# Patient Record
Sex: MALE | Race: WHITE | NOT HISPANIC OR LATINO | Employment: FULL TIME | URBAN - METROPOLITAN AREA
[De-identification: names, ages, dates, MRNs, and addresses within clinical notes are randomized per-mention and may not be internally consistent; named-entity substitution may affect disease eponyms.]

---

## 2017-01-20 ENCOUNTER — OFFICE VISIT (OUTPATIENT)
Dept: RHEUMATOLOGY | Facility: CLINIC | Age: 57
End: 2017-01-20
Attending: STUDENT IN AN ORGANIZED HEALTH CARE EDUCATION/TRAINING PROGRAM
Payer: COMMERCIAL

## 2017-01-20 VITALS
SYSTOLIC BLOOD PRESSURE: 118 MMHG | BODY MASS INDEX: 26.82 KG/M2 | WEIGHT: 198 LBS | OXYGEN SATURATION: 96 % | DIASTOLIC BLOOD PRESSURE: 79 MMHG | HEIGHT: 72 IN | HEART RATE: 63 BPM

## 2017-01-20 DIAGNOSIS — M31.6 TEMPORAL ARTERITIS (H): Primary | ICD-10-CM

## 2017-01-20 PROCEDURE — 99212 OFFICE O/P EST SF 10 MIN: CPT | Mod: ZF

## 2017-01-20 ASSESSMENT — PAIN SCALES - GENERAL: PAINLEVEL: MODERATE PAIN (4)
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
## 2017-01-20 NOTE — NURSING NOTE
Chief Complaint   Patient presents with     RECHECK     3 month follow up for idiopathic acute paccreatitis, vasculitis       Initial /79 mmHg  Pulse 63  Ht 1.829 m (6')  Wt 89.812 kg (198 lb)  BMI 26.85 kg/m2  SpO2 96% Estimated body mass index is 26.85 kg/(m^2) as calculated from the following:    Height as of this encounter: 1.829 m (6').    Weight as of this encounter: 89.812 kg (198 lb).  BP completed using cuff size: zac Dillon, JERILYN
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
## 2017-01-20 NOTE — PATIENT INSTRUCTIONS
Decrease prednisone by 1 mg daily per month until off.    Check ESR and CRP every other month.    Follow up with primary care provider and Dr. Tamez as previously scheduled.    Get knee x-rays and PT referral from primary care provider for patellofemoral syndrome.

## 2017-01-20 NOTE — MR AVS SNAPSHOT
After Visit Summary   1/20/2017    Lucas Brown    MRN: [id number]           Patient Information     Date Of Birth          1960        Visit Information        Provider Department      1/20/2017 9:30 AM Gato Simpson MD [facility] Rheumatology        Care Instructions    Decrease prednisone by 1 mg daily per month until off.    Check ESR and CRP every other month.    Follow up with primary care provider and Dr. Tamez as previously scheduled.    Get knee x-rays and PT referral from primary care provider for patellofemoral syndrome.        Follow-ups after your visit        Follow-up notes from your care team     Return if symptoms worsen or fail to improve.      Your next 10 appointments already scheduled     Apr 12, 2017 11:00 AM   (Arrive by 10:45 AM)   Return Visit with Oniel Cates MD   [facility] Pancreas and Biliary ([facility])    [address] [place] [address] [phone]              Who to contact     If you have questions or need follow up information about today's clinic visit or your schedule please contact [facility] RHEUMATOLOGY directly at [phone].  Normal or non-critical lab and imaging results will be communicated to you by [employer], letter or phone within 4 business days after the clinic has received the results. If you do not hear from us within 7 days, please contact the clinic through [employer] or phone. If you have a critical or abnormal lab result, we will notify you by phone as soon as possible.  Submit refill requests through [employer] or call your pharmacy and they will forward the refill request to us. Please allow 3 business days for your refill to be completed.          Additional Information About Your Visit        [employer] Information     [employer] gives you secure access to your electronic health record. If you see a primary care provider, you can also send messages to your care team and make  appointments. If you have questions, please call your primary care clinic.  If you do not have a primary care provider, please call [phone] and they will assist you.        Care EveryWhere ID     This is your Care EveryWhere ID. This could be used by other organizations to access your [facility] medical records  [id number]        Your Vitals Were     Pulse Height BMI (Body Mass Index) Pulse Oximetry          63 1.829 m (6') 26.85 kg/m2 96%         Blood Pressure from Last 3 Encounters:   01/20/17 118/79   10/21/16 98/67   10/18/16 115/80    Weight from Last 3 Encounters:   01/20/17 89.812 kg (198 lb)   10/21/16 86.637 kg (191 lb)   10/18/16 82.555 kg (182 lb)              Today, you had the following     No orders found for display         Today's Medication Changes          These changes are accurate as of: 1/20/17 10:17 AM.  If you have any questions, ask your nurse or doctor.               These medicines have changed or have updated prescriptions.        Dose/Directions    HYDROmorphone 4 MG tablet   Commonly known as:  DILAUDID   This may have changed:  how much to take   Used for:  S/P ERCP        Dose:  2-4 mg   Take 0.5-1 tablets (2-4 mg) by mouth every 4 hours as needed for moderate to severe pain   Quantity:  90 tablet   Refills:  0                Primary Care Provider Office Phone # Fax #    Cyril Mackay [phone]       [address] [address]        Thank you!     Thank you for choosing [facility] RHEUMATOLOGY  for your care. Our goal is always to provide you with excellent care. Hearing back from our patients is one way we can continue to improve our services. Please take a few minutes to complete the written survey that you may receive in the mail after your visit with us. Thank you!             Your Updated Medication List - Protect others around you: Learn how to safely use, store and throw away your medicines at www.disposemymeds.org.           This list is accurate as of: 1/20/17 10:17 AM.  Always use your most recent med list.                   Brand Name Dispense Instructions for use    ACETAMINOPHEN PO      Take 1,000 mg by mouth every 4 hours as needed for pain       aspirin 81 MG tablet      Take 81 mg by mouth daily       fenofibrate 145 MG tablet      Take 145 mg by mouth daily       HYDROmorphone 4 MG tablet    DILAUDID    90 tablet    Take 0.5-1 tablets (2-4 mg) by mouth every 4 hours as needed for moderate to severe pain       levothyroxine 150 MCG tablet    SYNTHROID/LEVOTHROID     Take 150 mcg by mouth daily       lisinopril 10 MG tablet    PRINIVIL/ZESTRIL     Take 10 mg by mouth daily       MULTIVITAMINS Chew      Take 1 chew tab by mouth daily       omeprazole 20 MG CR capsule    priLOSEC     Take 20-40 mg by mouth daily       ondansetron 4 MG ODT tab    ZOFRAN-ODT     Take 4 mg by mouth every 8 hours as needed for nausea       pravastatin 40 MG tablet    PRAVACHOL     Take 40 mg by mouth daily       * PREDNISONE PO      Take by mouth daily       * predniSONE 1 MG tablet    DELTASONE    240 tablet    Take 1 tablet (1 mg) by mouth daily       sertraline 50 MG tablet    ZOLOFT     Take 50 mg by mouth daily       sucralfate 1 GM/10ML suspension    CARAFATE    420 mL    Take 10 mLs (1 g) by mouth 4 times daily       WARFARIN SODIUM PO      Take 5 mg by mouth on Monday and Thursday. Take 2.5 mg all other days.       * Notice:  This list has 2 medication(s) that are the same as other medications prescribed for you. Read the directions carefully, and ask your doctor or other care provider to review them with you.

## 2017-03-02 ENCOUNTER — HOSPITAL ENCOUNTER (INPATIENT)
Dept: HOSPITAL 41 - JD.ED | Age: 57
LOS: 3 days | Discharge: HOME | DRG: 440 | End: 2017-03-05
Attending: INTERNAL MEDICINE | Admitting: INTERNAL MEDICINE
Payer: COMMERCIAL

## 2017-03-02 DIAGNOSIS — Z79.82: ICD-10-CM

## 2017-03-02 DIAGNOSIS — Z88.6: ICD-10-CM

## 2017-03-02 DIAGNOSIS — Z79.899: ICD-10-CM

## 2017-03-02 DIAGNOSIS — Z95.0: ICD-10-CM

## 2017-03-02 DIAGNOSIS — Z95.2: ICD-10-CM

## 2017-03-02 DIAGNOSIS — E03.9: ICD-10-CM

## 2017-03-02 DIAGNOSIS — Z20.828: ICD-10-CM

## 2017-03-02 DIAGNOSIS — I72.9: ICD-10-CM

## 2017-03-02 DIAGNOSIS — Z87.891: ICD-10-CM

## 2017-03-02 DIAGNOSIS — M31.6: ICD-10-CM

## 2017-03-02 DIAGNOSIS — M81.0: ICD-10-CM

## 2017-03-02 DIAGNOSIS — R10.13: ICD-10-CM

## 2017-03-02 DIAGNOSIS — Z79.01: ICD-10-CM

## 2017-03-02 DIAGNOSIS — K86.1: Primary | ICD-10-CM

## 2017-03-02 PROCEDURE — G0378 HOSPITAL OBSERVATION PER HR: HCPCS

## 2017-03-02 NOTE — EDM.PDOC
ED HPI GI/ABDOMINAL





- General


Chief Complaint: Gastrointestinal Problem


Stated Complaint: VOMITING/ABD PAIN


Time Seen by Provider: 03/02/17 12:48


Source of Information: Reports: Patient


History Limitations: Reports: No limitations





- History of Present Illness


INITIAL COMMENTS - FREE TEXT/NARRATIVE: 





Patient presents for evaluation of right upper quadrant and epigastric pain. 

Patient reports that the episode of pain began this morning. states that the 

pain came on suddenly. Patient has a past medical history of chronic 

pancreatitis and reports that this pain feels similar. Reports the pain is in 

the epigastric and RUQ and radiates to the back. He reports associated symptoms 

of nausea, vomiting and diaphoresis. He states that he is unable to keep his 

nausea or pain medicine down. Denies any fevers or coughing.





Patient's grandson was diagnosed with influenza A earlier this week.





Patient is on a pain contract. He is currently on fentanyl patches and 

Dilaudid. He states that he did change his fentanyl patches today. He was 

unable to take Dilaudid today due to the pain.





Patient sees GI in both [place] and [place]. No current plan of surgery. 

Plan is to utilize pain management.


Location: other (epigastric, RUQ)


Severity: severe


Context: Reports: sick contact (grandson ill with influenza a)


Associated Symptoms: Reports: nausea/vomiting





- Related Data


Allergies/ADRs: 


 Allergies











Allergy/AdvReac Type Severity Reaction Status Date / Time


 


codeine AdvReac  Agitation Verified 03/02/17 16:48


 


mannitol [From Reclast] AdvReac  Joint Pain Verified 03/02/17 16:48


 


water for injection,sterile AdvReac  Joint Pain Verified 03/02/17 16:48





[From Reclast]     


 


zoledronic acid AdvReac  Joint Pain Verified 03/02/17 16:48





[From Reclast]     











Home Meds: 


 Home Meds





Acetaminophen [Tylenol Extra Strength] 1,000 mg PO BID PRN 12/11/16 [History]


Aspirin 81 mg PO DAILY 12/11/16 [History]


Celecoxib [CeleBREX] 200 mg PO DAILY 12/11/16 [History]


Fenofibrate Nanocrystallized [Tricor] 145 mg PO DAILY 12/11/16 [History]


Finasteride [Proscar] 5 mg PO DAILY 12/11/16 [History]


HYDROmorphone [Dilaudid] 2 mg PO Q8HR PRN 12/11/16 [History]


Levothyroxine 150 mcg PO DAILY 12/11/16 [History]


Lisinopril 10 mg PO DAILY 12/11/16 [History]


Multivitamin [Multivitamins] 1 tab PO DAILY 12/11/16 [History]


Ondansetron HCl [Zofran] 4 mg PO DAILY 12/11/16 [History]


Pravastatin [Pravachol] 40 mg PO DAILY 12/11/16 [History]


Prednisone [IJD: Prednisone] 7 mg PO DAILY 12/11/16 [History]


Sertraline [Zoloft] 50 mg PO DAILY 12/11/16 [History]


Sucralfate [Carafate] 1 gm PO QID 12/11/16 [History]


Warfarin [Coumadin] 1.25 mg PO FR 12/15/16 [History]


Warfarin [Coumadin] 2.5 mg PO SUMOTUWETHSA 12/15/16 [History]


Omeprazole Magnesium [Prilosec Otc] 20 mg PO BID #60 tablet. 12/16/16 [Rx]


fentaNYL [Duragesic] 50 mcg TRDERM Q72H #1 box 12/16/16 [Rx]











Past Medical History


HEENT History: Reports: Other (see below)


Other HEENT History: early stages of cataracts


Cardiovascular History: Reports: Aneurysm, Heart valve replacement, Pacemaker


Other Cardiovascular History: Temporal arteritis (on prednisone for that reason)


Respiratory History: Reports: None


Other Respiratory History: after Reclast last friday, heavier breathing since 

then.


Gastrointestinal History: Reports: Pancreatitis, Other (see below)


Other Gastrointestinal History: Peptic ulcers/ stents to pancreas and common 

bile duct


Genitourinary History: Reports: Other (see below)


Other Genitourinary History: biopsy to prostate (negative)


Musculoskeletal History: Reports: Other (see below)


Other Musculoskeletal History: knee pain


Neurological History: Reports: None


Other Neuro History: headaches from temporal arteritis


Psychiatric History: Reports: Other (see below)


Other Psychiatric History: On sertraline for "cabin fever"


Endocrine/Metabolic History: Reports: Hypothyroidism, Osteoporosis


Other Endocrine/Metabolic History: hips, early signs of osteoporosis


Hematologic History: Reports: Other (see below) (diagnosed with giant cell 

arteritis 4 months ago and is on a weaning dose of prednisone. Started at 30 mg 

daily and wean by 1 mg per week until down to 10 mg daily currently is on 7 mg 

daily. No associated visual loss.)


Immunologic History: Reports: Immunosuppression


Other Immunologic History: steroid use at this time for his temporal arteritis


Oncologic (Cancer) History: Reports: None


Dermatologic History: Reports: None





- Past Surgical History


HEENT Surgical History: Reports: None


Male  Surgical History: Reports: Prostate Biopsy


Dermatological Surgical History: Reports: None





Social & Family History





- Family History


Family Medical History: Noncontributory


Cardiac: Reports: Bypass, MI, Stent





- Tobacco Use


Smoking Status *Q: Never Smoker


Years of Tobacco use: 30


Packs/Tins Daily: 2


Used Tobacco, but Quit: Yes


Month Tobacco Last Used: 2012


Second Hand Smoke Exposure: No





- Caffeine Use


Caffeine Use: Reports: None


Other Caffeine Use: 1 cup daily or less.





- Alcohol Use


Days Per Week of Alcohol Use: 0





- Recreational Drug Use


Recreational Drug Use: No





- Living Situation & Occupation


Living situation: Reports: 


Occupation: employed





ED ROS GENERAL





- Review of Systems


Review Of Systems: See Below


Constitutional: Reports: chills.  Denies: fever


Respiratory: Denies: cough


GI/Abdominal: Reports: Abdominal pain, Nausea, Vomiting


Musculoskeletal: Reports: back pain





ED EXAM, GI/ABD





- Physical Exam


Exam: See Below


Exam Limited By: No limitations


General Appearance: alert, WD/WN, severe distress, thin


Respiratory/Chest: no respiratory distress, lungs clear, normal breath sounds


Cardiovascular: normal peripheral pulses, regular rate, rhythm, no murmur


GI/Abdominal: normal bowel sounds, tenderness, guarding


Neurological: alert, oriented, normal cognition


Psychiatric: normal affect, normal mood


Skin Exam: Warm, Dry, Normal color





Course





- Vital Signs


Last Recorded V/S: 


 Last Vital Signs











Temp  36.5 C   03/02/17 16:21


 


Pulse  64   03/02/17 16:21


 


Resp  15   03/02/17 16:21


 


BP  126/77   03/02/17 16:21


 


Pulse Ox  99   03/02/17 16:21














- Orders/Labs/Meds


Orders: 


 Active Orders 24 hr











 Category Date Time Status


 


 Patient Status [ADT] Routine ADT  03/02/17 15:27 Ordered


 


 Antiembolic Devices [RC] PER UNIT ROUTINE Care  03/02/17 17:55 Active


 


 Peripheral IV Care [RC] .AS DIRECTED Care  03/02/17 12:52 Active


 


 Vital Signs [RC] PER UNIT ROUTINE Care  03/02/17 17:51 Active


 


 Nothing Per Oral Diet [DIET] Diet  03/02/17 Dinner Active


 


 BASIC METABOLIC PANEL,BMP [CHEM] DAILY Lab  03/03/17 05:00 Ordered


 


 BASIC METABOLIC PANEL,BMP [CHEM] DAILY Lab  03/04/17 05:00 Ordered


 


 BASIC METABOLIC PANEL,BMP [CHEM] DAILY Lab  03/05/17 05:00 Ordered


 


 CBC W/O DIFF,HEMOGRAM [HEME] MOTH@0700 Lab  03/06/17 07:00 Ordered


 


 CBC W/O DIFF,HEMOGRAM [HEME] MOTH@0700 Lab  03/09/17 07:00 Ordered


 


 CBC W/O DIFF,HEMOGRAM [HEME] MOTH@0700 Lab  03/13/17 07:00 Ordered


 


 CBC W/O DIFF,HEMOGRAM [HEME] MOTH@0700 Lab  03/16/17 07:00 Ordered


 


 CBC W/O DIFF,HEMOGRAM [HEME] MOTH@0700 Lab  03/20/17 07:00 Ordered


 


 CBC W/O DIFF,HEMOGRAM [HEME] MOTH@0700 Lab  03/23/17 07:00 Ordered


 


 CBC WITH AUTO DIFF [HEME] DAILY Lab  03/03/17 05:00 Ordered


 


 CBC WITH AUTO DIFF [HEME] DAILY Lab  03/04/17 05:00 Ordered


 


 CBC WITH AUTO DIFF [HEME] DAILY Lab  03/05/17 05:00 Ordered


 


 CRP [C-REACTIVE PROTEIN] [CHEM] DAILY Lab  03/03/17 05:00 Ordered


 


 CRP [C-REACTIVE PROTEIN] [CHEM] DAILY Lab  03/04/17 05:00 Ordered


 


 CRP [C-REACTIVE PROTEIN] [CHEM] DAILY Lab  03/05/17 05:00 Ordered


 


 INFLUENZA A,B, H1N1 BY PCR [MREF] Routine Lab  03/03/17 05:00 Uncollected


 


 INR,PT,PROTHROMBIN TIME [COAG] DAILY Lab  03/03/17 05:00 Ordered


 


 INR,PT,PROTHROMBIN TIME [COAG] DAILY Lab  03/04/17 05:00 Ordered


 


 LIPASE [CHEM] Routine Lab  03/04/17 05:00 Ordered


 


 MAGNESIUM [CHEM] DAILY Lab  03/03/17 05:00 Ordered


 


 MAGNESIUM [CHEM] DAILY Lab  03/04/17 05:00 Ordered


 


 MAGNESIUM [CHEM] DAILY Lab  03/05/17 05:00 Ordered


 


 MYCOPLASMA PNEUMONIAE IGM AB [CHEM] Routine Lab  03/03/17 05:00 Ordered


 


 Enoxaparin [Lovenox] Med  03/03/17 09:00 Active





 40 mg SUBCUT DAILY   


 


 Finasteride [Proscar] Med  03/03/17 09:00 Active





 5 mg PO DAILY   


 


 HYDROmorphone [Dilaudid] Med  03/02/17 17:53 Active





 2 mg IVPUSH Q4H PRN   


 


 Magnesium Sulfate/Water [Magnesium Sulfate 2 GM in Med  03/02/17 17:49 Active





 Water 50 ML] 2 gm   





 Premix Bag 1 bag   





 IV ONETIME   


 


 Metoclopramide [Reglan] Med  03/02/17 18:57 Pending





 10 mg IVPUSH Q6H STA   


 


 Oseltamivir [Tamiflu] Med  03/03/17 09:00 Active





 75 mg PO DAILY   


 


 Promethazine [Phenergan] 12.5 mg Med  03/02/17 18:16 Active





 Sodium Chloride 0.9% [Normal Saline] 50 ml   





 IV Q6H   


 


 Sertraline [Zoloft] Med  03/03/17 09:00 Active





 50 mg PO DAILY   


 


 Sodium Chloride 0.9% [Normal Saline] 1,000 ml Med  03/02/17 14:35 Active





 IV ONETIME   


 


 Sodium Chloride 0.9% [Saline Flush] Med  03/02/17 12:52 Active





 10 ml FLUSH ASDIRECTED PRN   


 


 Sucralfate [Carafate] Med  03/02/17 21:00 Active





 1 gm PO QID   


 


 Warfarin [Coumadin] Med  03/03/17 17:49 Active





 1.25 mg PO Fr@1800   


 


 Warfarin [Coumadin] Med  03/04/17 18:00 Active





 2.5 mg PO SuMoTuWeThSa@1800   


 


 fentaNYL [Duragesic] Med  03/05/17 09:00 Active





 50 mcg TRDERM Q72H   


 


 predniSONE Med  03/03/17 09:00 Active





 6 mg PO DAILY   


 


 Peripheral IV Insertion Adult [OM.PC] Routine Oth  03/02/17 12:51 Ordered


 


 RADHA Hose [Antiembolic Hose] [OM.PC] Routine Oth  03/02/17 17:55 Ordered


 


 Resuscitation Status Routine Resus Stat  03/02/17 16:50 Ordered








 Medication Orders





Enoxaparin Sodium (Lovenox)  40 mg SUBCUT DAILY LEVAR


Fentanyl (Duragesic)  50 mcg TRDERM Q72H LEVAR


Finasteride (Proscar)  5 mg PO DAILY L[facility]


Hydromorphone HCl (Dilaudid)  2 mg IVPUSH Q4H PRN


   PRN Reason: Pain (moderate 4-6)


   Last Admin: 03/02/17 18:28 Dose:  2 mg


Sodium Chloride (Normal Saline)  1,000 mls @ 125 mls/hr IV ONETIME ONE


   Stop: 03/02/17 22:34


   Last Admin: 03/02/17 14:43  Dose: 125 mls/hr


Magnesium Sulfate 2 gm/ Premix  50 mls @ 25 mls/hr IV ONETIME ONE


   Stop: 03/02/17 19:48


   Last Admin: 03/02/17 18:30 Dose:  25 mls/hr


Promethazine HCl 12.5 mg/ (Sodium Chloride)  50.5 mls @ 100 mls/hr IV Q6H PRN


   PRN Reason: Nausea


Metoclopramide HCl (Reglan)  10 mg IVPUSH Q6H STA


   Stop: 03/02/17 18:58


Oseltamivir Phosphate (Tamiflu)  75 mg PO DAILY [facility]


Prednisone (Prednisone)  6 mg PO DAILY [facility]


Sertraline HCl (Zoloft)  50 mg PO DAILY [facility]


Sodium Chloride (Saline Flush)  10 ml FLUSH ASDIRECTED PRN


   PRN Reason: Keep Vein Open


   Last Admin: 03/02/17 13:09  Dose: 10 ml


Sucralfate (Carafate)  1 gm PO QID [facility]


Warfarin Sodium (Coumadin)  1.25 mg PO Fr@1800 [facility]


Warfarin Sodium (Coumadin)  2.5 mg PO SuMoTuWeThSa@1800 [facility]








Labs: 


 Laboratory Tests











  03/02/17 03/02/17 03/02/17 Range/Units





  12:50 12:50 12:50 


 


WBC   11.08 H   (4.23-9.07)  K/mm3


 


RBC   5.08   (4.63-6.08)  M/mm3


 


Hgb   13.4 L   (13.7-17.5)  gm/L


 


Hct   40.7   (40.1-51.0)  %


 


MCV   80.1   (79.0-92.2)  fl


 


MCH   26.4   (25.7-32.2)  pg


 


MCHC   32.9   (32.2-35.5)  g/dl


 


RDW Std Deviation   43.4   (35.1-43.9)  fL


 


Plt Count   299   (163-337)  K/mm3


 


MPV   9.8   (9.4-12.3)  fl


 


Neutrophils % (Manual)   76 H   (40-60)  %


 


Band Neutrophils %   0   (0-10)  %


 


Lymphocytes % (Manual)   21   (20-40)  %


 


Atypical Lymphs %   0   %


 


Monocytes % (Manual)   2   (2-10)  %


 


Eosinophils % (Manual)   1   (0.8-7.0)  %


 


Basophils % (Manual)   0 L   (0.2-1.2)  


 


Platelet Estimate   Adequate   


 


RBC Morph Comment   Normal   


 


PT    24.7 H  (8.0-13.0)  SECONDS


 


INR    2.16  


 


Sodium  142    (136-145)  mEq/L


 


Potassium  3.5    (3.5-5.1)  mEq/L


 


Chloride  104    ()  mEq/L


 


Carbon Dioxide  25    (21-32)  mEq/L


 


Anion Gap  16.5 H    (5-15)  


 


BUN  16    (7-18)  mg/dL


 


Creatinine  1.2    (0.7-1.3)  mg/dL


 


Est Cr Clr Drug Dosing  72.34    mL/min


 


Estimated GFR (MDRD)  > 60    (>60)  mL/min


 


BUN/Creatinine Ratio  13.3 L    (14-18)  


 


Glucose  109 H    ()  mg/dL


 


Calcium  9.5    (8.5-10.1)  mg/dL


 


Magnesium     (1.8-2.4)  mg/dl


 


Total Bilirubin  0.6    (0.2-1.0)  mg/dL


 


AST  27    (15-37)  U/L


 


ALT  26    (16-63)  U/L


 


Alkaline Phosphatase  47    ()  U/L


 


C-Reactive Protein  3.0 H*    (<1.0)  mg/dL


 


Total Protein  7.8    (6.4-8.2)  g/dl


 


Albumin  4.2    (3.4-5.0)  g/dl


 


Globulin  3.6    gm/dL


 


Albumin/Globulin Ratio  1.2    (1-2)  


 


Amylase  85    ()  U/L


 


Lipase  459 H    ()  U/L


 


Urine Color     (Yellow)  


 


Urine Appearance     (Clear)  


 


Urine pH     (5.0-8.0)  


 


Ur Specific Gravity     (1.005-1.030)  


 


Urine Protein     (Negative)  


 


Urine Glucose (UA)     (Negative)  


 


Urine Ketones     (Negative)  


 


Urine Occult Blood     (Negative)  


 


Urine Nitrite     (Negative)  


 


Urine Bilirubin     (Negative)  


 


Urine Urobilinogen     (0.2-1.0)  


 


Ur Leukocyte Esterase     (Negative)  


 


Urine RBC     (0-5)  /hpf


 


Urine WBC     (0-5)  /hpf


 


Ur Squamous Epith Cells     (0-5)  /hpf


 


Urine Bacteria     (FEW)  /hpf


 


Urine Mucus     (FEW)  /hpf














  03/02/17 03/02/17 Range/Units





  12:50 16:40 


 


WBC    (4.23-9.07)  K/mm3


 


RBC    (4.63-6.08)  M/mm3


 


Hgb    (13.7-17.5)  gm/L


 


Hct    (40.1-51.0)  %


 


MCV    (79.0-92.2)  fl


 


MCH    (25.7-32.2)  pg


 


MCHC    (32.2-35.5)  g/dl


 


RDW Std Deviation    (35.1-43.9)  fL


 


Plt Count    (163-337)  K/mm3


 


MPV    (9.4-12.3)  fl


 


Neutrophils % (Manual)    (40-60)  %


 


Band Neutrophils %    (0-10)  %


 


Lymphocytes % (Manual)    (20-40)  %


 


Atypical Lymphs %    %


 


Monocytes % (Manual)    (2-10)  %


 


Eosinophils % (Manual)    (0.8-7.0)  %


 


Basophils % (Manual)    (0.2-1.2)  


 


Platelet Estimate    


 


RBC Morph Comment    


 


PT    (8.0-13.0)  SECONDS


 


INR    


 


Sodium    (136-145)  mEq/L


 


Potassium    (3.5-5.1)  mEq/L


 


Chloride    ()  mEq/L


 


Carbon Dioxide    (21-32)  mEq/L


 


Anion Gap    (5-15)  


 


BUN    (7-18)  mg/dL


 


Creatinine    (0.7-1.3)  mg/dL


 


Est Cr Clr Drug Dosing    mL/min


 


Estimated GFR (MDRD)    (>60)  mL/min


 


BUN/Creatinine Ratio    (14-18)  


 


Glucose    ()  mg/dL


 


Calcium    (8.5-10.1)  mg/dL


 


Magnesium  1.7 L   (1.8-2.4)  mg/dl


 


Total Bilirubin    (0.2-1.0)  mg/dL


 


AST    (15-37)  U/L


 


ALT    (16-63)  U/L


 


Alkaline Phosphatase    ()  U/L


 


C-Reactive Protein    (<1.0)  mg/dL


 


Total Protein    (6.4-8.2)  g/dl


 


Albumin    (3.4-5.0)  g/dl


 


Globulin    gm/dL


 


Albumin/Globulin Ratio    (1-2)  


 


Amylase    ()  U/L


 


Lipase    ()  U/L


 


Urine Color   Yellow  (Yellow)  


 


Urine Appearance   Slt cloudy H  (Clear)  


 


Urine pH   8.0  (5.0-8.0)  


 


Ur Specific Gravity   1.020  (1.005-1.030)  


 


Urine Protein   1+ H  (Negative)  


 


Urine Glucose (UA)   Negative  (Negative)  


 


Urine Ketones   Trace H  (Negative)  


 


Urine Occult Blood   Negative  (Negative)  


 


Urine Nitrite   Negative  (Negative)  


 


Urine Bilirubin   Negative  (Negative)  


 


Urine Urobilinogen   2.0 H  (0.2-1.0)  


 


Ur Leukocyte Esterase   Negative  (Negative)  


 


Urine RBC   0-5  (0-5)  /hpf


 


Urine WBC   0-5  (0-5)  /hpf


 


Ur Squamous Epith Cells   0-5  (0-5)  /hpf


 


Urine Bacteria   Few  (FEW)  /hpf


 


Urine Mucus   Few  (FEW)  /hpf











Meds: 


Medications











Generic Name Dose Route Start Last Admin





  Trade Name Calinq  PRN Reason Stop Dose Admin


 


Enoxaparin Sodium  40 mg  03/03/17 09:00  





  Lovenox  SUBCUT   





  DAILY LEVAR   


 


Fentanyl  50 mcg  03/05/17 09:00  





  Duragesic  TRDERM   





  Q72H LEVAR   


 


Finasteride  5 mg  03/03/17 09:00  





  Proscar  PO   





  DAILY [facility]   


 


Hydromorphone HCl  2 mg  03/02/17 17:53  03/02/17 18:28





  Dilaudid  IVPUSH   2 mg





  Q4H PRN   Administration





  Pain (moderate 4-6)   


 


Sodium Chloride  1,000 mls @ 125 mls/hr  03/02/17 14:35  03/02/17 14:43





  Normal Saline  IV  03/02/17 22:34  125 mls/hr





  ONETIME ONE   Administration


 


Magnesium Sulfate 2 gm/ Premix  50 mls @ 25 mls/hr  03/02/17 17:49  03/02/17 18:

30





  IV  03/02/17 19:48  25 mls/hr





  ONETIME ONE   Administration


 


Promethazine HCl 12.5 mg/  50.5 mls @ 100 mls/hr  03/02/17 18:16  





  Sodium Chloride  IV   





  Q6H PRN   





  Nausea   


 


Metoclopramide HCl  10 mg  03/02/17 18:57  





  Reglan  IVPUSH  03/02/17 18:58  





  Q6H STA   


 


Oseltamivir Phosphate  75 mg  03/03/17 09:00  





  Tamiflu  PO   





  DAILY [facility]   


 


Prednisone  6 mg  03/03/17 09:00  





  Prednisone  PO   





  DAILY [facility]   


 


Sertraline HCl  50 mg  03/03/17 09:00  





  Zoloft  PO   





  DAILY [facility]   


 


Sodium Chloride  10 ml  03/02/17 12:52  03/02/17 13:09





  Saline Flush  FLUSH   10 ml





  ASDIRECTED PRN   Administration





  Keep Vein Open   


 


Sucralfate  1 gm  03/02/17 21:00  





  Carafate  PO   





  QID [facility]   


 


Warfarin Sodium  1.25 mg  03/03/17 17:49  





  Coumadin  PO   





  Fr@1800 [facility]   


 


Warfarin Sodium  2.5 mg  03/04/17 18:00  





  Coumadin  PO   





  SuMoTuWeThSa@1800 [facility]   














Discontinued Medications














Generic Name Dose Route Start Last Admin





  Trade Name Freq  PRN Reason Stop Dose Admin


 


Fentanyl  50 mcg  03/02/17 18:00  





  Duragesic  TRDERM   





  Q72H [facility]   


 


Hydromorphone HCl  2 mg  03/02/17 12:52  03/02/17 13:00





  Dilaudid  IVPUSH  03/02/17 12:53  2 mg





  ONETIME ONE   Administration


 


Hydromorphone HCl  1 mg  03/02/17 14:33  03/02/17 14:42





  Dilaudid  IVPUSH  03/02/17 14:34  1 mg





  ONETIME ONE   Administration


 


Sodium Chloride  1,000 mls @ 999 mls/hr  03/02/17 12:53  03/02/17 13:05





  Normal Saline  IV  03/02/17 13:53  999 mls/hr





  ONETIME ONE   Administration


 


Metoclopramide HCl  10 mg  03/02/17 18:16  





  Reglan  IVPUSH   





  Q6H PRN   





  Nausea   


 


Ondansetron HCl  4 mg  03/02/17 12:52  03/02/17 13:00





  Zofran  IVPUSH  03/02/17 12:53  4 mg





  ONETIME ONE   Administration


 


Oseltamivir Phosphate  75 mg  03/02/17 15:08  03/02/17 15:55





  Tamiflu  PO  03/02/17 15:09  75 mg





  ONETIME ONE   Administration


 


Warfarin Sodium  2.5 mg  03/02/17 18:00  





  Coumadin  PO   





  SUMOTUWETHSA [facility]   














- Re-Assessments/Exams


Free Text/Narrative Re-Assessment/Exam: 





03/02/17 15:27


Labs returned.


Blood cell count is slightly elevated at 11.0 white count hemoglobin 13.4 

platelets are 299.


PT is 24.7, INR is 2.16, this is within his normal range.


Sodium is 142, potassium 3.5 chloride is 104. Anion gap is 16.5. CRP is 

elevated at 2.0.


Lipase is slightly elevated at 49.


Amylase is within normal limits at 85.





Patient reports the pain started to return after 2 mg of IV Dilaudid. 

Additional 1 mg of IV dilaudid was given. He reports good nausea control with 

the 4 mg of Zofran. 





Discussed the lab results with the patient and his wife. His wife does not feel 

comfortable taking home and feels admission to the hospital for pain control is 

needed. 





I spoke with the hospitalist, Dr. Chun, she agrees to admit the patient. Will 

admit to observation to [facility], Under Dr. Chun. Gastrointestinal he had on 

him he needs them level. Shots up to restart prophylactic Tamiflu as he was 

exposed to influenza A.





Departure





- Departure


Time of Disposition: 15:40


Disposition: Admitted As Inpatient 65


Condition: fair


Clinical Impression: 


Pancreatitis, chronic


Qualifiers:


 Pancreatitis type: other Qualified Code(s): K86.1 - Other chronic pancreatitis





Abdominal pain


Qualifiers:


 Abdominal location: epigastric Qualified Code(s): R10.13 - Epigastric pain





Referrals: 


Topher Cheng MD [Primary Care Provider] - 


Forms:  ED Department Discharge


Additional Instructions: 


Patient admitted to the med/surg floor under Dr. Chun for pain control for 

pancreatitis. 





- My Orders


Last 24 Hours: 


My Active Orders





03/02/17 12:51


Peripheral IV Insertion Adult [OM.PC] Routine 





03/02/17 12:52


Peripheral IV Care [RC] .AS DIRECTED 


Sodium Chloride 0.9% [Saline Flush]   10 ml FLUSH ASDIRECTED PRN 





03/02/17 14:35


Sodium Chloride 0.9% [Normal Saline] 1,000 ml IV ONETIME 





03/02/17 15:27


Patient Status [ADT] Routine 














- Assessment/Plan


Last 24 Hours: 


My Active Orders





03/02/17 12:51


Peripheral IV Insertion Adult [OM.PC] Routine 





03/02/17 12:52


Peripheral IV Care [RC] .AS DIRECTED 


Sodium Chloride 0.9% [Saline Flush]   10 ml FLUSH ASDIRECTED PRN 





03/02/17 14:35


Sodium Chloride 0.9% [Normal Saline] 1,000 ml IV ONETIME 





03/02/17 15:27


Patient Status [ADT] Routine

## 2017-03-02 NOTE — PCM.HP
H&P History of Present Illness





- General


Date of Service: 03/02/17


Admit Problem/Dx: 


 Admission Diagnosis/Problem





Admission Diagnosis/Problem      Chronic pancreatitis








Source of Information: Patient, Family, Provider


History Limitations: Reports: No limitations





- History of Present Illness


Initial Comments - Free Text/Narative: 











56 year old male with abdominal pain, hx of chronic pancreatitis.  Has had sick 

exposure, influenza from grandson.  Began feeling sick to his stomach with 

associated nausea and vomiting.  The patient was last admitted for similar 

complaints on  February 18, 2017, discharged in roughly 4 days to home.  At the 

time of DC, he received a Percocet Rx however was not able to get it filled; he 

is on a pain contract.


Onset of Symptoms: Reports: sudden


Duration of Symptoms: Reports: Day(s):, Getting worse


Location: Reports: abdomen


Quality: Reports: Same as previous episode


Severity: moderate


Improves with: Reports: Medication


Worsens with: Reports: None


Context: Reports: sick contact


Associated Symptoms: Reports: fever/chills, loss of appetite, malaise, nausea/

vomiting, weakness


  ** Right Upper Abdomen


Pain Score (Numeric/FACES): 4





- Related Data


Allergies/Adverse Reactions: 


 Allergies











Allergy/AdvReac Type Severity Reaction Status Date / Time


 


codeine AdvReac  Agitation Verified 03/02/17 16:48


 


mannitol [From Reclast] AdvReac  Joint Pain Verified 03/02/17 16:48


 


water for injection,sterile AdvReac  Joint Pain Verified 03/02/17 16:48





[From Reclast]     


 


zoledronic acid AdvReac  Joint Pain Verified 03/02/17 16:48





[From Reclast]     











Home Medications: 


 Home Meds





Acetaminophen [Tylenol Extra Strength] 1,000 mg PO BID PRN 12/11/16 [History]


Aspirin 81 mg PO DAILY 12/11/16 [History]


Celecoxib [CeleBREX] 200 mg PO DAILY 12/11/16 [History]


Fenofibrate Nanocrystallized [Tricor] 145 mg PO DAILY 12/11/16 [History]


Finasteride [Proscar] 5 mg PO DAILY 12/11/16 [History]


HYDROmorphone [Dilaudid] 2 mg PO Q8HR PRN 12/11/16 [History]


Levothyroxine 150 mcg PO DAILY 12/11/16 [History]


Lisinopril 10 mg PO DAILY 12/11/16 [History]


Multivitamin [Multivitamins] 1 tab PO DAILY 12/11/16 [History]


Ondansetron HCl [Zofran] 4 mg PO DAILY 12/11/16 [History]


Pravastatin [Pravachol] 40 mg PO DAILY 12/11/16 [History]


Prednisone [IJD: Prednisone] 6 mg PO DAILY 12/11/16 [History]


Sertraline [Zoloft] 50 mg PO DAILY 12/11/16 [History]


Sucralfate [Carafate] 1 gm PO QID 12/11/16 [History]


Warfarin [Coumadin] 2.5 mg PO DAILY 12/15/16 [History]


Omeprazole Magnesium [Prilosec Otc] 20 mg PO BID #60 tablet. 12/16/16 [Rx]


fentaNYL [Duragesic] 50 mcg TRDERM Q72H #1 box 12/16/16 [Rx]











Past Medical History


HEENT History: Reports: Other (see below)


Other HEENT History: early stages of cataracts


Cardiovascular History: Reports: Aneurysm, Heart valve replacement, Pacemaker


Other Cardiovascular History: Temporal arteritis (on prednisone for that reason)


Respiratory History: Reports: None


Other Respiratory History: after Reclast last friday, heavier breathing since 

then.


Gastrointestinal History: Reports: Pancreatitis, Other (see below)


Other Gastrointestinal History: Peptic ulcers/ stents to pancreas and common 

bile duct


Genitourinary History: Reports: Other (see below)


Other Genitourinary History: biopsy to prostate (negative)


Musculoskeletal History: Reports: Other (see below)


Other Musculoskeletal History: knee pain


Neurological History: Reports: None


Other Neuro History: headaches from temporal arteritis


Psychiatric History: Reports: Other (see below)


Other Psychiatric History: On sertraline for "cabin fever"


Endocrine/Metabolic History: Reports: Hypothyroidism, Osteoporosis


Other Endocrine/Metabolic History: hips, early signs of osteoporosis


Hematologic History: Reports: Other (see below) (diagnosed with giant cell 

arteritis 4 months ago and is on a weaning dose of prednisone. Started at 30 mg 

daily and wean by 1 mg per week until down to 10 mg daily currently is on 7 mg 

daily. No associated visual loss.)


Immunologic History: Reports: Immunosuppression


Other Immunologic History: steroid use at this time for his temporal arteritis


Oncologic (Cancer) History: Reports: None


Dermatologic History: Reports: None





- Past Surgical History


HEENT Surgical History: Reports: None


Male  Surgical History: Reports: Prostate Biopsy


Dermatological Surgical History: Reports: None





Social & Family History





- Family History


Family Medical History: Noncontributory


Cardiac: Reports: Bypass, MI, Stent





- Tobacco Use


Smoking Status *Q: Never Smoker


Years of Tobacco use: 30


Packs/Tins Daily: 2


Used Tobacco, but Quit: Yes


Month Tobacco Last Used: 2012


Second Hand Smoke Exposure: No





- Caffeine Use


Caffeine Use: Reports: None


Other Caffeine Use: 1 cup daily or less.





- Alcohol Use


Days Per Week of Alcohol Use: 0





- Recreational Drug Use


Recreational Drug Use: No





- Living Situation & Occupation


Living situation: Reports: 


Occupation: employed





H&P Review of Systems





- Review of Systems:


Review Of Systems: See Below


General: Reports: fever, chills, malaise, weakness, decreased appetite


HEENT: Reports: no symptoms


Pulmonary: Reports: no symptoms


Cardiovascular: Reports: no symptoms


Gastrointestinal: Reports: Abdominal pain (RUQ), Decreased appetite, Nausea, 

Vomiting


Genitourinary: Reports: no symptoms


Musculoskeletal: Reports: no symptoms


Skin: Reports: no symptoms


Psychiatric: Reports: no symptoms


Neurological: Reports: dizziness


Hematologic/Lymphatic: Reports: no symptoms


Immunologic: Reports: no symptoms





Exam





- Exam


Exam: See Below





- Vital Signs


Vital Signs: 


 Last Vital Signs











Temp  36.5 C   03/02/17 16:21


 


Pulse  64   03/02/17 16:21


 


Resp  15   03/02/17 16:21


 


BP  126/77   03/02/17 16:21


 


Pulse Ox  99   03/02/17 16:21











Weight: 86.5 kg





- Exam


Quality Assessment: DVT prophylaxis


General: alert, oriented, mild distress


HEENT: Conjunctiva clear, EACs clear, EOMI, Nares patent, Normal nasal septum, 

Posterior pharynx clear, Pupils equal, Pupils reactive


Neck: supple, trachea midline


Lungs: Clear to auscultation, Normal respiratory effort


Cardiovascular: regular rate, regular rhythm


Abdomen: normal bowel sounds, soft, other (RUQ tenderness without guarding or 

rigidity)


 (Male) Exam: Deferred


Rectal (Males) Exam: Deferred


Back Exam: normal inspection


Extremities: normal pulses


Skin: warm, dry


Neurological: cranial nerves intact, normal speech


Neuro Extensive - Mental Status: alert, oriented x3, normal mood/affect, normal 

cognition, memory intact


Neuro Extensive - Motor, Sensory, Reflexes: CN II-XII intact


Psychiatric: alert





- Patient Data


Lab Results last 24 hrs: 


 Laboratory Results - last 24 hr











  03/02/17 Range/Units





  16:40 


 


Urine Color  Yellow  (Yellow)  


 


Urine Appearance  Slt cloudy H  (Clear)  


 


Urine pH  8.0  (5.0-8.0)  


 


Ur Specific Gravity  1.020  (1.005-1.030)  


 


Urine Protein  1+ H  (Negative)  


 


Urine Glucose (UA)  Negative  (Negative)  


 


Urine Ketones  Trace H  (Negative)  


 


Urine Occult Blood  Negative  (Negative)  


 


Urine Nitrite  Negative  (Negative)  


 


Urine Bilirubin  Negative  (Negative)  


 


Urine Urobilinogen  2.0 H  (0.2-1.0)  


 


Ur Leukocyte Esterase  Negative  (Negative)  


 


Urine RBC  0-5  (0-5)  /hpf


 


Urine WBC  0-5  (0-5)  /hpf


 


Ur Squamous Epith Cells  0-5  (0-5)  /hpf


 


Urine Bacteria  Few  (FEW)  /hpf


 


Urine Mucus  Few  (FEW)  /hpf











Result Diagrams: 


 03/03/17 05:56





 03/03/17 05:56





*Q Meaningful Use (ADM)





- VTE *Q


VTE Criteria *Q: 








- Stroke *Q


Stroke Criteria *Q: 








- AMI *Q


AMI Criteria *Q: 








- Problem List


(1) Abdominal pain


SNOMED Code(s): 79524202


   ICD Code: R10.9 - UNSPECIFIED ABDOMINAL PAIN   Status: Acute   Priority: 

High   Current Visit: No   


Qualifiers: 


   Abdominal location: generalized   Qualified Code(s): R10.84 - Generalized 

abdominal pain   





(2) Nausea and vomiting


SNOMED Code(s): 25404565


   ICD Code: R11.2 - NAUSEA WITH VOMITING, UNSPECIFIED   Status: Acute   

Priority: High   Current Visit: No   


Qualifiers: 


   Vomiting type: unspecified   Vomiting Intractability: non-intractable   

Qualified Code(s): R11.2 - Nausea with vomiting, unspecified   





(3) Pacemaker


SNOMED Code(s): 355958204, 998862773


   ICD Code: Z95.0 - PRESENCE OF CARDIAC PACEMAKER   Status: Acute   Current 

Visit: No   





(4) Pancreatitis


SNOMED Code(s): 61586036


   ICD Code: K85.9 - ACUTE PANCREATITIS, UNSPECIFIED * DO NOT USE *   Status: 

Acute   Current Visit: No   


Problem List Initiated/Reviewed/Updated: Yes


Orders Last 24hrs: 


 Active Orders 24 hr











 Category Date Time Status


 


 Antiembolic Devices [RC] PER UNIT ROUTINE Care  03/02/17 17:55 Active


 


 Vital Signs [RC] PER UNIT ROUTINE Care  03/02/17 17:51 Active


 


 Nothing Per Oral Diet [DIET] Diet  03/02/17 Dinner Active


 


 BASIC METABOLIC PANEL,BMP [CHEM] DAILY Lab  03/03/17 05:00 Ordered


 


 BASIC METABOLIC PANEL,BMP [CHEM] DAILY Lab  03/04/17 05:00 Ordered


 


 BASIC METABOLIC PANEL,BMP [CHEM] DAILY Lab  03/05/17 05:00 Ordered


 


 CBC W/O DIFF,HEMOGRAM [HEME] MOTH@0700 Lab  03/06/17 07:00 Ordered


 


 CBC W/O DIFF,HEMOGRAM [HEME] MOTH@0700 Lab  03/09/17 07:00 Ordered


 


 CBC W/O DIFF,HEMOGRAM [HEME] MOTH@0700 Lab  03/13/17 07:00 Ordered


 


 CBC W/O DIFF,HEMOGRAM [HEME] MOTH@0700 Lab  03/16/17 07:00 Ordered


 


 CBC W/O DIFF,HEMOGRAM [HEME] MOTH@0700 Lab  03/20/17 07:00 Ordered


 


 CBC W/O DIFF,HEMOGRAM [HEME] MOTH@0700 Lab  03/23/17 07:00 Ordered


 


 CBC WITH AUTO DIFF [HEME] DAILY Lab  03/03/17 05:00 Ordered


 


 CBC WITH AUTO DIFF [HEME] DAILY Lab  03/04/17 05:00 Ordered


 


 CBC WITH AUTO DIFF [HEME] DAILY Lab  03/05/17 05:00 Ordered


 


 CRP [C-REACTIVE PROTEIN] [CHEM] DAILY Lab  03/03/17 05:00 Ordered


 


 CRP [C-REACTIVE PROTEIN] [CHEM] DAILY Lab  03/04/17 05:00 Ordered


 


 CRP [C-REACTIVE PROTEIN] [CHEM] DAILY Lab  03/05/17 05:00 Ordered


 


 INFLUENZA A,B, H1N1 BY PCR [MREF] Routine Lab  03/03/17 05:00 Uncollected


 


 INR,PT,PROTHROMBIN TIME [COAG] DAILY Lab  03/03/17 05:00 Ordered


 


 INR,PT,PROTHROMBIN TIME [COAG] DAILY Lab  03/04/17 05:00 Ordered


 


 LIPASE [CHEM] Routine Lab  03/04/17 05:00 Ordered


 


 MAGNESIUM [CHEM] DAILY Lab  03/03/17 05:00 Ordered


 


 MAGNESIUM [CHEM] DAILY Lab  03/04/17 05:00 Ordered


 


 MAGNESIUM [CHEM] DAILY Lab  03/05/17 05:00 Ordered


 


 MYCOPLASMA PNEUMONIAE IGM AB [CHEM] Routine Lab  03/03/17 05:00 Ordered


 


 Enoxaparin [Lovenox] Med  03/03/17 09:00 Active





 40 mg SUBCUT DAILY   


 


 Finasteride [Proscar] Med  03/03/17 09:00 Pending





 5 mg PO DAILY   


 


 HYDROmorphone [Dilaudid] Med  03/02/17 17:53 Active





 2 mg IVPUSH Q4H PRN   


 


 Magnesium Sulfate/Water [Magnesium Sulfate 2 GM in Med  03/02/17 17:49 Active





 Water 50 ML] 2 gm   





 Premix Bag 1 bag   





 IV ONETIME   


 


 Metoclopramide [Reglan] Med  03/02/17 18:16 Ordered





 10 mg IVPUSH Q6H PRN   


 


 Oseltamivir [Tamiflu] Med  03/03/17 09:00 Active





 75 mg PO DAILY   


 


 Promethazine [Phenergan] 12.5 mg Med  03/02/17 18:16 Ordered





 Sodium Chloride 0.9% [Normal Saline] 50 ml   





 IV Q6H   


 


 Sertraline [Zoloft] Med  03/03/17 09:00 Ordered





 50 mg PO DAILY   


 


 Sucralfate [Carafate] Med  03/02/17 21:00 Ordered





 1 gm PO QID   


 


 Warfarin [Coumadin] Med  03/03/17 17:49 Ordered





 1.25 mg PO FR   


 


 Warfarin [Coumadin] Med  03/02/17 18:00 Ordered





 2.5 mg PO SUMOTUWETHSA   


 


 fentaNYL [Duragesic] Med  03/05/17 09:00 Pending





 50 mcg TRDERM Q72H   


 


 predniSONE Med  03/03/17 09:00 Ordered





 6 mg PO DAILY   


 


 RADHA Hose [Antiembolic Hose] [OM.PC] Routine Oth  03/02/17 17:55 Ordered


 


 Resuscitation Status Routine Resus Stat  03/02/17 16:50 Ordered








 Medication Orders





Enoxaparin Sodium (Lovenox)  40 mg SUBCUT DAILY [facility]


Fentanyl (Duragesic)  50 mcg TRDERM Q72H [facility]


Finasteride (Proscar)  5 mg PO DAILY [facility]


Hydromorphone HCl (Dilaudid)  2 mg IVPUSH Q4H PRN


   PRN Reason: Pain (moderate 4-6)


   Last Admin: 03/02/17 18:28  Dose: 2 mg


Sodium Chloride (Normal Saline)  1,000 mls @ 125 mls/hr IV ONETIME ONE


   Stop: 03/02/17 22:34


   Last Admin: 03/02/17 14:43  Dose: 125 mls/hr


Magnesium Sulfate 2 gm/ Premix  50 mls @ 25 mls/hr IV ONETIME ONE


   Stop: 03/02/17 19:48


   Last Admin: 03/02/17 18:30  Dose: 25 mls/hr


Promethazine HCl 12.5 mg/ (Sodium Chloride)  50.5 mls @ 100 mls/hr IV Q6H PRN


   PRN Reason: Nausea


Metoclopramide HCl (Reglan)  10 mg IVPUSH Q6H PRN


   PRN Reason: Nausea


Oseltamivir Phosphate (Tamiflu)  75 mg PO DAILY [facility]


Prednisone (Prednisone)  6 mg PO DAILY [facility]


Sertraline HCl (Zoloft)  50 mg PO DAILY [facility]


Sodium Chloride (Saline Flush)  10 ml FLUSH ASDIRECTED PRN


   PRN Reason: Keep Vein Open


   Last Admin: 03/02/17 13:09  Dose: 10 ml


Sucralfate (Carafate)  1 gm PO QID [facility]


Warfarin Sodium (Coumadin)  2.5 mg PO SUMOTUWETHSA [facility]


Warfarin Sodium (Coumadin)  1.25 mg PO FR [facility]








Assessment/Plan Comment:: 








Impression:





Acute abdominal pain with chronic pancreatitis





Sick contact exposure, prelim screen negative





Valvular heart disease with AVR on coumadin














Plan:





IVF


Pain mgt


Advance diet as tolerated


Empiric treatment for Influenza


Anti-emetic


Resume home meds and advance diet as tolerated.


DVT/GI prophylaxis

## 2017-03-03 ENCOUNTER — TELEPHONE (OUTPATIENT)
Dept: GASTROENTEROLOGY | Facility: CLINIC | Age: 57
End: 2017-03-03

## 2017-03-03 RX ADMIN — DEXTROSE MONOHYDRATE, SODIUM CHLORIDE, AND POTASSIUM CHLORIDE SCH MLS/HR: 50; 9; 2.98 INJECTION, SOLUTION INTRAVENOUS at 09:11

## 2017-03-03 RX ADMIN — DEXTROSE MONOHYDRATE, SODIUM CHLORIDE, AND POTASSIUM CHLORIDE SCH MLS/HR: 50; 9; 2.98 INJECTION, SOLUTION INTRAVENOUS at 17:33

## 2017-03-03 NOTE — PCM.PN
- General Info


Date of Service: 03/03/17


Functional Status: Reports: tolerating diet (npo), urinating





- Review of Systems


General: Reports: weakness, malaise


HEENT: Reports: no symptoms


Pulmonary: Reports: no symptoms


Cardiovascular: Reports: no symptoms


Gastrointestinal: Reports: Abdominal pain, Nausea


Genitourinary: Reports: no symptoms


Musculoskeletal: Reports: no symptoms


Skin: Reports: no symptoms


Neurological: Reports: no symptoms


Psychiatric: Reports: no symptoms





- Patient Data


Vitals - most recent: 


 Last Vital Signs











Temp  36.9 C   03/03/17 15:29


 


Pulse  83   03/03/17 15:29


 


Resp  17   03/03/17 15:29


 


BP  127/90   03/03/17 15:29


 


Pulse Ox  96   03/03/17 15:29











Weight - most recent: 86.5 kg


I&O - last 24 hours: 


 Intake & Output











 03/03/17 03/03/17 03/03/17





 06:59 14:59 22:59


 


Intake Total   1624


 


Balance   1624











Med Orders - Current: 


 Current Medications





Fentanyl (Duragesic)  50 mcg TRDERM Q72H [facility]


Finasteride (Proscar)  5 mg PO DAILY [facility]


   Last Admin: 03/03/17 09:14 Dose:  5 mg


Hydromorphone HCl (Dilaudid)  2 mg IVPUSH Q4H PRN


   PRN Reason: Pain (moderate 4-6)


   Last Admin: 03/03/17 19:00 Dose:  2 mg


Potassium Chloride/Dextrose/Sod Cl (D5 Ns With 40 Meq Kcl)  1,000 mls @ 125 mls/

hr IV ASDIRECTED [facility]


   Last Admin: 03/03/17 17:33 Dose:  125 mls/hr


Metoclopramide HCl (Reglan)  10 mg IVPUSH Q6H PRN


   PRN Reason: Nausea


   Last Admin: 03/03/17 19:14 Dose:  10 mg


Miscellaneous Information (Remove Patch)  1 ea TRDERM Q72H [facility]


Oseltamivir Phosphate (Tamiflu)  75 mg PO DAILY [facility]


   Last Admin: 03/03/17 09:14 Dose:  75 mg


Prednisone (Prednisone)  6 mg PO DAILY [facility]


   Last Admin: 03/03/17 09:12 Dose:  6 mg


Sertraline HCl (Zoloft)  50 mg PO DAILY [facility]


   Last Admin: 03/03/17 09:14 Dose:  50 mg


Sodium Chloride (Saline Flush)  10 ml FLUSH ASDIRECTED PRN


   PRN Reason: Keep Vein Open


   Last Admin: 03/02/17 13:09 Dose:  10 ml


Sucralfate (Carafate)  1 gm PO QID [facility]


   Last Admin: 03/03/17 17:33 Dose:  1 gm


Warfarin Sodium (Coumadin)  2.5 mg PO DAILY@1800 [facility]





Discontinued Medications





Enoxaparin Sodium (Lovenox)  40 mg SUBCUT DAILY [facility]


   Last Admin: 03/03/17 09:15 Dose:  40 mg


Fentanyl (Duragesic)  50 mcg TRDERM Q72H [facility]


   Last Admin: 03/02/17 19:01 Dose:  Not Given


Hydromorphone HCl (Dilaudid)  2 mg IVPUSH ONETIME ONE


   Stop: 03/02/17 12:53


   Last Admin: 03/02/17 13:00 Dose:  2 mg


Hydromorphone HCl (Dilaudid)  1 mg IVPUSH ONETIME ONE


   Stop: 03/02/17 14:34


   Last Admin: 03/02/17 14:42 Dose:  1 mg


Sodium Chloride (Normal Saline)  1,000 mls @ 999 mls/hr IV ONETIME ONE


   Stop: 03/02/17 13:53


   Last Admin: 03/02/17 13:05 Dose:  999 mls/hr


Sodium Chloride (Normal Saline)  1,000 mls @ 125 mls/hr IV ONETIME ONE


   Stop: 03/02/17 22:34


   Last Admin: 03/02/17 14:43 Dose:  125 mls/hr


Magnesium Sulfate 2 gm/ Premix  50 mls @ 25 mls/hr IV ONETIME ONE


   Stop: 03/02/17 19:48


   Last Admin: 03/02/17 18:30 Dose:  25 mls/hr


Promethazine HCl 12.5 mg/ (Sodium Chloride)  50.5 mls @ 100 mls/hr IV Q6H PRN


   PRN Reason: Nausea


Metoclopramide HCl (Reglan)  10 mg IVPUSH Q6H PRN


   PRN Reason: Nausea


Metoclopramide HCl (Reglan)  10 mg IVPUSH Q6H [facility]


   Stop: 03/02/17 18:58


   Last Admin: 03/02/17 20:25 Dose:  10 mg


Ondansetron HCl (Zofran)  4 mg IVPUSH ONETIME ONE


   Stop: 03/02/17 12:53


   Last Admin: 03/02/17 13:00 Dose:  4 mg


Oseltamivir Phosphate (Tamiflu)  75 mg PO ONETIME ONE


   Stop: 03/02/17 15:09


   Last Admin: 03/02/17 15:55 Dose:  75 mg


Warfarin Sodium (Coumadin)  2.5 mg PO SUMOTUWETHSA [facility]


   Last Admin: 03/02/17 19:02 Dose:  Not Given


Warfarin Sodium (Coumadin)  1.25 mg PO Fr@1800 [facility]











- Exam


Quality Assessment: DVT prophylaxis


General: alert, oriented, cooperative


HEENT: Pupils equal, Pupils reactive, EOMI


Neck: trachea midline


Lungs: Clear to auscultation, Normal respiratory effort


Cardiovascular: regular rate


Abdomen: bowel sounds present, soft, tenderness (diffuse), other (non acute)


 (Male) Exam: Deferred


Back Exam: normal inspection


Extremities: normal pulses


Skin: warm


Neurological: normal gait, normal speech


Psy/Mental Status: alert, normal affect, normal mood





- Problem List & Annotations


(1) Abdominal pain


SNOMED Code(s): 38242239


   Code(s): R10.9 - UNSPECIFIED ABDOMINAL PAIN   Status: Acute   Priority: High

   Current Visit: No   


Qualifiers: 


   Abdominal location: generalized   Qualified Code(s): R10.84 - Generalized 

abdominal pain   





(2) Nausea and vomiting


SNOMED Code(s): 23498488


   Code(s): R11.2 - NAUSEA WITH VOMITING, UNSPECIFIED   Status: Acute   Priority

: High   Current Visit: No   


Qualifiers: 


   Vomiting type: unspecified   Vomiting Intractability: non-intractable   

Qualified Code(s): R11.2 - Nausea with vomiting, unspecified   





(3) Pacemaker


SNOMED Code(s): 856925231, 142913995


   Code(s): Z95.0 - PRESENCE OF CARDIAC PACEMAKER   Status: Acute   Current 

Visit: No   





(4) Pancreatitis


SNOMED Code(s): 12099064


   Code(s): K85.9 - ACUTE PANCREATITIS, UNSPECIFIED * DO NOT USE *   Status: 

Acute   Current Visit: No   





- Problem List Review


Problem List Initiated/Reviewed/Updated: Yes





- My Orders


Last 24 Hours: 


My Active Orders





03/03/17 Dinner


Clear Liquid Diet [DIET] 














- Plan


Plan:: 








Impression:





Acute abdominal pain with chronic pancreatitis





Sick contact exposure, prelim screen negative





Valvular heart disease with AVR on coumadin














Plan:





IVF


Pain mgt


Advance diet as tolerated


Empiric treatment for Influenza


Anti-emetic


Resume home meds and advance diet as tolerated.


DVT/GI prophylaxis

## 2017-03-03 NOTE — TELEPHONE ENCOUNTER
"Patient was given my number to call and reschedule his appointment from Dr. Cates's cancelled clinic on 04/12/2017.  He is scheduled on 05/10/2017 at 11 AM.  Patient reports that his pancreas are, \"acting up.\"   He stated that his PCP is urging him to get in with Dr. Cates ASAP for a consult. He plans to get an MRCP sometime in April and will have those images sent to us for Dr. Cates to review.    Will route this note to Dr. Darryn Anna's nurse.     SR 03/03/2017  157p    "

## 2017-03-04 RX ADMIN — DEXTROSE MONOHYDRATE, SODIUM CHLORIDE, AND POTASSIUM CHLORIDE SCH MLS/HR: 50; 9; 2.98 INJECTION, SOLUTION INTRAVENOUS at 10:05

## 2017-03-04 RX ADMIN — DEXTROSE MONOHYDRATE, SODIUM CHLORIDE, AND POTASSIUM CHLORIDE SCH MLS/HR: 50; 9; 2.98 INJECTION, SOLUTION INTRAVENOUS at 01:03

## 2017-03-04 NOTE — PCM.PN
- General Info


Date of Service: 03/04/17


Functional Status: Reports: tolerating diet (clears, ready to advance diet), 

ambulating, urinating





- Review of Systems


General: Reports: weakness


HEENT: Reports: no symptoms


Pulmonary: Reports: no symptoms


Cardiovascular: Reports: no symptoms


Gastrointestinal: Reports: Abdominal pain (3/10)


Genitourinary: Reports: no symptoms


Musculoskeletal: Reports: no symptoms


Skin: Reports: no symptoms


Neurological: Reports: no symptoms


Psychiatric: Reports: no symptoms





- Patient Data


Vitals - most recent: 


 Last Vital Signs











Temp  36.7 C   03/04/17 15:44


 


Pulse  64   03/04/17 15:44


 


Resp  14   03/04/17 15:44


 


BP  144/83 H  03/04/17 15:44


 


Pulse Ox  100   03/04/17 15:44











Weight - most recent: 88.723 kg


I&O - last 24 hours: 


 Intake & Output











 03/04/17 03/04/17 03/04/17





 06:59 14:59 22:59


 


Intake Total 2332 690 1020


 


Output Total 1600  1700


 


Balance 732 690 -680











Lab Results last 24 hrs: 


 Laboratory Results - last 24 hr











  03/04/17 03/04/17 03/04/17 Range/Units





  05:35 05:35 05:35 


 


WBC  4.86    (4.23-9.07)  K/mm3


 


RBC  3.84 L    (4.63-6.08)  M/mm3


 


Hgb  10.3 L    (13.7-17.5)  gm/L


 


Hct  31.6 L    (40.1-51.0)  %


 


MCV  82.3    (79.0-92.2)  fl


 


MCH  26.8    (25.7-32.2)  pg


 


MCHC  32.6    (32.2-35.5)  g/dl


 


RDW Std Deviation  42.9    (35.1-43.9)  fL


 


Plt Count  221    (163-337)  K/mm3


 


MPV  9.5    (9.4-12.3)  fl


 


Neut % (Auto)  52.9    (34.0-67.9)  %


 


Lymph % (Auto)  32.3    (21.8-53.1)  %


 


Mono % (Auto)  11.1    (5.3-12.2)  %


 


Eos % (Auto)  3.3    (0.8-7.0)  


 


Baso % (Auto)  0.2    (0.1-1.2)  %


 


Neut #  2.57    (1.78-5.38)  K/mm3


 


Lymph #  1.57    (1.32-3.57)  K/mm3


 


Mono #  0.54    (0.30-0.82)  K/mm3


 


Eos #  0.16    (0.04-0.54)  K/mm3


 


Baso #  0.01    (0.01-0.08)  K/mm3


 


PT   21.6 H   (8.0-13.0)  SECONDS


 


INR   1.90   


 


Sodium    140  (136-145)  mEq/L


 


Potassium    4.2  (3.5-5.1)  mEq/L


 


Chloride    109 H  ()  mEq/L


 


Carbon Dioxide    25  (21-32)  mEq/L


 


Anion Gap    10.2  (5-15)  


 


BUN    7  (7-18)  mg/dL


 


Creatinine    0.8  (0.7-1.3)  mg/dL


 


Est Cr Clr Drug Dosing    108.51  mL/min


 


Estimated GFR (MDRD)    > 60  (>60)  mL/min


 


BUN/Creatinine Ratio    8.8 L  (14-18)  


 


Glucose    104  ()  mg/dL


 


Calcium    7.7 L  (8.5-10.1)  mg/dL


 


Magnesium    2.0  (1.8-2.4)  mg/dl


 


C-Reactive Protein    5.7 H*  (<1.0)  mg/dL


 


Lipase    197  ()  U/L











Med Orders - Current: 


 Current Medications





Fentanyl (Duragesic)  50 mcg TRDERM Q72H [facility]


Finasteride (Proscar)  5 mg PO DAILY [facility]


   Last Admin: 03/04/17 08:47 Dose:  5 mg


Hydromorphone HCl (Dilaudid)  2 mg IVPUSH Q3H PRN


   PRN Reason: Pain (moderate 4-6)


   Last Admin: 03/04/17 14:06 Dose:  2 mg


Potassium Chloride/Dextrose/Sod Cl (D5 Ns With 40 Meq Kcl)  1,000 mls @ 125 mls/

hr IV ASDIRECTED [facility]


   Last Admin: 03/04/17 10:05 Dose:  125 mls/hr


Metoclopramide HCl (Reglan)  10 mg IVPUSH Q6H PRN


   PRN Reason: Nausea


   Last Admin: 03/03/17 19:14 Dose:  10 mg


Miscellaneous Information (Remove Patch)  1 ea TRDERM Q72H [facility]


Oseltamivir Phosphate (Tamiflu)  75 mg PO DAILY [facility]


   Last Admin: 03/04/17 08:47 Dose:  75 mg


Prednisone (Prednisone)  6 mg PO DAILY [facility]


   Last Admin: 03/04/17 08:47 Dose:  6 mg


Sertraline HCl (Zoloft)  50 mg PO DAILY [facility]


   Last Admin: 03/04/17 08:47 Dose:  50 mg


Sodium Chloride (Saline Flush)  10 ml FLUSH ASDIRECTED PRN


   PRN Reason: Keep Vein Open


   Last Admin: 03/02/17 13:09 Dose:  10 ml


Sucralfate (Carafate)  1 gm PO QID [facility]


   Last Admin: 03/04/17 17:28 Dose:  1 gm


Warfarin Sodium (Coumadin)  2.5 mg PO DAILY@1800 [facility]


   Last Admin: 03/04/17 17:29 Dose:  2.5 mg





Discontinued Medications





Acetaminophen/Hydrocodone Bitart (Norco 325-10 Mg)  1 tab PO Q6H PRN


   PRN Reason: Pain (moderate 4-6)


Enoxaparin Sodium (Lovenox)  40 mg SUBCUT DAILY [facility]


   Last Admin: 03/03/17 09:15 Dose:  40 mg


Fentanyl (Duragesic)  50 mcg TRDERM Q72H [facility]


   Last Admin: 03/02/17 19:01 Dose:  Not Given


Hydromorphone HCl (Dilaudid)  2 mg IVPUSH ONETIME ONE


   Stop: 03/02/17 12:53


   Last Admin: 03/02/17 13:00 Dose:  2 mg


Hydromorphone HCl (Dilaudid)  1 mg IVPUSH ONETIME ONE


   Stop: 03/02/17 14:34


   Last Admin: 03/02/17 14:42 Dose:  1 mg


Hydromorphone HCl (Dilaudid)  2 mg IVPUSH Q4H PRN


   PRN Reason: Pain (moderate 4-6)


   Last Admin: 03/03/17 19:00 Dose:  2 mg


Sodium Chloride (Normal Saline)  1,000 mls @ 999 mls/hr IV ONETIME ONE


   Stop: 03/02/17 13:53


   Last Admin: 03/02/17 13:05 Dose:  999 mls/hr


Sodium Chloride (Normal Saline)  1,000 mls @ 125 mls/hr IV ONETIME ONE


   Stop: 03/02/17 22:34


   Last Admin: 03/02/17 14:43 Dose:  125 mls/hr


Magnesium Sulfate 2 gm/ Premix  50 mls @ 25 mls/hr IV ONETIME ONE


   Stop: 03/02/17 19:48


   Last Admin: 03/02/17 18:30 Dose:  25 mls/hr


Promethazine HCl 12.5 mg/ (Sodium Chloride)  50.5 mls @ 100 mls/hr IV Q6H PRN


   PRN Reason: Nausea


Metoclopramide HCl (Reglan)  10 mg IVPUSH Q6H PRN


   PRN Reason: Nausea


Metoclopramide HCl (Reglan)  10 mg IVPUSH Q6H [facility]


   Stop: 03/02/17 18:58


   Last Admin: 03/02/17 20:25 Dose:  10 mg


Ondansetron HCl (Zofran)  4 mg IVPUSH ONETIME ONE


   Stop: 03/02/17 12:53


   Last Admin: 03/02/17 13:00 Dose:  4 mg


Oseltamivir Phosphate (Tamiflu)  75 mg PO ONETIME ONE


   Stop: 03/02/17 15:09


   Last Admin: 03/02/17 15:55 Dose:  75 mg


Warfarin Sodium (Coumadin)  2.5 mg PO SUMOTUWETHSA [facility]


   Last Admin: 03/02/17 19:02 Dose:  Not Given


Warfarin Sodium (Coumadin)  1.25 mg PO Fr@1800 [facility]











- Exam


Quality Assessment: DVT prophylaxis


General: alert, oriented, cooperative, mild distress (decreased cf Fri, 3/3/17)


HEENT: Pupils equal, Pupils reactive, EOMI


Neck: supple, trachea midline


Lungs: Normal respiratory effort


Cardiovascular: regular rate, regular rhythm


Abdomen: bowel sounds present, soft, no distension, tenderness (decreased)


 (Male) Exam: Deferred


Back Exam: normal inspection


Extremities: normal pulses


Skin: warm


Neurological: normal gait, normal speech


Psy/Mental Status: alert, normal affect, normal mood





- Problem List & Annotations


(1) Abdominal pain


SNOMED Code(s): 63352583


   Code(s): R10.9 - UNSPECIFIED ABDOMINAL PAIN   Status: Acute   Priority: High

   Current Visit: No   


Qualifiers: 


   Abdominal location: generalized   Qualified Code(s): R10.84 - Generalized 

abdominal pain   





(2) Nausea and vomiting


SNOMED Code(s): 84336391


   Code(s): R11.2 - NAUSEA WITH VOMITING, UNSPECIFIED   Status: Acute   Priority

: High   Current Visit: No   


Qualifiers: 


   Vomiting type: unspecified   Vomiting Intractability: non-intractable   

Qualified Code(s): R11.2 - Nausea with vomiting, unspecified   





(3) Pacemaker


SNOMED Code(s): 933350774, 179837248


   Code(s): Z95.0 - PRESENCE OF CARDIAC PACEMAKER   Status: Acute   Current 

Visit: No   





(4) Pancreatitis


SNOMED Code(s): 67666744


   Code(s): K85.9 - ACUTE PANCREATITIS, UNSPECIFIED * DO NOT USE *   Status: 

Acute   Current Visit: No   





- Problem List Review


Problem List Initiated/Reviewed/Updated: Yes





- My Orders


Last 24 Hours: 


My Active Orders





03/03/17 21:11


HYDROmorphone [Dilaudid]   2 mg IVPUSH Q3H PRN 





03/04/17 Dinner


Soft Diet [DIET] 














- Plan


Plan:: 








Impression:





Acute abdominal pain with chronic pancreatitis


Improving lipase level, decreasing.





Sick contact exposure, prelim screen negative


Empiric coverage





Valvular heart disease with AVR on coumadin














Plan:





IVF, will be stopped after diet has been advanced.


Pain mgt


Advance diet as tolerated


Continue TamiFlu


Anti-emetic


Resume home meds and advance diet as tolerated.


DVT/GI prophylaxis

## 2017-03-05 VITALS — SYSTOLIC BLOOD PRESSURE: 146 MMHG | DIASTOLIC BLOOD PRESSURE: 77 MMHG

## 2017-03-05 NOTE — PCM.DCSUM1
**Discharge Summary





- Hospital Course


Free Text/Narrative:: 








56 year old male with acute abdominal in the setting of chronic pancreatitis.  

Had sick contact with family member who was Influenza A positive after


exposure became ill with severe nausea, vomiting and subsequent abdominal pain.

  IVF, pain meds and anti-emetic were used, diet was advanced cautiously


to regular diet.








Primary Dx


Abdominal pain


Chronic Pancreatitis


Empiric Treatment for Influenza A








Disposition


Home








Diet


Regular








Activity


As tolerated








Meds


Home meds


Tamiflu 75 mg daily for 5 days.








Appt


PCP 2 weeks.








- Discharge Data


Discharge Date: 03/03/17


Discharge Disposition: Home, Self-Care 01


Condition: Good





- Discharge Diagnosis/Problem(s)


(1) Abdominal pain


SNOMED Code(s): 67670016


   ICD Code: R10.9 - UNSPECIFIED ABDOMINAL PAIN   Status: Acute   Priority: 

High   Current Visit: No   


Qualifiers: 


   Abdominal location: generalized   Qualified Code(s): R10.84 - Generalized 

abdominal pain   





(2) Nausea and vomiting


SNOMED Code(s): 98624972


   ICD Code: R11.2 - NAUSEA WITH VOMITING, UNSPECIFIED   Status: Acute   

Priority: High   Current Visit: No   


Qualifiers: 


   Vomiting type: unspecified   Vomiting Intractability: non-intractable   

Qualified Code(s): R11.2 - Nausea with vomiting, unspecified   





(3) Pacemaker


SNOMED Code(s): 049866748, 380021825


   ICD Code: Z95.0 - PRESENCE OF CARDIAC PACEMAKER   Status: Acute   Current 

Visit: No   





(4) Pancreatitis


SNOMED Code(s): 37061757


   ICD Code: K85.9 - ACUTE PANCREATITIS, UNSPECIFIED * DO NOT USE *   Status: 

Acute   Current Visit: No   





- Patient Summary/Data


Operative Procedure(s) Performed: None





- Patient Instructions


Diet: Usual Diet as Tolerated


Activity: As Tolerated


Driving: [address] Today


Showering/Bathing: [place] Shower


Notify Provider of: Fever, Increased Pain, Nausea and/or Vomiting





- Discharge Plan


Prescriptions/Med Rec: 


Oseltamivir Phosphate [IJD: Tamiflu] 75 mg PO DAILY #5 capsule


Home Medications: 


 Home Meds





Acetaminophen [Tylenol Extra Strength] 1,000 mg PO BID PRN 12/11/16 [History]


Aspirin 81 mg PO DAILY 12/11/16 [History]


Celecoxib [CeleBREX] 200 mg PO DAILY 12/11/16 [History]


Fenofibrate Nanocrystallized [Tricor] 145 mg PO DAILY 12/11/16 [History]


Finasteride [Proscar] 5 mg PO DAILY 12/11/16 [History]


HYDROmorphone [Dilaudid] 2 mg PO Q8HR PRN 12/11/16 [History]


Levothyroxine 150 mcg PO DAILY 12/11/16 [History]


Lisinopril 10 mg PO DAILY 12/11/16 [History]


Multivitamin [Multivitamins] 1 tab PO DAILY 12/11/16 [History]


Ondansetron HCl [Zofran] 4 mg PO DAILY 12/11/16 [History]


Pravastatin [Pravachol] 40 mg PO DAILY 12/11/16 [History]


Prednisone [IJD: Prednisone] 6 mg PO DAILY 12/11/16 [History]


Sertraline [Zoloft] 50 mg PO DAILY 12/11/16 [History]


Sucralfate [Carafate] 1 gm PO QID 12/11/16 [History]


Warfarin [Coumadin] 2.5 mg PO DAILY 12/15/16 [History]


Omeprazole Magnesium [Prilosec Otc] 20 mg PO BID #60 tablet. 12/16/16 [Rx]


fentaNYL [Duragesic] 50 mcg TRDERM Q72H #1 box 12/16/16 [Rx]


Oseltamivir Phosphate [IJD: Tamiflu] 75 mg PO DAILY #5 capsule 03/05/17 [Rx]








Patient Handouts:  Acute Pancreatitis, Easy-to-Read


Forms:  ED Department Discharge


Referrals: 


Topher Cheng MD [Primary Care Provider] -  (Please schedule a follow-up 

appointment with your PCP, Dr. Cheng in 2 weeks.  )





- Discharge Summary/Plan Comment


DC Time >30 min.: No





- General Info


Date of Service: 03/03/17


Functional Status: Reports: pain controlled, tolerating diet, ambulating, 

urinating





- Review of Systems


General: Reports: no symptoms.  Denies: weakness, fatigue


HEENT: Reports: no symptoms


Pulmonary: Reports: no symptoms


Cardiovascular: Reports: no symptoms


Gastrointestinal: Reports: No symptoms


Genitourinary: Reports: no symptoms


Musculoskeletal: Reports: no symptoms


Skin: Reports: no symptoms


Neurological: Reports: no symptoms


Psychiatric: Reports: no symptoms





- Patient Data


Vitals - Most Recent: 


 Last Vital Signs











Temp  36.3 C   03/05/17 16:11


 


Pulse  60   03/05/17 16:11


 


Resp  20   03/05/17 16:11


 


BP  146/77 H  03/05/17 16:11


 


Pulse Ox  98   03/05/17 16:11











Weight - Most Recent: 86.772 kg


I&O - Last 24 hours: 


 Intake & Output











 03/05/17 03/05/17 03/05/17





 06:59 14:59 22:59


 


Intake Total 400 420 600


 


Output Total 1800  1400


 


Balance -1400 420 -800











Lab Results - Last 24 hrs: 


 Laboratory Results - last 24 hr











  03/05/17 03/05/17 Range/Units





  05:55 05:55 


 


WBC  5.39   (4.23-9.07)  K/mm3


 


RBC  4.18 L   (4.63-6.08)  M/mm3


 


Hgb  11.1 L   (13.7-17.5)  gm/L


 


Hct  33.8 L   (40.1-51.0)  %


 


MCV  80.9   (79.0-92.2)  fl


 


MCH  26.6   (25.7-32.2)  pg


 


MCHC  32.8   (32.2-35.5)  g/dl


 


RDW Std Deviation  42.2   (35.1-43.9)  fL


 


Plt Count  243   (163-337)  K/mm3


 


MPV  9.8   (9.4-12.3)  fl


 


Neut % (Auto)  52.8   (34.0-67.9)  %


 


Lymph % (Auto)  33.6   (21.8-53.1)  %


 


Mono % (Auto)  10.0   (5.3-12.2)  %


 


Eos % (Auto)  3.0   (0.8-7.0)  


 


Baso % (Auto)  0.4   (0.1-1.2)  %


 


Neut #  2.85   (1.78-5.38)  K/mm3


 


Lymph #  1.81   (1.32-3.57)  K/mm3


 


Mono #  0.54   (0.30-0.82)  K/mm3


 


Eos #  0.16   (0.04-0.54)  K/mm3


 


Baso #  0.02   (0.01-0.08)  K/mm3


 


Sodium   142  (136-145)  mEq/L


 


Potassium   3.8  (3.5-5.1)  mEq/L


 


Chloride   107  ()  mEq/L


 


Carbon Dioxide   26  (21-32)  mEq/L


 


Anion Gap   12.8  (5-15)  


 


BUN   8  (7-18)  mg/dL


 


Creatinine   0.9  (0.7-1.3)  mg/dL


 


Est Cr Clr Drug Dosing   96.45  mL/min


 


Estimated GFR (MDRD)   > 60  (>60)  mL/min


 


BUN/Creatinine Ratio   8.9 L  (14-18)  


 


Glucose   88  ()  mg/dL


 


Calcium   8.5  (8.5-10.1)  mg/dL


 


Magnesium   1.8  (1.8-2.4)  mg/dl


 


C-Reactive Protein   2.8 H*  (<1.0)  mg/dL











Med Orders - Current: 


 Current Medications





Fentanyl (Duragesic)  50 mcg TRDERM Q72H [facility]


   Last Admin: 03/05/17 09:21 Dose:  50 mcg


Finasteride (Proscar)  5 mg PO DAILY [facility]


   Last Admin: 03/05/17 09:18 Dose:  5 mg


Hydromorphone HCl (Dilaudid)  2 mg PO Q8H PRN


   PRN Reason: Pain


   Last Admin: 03/05/17 09:18 Dose:  2 mg


Hydromorphone HCl (Dilaudid)  2 mg IVPUSH Q6H PRN


   PRN Reason: Pain


Metoclopramide HCl (Reglan)  10 mg IVPUSH Q6H PRN


   PRN Reason: Nausea


   Last Admin: 03/04/17 19:58 Dose:  10 mg


Miscellaneous Information (Remove Patch)  1 ea TRDERM Q72H [facility]


   Last Admin: 03/05/17 09:23 Dose:  1 ea


Oseltamivir Phosphate (Tamiflu)  75 mg PO DAILY [facility]


   Last Admin: 03/05/17 09:17 Dose:  75 mg


Prednisone (Prednisone)  6 mg PO DAILY [facility]


   Last Admin: 03/05/17 09:19 Dose:  6 mg


Sertraline HCl (Zoloft)  50 mg PO DAILY [facility]


   Last Admin: 03/05/17 09:17 Dose:  50 mg


Sodium Chloride (Saline Flush)  10 ml FLUSH ASDIRECTED PRN


   PRN Reason: Keep Vein Open


   Last Admin: 03/02/17 13:09 Dose:  10 ml


Sodium Chloride (Saline Flush)  10 ml FLUSH ASDIRECTED PRN


   PRN Reason: Keep Vein Open


Sucralfate (Carafate)  1 gm PO QID [facility]


   Last Admin: 03/05/17 16:42 Dose:  1 gm


Warfarin Sodium (Coumadin)  2.5 mg PO DAILY@1800 [facility]


   Last Admin: 03/04/17 17:29 Dose:  2.5 mg





Discontinued Medications





Acetaminophen/Hydrocodone Bitart (Norco 325-10 Mg)  1 tab PO Q6H PRN


   PRN Reason: Pain (moderate 4-6)


Enoxaparin Sodium (Lovenox)  40 mg SUBCUT DAILY [facility]


   Last Admin: 03/03/17 09:15 Dose:  40 mg


Fentanyl (Duragesic)  50 mcg TRDERM Q72H [facility]


   Last Admin: 03/02/17 19:01 Dose:  Not Given


Hydromorphone HCl (Dilaudid)  2 mg IVPUSH ONETIME ONE


   Stop: 03/02/17 12:53


   Last Admin: 03/02/17 13:00 Dose:  2 mg


Hydromorphone HCl (Dilaudid)  1 mg IVPUSH ONETIME ONE


   Stop: 03/02/17 14:34


   Last Admin: 03/02/17 14:42 Dose:  1 mg


Hydromorphone HCl (Dilaudid)  2 mg IVPUSH Q4H PRN


   PRN Reason: Pain (moderate 4-6)


   Last Admin: 03/03/17 19:00 Dose:  2 mg


Hydromorphone HCl (Dilaudid)  2 mg IVPUSH Q3H PRN


   PRN Reason: Pain (moderate 4-6)


   Last Admin: 03/05/17 06:12 Dose:  2 mg


Sodium Chloride (Normal Saline)  1,000 mls @ 999 mls/hr IV ONETIME ONE


   Stop: 03/02/17 13:53


   Last Admin: 03/02/17 13:05 Dose:  999 mls/hr


Sodium Chloride (Normal Saline)  1,000 mls @ 125 mls/hr IV ONETIME ONE


   Stop: 03/02/17 22:34


   Last Admin: 03/02/17 14:43 Dose:  125 mls/hr


Magnesium Sulfate 2 gm/ Premix  50 mls @ 25 mls/hr IV ONETIME ONE


   Stop: 03/02/17 19:48


   Last Admin: 03/02/17 18:30 Dose:  25 mls/hr


Promethazine HCl 12.5 mg/ (Sodium Chloride)  50.5 mls @ 100 mls/hr IV Q6H PRN


   PRN Reason: Nausea


Potassium Chloride/Dextrose/Sod Cl (D5 Ns With 40 Meq Kcl)  1,000 mls @ 125 mls/

hr IV ASDIRECTED [facility]


   Last Admin: 03/04/17 10:05 Dose:  125 mls/hr


Metoclopramide HCl (Reglan)  10 mg IVPUSH Q6H PRN


   PRN Reason: Nausea


Metoclopramide HCl (Reglan)  10 mg IVPUSH Q6H [facility]


   Stop: 03/02/17 18:58


   Last Admin: 03/02/17 20:25 Dose:  10 mg


Ondansetron HCl (Zofran)  4 mg IVPUSH ONETIME ONE


   Stop: 03/02/17 12:53


   Last Admin: 03/02/17 13:00 Dose:  4 mg


Oseltamivir Phosphate (Tamiflu)  75 mg PO ONETIME ONE


   Stop: 03/02/17 15:09


   Last Admin: 03/02/17 15:55 Dose:  75 mg


Warfarin Sodium (Coumadin)  2.5 mg PO DONNIETUWETHSA [facility]


   Last Admin: 03/02/17 19:02 Dose:  Not Given


Warfarin Sodium (Coumadin)  1.25 mg PO Fr@1800 [facility]











- Exam


Quality Assessment: Reports: DVT prophylaxis


General: Reports: alert, oriented, cooperative, no acute distress


HEENT: Reports: Pupils equal, Pupils reactive, EOMI


Neck: Reports: supple, trachea midline


Lungs: Reports: Clear to auscultation, Normal respiratory effort


Cardiovascular: Reports: regular rate, regular rhythm


Abdomen: Reports: bowel sounds present, soft, no tenderness, no distension


 (Male) Exam: Deferred


Rectal (Males) Exam: Deferred


Back Exam: Reports: normal inspection, full range of motion


Extremities: Reports: normal pulses


Skin: Reports: warm


Neurological: Reports: no new focal deficit, normal gait, normal speech


Psy/Mental Status: Reports: alert, normal affect, normal mood





*Q Meaningful Use (DIS)





- VTE *Q


VTE Criteria *Q: 








- Stroke *Q


Stroke Criteria *Q: 








- AMI *Q


AMI Criteria *Q:

## 2017-03-22 ENCOUNTER — TRANSFERRED RECORDS (OUTPATIENT)
Dept: HEALTH INFORMATION MANAGEMENT | Facility: CLINIC | Age: 57
End: 2017-03-22

## 2017-03-23 ENCOUNTER — TRANSFERRED RECORDS (OUTPATIENT)
Dept: HEALTH INFORMATION MANAGEMENT | Facility: CLINIC | Age: 57
End: 2017-03-23

## 2017-04-04 ENCOUNTER — HOSPITAL ENCOUNTER (EMERGENCY)
Dept: HOSPITAL 41 - JD.ED | Age: 57
LOS: 1 days | Discharge: HOME | End: 2017-04-05
Payer: COMMERCIAL

## 2017-04-04 VITALS — DIASTOLIC BLOOD PRESSURE: 78 MMHG | SYSTOLIC BLOOD PRESSURE: 112 MMHG

## 2017-04-04 DIAGNOSIS — F32.9: ICD-10-CM

## 2017-04-04 DIAGNOSIS — Z79.899: ICD-10-CM

## 2017-04-04 DIAGNOSIS — M19.90: ICD-10-CM

## 2017-04-04 DIAGNOSIS — Z79.01: ICD-10-CM

## 2017-04-04 DIAGNOSIS — E78.00: ICD-10-CM

## 2017-04-04 DIAGNOSIS — K68.9: Primary | ICD-10-CM

## 2017-04-04 DIAGNOSIS — E03.9: ICD-10-CM

## 2017-04-04 DIAGNOSIS — Z88.8: ICD-10-CM

## 2017-04-04 DIAGNOSIS — Z87.891: ICD-10-CM

## 2017-04-04 DIAGNOSIS — I50.9: ICD-10-CM

## 2017-04-04 DIAGNOSIS — Z79.82: ICD-10-CM

## 2017-04-04 DIAGNOSIS — F41.9: ICD-10-CM

## 2017-04-04 DIAGNOSIS — Z95.4: ICD-10-CM

## 2017-04-04 DIAGNOSIS — K21.9: ICD-10-CM

## 2017-04-04 DIAGNOSIS — I11.0: ICD-10-CM

## 2017-04-04 PROCEDURE — 96361 HYDRATE IV INFUSION ADD-ON: CPT

## 2017-04-04 PROCEDURE — 85025 COMPLETE CBC W/AUTO DIFF WBC: CPT

## 2017-04-04 PROCEDURE — 81001 URINALYSIS AUTO W/SCOPE: CPT

## 2017-04-04 PROCEDURE — 83690 ASSAY OF LIPASE: CPT

## 2017-04-04 PROCEDURE — 99284 EMERGENCY DEPT VISIT MOD MDM: CPT

## 2017-04-04 PROCEDURE — 36415 COLL VENOUS BLD VENIPUNCTURE: CPT

## 2017-04-04 PROCEDURE — 96375 TX/PRO/DX INJ NEW DRUG ADDON: CPT

## 2017-04-04 PROCEDURE — 74177 CT ABD & PELVIS W/CONTRAST: CPT

## 2017-04-04 PROCEDURE — 96374 THER/PROPH/DIAG INJ IV PUSH: CPT

## 2017-04-04 PROCEDURE — 86140 C-REACTIVE PROTEIN: CPT

## 2017-04-04 PROCEDURE — 80053 COMPREHEN METABOLIC PANEL: CPT

## 2017-04-04 NOTE — EDM.PDOC
ED HPI GI/ABDOMINAL





- General


Chief Complaint: Abdominal Pain


Stated Complaint: PANCREATITIS


Time Seen by Provider: 04/04/17 19:25


Source of Information: Reports: Patient, Old records, RN notes reviewed, Other (

MRI abdomen 3/22/17)


History Limitations: Reports: No limitations





- History of Present Illness


INITIAL COMMENTS - FREE TEXT/NARRATIVE: 





The patient states that he had aortic stenosis, requiring a mechanical aortic 

valve replacement 2/5/1999.  He was found to have a proximal aortic aneurysm in 

May of 2014, prompting a replacement [employer] aortic valve replacement and 

aortic root repair on 8/25/2014.  Apparently the patient was on the operating 

table for 6 or 7 hours, and subsequent to his surgery, he developed epigastric 

abdominal pain.  Numerous workups pointed to his pancreas, and the patient is 

currently under the care of the Gastroenterologist Dr. Montano at the 

[facility].  The current feeling is that the patient's proximal 

pancreas may have suffered ischemia during the long surgery.  A non-cancerous 

mass has formed at the head of the pancreas leading to local inflammation.  The 

patient states that he has undergone approximately 7 ERCPs with numerous 

pancreatic stents.  He is under contract with Dr. Erin Alvarado, a pain 

specialist at [facility].





The most recent CT scan of the abdomen and pelvis that we have on record is 

from 12/12/2016, which demonstrated a pancreatic cyst, along with gastric and 

duodenal inflammation.





A MRI of his abdomen on 3/22/2017 at [facility] found:


1. Focal soft tissue thickening at the pancreaticoduodenal groove and mild 

fullness of the pancreatic head with tiny cystic areas.  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 

represent the sequelae of groove pancreatitis.  No active peripancreatic 

inflammatory changes.


2. Mild splenomegaly.





The patient states that he has recurrent episodes of abdominal pain which leads 

to nausea and, then vomiting, then a trip to the ED, followed by admission to 

the hospital.  Our records indicate that the patient has been admitted to this 

hospital 8 times since 2/7/2015, most recently 3/2/2017 through 3/7/2017.  

During earlier presentations, the patient had modest elevations of his lipase - 

the highest recorded was 1772 on 2/7/2015, however, more recently, his lipase 

is not elevated.





He now presents complaining of right-sided abdominal pain for the past 2 days 

with slight nausea, but no emesis.  He has been taking Zofran.  No recent fever

, constipation, diarrhea, or urinary symptoms.





His PCP, Dr. Cheng, contacted the ED prior to the patient's arrival.





- Related Data


Allergies/ADRs: 


 Allergies











Allergy/AdvReac Type Severity Reaction Status Date / Time


 


codeine AdvReac  Agitation Verified 04/04/17 19:29


 


mannitol [From Reclast] AdvReac  Joint Pain Verified 04/04/17 19:29


 


water for injection,sterile AdvReac  Joint Pain Verified 04/04/17 19:29





[From Reclast]     


 


zoledronic acid AdvReac  Joint Pain Verified 04/04/17 19:29





[From Reclast]     











Home Meds: 


 Home Meds





Acetaminophen [Tylenol Extra Strength] 1,000 mg PO BID PRN 12/11/16 [History]


Aspirin 81 mg PO DAILY 12/11/16 [History]


Celecoxib [CeleBREX] 200 mg PO DAILY 12/11/16 [History]


Fenofibrate Nanocrystallized [Tricor] 145 mg PO DAILY 12/11/16 [History]


Finasteride [Proscar] 5 mg PO DAILY 12/11/16 [History]


HYDROmorphone [Dilaudid] 2 mg PO Q8HR PRN 12/11/16 [History]


Levothyroxine 150 mcg PO DAILY 12/11/16 [History]


Lisinopril 10 mg PO DAILY 12/11/16 [History]


Multivitamin [Multivitamins] 1 tab PO DAILY 12/11/16 [History]


Ondansetron HCl [Zofran] 4 mg PO DAILY 12/11/16 [History]


Pravastatin [Pravachol] 40 mg PO DAILY 12/11/16 [History]


Prednisone [IJD: Prednisone] 6 mg PO DAILY 12/11/16 [History]


Sertraline [Zoloft] 50 mg PO DAILY 12/11/16 [History]


Sucralfate [Carafate] 1 gm PO QID 12/11/16 [History]


Warfarin [Coumadin] 2.5 mg PO DAILY 12/15/16 [History]


Omeprazole Magnesium [Prilosec Otc] 20 mg PO BID #60 tablet. 12/16/16 [Rx]


fentaNYL [Duragesic] 50 mcg TRDERM Q72H #1 box 12/16/16 [Rx]


Oseltamivir Phosphate [IJD: Tamiflu] 75 mg PO DAILY #5 capsule 03/05/17 [Rx]











Past Medical History


HEENT History: Reports: Other (see below)


Other HEENT History: early stages of cataracts


Cardiovascular History: Reports: Aneurysm (aortic root, dx'd 5/2014), Heart 

Failure, High cholesterol, Hypertension, Other (see below) (Aortic stenosis)


Gastrointestinal History: Reports: GERD, Pancreatitis (as per the HPI), PUD, 

Other (see below) (Upper GI polyp)


Musculoskeletal History: Reports: Arthritis (hands, knees)


Other Neuro History: headaches from temporal arteritis


Psychiatric History: Reports: Anxiety, Depression


Endocrine/Metabolic History: Reports: Hypothyroidism


Immunologic History: Reports: Immunosuppression (on prednisone), Other (see 

below) (Giant cell arteritis)





- Past Surgical History


Cardiovascular Surgical History: Reports: Valve replacement ([employer] Davy aortic 2/5 /1999, 8/25/2014), Vascular surgery (Aortic root repair 8/25/2014), Other (see 

below) (Left temporal artery biopsy)


GI Surgical History: Reports: ERCP (x 7)


Male  Surgical History: Reports: Prostate Biopsy (benign)





Social & Family History





- Family History


Family Medical History: Noncontributory


Cardiac: Reports: Bypass, MI, Stent





- Tobacco Use


Smoking Status *Q: Former Smoker


Tobacco Use Within Last Twelve Months: No


Years of Tobacco use: 34


Packs/Tins Daily: 1.5


Used Tobacco, but Quit: Yes


Month Tobacco Last Used: Quit 2012


Second Hand Smoke Exposure: No





- Caffeine Use


Caffeine Use: Reports: None


Other Caffeine Use: 1 cup daily or less.





- Alcohol Use


Alcohol Use History: No


Days Per Week of Alcohol Use: 0





- Recreational Drug Use


Recreational Drug Use: No





- Living Situation & Occupation


Living situation: Reports: , with spouse


Occupation: employed ()





ED ROS GENERAL





- Review of Systems


Review Of Systems: See Below


Constitutional: Reports: no symptoms


HEENT: Reports: Other (Viral URI symptoms about 10 days ago)


Respiratory: Reports: No Symptoms


Cardiovascular: Reports: No symptoms


Endocrine: Reports: no symptoms


GI/Abdominal: Reports: No symptoms


: Reports: no symptoms


Musculoskeletal: Reports: no symptoms


Skin: Reports: no symptoms


Neurological: Reports: No Symptoms


Psychiatric: Reports: No symptoms


Hematologic/Lymphatic: Reports: no symptoms


Immunologic: Reports: no symptoms





ED EXAM, GI/ABD





- Physical Exam


Exam: See Below


Exam Limited By: No limitations


General Appearance: alert, WD/WN, mild distress (Appears uncomfortable)


Eyes: bilateral: normal appearance, EOMI


Ears: normal external exam, hearing grossly normal


Nose: normal inspection, no blood


Throat/Mouth: Normal inspection, Normal lips, Normal voice, No airway compromise


Head: atraumatic, normocephalic


Neck: normal inspection, full range of motion


Respiratory/Chest: no respiratory distress, lungs clear, normal breath sounds, 

no accessory muscle use


Cardiovascular: normal peripheral pulses, regular rate, rhythm, no edema, no 

gallop, no JVD, no murmur, no rub


GI/Abdominal: normal bowel sounds, soft, no organomegaly, no distention, no 

abnormal bruit, no mass, tenderness (To the right abdomen primarily, with mild 

tenderness to the epigastric area.  Essentially nontender elsewhere.)


 (Male) Exam: Deferred


Rectal (Males) Exam: Deferred


Back Exam: normal inspection, full range of motion.  No: CVA tenderness (L), 

CVA tenderness (R)


Extremities: normal inspection, normal range of motion, no pedal edema, normal 

capillary refill


Neurological: alert, oriented, normal cognition, no motor/sensory deficits


Psychiatric: normal affect


Skin Exam: Warm, Dry, Intact, Normal color, No rash


Lymphatic: no adenopathy





Course





- Vital Signs


Last Recorded V/S: 


 Last Vital Signs











Temp  36.2 C   04/04/17 19:24


 


Pulse  107 H  04/04/17 19:24


 


Resp  18   04/04/17 19:24


 


BP  112/78   04/04/17 19:24


 


Pulse Ox  96   04/04/17 19:24














- Orders/Labs/Meds


Orders: 


 Active Orders 24 hr











 Category Date Time Status


 


 Abdomen Pelvis w Cont [CT] Stat Exams  04/04/17 20:26 Taken


 


 Sodium Chloride 0.9% [Saline Flush] Med  04/04/17 21:14 Active





 10 ml FLUSH ONETIME PRN   








 Medication Orders





Sodium Chloride (Saline Flush)  10 ml FLUSH ONETIME PRN


   PRN Reason: IV FLUSH


   Last Admin: 04/04/17 22:41  Dose: 10 ml








Labs: 


 Laboratory Tests











  04/04/17 04/04/17 04/04/17 Range/Units





  19:29 19:29 19:29 


 


WBC  6.46    (4.23-9.07)  K/mm3


 


RBC  4.56 L    (4.63-6.08)  M/mm3


 


Hgb  12.1 L    (13.7-17.5)  gm/L


 


Hct  36.6 L    (40.1-51.0)  %


 


MCV  80.3    (79.0-92.2)  fl


 


MCH  26.5    (25.7-32.2)  pg


 


MCHC  33.1    (32.2-35.5)  g/dl


 


RDW Std Deviation  43.6    (35.1-43.9)  fL


 


Plt Count  300    (163-337)  K/mm3


 


MPV  9.4    (9.4-12.3)  fl


 


Neutrophils % (Manual)  68 H    (40-60)  %


 


Band Neutrophils %  0    (0-10)  %


 


Lymphocytes % (Manual)  31    (20-40)  %


 


Atypical Lymphs %  0    %


 


Monocytes % (Manual)  1 L    (2-10)  %


 


Eosinophils % (Manual)  0 L    (0.8-7.0)  %


 


Basophils % (Manual)  0 L    (0.2-1.2)  


 


Platelet Estimate  Adequate    


 


Poikilocytosis  1+ slight    


 


Ovalocytes  1+ slight    


 


RBC Morph Comment  Not Reportable    


 


Sodium   140   (136-145)  mEq/L


 


Potassium   4.0   (3.5-5.1)  mEq/L


 


Chloride   104   ()  mEq/L


 


Carbon Dioxide   27   (21-32)  mEq/L


 


Anion Gap   13.0   (5-15)  


 


BUN   12   (7-18)  mg/dL


 


Creatinine   1.0   (0.7-1.3)  mg/dL


 


Est Cr Clr Drug Dosing   86.80   mL/min


 


Estimated GFR (MDRD)   > 60   (>60)  mL/min


 


BUN/Creatinine Ratio   12.0 L   (14-18)  


 


Glucose   85   ()  mg/dL


 


Calcium   8.6   (8.5-10.1)  mg/dL


 


Total Bilirubin   0.4   (0.2-1.0)  mg/dL


 


AST   28   (15-37)  U/L


 


ALT   23   (16-63)  U/L


 


Alkaline Phosphatase   47   ()  U/L


 


C-Reactive Protein    1.7 H*  (<1.0)  mg/dL


 


Total Protein   7.2   (6.4-8.2)  g/dl


 


Albumin   3.7   (3.4-5.0)  g/dl


 


Globulin   3.5   gm/dL


 


Albumin/Globulin Ratio   1.1   (1-2)  


 


Lipase   106   ()  U/L


 


Urine Color     (Yellow)  


 


Urine Appearance     (Clear)  


 


Urine pH     (5.0-8.0)  


 


Ur Specific Gravity     (1.005-1.030)  


 


Urine Protein     (Negative)  


 


Urine Glucose (UA)     (Negative)  


 


Urine Ketones     (Negative)  


 


Urine Occult Blood     (Negative)  


 


Urine Nitrite     (Negative)  


 


Urine Bilirubin     (Negative)  


 


Urine Urobilinogen     (0.2-1.0)  


 


Ur Leukocyte Esterase     (Negative)  


 


Urine RBC     (0-5)  /hpf


 


Urine WBC     (0-5)  /hpf


 


Ur Epithelial Cells     


 


Ur Squamous Epith Cells     (0-5)  /hpf


 


Urine Bacteria     (FEW)  /hpf


 


Urine Mucus     (FEW)  /hpf














  04/04/17 Range/Units





  20:22 


 


WBC   (4.23-9.07)  K/mm3


 


RBC   (4.63-6.08)  M/mm3


 


Hgb   (13.7-17.5)  gm/L


 


Hct   (40.1-51.0)  %


 


MCV   (79.0-92.2)  fl


 


MCH   (25.7-32.2)  pg


 


MCHC   (32.2-35.5)  g/dl


 


RDW Std Deviation   (35.1-43.9)  fL


 


Plt Count   (163-337)  K/mm3


 


MPV   (9.4-12.3)  fl


 


Neutrophils % (Manual)   (40-60)  %


 


Band Neutrophils %   (0-10)  %


 


Lymphocytes % (Manual)   (20-40)  %


 


Atypical Lymphs %   %


 


Monocytes % (Manual)   (2-10)  %


 


Eosinophils % (Manual)   (0.8-7.0)  %


 


Basophils % (Manual)   (0.2-1.2)  


 


Platelet Estimate   


 


Poikilocytosis   


 


Ovalocytes   


 


RBC Morph Comment   


 


Sodium   (136-145)  mEq/L


 


Potassium   (3.5-5.1)  mEq/L


 


Chloride   ()  mEq/L


 


Carbon Dioxide   (21-32)  mEq/L


 


Anion Gap   (5-15)  


 


BUN   (7-18)  mg/dL


 


Creatinine   (0.7-1.3)  mg/dL


 


Est Cr Clr Drug Dosing   mL/min


 


Estimated GFR (MDRD)   (>60)  mL/min


 


BUN/Creatinine Ratio   (14-18)  


 


Glucose   ()  mg/dL


 


Calcium   (8.5-10.1)  mg/dL


 


Total Bilirubin   (0.2-1.0)  mg/dL


 


AST   (15-37)  U/L


 


ALT   (16-63)  U/L


 


Alkaline Phosphatase   ()  U/L


 


C-Reactive Protein   (<1.0)  mg/dL


 


Total Protein   (6.4-8.2)  g/dl


 


Albumin   (3.4-5.0)  g/dl


 


Globulin   gm/dL


 


Albumin/Globulin Ratio   (1-2)  


 


Lipase   ()  U/L


 


Urine Color  Yellow  (Yellow)  


 


Urine Appearance  Slt cloudy H  (Clear)  


 


Urine pH  6.5  (5.0-8.0)  


 


Ur Specific Gravity  1.020  (1.005-1.030)  


 


Urine Protein  Trace H  (Negative)  


 


Urine Glucose (UA)  Negative  (Negative)  


 


Urine Ketones  Negative  (Negative)  


 


Urine Occult Blood  Negative  (Negative)  


 


Urine Nitrite  Negative  (Negative)  


 


Urine Bilirubin  Negative  (Negative)  


 


Urine Urobilinogen  >=8.0 H  (0.2-1.0)  


 


Ur Leukocyte Esterase  Negative  (Negative)  


 


Urine RBC  0-5  (0-5)  /hpf


 


Urine WBC  0-5  (0-5)  /hpf


 


Ur Epithelial Cells  Not Reportable  


 


Ur Squamous Epith Cells  0-5  (0-5)  /hpf


 


Urine Bacteria  Few  (FEW)  /hpf


 


Urine Mucus  Moderate H  (FEW)  /hpf











Meds: 


Medications











Generic Name Dose Route Start Last Admin





  Trade Name Jaylen  PRN Reason Stop Dose Admin


 


Sodium Chloride  10 ml  04/04/17 21:14  04/04/17 22:41





  Saline Flush  FLUSH   10 ml





  ONETIME PRN   Administration





  IV FLUSH   














Discontinued Medications














Generic Name Dose Route Start Last Admin





  Trade Name Jaylen  PRN Reason Stop Dose Admin


 


Diatrizoate Meglum/Diatrizoate Sod  90 ml  04/04/17 21:14  04/04/17 22:40





  Gastrografin 37%  PO  04/04/17 21:15  90 ml





  ONETIME ONE   Administration


 


Hydromorphone HCl  1 mg  04/04/17 20:04  04/04/17 20:15





  Dilaudid  IVPUSH  04/04/17 20:05  1 mg





  ONETIME ONE   Administration


 


Sodium Chloride  1,000 mls @ 999 mls/hr  04/04/17 20:05  04/04/17 20:20





  Normal Saline  IV  04/04/17 21:05  999 mls/hr





  ONETIME ONE   Administration


 


Iopamidol  125 ml  04/04/17 21:14  04/04/17 22:41





  Isovue-300 (61%)  IVPUSH  04/04/17 21:15  125 ml





  ONETIME ONE   Administration


 


Ondansetron HCl  4 mg  04/04/17 20:04  04/04/17 20:13





  Zofran  IVPUSH  04/04/17 20:05  4 mg





  ONETIME ONE   Administration














- Radiology Interpretation


Free Text/Narrative:: 





CT of the abdomen and pelvis with contrast is read by [facility] as 

"Possible post inflammatory stricture and medial wall irregularities through 

second and third portions of duodenum.  Pancreatic mass more likely given the 

extent of tissue changes which are contiguous with head of pancreas and 

uncinate process."





- Re-Assessments/Exams


Free Text/Narrative Re-Assessment/Exam: 





04/05/17 00:26


Test results discussed with the patient and his wife.  Both the MRI of the 

abdomen 3/22/2017 and today's CT scan of the abdomen and pelvis indicate a mass 

of the head of the pancreas without pancreatic inflammation, and the patient's 

lipase is within normal limits today.





At this time, the patient feels well enough to go home.  He was offered 

admission, but declined.  His plan is to followup with Dr. Cheng tomorrow 

for some additional IV fluid, then with his Gastroenterologist, Dr. Montano, on 

4/10/2017.  He has Zofran at home and does not need a refill.





Departure





- Departure


Time of Disposition: 00:34


Disposition: Home, Self-Care 01


Condition: fair


Clinical Impression: 


 Pain from pancreas





Referrals: 


Topher Cheng MD [Primary Care Provider] - 


Forms:  ED Department Discharge


Additional Instructions: 


You were seen in the emergency room today for recurrent abdominal pain.





Workup in the ER included blood work, a urinalysis, and a CT scan of your 

abdomen and pelvis.





Your blood work was unremarkable.  Your lipase was normal at 106.  You do not 

have an elevated WBC count, and your electrolytes and kidney function are 

normal.





The CT scan of your abdomen and pelvis indicate a mass at the head of your 

pancreas, essentially the same finding as on the MRI of your abdomen from 3/22/

27.  No other abnormalities were found.





Followup with Dr. Cheng tomorrow, as planned, then with Dr. Montano on 4/10/

2017.





If any other problems, please do not hesitate to return to the ER.





- My Orders


Last 24 Hours: 


My Active Orders





04/04/17 20:26


Abdomen Pelvis w Cont [CT] Stat 





04/04/17 21:14


Sodium Chloride 0.9% [Saline Flush]   10 ml FLUSH ONETIME PRN 














- Assessment/Plan


Last 24 Hours: 


My Active Orders





04/04/17 20:26


Abdomen Pelvis w Cont [CT] Stat 





04/04/17 21:14


Sodium Chloride 0.9% [Saline Flush]   10 ml FLUSH ONETIME PRN

## 2017-04-05 NOTE — CT
CT abdomen and pelvis

 

Technique: Multiple axial sections were obtained from above the dome 

of the diaphragm inferiorly through the pubic symphysis.  Intravenous 

and oral contrast was utilized.  Delayed images were also obtained 

through the abdomen and pelvis.

 

Comparison: Previous CT exam of 12/12/16.

 

Findings: Previous study showed inflammatory change and cystic 

structure around the duodenum which has resolved.  Current study shows

 soft tissue thickening which appears to invade into the duodenum 

which could relate to chronic scarring but neoplasm needs to be 

excluded.  This involves the pancreatic head.  Extent of this finding 

is difficult to determine because of it being nearly isodense to other

 portions of the pancreas but measures roughly 4 cm in greatest 

craniocaudal measurement.

 

Visualized lung bases show nothing acute.  Liver shows no focal 

parenchymal abnormality.  Spleen size is 14.0 cm which is at the upper

 limits of normal.  Adrenal glands show no nodule.  Kidneys show 

symmetric contrast enhancement.  Small cysts noted off the left kidney

 measuring 1.4 cm.  Aorta and iliac vessels show atherosclerotic 

change without aneurysmal dilatation.  No retroperitoneal adenopathy 

is seen.  Appendix is seen which appears normal.  No pelvic mass or 

adenopathy is identified.  Delayed images show contrast excretion from

 both kidneys as well as contrast seen throughout the ureters without 

obstruction.  Contrast noted within the bladder.

 

Bone window settings were reviewed which show scattered degenerative 

spurring within the spine.  Vacuum phenomenon noted within the L5-S1 

disc.  Mild degenerative change seen within the lower apophyseal 

joints.  Small fat-containing umbilical hernia is seen.

 

Impression:

1.  Previous CT study showed inflammatory change and cystic area off 

the duodenum which has resolved.  Current study shows soft tissue 

density extending from the pancreas into the duodenal wall.  Uncertain

 if current findings represent inflammatory scarring with possibility 

of pancreatic carcinoma needing to be excluded.  MRI pancreas could be

 obtained to see if this helps differentiate although I believe 

endoscopic evaluation will be eventually needed.

2.  Other incidental findings as noted above.

 

Diagnostic code #9

 

Agree with preliminary report issued by [facility] ([facility] 

report dictated on 04/05/17, 12:02 AM Central Time)

## 2017-04-12 ENCOUNTER — CARE COORDINATION (OUTPATIENT)
Dept: GASTROENTEROLOGY | Facility: CLINIC | Age: 57
End: 2017-04-12

## 2017-04-12 NOTE — PROGRESS NOTES
Received call from Dr Cyril Mckeon [phone].  Asked to confer with Dr Cates. Recently Lucas had CT scan done that showed a mass in the head of pancreas and increasing N/V and abd pain.     Advised will get CT images # [phone] pushed to us and have provider review: Unlikely will be Dr Cates as he will be out of office. Advised will have another provider review and get back to him.   Verbalized understanding.    Freya VELARDE RN Coordinator  Dr. Cates, Dr. Miguel & Dr. Lopez  Pancreas[facility]Biliary  [phone] #4

## 2017-04-13 ENCOUNTER — CARE COORDINATION (OUTPATIENT)
Dept: GASTROENTEROLOGY | Facility: CLINIC | Age: 57
End: 2017-04-13

## 2017-04-13 NOTE — PROGRESS NOTES
Attempted to call # [phone] that was given by Dr. Cyril Mckeon on 4/12/2017 to have patient's CT images pushed but the number provided goes unanswered and not able to leave a message for a return call back.    Called Dr. Cyril Mckeon # [phone] and informed him of information noted above.  Dr. Cyril Mckeon stated to call [facility] in [place], [place] # [phone] and ask for Radiology Department.    Called [facility] and spoke to Scarlet in Radiology.  Requested most recent CT images to be pushed but Scarlet notes they are unable to push them.  Contact information given to have CD mailed for review.  Scarlet verbalized understanding.      Myriam Cates, Dr. Miguel & Dr. Lopez  Pancreas[facility]Biliary  [phone] #4

## 2017-04-18 ENCOUNTER — TELEPHONE (OUTPATIENT)
Dept: GASTROENTEROLOGY | Facility: CLINIC | Age: 57
End: 2017-04-18

## 2017-04-18 NOTE — TELEPHONE ENCOUNTER
One disc received, title indicated multiple.   Will route to Dr. Darryn Anna's RNCC and take down to scanning.    SR 04/18/2017  [id number]

## 2017-04-19 ENCOUNTER — HOSPITAL ENCOUNTER (INPATIENT)
Dept: GENERAL RADIOLOGY | Facility: CLINIC | Age: 57
End: 2017-04-19
Attending: INTERNAL MEDICINE

## 2017-04-19 ENCOUNTER — CARE COORDINATION (OUTPATIENT)
Dept: GASTROENTEROLOGY | Facility: CLINIC | Age: 57
End: 2017-04-19

## 2017-04-19 NOTE — PROGRESS NOTES
Message received from Dr. Miguel:  Carlos Brand's pt   Was contacted by this guys PCP; local CT which is now in our system is being read as concerning for pancreatic mass     Can you have this imaging reread stat by our radiologists and then organize an EUS with me in the next few weeks unless they also read a mass (I dont see a solid lesion)   Thanks   Titus                Order placed for images to be read by our radiologist.

## 2017-04-21 ENCOUNTER — CARE COORDINATION (OUTPATIENT)
Dept: GASTROENTEROLOGY | Facility: CLINIC | Age: 57
End: 2017-04-21

## 2017-04-21 DIAGNOSIS — R10.9 ABDOMINAL PAIN: Primary | ICD-10-CM

## 2017-04-21 NOTE — PROGRESS NOTES
Order placed for EUS until CT from outside read.     Sent to scheduling.    Freya VELARDE RN Coordinator  Dr. Cates, Dr. Miguel & Dr. Lopez  Pancreas[facility]Biliary  [phone] #4

## 2017-04-25 ENCOUNTER — CARE COORDINATION (OUTPATIENT)
Dept: GASTROENTEROLOGY | Facility: CLINIC | Age: 57
End: 2017-04-25

## 2017-04-25 ENCOUNTER — TELEPHONE (OUTPATIENT)
Dept: GASTROENTEROLOGY | Facility: CLINIC | Age: 57
End: 2017-04-25

## 2017-04-25 NOTE — PROGRESS NOTES
Received message to call Lucas regarding his warfarin and blood thinners.   He states he normally is on warfarin and then when he is off he goes on lovenox. Advised him to go through his PCP to get lovenox directions and prescription. Generally patients are off warfarin for 5 days and if he is on lovenox then he is to not take the doses the evening before the procedure and the morning of the procedure.   He is also on a daily ASA, advised to stop taking it 2 days prior to the procedure.   Verbalized understanding.     Freya VELARDE RN Coordinator  Dr. Cates, Dr. Miguel & Dr. Lopez  Pancreas[facility]Biliary  [phone] #4

## 2017-04-25 NOTE — TELEPHONE ENCOUNTER
Lucas is scheduled for an EUS with Dr. Miguel on 5/15/2017 at 10:25 am with an arrival time of 8:25 am.    Patient is aware that he will need a pre-op with his PCP prior to procedure, a  to pick the patient up and someone to monitor the patient for 24 hours post-op.  Patient is on Coumadin and has questions on dosing pre-op. Will route message to [facility].     All instructions will be mailed out to confirmed address in [facility] along with pre-op physical form.    SANTANA 4/25/2017  1045 a

## 2017-05-04 ENCOUNTER — TELEPHONE (OUTPATIENT)
Dept: GASTROENTEROLOGY | Facility: CLINIC | Age: 57
End: 2017-05-04

## 2017-05-09 ENCOUNTER — CARE COORDINATION (OUTPATIENT)
Dept: GASTROENTEROLOGY | Facility: CLINIC | Age: 57
End: 2017-05-09

## 2017-05-09 ASSESSMENT — ENCOUNTER SYMPTOMS
FLANK PAIN: 0
NECK MASS: 0
SPEECH CHANGE: 0
DYSURIA: 1
SMELL DISTURBANCE: 0
DIARRHEA: 0
TASTE DISTURBANCE: 0
HEMATURIA: 0
SEIZURES: 0
RECTAL BLEEDING: 0
RECTAL PAIN: 0
ABDOMINAL PAIN: 1
POLYPHAGIA: 0
ALTERED TEMPERATURE REGULATION: 0
TINGLING: 1
DIFFICULTY URINATING: 0
BOWEL INCONTINENCE: 0
WEIGHT LOSS: 1
SINUS PAIN: 0
WEIGHT GAIN: 0
POLYDIPSIA: 0
BRUISES/BLEEDS EASILY: 1
PARALYSIS: 0
DECREASED APPETITE: 1
MEMORY LOSS: 0
MUSCLE CRAMPS: 1
STIFFNESS: 1
BACK PAIN: 1
DIZZINESS: 0
NECK PAIN: 1
NIGHT SWEATS: 1
TREMORS: 0
NUMBNESS: 1
SINUS CONGESTION: 0
HEADACHES: 1
CHILLS: 0
FEVER: 0
NAUSEA: 1
SWOLLEN GLANDS: 0
HALLUCINATIONS: 0
LOSS OF CONSCIOUSNESS: 0
MUSCLE WEAKNESS: 0
MYALGIAS: 1
WEAKNESS: 1
CONSTIPATION: 0
HOARSE VOICE: 1
SORE THROAT: 0
DISTURBANCES IN COORDINATION: 0
JAUNDICE: 0
BLOATING: 0
TROUBLE SWALLOWING: 0
VOMITING: 1
ARTHRALGIAS: 1
HEARTBURN: 1
INCREASED ENERGY: 1
BLOOD IN STOOL: 0
FATIGUE: 1

## 2017-05-09 NOTE — PROGRESS NOTES
Called Lucas and asked him to change his appointment time to 10am and to see RADHA Waggoner on Wednesday 5/10/2017.   Is amenable to changing it and scheduled with Rosaline GARCIA.     Freya VELARDE RN Coordinator  Dr. Cates, Dr. Miguel & Dr. Lopez  Pancreas[facility]Biliary  [phone] #4

## 2017-05-10 ENCOUNTER — OFFICE VISIT (OUTPATIENT)
Dept: GASTROENTEROLOGY | Facility: CLINIC | Age: 57
End: 2017-05-10

## 2017-05-10 VITALS
HEIGHT: 71 IN | HEART RATE: 82 BPM | BODY MASS INDEX: 27.73 KG/M2 | SYSTOLIC BLOOD PRESSURE: 104 MMHG | WEIGHT: 198.1 LBS | TEMPERATURE: 98.3 F | DIASTOLIC BLOOD PRESSURE: 75 MMHG | OXYGEN SATURATION: 97 %

## 2017-05-10 DIAGNOSIS — K85.00 IDIOPATHIC ACUTE PANCREATITIS, UNSPECIFIED COMPLICATION STATUS: Primary | ICD-10-CM

## 2017-05-10 RX ORDER — FENTANYL 12.5 UG/1
1 PATCH TRANSDERMAL
COMMUNITY
End: 2017-08-24 | Stop reason: ALTCHOICE

## 2017-05-10 ASSESSMENT — PAIN SCALES - GENERAL: PAINLEVEL: MODERATE PAIN (4)

## 2017-05-10 NOTE — NURSING NOTE
Lucas signed DEJA for [facility] for his MRI 3/22/2017.   Faxed to [facility] 5/10/2017 asking them to send STAT. Per Lucas they are expecting his DEJA.

## 2017-05-10 NOTE — PROGRESS NOTES
REASON FOR VISIT: Follow-up visit   REFERRING PHYSICIAN: Dr. Cyril Banda ([place], [place])          HISTORY OF PRESENT ILLNESS:     Lucas Brown is a 56 year old male with a history of smoldering recurrent acute pancreatitis after his aortic valve repair and resection of ascending aortic aneurysm who was found to have an inflammatory mass in the head of the pancreas noted a little over 2 years ago. He had a long complicated operation with 7 hours in the operating room and towards the end of his hospitalization he developed acute pancreatitis, he also had a pacemaker placed for complete heart block after the procedure. He developed recurrent episodes of acute pancreatitis.   He had an EUS showing an inflammatory mass with a small fluid collection in the head. The FNA was consistent with pancreatitis. No evidence of malignancy. He had ERCP in August 2016 with stent placement and an ampullary biopsy that showed more than 25-30 IgG4 cells per high powered field suggestive of but not diagnostic of autoimmune pancreatitis. In October of 2016, he underwent another ERCP for stent removal that showed the major papilla to be edematous, erythematous and congested. The stent was removed, sludge was swept from the duct, and no stent left in place due to adequate biliary drainage.   Since his last visit with Dr. Cates in September of 2016, patient states he was doing ok up until February when he developed persistent abdominal pain, daily nausea, and vomiting at least once a week. His weight has been stable.  Appetite is poor. He is never hungry has to make himself eat. He has sharp abdominal pain that comes and goes, worse with eating, concentrated in the upper and lower mid abdomen and radiates to left side and back. He has not been taking pancreatic enzymes. Pain is consistent but not getting worse, occurs daily. Since Feburary he has been to the ER 6 times and a few times he was kept 2-3 days for  hydration and pain control. He takes dilaudid 2 mg every 8 hours and fentanyl patch for pain control, which helps. He is being managed by pain clinic in [facility]. He reports occasional night sweats, no fever. No diarrhea. No steatorrhea. Occasional constipation. Takes miralax as needed. His primary care physician  has set up outpatient IVF infusion which he has utilized several times, which has helped. He reports having labs drawn last week, inflammatory marker was elevated, ESR and CRP. His primary physican told him this is most likely related to his pancreas and was encouraged to follow-up here at the [facility] to see Dr. Cates.       PAST MEDICAL HISTORY:  -Chronic pancreatitis   -Temporal arteritis    -Hypothyroidism     PAST SURGICAL HISTORY:   -Aortic valve replacement with metallic valve  -Pacemaker       MEDICATIONS:   Reviewed          FAMILY HISTORY:  Noncontributory       SOCIAL HISTORY:    -former smoker, quit 2012, 30 pack year history   -no alcohol intake since 2014       REVIEW OF SYSTEMS:   A complete 10-point review of systems was performed and was noted to be negative except as above.      PHYSICAL EXAM:  VITAL SIGNS: Stable  GENERAL: alert, well nourished, well hydrated  EYES: Eyes grossly normal to inspection,  anicteric sclera   MOUTH: no ulcers, no lesions  NECK: no tenderness, no adenopathy, no asymmetry, no masses, no stiffness; thyroid- normal to palpation  RESP: lungs clear to auscultation - no rales, no rhonchi, no wheezes  CV: regular rates and rhythm, normal S1 S2, no S3 or S4 and no murmur, no click or rub   ABDOMEN: soft, epigastric tenderness, no hepatosplenomegaly, no masses, normal bowel sounds  MS: extremities- no gross deformities noted, no edema  SKIN:  no rashes  NEURO: strength and tone- normal, sensory exam- grossly normal, mentation- intact, speech- normal, reflexes- symmetric  Non focal no aphasia. No facial asymmetry.   PSYCH: Alert and oriented times 3; speech- coherent ,  normal rate and volume; able to articulate logical thoughts, able to abstract reason, no tangential thoughts, no hallucinations or delusions, affect- normal   LYMPHATICS: ant. cervical- normal      ASSESSMENT   Lucas Brown is a 56 year old male with a history of smoldering recurrent acute pancreatitis after his aortic valve repair and resection of ascending aortic aneurysm who was found to have an inflammatory mass in the head of the pancreas noted a little over 2 years ago.  He had an EUS showing an inflammatory mass with a small fluid collection in the head.  The FNA was consistent with pancreatitis. No evidence of malignancy. He had ERCP in August 2016 with stent placement and an ampullary biopsy that showed more than 25-30 IgG4 cells per high powered field suggestive of but not diagnostic of autoimmune pancreatitis. His serum IgG4 is negative. Initially his pancreatitis was thought to be related to autoimmune pancreatitis. He was diagnosed with temporal arteritis in March of 2016 that was treated with steriods. However, his pancreatic pain had not responded to steroids. He was seen by Rheumatology and temporal artery biopsy slide for IgG4 related disease was negative. Per Rheumatology note his pancreatitis is unlikely related to autoimmune process, is more likely secondary to ischemia in the setting of a prolonged cardiothoracic surgery. He was seen in the ER recently where a CT scan of abdomen from 4/19/2017 (per radiology read) showed prominence in the pancreatic head with adjacent duodenal wall thickening favored to represent sequelae of prior pancreatitis, unable to exclude pancreatic mass that would best be assessed by direct biopsy per radiologist. Patient is scheduled for an EUS on 5/15/17 with Dr. Miguel for further evaluation. There is very low suspicions for malignancy as previous cytology from EUS showed no evidence of neoplasm. The plan is to obtain a fine core biopsy of the pancreatic head to  exclude autoimmune process, malignancy vs inflammatory process. He was given a trial of Zenpep (pancreatic enzymes) today to see if this helps with postprandial pain as he continues to struggle with daily pain made worse with eating. He should follow-up in clinic in 2-3 months or sooner if symptoms worsen or persist.     Patient seen with Dr. Darryn Saldana, [facility]   Advanced Endoscopy   [phone]    45 minutes of the 60 minute visit today I spent counseling/educating the patient regarding disease process, endoscopic intervention, and pain management     Seen and examined with NP briefly but she is billing. agree with findings and recommendations.    Oniel Cates MD GI Staff

## 2017-05-10 NOTE — NURSING NOTE
"Chief Complaint   Patient presents with     Consult     new patient       Vitals:    05/10/17 0946   BP: 104/75   BP Location: Left arm   Patient Position: Chair   Cuff Size: Adult Large   Pulse: 82   Temp: 98.3  F (36.8  C)   TempSrc: Oral   SpO2: 97%   Weight: 89.9 kg (198 lb 1.6 oz)   Height: 1.803 m (5' 11\")       Body mass index is 27.63 kg/(m^2).      Maria Isabel Mota LPN                          "

## 2017-05-10 NOTE — LETTER
5/10/2017       RE: Lucas Brown  [address] [address]     Dear Colleague,    Thank you for referring your patient, Lucas Brown, to the [facility] at [facility]. Please see a copy of my visit note below.    REASON FOR VISIT: Follow-up visit   REFERRING PHYSICIAN: Dr. Cyril Banda ([place], [place])          HISTORY OF PRESENT ILLNESS:     Lucas Brown is a 56 year old male with a history of smoldering recurrent acute pancreatitis after his aortic valve repair and resection of ascending aortic aneurysm who was found to have an inflammatory mass in the head of the pancreas noted a little over 2 years ago. He had a long complicated operation with 7 hours in the operating room and towards the end of his hospitalization he developed acute pancreatitis, he also had a pacemaker placed for complete heart block after the procedure. He developed recurrent episodes of acute pancreatitis.   He had an EUS showing an inflammatory mass with a small fluid collection in the head. The FNA was consistent with pancreatitis. No evidence of malignancy. He had ERCP in August 2016 with stent placement and an ampullary biopsy that showed more than 25-30 IgG4 cells per high powered field suggestive of but not diagnostic of autoimmune pancreatitis. In October of 2016, he underwent another ERCP for stent removal that showed the major papilla to be edematous, erythematous and congested. The stent was removed, sludge was swept from the duct, and no stent left in place due to adequate biliary drainage.   Since his last visit with Dr. Cates in September of 2016, patient states he was doing ok up until February when he developed persistent abdominal pain, daily nausea, and vomiting at least once a week. His weight has been stable.  Appetite is poor. He is never hungry has to make himself eat. He has sharp abdominal pain that comes and goes,  worse with eating, concentrated in the upper and lower mid abdomen and radiates to left side and back. He has not been taking pancreatic enzymes. Pain is consistent but not getting worse, occurs daily. Since Feburary he has been to the ER 6 times and a few times he was kept 2-3 days for hydration and pain control. He takes dilaudid 2 mg every 8 hours and fentanyl patch for pain control, which helps. He is being managed by pain clinic in [facility]. He reports occasional night sweats, no fever. No diarrhea. No steatorrhea. Occasional constipation. Takes miralax as needed. His primary care physician  has set up outpatient IVF infusion which he has utilized several times, which has helped. He reports having labs drawn last week, inflammatory marker was elevated, ESR and CRP. His primary physican told him this is most likely related to his pancreas and was encouraged to follow-up here at the [facility] to see Dr. Cates.       PAST MEDICAL HISTORY:  -Chronic pancreatitis   -Temporal arteritis    -Hypothyroidism     PAST SURGICAL HISTORY:   -Aortic valve replacement with metallic valve  -Pacemaker       MEDICATIONS:   Reviewed          FAMILY HISTORY:  Noncontributory       SOCIAL HISTORY:    -former smoker, quit 2012, 30 pack year history   -no alcohol intake since 2014       REVIEW OF SYSTEMS:   A complete 10-point review of systems was performed and was noted to be negative except as above.      PHYSICAL EXAM:  VITAL SIGNS: Stable  GENERAL: alert, well nourished, well hydrated  EYES: Eyes grossly normal to inspection,  anicteric sclera   MOUTH: no ulcers, no lesions  NECK: no tenderness, no adenopathy, no asymmetry, no masses, no stiffness; thyroid- normal to palpation  RESP: lungs clear to auscultation - no rales, no rhonchi, no wheezes  CV: regular rates and rhythm, normal S1 S2, no S3 or S4 and no murmur, no click or rub   ABDOMEN: soft, epigastric tenderness, no hepatosplenomegaly, no masses, normal bowel sounds  MS:  extremities- no gross deformities noted, no edema  SKIN:  no rashes  NEURO: strength and tone- normal, sensory exam- grossly normal, mentation- intact, speech- normal, reflexes- symmetric  Non focal no aphasia. No facial asymmetry.   PSYCH: Alert and oriented times 3; speech- coherent , normal rate and volume; able to articulate logical thoughts, able to abstract reason, no tangential thoughts, no hallucinations or delusions, affect- normal   LYMPHATICS: ant. cervical- normal      ASSESSMENT   Lucas Brown is a 56 year old male with a history of smoldering recurrent acute pancreatitis after his aortic valve repair and resection of ascending aortic aneurysm who was found to have an inflammatory mass in the head of the pancreas noted a little over 2 years ago.  He had an EUS showing an inflammatory mass with a small fluid collection in the head.  The FNA was consistent with pancreatitis. No evidence of malignancy. He had ERCP in August 2016 with stent placement and an ampullary biopsy that showed more than 25-30 IgG4 cells per high powered field suggestive of but not diagnostic of autoimmune pancreatitis. His serum IgG4 is negative. Initially his pancreatitis was thought to be related to autoimmune pancreatitis. He was diagnosed with temporal arteritis in March of 2016 that was treated with steriods. However, his pancreatic pain had not responded to steroids. He was seen by Rheumatology and temporal artery biopsy slide for IgG4 related disease was negative. Per Rheumatology note his pancreatitis is unlikely related to autoimmune process, is more likely secondary to ischemia in the setting of a prolonged cardiothoracic surgery. He was seen in the ER recently where a CT scan of abdomen from 4/19/2017 (per radiology read) showed prominence in the pancreatic head with adjacent duodenal wall thickening favored to represent sequelae of prior pancreatitis, unable to exclude pancreatic mass that would best be assessed by  direct biopsy per radiologist. Patient is scheduled for an EUS on 5/15/17 with Dr. Miguel for further evaluation. There is very low suspicions for malignancy as previous cytology from EUS showed no evidence of neoplasm. The plan is to obtain a fine core biopsy of the pancreatic head to exclude autoimmune process, malignancy vs inflammatory process. He was given a trial of Zenpep (pancreatic enzymes) today to see if this helps with postprandial pain as he continues to struggle with daily pain made worse with eating. He should follow-up in clinic in 2-3 months or sooner if symptoms worsen or persist.     Patient seen with Dr. Darryn Saldana, [facility]   Advanced Endoscopy   [phone]    45 minutes of the 60 minute visit today I spent counseling/educating the patient regarding disease process, endoscopic intervention, and pain management     Seen and examined with NP briefly but she is billing. agree with findings and recommendations.    Oniel Cates MD GI Staff

## 2017-05-11 ENCOUNTER — CARE COORDINATION (OUTPATIENT)
Dept: GASTROENTEROLOGY | Facility: CLINIC | Age: 57
End: 2017-05-11

## 2017-05-11 NOTE — PROGRESS NOTES
MRI of abdomen dated 3/22/17 received from [address], [facility] [place] [address].    Myriam Mota LPN  Advanced Endoscopy~ Panc/Bili  Dr. Cates, Dr. Childs & Dr. Miguel  [phone]

## 2017-05-15 ENCOUNTER — ANESTHESIA EVENT (OUTPATIENT)
Dept: SURGERY | Facility: CLINIC | Age: 57
End: 2017-05-15
Payer: COMMERCIAL

## 2017-05-15 ENCOUNTER — ANESTHESIA (OUTPATIENT)
Dept: SURGERY | Facility: CLINIC | Age: 57
End: 2017-05-15
Payer: COMMERCIAL

## 2017-05-15 ENCOUNTER — HOSPITAL ENCOUNTER (OUTPATIENT)
Facility: CLINIC | Age: 57
Discharge: HOME | End: 2017-05-15
Attending: INTERNAL MEDICINE | Admitting: INTERNAL MEDICINE
Payer: COMMERCIAL

## 2017-05-15 ENCOUNTER — SURGERY (OUTPATIENT)
Age: 57
End: 2017-05-15

## 2017-05-15 VITALS
BODY MASS INDEX: 27.62 KG/M2 | OXYGEN SATURATION: 99 % | SYSTOLIC BLOOD PRESSURE: 96 MMHG | TEMPERATURE: 98.4 F | WEIGHT: 197.31 LBS | RESPIRATION RATE: 16 BRPM | HEIGHT: 71 IN | DIASTOLIC BLOOD PRESSURE: 67 MMHG

## 2017-05-15 DIAGNOSIS — R11.0 NAUSEA: Primary | ICD-10-CM

## 2017-05-15 LAB
ALBUMIN SERPL-MCNC: 3.2 G/DL (ref 3.4–5)
ALP SERPL-CCNC: 46 U/L (ref 40–150)
ALT SERPL W P-5'-P-CCNC: 23 U/L (ref 0–70)
ANION GAP SERPL CALCULATED.3IONS-SCNC: 7 MMOL/L (ref 3–14)
AST SERPL W P-5'-P-CCNC: 28 U/L (ref 0–45)
BILIRUB SERPL-MCNC: 0.4 MG/DL (ref 0.2–1.3)
BUN SERPL-MCNC: 15 MG/DL (ref 7–30)
CALCIUM SERPL-MCNC: 8.6 MG/DL (ref 8.5–10.1)
CHLORIDE SERPL-SCNC: 104 MMOL/L (ref 94–109)
CO2 SERPL-SCNC: 28 MMOL/L (ref 20–32)
CREAT SERPL-MCNC: 0.95 MG/DL (ref 0.66–1.25)
ERYTHROCYTE [DISTWIDTH] IN BLOOD BY AUTOMATED COUNT: 14.9 % (ref 10–15)
GFR SERPL CREATININE-BSD FRML MDRD: 82 ML/MIN/1.7M2
GLUCOSE SERPL-MCNC: 90 MG/DL (ref 70–99)
HCT VFR BLD AUTO: 35.1 % (ref 40–53)
HGB BLD-MCNC: 11.4 G/DL (ref 13.3–17.7)
INR PPP: 1.05 (ref 0.86–1.14)
MCH RBC QN AUTO: 26.7 PG (ref 26.5–33)
MCHC RBC AUTO-ENTMCNC: 32.5 G/DL (ref 31.5–36.5)
MCV RBC AUTO: 82 FL (ref 78–100)
PLATELET # BLD AUTO: 173 10E9/L (ref 150–450)
POTASSIUM SERPL-SCNC: 3.8 MMOL/L (ref 3.4–5.3)
PROT SERPL-MCNC: 6.9 G/DL (ref 6.8–8.8)
RBC # BLD AUTO: 4.27 10E12/L (ref 4.4–5.9)
SODIUM SERPL-SCNC: 139 MMOL/L (ref 133–144)
UPPER EUS: NORMAL
WBC # BLD AUTO: 4.9 10E9/L (ref 4–11)

## 2017-05-15 PROCEDURE — 25000132 ZZH RX MED GY IP 250 OP 250 PS 637: Performed by: ANESTHESIOLOGY

## 2017-05-15 PROCEDURE — 36000057 ZZH SURGERY LEVEL 3 1ST 30 MIN - UMMC: Performed by: INTERNAL MEDICINE

## 2017-05-15 PROCEDURE — 71000014 ZZH RECOVERY PHASE 1 LEVEL 2 FIRST HR: Performed by: INTERNAL MEDICINE

## 2017-05-15 PROCEDURE — 71000015 ZZH RECOVERY PHASE 1 LEVEL 2 EA ADDTL HR: Performed by: INTERNAL MEDICINE

## 2017-05-15 PROCEDURE — 25000565 ZZH ISOFLURANE, EA 15 MIN: Performed by: INTERNAL MEDICINE

## 2017-05-15 PROCEDURE — 88173 CYTOPATH EVAL FNA REPORT: CPT | Performed by: INTERNAL MEDICINE

## 2017-05-15 PROCEDURE — 37000008 ZZH ANESTHESIA TECHNICAL FEE, 1ST 30 MIN: Performed by: INTERNAL MEDICINE

## 2017-05-15 PROCEDURE — 40000170 ZZH STATISTIC PRE-PROCEDURE ASSESSMENT II: Performed by: INTERNAL MEDICINE

## 2017-05-15 PROCEDURE — 25000125 ZZHC RX 250: Performed by: NURSE ANESTHETIST, CERTIFIED REGISTERED

## 2017-05-15 PROCEDURE — 88305 TISSUE EXAM BY PATHOLOGIST: CPT | Performed by: INTERNAL MEDICINE

## 2017-05-15 PROCEDURE — 71000027 ZZH RECOVERY PHASE 2 EACH 15 MINS: Performed by: INTERNAL MEDICINE

## 2017-05-15 PROCEDURE — 36415 COLL VENOUS BLD VENIPUNCTURE: CPT | Performed by: INTERNAL MEDICINE

## 2017-05-15 PROCEDURE — 25000128 H RX IP 250 OP 636: Performed by: NURSE ANESTHETIST, CERTIFIED REGISTERED

## 2017-05-15 PROCEDURE — 27210794 ZZH OR GENERAL SUPPLY STERILE: Performed by: INTERNAL MEDICINE

## 2017-05-15 PROCEDURE — C9399 UNCLASSIFIED DRUGS OR BIOLOG: HCPCS | Performed by: NURSE ANESTHETIST, CERTIFIED REGISTERED

## 2017-05-15 PROCEDURE — 85610 PROTHROMBIN TIME: CPT | Performed by: INTERNAL MEDICINE

## 2017-05-15 PROCEDURE — 00000155 ZZHCL STATISTIC H-CELL BLOCK W/STAIN: Performed by: INTERNAL MEDICINE

## 2017-05-15 PROCEDURE — 88342 IMHCHEM/IMCYTCHM 1ST ANTB: CPT | Performed by: INTERNAL MEDICINE

## 2017-05-15 PROCEDURE — 25800025 ZZH RX 258: Performed by: ANESTHESIOLOGY

## 2017-05-15 PROCEDURE — 85027 COMPLETE CBC AUTOMATED: CPT | Performed by: INTERNAL MEDICINE

## 2017-05-15 PROCEDURE — 88172 CYTP DX EVAL FNA 1ST EA SITE: CPT | Performed by: INTERNAL MEDICINE

## 2017-05-15 PROCEDURE — 80053 COMPREHEN METABOLIC PANEL: CPT | Performed by: INTERNAL MEDICINE

## 2017-05-15 PROCEDURE — 25000128 H RX IP 250 OP 636: Performed by: ANESTHESIOLOGY

## 2017-05-15 PROCEDURE — 37000009 ZZH ANESTHESIA TECHNICAL FEE, EACH ADDTL 15 MIN: Performed by: INTERNAL MEDICINE

## 2017-05-15 PROCEDURE — 36000059 ZZH SURGERY LEVEL 3 EA 15 ADDTL MIN UMMC: Performed by: INTERNAL MEDICINE

## 2017-05-15 RX ORDER — LIDOCAINE HYDROCHLORIDE 20 MG/ML
INJECTION, SOLUTION INFILTRATION; PERINEURAL PRN
Status: DISCONTINUED | OUTPATIENT
Start: 2017-05-15 | End: 2017-05-15

## 2017-05-15 RX ORDER — SODIUM CHLORIDE, SODIUM LACTATE, POTASSIUM CHLORIDE, CALCIUM CHLORIDE 600; 310; 30; 20 MG/100ML; MG/100ML; MG/100ML; MG/100ML
INJECTION, SOLUTION INTRAVENOUS CONTINUOUS
Status: DISCONTINUED | OUTPATIENT
Start: 2017-05-15 | End: 2017-05-15 | Stop reason: HOSPADM

## 2017-05-15 RX ORDER — ONDANSETRON 2 MG/ML
INJECTION INTRAMUSCULAR; INTRAVENOUS PRN
Status: DISCONTINUED | OUTPATIENT
Start: 2017-05-15 | End: 2017-05-15

## 2017-05-15 RX ORDER — ONDANSETRON 2 MG/ML
4 INJECTION INTRAMUSCULAR; INTRAVENOUS EVERY 30 MIN PRN
Status: DISCONTINUED | OUTPATIENT
Start: 2017-05-15 | End: 2017-05-15 | Stop reason: HOSPADM

## 2017-05-15 RX ORDER — PROPOFOL 10 MG/ML
INJECTION, EMULSION INTRAVENOUS PRN
Status: DISCONTINUED | OUTPATIENT
Start: 2017-05-15 | End: 2017-05-15

## 2017-05-15 RX ORDER — LIDOCAINE 40 MG/G
CREAM TOPICAL
Status: DISCONTINUED | OUTPATIENT
Start: 2017-05-15 | End: 2017-05-15 | Stop reason: HOSPADM

## 2017-05-15 RX ORDER — FLUMAZENIL 0.1 MG/ML
0.2 INJECTION, SOLUTION INTRAVENOUS
Status: DISCONTINUED | OUTPATIENT
Start: 2017-05-15 | End: 2017-05-15 | Stop reason: HOSPADM

## 2017-05-15 RX ORDER — ONDANSETRON 4 MG/1
4 TABLET, FILM COATED ORAL EVERY 8 HOURS PRN
Qty: 20 TABLET | Refills: 0 | Status: SHIPPED | OUTPATIENT
Start: 2017-05-15 | End: 2017-05-22

## 2017-05-15 RX ORDER — NALOXONE HYDROCHLORIDE 0.4 MG/ML
.1-.4 INJECTION, SOLUTION INTRAMUSCULAR; INTRAVENOUS; SUBCUTANEOUS
Status: DISCONTINUED | OUTPATIENT
Start: 2017-05-15 | End: 2017-05-15 | Stop reason: HOSPADM

## 2017-05-15 RX ORDER — FENTANYL CITRATE 50 UG/ML
25-50 INJECTION, SOLUTION INTRAMUSCULAR; INTRAVENOUS
Status: DISCONTINUED | OUTPATIENT
Start: 2017-05-15 | End: 2017-05-15 | Stop reason: HOSPADM

## 2017-05-15 RX ORDER — ONDANSETRON 4 MG/1
4 TABLET, ORALLY DISINTEGRATING ORAL EVERY 30 MIN PRN
Status: DISCONTINUED | OUTPATIENT
Start: 2017-05-15 | End: 2017-05-15 | Stop reason: HOSPADM

## 2017-05-15 RX ORDER — HYDROMORPHONE HYDROCHLORIDE 1 MG/ML
.3-.5 INJECTION, SOLUTION INTRAMUSCULAR; INTRAVENOUS; SUBCUTANEOUS EVERY 10 MIN PRN
Status: DISCONTINUED | OUTPATIENT
Start: 2017-05-15 | End: 2017-05-15 | Stop reason: HOSPADM

## 2017-05-15 RX ORDER — FENTANYL CITRATE 50 UG/ML
INJECTION, SOLUTION INTRAMUSCULAR; INTRAVENOUS PRN
Status: DISCONTINUED | OUTPATIENT
Start: 2017-05-15 | End: 2017-05-15

## 2017-05-15 RX ORDER — HYDROMORPHONE HYDROCHLORIDE 2 MG/1
2 TABLET ORAL ONCE
Status: COMPLETED | OUTPATIENT
Start: 2017-05-15 | End: 2017-05-15

## 2017-05-15 RX ADMIN — PROPOFOL 200 MG: 10 INJECTION, EMULSION INTRAVENOUS at 10:22

## 2017-05-15 RX ADMIN — ONDANSETRON 4 MG: 2 INJECTION INTRAMUSCULAR; INTRAVENOUS at 12:23

## 2017-05-15 RX ADMIN — SODIUM CHLORIDE, POTASSIUM CHLORIDE, SODIUM LACTATE AND CALCIUM CHLORIDE: 600; 310; 30; 20 INJECTION, SOLUTION INTRAVENOUS at 10:10

## 2017-05-15 RX ADMIN — MIDAZOLAM HYDROCHLORIDE 2 MG: 1 INJECTION, SOLUTION INTRAMUSCULAR; INTRAVENOUS at 10:10

## 2017-05-15 RX ADMIN — FENTANYL CITRATE 50 MCG: 50 INJECTION, SOLUTION INTRAMUSCULAR; INTRAVENOUS at 10:46

## 2017-05-15 RX ADMIN — SODIUM CHLORIDE, POTASSIUM CHLORIDE, SODIUM LACTATE AND CALCIUM CHLORIDE: 600; 310; 30; 20 INJECTION, SOLUTION INTRAVENOUS at 11:40

## 2017-05-15 RX ADMIN — SUGAMMADEX 180 MG: 100 INJECTION, SOLUTION INTRAVENOUS at 11:25

## 2017-05-15 RX ADMIN — FENTANYL CITRATE 100 MCG: 50 INJECTION, SOLUTION INTRAMUSCULAR; INTRAVENOUS at 10:22

## 2017-05-15 RX ADMIN — SUCCINYLCHOLINE CHLORIDE 100 MG: 20 INJECTION, SOLUTION INTRAMUSCULAR; INTRAVENOUS at 10:22

## 2017-05-15 RX ADMIN — ROCURONIUM BROMIDE 10 MG: 10 INJECTION INTRAVENOUS at 10:28

## 2017-05-15 RX ADMIN — HYDROMORPHONE HYDROCHLORIDE 2 MG: 2 TABLET ORAL at 13:35

## 2017-05-15 RX ADMIN — PHENYLEPHRINE HYDROCHLORIDE 100 MCG: 10 INJECTION, SOLUTION INTRAMUSCULAR; INTRAVENOUS; SUBCUTANEOUS at 10:38

## 2017-05-15 RX ADMIN — ONDANSETRON 4 MG: 2 INJECTION INTRAMUSCULAR; INTRAVENOUS at 11:20

## 2017-05-15 RX ADMIN — LIDOCAINE HYDROCHLORIDE 100 MG: 20 INJECTION, SOLUTION INFILTRATION; PERINEURAL at 10:22

## 2017-05-15 RX ADMIN — PHENYLEPHRINE HYDROCHLORIDE 100 MCG: 10 INJECTION, SOLUTION INTRAMUSCULAR; INTRAVENOUS; SUBCUTANEOUS at 10:57

## 2017-05-15 RX ADMIN — PROPOFOL 50 MG: 10 INJECTION, EMULSION INTRAVENOUS at 11:10

## 2017-05-15 RX ADMIN — ROCURONIUM BROMIDE 10 MG: 10 INJECTION INTRAVENOUS at 10:29

## 2017-05-15 ASSESSMENT — LIFESTYLE VARIABLES: TOBACCO_USE: 1

## 2017-05-15 ASSESSMENT — PAIN DESCRIPTION - DESCRIPTORS
DESCRIPTORS: SORE

## 2017-05-15 NOTE — ANESTHESIA CARE TRANSFER NOTE
Patient: Lucas Brown    Procedure(s):  Upper Endoscopic Ultrasound fine needle biopsy   - Wound Class: II-Clean Contaminated    Diagnosis: Pancreatitis   Diagnosis Additional Information: No value filed.    Anesthesia Type:   General, ETT     Note:  Airway :Face Mask  Patient transferred to:PACU  Comments: To PACU, VSS, pt awake and alert, report to RN, care accepted.      Vitals: (Last set prior to Anesthesia Care Transfer)    CRNA VITALS  5/15/2017 1106 - 5/15/2017 1142      5/15/2017             Pulse: 82    Ht Rate: 81    SpO2: 99 %    Resp Rate (set): 10                Electronically Signed By: TERRELL De Leon CRNA  May 15, 2017  11:42 AM

## 2017-05-15 NOTE — DISCHARGE INSTRUCTIONS
[facility]  Same-Day Surgery   Adult Discharge Orders & Instructions     For 24 hours after surgery    1. Get plenty of rest.  A responsible adult must stay with you for at least 24 hours after you leave the hospital.   2. Do not drive or use heavy equipment.  If you have weakness or tingling, don't drive or use heavy equipment until this feeling goes away.  3. Do not drink alcohol.  4. Avoid strenuous or risky activities.  Ask for help when climbing stairs.   5. You may feel lightheaded.  IF so, sit for a few minutes before standing.  Have someone help you get up.   6. If you have nausea (feel sick to your stomach): Drink only clear liquids such as apple juice, ginger ale, broth or 7-Up.  Rest may also help.  Be sure to drink enough fluids.  Move to a regular diet as you feel able.  7. You may have a slight fever. Call the doctor if your fever is over 100 F (37.7 C) (taken under the tongue) or lasts longer than 24 hours.  8. You may have a dry mouth, a sore throat, muscle aches or trouble sleeping.  These should go away after 24 hours.  9. Do not make important or legal decisions.   Call your doctor for any of the followin.  Signs of infection (fever, growing tenderness at the surgery site, a large amount of drainage or bleeding, severe pain, foul-smelling drainage, redness, swelling).    2. It has been over 8 to 10 hours since surgery and you are still not able to urinate (pass water).    3.  Headache for over 24 hours.      To contact a doctor, call Dr. Cantrell's clinic at #[phone] or:        [phone] and ask for the resident on call for gastroenterology or surgery (answered 24 hours a day)      Emergency Department:    [facility]: [phone]       (TTY for hearing impaired: [phone])

## 2017-05-15 NOTE — OR NURSING
"Dr. Berry here. Pt.c/o his lower lip feels\"different\".  Lower lip is puffy.  told pt.this is from the bite block used in OR.  He will inform Dr. Miguel.  Dr. Miguel  Was here to see Pt at 1155. Medicated for nausea with zofran.  to see Pt on 3C prior to discharge.  "

## 2017-05-15 NOTE — OR NURSING
EP Nurse, María Ferrer, notified patient here for a procedure today. If magnet needed pacer will be DOO with rate of 85.

## 2017-05-15 NOTE — OR NURSING
Dr. Miguel was able to see Lucas in phase II before he was discharged. He was able to discuss the procedure and his findings at this point.  GUILLERMO Zavala took a dilaudid for abdominal prior to discharge.

## 2017-05-15 NOTE — BRIEF OP NOTE
[facility]    Brief Operative Note    Pre-operative diagnosis: Pancreatitis   Post-operative diagnosis * No post-op diagnosis entered *  Procedure: Procedure(s):  Upper Endoscopic Ultrasound fine needle biopsy   - Wound Class: II-Clean Contaminated  Surgeon: Surgeon(s) and Role:     * Titus Miguel MD - Primary     * Chely Henderson MD - Assisting  Anesthesia: General   Estimated blood loss: Minimal  Drains: None  Specimens: * No specimens in log *  Findings:   heterogenous pancreas in the head. no discrete lesion seen FNB with 22 gaugr SkarkCore needle: took two passes. Thickened duodenal wall. FNB w 22 fauge sharkcore.   Rest of the pancreas appeared normal.      Recommendation: follow up cytology. Hold AC for 48 hours.     Complications: None.  Implants: None.

## 2017-05-15 NOTE — LETTER
Patient:  Lucas Brown  :   1960  MRN:     [id number]        Mr.Lyndon ROBINSON Brown  [address]  [facility] [address]        May 18, 2017    Dear ,    We are writing to inform you of your test results. In short, the tissue we collected from your pancreas was normal without concerning feature or supportive evidence of a diagnosis of autoimmune pancreatitis. As discussed previously, our plan will involve close outpatient monitoring and you should have a follow up appointment organized with our group. Please contact us immediate with suggestion of recurrent acute pancreatitis.    I have included the formal documtentation of the results below. It continues to be a pleasure participating in your care.  Please feel free to contact our clinic with any further questions.      Sincerely,    Titus Reis MD PhD  Director of Endoscopy   of Medicine  Interventional and Therapeutic Endoscopy    [facility]  Division of Gastroenterology and Hepatology  [facility] [address] [address]    New Consultations  [phone]  Procedure Scheduling [phone]  Clinical Nurse Coordinator [phone]  Clinical Fax   [phone]  Administrative   [phone]  Administrative Fax  [phone]    Resulted Orders   Fine needle aspiration   Result Value Ref Range    Copath Report       Patient Name: LUCAS BROWN  MR#: [id number]  Specimen #: [id number]  Collected: 5/15/2017  Received: 5/15/2017  Reported: 2017 12:32  Ordering Phy(s): TITUS REIS    For improved result formatting, select 'View Enhanced Report Format'  under Linked Documents section.    SPECIMEN/STAIN PROCESS:  A: FNA-EUS guided, Erika-pancreatic Mass       Pap-Cyto x 2, Diff Quick Stain-cyto x 2, Cell Block w/ H&E-Cyto x  1, Save Ribbon, 1 x 1, Cell Block, Level 2 x 1, Save Ribbon, 2 x 1, Cell  Block, Level 3 x 1, IgG-4 x 1  B: FNA-EUS guided, Pancreas        Pap-Cyto x 2, Diff Quick Stain-cyto x 2, Cell Block w/ H&E-Cyto x  1, Save Ribbon, 1 x 1, Cell Block, Level 2 x 1, Save Ribbon, 2 x 1, Cell  Block, Level 3 x 1, IgG-4 x 1    ----------------------------------------------------------------    CYTOLOGIC INTERPRETATION:    A. Erika-pancreatic mass, endoscopic ultrasound guided fine needle  aspiration:  - Negative for malignancy  - Negative for IgG4 expression by immunohistochemistry   Specimen adequacy: Satisfactory for evaluation.    B. Pancreas, endoscopic ultrasound guided fine needle aspiration:  - Negative for malignancy  - Negative for IgG4 expression by immunohistochemistry  Specimen adequacy: Satisfactory for evaluation.    I have personally reviewed all specimens and/or slides, including the  listed special stains, and used them with my medical judgment to  determine the final diagnosis.    Electronically signed out by:  Rivas Sheldon M.D., [facility]    Processed and screened at [facility]    CLINICAL HISTORY:       56 year old male with a history of temporal arteritis and recurrent  acute pancreatitis. Pancreas FNA in August 2016 was suggestive of  pancreatitis ([id number])and ampullary biopsy suggestive of but not  diagnostic of autoimmune pancreatitis ([id number]). Recent abdominal CT  showed prominence in the pancreatic head with adjacent duodenal wall  thickening.    ,    GROSS:  A. FNA- EUS guided, Erika-pancreatic Mass:  Received are 2 fixed slides,  processed for Pap stain, 2 air dried slides, processed for Diff Quik  stain, and material in formalin, processed for one hematoxylin stained  cell block.    B. FNA-EUS guided, Pancreas:  Received are 2 fixed slides, processed for  Pap stain, 2 air dried slides, processed for Diff Quik stain, and  material in formalin, processed for one hematoxylin stained cell block.    INTRAOPERATIVE CONSULTATION:  FNA Performance: Fine needle aspiration was  not performed by [facility],   Pathology staff.  Immediate Adequacy: On site specimen adequacy evaluation was performed  by Dr. DANELLE Glasgow MD via telepathology.    Onsite adequacy/interpretation:  Pass A1-A2 inadequate.  Pass B1-B2 inadequate.    MICROSCOPIC:  Microscopic examination is performed.  Smears and cell block sections  show unremarkable pancreatic tissue without significant inflammation.  Immunohistochemical stains for IgG4 are performed with the appropriate  controls on parts  A and B and show no IgG 4 positive cells.    Shruthi Garber MD, PGY2, and Rivas Sheldon MD    CPT Codes:  A: 36034-ZFIG-ZCO, 72881-NWEL-KFY, 45938-KBGU, 50790-UYD, HCB, 32344-AVE  B: 80680-FUSJ-IXF, 48718-GUQG-HIF, 59935-SMKQ, 91993-MKT, HCB, 38484-OQI    TESTING LAB LOCATION:  [facility], [address]   [id number] [phone]    COLLECTION SITE:  Client:  [facility]  Location:  [facility] (B)

## 2017-05-15 NOTE — IP AVS SNAPSHOT
MRN:[id number]                      After Visit Summary   5/15/2017    Lucas Brown    MRN: [id number]           Thank you!     Thank you for choosing [facility] for your care. Our goal is always to provide you with excellent care. Hearing back from our patients is one way we can continue to improve our services. Please take a few minutes to complete the written survey that you may receive in the mail after you visit with us. Thank you!        Patient Information     Date Of Birth          1960        About your hospital stay     You were admitted on:  May 15, 2017 You last received care in the:  Same Day Surgery [facility]    You were discharged on:  May 15, 2017       Who to Call     For medical emergencies, please call 911.  For non-urgent questions about your medical care, please call your primary care provider or clinic, [phone]  For questions related to your surgery, please call your surgery clinic        Attending Provider     Provider Titus Wilkerson MD Gastroenterology       Primary Care Provider Office Phone # Fax #    Cyril Mackay [phone]       [facility] [address] [place] [id number]        After Care Instructions     Discharge Instructions       No driving or operating machinery until the day after procedure.            Discharge Instructions       Recommend that a responsible adult remain with the patient at home for 6 hours post discharge.            Discharge Instructions       Start with clear liquids, sips of water 1 hour after procedure. If no abdominal pain and gag reflex has returned, advance as tolerated to pre-procedure diet.              Discharge Instructions       Restart home medications.            Discharge Instructions       Check with MD when to start anticoagulants.            Discharge Instructions       No ALCOHOL 24 hours post procedure.                  Further instructions from your  care team       [facility] [place]  Same-Day Surgery   Adult Discharge Orders & Instructions     For 24 hours after surgery    1. Get plenty of rest.  A responsible adult must stay with you for at least 24 hours after you leave the hospital.   2. Do not drive or use heavy equipment.  If you have weakness or tingling, don't drive or use heavy equipment until this feeling goes away.  3. Do not drink alcohol.  4. Avoid strenuous or risky activities.  Ask for help when climbing stairs.   5. You may feel lightheaded.  IF so, sit for a few minutes before standing.  Have someone help you get up.   6. If you have nausea (feel sick to your stomach): Drink only clear liquids such as apple juice, ginger ale, broth or 7-Up.  Rest may also help.  Be sure to drink enough fluids.  Move to a regular diet as you feel able.  7. You may have a slight fever. Call the doctor if your fever is over 100 F (37.7 C) (taken under the tongue) or lasts longer than 24 hours.  8. You may have a dry mouth, a sore throat, muscle aches or trouble sleeping.  These should go away after 24 hours.  9. Do not make important or legal decisions.   Call your doctor for any of the followin.  Signs of infection (fever, growing tenderness at the surgery site, a large amount of drainage or bleeding, severe pain, foul-smelling drainage, redness, swelling).    2. It has been over 8 to 10 hours since surgery and you are still not able to urinate (pass water).    3.  Headache for over 24 hours.      To contact a doctor, call Dr. Cantrell's clinic at #[phone] or:        [phone] and ask for the resident on call for gastroenterology or surgery (answered 24 hours a day)      Emergency Department:    [facility]: [phone]       (TTY for hearing impaired: [phone])              Pending Results     No orders found from 2017 to 2017.            Admission Information     Date & Time Provider Department  "Dept. Phone    5/15/2017 Titus Miguel MD Same Day Surgery [facility] [phone]      Your Vitals Were     Blood Pressure Temperature Respirations Height Weight Pulse Oximetry    99/66 99.7  F (37.6  C) (Oral) 16 1.803 m (5' 11\") 89.5 kg (197 lb 5 oz) 99%    BMI (Body Mass Index)                   27.52 kg/m2           [employer] Information     [employer] gives you secure access to your electronic health record. If you see a primary care provider, you can also send messages to your care team and make appointments. If you have questions, please call your primary care clinic.  If you do not have a primary care provider, please call [phone] and they will assist you.        Care EveryWhere ID     This is your Care EveryWhere ID. This could be used by other organizations to access your [facility] medical records  [id number]           Review of your medicines      UNREVIEWED medicines. Ask your doctor about these medicines        Dose / Directions    enoxaparin 80 MG/0.8ML injection   Commonly known as:  LOVENOX   Ask about: Should I take this medication?        Dose:  80 mg   Inject 80 mg Subcutaneous 2 times daily   Refills:  0         START taking        Dose / Directions    ondansetron 4 MG tablet   Commonly known as:  ZOFRAN   Used for:  Nausea        Dose:  4 mg   Take 1 tablet (4 mg) by mouth every 8 hours as needed for nausea   Quantity:  20 tablet   Refills:  0         CONTINUE these medicines which may have CHANGED, or have new prescriptions. If we are uncertain of the size of tablets/capsules you have at home, strength may be listed as something that might have changed.        Dose / Directions    HYDROmorphone 4 MG tablet   Commonly known as:  DILAUDID   This may have changed:  how much to take   Used for:  S/P ERCP        Dose:  2-4 mg   Take 0.5-1 tablets (2-4 mg) by mouth every 4 hours as needed for moderate to severe pain   Quantity:  90 tablet   Refills:  0         CONTINUE these medicines " which have NOT CHANGED        Dose / Directions    ACETAMINOPHEN PO        Dose:  1000 mg   Take 1,000 mg by mouth every 4 hours as needed for pain   Refills:  0       amylase-lipase-protease 76475 UNITS Cpep per EC capsule   Commonly known as:  CREON   Used for:  Idiopathic acute pancreatitis, unspecified complication status        Take 2-3 with meals / 1-2 with snacks, up to 15 per day.   Quantity:  450 capsule   Refills:  6       aspirin 81 MG tablet        Dose:  81 mg   Take 81 mg by mouth daily Holding   Refills:  0       CELEBREX PO        Dose:  200 mg   Take 200 mg by mouth daily   Refills:  0       fenofibrate 145 MG tablet        Dose:  145 mg   Take 145 mg by mouth daily   Refills:  0       fentaNYL 12 mcg/hr 72 hr patch   Commonly known as:  DURAGESIC        Dose:  1 patch   Place 1 patch onto the skin every 72 hours   Refills:  0       FINASTERIDE PO        Dose:  5 mg   Take 5 mg by mouth daily   Refills:  0       levothyroxine 150 MCG tablet   Commonly known as:  SYNTHROID/LEVOTHROID        Dose:  150 mcg   Take 150 mcg by mouth daily   Refills:  0       lisinopril 10 MG tablet   Commonly known as:  PRINIVIL/ZESTRIL        Dose:  10 mg   Take 10 mg by mouth daily   Refills:  0       MULTIVITAMINS Chew        Dose:  1 chew tab   Take 1 chew tab by mouth daily   Refills:  0       omeprazole 20 MG CR capsule   Commonly known as:  priLOSEC        Dose:  20 mg   Take 20 mg by mouth 2 times daily   Refills:  0       ondansetron 4 MG ODT tab   Commonly known as:  ZOFRAN-ODT        Dose:  4 mg   Take 4 mg by mouth every 8 hours as needed for nausea   Refills:  0       pravastatin 40 MG tablet   Commonly known as:  PRAVACHOL        Dose:  40 mg   Take 40 mg by mouth daily   Refills:  0       PREDNISONE PO        Dose:  4 mg   Take 4 mg by mouth daily   Refills:  0       sertraline 50 MG tablet   Commonly known as:  ZOLOFT        Dose:  50 mg   Take 50 mg by mouth daily   Refills:  0       WARFARIN SODIUM PO         Holding   Refills:  0            Where to get your medicines      These medications were sent to [facility] - [place], [place] - [address]  [address], [facility] [address]     Phone:  [phone]     ondansetron 4 MG tablet                Protect others around you: Learn how to safely use, store and throw away your medicines at www.disposemymeds.org.             Medication List: This is a list of all your medications and when to take them. Check marks below indicate your daily home schedule. Keep this list as a reference.      Medications           Morning Afternoon Evening Bedtime As Needed    ACETAMINOPHEN PO   Take 1,000 mg by mouth every 4 hours as needed for pain                                amylase-lipase-protease 61256 UNITS Cpep per EC capsule   Commonly known as:  CREON   Take 2-3 with meals / 1-2 with snacks, up to 15 per day.                                aspirin 81 MG tablet   Take 81 mg by mouth daily Holding                                CELEBREX PO   Take 200 mg by mouth daily                                fenofibrate 145 MG tablet   Take 145 mg by mouth daily                                fentaNYL 12 mcg/hr 72 hr patch   Commonly known as:  DURAGESIC   Place 1 patch onto the skin every 72 hours                                FINASTERIDE PO   Take 5 mg by mouth daily                                HYDROmorphone 4 MG tablet   Commonly known as:  DILAUDID   Take 0.5-1 tablets (2-4 mg) by mouth every 4 hours as needed for moderate to severe pain                                levothyroxine 150 MCG tablet   Commonly known as:  SYNTHROID/LEVOTHROID   Take 150 mcg by mouth daily                                lisinopril 10 MG tablet   Commonly known as:  PRINIVIL/ZESTRIL   Take 10 mg by mouth daily                                MULTIVITAMINS Chew   Take 1 chew tab by mouth daily                                omeprazole 20 MG CR capsule   Commonly known  as:  priLOSEC   Take 20 mg by mouth 2 times daily                                ondansetron 4 MG ODT tab   Commonly known as:  ZOFRAN-ODT   Take 4 mg by mouth every 8 hours as needed for nausea                                ondansetron 4 MG tablet   Commonly known as:  ZOFRAN   Take 1 tablet (4 mg) by mouth every 8 hours as needed for nausea                                pravastatin 40 MG tablet   Commonly known as:  PRAVACHOL   Take 40 mg by mouth daily                                PREDNISONE PO   Take 4 mg by mouth daily                                sertraline 50 MG tablet   Commonly known as:  ZOLOFT   Take 50 mg by mouth daily                                WARFARIN SODIUM PO   Holding                                  ASK your doctor about these medications           Morning Afternoon Evening Bedtime As Needed    enoxaparin 80 MG/0.8ML injection   Commonly known as:  LOVENOX   Inject 80 mg Subcutaneous 2 times daily   Ask about: Should I take this medication?

## 2017-05-15 NOTE — ANESTHESIA PREPROCEDURE EVALUATION
Anesthesia Evaluation     . Pt has had prior anesthetic. Type: General and MAC    No history of anesthetic complications          ROS/MED HX    ENT/Pulmonary:     (+)sleep apnea, tobacco use, Past use doesn't use CPAP , . .    Neurologic:  - neg neurologic ROS     Cardiovascular: Comment: History of aortic insufficiency status post aortic valve replacement in August 2014.      (+) hypertension----. Taking blood thinners Pt has received instructions: Instructions Given to patient: Holding aspirin and Warfarin for seven days.  Bridging with subcutaneous Lovenox.  . . . pacemaker Reason placed: Complete heart block:type: Dual chamber pacemaker settings: DDDR with a rate of  bpm.   - Patient is dependent on pacemaker . valvular problems/murmurs . Previous cardiac testing date:results:date: results:ECG reviewed date:05/08/2017 results:Ventricular-paced complexes. date: results:          METS/Exercise Tolerance:  >4 METS   Hematologic:  - neg hematologic  ROS       Musculoskeletal:  - neg musculoskeletal ROS       GI/Hepatic: Comment: Diagnosis of chronic pancreatitis.      (+) GERD Asymptomatic on medication,       Renal/Genitourinary:  - ROS Renal section negative   (+) BPH,       Endo:     (+) Chronic steroid usage for .      Psychiatric:     (+) psychiatric history depression      Infectious Disease:  - neg infectious disease ROS       Malignancy:      - no malignancy   Other:    (+) H/O Chronic Pain,H/O chronic opiod use ,                    Physical Exam  Normal systems: pulmonary and dental    Airway   Mallampati: II  TM distance: >3 FB  Neck ROM: full    Dental     Cardiovascular   Rhythm and rate: regular and normal      Pulmonary                        Lab / Radiology Results:   Reviewed current labs when avail, see EMR for details.      BMP:  Recent Labs   Lab Test  10/18/16   1409   NA  142   POTASSIUM  3.6   CHLORIDE  107   CO2  29   BUN  16   CR  0.92   GLC  83   NICHOLAS  8.8       LFTs:   Recent Labs    Lab Test  10/18/16   1409   PROTTOTAL  6.2*   ALBUMIN  3.3*   BILITOTAL  0.4   ALKPHOS  36*   AST  13   ALT  19       CBC:   Recent Labs   Lab Test  05/15/17   0915   WBC  4.9   HGB  11.4*   PLT  173       Coags:  Recent Labs   Lab Test  05/15/17   0915   12/01/15   1336   INR  1.05   < >  1.16*   PTT   --    --   31    < > = values in this interval not displayed.       Blood Bank:  No results found for: ABO, RH, AS    Studies:  See EMR for current studies, reviewed when available.      Anesthesia Plan      History & Physical Review  History and physical reviewed and following examination; no interval change.    ASA Status:  3 .    NPO Status:  > 8 hours    Plan for General and ETT with Intravenous induction. Maintenance will be Balanced.    PONV prophylaxis:  Ondansetron (or other 5HT-3) and Dexamethasone or Solumedrol       Postoperative Care  Postoperative pain management:  Multi-modal analgesia.      Consents  Anesthetic plan, risks, benefits and alternatives discussed with:  Patient.  Use of blood products discussed: Yes.   Use of blood products discussed with Patient.  Consented to blood products.  .      Manuel Berry MD  Anesthesiologist  9:46 AM  May 15, 2017                        .

## 2017-05-15 NOTE — IP AVS SNAPSHOT
Same Day Surgery [address] [address]    Phone:  [phone]                                       After Visit Summary   5/15/2017    Lucas Brown    MRN: [id number]           After Visit Summary Signature Page     I have received my discharge instructions, and my questions have been answered. I have discussed any challenges I see with this plan with the nurse or doctor.    ..........................................................................................................................................  Patient/Patient Representative Signature      ..........................................................................................................................................  Patient Representative Print Name and Relationship to Patient    ..................................................               ................................................  Date                                            Time    ..........................................................................................................................................  Reviewed by Signature/Title    ...................................................              ..............................................  Date                                                            Time

## 2017-05-15 NOTE — ANESTHESIA POSTPROCEDURE EVALUATION
Patient: Lucas Brown    Procedure(s):  Upper Endoscopic Ultrasound fine needle biopsy   - Wound Class: II-Clean Contaminated    Diagnosis:Pancreatitis   Diagnosis Additional Information: No value filed.    Anesthesia Type:  General, ETT    Note:  Anesthesia Post Evaluation    Patient location during evaluation: PACU  Patient participation: Able to fully participate in evaluation  Level of consciousness: awake and alert  Pain management: adequate  Airway patency: patent  Cardiovascular status: acceptable and hemodynamically stable  Respiratory status: acceptable  Hydration status: acceptable  PONV: none     Anesthetic complications: None          Last vitals:  Vitals:    05/15/17 1141 05/15/17 1145 05/15/17 1200   BP: 104/64 104/64 111/71   Resp: 16 16 16   Temp: 36.9  C (98.5  F)  36.9  C (98.5  F)   SpO2: 99% 98% 100%         Electronically Signed By: Manuel Berry MD  May 15, 2017  12:29 PM

## 2017-05-17 ENCOUNTER — CARE COORDINATION (OUTPATIENT)
Dept: GASTROENTEROLOGY | Facility: CLINIC | Age: 57
End: 2017-05-17

## 2017-05-17 LAB — COPATH REPORT: NORMAL

## 2017-05-17 NOTE — PROGRESS NOTES
"Post EUS (5/15/2017) with Dr. Miguel: Follow-up -([facility]ryn patient)    Post procedure recommendations: Dr Migule to communicate the results of cytology when available.    Patient states he is having a lot of pain. States he feels beat up. Able to eat and drink. Still having nausea, taking zofran as needed. Denies vomiting or fevers. He is advised to call with any concerning symptoms. ~ Advised to go to the ER if develops:    Fevers over 101 +/- chills,    Significant nausea/vomiting causing inability to take in fluids depleting hydration.    severe pain, ongoing symptoms (pain, nausea) that cannot be controlled with prescribed or OTC medication.    Signs of dehydration (pale skin, low blood pressure, lightheadedness, dark urine, \"sticky\" skin when pinched, rapid heart rate, little to no urine output).  Orders placed: None at this time.  Scheduled in clinic on 6/7/2017 at 1pm with Rosaline GARCIA.     Contact information verified for future questions/concerns.    Freya VELARDE RN Coordinator  Dr. Cates, Dr. Miguel & Dr. Lopez  Pancreas~Biliary  [phone]          "

## 2017-05-19 ENCOUNTER — TELEPHONE (OUTPATIENT)
Dept: GASTROENTEROLOGY | Facility: CLINIC | Age: 57
End: 2017-05-19

## 2017-05-19 NOTE — TELEPHONE ENCOUNTER
Paper records scanned in and CD with the following studies:     03/23/2017 Echocardiogram 2nd complete  03/22/2017 MRI Abd W WO  01/07/2016 Echocardiogram 2nd complete    Taken down to scanning on 5/19/2017    SR 05/19/2017  352p

## 2017-05-30 ENCOUNTER — CARE COORDINATION (OUTPATIENT)
Dept: GASTROENTEROLOGY | Facility: CLINIC | Age: 57
End: 2017-05-30

## 2017-05-30 NOTE — PROGRESS NOTES
Confirmed appointment with Rosaline Saldana on 6/7/2017 at 1 pm.    Myriam Mota LPN  Advanced Endoscopy~ Panc/Bili  Dr. Cates, Dr. Childs & Dr. Miguel  [phone]

## 2017-06-05 ENCOUNTER — CARE COORDINATION (OUTPATIENT)
Dept: GASTROENTEROLOGY | Facility: CLINIC | Age: 57
End: 2017-06-05

## 2017-06-05 NOTE — PROGRESS NOTES
Message received to contact Lucas and inform him that Dr. Cates won't be in clinic on the day of his appointment.   Spoke to Lucas and informed him of what is noted above. Lucas states that he isn't in need of seeing Dr. Cates along with Rosaline Saldana that he is scheduled to see.    Myriam Mota LPN  Advanced Endoscopy~ Panc/Bili  Dr. Cates, Dr. Childs & Dr. Miguel  [phone]

## 2017-06-07 ENCOUNTER — OFFICE VISIT (OUTPATIENT)
Dept: GASTROENTEROLOGY | Facility: CLINIC | Age: 57
End: 2017-06-07

## 2017-06-07 VITALS
HEART RATE: 74 BPM | OXYGEN SATURATION: 96 % | DIASTOLIC BLOOD PRESSURE: 70 MMHG | WEIGHT: 198.9 LBS | HEIGHT: 71 IN | SYSTOLIC BLOOD PRESSURE: 109 MMHG | BODY MASS INDEX: 27.85 KG/M2 | TEMPERATURE: 99.2 F

## 2017-06-07 DIAGNOSIS — K85.90 RECURRENT ACUTE PANCREATITIS: Primary | ICD-10-CM

## 2017-06-07 RX ORDER — FENTANYL 50 UG/1
50 PATCH TRANSDERMAL
COMMUNITY
Start: 2017-05-22 | End: 2017-06-07

## 2017-06-07 RX ORDER — HYDROMORPHONE HYDROCHLORIDE 2 MG/1
TABLET ORAL
COMMUNITY
Start: 2017-05-22 | End: 2017-06-07

## 2017-06-07 ASSESSMENT — PAIN SCALES - GENERAL: PAINLEVEL: MILD PAIN (3)

## 2017-06-07 NOTE — PROGRESS NOTES
REASON FOR VISIT: Follow-up visit.   REFERRING PHYSICIAN: : Dr. Cyril Banda ([place], [place])       FORWARDED HISTORY:   Lucas Brown is a 56 year old male with a history of smoldering recurrent acute pancreatitis after his aortic valve repair and resection of ascending aortic aneurysm who was found to have an inflammatory mass in the head of the pancreas noted a little over 2 years ago.  He had an EUS showing an inflammatory mass with a small fluid collection in the head.  The FNA was consistent with pancreatitis. No evidence of malignancy. He had ERCP in August 2016 with stent placement and an ampullary biopsy that showed more than 25-30 IgG4 cells per high powered field suggestive of but not diagnostic of autoimmune pancreatitis. His serum IgG4 is negative. Initially his pancreatitis was thought to be related to autoimmune pancreatitis.   He was diagnosed with temporal arteritis in March of 2016 that was treated with steriods. However, his pancreatic pain had not responded to steroids. He was seen by Rheumatology and temporal artery biopsy slide for IgG4 related disease was negative. Per Rheumatology note his pancreatitis is unlikely related to autoimmune process, is more likely secondary to ischemia in the setting of a prolonged cardiothoracic surgery.   He was seen in the ER where a CT scan of abdomen from 4/19/2017 showed prominence in the pancreatic head with adjacent duodenal wall thickening favored to represent sequelae of prior pancreatitis, unable to exclude pancreatic mass that would best be assessed by direct biopsy per. As a result, he underwent an EUS with FNB on  5/15/17 with Dr. Miguel where a fine core biopsy was performed. Cytology from EUS showed no evidence of malignancy and was negative for IgG4. The EUS/FNB showed focal heterogeneity of the pancreatic head with extension towards the dudoenal wall, though no  suggestion of discrete mass, favoring pancreatic disease.  "      HISTORY OF PRESENT ILLNESS:    Since his last visit on 5/10/17, Mr. Brown reports joint pain since starting on pancreatic enzymes. He was initially taking Zenpep ran out and started taking Creon 2 capsules with each meals. He reports joint pain to bilateral knees, hips, and ankles since taking enzymes. He reports the joint pain is severe at times, worsens throughout the day when sedentary. He also reports pain with urination since taking pancreatic enzymes. He denies joint swelling, warmth, or erythremia. He continues to have daily abdominal pain, not has severe compared to one month ago. He states \"the pancreas pain has improved\".  He takes dilaudid as needed with adequate pain relief. He is concerned that headaches are getting more consistent and is concerned this may be related to tapering of Prednisone which he takes for history of temporal arteritis. He followed up with his PCP, and states his ESR was slightly elevated. He was encouraged to follow-up with Rheumatology, he has scheduled appointment the end of June.      In addition, he reports ongoing nausea that tends to occur after eating. He takes Zofran 1-2 times per day that seems to help. Occasional vomiting. Denies hematemesis. Good fluid intake. Appetite is still poor. He tries to eat small frequent meals. He has maintained his weight. Still has some fatigue. Requiring frequent rest throughout the day. Reports occasional constipation, takes Miralax as needed.   Patient denies night sweats, fever,chills, jaundice, itching, early satiety, bloating, dysphagia, odynophagia, diarrhea, or oily stools.       REVIEW OF SYSTEMS:   A complete 10-point review of systems was performed and was noted to be negative except as above.      PHYSICAL EXAM:  VITAL SIGNS: Stable  GENERAL: alert, appears stated age, no acute distress  EYES: Eyes grossly normal to inspection, anicteric sclera   MOUTH: no ulcers, no lesions  NECK: no tenderness, no adenopathy, no " asymmetry, no masses, no stiffness; thyroid- normal to palpation  RESP: lungs clear to auscultation - no rales, no rhonchi, no wheezes  CV: regular rates and rhythm, normal S1 S2, no murmur   ABDOMEN: soft, lower abdominal tenderness, no hepatosplenomegaly, no masses, distention, guarding, or rebound, normal bowel sounds  MS: extremities- no gross deformities noted, no edema  SKIN: no rashes  NEURO: strength and tone- normal, mentation- intact, speech- normal, reflexes- symmetric  Non focal no aphasia. No facial asymmetry.   PSYCH: Alert and oriented times 3; speech- coherent , normal rate and volume; able to articulate logical thoughts, affect- normal   LYMPHATICS: ant. cervical- normal      ASSESSMENT AND RECOMMENDATIONS:   Mr. Brown is a 56 year old male with history of smoldering recurrent acute pancreatitis with inflammatory mass in the head of the pancreas that has been going on almost over 2 years since his aortic valve replacement. He had multiple EUS and ERCP including fine needle biopsy.  We discussed at length the results of cytology which was negative for autoimmune pancreatitis and negative for malignancy.The EUS/FNB showed focal heterogeneity of the pancreatic head with extension towards the dudoenal wall, though no  suggestion of discrete mass, favoring pancreatic disease. Now with intractable pain with persistent mass like inflammation of head. Discussed with Dr. Cates who recommends referral to Dr. Crum for consideration for Whipple surgery. Patient and wife expressed concerns, agreeable to discuss this further with Dr. Crum.   In addition, he was encouraged to follow-up with his Rheumatologist.With tapering of his prednisone, he has noticed mild persistent headaches that he is concerned is due to temporal arteritis.  He was instructed to stop the pancreatic enzymes to see if the joint pain resolves. This is most likely related to the pancreatic enzymes, given the joint pain started  shortly after starting enzymes. If the joint pain does not resolve, he should discuss this at his upcoming Rheumatology appointment.       Discussed with Dr. Cates, agreed with plan         Rosaline Saldana, BOBBI   [facility]   [phone]                I spent 40 minutes with this patient face to face and explained the conditions and plans (more than 50% of time was counseling/coordination of care as discussed above) .

## 2017-06-07 NOTE — LETTER
6/7/2017       RE: Lucas Brown  [address] [facility] [place] [address]     Dear Colleague,    Thank you for referring your patient, Lucas Brown, to the [facility]CREAS AND BIL[facility] at [facility]. Please see a copy of my visit note below.    REASON FOR VISIT: Follow-up visit.   REFERRING PHYSICIAN: : Dr. Cyril Banda ([place], [place])       FORWARDED HISTORY:   Lucas Brown is a 56 year old male with a history of smoldering recurrent acute pancreatitis after his aortic valve repair and resection of ascending aortic aneurysm who was found to have an inflammatory mass in the head of the pancreas noted a little over 2 years ago.  He had an EUS showing an inflammatory mass with a small fluid collection in the head.  The FNA was consistent with pancreatitis. No evidence of malignancy. He had ERCP in August 2016 with stent placement and an ampullary biopsy that showed more than 25-30 IgG4 cells per high powered field suggestive of but not diagnostic of autoimmune pancreatitis. His serum IgG4 is negative. Initially his pancreatitis was thought to be related to autoimmune pancreatitis.   He was diagnosed with temporal arteritis in March of 2016 that was treated with steriods. However, his pancreatic pain had not responded to steroids. He was seen by Rheumatology and temporal artery biopsy slide for IgG4 related disease was negative. Per Rheumatology note his pancreatitis is unlikely related to autoimmune process, is more likely secondary to ischemia in the setting of a prolonged cardiothoracic surgery.   He was seen in the ER where a CT scan of abdomen from 4/19/2017 showed prominence in the pancreatic head with adjacent duodenal wall thickening favored to represent sequelae of prior pancreatitis, unable to exclude pancreatic mass that would best be assessed by direct biopsy per. As a result, he underwent an EUS with FNB on  5/15/17 with Dr. Miguel  "where a fine core biopsy was performed. Cytology from EUS showed no evidence of malignancy and was negative for IgG4. The EUS/FNB showed focal heterogeneity of the pancreatic head with extension towards the dudoenal wall, though no  suggestion of discrete mass, favoring pancreatic disease.     HISTORY OF PRESENT ILLNESS:    Since his last visit on 5/10/17, Mr. Brown reports joint pain since starting on pancreatic enzymes. He was initially taking Zenpep ran out and started taking Creon 2 capsules with each meals. He reports joint pain to bilateral knees, hips, and ankles since taking enzymes. He reports the joint pain is severe at times, worsens throughout the day when sedentary. He also reports pain with urination since taking pancreatic enzymes. He denies joint swelling, warmth, or erythremia. He continues to have daily abdominal pain, not has severe compared to one month ago. He states \"the pancreas pain has improved\".  He takes dilaudid as needed with adequate pain relief. He is concerned that headaches are getting more consistent and is concerned this may be related to tapering of Prednisone which he takes for history of temporal arteritis. He followed up with his PCP, and states his ESR was slightly elevated. He was encouraged to follow-up with Rheumatology, he has scheduled appointment the end of June.      In addition, he reports ongoing nausea that tends to occur after eating. He takes Zofran 1-2 times per day that seems to help. Occasional vomiting. Denies hematemesis. Good fluid intake. Appetite is still poor. He tries to eat small frequent meals. He has maintained his weight. Still has some fatigue. Requiring frequent rest throughout the day. Reports occasional constipation, takes Miralax as needed.   Patient denies night sweats, fever,chills, jaundice, itching, early satiety, bloating, dysphagia, odynophagia, diarrhea, or oily stools.     REVIEW OF SYSTEMS:   A complete 10-point review of systems was " performed and was noted to be negative except as above.    PHYSICAL EXAM:  VITAL SIGNS: Stable  GENERAL: alert, appears stated age, no acute distress  EYES: Eyes grossly normal to inspection, anicteric sclera   MOUTH: no ulcers, no lesions  NECK: no tenderness, no adenopathy, no asymmetry, no masses, no stiffness; thyroid- normal to palpation  RESP: lungs clear to auscultation - no rales, no rhonchi, no wheezes  CV: regular rates and rhythm, normal S1 S2, no murmur   ABDOMEN: soft, lower abdominal tenderness, no hepatosplenomegaly, no masses, distention, guarding, or rebound, normal bowel sounds  MS: extremities- no gross deformities noted, no edema  SKIN: no rashes  NEURO: strength and tone- normal, mentation- intact, speech- normal, reflexes- symmetric  Non focal no aphasia. No facial asymmetry.   PSYCH: Alert and oriented times 3; speech- coherent , normal rate and volume; able to articulate logical thoughts, affect- normal   LYMPHATICS: ant. cervical- normal    ASSESSMENT AND RECOMMENDATIONS:   Mr. Brown is a 56 year old male with history of smoldering recurrent acute pancreatitis with inflammatory mass in the head of the pancreas that has been going on almost over 2 years since his aortic valve replacement. He had multiple EUS and ERCP including fine needle biopsy.  We discussed at length the results of cytology which was negative for autoimmune pancreatitis and negative for malignancy.The EUS/FNB showed focal heterogeneity of the pancreatic head with extension towards the dudoenal wall, though no  suggestion of discrete mass, favoring pancreatic disease. Now with intractable pain with persistent mass like inflammation of head. Discussed with Dr. Cates who recommends referral to Dr. Crum for consideration for Whipple surgery. Patient and wife expressed concerns, agreeable to discuss this further with Dr. Crum.   In addition, he was encouraged to follow-up with his Rheumatologist.With tapering of his  prednisone, he has noticed mild persistent headaches that he is concerned is due to temporal arteritis.  He was instructed to stop the pancreatic enzymes to see if the joint pain resolves. This is most likely related to the pancreatic enzymes, given the joint pain started shortly after starting enzymes. If the joint pain does not resolve, he should discuss this at his upcoming Rheumatology appointment.       Discussed with Dr. Cates, agreed with plan     Rosaline Saldana, BOBBI   [facility]   [phone]

## 2017-06-07 NOTE — MR AVS SNAPSHOT
After Visit Summary   6/7/2017    Lucas Brown    MRN: [id number]           Patient Information     Date Of Birth          1960        Visit Information        Provider Department      6/7/2017 1:00 PM Rosaline Saldana APRN [facility]creas [facility] Biliary        Today's Diagnoses     Recurrent acute pancreatitis    -  1       Follow-ups after your visit        Additional Services     GENERAL SURG ADULT REFERRAL       Refer to Vik Crum MD.                  Who to contact     Please call your clinic at [phone] to:    Ask questions about your health    Make or cancel appointments    Discuss your medicines    Learn about your test results    Speak to your doctor   If you have compliments or concerns about an experience at your clinic, or if you wish to file a complaint, please contact [facility] Patient Relations at [phone] or email us at [email or web address]         Additional Information About Your Visit        MyChart Information     [employer] gives you secure access to your electronic health record. If you see a primary care provider, you can also send messages to your care team and make appointments. If you have questions, please call your primary care clinic.  If you do not have a primary care provider, please call [phone] and they will assist you.      [employer] is an electronic gateway that provides easy, online access to your medical records. With [employer], you can request a clinic appointment, read your test results, renew a prescription or communicate with your care team.     To access your existing account, please contact your [facility] or call [phone] for assistance.        Care EveryWhere ID     This is your Care EveryWhere ID. This could be used by other organizations to access your [place] medical records  [id number]        Your Vitals Were     Pulse Temperature Height Pulse  "Oximetry BMI (Body Mass Index)       74 99.2  F (37.3  C) 1.803 m (5' 11\") 96% 27.74 kg/m2        Blood Pressure from Last 3 Encounters:   06/07/17 109/70   05/15/17 96/67   05/10/17 104/75    Weight from Last 3 Encounters:   06/07/17 90.2 kg (198 lb 14.4 oz)   05/15/17 89.5 kg (197 lb 5 oz)   05/10/17 89.9 kg (198 lb 1.6 oz)              We Performed the Following     GENERAL SURG ADULT REFERRAL          Today's Medication Changes          These changes are accurate as of: 6/7/17 11:59 PM.  If you have any questions, ask your nurse or doctor.               These medicines have changed or have updated prescriptions.        Dose/Directions    fentaNYL 12 mcg/hr 72 hr patch   Commonly known as:  DURAGESIC   This may have changed:  Another medication with the same name was removed. Continue taking this medication, and follow the directions you see here.   Changed by:  Rosaline Saldana APRN CNP        Dose:  1 patch   Place 1 patch onto the skin every 72 hours   Refills:  0       HYDROmorphone 4 MG tablet   Commonly known as:  DILAUDID   This may have changed:    - how much to take  - Another medication with the same name was removed. Continue taking this medication, and follow the directions you see here.   Used for:  S/P ERCP        Dose:  2-4 mg   Take 0.5-1 tablets (2-4 mg) by mouth every 4 hours as needed for moderate to severe pain   Quantity:  90 tablet   Refills:  0       sertraline 50 MG tablet   Commonly known as:  ZOLOFT   This may have changed:  Another medication with the same name was removed. Continue taking this medication, and follow the directions you see here.        Dose:  50 mg   Take 50 mg by mouth daily   Refills:  0                Primary Care Provider Office Phone # Fax #    Cyril Mackay [phone]       [place] [address] [facility] [address]        Thank you!     Thank you for choosing [facility] PANCREAS AND BILIARY  for your care. Our goal is always " to provide you with excellent care. Hearing back from our patients is one way we can continue to improve our services. Please take a few minutes to complete the written survey that you may receive in the mail after your visit with us. Thank you!             Your Updated Medication List - Protect others around you: Learn how to safely use, store and throw away your medicines at www.disposemymeds.org.          This list is accurate as of: 6/7/17 11:59 PM.  Always use your most recent med list.                   Brand Name Dispense Instructions for use    ACETAMINOPHEN PO      Take 1,000 mg by mouth every 4 hours as needed for pain       amylase-lipase-protease 07094 UNITS Cpep per EC capsule    CREON    450 capsule    Take 2-3 with meals / 1-2 with snacks, up to 15 per day.       aspirin 81 MG tablet      Take 81 mg by mouth daily Holding       CELEBREX PO      Take 200 mg by mouth daily       fenofibrate 145 MG tablet      Take 145 mg by mouth daily       fentaNYL 12 mcg/hr 72 hr patch    DURAGESIC     Place 1 patch onto the skin every 72 hours       FINASTERIDE PO      Take 5 mg by mouth daily       HYDROmorphone 4 MG tablet    DILAUDID    90 tablet    Take 0.5-1 tablets (2-4 mg) by mouth every 4 hours as needed for moderate to severe pain       levothyroxine 150 MCG tablet    SYNTHROID/LEVOTHROID     Take 150 mcg by mouth daily       lisinopril 10 MG tablet    PRINIVIL/ZESTRIL     Take 10 mg by mouth daily       MULTIVITAMINS Chew      Take 1 chew tab by mouth daily       omeprazole 20 MG CR capsule    priLOSEC     Take 20 mg by mouth 2 times daily       ondansetron 4 MG ODT tab    ZOFRAN-ODT     Take 4 mg by mouth every 8 hours as needed for nausea       pravastatin 40 MG tablet    PRAVACHOL     Take 40 mg by mouth daily       PREDNISONE PO      Take 3 mg by mouth daily       sertraline 50 MG tablet    ZOLOFT     Take 50 mg by mouth daily       WARFARIN SODIUM PO      Holding

## 2017-06-07 NOTE — NURSING NOTE
"Chief Complaint   Patient presents with     RECHECK     F/U Post EUS       Vitals:    06/07/17 1243   BP: 109/70   BP Location: Left arm   Patient Position: Chair   Cuff Size: Adult Regular   Pulse: 74   Temp: 99.2  F (37.3  C)   SpO2: 96%   Weight: 198 lb 14.4 oz   Height: 5' 11\"       Body mass index is 27.74 kg/(m^2).      Monica Hansen                          "

## 2017-06-15 ENCOUNTER — CARE COORDINATION (OUTPATIENT)
Dept: GASTROENTEROLOGY | Facility: CLINIC | Age: 57
End: 2017-06-15

## 2017-06-15 DIAGNOSIS — K85.90 RECURRENT ACUTE PANCREATITIS: Primary | ICD-10-CM

## 2017-06-15 NOTE — PROGRESS NOTES
Message received from Rosaline GARCIA:  Viokase ordered. Will need to continue Prilosec. Sent to [place] pharmacy. Please inform may also cause joint pain.     Thanks,     Rosaline GARCIA     Called and left message for Lucas of the above information and contact information left for future questions/concerns.     Freya VELARDE RN Coordinator  Dr. Cates, Dr. Miguel & Dr. Childs   Pancreas~Biliary  [phone] #4

## 2017-06-15 NOTE — PROGRESS NOTES
Lyndon called asking for message to be sent to Rosaline GARCIA:    He states his joint pain is now much improved being off the pancreatic enzymes but he is now having more pancreatic pain and is wondering if another enzyme would be an option or what his other options are.     Message sent to Rosaline GARCIA for input. Lucas is aware he will get a call back when plan known.     Freya VELARDE RN Coordinator  Dr. Cates, Dr. Miguel & Dr. Childs   Pancreas~Biliary  [phone] #4

## 2017-06-23 ENCOUNTER — MYC MEDICAL ADVICE (OUTPATIENT)
Dept: GASTROENTEROLOGY | Facility: CLINIC | Age: 57
End: 2017-06-23

## 2017-07-18 DIAGNOSIS — K85.90 PANCREATITIS: Primary | ICD-10-CM

## 2017-07-19 ENCOUNTER — TELEPHONE (OUTPATIENT)
Dept: SURGERY | Facility: CLINIC | Age: 57
End: 2017-07-19

## 2017-07-19 NOTE — TELEPHONE ENCOUNTER
Pre Visit Call and Assessment    Date of call:  07/19/2017    Phone numbers:  Home number on file [phone] (home)    Reached patient/confirmed appointment:  No - left message:   on voicemail  Patient care team/Primary provider:  Cyril Mackay  Preferred outpatient pharmacy:    [facility] - [place], [place] - [address] [place] [address]  [facility] [address]  Phone: [phone] Fax: [phone]    Referred to:  Dr. Filipe Crum    Reason for visit:  poss    Problem List:    Patient Active Problem List   Diagnosis     Pancreatitis     S/P ERCP     Hypertension     Hyperlipidemia     Hypothyroidism     H/O aortic valve replacement     Sleep apnea     Abdominal pain     Atrioventricular block, complete (HCC)     Aneurysm of thoracic aorta (H)     Temporal arteritis (H)       Allergies:     Allergies   Allergen Reactions     Reclast [Zoledronic Acid] Other (See Comments)     Caused pain in joints     Codeine Other (See Comments)     Gets garcia       History:      Past Medical History:   Diagnosis Date     Anticoagulation adequate with anticoagulant therapy      Antiplatelet or antithrombotic long-term use      Anxiety      Aortic stenosis 1999     BPH (benign prostatic hyperplasia)      Chronic pancreatitis (H)      Complete heart block (H)     medtronic ppm     Depression      GERD (gastroesophageal reflux disease)      Heart murmur      HTN (hypertension)      Hyperlipidemia      Hypothyroidism      COLBY on CPAP      Other chronic pain      Pacemaker      Pancreatic disease      Pancreatitis        Past Surgical History:   Procedure Laterality Date     aortic aneurysm       BIOPSY ARTERY TEMPORAL       ENDOSCOPIC RETROGRADE CHOLANGIOPANCREATOGRAM N/A 10/13/2015    Procedure: COMBINED ENDOSCOPIC RETROGRADE CHOLANGIOPANCREATOGRAPHY, PLACE TUBE/STENT;  Surgeon: Oniel Cates MD;  [facility]: [facility]     ENDOSCOPIC RETROGRADE CHOLANGIOPANCREATOGRAM N/A 11/12/2015    Procedure: COMBINED  ENDOSCOPIC RETROGRADE CHOLANGIOPANCREATOGRAPHY, REMOVE FOREIGN BODY OR STENT/TUBE;  Surgeon: Oniel Cates MD;  Location: [facility]     ENDOSCOPIC RETROGRADE CHOLANGIOPANCREATOGRAM N/A 12/1/2015    Procedure: COMBINED ENDOSCOPIC RETROGRADE CHOLANGIOPANCREATOGRAPHY, PLACE TUBE/STENT;  Surgeon: Oniel Cates MD;  [facility]: [facility]     ENDOSCOPIC RETROGRADE CHOLANGIOPANCREATOGRAM N/A 12/22/2015    Procedure: ENDOSCOPIC RETROGRADE CHOLANGIOPANCREATOGRAM;  Surgeon: Oniel Cates MD;  [facility]     ENDOSCOPIC RETROGRADE CHOLANGIOPANCREATOGRAM N/A 1/19/2016    Procedure: COMBINED ENDOSCOPIC RETROGRADE CHOLANGIOPANCREATOGRAPHY, REMOVE FOREIGN BODY OR STENT/TUBE;  Surgeon: Oniel Cates MD;  [facility]     ENDOSCOPIC RETROGRADE CHOLANGIOPANCREATOGRAM N/A 8/30/2016    Procedure: COMBINED ENDOSCOPIC RETROGRADE CHOLANGIOPANCREATOGRAPHY, PLACE TUBE/STENT;  Surgeon: Oniel Cates MD;  [facility]     ENDOSCOPIC RETROGRADE CHOLANGIOPANCREATOGRAM N/A 10/18/2016    Procedure: COMBINED ENDOSCOPIC RETROGRADE CHOLANGIOPANCREATOGRAPHY, REMOVE FOREIGN BODY OR STENT/TUBE;  Surgeon: Guru Amber Childs MD;  [facility]     ESOPHAGOSCOPY, GASTROSCOPY, DUODENOSCOPY (EGD), COMBINED N/A 8/31/2016    Procedure: COMBINED ENDOSCOPIC ULTRASOUND, ESOPHAGOSCOPY, GASTROSCOPY, DUODENOSCOPY (EGD), FINE NEEDLE ASPIRATE/BIOPSY;  Surgeon: Loy Russ MD;  Location: [facility]     ESOPHAGOSCOPY, GASTROSCOPY, DUODENOSCOPY (EGD), COMBINED N/A 10/18/2016    Procedure: COMBINED ESOPHAGOSCOPY, GASTROSCOPY, DUODENOSCOPY (EGD), REMOVE FOREIGN BODY;  Surgeon: Guru Amber Childs MD;  Location: [facility]     ESOPHAGOSCOPY, GASTROSCOPY, DUODENOSCOPY (EGD), COMBINED N/A 5/15/2017    Procedure: COMBINED ENDOSCOPIC ULTRASOUND, ESOPHAGOSCOPY, GASTROSCOPY, DUODENOSCOPY (EGD), FINE NEEDLE ASPIRATE/BIOPSY;  Upper Endoscopic Ultrasound fine needle biopsy  ;  Surgeon: Titus Miguel  MD Ramses;  Location:  OR      [facility] ENDOSCOPY W EUS N/A 10/9/2015    Procedure: COMBINED ENDOSCOPIC ULTRASOUND, ESOPHAGOSCOPY, GASTROSCOPY, DUODENOSCOPY (EGD);  Surgeon: Loy Russ MD;  Location:  GI     IMPLANT PACEMAKER  08/27/14     REPLACE VALVE AORTIC  08/27/14    X2, 1999 and 2014       Family History   Problem Relation Age of Onset     Alzheimer Disease Mother        Social History   Substance Use Topics     Smoking status: Former Smoker     Quit date: 3/9/2012     Smokeless tobacco: Not on file      Comment: Quit 2012     Alcohol use No       Patient instructions:    *  Bring outside medical records, images/disc, and/or studies

## 2017-07-20 ENCOUNTER — OFFICE VISIT (OUTPATIENT)
Dept: SURGERY | Facility: CLINIC | Age: 57
End: 2017-07-20

## 2017-07-20 VITALS
HEIGHT: 72 IN | SYSTOLIC BLOOD PRESSURE: 98 MMHG | HEART RATE: 82 BPM | DIASTOLIC BLOOD PRESSURE: 64 MMHG | OXYGEN SATURATION: 97 % | TEMPERATURE: 98.4 F | BODY MASS INDEX: 27.2 KG/M2 | WEIGHT: 200.8 LBS

## 2017-07-20 DIAGNOSIS — M31.6 TEMPORAL ARTERITIS (H): ICD-10-CM

## 2017-07-20 DIAGNOSIS — K85.90 ACUTE PANCREATITIS: ICD-10-CM

## 2017-07-20 DIAGNOSIS — K85.90 PANCREATITIS: ICD-10-CM

## 2017-07-20 DIAGNOSIS — K86.1 CHRONIC RECURRENT PANCREATITIS (H): ICD-10-CM

## 2017-07-20 DIAGNOSIS — D49.0 IPMN (INTRADUCTAL PAPILLARY MUCINOUS NEOPLASM): Primary | ICD-10-CM

## 2017-07-20 LAB
ALBUMIN SERPL-MCNC: 3.6 G/DL (ref 3.4–5)
ALP SERPL-CCNC: 50 U/L (ref 40–150)
ALT SERPL W P-5'-P-CCNC: 22 U/L (ref 0–70)
AMYLASE SERPL-CCNC: 49 U/L (ref 30–110)
ANION GAP SERPL CALCULATED.3IONS-SCNC: 7 MMOL/L (ref 3–14)
AST SERPL W P-5'-P-CCNC: 25 U/L (ref 0–45)
BASOPHILS # BLD AUTO: 0 10E9/L (ref 0–0.2)
BASOPHILS NFR BLD AUTO: 0.3 %
BILIRUB DIRECT SERPL-MCNC: 0.1 MG/DL (ref 0–0.2)
BILIRUB SERPL-MCNC: 0.4 MG/DL (ref 0.2–1.3)
BUN SERPL-MCNC: 11 MG/DL (ref 7–30)
CALCIUM SERPL-MCNC: 8.4 MG/DL (ref 8.5–10.1)
CHLORIDE SERPL-SCNC: 103 MMOL/L (ref 94–109)
CO2 SERPL-SCNC: 26 MMOL/L (ref 20–32)
CREAT SERPL-MCNC: 1.04 MG/DL (ref 0.66–1.25)
CRP SERPL-MCNC: 17.8 MG/L (ref 0–8)
DIFFERENTIAL METHOD BLD: ABNORMAL
EOSINOPHIL # BLD AUTO: 0.1 10E9/L (ref 0–0.7)
EOSINOPHIL NFR BLD AUTO: 2 %
ERYTHROCYTE [DISTWIDTH] IN BLOOD BY AUTOMATED COUNT: 14.5 % (ref 10–15)
ERYTHROCYTE [SEDIMENTATION RATE] IN BLOOD BY WESTERGREN METHOD: 13 MM/H (ref 0–20)
GFR SERPL CREATININE-BSD FRML MDRD: 73 ML/MIN/1.7M2
GLUCOSE SERPL-MCNC: 139 MG/DL (ref 70–99)
HBA1C MFR BLD: 5.9 % (ref 4.3–6)
HCT VFR BLD AUTO: 37.2 % (ref 40–53)
HGB BLD-MCNC: 12 G/DL (ref 13.3–17.7)
IMM GRANULOCYTES # BLD: 0 10E9/L (ref 0–0.4)
IMM GRANULOCYTES NFR BLD: 0.5 %
INR PPP: 2.75 (ref 0.86–1.14)
LIPASE SERPL-CCNC: 97 U/L (ref 73–393)
LYMPHOCYTES # BLD AUTO: 1.3 10E9/L (ref 0.8–5.3)
LYMPHOCYTES NFR BLD AUTO: 20.8 %
MCH RBC QN AUTO: 26.3 PG (ref 26.5–33)
MCHC RBC AUTO-ENTMCNC: 32.3 G/DL (ref 31.5–36.5)
MCV RBC AUTO: 81 FL (ref 78–100)
MONOCYTES # BLD AUTO: 0.5 10E9/L (ref 0–1.3)
MONOCYTES NFR BLD AUTO: 8.1 %
NEUTROPHILS # BLD AUTO: 4.4 10E9/L (ref 1.6–8.3)
NEUTROPHILS NFR BLD AUTO: 68.3 %
NRBC # BLD AUTO: 0 10*3/UL
NRBC BLD AUTO-RTO: 0 /100
PLATELET # BLD AUTO: 230 10E9/L (ref 150–450)
POTASSIUM SERPL-SCNC: 4.2 MMOL/L (ref 3.4–5.3)
PROT SERPL-MCNC: 7 G/DL (ref 6.8–8.8)
RBC # BLD AUTO: 4.57 10E12/L (ref 4.4–5.9)
SODIUM SERPL-SCNC: 137 MMOL/L (ref 133–144)
WBC # BLD AUTO: 6.4 10E9/L (ref 4–11)

## 2017-07-20 ASSESSMENT — ENCOUNTER SYMPTOMS
BACK PAIN: 1
PARALYSIS: 0
NUMBNESS: 0
JOINT SWELLING: 0
WEAKNESS: 1
HEMATURIA: 0
RECTAL BLEEDING: 0
NAUSEA: 1
BOWEL INCONTINENCE: 0
TREMORS: 0
HEADACHES: 1
MEMORY LOSS: 0
DISTURBANCES IN COORDINATION: 0
DYSURIA: 1
STIFFNESS: 1
SEIZURES: 0
LOSS OF CONSCIOUSNESS: 0
BLOATING: 0
MYALGIAS: 1
DIZZINESS: 0
MUSCLE WEAKNESS: 1
CONSTIPATION: 0
VOMITING: 1
RECTAL PAIN: 0
MUSCLE CRAMPS: 1
DIARRHEA: 1
JAUNDICE: 0
ABDOMINAL PAIN: 1
NECK PAIN: 0
DIFFICULTY URINATING: 1
SPEECH CHANGE: 0
TINGLING: 0
FLANK PAIN: 0
ARTHRALGIAS: 1
BLOOD IN STOOL: 0

## 2017-07-20 ASSESSMENT — PAIN SCALES - GENERAL: PAINLEVEL: MILD PAIN (3)

## 2017-07-20 NOTE — NURSING NOTE
Chief Complaint   Patient presents with     Consult     consult       Vitals:    07/20/17 1536   BP: 98/64   BP Location: Left arm   Patient Position: Chair   Cuff Size: Adult Regular   Pulse: 82   Temp: 98.4  F (36.9  C)   SpO2: 97%   Weight: 200 lb 12.8 oz   Height: 6'       Body mass index is 27.23 kg/(m^2).      Demarco Pittman MA

## 2017-07-20 NOTE — LETTER
7/20/2017       RE: Lucas Brown  [address] [address]     Dear Colleague,    Thank you for referring your patient, Lucas Brown, to the [facility] SURGERY at [facility]. Please see a copy of my visit note below.    See scanned handwritten note of below due to dictation system issues    GB     Answers for HPI/ROS submitted by the patient on 7/20/2017     Again, thank you for allowing me to participate in the care of your patient.      Sincerely,  Vik Crum MD

## 2017-07-21 NOTE — PROGRESS NOTES
Call to schedule an injection if needed   See scanned handwritten note of below due to dictation system issues    GB     Answers for HPI/ROS submitted by the patient on 7/20/2017   General Symptoms: No  Skin Symptoms: No  HENT Symptoms: No  EYE SYMPTOMS: No  HEART SYMPTOMS: No  LUNG SYMPTOMS: No  INTESTINAL SYMPTOMS: Yes  URINARY SYMPTOMS: Yes  REPRODUCTIVE SYMPTOMS: Yes  SKELETAL SYMPTOMS: Yes  BLOOD SYMPTOMS: No  NERVOUS SYSTEM SYMPTOMS: Yes  MENTAL HEALTH SYMPTOMS: No  Nausea: Yes  Vomiting: Yes  Abdominal pain: Yes  Bloating: No  Constipation: No  Diarrhea: Yes  Blood in stool: No  Black stools: No  Rectal or Anal pain: No  Fecal incontinence: No  Rectal bleeding: No  Yellowing of skin or eyes: No  Vomit with blood: No  Change in stools: No  Hemorrhoids: No  Trouble holding urine or incontinence: No  Pain or burning: Yes  Trouble starting or stopping: Yes  Increased frequency of urination: Yes  Blood in urine: No  Decreased frequency of urination: No  Frequent nighttime urination: Yes  Flank pain: No  Difficulty emptying bladder: Yes  Back pain: Yes  Muscle aches: Yes  Neck pain: No  Swollen joints: No  Joint pain: Yes  Bone pain: No  Muscle cramps: Yes  Muscle weakness: Yes  Joint stiffness: Yes  Bone fracture: No  Trouble with coordination: No  Dizziness or trouble with balance: No  Fainting or black-out spells: No  Memory loss: No  Headache: Yes  Seizures: No  Speech problems: No  Tingling: No  Tremor: No  Weakness: Yes  Difficulty walking: No  Paralysis: No  Numbness: No  Scrotal pain or swelling: No  Erectile dysfunction: No  Penile discharge: No  Genital ulcers: No  Reduced libido: Yes

## 2017-08-03 ENCOUNTER — TELEPHONE (OUTPATIENT)
Dept: TRANSPLANT | Facility: CLINIC | Age: 57
End: 2017-08-03

## 2017-08-03 DIAGNOSIS — K85.90 PANCREATITIS: Primary | ICD-10-CM

## 2017-08-03 NOTE — TELEPHONE ENCOUNTER
Spoke to Lucas and let him know Dr Crum has decided he would be a good candidate for a Whipple procedure. E is aware he will need cardiology clearance and will  Call his local cardiologist for an immediate appointment.  Discussed time frame and need to stay locally after procedure for 4-8 weeks.

## 2017-08-07 ENCOUNTER — TELEPHONE (OUTPATIENT)
Dept: TRANSPLANT | Facility: CLINIC | Age: 57
End: 2017-08-07

## 2017-08-07 DIAGNOSIS — K85.90 PANCREATITIS: Primary | ICD-10-CM

## 2017-08-07 NOTE — TELEPHONE ENCOUNTER
FOSTERM at DR Doll office asking for call back to discuss upcoming planned whipple surgery and cardiology clearance/anticoagulation plan.

## 2017-08-08 ENCOUNTER — HOSPITAL ENCOUNTER (EMERGENCY)
Dept: HOSPITAL 41 - JD.ED | Age: 57
Discharge: HOME | End: 2017-08-08
Payer: COMMERCIAL

## 2017-08-08 VITALS — SYSTOLIC BLOOD PRESSURE: 121 MMHG | DIASTOLIC BLOOD PRESSURE: 104 MMHG

## 2017-08-08 DIAGNOSIS — I11.0: ICD-10-CM

## 2017-08-08 DIAGNOSIS — Z87.891: ICD-10-CM

## 2017-08-08 DIAGNOSIS — Z79.82: ICD-10-CM

## 2017-08-08 DIAGNOSIS — I50.9: ICD-10-CM

## 2017-08-08 DIAGNOSIS — F32.9: ICD-10-CM

## 2017-08-08 DIAGNOSIS — K86.1: Primary | ICD-10-CM

## 2017-08-08 DIAGNOSIS — K21.9: ICD-10-CM

## 2017-08-08 DIAGNOSIS — Z79.899: ICD-10-CM

## 2017-08-08 DIAGNOSIS — M19.90: ICD-10-CM

## 2017-08-08 DIAGNOSIS — Z88.5: ICD-10-CM

## 2017-08-08 DIAGNOSIS — Z98.890: ICD-10-CM

## 2017-08-08 DIAGNOSIS — E78.00: ICD-10-CM

## 2017-08-08 DIAGNOSIS — Z88.8: ICD-10-CM

## 2017-08-08 DIAGNOSIS — F41.9: ICD-10-CM

## 2017-08-08 DIAGNOSIS — E03.9: ICD-10-CM

## 2017-08-08 PROCEDURE — 99284 EMERGENCY DEPT VISIT MOD MDM: CPT

## 2017-08-08 PROCEDURE — 96361 HYDRATE IV INFUSION ADD-ON: CPT

## 2017-08-08 PROCEDURE — 86140 C-REACTIVE PROTEIN: CPT

## 2017-08-08 PROCEDURE — 96375 TX/PRO/DX INJ NEW DRUG ADDON: CPT

## 2017-08-08 PROCEDURE — 84484 ASSAY OF TROPONIN QUANT: CPT

## 2017-08-08 PROCEDURE — 85610 PROTHROMBIN TIME: CPT

## 2017-08-08 PROCEDURE — 83735 ASSAY OF MAGNESIUM: CPT

## 2017-08-08 PROCEDURE — 80053 COMPREHEN METABOLIC PANEL: CPT

## 2017-08-08 PROCEDURE — 83690 ASSAY OF LIPASE: CPT

## 2017-08-08 PROCEDURE — 36415 COLL VENOUS BLD VENIPUNCTURE: CPT

## 2017-08-08 PROCEDURE — 85025 COMPLETE CBC W/AUTO DIFF WBC: CPT

## 2017-08-08 PROCEDURE — 82553 CREATINE MB FRACTION: CPT

## 2017-08-08 PROCEDURE — 96374 THER/PROPH/DIAG INJ IV PUSH: CPT

## 2017-08-08 NOTE — EDM.PDOC
ED HPI GENERAL MEDICAL PROBLEM





- General


Chief Complaint: Abdominal Pain


Stated Complaint: ABDMINAL PAIN,VOMITING


Time Seen by Provider: 08/08/17 11:23


Source of Information: Reports: Patient, Family (spouse)


History Limitations: Reports: No Limitations





- History of Present Illness


INITIAL COMMENTS - FREE TEXT/NARRATIVE: 





56-year-old male presents the ED with severe epigastric left upper quadrant 

abdominal pain with slight radiation through to his left flank area. Patient 

has a history of chronic relapsing pancreatitis for greater than a year. These 

refer to old histories. He developed neck ischemia of the pancreas we believe 

doing a prolonged heart surgery. He has been to [facility] 

is on multiple occasions and is now scheduled for partial pancreatectomy due to 

the chronic severe pain that he is in. Her flareup started again this morning. 

He is never pain-free. Currently can stop vomiting and oral meds will stay 

down. He is on a fentanyl patch 50 g per hour and uses Dilaudid tablets 

intermittently for pain relief. Emesis is containing flecks of blood this 

morning from vomiting so much. It is 10 out of 10.


Onset: Today


Onset Date: 08/08/17


Onset Time: 06:00 (Awoke from sleep with severe pain.)


Duration: Chronic, Getting Worse


Location: Reports: Abdomen (Epigastrium and left upper quadrant radiating 

through to the left flank area.)


Quality: Reports: Ache, Other


Severity: Severe (Deep aching severe pain 10 out of 10.)


Improves with: Reports: None


Worsens with: Reports: None


Context: Reports: Other (Patient has chronic relapsing pancreatitis with gait 

with pain daily.).  Denies: Activity, Exercise, Lifting, Sick Contact, Trauma


Associated Symptoms: Reports: Malaise, Nausea/Vomiting.  Denies: Confusion, 

Chest Pain, Cough, cough w sputum, Diaphoresis, Fever/Chills, Headaches, Rash, 

Seizure


Treatments PTA: Reports: Other Medication(s)


Other Treatments PTA: dilaudid PO


  ** Middle Abdomen


Pain Score (Numeric/FACES): 8





- Related Data


 Allergies











Allergy/AdvReac Type Severity Reaction Status Date / Time


 


codeine AdvReac  Agitation Verified 08/08/17 11:09


 


mannitol [From Reclast] AdvReac  Joint Pain Verified 08/08/17 11:09


 


water for injection,sterile AdvReac  Joint Pain Verified 08/08/17 11:09





[From Reclast]     


 


zoledronic acid AdvReac  Joint Pain Verified 08/08/17 11:09





[From Reclast]     











Home Meds: 


 Home Meds





Acetaminophen [Tylenol Extra Strength] 1,000 mg PO BID PRN 12/11/16 [History]


Aspirin 81 mg PO DAILY 12/11/16 [History]


Celecoxib [CeleBREX] 200 mg PO DAILY 12/11/16 [History]


Fenofibrate Nanocrystallized [Tricor] 145 mg PO DAILY 12/11/16 [History]


Finasteride [Proscar] 5 mg PO DAILY 12/11/16 [History]


HYDROmorphone [Dilaudid] 2 mg PO Q8HR PRN 12/11/16 [History]


Levothyroxine 150 mcg PO DAILY 12/11/16 [History]


Lisinopril 10 mg PO DAILY 12/11/16 [History]


Multivitamin [Multivitamins] 1 tab PO DAILY 12/11/16 [History]


Ondansetron HCl [Zofran] 4 mg PO DAILY 12/11/16 [History]


Pravastatin [Pravachol] 40 mg PO DAILY 12/11/16 [History]


Prednisone [IJD: Prednisone] 6 mg PO DAILY 12/11/16 [History]


Sertraline [Zoloft] 50 mg PO DAILY 12/11/16 [History]


Warfarin [Coumadin] 2.5 mg PO DAILY 12/15/16 [History]


Omeprazole Magnesium [Prilosec Otc] 20 mg PO BID #60 tablet. 12/16/16 [Rx]


fentaNYL [Duragesic] 50 mcg TRDERM Q72H #1 box 12/16/16 [Rx]


Ondansetron [Zofran ODT] 4 mg PO Q6H #20 tab.dis 08/08/17 [Rx]











Past Medical History


HEENT History: Reports: Other (See Below)


Other HEENT History: early stages of cataracts


Cardiovascular History: Reports: Aneurysm (repaired), Heart Failure, High 

Cholesterol, Hypertension, Other (See Below)


Other Cardiovascular History: Temporal arteritis (on prednisone for that reason)


Respiratory History: Reports: None


Other Respiratory History: after Reclast last friday, heavier breathing since 

then.


Gastrointestinal History: Reports: GERD, Pancreatitis, PUD, Other (See Below)


Other Gastrointestinal History: Peptic ulcers/ stents to pancreas and common 

bile duct


Genitourinary History: Reports: Other (See Below)


Other Genitourinary History: biopsy to prostate (negative)


Musculoskeletal History: Reports: Arthritis (hands, knees)


Other Musculoskeletal History: knee pain


Neurological History: Reports: None


Other Neuro History: headaches from temporal arteritis


Psychiatric History: Reports: Anxiety, Depression


Other Psychiatric History: On sertraline for "cabin fever"


Endocrine/Metabolic History: Reports: Hypothyroidism


Other Endocrine/Metabolic History: hips, early signs of osteoporosis


Hematologic History: Reports: Other (See Below)


Immunologic History: Reports: Immunosuppression, Other (See Below)


Other Immunologic History: steroid use at this time for his temporal arteritis


Oncologic (Cancer) History: Reports: None


Dermatologic History: Reports: None





- Past Surgical History


Cardiovascular Surgical History: Reports: Valve Replacement, Vascular Surgery, 

Other (See Below)





Social & Family History





- Family History


Family Medical History: Noncontributory


Cardiac: Reports: Bypass, MI, Stent





- Tobacco Use


Smoking Status *Q: Former Smoker


Years of Tobacco use: 34


Packs/Tins Daily: 1.5


Used Tobacco, but Quit: Yes


Month Tobacco Last Used: Quit 2012


Second Hand Smoke Exposure: No





- Caffeine Use


Caffeine Use: Reports: None


Other Caffeine Use: 1 cup daily or less.





- Alcohol Use


Days Per Week of Alcohol Use: 0





- Recreational Drug Use


Recreational Drug Use: No





- Living Situation & Occupation


Living situation: Reports: , with Spouse


Occupation: Employed





ED ROS GENERAL





- Review of Systems


Review Of Systems: See Below


Constitutional: Reports: Fatigue.  Denies: Fever, Chills, Malaise, Weakness


HEENT: Reports: No Symptoms


Respiratory: Reports: No Symptoms


Cardiovascular: Reports: No Symptoms


Endocrine: Reports: No Symptoms


GI/Abdominal: Reports: Abdominal Pain, Anorexia, Diarrhea, Hematemesis, Nausea, 

Vomiting (intractable.)


: Reports: No Symptoms


Musculoskeletal: Reports: No Symptoms


Skin: Reports: No Symptoms


Neurological: Reports: No Symptoms


Psychiatric: Reports: No Symptoms


Hematologic/Lymphatic: Reports: No Symptoms


Immunologic: Reports: No Symptoms





ED EXAM, GI/ABD





- Physical Exam


Exam: See Below


Exam Limited By: No Limitations


General Appearance: Alert, WD/WN, No Apparent Distress


Eyes: Bilateral: Normal Appearance (no jaundice,), Pale Conjunctiva (mild )


Throat/Mouth: Normal Inspection, Normal Lips, Normal Oropharynx


Head: Atraumatic, Normocephalic


Neck: Normal Inspection, Supple, Non-Tender, Full Range of Motion


Respiratory/Chest: No Respiratory Distress, Lungs Clear, Normal Breath Sounds, 

No Accessory Muscle Use


Cardiovascular: Normal Peripheral Pulses, Regular Rate, Rhythm, No Edema, No 

Gallop, No Murmur, No Rub


GI/Abdominal Exam: Normal Bowel Sounds, Soft, Non-Tender, Abnormal Bowel Sounds 

(hypoactive bowl sounds.)


 (Male) Exam: No Hernia


Back Exam: Normal Inspection, Full Range of Motion.  No: CVA Tenderness (L), 

CVA Tenderness (R)


Extremities: Normal Inspection, Normal Range of Motion, Non-Tender, No Pedal 

Edema, Normal Capillary Refill


Neurological: Alert, Oriented, CN II-XII Intact, Normal Cognition, Normal Gait


Psychiatric: Normal Affect, Normal Mood


Skin Exam: Warm, Dry, Intact, Normal Color, No Rash





Course





- Vital Signs


Last Recorded V/S: 


 Last Vital Signs











Temp  36.2 C   08/08/17 11:18


 


Pulse  95   08/08/17 11:18


 


Resp  20   08/08/17 11:18


 


BP  121/104 H  08/08/17 11:18


 


Pulse Ox  100   08/08/17 11:18














- Orders/Labs/Meds


Orders: 


 Active Orders 24 hr











 Category Date Time Status


 


 Dextrose 5%-0.9% NaCl [Dextrose 5%-Normal Saline] 1,000 Med  08/08/17 11:30 

Active





 ml   





 IV ASDIRECTED   








 Medication Orders





Dextrose/Sodium Chloride (Dextrose 5%-Normal Saline)  1,000 mls @ 999 mls/hr IV 

ASDIRECTED LEVAR


   Last Admin: 08/08/17 11:43  Dose: 999 mls/hr








Labs: 


 Laboratory Tests











  08/08/17 08/08/17 08/08/17 Range/Units





  11:15 11:15 11:15 


 


WBC  9.23 H    (4.23-9.07)  K/mm3


 


RBC  4.95    (4.63-6.08)  M/mm3


 


Hgb  13.0 L    (13.7-17.5)  gm/L


 


Hct  38.9 L    (40.1-51.0)  %


 


MCV  78.6 L    (79.0-92.2)  fl


 


MCH  26.3    (25.7-32.2)  pg


 


MCHC  33.4    (32.2-35.5)  g/dl


 


RDW Std Deviation  43.6    (35.1-43.9)  fL


 


Plt Count  273    (163-337)  K/mm3


 


MPV  9.4    (9.4-12.3)  fl


 


Neutrophils % (Manual)  59    (40-60)  %


 


Band Neutrophils %  2    (0-10)  %


 


Lymphocytes % (Manual)  31    (20-40)  %


 


Atypical Lymphs %  0    %


 


Monocytes % (Manual)  8    (2-10)  %


 


Eosinophils % (Manual)  0 L    (0.8-7.0)  %


 


Basophils % (Manual)  0 L    (0.2-1.2)  


 


Platelet Estimate  Adequate    


 


Poikilocytosis  1+ slight    


 


Anisocytosis  1+ slight    


 


Microcytosis  1+ slight    


 


RBC Morph Comment  Not Reportable    


 


PT   30.9 H   (8.0-13.0)  SECONDS


 


INR   2.66   


 


Sodium    143  (136-145)  mEq/L


 


Potassium    4.1  (3.5-5.1)  mEq/L


 


Chloride    105  ()  mEq/L


 


Carbon Dioxide    32  (21-32)  mEq/L


 


Anion Gap    10.1  (5-15)  


 


BUN    20 H  (7-18)  mg/dL


 


Creatinine    1.3  (0.7-1.3)  mg/dL


 


Est Cr Clr Drug Dosing    66.77  mL/min


 


Estimated GFR (MDRD)    57  (>60)  mL/min


 


BUN/Creatinine Ratio    15.4  (14-18)  


 


Glucose    104  ()  mg/dL


 


Calcium    9.9  (8.5-10.1)  mg/dL


 


Magnesium    1.8  (1.8-2.4)  mg/dl


 


Total Bilirubin    0.6  (0.2-1.0)  mg/dL


 


AST    30  (15-37)  U/L


 


ALT    31  (16-63)  U/L


 


Alkaline Phosphatase    46  ()  U/L


 


CK-MB (CK-2)    2.1  (0-3.6)  ng/ml


 


Troponin I    0.023  (0.00-0.056)  ng/mL


 


C-Reactive Protein    3.5 H*  (<1.0)  mg/dL


 


Total Protein    7.9  (6.4-8.2)  g/dl


 


Albumin    4.1  (3.4-5.0)  g/dl


 


Globulin    3.8  gm/dL


 


Albumin/Globulin Ratio    1.1  (1-2)  


 


Lipase    150  ()  U/L











Meds: 


Medications











Generic Name Dose Route Start Last Admin





  Trade Name Jaylen  PRN Reason Stop Dose Admin


 


Dextrose/Sodium Chloride  1,000 mls @ 999 mls/hr  08/08/17 11:30  08/08/17 11:43





  Dextrose 5%-Normal Saline  IV   999 mls/hr





  ASDIRECTED LEVAR   Administration














Discontinued Medications














Generic Name Dose Route Start Last Admin





  Trade Name Jaylen  PRN Reason Stop Dose Admin


 


Hydromorphone HCl  2 mg  08/08/17 11:22  08/08/17 11:33





  Dilaudid  IVPUSH  08/08/17 11:23  2 mg





  ONETIME ONE   Administration


 


Metoclopramide HCl  10 mg  08/08/17 11:23  08/08/17 11:31





  Reglan  IVPUSH  08/08/17 11:24  10 mg





  ONETIME ONE   Administration














- Radiology Interpretation


Free Text/Narrative:: 





56-year-old male attends the ED in severe pain. Patient has chronic relapsing 

pancreatitis and in fact chronic abdominal pain from pancreatitis rollover a 

year and a half now. The suspicion is that he developed ischemia of the 

pancreas after a prolonged open heart surgical repair I believe the operation 

was over 9 hours. He has subsequently developed a chronic pancreatitis and has 

pain on a daily basis. Controlled with 50 g of fentanyl patch daily and 

intermittent use of Dilaudid. He is on pancreatic enzyme supplementation with 

all meals and snacks. Awoke him from sleep early this morning and he cannot 

stop vomiting. Abdomen shows absence of any bowel sounds. Severe tenderness 

left upper quadrant and epigastrium. Plan routine labs to look at his lipase 

again. Dilaudid 2 mg IV and Reglan 10 mg IV for nausea relief.





- Re-Assessments/Exams


Free Text/Narrative Re-Assessment/Exam: 





08/08/17 13:06 patient is feeling better in terms that pain is down to 2 or 3 

out of 10 in tolerable. He does not wish to stay in the hospital. He requests a 

prescription for further Zofran sublingual tablets. He has everything else at 

home that he needs. His wife is here to drive him home. His labs revealed a 

total white count of 9.23 with 59% it fills and 2% band cells. Hemoglobin was 

13.0 hematocrit of 38.9 platelets are normal and CBC little low at 78.6 

suggesting iron deficiency. Sodium 143 potassium 4.1 chloride 105 bicarbonate 

is 32 i.e. mildly elevated CRP is 3.5 lipase is only 150. PT is 30.9 and INR is 

2.66 at this time i.e. therapeutic I will discharge him home with 20 tablets of 

Zofran 4 mg sublingually that he can take one or 2 every 6 hours as necessary 

for nausea relief.








Departure





- Departure


Time of Disposition: 13:08


Disposition: Home, Self-Care 01


Condition: Fair


Clinical Impression: 


 Chronic relapsing pancreatitis








- Discharge Information


Prescriptions: 


Ondansetron [Zofran ODT] 4 mg PO Q6H #20 tab.dis


Forms:  ED Department Discharge


Additional Instructions: 


Evaluation the emergency room today in regards to acute exacerbation of chronic 

pancreatitis. Intractable nausea and vomiting with some blood noted in the 

emesis from vomiting so much today. Lab tests were essentially normal although 

they do indicate you're developing somewhat of a iron deficiency . Coumadin 

time or INR today was 2.66 which is therapeutic. Lipase today was 150 normal. 

Home to rest and to continue pain medication and use of Zofran sublingually as 

needed for nausea relief. Follow-up with personal physician as planned.





- My Orders


Last 24 Hours: 


My Active Orders





08/08/17 11:30


Dextrose 5%-0.9% NaCl [Dextrose 5%-Normal Saline] 1,000 ml IV ASDIRECTED 














- Assessment/Plan


Last 24 Hours: 


My Active Orders





08/08/17 11:30


Dextrose 5%-0.9% NaCl [Dextrose 5%-Normal Saline] 1,000 ml IV ASDIRECTED

## 2017-08-11 ENCOUNTER — TELEPHONE (OUTPATIENT)
Dept: SURGERY | Facility: CLINIC | Age: 57
End: 2017-08-11

## 2017-08-15 ENCOUNTER — TRANSFERRED RECORDS (OUTPATIENT)
Dept: HEALTH INFORMATION MANAGEMENT | Facility: CLINIC | Age: 57
End: 2017-08-15

## 2017-08-16 DIAGNOSIS — Z01.818 PRE-OP TESTING: Primary | ICD-10-CM

## 2017-08-16 DIAGNOSIS — K86.1 PANCREATITIS, CHRONIC (H): ICD-10-CM

## 2017-08-17 ENCOUNTER — TELEPHONE (OUTPATIENT)
Dept: TRANSPLANT | Facility: CLINIC | Age: 57
End: 2017-08-17

## 2017-08-17 NOTE — TELEPHONE ENCOUNTER
Reviewed patients pre op instructions and appointments. He already has cardiology clearance and a transitional anticoagulation plan.( per his PCP )  Instructions /appoinemnts mailed.  Reminded patient he needs to set up his post op accommodation now as hotels fill up during the state fair/start of football season.

## 2017-08-18 ENCOUNTER — TELEPHONE (OUTPATIENT)
Dept: SURGERY | Facility: CLINIC | Age: 57
End: 2017-08-18

## 2017-08-18 NOTE — TELEPHONE ENCOUNTER
Social Work:  D/I: Pt called Chantal Guidry in acomodations re local lodging for pre-post surgery. He is aware he will need to be nearby for 4-6 weeks post DC. Discussed [facility] which he had inquired about. He would be eligible. Discussed the cost and waiting list. He would like to be placed on the waiting list. I contacted Chantal Guidry and she will place him on the waiting list and work with him on other options in the case there is no opening at [facility].  Pt has my contact info also if further needs.

## 2017-08-23 ENCOUNTER — CARE COORDINATION (OUTPATIENT)
Dept: SURGERY | Facility: CLINIC | Age: 57
End: 2017-08-23

## 2017-08-23 NOTE — PROGRESS NOTES
Pre Visit Call and Assessment    Date of call:  08/23/2017    Phone numbers:  Home number on file [phone] (home)    Reached patient/confirmed appointment:  Yes  Patient care team/Primary provider:  Cyril Mackay outpatient pharmacy:    [facility] - [place] - [address] [place] [id number]  Phone: [phone] Fax: [phone]    Referred to:  Dr. Filipe Crum    Reason for visit:  New consult

## 2017-08-24 ENCOUNTER — ALLIED HEALTH/NURSE VISIT (OUTPATIENT)
Dept: CARDIOLOGY | Facility: CLINIC | Age: 57
End: 2017-08-24
Attending: INTERNAL MEDICINE
Payer: COMMERCIAL

## 2017-08-24 ENCOUNTER — ALLIED HEALTH/NURSE VISIT (OUTPATIENT)
Dept: SURGERY | Facility: CLINIC | Age: 57
End: 2017-08-24

## 2017-08-24 ENCOUNTER — OFFICE VISIT (OUTPATIENT)
Dept: SURGERY | Facility: CLINIC | Age: 57
End: 2017-08-24

## 2017-08-24 ENCOUNTER — ANESTHESIA EVENT (OUTPATIENT)
Dept: SURGERY | Facility: CLINIC | Age: 57
DRG: 406 | End: 2017-08-24
Payer: COMMERCIAL

## 2017-08-24 ENCOUNTER — ALLIED HEALTH/NURSE VISIT (OUTPATIENT)
Dept: TRANSPLANT | Facility: CLINIC | Age: 57
End: 2017-08-24
Attending: SURGERY
Payer: COMMERCIAL

## 2017-08-24 VITALS
BODY MASS INDEX: 28.81 KG/M2 | WEIGHT: 205.8 LBS | HEART RATE: 79 BPM | HEIGHT: 71 IN | TEMPERATURE: 97.2 F | SYSTOLIC BLOOD PRESSURE: 107 MMHG | DIASTOLIC BLOOD PRESSURE: 66 MMHG | OXYGEN SATURATION: 96 %

## 2017-08-24 VITALS
OXYGEN SATURATION: 97 % | HEART RATE: 85 BPM | BODY MASS INDEX: 28.49 KG/M2 | DIASTOLIC BLOOD PRESSURE: 70 MMHG | SYSTOLIC BLOOD PRESSURE: 102 MMHG | TEMPERATURE: 98.2 F | HEIGHT: 71 IN | RESPIRATION RATE: 16 BRPM | WEIGHT: 203.5 LBS

## 2017-08-24 VITALS — HEIGHT: 71 IN | WEIGHT: 202 LBS | BODY MASS INDEX: 28.28 KG/M2

## 2017-08-24 DIAGNOSIS — K86.1 CHRONIC RECURRENT PANCREATITIS (H): Primary | ICD-10-CM

## 2017-08-24 DIAGNOSIS — Z01.818 PREOP EXAMINATION: Primary | ICD-10-CM

## 2017-08-24 DIAGNOSIS — Z01.818 PRE-OP TESTING: Primary | ICD-10-CM

## 2017-08-24 DIAGNOSIS — Z01.818 PRE-OP TESTING: ICD-10-CM

## 2017-08-24 DIAGNOSIS — I44.2 ATRIOVENTRICULAR BLOCK, COMPLETE (H): Primary | ICD-10-CM

## 2017-08-24 DIAGNOSIS — K86.1 PANCREATITIS, CHRONIC (H): ICD-10-CM

## 2017-08-24 LAB
ALBUMIN SERPL-MCNC: 3.5 G/DL (ref 3.4–5)
ALP SERPL-CCNC: 44 U/L (ref 40–150)
ALT SERPL W P-5'-P-CCNC: 22 U/L (ref 0–70)
ANION GAP SERPL CALCULATED.3IONS-SCNC: 7 MMOL/L (ref 3–14)
AST SERPL W P-5'-P-CCNC: 25 U/L (ref 0–45)
BASOPHILS # BLD AUTO: 0 10E9/L (ref 0–0.2)
BASOPHILS NFR BLD AUTO: 0.4 %
BILIRUB SERPL-MCNC: 0.5 MG/DL (ref 0.2–1.3)
BUN SERPL-MCNC: 16 MG/DL (ref 7–30)
C PEPTIDE SERPL-MCNC: 10.7 NG/ML (ref 0.9–6.9)
C PEPTIDE SERPL-MCNC: 2.2 NG/ML (ref 0.9–6.9)
C PEPTIDE SERPL-MCNC: 4.1 NG/ML (ref 0.9–6.9)
CALCIUM SERPL-MCNC: 8.6 MG/DL (ref 8.5–10.1)
CHLORIDE SERPL-SCNC: 109 MMOL/L (ref 94–109)
CHOLEST SERPL-MCNC: 157 MG/DL
CO2 SERPL-SCNC: 26 MMOL/L (ref 20–32)
CREAT SERPL-MCNC: 0.96 MG/DL (ref 0.66–1.25)
DIFFERENTIAL METHOD BLD: ABNORMAL
EOSINOPHIL # BLD AUTO: 0.1 10E9/L (ref 0–0.7)
EOSINOPHIL NFR BLD AUTO: 2.5 %
ERYTHROCYTE [DISTWIDTH] IN BLOOD BY AUTOMATED COUNT: 15.7 % (ref 10–15)
GFR SERPL CREATININE-BSD FRML MDRD: 80 ML/MIN/1.7M2
GLUCOSE SERPL-MCNC: 101 MG/DL (ref 70–99)
GLUCOSE SERPL-MCNC: 88 MG/DL (ref 70–99)
GLUCOSE SERPL-MCNC: 88 MG/DL (ref 70–99)
HBA1C MFR BLD: 5.5 % (ref 4.3–6)
HCT VFR BLD AUTO: 36.5 % (ref 40–53)
HDLC SERPL-MCNC: 40 MG/DL
HGB BLD-MCNC: 11.8 G/DL (ref 13.3–17.7)
IMM GRANULOCYTES # BLD: 0 10E9/L (ref 0–0.4)
IMM GRANULOCYTES NFR BLD: 0.4 %
INR PPP: 2.87 (ref 0.86–1.14)
LDLC SERPL CALC-MCNC: 86 MG/DL
LYMPHOCYTES # BLD AUTO: 2.3 10E9/L (ref 0.8–5.3)
LYMPHOCYTES NFR BLD AUTO: 43.8 %
MCH RBC QN AUTO: 25.9 PG (ref 26.5–33)
MCHC RBC AUTO-ENTMCNC: 32.3 G/DL (ref 31.5–36.5)
MCV RBC AUTO: 80 FL (ref 78–100)
MONOCYTES # BLD AUTO: 0.5 10E9/L (ref 0–1.3)
MONOCYTES NFR BLD AUTO: 9 %
NEUTROPHILS # BLD AUTO: 2.3 10E9/L (ref 1.6–8.3)
NEUTROPHILS NFR BLD AUTO: 43.9 %
NONHDLC SERPL-MCNC: 117 MG/DL
NRBC # BLD AUTO: 0 10*3/UL
NRBC BLD AUTO-RTO: 0 /100
PLATELET # BLD AUTO: 257 10E9/L (ref 150–450)
POTASSIUM SERPL-SCNC: 3.6 MMOL/L (ref 3.4–5.3)
PREALB SERPL IA-MCNC: 20 MG/DL (ref 15–45)
PROT SERPL-MCNC: 7.2 G/DL (ref 6.8–8.8)
RBC # BLD AUTO: 4.55 10E12/L (ref 4.4–5.9)
SODIUM SERPL-SCNC: 142 MMOL/L (ref 133–144)
TRIGL SERPL-MCNC: 154 MG/DL
WBC # BLD AUTO: 5.2 10E9/L (ref 4–11)

## 2017-08-24 PROCEDURE — 85025 COMPLETE CBC W/AUTO DIFF WBC: CPT | Performed by: SURGERY

## 2017-08-24 PROCEDURE — 85610 PROTHROMBIN TIME: CPT | Performed by: SURGERY

## 2017-08-24 PROCEDURE — 93280 PM DEVICE PROGR EVAL DUAL: CPT | Mod: ZF

## 2017-08-24 PROCEDURE — 83036 HEMOGLOBIN GLYCOSYLATED A1C: CPT | Performed by: SURGERY

## 2017-08-24 PROCEDURE — 86923 COMPATIBILITY TEST ELECTRIC: CPT | Performed by: SURGERY

## 2017-08-24 PROCEDURE — 84681 ASSAY OF C-PEPTIDE: CPT | Performed by: SURGERY

## 2017-08-24 PROCEDURE — 84446 ASSAY OF VITAMIN E: CPT | Performed by: SURGERY

## 2017-08-24 PROCEDURE — 82306 VITAMIN D 25 HYDROXY: CPT | Performed by: SURGERY

## 2017-08-24 PROCEDURE — 93280 PM DEVICE PROGR EVAL DUAL: CPT | Mod: 26 | Performed by: INTERNAL MEDICINE

## 2017-08-24 PROCEDURE — 84134 ASSAY OF PREALBUMIN: CPT | Performed by: SURGERY

## 2017-08-24 PROCEDURE — 36415 COLL VENOUS BLD VENIPUNCTURE: CPT | Performed by: SURGERY

## 2017-08-24 PROCEDURE — 80061 LIPID PANEL: CPT | Performed by: SURGERY

## 2017-08-24 PROCEDURE — 86850 RBC ANTIBODY SCREEN: CPT | Performed by: SURGERY

## 2017-08-24 PROCEDURE — 80053 COMPREHEN METABOLIC PANEL: CPT | Performed by: SURGERY

## 2017-08-24 PROCEDURE — 86900 BLOOD TYPING SEROLOGIC ABO: CPT | Performed by: SURGERY

## 2017-08-24 PROCEDURE — 86901 BLOOD TYPING SEROLOGIC RH(D): CPT | Performed by: SURGERY

## 2017-08-24 PROCEDURE — 82947 ASSAY GLUCOSE BLOOD QUANT: CPT | Performed by: SURGERY

## 2017-08-24 PROCEDURE — 84590 ASSAY OF VITAMIN A: CPT | Performed by: SURGERY

## 2017-08-24 RX ORDER — FENTANYL 50 UG/1
1 PATCH TRANSDERMAL
Status: ON HOLD | COMMUNITY
End: 2017-09-12

## 2017-08-24 ASSESSMENT — PAIN SCALES - GENERAL: PAINLEVEL: MILD PAIN (3)

## 2017-08-24 ASSESSMENT — LIFESTYLE VARIABLES: TOBACCO_USE: 1

## 2017-08-24 NOTE — MR AVS SNAPSHOT
After Visit Summary   8/24/2017    Lucas Brown    MRN: [id number]           Patient Information     Date Of Birth          1960        Visit Information        Provider Department      8/24/2017 8:00 AM Nurse, Hernán León [facility] Solid Organ Transplant        Today's Diagnoses     Pre-op testing    -  1       Follow-ups after your visit        Your next 10 appointments already scheduled     Aug 24, 2017  8:00 AM CDT   Nurse Visit with  Tx Nurse   [facility] Solid Organ Transplant ([facility])    [address] [place] [address]  3rd [facility] [address]   [phone]            Aug 24, 2017 11:00 AM CDT   (Arrive by 10:45 AM)   PAC Pharmacist with  Pac Pharmacist   [facility] Preoperative Assessment [place] ([facility])    [address] [place] [address]   [phone]            Aug 24, 2017 11:30 AM CDT   (Arrive by 11:15 AM)   PAC EVALUATION with  Pac Fahad 9   [facility] Preoperative Assessment [place] ([facility])    [address] [place] [address]   [phone]            Aug 24, 2017 12:30 PM CDT   (Arrive by 12:15 PM)   PAC RN ASSESSMENT with  Pac Rn   [facility] Preoperative Assessment [place] ([facility])    [address] [place] [address]   [phone]            Aug 24, 2017  1:00 PM CDT   (Arrive by 12:45 PM)   PAC Anesthesia Consult with  Pac Anesthesiologist   [facility] Preoperative Assessment [place] ([facility])    [address] [place] [address]   [phone]            Aug 24, 2017  6:00 PM CDT   (Arrive by 5:45 PM)   New Patient Visit with Vik Crum MD   [facility] General Surgery ([facility])    [address] [place] [address]   [phone]            Aug 28, 2017   Procedure with  "Vik Crum MD   [facility] [place], Same Day Surgery (--)    [address]  [place] [address] [phone]              Who to contact     If you have questions or need follow up information about today's clinic visit or your schedule please contact [facility]ANSPL[facility] directly at [phone].  Normal or non-critical lab and imaging results will be communicated to you by MyChart, letter or phone within 4 business days after the clinic has received the results. If you do not hear from us within 7 days, please contact the clinic through [employer] or phone. If you have a critical or abnormal lab result, we will notify you by phone as soon as possible.  Submit refill requests through [employer] or call your pharmacy and they will forward the refill request to us. Please allow 3 business days for your refill to be completed.          Additional Information About Your Visit        [employer] Information     [employer] gives you secure access to your electronic health record. If you see a primary care provider, you can also send messages to your care team and make appointments. If you have questions, please call your primary care clinic.  If you do not have a primary care provider, please call [phone] and they will assist you.        Care EveryWhere ID     This is your Care EveryWhere ID. This could be used by other organizations to access your [place] medical records  [id number]        Your Vitals Were     Height BMI (Body Mass Index)                1.803 m (5' 11\") 28.17 kg/m2           Blood Pressure from Last 3 Encounters:   07/20/17 98/64   06/07/17 109/70   05/15/17 96/67    Weight from Last 3 Encounters:   08/24/17 91.6 kg (202 lb)   07/20/17 91.1 kg (200 lb 12.8 oz)   06/07/17 90.2 kg (198 lb 14.4 oz)              Today, you had the following     No orders found for display         Today's Medication Changes          These changes are accurate as of: 8/24/17  7:57 AM.  If you have any " questions, ask your nurse or doctor.               These medicines have changed or have updated prescriptions.        Dose/Directions    HYDROmorphone 4 MG tablet   Commonly known as:  DILAUDID   This may have changed:  how much to take   Used for:  S/P ERCP        Dose:  2-4 mg   Take 0.5-1 tablets (2-4 mg) by mouth every 4 hours as needed for moderate to severe pain   Quantity:  90 tablet   Refills:  0                Primary Care Provider Office Phone # Fax #    Cyril Mackay [phone]       [facility] [address] [facility] [address]        Equal Access to Services     [facility]: Hadii arbaella borja hadashfavio Soomaali, waaxda luqadaha, qaybta kaalmajim olivera, rimma gunn . So [facility] [phone].    ATENCIÓN: Si habla español, tiene a rodriguez disposición servicios gratuitos de asistencia lingüística. [facility] [phone].    We comply with applicable federal civil rights laws and [place] laws. We do not discriminate on the basis of race, color, national origin, age, disability sex, sexual orientation or gender identity.            Thank you!     Thank you for choosing [facility]  for your care. Our goal is always to provide you with excellent care. Hearing back from our patients is one way we can continue to improve our services. Please take a few minutes to complete the written survey that you may receive in the mail after your visit with us. Thank you!             Your Updated Medication List - Protect others around you: Learn how to safely use, store and throw away your medicines at www.disposemymeds.org.          This list is accurate as of: 8/24/17  7:57 AM.  Always use your most recent med list.                   Brand Name Dispense Instructions for use Diagnosis    ACETAMINOPHEN PO      Take 1,000 mg by mouth every 4 hours as needed for pain        amylase-lipase-protease 19814 UNITS Tabs tablet    VIOKACE    450 tablet     Take 1-5 with snacks and meals, up to 15 per day.    Recurrent acute pancreatitis       aspirin 81 MG tablet      Take 81 mg by mouth daily Holding        CELEBREX PO      Take 200 mg by mouth daily        fenofibrate 145 MG tablet      Take 145 mg by mouth daily        fentaNYL 12 mcg/hr 72 hr patch    DURAGESIC     Place 1 patch onto the skin every 72 hours        FINASTERIDE PO      Take 5 mg by mouth daily        HYDROmorphone 4 MG tablet    DILAUDID    90 tablet    Take 0.5-1 tablets (2-4 mg) by mouth every 4 hours as needed for moderate to severe pain    S/P ERCP       levothyroxine 150 MCG tablet    SYNTHROID/LEVOTHROID     Take 150 mcg by mouth daily        lisinopril 10 MG tablet    PRINIVIL/ZESTRIL     Take 10 mg by mouth daily        MULTIVITAMINS Chew      Take 1 chew tab by mouth daily        omeprazole 20 MG CR capsule    priLOSEC     Take 20 mg by mouth 2 times daily        ondansetron 4 MG ODT tab    ZOFRAN-ODT     Take 4 mg by mouth every 8 hours as needed for nausea        pravastatin 40 MG tablet    PRAVACHOL     Take 40 mg by mouth daily        PREDNISONE PO      Take 3 mg by mouth daily        sertraline 50 MG tablet    ZOLOFT     Take 50 mg by mouth daily        WARFARIN SODIUM PO      Holding

## 2017-08-24 NOTE — PATIENT INSTRUCTIONS
Preparing for Your Surgery      Name:  Lucas Brown   MRN:  [id number]   :  1960   Today's Date:  2017     Arriving for surgery:  Surgery date:  17  Surgery time:  7:30 am  Arrival time:  5 am    Please come to:   [facility] [address]  [address] [place]  [address]    -   parking is available in front of the hospital from 5:15 am to 8:00 pm    -  Stop at the Information Desk in the lobby    -   Inform the information person that you are here for surgery. An escort to 3c will be provided. If you would not like an escort, please proceed to 3C on the 3rd floor. [phone]   -  Bring your ID and insurance card.    What can I eat or drink?  -  You may have solid food or milk products until 8 hours prior to your surgery. (11:30 pm 17 )  -  You may have water, apple juice, BLACK coffee (NO creamer or nondairy creamer), or 7up/Sprite until 2 hours prior to your surgery. (5 am- Stop on arrival to hospital. )    Which medicines can I take?       -  Do not bring your own medications to the hospital.        -  Follow Surgery Clinic instructions regarding Ibuprofen. If no instructions given, NO Ibuprofen the day prior to surgery.         -  Hold Multivitamin 7 days prior to surgery.        -  Hold Celecoxib 2 days prior to surgery. Last dose 17.        -  Lovenox- Pt was previously instructed to hold  8-27 PM dose and hold Monday 8-28 AM dose.    -  Do NOT take these medications in the morning, the day of surgery:  Lisinopril, Amylase-Lipase-Protease (Viokace)  -  Please take these medications the morning of surgery:  Finasteride, Prednisone, Levothyroxine, Sertraline, Omeprazole, Pravastatin, Fenofibrate, Tamsulosin, Fentanyl patch      Hydromorphone if needed, Acetaminophen (Tylenol) if needed    How do I prepare myself?  -  Take two showers: one the night before surgery; and one the morning of surgery.         Use Scrubcare or Hibiclens  to wash from neck down.  You may use your own shampoo and conditioner. No other hair products.   -  Do NOT use lotion, powder, colognes, deodorant, or antiperspirant the day of your surgery.  -  Do NOT wear any jewelry.    -  Begin using Incentive Spirometer 1 week prior to surgery.  Use 4 times per day, up to 5-10 breaths each time.  Bring Incentive Spirometer to hospital.    Questions or Concerns:  If you have questions or concerns, please call the  Preoperative Assessment Center, Monday-Friday 7AM-7PM:  [phone]    Hospital Visiting Restrictions:   Due to the current measles outbreak, visitor restrictions are in place in the hospitals. No visitors under 5 are allowed to visit. Also, visitors who are unvaccinated for measles and those who are currently ill are also asked to refrain from visiting.    AFTER YOUR SURGERY  Breathing exercises   Breathing exercises help you recover faster. Take deep breaths and let the air out slowly. This will:     Help you wake up after surgery.    Help prevent complications like pneumonia.  Preventing complications will help you go home sooner.   Nausea and vomiting   You may feel sick to your stomach after surgery; if so, let your nurse know.    Pain control:  After surgery, you may have pain. Our goal is to help you manage your pain. Pain medicine will help you feel comfortable enough to do activities that will help you heal.  These activities may include breathing exercises, walking and physical therapy.   To help your health care team treat your pain we will ask: 1) If you have pain  2) where it is located 3) describe your pain in your words  Methods of pain control include medications given by mouth, vein or by nerve block for some surgeries.  We may give you a pain control pump that will:  1) Deliver the medicine through a tube placed in your vein  2) Control the amount of medicine you receive  3) Allow you to push a button to deliver a dose of pain medicine  Sequential  Compression Device (SCD) or Pneumo Boots:  You may need to wear SCD S on your legs or feet. These are wraps connected to a machine that pumps in air and releases it. The repeated pumping helps prevent blood clots from forming.     Using an Incentive Spirometer    An incentive spirometer is a device that helps you do deep breathing exercises. These exercises expand your lungs, aid in circulation, and help prevent pneumonia. Deep breathing exercises also help you breathe better and improve the function of your lungs by:    Keeping your lungs clear    Strengthening your breathing muscles    Helping prevent respiratory complications or problems  The incentive spirometer gives you a way to take an active part in recover. A nurse or therapist will teach you breathing exercises. To do these exercises, you will breathe in through your mouth and not your nose. The incentive spirometer only works correctly if you breathe in through your mouth.  Steps to clear lungs  Step 1. Exhale normally. Then, inhale normally.    Relax and breathe out.  Step 2. Place your lips tightly around the mouthpiece.    Make sure the device is upright and not tilted.  Step 3. Inhale as much air as you can through the mouthpiece (don't breath through your nose).    Inhale slowly and deeply.    Hold your breath long enough to keep the balls or disk raised for at least 3 to 5 seconds, or as instructed by your healthcare provider.    Some spirometers have an indicator to let you know that you are breathing in too fast. If the indicator goes off, breathe in more slowly.  Step 4. Repeat the exercise regularly.    Do this exercise every hour while you're awake, or as instructed by your healthcare provider.    If you were taught deep breathing and coughing exercises, do them regularly as instructed by your healthcare provider.   Follow-up care  Make a follow up appointment, or as directed. Also, follow up with your healthcare provider as advised or if your  symptoms do not improve or continue to get worse.  When to call your healthcare provider  Call your healthcare provider right away if you have any of the following:    Fever 100.4  (38 C) or higher, or as directed by your healthcare provider    Brownish, bloody, or smelly sputum  Call 911  Call 911 if any of these occur:     Shortness of breath that doesn't get better after taking your medicine    Cool, moist, pale or blue skin    Trouble breathing or swallowing, wheezing    Fainting or loss of consciousness    Feeling of fizziness or weakness or a sudden drop in blood pressure    Feeling of doom or lightheaded    Chest pain or rapid heart rate  Date Last Reviewed: 1/1/2017 2000-2017 [employer]. [address] [place] [address], [place] [address]. All rights reserved. This information is not intended as a substitute for professional medical care. Always follow your healthcare professional's instructions.

## 2017-08-24 NOTE — NURSING NOTE
Lucas Brown arrived fasting for labs, vitals obtained, instructions provided for mixed meal test.  Patient stated understanding of the plan.  Fasting labs drawn, patient consumed 360 ml of BOOST per providers instruction.  Patient instructed to return to the lab at 1hr (8:50 AM) and 2hr (9:50 AM) post prandial and remain fasting until completion of the test with exception of enzymes needed or pain medication.

## 2017-08-24 NOTE — LETTER
2017       RE: Lucas Brown  [address] [place] [id number]     Dear Colleague,    Thank you for referring your patient, Lucas Brown, to the [facility] SURGERY at [facility]. Please see a copy of my visit note below.    See dictated note: [id number]  Visit time 25 min, all counselling  GB      2017         Cyril Mackay MD   [facility]   [address] [place]  [address]      RE: Lucas Brown   MRN: [id number]   : 1960      Dear Dr. Mackay:      I had the pleasure of seeing your patient Mr. Lucas Brown in Surgery Clinic today.  As you know, he is scheduled for a Whipple procedure next Monday for his chronic pancreatitis.  I spent the majority of my 25 minute visit discussing the planned procedure with the patient and his wife, going over preoperative instructions and answering their questions. We plan on a Whipple procedure on Monday with a G-tube and a J-tube.      I certainly appreciate the chance to care for this gentleman and will keep you informed as to his progress from here.  Visit time 25 minutes, nearly all counseling.         D: 2017 18:24   T: 2017 09:56   MT: [id number]      Name:     LUCAS BROWN   MRN:      [id number]        Account:      [id number]   :      1960           Service Date: 2017      Document: [id number]       Again, thank you for allowing me to participate in the care of your patient.      Sincerely,    Vik Crum MD

## 2017-08-24 NOTE — MR AVS SNAPSHOT
After Visit Summary   2017    Lucas Brown    MRN: [id number]           Patient Information     Date Of Birth          1960        Visit Information        Provider Department      2017 12:30 PM Rn, [facility] Preoperative Assessment [facility]        Care Instructions    Preparing for Your Surgery      Name:  Lucas Brown   MRN:  [id number]   :  1960   Today's Date:  2017     Arriving for surgery:  Surgery date:  17  Surgery time:  7:30 am  Arrival time:  5 am    Please come to:   [facility] [address]  [address] [place]  [address]    -   parking is available in front of the hospital from 5:15 am to 8:00 pm    -  Stop at the Information Desk in the lobby    -   Inform the information person that you are here for surgery. An escort to 3c will be provided. If you would not like an escort, please proceed to 3C on the 3rd floor. [phone]   -  Bring your ID and insurance card.    What can I eat or drink?  -  You may have solid food or milk products until 8 hours prior to your surgery. (11:30 pm 17 )  -  You may have water, apple juice, BLACK coffee (NO creamer or nondairy creamer), or 7up/Sprite until 2 hours prior to your surgery. (5 am- Stop on arrival to hospital. )    Which medicines can I take?       -  Do not bring your own medications to the hospital.        -  Follow Surgery Clinic instructions regarding Ibuprofen. If no instructions given, NO Ibuprofen the day prior to surgery.         -  Hold Multivitamin 7 days prior to surgery.        -  Hold Celecoxib 2 days prior to surgery. Last dose 17.        -  Lovenox- Pt was previously instructed to hold  8-27 PM dose and hold Monday 8-28 AM dose.    -  Do NOT take these medications in the morning, the day of surgery:  Lisinopril, Amylase-Lipase-Protease (Viokace)  -  Please take these medications the morning of surgery:  Finasteride,  Prednisone, Levothyroxine, Sertraline, Omeprazole, Pravastatin, Fenofibrate, Tamsulosin, Fentanyl patch      Hydromorphone if needed, Acetaminophen (Tylenol) if needed    How do I prepare myself?  -  Take two showers: one the night before surgery; and one the morning of surgery.         Use Scrubcare or Hibiclens to wash from neck down.  You may use your own shampoo and conditioner. No other hair products.   -  Do NOT use lotion, powder, colognes, deodorant, or antiperspirant the day of your surgery.  -  Do NOT wear any jewelry.    -  Begin using Incentive Spirometer 1 week prior to surgery.  Use 4 times per day, up to 5-10 breaths each time.  Bring Incentive Spirometer to hospital.    Questions or Concerns:  If you have questions or concerns, please call the  Preoperative Assessment Center, Monday-Friday 7AM-7PM:  [phone]    Hospital Visiting Restrictions:   Due to the current measles outbreak, visitor restrictions are in place in the hospitals. No visitors under 5 are allowed to visit. Also, visitors who are unvaccinated for measles and those who are currently ill are also asked to refrain from visiting.    AFTER YOUR SURGERY  Breathing exercises   Breathing exercises help you recover faster. Take deep breaths and let the air out slowly. This will:     Help you wake up after surgery.    Help prevent complications like pneumonia.  Preventing complications will help you go home sooner.   Nausea and vomiting   You may feel sick to your stomach after surgery; if so, let your nurse know.    Pain control:  After surgery, you may have pain. Our goal is to help you manage your pain. Pain medicine will help you feel comfortable enough to do activities that will help you heal.  These activities may include breathing exercises, walking and physical therapy.   To help your health care team treat your pain we will ask: 1) If you have pain  2) where it is located 3) describe your pain in your words  Methods of pain control  include medications given by mouth, vein or by nerve block for some surgeries.  We may give you a pain control pump that will:  1) Deliver the medicine through a tube placed in your vein  2) Control the amount of medicine you receive  3) Allow you to push a button to deliver a dose of pain medicine  Sequential Compression Device (SCD) or Pneumo Boots:  You may need to wear SCD S on your legs or feet. These are wraps connected to a machine that pumps in air and releases it. The repeated pumping helps prevent blood clots from forming.     Using an Incentive Spirometer    An incentive spirometer is a device that helps you do deep breathing exercises. These exercises expand your lungs, aid in circulation, and help prevent pneumonia. Deep breathing exercises also help you breathe better and improve the function of your lungs by:    Keeping your lungs clear    Strengthening your breathing muscles    Helping prevent respiratory complications or problems  The incentive spirometer gives you a way to take an active part in recover. A nurse or therapist will teach you breathing exercises. To do these exercises, you will breathe in through your mouth and not your nose. The incentive spirometer only works correctly if you breathe in through your mouth.  Steps to clear lungs  Step 1. Exhale normally. Then, inhale normally.    Relax and breathe out.  Step 2. Place your lips tightly around the mouthpiece.    Make sure the device is upright and not tilted.  Step 3. Inhale as much air as you can through the mouthpiece (don't breath through your nose).    Inhale slowly and deeply.    Hold your breath long enough to keep the balls or disk raised for at least 3 to 5 seconds, or as instructed by your healthcare provider.    Some spirometers have an indicator to let you know that you are breathing in too fast. If the indicator goes off, breathe in more slowly.  Step 4. Repeat the exercise regularly.    Do this exercise every hour while  you're awake, or as instructed by your healthcare provider.    If you were taught deep breathing and coughing exercises, do them regularly as instructed by your healthcare provider.   Follow-up care  Make a follow up appointment, or as directed. Also, follow up with your healthcare provider as advised or if your symptoms do not improve or continue to get worse.  When to call your healthcare provider  Call your healthcare provider right away if you have any of the following:    Fever 100.4  (38 C) or higher, or as directed by your healthcare provider    Brownish, bloody, or smelly sputum  Call 911  Call 911 if any of these occur:     Shortness of breath that doesn't get better after taking your medicine    Cool, moist, pale or blue skin    Trouble breathing or swallowing, wheezing    Fainting or loss of consciousness    Feeling of fizziness or weakness or a sudden drop in blood pressure    Feeling of doom or lightheaded    Chest pain or rapid heart rate  Date Last Reviewed: 1/1/2017 2000-2017 [employer]. [address] [place] [address]. All rights reserved. This information is not intended as a substitute for professional medical care. Always follow your healthcare professional's instructions.                          Follow-ups after your visit        Your next 10 appointments already scheduled     Aug 24, 2017 12:30 PM CDT   (Arrive by 12:15 PM)   PAC RN ASSESSMENT with Hernán Pac Rn   [facility] Preoperative Assessment [place] ([facility])    [address] [place] [id number]   [phone]            Aug 24, 2017  1:00 PM CDT   (Arrive by 12:45 PM)   PAC Anesthesia Consult with Hernán Pac Anesthesiologist   [facility] Preoperative Assessment Center ([facility])    [address] [place] [id number]   [phone]            Aug 24, 2017  1:15 PM CDT   LAB with HERNÁN LAB   [facility] Lab [facility])    [address]  [address] [facility] [id number] [phone]           Patient must bring picture ID. Patient should be prepared to give a urine specimen  Please do not eat 10-12 hours before your appointment if you are coming in fasting for labs on lipids, cholesterol, or glucose (sugar). Pregnant women should follow their Care Team instructions. Water with medications is okay. Do not drink coffee or other fluids. If you have concerns about taking  your medications, please ask at office or if scheduling via [employer], send a message by clicking on Secure Messaging, Message Your Care Team.            Aug 24, 2017  6:00 PM CDT   (Arrive by 5:45 PM)   New Patient Visit with Vik Crum MD   [facility] Surgery ([facility] [place])    [address]  [address] [facility] [id number] [phone]            Aug 28, 2017   Procedure with Vik Crum MD   [facility], [place], Same Day Surgery (--)    [address] [id number] [phone]              Who to contact     Please call your clinic at [phone] to:    Ask questions about your health    Make or cancel appointments    Discuss your medicines    Learn about your test results    Speak to your doctor   If you have compliments or concerns about an experience at your clinic, or if you wish to file a complaint, please contact [facility] Patient Relations at [phone] or email us at [email or web address]         Additional Information About Your Visit        [employer] Information     [employer] gives you secure access to your electronic health record. If you see a primary care provider, you can also send messages to your care team and make appointments. If you have questions, please call your primary care clinic.  If you do not have a primary care provider, please call [phone] and they will assist you.      [employer] is an electronic  gateway that provides easy, online access to your medical records. With [employer], you can request a clinic appointment, read your test results, renew a prescription or communicate with your care team.     To access your existing account, please contact your [facility] or call [phone] for assistance.        Care EveryWhere ID     This is your Care EveryWhere ID. This could be used by other organizations to access your [facility] medical records  [id number]         Blood Pressure from Last 3 Encounters:   08/24/17 102/70   07/20/17 98/64   06/07/17 109/70    Weight from Last 3 Encounters:   08/24/17 92.3 kg (203 lb 8 oz)   08/24/17 91.6 kg (202 lb)   07/20/17 91.1 kg (200 lb 12.8 oz)              Today, you had the following     No orders found for display         Today's Medication Changes          These changes are accurate as of: 8/24/17 12:00 PM.  If you have any questions, ask your nurse or doctor.               These medicines have changed or have updated prescriptions.        Dose/Directions    fentaNYL 50 mcg/hr 72 hr patch   Commonly known as:  DURAGESIC   This may have changed:  Another medication with the same name was removed. Continue taking this medication, and follow the directions you see here.   Changed by:  Hernán Denny        Dose:  1 patch   Place 1 patch onto the skin every 72 hours Changed 8/24/17   Refills:  0       HYDROMORPHONE HCL PO   This may have changed:  Another medication with the same name was removed. Continue taking this medication, and follow the directions you see here.   Changed by:  Hernán Denny        Dose:  2 mg   Take 2 mg by mouth 3 times daily as needed for moderate to severe pain 2-4 tabs per day   Refills:  0                Primary Care Provider Office Phone # Fax #    Cyril GonzalezCodie [phone]       [address] [address]        Equal Access to Services     LEE CORNELL  AH: Hadshelbi muñozliangfavio Dao, watejda luqadaha, qaybta kayoel olivera, waxalma josias raleighcitlalli gonzalesjovannaludivina rodriguez. So [facility] [phone].    ATENCIÓN: Si habla rayshawn, tiene a rodriguez disposición servicios gratuitos de asistencia lingüística. Llame al [phone].    We comply with applicable federal civil rights laws and [place] laws. We do not discriminate on the basis of race, color, national origin, age, disability sex, sexual orientation or gender identity.            Thank you!     Thank you for choosing [facility] PREOPERATIVE ASSESSMENT CENTER  for your care. Our goal is always to provide you with excellent care. Hearing back from our patients is one way we can continue to improve our services. Please take a few minutes to complete the written survey that you may receive in the mail after your visit with us. Thank you!             Your Updated Medication List - Protect others around you: Learn how to safely use, store and throw away your medicines at www.disposemymeds.org.          This list is accurate as of: 8/24/17 12:00 PM.  Always use your most recent med list.                   Brand Name Dispense Instructions for use Diagnosis    ACETAMINOPHEN PO      Take 1,000 mg by mouth every 6 hours as needed for pain        amylase-lipase-protease 70169 UNITS Tabs tablet    VIOKACE    450 tablet    Take 1-5 with snacks and meals, up to 15 per day.    Recurrent acute pancreatitis       aspirin 81 MG tablet      Take 81 mg by mouth daily Holding        CELEBREX PO      Take 200 mg by mouth daily        enoxaparin 80 MG/0.8ML injection    LOVENOX     Inject 80 mg Subcutaneous every 12 hours Will hold 8/27/17pm and 8/28/17 am        fenofibrate 145 MG tablet      Take 145 mg by mouth daily        fentaNYL 50 mcg/hr 72 hr patch    DURAGESIC     Place 1 patch onto the skin every 72 hours Changed 8/24/17        FINASTERIDE PO      Take 5 mg by mouth daily        HYDROMORPHONE HCL PO      Take 2 mg by mouth 3 times  daily as needed for moderate to severe pain 2-4 tabs per day        levothyroxine 150 MCG tablet    SYNTHROID/LEVOTHROID     Take 150 mcg by mouth daily        lisinopril 10 MG tablet    PRINIVIL/ZESTRIL     Take 10 mg by mouth daily        MULTIVITAMINS Chew      Take 1 chew tab by mouth daily        omeprazole 20 MG CR capsule    priLOSEC     Take 20 mg by mouth 2 times daily as needed        ondansetron 4 MG ODT tab    ZOFRAN-ODT     Take 4 mg by mouth every 8 hours as needed for nausea        pravastatin 40 MG tablet    PRAVACHOL     Take 40 mg by mouth daily        PREDNISONE PO      Take 5 mg by mouth daily        sertraline 50 MG tablet    ZOLOFT     Take 50 mg by mouth daily        TAMSULOSIN HCL PO      Take 0.4 mg by mouth daily        WARFARIN SODIUM PO      Holding startin 8/23/17

## 2017-08-24 NOTE — PROGRESS NOTES
Pt seen at [facility] in PACU on 4th floor for evaluation and iterative programming of a Medtronic Dual lead pacemaker, per MD orders. Patient is scheduled for a pacemaker check pre surgery today. Intrinsic rhythm = SR @ 70 () 30 BPM. Normal pacemaker function. 0 episodes recorded. No short v-v intervals recorded. Lead impedance trends appear stable. AP= 64.7% and = 99.9%. Pt reports he is feeling well. Battery estimates 6 years to KALI. Reported setting and numbers to RN checking patient in for pre surgery information.    Pacemaker Dual Lead

## 2017-08-24 NOTE — NURSING NOTE
"Chief Complaint   Patient presents with     RECHECK     pre op       Vitals:    08/24/17 1753   BP: 107/66   BP Location: Left arm   Patient Position: Chair   Cuff Size: Adult Regular   Pulse: 79   Temp: 97.2  F (36.2  C)   SpO2: 96%   Weight: 205 lb 12.8 oz   Height: 5' 11\"       Body mass index is 28.7 kg/(m^2).      Demarco Pittman MA                          "

## 2017-08-24 NOTE — PHARMACY - PREOPERATIVE ASSESSMENT CENTER
ANTICOAGULATION DOCUMENTATION - Preoperative Assessment Center (PAC) Pharmacist   Patient seen and interviewed during time of PAC Clinic appointment August 24, 2017.     Based on profile review and patient interview Lucas Brown has been on warfarin for treatment of artificial heart valves since 2/1999.  Current dose as of August 22, 2017 is warfarin 2.5mg po daily. Goal INR = 2.5-3.5.  It is prescribed by Dr. Aggarwal with [facility] in [place], [place].  The expected duration of therapy is indefinite.      Lucas Brown is scheduled for surgery on 8/28/17 and the perioperative anticoagulation plan outlined by Dr. Aggarwal is to hold warfarin starting 8/23/17 (patient has done this) and start enoxaparin 80mg sq q12h, hold dose 8/27/17 pm and 8/28/17 am.  Patient understand the plan.  This plan may require re-assessment and modification by his primary team in the perioperative setting depending on patients clinical situation.        Marbella Peguero RPH  August 24, 2017  11:30 AM

## 2017-08-24 NOTE — ANESTHESIA PREPROCEDURE EVALUATION
Anesthesia Evaluation     . Pt has had prior anesthetic. Type: General and MAC    No history of anesthetic complications          ROS/MED HX    ENT/Pulmonary:     (+)sleep apnea, tobacco use, Past use Quit 2012, 37 pack years packs/day  doesn't use CPAP , . .    Neurologic:     (+)other neuro Temporal Arteritis    Cardiovascular:     (+) hypertension-range: 98/65 - 130/85, ---. Taking blood thinners Pt has received instructions: Instructions Given to patient: Holding aspirin and Warfarin for 5 days.  Bridging with subcutaneous Lovenox.  . . . pacemaker Reason placed: Complete heart block:type: Medtronic Dual chamber pacemaker settings: DDDR with a rate of  bpm.   - Patient is dependent on pacemaker . valvular problems/murmurs Mechanical Heart Valve placed 1994 and 2014:. Previous cardiac testing Echodate:3/23/17results:date: results:ECG reviewed date:05/08/2017 results:Ventricular-paced complexes. date: results:          METS/Exercise Tolerance:  >4 METS   Hematologic:  - neg hematologic  ROS       Musculoskeletal:  - neg musculoskeletal ROS       GI/Hepatic: Comment: Diagnosis of chronic pancreatitis.      (+) GERD Asymptomatic on medication,       Renal/Genitourinary:     (+) BPH,       Endo:     (+) thyroid problem hypothyroidism, Chronic steroid usage for Date most recently used: Temporal Arteritis,.      Psychiatric:     (+) psychiatric history depression      Infectious Disease:  - neg infectious disease ROS       Malignancy:      - no malignancy   Other:    (+) H/O Chronic Pain,H/O chronic opiod use ,                    Physical Exam  Normal systems: cardiovascular and pulmonary    Airway   Mallampati: II  TM distance: >3 FB  Neck ROM: full    Dental   (+) missing and caps    Cardiovascular   Rhythm and rate: regular and normal      Pulmonary    breath sounds clear to auscultation    Other findings: Lab Results       Component                Value               Date                       WBC                       5.2                 08/24/2017                 HGB                      11.8 (L)            08/24/2017                 HCT                      36.5 (L)            08/24/2017                 PLT                      257                 08/24/2017                 INR                      2.87 (H)            08/24/2017                 PTT                      31                  12/01/2015                 NA                       142                 08/24/2017                 POTASSIUM                3.6                 08/24/2017                 NICHOLAS                      8.6                 08/24/2017                 GLC                      88                  08/24/2017                 CR                       0.96                08/24/2017                 BUN                      16                  08/24/2017                 CO2                      26                  08/24/2017                 ALT                      22                  08/24/2017                 AST                      25                  08/24/2017                 BILITOTAL                0.5                 08/24/2017                 ALKPHOS                  44                  08/24/2017              ECHO complete with Color Doppler 3/23/17  Summary:  1.  Normal left ventricular systolic function, LVEF 50-55%  2. Mild concentric left ventricular hypertrophy  3. Pseudonormal (Grade 2) pattern of LV diastolic filling.  4. Moderate hypokinesis of the apical septal left ventricular wall(s).  5. There is a small membranous VSD with peak left to right flow velocity of 4.2m/s.  6. The aortic root and ascending aorta is/are normal.  7. Mechanical prosthesis in the aortic position.  8. The peak and mean pressures and peak systolic velocity through the AV mechanical prosthesis are respectively 41 mmHG, 23 mmHg and 3.2 m/s.  These are unchanged from 1/7/2016.  9. Trace aortic valve regurgitation, trace mitral valve regurgitation, mild  tricuspid valve regurgitation.  10. The inferior vena cava is normal.  11. The right ventricular systolic pressure is normal at 30 mmHg.  12. Compared to the previous study of 1/7/2016 the LV systolic function, aortic valve and VSD are unchanged.  The right sided pressures have decreased.            PAC Discussion and Assessment    ASA Classification: 3  Case is suitable for: [place]  Anesthetic techniques and relevant risks discussed: GA with regional block for post-op pain control  Invasive monitoring and risk discussed:   Types:   Possibility and Risk of blood transfusion discussed:   NPO instructions given:   Additional anesthetic preparation and risks discussed:   Needs early admission to pre-op area:   Other:     PAC Resident/NP Anesthesia Assessment:  Lucas Brown is a 56 year old male who is scheduled for a Whipple Procedure on 8/28/17 with Dr. Vik Crum for chronic pancreatitis, IPMN at the [facility]..  PAC referral for risk assessment and optimization for anesthesia with comorbid conditions of:    Pre-operative considerations:  1.  Cardiovascular:  Functional status METS  >4    Risk of Major Adverse Cardiac event: 6.6%  Cardiology Preop done through [facility] 8/15/17:  -Aortic valve replacement in 1994 and 2014  -Ascending aortic aneurysm repair in 2014 and a perioperative 3rd degree AV Block requiring  pacemaker implantation   >Pacemaker can be used with a magnet, battery 6 years   >last pacemaker interrogation 8/24/17  >ECHO 3/23/17:  LVEF 50-55%, small membranous VSD with peak left to right flow velocity of 4.2 m/s, Trace Regurgitation of AV mechanical prosthesis:  peak gradient 41mmHg, mean gradient 23mmHg, peak systolic velocity 3.2m/s  -Hypertension well controlled on lisinopriol, will hold DOS   2.  Pulm:   COLBY risk:   -COLBY, doesn't use CPAP  3.  GI:  Risk of PONV score = 2.  If > 2, anti-emetic intervention recommended.     4.  Anesthesia  History:     ETT 05/15/17; 1024; Mask Ventilation: Easy; Ease of Intubation: Easy; Airway Size: 8;  Cuffed;  Oral;  Blade Type: Brody;  Blade Size: 4;  Place by: Ariel Rivera;  Insertion Attempts: 1;  Secured at (cm)to lip: 23 cm;  Breath Sounds: Equal, clear and bilateral;  End Tidal CO2: Present;  Dentition: Intact;  Grade View of Cords: 2    5.  Meds:  -Anticoags: Warfarin, asa 81 mg:  Stopped both 5 days prior to DOS, bridging with Lovenox  -Opiates: Dilaudid 2mg-4 tabs daily, Fentanyl patch 50 mcg/hr q 72 hrs  Morphine Equivalent: 108  -Chronic Steroids: Prednisone 5mg qd for temporal arteritis    Patient is optimized and is acceptable candidate for the proposed procedure.  No further diagnostic evaluation is needed.       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 Chart reviewed, patient seen and evaluated; agree with above assessment. Cardiovascular function good, has undergone AVR X2, now with mechanical valve and S/P aortic aneurysm repair. Continues to have small membranous VSD with left to right shunt.  Pacemaker in place. On warfarin for the mechanical valve, now bridging with lovenox.     Patient is appropriate for the planned procedure without further workup or medical management change. The final anesthesia plan will be determined by the physician anesthesiologist caring for the patient on the day of surgery.      Reviewed and Signed by PAC Anesthesiologist  Anesthesiologist: ted  Date: 8/24/2017  Time:   Pass/Fail: Pass  Disposition:     PAC Pharmacist Assessment:        Pharmacist:   Date:   Time:      Anesthesia Plan      History & Physical Review  History and physical reviewed and following examination; no interval change.    ASA Status:  3 .    NPO Status:  > 8 hours    Plan for General, ETT and Periph. Nerve Block for postop pain with Intravenous and  Propofol induction. Maintenance will be Balanced.    PONV prophylaxis:  Ondansetron (or other 5HT-3) and Dexamethasone or Solumedrol  Additional equipment: 2nd IV, Arterial Line and Central Line      Postoperative Care  Postoperative pain management:  IV analgesics.      Consents  Anesthetic plan, risks, benefits and alternatives discussed with:  Patient..                          .

## 2017-08-24 NOTE — H&P
Pre-Operative H & P     CC:  Preoperative exam to assess for increased cardiopulmonary risk while undergoing surgery and anesthesia.    Date of Encounter: August 24, 2017   Primary Care Physician:  Cyril Mackay  Lucas Brown is a 56 year old male who is scheduled for a Whipple Procedure on 8/28/17 with Dr. Vik Crum for chronic pancreatitis, IPMN at the [facility].    Mr. Brown was recently diagnosed to have a pancreatic mass.  He experienced pancreatitis after his ascending aortic aneurysm repair in 2014.  He has had several ED visits from January - April 2017 due to flares with severe abdominal pain.  He has a significant cardiology history with two aortic valve replacements in 1994 and 2014.  He also experienced a perioperative 3rd degree AV block in 2014 requiring implantation of a pacemaker.  His ECHO from 3/23/17 showed an LVEF of 50-55% and trace AV regurgitation.          History is obtained from the patient and medical record including Care Everywhere.    Past Medical History  Past Medical History:   Diagnosis Date     Anticoagulation adequate with anticoagulant therapy      Antiplatelet or antithrombotic long-term use      Anxiety      BPH (benign prostatic hyperplasia)      Chronic pancreatitis (H)      Complete heart block (H)     medtronic ppm     Depression      GERD (gastroesophageal reflux disease)      Heart murmur      HTN (hypertension)      Hyperlipidemia      Hypothyroidism      COLBY (obstructive sleep apnea)     Not using CPAP     Other chronic pain      Pacemaker      Pancreatic disease      Pancreatitis          Past Surgical History  Past Surgical History:   Procedure Laterality Date     aortic aneurysm       BIOPSY ARTERY TEMPORAL       ENDOSCOPIC RETROGRADE CHOLANGIOPANCREATOGRAM N/A 10/13/2015    Procedure: COMBINED ENDOSCOPIC RETROGRADE CHOLANGIOPANCREATOGRAPHY, PLACE TUBE/STENT;  Surgeon: Oniel Cates MD;   Location: [facility]     ENDOSCOPIC RETROGRADE CHOLANGIOPANCREATOGRAM N/A 11/12/2015    Procedure: COMBINED ENDOSCOPIC RETROGRADE CHOLANGIOPANCREATOGRAPHY, REMOVE FOREIGN BODY OR STENT/TUBE;  Surgeon: Oniel Cates MD;  Location: [facility]     ENDOSCOPIC RETROGRADE CHOLANGIOPANCREATOGRAM N/A 12/1/2015    Procedure: COMBINED ENDOSCOPIC RETROGRADE CHOLANGIOPANCREATOGRAPHY, PLACE TUBE/STENT;  Surgeon: Oniel Cates MD;  Location: [facility]     ENDOSCOPIC RETROGRADE CHOLANGIOPANCREATOGRAM N/A 12/22/2015    Procedure: ENDOSCOPIC RETROGRADE CHOLANGIOPANCREATOGRAM;  Surgeon: Oniel Cates MD;  Location: [facility]     ENDOSCOPIC RETROGRADE CHOLANGIOPANCREATOGRAM N/A 1/19/2016    Procedure: COMBINED ENDOSCOPIC RETROGRADE CHOLANGIOPANCREATOGRAPHY, REMOVE FOREIGN BODY OR STENT/TUBE;  Surgeon: Oniel Cates MD;  Location: [facility]     ENDOSCOPIC RETROGRADE CHOLANGIOPANCREATOGRAM N/A 8/30/2016    Procedure: COMBINED ENDOSCOPIC RETROGRADE CHOLANGIOPANCREATOGRAPHY, PLACE TUBE/STENT;  Surgeon: Oniel Cates MD;  Location: [facility]     ENDOSCOPIC RETROGRADE CHOLANGIOPANCREATOGRAM N/A 10/18/2016    Procedure: COMBINED ENDOSCOPIC RETROGRADE CHOLANGIOPANCREATOGRAPHY, REMOVE FOREIGN BODY OR STENT/TUBE;  Surgeon: Guru Amber Childs MD;  Location: [facility]     ESOPHAGOSCOPY, GASTROSCOPY, DUODENOSCOPY (EGD), COMBINED N/A 8/31/2016    Procedure: COMBINED ENDOSCOPIC ULTRASOUND, ESOPHAGOSCOPY, GASTROSCOPY, DUODENOSCOPY (EGD), FINE NEEDLE ASPIRATE/BIOPSY;  Surgeon: Loy Russ MD;  Location: [facility]     ESOPHAGOSCOPY, GASTROSCOPY, DUODENOSCOPY (EGD), COMBINED N/A 10/18/2016    Procedure: COMBINED ESOPHAGOSCOPY, GASTROSCOPY, DUODENOSCOPY (EGD), REMOVE FOREIGN BODY;  Surgeon: Guru Amber Childs MD;  Location: [facility]     ESOPHAGOSCOPY, GASTROSCOPY, DUODENOSCOPY (EGD), COMBINED N/A 5/15/2017    Procedure: COMBINED ENDOSCOPIC ULTRASOUND, ESOPHAGOSCOPY, GASTROSCOPY, DUODENOSCOPY (EGD),  FINE NEEDLE ASPIRATE/BIOPSY;  Upper Endoscopic Ultrasound fine needle biopsy  ;  Surgeon: Titus Miguel MD;  Location: [facility]      [facility] ENDOSCOPY W EUS N/A 10/9/2015    Procedure: COMBINED ENDOSCOPIC ULTRASOUND, ESOPHAGOSCOPY, GASTROSCOPY, DUODENOSCOPY (EGD);  Surgeon: Loy Russ MD;  Location: [facility]     IMPLANT PACEMAKER  08/27/14     REPLACE VALVE AORTIC  08/27/14    X2, 1999 and 2014       Prior to Admission Medications  Current Outpatient Prescriptions   Medication Sig Dispense Refill     fentaNYL (DURAGESIC) 50 mcg/hr 72 hr patch Place 1 patch onto the skin every 72 hours Changed 8/24/17       HYDROMORPHONE HCL PO Take 2 mg by mouth 3 times daily as needed for moderate to severe pain 2-4 tabs per day       enoxaparin (LOVENOX) 80 MG/0.8ML injection Inject 80 mg Subcutaneous every 12 hours Will hold 8/27/17pm and 8/28/17 am       TAMSULOSIN HCL PO Take 0.4 mg by mouth daily       amylase-lipase-protease (VIOKACE) 57439 UNITS TABS tablet Take 1-5 with snacks and meals, up to 15 per day. 450 tablet 3     Celecoxib (CELEBREX PO) Take 200 mg by mouth daily       FINASTERIDE PO Take 5 mg by mouth daily       PREDNISONE PO Take 5 mg by mouth daily        ondansetron (ZOFRAN-ODT) 4 MG disintegrating tablet Take 4 mg by mouth every 8 hours as needed for nausea       WARFARIN SODIUM PO Holding startin 8/23/17       ACETAMINOPHEN PO Take 1,000 mg by mouth every 6 hours as needed for pain        levothyroxine (SYNTHROID, LEVOTHROID) 150 MCG tablet Take 150 mcg by mouth daily       sertraline (ZOLOFT) 50 MG tablet Take 50 mg by mouth daily       omeprazole (PRILOSEC) 20 MG capsule Take 20 mg by mouth 2 times daily as needed        lisinopril (PRINIVIL,ZESTRIL) 10 MG tablet Take 10 mg by mouth daily       Multiple Vitamins-Minerals (MULTIVITAMINS) CHEW Take 1 chew tab by mouth daily       pravastatin (PRAVACHOL) 40 MG tablet Take 40 mg by mouth daily       aspirin 81 MG tablet Take 81 mg by mouth daily  Holding       fenofibrate 145 MG tablet Take 145 mg by mouth daily           Allergies  Reclast [zoledronic acid] and Codeine     Social History  Social History     Social History     Marital status:      Spouse name: N/A     Number of children: N/A     Years of education: N/A     Occupational History           Social History Main Topics     Smoking status: Former Smoker     Packs/day: 1.50     Years: 25.00     Quit date: 3/9/2012     Smokeless tobacco: Former User      Comment: Quit 2012     Alcohol use No     Drug use: No     Sexual activity: Not on file     Other Topics Concern     Not on file     Social History Narrative          Family History  Family History   Problem Relation Age of Onset     Alzheimer Disease Mother         ROS  10 point review of systems performed.  All pertinent positives noted, otherwise Negative.      Cardiac Testing  ECHO complete with Color Doppler 3/23/17  Summary:  1.  Normal left ventricular systolic function, LVEF 50-55%  2. Mild concentric left ventricular hypertrophy  3. Pseudonormal (Grade 2) pattern of LV diastolic filling.  4. Moderate hypokinesis of the apical septal left ventricular wall(s).  5. There is a small membranous VSD with peak left to right flow velocity of 4.2m/s.  6. The aortic root and ascending aorta is/are normal.  7. Mechanical prosthesis in the aortic position.  8. The peak and mean pressures and peak systolic velocity through the AV mechanical prosthesis are respectively 41 mmHG, 23 mmHg and 3.2 m/s.  These are unchanged from 1/7/2016.  9. Trace aortic valve regurgitation, trace mitral valve regurgitation, mild tricuspid valve regurgitation.  10. The inferior vena cava is normal.  11. The right ventricular systolic pressure is normal at 30 mmHg.  12. Compared to the previous study of 1/7/2016 the LV systolic function, aortic valve and VSD are unchanged.  The right sided pressures have decreased.      Labs: (personally reviewed):  Lab  "Results   Component Value Date    WBC 5.2 08/24/2017    HGB 11.8 (L) 08/24/2017    HCT 36.5 (L) 08/24/2017     08/24/2017    INR 2.87 (H) 08/24/2017    PTT 31 12/01/2015     08/24/2017    POTASSIUM 3.6 08/24/2017    NICHOLAS 8.6 08/24/2017    GLC 88 08/24/2017    CR 0.96 08/24/2017    BUN 16 08/24/2017    CO2 26 08/24/2017    ALT 22 08/24/2017    AST 25 08/24/2017    BILITOTAL 0.5 08/24/2017    ALKPHOS 44 08/24/2017         Physical Exam:  No LMP for male patient.   Vital signs:  /70  Pulse 85  Temp 98.2  F (36.8  C) (Oral)  Resp 16  Ht 1.803 m (5' 11\")  Wt 92.3 kg (203 lb 8 oz)  SpO2 97%  BMI 28.38 kg/m2    Constitutional: Awake, alert, cooperative, no apparent distress, and appears stated age.  Eyes: Pupils equal, round and reactive to light, sclera clear, conjunctiva normal.  HENT: Normocephalic, oral pharynx with moist mucus membranes, good dentition. No goiter appreciated.   Respiratory: Clear to auscultation bilaterally, no crackles or wheezing.  Cardiovascular: Regular rate and rhythm,  no overt murmur noted.  No edema. Palpable pulses to radial  DP and PT arteries.   GI: Normal bowel sounds, soft, non-distended, non-tender, no masses palpated  Skin: Warm and dry.  No rashes at anticipated surgical site.   Musculoskeletal: Full ROM of neck. There is no redness, warmth, or swelling of the exposed joints. Gross motor strength is normal.    Neurologic: Awake, alert, oriented to name, place and time.  Gait is normal.   Neuropsychiatric: Calm, cooperative. Normal affect.     Assessment/Plan  Lucas Brown is a 56 year old male who is scheduled for a Whipple Procedure on 8/28/17 with Dr. Vik Crum for chronic pancreatitis, IPMN at the [facility]  PAC referral for risk assessment and optimization for anesthesia with comorbid conditions of:    Pre-operative considerations:  1.  Cardiovascular:  Functional status METS  >4    Risk of Major " Adverse Cardiac event: 6.6%  Cardiology Preop done through [facility] 8/15/17:  -Aortic valve replacement in 1994 and 2014  -Ascending aortic aneurysm repair in 2014 and a perioperative 3rd degree AV Block requiring  pacemaker implantation   >Pacemaker can be used with a magnet, battery 6 years   >last pacemaker interrogation 8/24/17  >ECHO 3/23/17:  LVEF 50-55%, small membranous VSD with peak left to right flow velocity of 4.2 m/s, Trace Regurgitation of AV mechanical prosthesis:  peak gradient 41mmHg, mean gradient 23mmHg, peak systolic velocity 3.2m/s  -Hypertension well controlled on lisinopriol, will hold DOS   2.  Pulm:   COLBY risk:   -COLBY, doesn t use CPAP  3.  GI:  Risk of PONV score = 2.  If > 2, anti-emetic intervention recommended.     4.  Anesthesia History:     ETT 05/15/17; 1024; Mask Ventilation: Easy; Ease of Intubation: Easy; Airway Size: 8;  Cuffed;  Oral;  Blade Type: Brody;  Blade Size: 4;  Place by: Ariel Rivera;  Insertion Attempts: 1;  Secured at (cm)to lip: 23 cm;  Breath Sounds: Equal, clear and bilateral;  End Tidal CO2: Present;  Dentition: Intact;  Grade View of Cords: 2     5.  Meds:  -Anticoags: Warfarin, asa 81 mg:  Stopped both 5 days prior to DOS, bridging with Lovenox  -Opiates: Dilaudid 2mg-4 tabs daily, Fentanyl patch 50 mcg/hr q 72 hrs  Morphine Equivalent: 108  -Chronic Steroids: Prednisone 5mg qd for temporal arteritis    Patient is optimized and is acceptable candidate for the proposed procedure.  No further diagnostic evaluation is needed.     Tiffany GALLO-C   Preoperative Assessment Center  [facility]  Phone: [phone]  Fax: [phone]

## 2017-08-24 NOTE — MR AVS SNAPSHOT
After Visit Summary   8/24/2017    Lucas Brown    MRN: [id number]           Patient Information     Date Of Birth          1960        Visit Information        Provider Department      8/24/2017 6:00 AM 1, Uc Cv Device [facility]        Today's Diagnoses     Atrioventricular block, complete (HCC)    -  1       Follow-ups after your visit        Your next 10 appointments already scheduled     Aug 24, 2017  6:00 PM CDT   (Arrive by 5:45 PM)   New Patient Visit with Vik Crum MD   [facility] General Surgery ([facility])    [address]  4th Floor  [facility] [address] [phone]            Aug 28, 2017   Procedure with Vik Crum MD   [facility], [place], Same Day Surgery (--)    [address] [address] [phone]              Who to contact     If you have questions or need follow up information about today's clinic visit or your schedule please contact [facility] directly at [phone].  Normal or non-critical lab and imaging results will be communicated to you by Helidynehart, letter or phone within 4 business days after the clinic has received the results. If you do not hear from us within 7 days, please contact the clinic through Helidynehart or phone. If you have a critical or abnormal lab result, we will notify you by phone as soon as possible.  Submit refill requests through Munchkin Fun or call your pharmacy and they will forward the refill request to us. Please allow 3 business days for your refill to be completed.          Additional Information About Your Visit        MyChart Information     Munchkin Fun gives you secure access to your electronic health record. If you see a primary care provider, you can also send messages to your care team and make appointments. If you have questions, please call your primary care clinic.  If you do not have a primary care provider, please call [phone] and they will  assist you.        Care EveryWhere ID     This is your Care EveryWhere ID. This could be used by other organizations to access your [place] medical records  [id number]         Blood Pressure from Last 3 Encounters:   08/24/17 102/70   07/20/17 98/64   06/07/17 109/70    Weight from Last 3 Encounters:   08/24/17 92.3 kg (203 lb 8 oz)   08/24/17 91.6 kg (202 lb)   07/20/17 91.1 kg (200 lb 12.8 oz)              We Performed the Following     PM DEVICE PROGRAMMING EVAL, DUAL LEAD PACER          Today's Medication Changes          These changes are accurate as of: 8/24/17  5:41 PM.  If you have any questions, ask your nurse or doctor.               These medicines have changed or have updated prescriptions.        Dose/Directions    fentaNYL 50 mcg/hr 72 hr patch   Commonly known as:  DURAGESIC   This may have changed:  Another medication with the same name was removed. Continue taking this medication, and follow the directions you see here.   Changed by:  PharmacistHernán Pac        Dose:  1 patch   Place 1 patch onto the skin every 72 hours Changed 8/24/17   Refills:  0       HYDROMORPHONE HCL PO   This may have changed:  Another medication with the same name was removed. Continue taking this medication, and follow the directions you see here.   Changed by:  PharmacistHernán Pac        Dose:  2 mg   Take 2 mg by mouth 3 times daily as needed for moderate to severe pain 2-4 tabs per day   Refills:  0                Primary Care Provider Office Phone # Fax #    Cyril Mackay [phone]       [address] [address]        Equal Access to Services     LEE CORNELL : Hadshelbi birch Sonick, waaxda luqadaha, qaybta kaalmada rimma olivera. So [facility] [phone].    ATENCIÓN: Si habla español, tiene a rodriguez disposición servicios gratuitos de asistencia lingüística. Llame al [phone].    We comply with applicable federal civil  rights laws and [place] laws. We do not discriminate on the basis of race, color, national origin, age, disability sex, sexual orientation or gender identity.            Thank you!     Thank you for choosing [facility]  for your care. Our goal is always to provide you with excellent care. Hearing back from our patients is one way we can continue to improve our services. Please take a few minutes to complete the written survey that you may receive in the mail after your visit with us. Thank you!             Your Updated Medication List - Protect others around you: Learn how to safely use, store and throw away your medicines at www.disposemymeds.org.          This list is accurate as of: 8/24/17  5:41 PM.  Always use your most recent med list.                   Brand Name Dispense Instructions for use Diagnosis    ACETAMINOPHEN PO      Take 1,000 mg by mouth every 6 hours as needed for pain        amylase-lipase-protease 64177 UNITS Tabs tablet    VIOKACE    450 tablet    Take 1-5 with snacks and meals, up to 15 per day.    Recurrent acute pancreatitis       aspirin 81 MG tablet      Take 81 mg by mouth daily Holding        CELEBREX PO      Take 200 mg by mouth daily        enoxaparin 80 MG/0.8ML injection    LOVENOX     Inject 80 mg Subcutaneous every 12 hours Will hold 8/27/17pm and 8/28/17 am        fenofibrate 145 MG tablet      Take 145 mg by mouth daily        fentaNYL 50 mcg/hr 72 hr patch    DURAGESIC     Place 1 patch onto the skin every 72 hours Changed 8/24/17        FINASTERIDE PO      Take 5 mg by mouth daily        HYDROMORPHONE HCL PO      Take 2 mg by mouth 3 times daily as needed for moderate to severe pain 2-4 tabs per day        levothyroxine 150 MCG tablet    SYNTHROID/LEVOTHROID     Take 150 mcg by mouth daily        lisinopril 10 MG tablet    PRINIVIL/ZESTRIL     Take 10 mg by mouth daily        MULTIVITAMINS Chew      Take 1 chew tab by mouth daily        omeprazole 20 MG CR capsule     priLOSEC     Take 20 mg by mouth 2 times daily as needed        ondansetron 4 MG ODT tab    ZOFRAN-ODT     Take 4 mg by mouth every 8 hours as needed for nausea        pravastatin 40 MG tablet    PRAVACHOL     Take 40 mg by mouth daily        PREDNISONE PO      Take 5 mg by mouth daily        sertraline 50 MG tablet    ZOLOFT     Take 50 mg by mouth daily        TAMSULOSIN HCL PO      Take 0.4 mg by mouth daily        WARFARIN SODIUM PO      Holding startin 8/23/17

## 2017-08-24 NOTE — PROGRESS NOTES
Preoperative Assessment [facility] medication history for August 24, 2017 is complete.    See Epic admission navigator for allergy information, pharmacy and prior to admission medications.    Operating room staff will still need to confirm medications and last dose information on day of surgery.     Medication history interview sources:   Patient    Changes made to PTA medication list (reason)  Added: Tamsulosin 0.4mg po daily, enoxaparin 80mg sq q12h  Deleted: none  Changed: Warfarin on hold, aspirin on hold, prednisone increased to 5mg daily, fentanyl increased to 50 mcg q72h, hydromorphone changed to 2mg po tid prn    Additional medication history information (including reliability of information, actions taken by pharmacist):None      Prior to Admission medications    Medication Sig Last Dose Taking? Auth Provider   fentaNYL (DURAGESIC) 50 mcg/hr 72 hr patch Place 1 patch onto the skin every 72 hours Changed 8/24/17 Taking Yes Unknown, Entered By History   HYDROMORPHONE HCL PO Take 2 mg by mouth 3 times daily as needed for moderate to severe pain 2-4 tabs per day Taking Yes Unknown, Entered By History   enoxaparin (LOVENOX) 80 MG/0.8ML injection Inject 80 mg Subcutaneous every 12 hours Will hold 8/27/17pm and 8/28/17 am Taking Yes Unknown, Entered By History   TAMSULOSIN HCL PO Take 0.4 mg by mouth daily Taking Yes Unknown, Entered By History   amylase-lipase-protease (VIOKACE) 43369 UNITS TABS tablet Take 1-5 with snacks and meals, up to 15 per day. Taking Yes Rosaline Saldana APRN CNP   Celecoxib (CELEBREX PO) Take 200 mg by mouth daily Taking Yes Reported, Patient   FINASTERIDE PO Take 5 mg by mouth daily Taking Yes Reported, Patient   PREDNISONE PO Take 5 mg by mouth daily  Taking Yes Reported, Patient   ondansetron (ZOFRAN-ODT) 4 MG disintegrating tablet Take 4 mg by mouth every 8 hours as needed for nausea Taking Yes Unknown, Entered By History   WARFARIN SODIUM PO Holding startin 8/23/17 Taking Yes  Unknown, Entered By History   ACETAMINOPHEN PO Take 1,000 mg by mouth every 6 hours as needed for pain  Taking Yes Unknown, Entered By History   levothyroxine (SYNTHROID, LEVOTHROID) 150 MCG tablet Take 150 mcg by mouth daily Taking Yes Unknown, Entered By History   sertraline (ZOLOFT) 50 MG tablet Take 50 mg by mouth daily Taking Yes Unknown, Entered By History   omeprazole (PRILOSEC) 20 MG capsule Take 20 mg by mouth 2 times daily as needed  Taking Yes Unknown, Entered By History   lisinopril (PRINIVIL,ZESTRIL) 10 MG tablet Take 10 mg by mouth daily Taking Yes Unknown, Entered By History   Multiple Vitamins-Minerals (MULTIVITAMINS) CHEW Take 1 chew tab by mouth daily Taking Yes Unknown, Entered By History   pravastatin (PRAVACHOL) 40 MG tablet Take 40 mg by mouth daily Taking Yes Unknown, Entered By History   aspirin 81 MG tablet Take 81 mg by mouth daily Holding Taking Yes Unknown, Entered By History   fenofibrate 145 MG tablet Take 145 mg by mouth daily Taking Yes Unknown, Entered By History         Medication history completed by: Marbella Peguero RPH

## 2017-08-24 NOTE — MR AVS SNAPSHOT
After Visit Summary   8/24/2017    Lucas Brown    MRN: [id number]           Patient Information     Date Of Birth          1960        Visit Information        Provider Department      8/24/2017 11:00 AM Pharmacist, Hernán Rossi [facility] Preoperative Assessment [place]        Today's Diagnoses     Preop examination    -  1       Follow-ups after your visit        Your next 10 appointments already scheduled     Aug 24, 2017 12:30 PM CDT   (Arrive by 12:15 PM)   PAC RN ASSESSMENT with Hernán Pac Rn   [facility] Preoperative Assessment [place] ([facility])    [address] [place] [address]   [phone]            Aug 24, 2017  1:00 PM CDT   (Arrive by 12:45 PM)   PAC Anesthesia Consult with Hernán Pac Anesthesiologist   [facility] Preoperative Assessment [place] ([facility])    [address] [place] [id number] [phone]            Aug 24, 2017  1:15 PM CDT   LAB with HERNÁN LAB   [facility] Lab ([facility])    [address] [place] [id number] [phone]           Patient must bring picture ID. Patient should be prepared to give a urine specimen  Please do not eat 10-12 hours before your appointment if you are coming in fasting for labs on lipids, cholesterol, or glucose (sugar). Pregnant women should follow their Care Team instructions. Water with medications is okay. Do not drink coffee or other fluids. If you have concerns about taking  your medications, please ask at office or if scheduling via Nurotron Biotechnologyhart, send a message by clicking on Secure Messaging, Message Your Care Team.            Aug 24, 2017  6:00 PM CDT   (Arrive by 5:45 PM)   New Patient Visit with Vik Crum MD   [facility] General Surgery ([facility])    [address] [place] [address] [phone]            Aug 28, 2017    Procedure with Vik Crum MD   [facility], [place], Same Day Surgery (--)    [address]  [place] [address] [phone]              Who to contact     Please call your clinic at [phone] to:    Ask questions about your health    Make or cancel appointments    Discuss your medicines    Learn about your test results    Speak to your doctor   If you have compliments or concerns about an experience at your clinic, or if you wish to file a complaint, please contact [facility] Patient Relations at [phone] or email us at [email or web address]         Additional Information About Your Visit        [employer] Information     Liquid Scenarios gives you secure access to your electronic health record. If you see a primary care provider, you can also send messages to your care team and make appointments. If you have questions, please call your primary care clinic.  If you do not have a primary care provider, please call [phone] and they will assist you.      Liquid Scenarios is an electronic gateway that provides easy, online access to your medical records. With Liquid Scenarios, you can request a clinic appointment, read your test results, renew a prescription or communicate with your care team.     To access your existing account, please contact your [facility] Clinic or call [phone] for assistance.        Care EveryWhere ID     This is your Care EveryWhere ID. This could be used by other organizations to access your [place] medical records  [id number]         Blood Pressure from Last 3 Encounters:   08/24/17 102/70   07/20/17 98/64   06/07/17 109/70    Weight from Last 3 Encounters:   08/24/17 92.3 kg (203 lb 8 oz)   08/24/17 91.6 kg (202 lb)   07/20/17 91.1 kg (200 lb 12.8 oz)              Today, you had the following     No orders found for display         Today's Medication Changes          These changes are accurate as of: 8/24/17 11:43 AM.  If you  have any questions, ask your nurse or doctor.               These medicines have changed or have updated prescriptions.        Dose/Directions    fentaNYL 50 mcg/hr 72 hr patch   Commonly known as:  DURAGESIC   This may have changed:  Another medication with the same name was removed. Continue taking this medication, and follow the directions you see here.   Changed by:  Hernán Denny        Dose:  1 patch   Place 1 patch onto the skin every 72 hours Changed 8/24/17   Refills:  0       HYDROMORPHONE HCL PO   This may have changed:  Another medication with the same name was removed. Continue taking this medication, and follow the directions you see here.   Changed by:  Hernán Denny        Dose:  2 mg   Take 2 mg by mouth 3 times daily as needed for moderate to severe pain 2-4 tabs per day   Refills:  0                Primary Care Provider Office Phone # Fax #    Cyril Mackay [phone]       [facility] [address] [address]        Equal Access to Services     LEE CORNELL : Hadii aad ku hadasho Soomaali, waaxda luqadaha, qaybta kaalmada adeegyada, rimma gunn . So [facility] [phone].    ATENCIÓN: Si habla español, tiene a rodriguez disposición servicios gratuitos de asistencia lingüística. [facility] [phone].    We comply with applicable federal civil rights laws and [place] laws. We do not discriminate on the basis of race, color, national origin, age, disability sex, sexual orientation or gender identity.            Thank you!     Thank you for choosing [facility] PREOPER[facility]  for your care. Our goal is always to provide you with excellent care. Hearing back from our patients is one way we can continue to improve our services. Please take a few minutes to complete the written survey that you may receive in the mail after your visit with us. Thank you!             Your Updated Medication List - Protect others around  you: Learn how to safely use, store and throw away your medicines at www.disposemymeds.org.          This list is accurate as of: 8/24/17 11:43 AM.  Always use your most recent med list.                   Brand Name Dispense Instructions for use Diagnosis    ACETAMINOPHEN PO      Take 1,000 mg by mouth every 6 hours as needed for pain        amylase-lipase-protease 53582 UNITS Tabs tablet    VIOKACE    450 tablet    Take 1-5 with snacks and meals, up to 15 per day.    Recurrent acute pancreatitis       aspirin 81 MG tablet      Take 81 mg by mouth daily Holding        CELEBREX PO      Take 200 mg by mouth daily        enoxaparin 80 MG/0.8ML injection    LOVENOX     Inject 80 mg Subcutaneous every 12 hours Will hold 8/27/17pm and 8/28/17 am        fenofibrate 145 MG tablet      Take 145 mg by mouth daily        fentaNYL 50 mcg/hr 72 hr patch    DURAGESIC     Place 1 patch onto the skin every 72 hours Changed 8/24/17        FINASTERIDE PO      Take 5 mg by mouth daily        HYDROMORPHONE HCL PO      Take 2 mg by mouth 3 times daily as needed for moderate to severe pain 2-4 tabs per day        levothyroxine 150 MCG tablet    SYNTHROID/LEVOTHROID     Take 150 mcg by mouth daily        lisinopril 10 MG tablet    PRINIVIL/ZESTRIL     Take 10 mg by mouth daily        MULTIVITAMINS Chew      Take 1 chew tab by mouth daily        omeprazole 20 MG CR capsule    priLOSEC     Take 20 mg by mouth 2 times daily as needed        ondansetron 4 MG ODT tab    ZOFRAN-ODT     Take 4 mg by mouth every 8 hours as needed for nausea        pravastatin 40 MG tablet    PRAVACHOL     Take 40 mg by mouth daily        PREDNISONE PO      Take 5 mg by mouth daily        sertraline 50 MG tablet    ZOLOFT     Take 50 mg by mouth daily        TAMSULOSIN HCL PO      Take 0.4 mg by mouth daily        WARFARIN SODIUM PO      Holding startin 8/23/17

## 2017-08-25 NOTE — PROGRESS NOTES
2017         Cyril Mackay MD   [facility]   [address] [place]  [address]      RE: Lucas Brown   MRN: [id number]   : 1960      Dear Dr. Mackay:      I had the pleasure of seeing your patient Mr. Lucas Brown in Surgery Clinic today.  As you know, he is scheduled for a Whipple procedure next Monday for his chronic pancreatitis.  I spent the majority of my 25 minute visit discussing the planned procedure with the patient and his wife, going over preoperative instructions and answering their questions. We plan on a Whipple procedure on Monday with a G-tube and a J-tube.      I certainly appreciate the chance to care for this gentleman and will keep you informed as to his progress from here.  Visit time 25 minutes, nearly all counseling.      Sincerely,      MD BABAR Andres MD             D: 2017 18:24   T: 2017 09:56   MT: JUHI      Name:     LUCAS BROWN   MRN:      [id number]        Account:      [id number]   :      1960           Service Date: 2017      Document: [id number]

## 2017-08-27 LAB
A-TOCOPHEROL VIT E SERPL-MCNC: 12.7 MG/L (ref 5.5–18)
ANNOTATION COMMENT IMP: NORMAL
BETA+GAMMA TOCOPHEROL SERPL-MCNC: 3.8 MG/L (ref 0–6)
RETINYL PALMITATE SERPL-MCNC: <0.02 MG/L (ref 0–0.1)
VIT A SERPL-MCNC: 0.61 MG/L (ref 0.3–1.2)

## 2017-08-28 ENCOUNTER — ALLIED HEALTH/NURSE VISIT (OUTPATIENT)
Dept: CARDIOLOGY | Facility: CLINIC | Age: 57
DRG: 406 | End: 2017-08-28
Attending: INTERNAL MEDICINE
Payer: COMMERCIAL

## 2017-08-28 ENCOUNTER — ANESTHESIA (OUTPATIENT)
Dept: SURGERY | Facility: CLINIC | Age: 57
DRG: 406 | End: 2017-08-28
Payer: COMMERCIAL

## 2017-08-28 ENCOUNTER — APPOINTMENT (OUTPATIENT)
Dept: GENERAL RADIOLOGY | Facility: CLINIC | Age: 57
DRG: 406 | End: 2017-08-28
Attending: ANESTHESIOLOGY
Payer: COMMERCIAL

## 2017-08-28 ENCOUNTER — HOSPITAL ENCOUNTER (INPATIENT)
Facility: CLINIC | Age: 57
LOS: 16 days | Discharge: HOME HEALTH | DRG: 406 | End: 2017-09-13
Attending: SURGERY | Admitting: SURGERY
Payer: COMMERCIAL

## 2017-08-28 DIAGNOSIS — K86.1 CHRONIC PANCREATITIS, UNSPECIFIED PANCREATITIS TYPE (H): ICD-10-CM

## 2017-08-28 DIAGNOSIS — I44.2 ATRIOVENTRICULAR BLOCK, COMPLETE (H): Primary | ICD-10-CM

## 2017-08-28 DIAGNOSIS — Z95.2 H/O AORTIC VALVE REPLACEMENT: Primary | ICD-10-CM

## 2017-08-28 DIAGNOSIS — G89.18 ACUTE POST-OPERATIVE PAIN: ICD-10-CM

## 2017-08-28 LAB
ABO + RH BLD: NORMAL
ABO + RH BLD: NORMAL
ALBUMIN SERPL-MCNC: 3.2 G/DL (ref 3.4–5)
ALP SERPL-CCNC: 28 U/L (ref 40–150)
ALT SERPL W P-5'-P-CCNC: 30 U/L (ref 0–70)
ANION GAP SERPL CALCULATED.3IONS-SCNC: 8 MMOL/L (ref 3–14)
APTT PPP: 29 SEC (ref 22–37)
APTT PPP: 31 SEC (ref 22–37)
AST SERPL W P-5'-P-CCNC: 39 U/L (ref 0–45)
BASE DEFICIT BLDA-SCNC: 1 MMOL/L
BILIRUB SERPL-MCNC: 0.8 MG/DL (ref 0.2–1.3)
BLD GP AB SCN SERPL QL: NORMAL
BLD PROD TYP BPU: NORMAL
BLD UNIT ID BPU: 0
BLD UNIT ID BPU: 0
BLOOD BANK CMNT PATIENT-IMP: NORMAL
BLOOD BANK CMNT PATIENT-IMP: NORMAL
BLOOD PRODUCT CODE: NORMAL
BLOOD PRODUCT CODE: NORMAL
BPU ID: NORMAL
BPU ID: NORMAL
BUN SERPL-MCNC: 13 MG/DL (ref 7–30)
CALCIUM SERPL-MCNC: 7.4 MG/DL (ref 8.5–10.1)
CHLORIDE SERPL-SCNC: 110 MMOL/L (ref 94–109)
CO2 SERPL-SCNC: 27 MMOL/L (ref 20–32)
CREAT SERPL-MCNC: 0.99 MG/DL (ref 0.66–1.25)
DEPRECATED CALCIDIOL+CALCIFEROL SERPL-MC: <23 UG/L (ref 20–75)
ERYTHROCYTE [DISTWIDTH] IN BLOOD BY AUTOMATED COUNT: 16.2 % (ref 10–15)
FIBRINOGEN PPP-MCNC: 328 MG/DL (ref 200–420)
GFR SERPL CREATININE-BSD FRML MDRD: 78 ML/MIN/1.7M2
GLUCOSE BLDC GLUCOMTR-MCNC: 130 MG/DL (ref 70–99)
GLUCOSE BLDC GLUCOMTR-MCNC: 131 MG/DL (ref 70–99)
GLUCOSE BLDC GLUCOMTR-MCNC: 134 MG/DL (ref 70–99)
GLUCOSE SERPL-MCNC: 122 MG/DL (ref 70–99)
HCO3 BLD-SCNC: 25 MMOL/L (ref 21–28)
HCT VFR BLD AUTO: 29.7 % (ref 40–53)
HGB BLD-MCNC: 9.6 G/DL (ref 13.3–17.7)
HGB BLD-MCNC: 9.6 G/DL (ref 13.3–17.7)
INR PPP: 1.15 (ref 0.86–1.14)
INR PPP: 1.26 (ref 0.86–1.14)
LMWH PPP CHRO-ACNC: 0.16 IU/ML
MCH RBC QN AUTO: 26.2 PG (ref 26.5–33)
MCHC RBC AUTO-ENTMCNC: 32.3 G/DL (ref 31.5–36.5)
MCV RBC AUTO: 81 FL (ref 78–100)
MRSA DNA SPEC QL NAA+PROBE: NEGATIVE
NUM BPU REQUESTED: 2
O2/TOTAL GAS SETTING VFR VENT: ABNORMAL %
PCO2 BLD: 44 MM HG (ref 35–45)
PH BLD: 7.36 PH (ref 7.35–7.45)
PLATELET # BLD AUTO: 166 10E9/L (ref 150–450)
PO2 BLD: 156 MM HG (ref 80–105)
POTASSIUM SERPL-SCNC: 3.8 MMOL/L (ref 3.4–5.3)
POTASSIUM SERPL-SCNC: 4.3 MMOL/L (ref 3.4–5.3)
PROT SERPL-MCNC: 5.7 G/DL (ref 6.8–8.8)
RBC # BLD AUTO: 3.67 10E12/L (ref 4.4–5.9)
SODIUM SERPL-SCNC: 145 MMOL/L (ref 133–144)
SPECIMEN EXP DATE BLD: NORMAL
SPECIMEN SOURCE: NORMAL
TRANSFUSION STATUS PATIENT QL: NORMAL
VITAMIN D2 SERPL-MCNC: <5 UG/L
VITAMIN D3 SERPL-MCNC: 18 UG/L
WBC # BLD AUTO: 6.4 10E9/L (ref 4–11)

## 2017-08-28 PROCEDURE — 25000128 H RX IP 250 OP 636: Performed by: STUDENT IN AN ORGANIZED HEALTH CARE EDUCATION/TRAINING PROGRAM

## 2017-08-28 PROCEDURE — 87641 MR-STAPH DNA AMP PROBE: CPT | Performed by: SURGERY

## 2017-08-28 PROCEDURE — 25000125 ZZHC RX 250: Performed by: ANESTHESIOLOGY

## 2017-08-28 PROCEDURE — 27110038 ZZH RX 271: Performed by: ANESTHESIOLOGY

## 2017-08-28 PROCEDURE — 88341 IMHCHEM/IMCYTCHM EA ADD ANTB: CPT | Performed by: SURGERY

## 2017-08-28 PROCEDURE — 88307 TISSUE EXAM BY PATHOLOGIST: CPT | Performed by: SURGERY

## 2017-08-28 PROCEDURE — 36415 COLL VENOUS BLD VENIPUNCTURE: CPT | Performed by: ANESTHESIOLOGY

## 2017-08-28 PROCEDURE — 85610 PROTHROMBIN TIME: CPT | Performed by: ANESTHESIOLOGY

## 2017-08-28 PROCEDURE — 36000068 ZZH SURGERY LEVEL 5 1ST 30 MIN - UMMC: Performed by: SURGERY

## 2017-08-28 PROCEDURE — 40000196 ZZH STATISTIC RAPCV CVP MONITORING

## 2017-08-28 PROCEDURE — 0FBG0ZZ EXCISION OF PANCREAS, OPEN APPROACH: ICD-10-PCS | Performed by: SURGERY

## 2017-08-28 PROCEDURE — 85610 PROTHROMBIN TIME: CPT | Performed by: SURGERY

## 2017-08-28 PROCEDURE — 85384 FIBRINOGEN ACTIVITY: CPT | Performed by: SURGERY

## 2017-08-28 PROCEDURE — 40000275 ZZH STATISTIC RCP TIME EA 10 MIN

## 2017-08-28 PROCEDURE — 40000170 ZZH STATISTIC PRE-PROCEDURE ASSESSMENT II: Performed by: SURGERY

## 2017-08-28 PROCEDURE — 25000125 ZZHC RX 250: Performed by: SURGERY

## 2017-08-28 PROCEDURE — 93286 PERI-PX EVAL PM/LDLS PM IP: CPT | Mod: ZF

## 2017-08-28 PROCEDURE — 27210794 ZZH OR GENERAL SUPPLY STERILE: Performed by: SURGERY

## 2017-08-28 PROCEDURE — 99223 1ST HOSP IP/OBS HIGH 75: CPT | Mod: GC | Performed by: INTERNAL MEDICINE

## 2017-08-28 PROCEDURE — 88312 SPECIAL STAINS GROUP 1: CPT | Performed by: SURGERY

## 2017-08-28 PROCEDURE — C9399 UNCLASSIFIED DRUGS OR BIOLOG: HCPCS | Performed by: ANESTHESIOLOGY

## 2017-08-28 PROCEDURE — 71000016 ZZH RECOVERY PHASE 1 LEVEL 3 FIRST HR: Performed by: SURGERY

## 2017-08-28 PROCEDURE — 37000008 ZZH ANESTHESIA TECHNICAL FEE, 1ST 30 MIN: Performed by: SURGERY

## 2017-08-28 PROCEDURE — 93287 PERI-PX DEVICE EVAL & PRGR: CPT | Mod: 26 | Performed by: INTERNAL MEDICINE

## 2017-08-28 PROCEDURE — 25000128 H RX IP 250 OP 636: Performed by: SURGERY

## 2017-08-28 PROCEDURE — P9041 ALBUMIN (HUMAN),5%, 50ML: HCPCS | Performed by: ANESTHESIOLOGY

## 2017-08-28 PROCEDURE — 84132 ASSAY OF SERUM POTASSIUM: CPT | Performed by: ANESTHESIOLOGY

## 2017-08-28 PROCEDURE — 87640 STAPH A DNA AMP PROBE: CPT | Performed by: SURGERY

## 2017-08-28 PROCEDURE — 80053 COMPREHEN METABOLIC PANEL: CPT | Performed by: SURGERY

## 2017-08-28 PROCEDURE — 85730 THROMBOPLASTIN TIME PARTIAL: CPT | Performed by: ANESTHESIOLOGY

## 2017-08-28 PROCEDURE — 40000986 XR CHEST PORT 1 VW

## 2017-08-28 PROCEDURE — 88342 IMHCHEM/IMCYTCHM 1ST ANTB: CPT | Performed by: SURGERY

## 2017-08-28 PROCEDURE — 40000014 ZZH STATISTIC ARTERIAL MONITORING DAILY

## 2017-08-28 PROCEDURE — 88309 TISSUE EXAM BY PATHOLOGIST: CPT | Performed by: SURGERY

## 2017-08-28 PROCEDURE — 37000009 ZZH ANESTHESIA TECHNICAL FEE, EACH ADDTL 15 MIN: Performed by: SURGERY

## 2017-08-28 PROCEDURE — 25000132 ZZH RX MED GY IP 250 OP 250 PS 637: Performed by: SURGERY

## 2017-08-28 PROCEDURE — 25000132 ZZH RX MED GY IP 250 OP 250 PS 637: Performed by: STUDENT IN AN ORGANIZED HEALTH CARE EDUCATION/TRAINING PROGRAM

## 2017-08-28 PROCEDURE — 85018 HEMOGLOBIN: CPT | Performed by: SURGERY

## 2017-08-28 PROCEDURE — 85730 THROMBOPLASTIN TIME PARTIAL: CPT | Performed by: SURGERY

## 2017-08-28 PROCEDURE — 0DB90ZZ EXCISION OF DUODENUM, OPEN APPROACH: ICD-10-PCS | Performed by: SURGERY

## 2017-08-28 PROCEDURE — 40000065 ZZH STATISTIC EKG NON-CHARGEABLE

## 2017-08-28 PROCEDURE — 82803 BLOOD GASES ANY COMBINATION: CPT | Performed by: SURGERY

## 2017-08-28 PROCEDURE — 00000146 ZZHCL STATISTIC GLUCOSE BY METER IP

## 2017-08-28 PROCEDURE — 0DHA3UZ INSERTION OF FEEDING DEVICE INTO JEJUNUM, PERCUTANEOUS APPROACH: ICD-10-PCS | Performed by: SURGERY

## 2017-08-28 PROCEDURE — 25000566 ZZH SEVOFLURANE, EA 15 MIN: Performed by: SURGERY

## 2017-08-28 PROCEDURE — 20000004 ZZH R&B ICU UMMC

## 2017-08-28 PROCEDURE — 71000017 ZZH RECOVERY PHASE 1 LEVEL 3 EA ADDTL HR: Performed by: SURGERY

## 2017-08-28 PROCEDURE — 93010 ELECTROCARDIOGRAM REPORT: CPT | Mod: 59 | Performed by: INTERNAL MEDICINE

## 2017-08-28 PROCEDURE — 25000128 H RX IP 250 OP 636: Performed by: ANESTHESIOLOGY

## 2017-08-28 PROCEDURE — 93286 PERI-PX EVAL PM/LDLS PM IP: CPT | Mod: 26 | Performed by: INTERNAL MEDICINE

## 2017-08-28 PROCEDURE — 0DTJ0ZZ RESECTION OF APPENDIX, OPEN APPROACH: ICD-10-PCS | Performed by: SURGERY

## 2017-08-28 PROCEDURE — P9016 RBC LEUKOCYTES REDUCED: HCPCS | Performed by: SURGERY

## 2017-08-28 PROCEDURE — 88302 TISSUE EXAM BY PATHOLOGIST: CPT | Performed by: SURGERY

## 2017-08-28 PROCEDURE — 36000070 ZZH SURGERY LEVEL 5 EA 15 ADDTL MIN - UMMC: Performed by: SURGERY

## 2017-08-28 PROCEDURE — 0WQF0ZZ REPAIR ABDOMINAL WALL, OPEN APPROACH: ICD-10-PCS | Performed by: SURGERY

## 2017-08-28 PROCEDURE — 85027 COMPLETE CBC AUTOMATED: CPT | Performed by: SURGERY

## 2017-08-28 PROCEDURE — 88304 TISSUE EXAM BY PATHOLOGIST: CPT | Performed by: SURGERY

## 2017-08-28 PROCEDURE — 85520 HEPARIN ASSAY: CPT | Performed by: ANESTHESIOLOGY

## 2017-08-28 RX ORDER — ACETAMINOPHEN 325 MG/1
975 TABLET ORAL ONCE
Status: COMPLETED | OUTPATIENT
Start: 2017-08-28 | End: 2017-08-28

## 2017-08-28 RX ORDER — NICOTINE POLACRILEX 4 MG
15-30 LOZENGE BUCCAL
Status: DISCONTINUED | OUTPATIENT
Start: 2017-08-28 | End: 2017-09-13 | Stop reason: HOSPADM

## 2017-08-28 RX ORDER — POTASSIUM CHLORIDE 7.45 MG/ML
10 INJECTION INTRAVENOUS
Status: DISCONTINUED | OUTPATIENT
Start: 2017-08-28 | End: 2017-09-13 | Stop reason: HOSPADM

## 2017-08-28 RX ORDER — LIDOCAINE HYDROCHLORIDE AND EPINEPHRINE 15; 5 MG/ML; UG/ML
INJECTION, SOLUTION EPIDURAL PRN
Status: DISCONTINUED | OUTPATIENT
Start: 2017-08-28 | End: 2017-08-28

## 2017-08-28 RX ORDER — CYCLOBENZAPRINE HCL 5 MG
10 TABLET ORAL 3 TIMES DAILY PRN
Status: DISCONTINUED | OUTPATIENT
Start: 2017-08-28 | End: 2017-09-04

## 2017-08-28 RX ORDER — ONDANSETRON 4 MG/1
4 TABLET, ORALLY DISINTEGRATING ORAL EVERY 30 MIN PRN
Status: DISCONTINUED | OUTPATIENT
Start: 2017-08-28 | End: 2017-08-28 | Stop reason: HOSPADM

## 2017-08-28 RX ORDER — SERTRALINE HYDROCHLORIDE 20 MG/ML
50 SOLUTION ORAL DAILY
Status: DISCONTINUED | OUTPATIENT
Start: 2017-08-28 | End: 2017-09-13 | Stop reason: HOSPADM

## 2017-08-28 RX ORDER — DEXTROSE, SODIUM CHLORIDE, SODIUM LACTATE, POTASSIUM CHLORIDE, AND CALCIUM CHLORIDE 5; .6; .31; .03; .02 G/100ML; G/100ML; G/100ML; G/100ML; G/100ML
INJECTION, SOLUTION INTRAVENOUS CONTINUOUS
Status: DISCONTINUED | OUTPATIENT
Start: 2017-08-28 | End: 2017-09-01

## 2017-08-28 RX ORDER — ONDANSETRON 2 MG/ML
4 INJECTION INTRAMUSCULAR; INTRAVENOUS EVERY 30 MIN PRN
Status: DISCONTINUED | OUTPATIENT
Start: 2017-08-28 | End: 2017-08-28 | Stop reason: HOSPADM

## 2017-08-28 RX ORDER — FENTANYL CITRATE 50 UG/ML
25-50 INJECTION, SOLUTION INTRAMUSCULAR; INTRAVENOUS
Status: DISCONTINUED | OUTPATIENT
Start: 2017-08-28 | End: 2017-08-28 | Stop reason: HOSPADM

## 2017-08-28 RX ORDER — SODIUM CHLORIDE, SODIUM LACTATE, POTASSIUM CHLORIDE, CALCIUM CHLORIDE 600; 310; 30; 20 MG/100ML; MG/100ML; MG/100ML; MG/100ML
INJECTION, SOLUTION INTRAVENOUS CONTINUOUS
Status: DISCONTINUED | OUTPATIENT
Start: 2017-08-28 | End: 2017-08-28 | Stop reason: HOSPADM

## 2017-08-28 RX ORDER — LIDOCAINE HYDROCHLORIDE 20 MG/ML
INJECTION, SOLUTION INFILTRATION; PERINEURAL PRN
Status: DISCONTINUED | OUTPATIENT
Start: 2017-08-28 | End: 2017-08-28

## 2017-08-28 RX ORDER — FENTANYL CITRATE 50 UG/ML
INJECTION, SOLUTION INTRAMUSCULAR; INTRAVENOUS PRN
Status: DISCONTINUED | OUTPATIENT
Start: 2017-08-28 | End: 2017-08-28

## 2017-08-28 RX ORDER — POTASSIUM CHLORIDE 750 MG/1
20-40 TABLET, EXTENDED RELEASE ORAL
Status: DISCONTINUED | OUTPATIENT
Start: 2017-08-28 | End: 2017-09-13 | Stop reason: HOSPADM

## 2017-08-28 RX ORDER — POTASSIUM CHLORIDE 29.8 MG/ML
20 INJECTION INTRAVENOUS
Status: DISCONTINUED | OUTPATIENT
Start: 2017-08-28 | End: 2017-09-06 | Stop reason: RX

## 2017-08-28 RX ORDER — NALOXONE HYDROCHLORIDE 0.4 MG/ML
.1-.4 INJECTION, SOLUTION INTRAMUSCULAR; INTRAVENOUS; SUBCUTANEOUS
Status: DISCONTINUED | OUTPATIENT
Start: 2017-08-28 | End: 2017-08-28 | Stop reason: HOSPADM

## 2017-08-28 RX ORDER — ERTAPENEM 1 G/1
1 INJECTION, POWDER, LYOPHILIZED, FOR SOLUTION INTRAMUSCULAR; INTRAVENOUS
Status: COMPLETED | OUTPATIENT
Start: 2017-08-28 | End: 2017-08-28

## 2017-08-28 RX ORDER — DEXTROSE MONOHYDRATE 25 G/50ML
25-50 INJECTION, SOLUTION INTRAVENOUS
Status: DISCONTINUED | OUTPATIENT
Start: 2017-08-28 | End: 2017-09-13 | Stop reason: HOSPADM

## 2017-08-28 RX ORDER — PROCHLORPERAZINE MALEATE 5 MG
5-10 TABLET ORAL EVERY 6 HOURS PRN
Status: DISCONTINUED | OUTPATIENT
Start: 2017-08-28 | End: 2017-09-13 | Stop reason: HOSPADM

## 2017-08-28 RX ORDER — ONDANSETRON 4 MG/1
4 TABLET, ORALLY DISINTEGRATING ORAL EVERY 6 HOURS PRN
Status: DISCONTINUED | OUTPATIENT
Start: 2017-08-28 | End: 2017-09-13 | Stop reason: HOSPADM

## 2017-08-28 RX ORDER — PRAVASTATIN SODIUM 40 MG
40 TABLET ORAL DAILY
Status: DISCONTINUED | OUTPATIENT
Start: 2017-08-28 | End: 2017-09-04

## 2017-08-28 RX ORDER — ERTAPENEM 1 G/1
1 INJECTION, POWDER, LYOPHILIZED, FOR SOLUTION INTRAMUSCULAR; INTRAVENOUS ONCE
Status: COMPLETED | OUTPATIENT
Start: 2017-08-28 | End: 2017-08-28

## 2017-08-28 RX ORDER — PROPOFOL 10 MG/ML
INJECTION, EMULSION INTRAVENOUS PRN
Status: DISCONTINUED | OUTPATIENT
Start: 2017-08-28 | End: 2017-08-28

## 2017-08-28 RX ORDER — ONDANSETRON 2 MG/ML
4 INJECTION INTRAMUSCULAR; INTRAVENOUS EVERY 6 HOURS PRN
Status: DISCONTINUED | OUTPATIENT
Start: 2017-08-28 | End: 2017-09-13 | Stop reason: HOSPADM

## 2017-08-28 RX ORDER — GABAPENTIN 250 MG/5ML
300 SOLUTION ORAL 2 TIMES DAILY
Status: DISCONTINUED | OUTPATIENT
Start: 2017-08-29 | End: 2017-09-04

## 2017-08-28 RX ORDER — SODIUM CHLORIDE 9 MG/ML
INJECTION, SOLUTION INTRAVENOUS CONTINUOUS PRN
Status: DISCONTINUED | OUTPATIENT
Start: 2017-08-28 | End: 2017-08-28

## 2017-08-28 RX ORDER — LIDOCAINE 50 MG/G
1 PATCH TOPICAL
Status: DISCONTINUED | OUTPATIENT
Start: 2017-08-28 | End: 2017-08-29

## 2017-08-28 RX ORDER — SODIUM CHLORIDE, SODIUM LACTATE, POTASSIUM CHLORIDE, CALCIUM CHLORIDE 600; 310; 30; 20 MG/100ML; MG/100ML; MG/100ML; MG/100ML
INJECTION, SOLUTION INTRAVENOUS CONTINUOUS PRN
Status: DISCONTINUED | OUTPATIENT
Start: 2017-08-28 | End: 2017-08-28

## 2017-08-28 RX ORDER — FLUMAZENIL 0.1 MG/ML
0.2 INJECTION, SOLUTION INTRAVENOUS
Status: DISCONTINUED | OUTPATIENT
Start: 2017-08-28 | End: 2017-08-28 | Stop reason: HOSPADM

## 2017-08-28 RX ORDER — NALOXONE HYDROCHLORIDE 0.4 MG/ML
.1-.4 INJECTION, SOLUTION INTRAMUSCULAR; INTRAVENOUS; SUBCUTANEOUS
Status: DISCONTINUED | OUTPATIENT
Start: 2017-08-28 | End: 2017-09-13 | Stop reason: HOSPADM

## 2017-08-28 RX ORDER — POTASSIUM CHLORIDE 1.5 G/1.58G
20-40 POWDER, FOR SOLUTION ORAL
Status: DISCONTINUED | OUTPATIENT
Start: 2017-08-28 | End: 2017-09-13 | Stop reason: HOSPADM

## 2017-08-28 RX ORDER — HYDROMORPHONE HYDROCHLORIDE 1 MG/ML
.3-.5 INJECTION, SOLUTION INTRAMUSCULAR; INTRAVENOUS; SUBCUTANEOUS EVERY 5 MIN PRN
Status: DISCONTINUED | OUTPATIENT
Start: 2017-08-28 | End: 2017-08-28 | Stop reason: HOSPADM

## 2017-08-28 RX ORDER — EPHEDRINE SULFATE 50 MG/ML
INJECTION, SOLUTION INTRAMUSCULAR; INTRAVENOUS; SUBCUTANEOUS PRN
Status: DISCONTINUED | OUTPATIENT
Start: 2017-08-28 | End: 2017-08-28

## 2017-08-28 RX ORDER — PREDNISONE 5 MG/ML
5 SOLUTION ORAL DAILY
Status: DISCONTINUED | OUTPATIENT
Start: 2017-09-01 | End: 2017-09-13 | Stop reason: HOSPADM

## 2017-08-28 RX ORDER — DEXMEDETOMIDINE HYDROCHLORIDE 4 UG/ML
0.2-0.7 INJECTION, SOLUTION INTRAVENOUS CONTINUOUS
Status: DISCONTINUED | OUTPATIENT
Start: 2017-08-28 | End: 2017-09-01

## 2017-08-28 RX ORDER — ONDANSETRON 2 MG/ML
INJECTION INTRAMUSCULAR; INTRAVENOUS PRN
Status: DISCONTINUED | OUTPATIENT
Start: 2017-08-28 | End: 2017-08-28

## 2017-08-28 RX ORDER — DEXMEDETOMIDINE HYDROCHLORIDE 4 UG/ML
0.2-1.2 INJECTION, SOLUTION INTRAVENOUS CONTINUOUS
Status: DISCONTINUED | OUTPATIENT
Start: 2017-08-28 | End: 2017-08-28 | Stop reason: HOSPADM

## 2017-08-28 RX ORDER — LEVOTHYROXINE SODIUM 150 UG/1
150 TABLET ORAL DAILY
Status: DISCONTINUED | OUTPATIENT
Start: 2017-08-28 | End: 2017-09-04

## 2017-08-28 RX ORDER — POTASSIUM CL/LIDO/0.9 % NACL 10MEQ/0.1L
10 INTRAVENOUS SOLUTION, PIGGYBACK (ML) INTRAVENOUS
Status: DISCONTINUED | OUTPATIENT
Start: 2017-08-28 | End: 2017-09-13 | Stop reason: HOSPADM

## 2017-08-28 RX ORDER — FENOFIBRATE 160 MG/1
160 TABLET ORAL DAILY
Status: DISCONTINUED | OUTPATIENT
Start: 2017-08-29 | End: 2017-09-04

## 2017-08-28 RX ORDER — ALBUMIN HUMAN 5 %
INTRAVENOUS SOLUTION INTRAVENOUS PRN
Status: DISCONTINUED | OUTPATIENT
Start: 2017-08-28 | End: 2017-08-28

## 2017-08-28 RX ORDER — MAGNESIUM SULFATE HEPTAHYDRATE 40 MG/ML
4 INJECTION, SOLUTION INTRAVENOUS EVERY 4 HOURS PRN
Status: DISCONTINUED | OUTPATIENT
Start: 2017-08-28 | End: 2017-09-13 | Stop reason: HOSPADM

## 2017-08-28 RX ORDER — LIDOCAINE 40 MG/G
CREAM TOPICAL
Status: DISCONTINUED | OUTPATIENT
Start: 2017-08-28 | End: 2017-09-05

## 2017-08-28 RX ORDER — ERTAPENEM 1 G/1
1 INJECTION, POWDER, LYOPHILIZED, FOR SOLUTION INTRAMUSCULAR; INTRAVENOUS EVERY 8 HOURS PRN
Status: DISCONTINUED | OUTPATIENT
Start: 2017-08-28 | End: 2017-08-28 | Stop reason: HOSPADM

## 2017-08-28 RX ADMIN — FENTANYL CITRATE 50 MCG: 50 INJECTION, SOLUTION INTRAMUSCULAR; INTRAVENOUS at 08:55

## 2017-08-28 RX ADMIN — ROCURONIUM BROMIDE 20 MG: 10 INJECTION INTRAVENOUS at 11:56

## 2017-08-28 RX ADMIN — LIDOCAINE HYDROCHLORIDE 80 MG: 20 INJECTION, SOLUTION INFILTRATION; PERINEURAL at 07:45

## 2017-08-28 RX ADMIN — ROCURONIUM BROMIDE 10 MG: 10 INJECTION INTRAVENOUS at 14:05

## 2017-08-28 RX ADMIN — SERTRALINE HYDROCHLORIDE 50 MG: 20 SOLUTION ORAL at 21:23

## 2017-08-28 RX ADMIN — Medication: at 16:07

## 2017-08-28 RX ADMIN — ACETAMINOPHEN 975 MG: 325 TABLET, FILM COATED ORAL at 05:51

## 2017-08-28 RX ADMIN — ONDANSETRON 4 MG: 2 INJECTION INTRAMUSCULAR; INTRAVENOUS at 14:13

## 2017-08-28 RX ADMIN — SODIUM CHLORIDE: 9 INJECTION, SOLUTION INTRAVENOUS at 08:18

## 2017-08-28 RX ADMIN — PHENYLEPHRINE HYDROCHLORIDE 25 MCG: 10 INJECTION, SOLUTION INTRAMUSCULAR; INTRAVENOUS; SUBCUTANEOUS at 11:12

## 2017-08-28 RX ADMIN — ALBUMIN HUMAN 250 ML: 50 SOLUTION INTRAVENOUS at 11:54

## 2017-08-28 RX ADMIN — LIDOCAINE 1 PATCH: 50 PATCH CUTANEOUS at 20:49

## 2017-08-28 RX ADMIN — PHENYLEPHRINE HYDROCHLORIDE 0.2 MCG/KG/MIN: 10 INJECTION, SOLUTION INTRAMUSCULAR; INTRAVENOUS; SUBCUTANEOUS at 13:22

## 2017-08-28 RX ADMIN — DEXMEDETOMIDINE HYDROCHLORIDE 0.3 MCG/KG/HR: 4 INJECTION, SOLUTION INTRAVENOUS at 12:00

## 2017-08-28 RX ADMIN — SUGAMMADEX 200 MG: 100 INJECTION, SOLUTION INTRAVENOUS at 14:38

## 2017-08-28 RX ADMIN — HYDROMORPHONE HYDROCHLORIDE 0.5 MG: 1 INJECTION, SOLUTION INTRAMUSCULAR; INTRAVENOUS; SUBCUTANEOUS at 14:10

## 2017-08-28 RX ADMIN — LEVOTHYROXINE SODIUM 150 MCG: 150 TABLET ORAL at 20:49

## 2017-08-28 RX ADMIN — ROCURONIUM BROMIDE 10 MG: 10 INJECTION INTRAVENOUS at 12:25

## 2017-08-28 RX ADMIN — ALBUMIN HUMAN 250 ML: 50 SOLUTION INTRAVENOUS at 13:54

## 2017-08-28 RX ADMIN — DEXMEDETOMIDINE HYDROCHLORIDE 0.3 MCG/KG/HR: 4 INJECTION, SOLUTION INTRAVENOUS at 20:50

## 2017-08-28 RX ADMIN — PRAVASTATIN SODIUM 40 MG: 40 TABLET ORAL at 20:48

## 2017-08-28 RX ADMIN — PHENYLEPHRINE HYDROCHLORIDE 100 MCG: 10 INJECTION, SOLUTION INTRAMUSCULAR; INTRAVENOUS; SUBCUTANEOUS at 11:53

## 2017-08-28 RX ADMIN — HYDROCORTISONE SODIUM SUCCINATE 100 MG: 100 INJECTION, POWDER, FOR SOLUTION INTRAMUSCULAR; INTRAVENOUS at 08:35

## 2017-08-28 RX ADMIN — ROCURONIUM BROMIDE 20 MG: 10 INJECTION INTRAVENOUS at 13:03

## 2017-08-28 RX ADMIN — SODIUM CHLORIDE, POTASSIUM CHLORIDE, SODIUM LACTATE AND CALCIUM CHLORIDE: 600; 310; 30; 20 INJECTION, SOLUTION INTRAVENOUS at 07:37

## 2017-08-28 RX ADMIN — ROCURONIUM BROMIDE 10 MG: 10 INJECTION INTRAVENOUS at 11:11

## 2017-08-28 RX ADMIN — RANITIDINE HYDROCHLORIDE 50 MG: 25 INJECTION INTRAMUSCULAR; INTRAVENOUS at 20:48

## 2017-08-28 RX ADMIN — SODIUM CHLORIDE, SODIUM LACTATE, POTASSIUM CHLORIDE, CALCIUM CHLORIDE AND DEXTROSE MONOHYDRATE 125 ML/HR: 5; 600; 310; 30; 20 INJECTION, SOLUTION INTRAVENOUS at 16:20

## 2017-08-28 RX ADMIN — ROCURONIUM BROMIDE 50 MG: 10 INJECTION INTRAVENOUS at 07:48

## 2017-08-28 RX ADMIN — PROPOFOL 160 MG: 10 INJECTION, EMULSION INTRAVENOUS at 07:45

## 2017-08-28 RX ADMIN — FENTANYL CITRATE 50 MCG: 50 INJECTION, SOLUTION INTRAMUSCULAR; INTRAVENOUS at 14:59

## 2017-08-28 RX ADMIN — FENTANYL CITRATE 50 MCG: 50 INJECTION, SOLUTION INTRAMUSCULAR; INTRAVENOUS at 14:55

## 2017-08-28 RX ADMIN — FENTANYL CITRATE 150 MCG: 50 INJECTION, SOLUTION INTRAMUSCULAR; INTRAVENOUS at 07:45

## 2017-08-28 RX ADMIN — PHENYLEPHRINE HYDROCHLORIDE 50 MCG: 10 INJECTION, SOLUTION INTRAMUSCULAR; INTRAVENOUS; SUBCUTANEOUS at 12:29

## 2017-08-28 RX ADMIN — SODIUM CHLORIDE, POTASSIUM CHLORIDE, SODIUM LACTATE AND CALCIUM CHLORIDE 1000 ML: 600; 310; 30; 20 INJECTION, SOLUTION INTRAVENOUS at 22:58

## 2017-08-28 RX ADMIN — ERTAPENEM SODIUM 1 G: 1 INJECTION, POWDER, LYOPHILIZED, FOR SOLUTION INTRAMUSCULAR; INTRAVENOUS at 14:07

## 2017-08-28 RX ADMIN — MIDAZOLAM 1 MG: 1 INJECTION INTRAMUSCULAR; INTRAVENOUS at 07:03

## 2017-08-28 RX ADMIN — DEXMEDETOMIDINE HYDROCHLORIDE 0.4 MCG/KG/HR: 4 INJECTION, SOLUTION INTRAVENOUS at 16:27

## 2017-08-28 RX ADMIN — HYDROMORPHONE HYDROCHLORIDE 0.5 MG: 1 INJECTION, SOLUTION INTRAMUSCULAR; INTRAVENOUS; SUBCUTANEOUS at 17:15

## 2017-08-28 RX ADMIN — FENTANYL CITRATE 50 MCG: 50 INJECTION, SOLUTION INTRAMUSCULAR; INTRAVENOUS at 08:53

## 2017-08-28 RX ADMIN — Medication 14 ML/HR: at 09:40

## 2017-08-28 RX ADMIN — LIDOCAINE HYDROCHLORIDE,EPINEPHRINE BITARTRATE 5 ML: 15; .005 INJECTION, SOLUTION EPIDURAL; INFILTRATION; INTRACAUDAL; PERINEURAL at 07:12

## 2017-08-28 RX ADMIN — FENTANYL CITRATE 50 MCG: 50 INJECTION, SOLUTION INTRAMUSCULAR; INTRAVENOUS at 15:20

## 2017-08-28 RX ADMIN — FENTANYL CITRATE 100 MCG: 50 INJECTION INTRAMUSCULAR; INTRAVENOUS at 07:03

## 2017-08-28 RX ADMIN — PHENYLEPHRINE HYDROCHLORIDE 50 MCG: 10 INJECTION, SOLUTION INTRAMUSCULAR; INTRAVENOUS; SUBCUTANEOUS at 13:41

## 2017-08-28 RX ADMIN — ROCURONIUM BROMIDE 30 MG: 10 INJECTION INTRAVENOUS at 09:32

## 2017-08-28 RX ADMIN — ROCURONIUM BROMIDE 20 MG: 10 INJECTION INTRAVENOUS at 08:30

## 2017-08-28 RX ADMIN — ONDANSETRON 4 MG: 2 INJECTION INTRAMUSCULAR; INTRAVENOUS at 19:29

## 2017-08-28 RX ADMIN — HYDROCORTISONE SODIUM SUCCINATE 50 MG: 100 INJECTION, POWDER, FOR SOLUTION INTRAMUSCULAR; INTRAVENOUS at 20:48

## 2017-08-28 RX ADMIN — FENTANYL CITRATE 50 MCG: 50 INJECTION, SOLUTION INTRAMUSCULAR; INTRAVENOUS at 15:57

## 2017-08-28 RX ADMIN — PHENYLEPHRINE HYDROCHLORIDE 100 MCG: 10 INJECTION, SOLUTION INTRAMUSCULAR; INTRAVENOUS; SUBCUTANEOUS at 13:00

## 2017-08-28 RX ADMIN — Medication: at 23:05

## 2017-08-28 RX ADMIN — ROCURONIUM BROMIDE 10 MG: 10 INJECTION INTRAVENOUS at 11:06

## 2017-08-28 RX ADMIN — ERTAPENEM SODIUM 1 G: 1 INJECTION, POWDER, LYOPHILIZED, FOR SOLUTION INTRAMUSCULAR; INTRAVENOUS at 21:23

## 2017-08-28 RX ADMIN — FENTANYL CITRATE 50 MCG: 50 INJECTION, SOLUTION INTRAMUSCULAR; INTRAVENOUS at 15:02

## 2017-08-28 RX ADMIN — ROCURONIUM BROMIDE 10 MG: 10 INJECTION INTRAVENOUS at 09:59

## 2017-08-28 RX ADMIN — ALBUMIN HUMAN 250 ML: 50 SOLUTION INTRAVENOUS at 12:11

## 2017-08-28 RX ADMIN — ERTAPENEM SODIUM 1 G: 1 INJECTION, POWDER, LYOPHILIZED, FOR SOLUTION INTRAMUSCULAR; INTRAVENOUS at 08:26

## 2017-08-28 RX ADMIN — FENTANYL CITRATE 50 MCG: 50 INJECTION, SOLUTION INTRAMUSCULAR; INTRAVENOUS at 08:31

## 2017-08-28 RX ADMIN — PHENYLEPHRINE HYDROCHLORIDE 100 MCG: 10 INJECTION, SOLUTION INTRAMUSCULAR; INTRAVENOUS; SUBCUTANEOUS at 13:09

## 2017-08-28 RX ADMIN — FENTANYL CITRATE 50 MCG: 50 INJECTION, SOLUTION INTRAMUSCULAR; INTRAVENOUS at 14:57

## 2017-08-28 RX ADMIN — HYDROMORPHONE HYDROCHLORIDE 0.5 MG: 1 INJECTION, SOLUTION INTRAMUSCULAR; INTRAVENOUS; SUBCUTANEOUS at 16:06

## 2017-08-28 RX ADMIN — Medication 5 MG: at 07:58

## 2017-08-28 RX ADMIN — SODIUM CHLORIDE, SODIUM LACTATE, POTASSIUM CHLORIDE, CALCIUM CHLORIDE AND DEXTROSE MONOHYDRATE: 5; 600; 310; 30; 20 INJECTION, SOLUTION INTRAVENOUS at 22:58

## 2017-08-28 RX ADMIN — ONDANSETRON 4 MG: 2 INJECTION INTRAMUSCULAR; INTRAVENOUS at 15:40

## 2017-08-28 RX ADMIN — HYDROMORPHONE HYDROCHLORIDE 0.5 MG: 1 INJECTION, SOLUTION INTRAMUSCULAR; INTRAVENOUS; SUBCUTANEOUS at 16:24

## 2017-08-28 RX ADMIN — DEXMEDETOMIDINE HYDROCHLORIDE 0.5 MCG/KG/HR: 4 INJECTION, SOLUTION INTRAVENOUS at 16:41

## 2017-08-28 RX ADMIN — ROCURONIUM BROMIDE 10 MG: 10 INJECTION INTRAVENOUS at 14:02

## 2017-08-28 RX ADMIN — ROCURONIUM BROMIDE 20 MG: 10 INJECTION INTRAVENOUS at 10:37

## 2017-08-28 ASSESSMENT — PAIN DESCRIPTION - DESCRIPTORS: DESCRIPTORS: SHARP

## 2017-08-28 NOTE — MR AVS SNAPSHOT
After Visit Summary   8/28/2017    Lucas Brown    MRN: [id number]           Patient Information     Date Of Birth          1960        Visit Information        Provider Department      8/28/2017 6:00 AM 1,  Cv Device [facility]        Today's Diagnoses     Atrioventricular block, complete (HCC)    -  1       Follow-ups after your visit        Who to contact     If you have questions or need follow up information about today's clinic visit or your schedule please contact [facility] directly at [phone].  Normal or non-critical lab and imaging results will be communicated to you by Swayhart, letter or phone within 4 business days after the clinic has received the results. If you do not hear from us within 7 days, please contact the clinic through [employer] or phone. If you have a critical or abnormal lab result, we will notify you by phone as soon as possible.  Submit refill requests through SWYF or call your pharmacy and they will forward the refill request to us. Please allow 3 business days for your refill to be completed.          Additional Information About Your Visit        MyChart Information     SWYF gives you secure access to your electronic health record. If you see a primary care provider, you can also send messages to your care team and make appointments. If you have questions, please call your primary care clinic.  If you do not have a primary care provider, please call [phone] and they will assist you.        Care EveryWhere ID     This is your Care EveryWhere ID. This could be used by other organizations to access your [place] medical records  [id number]         Blood Pressure from Last 3 Encounters:   08/28/17 106/69   08/24/17 107/66   08/24/17 102/70    Weight from Last 3 Encounters:   08/28/17 90.3 kg (199 lb 1.2 oz)   08/24/17 93.4 kg (205 lb 12.8 oz)   08/24/17 92.3 kg (203 lb 8 oz)              We Performed the Following     RONY-PROC  DEVICE EVAL/PROGRAMMING, PACER          Today's Medication Changes      Notice     This visit is during an admission. Changes to the med list made in this visit will be reflected in the After Visit Summary of the admission.             Primary Care Provider Office Phone # Fax #    Cyril Mackay [phone]       [place] [address] [facility] [address]        Equal Access to Services     LEE CORNELL : Hadii aad ku hadasho Soomaali, waaxda luqadaha, qaybta kaalmada adeegyada, waxay idiin hayaan adeeg kharaludivina lashadi . So [facility] [phone].    ATENCIÓN: Si habla español, tiene a rodriguez disposición servicios gratuitos de asistencia lingüística. Llame al [phone].    We comply with applicable federal civil rights laws and [place] laws. We do not discriminate on the basis of race, color, national origin, age, disability sex, sexual orientation or gender identity.            Thank you!     Thank you for choosing [facility]  for your care. Our goal is always to provide you with excellent care. Hearing back from our patients is one way we can continue to improve our services. Please take a few minutes to complete the written survey that you may receive in the mail after your visit with us. Thank you!             Your Updated Medication List - Protect others around you: Learn how to safely use, store and throw away your medicines at www.disposemymeds.org.      Notice     This visit is during an admission. Changes to the med list made in this visit will be reflected in the After Visit Summary of the admission.

## 2017-08-28 NOTE — MR AVS SNAPSHOT
After Visit Summary   8/28/2017    Lucas Brown    MRN: [id number]           Patient Information     Date Of Birth          1960        Visit Information        Provider Department      8/28/2017 5:30 AM 1,  Cv Device [facility]        Today's Diagnoses     Atrioventricular block, complete (HCC)    -  1       Follow-ups after your visit        Who to contact     If you have questions or need follow up information about today's clinic visit or your schedule please contact [facility] directly at [phone].  Normal or non-critical lab and imaging results will be communicated to you by [email or web address], letter or phone within 4 business days after the clinic has received the results. If you do not hear from us within 7 days, please contact the clinic through [employer] or phone. If you have a critical or abnormal lab result, we will notify you by phone as soon as possible.  Submit refill requests through [employer] or call your pharmacy and they will forward the refill request to us. Please allow 3 business days for your refill to be completed.          Additional Information About Your Visit        MyChart Information     [employer] gives you secure access to your electronic health record. If you see a primary care provider, you can also send messages to your care team and make appointments. If you have questions, please call your primary care clinic.  If you do not have a primary care provider, please call [phone] and they will assist you.        Care EveryWhere ID     This is your Care EveryWhere ID. This could be used by other organizations to access your [facility] medical records  [id number]         Blood Pressure from Last 3 Encounters:   08/28/17 104/67   08/24/17 107/66   08/24/17 102/70    Weight from Last 3 Encounters:   08/28/17 90.3 kg (199 lb 1.2 oz)   08/24/17 93.4 kg (205 lb 12.8 oz)   08/24/17 92.3 kg (203 lb 8 oz)              We Performed the Following     RONY-PROC  DEVICE EVAL/PROGRAMMING, PACER          Today's Medication Changes      Notice     This visit is during an admission. Changes to the med list made in this visit will be reflected in the After Visit Summary of the admission.             Primary Care Provider Office Phone # Fax #    Cyril Mackay [phone]       [place] [id number] [facility] [address]        Equal Access to Services     LEE CORNELL : Hadii aad ku hadasho Soomaali, waaxda luqadaha, qaybta kaalmada adeegyada, waxay idiin hayaan adeeg kharaludivina lashadi . So [facility] [phone].    ATENCIÓN: Si habla español, tiene a rodriguez disposición servicios gratuitos de asistencia lingüística. Llame al [phone].    We comply with applicable federal civil rights laws and [place] laws. We do not discriminate on the basis of race, color, national origin, age, disability sex, sexual orientation or gender identity.            Thank you!     Thank you for choosing [facility]  for your care. Our goal is always to provide you with excellent care. Hearing back from our patients is one way we can continue to improve our services. Please take a few minutes to complete the written survey that you may receive in the mail after your visit with us. Thank you!             Your Updated Medication List - Protect others around you: Learn how to safely use, store and throw away your medicines at www.disposemymeds.org.      Notice     This visit is during an admission. Changes to the med list made in this visit will be reflected in the After Visit Summary of the admission.

## 2017-08-28 NOTE — ANESTHESIA PROCEDURE NOTES
Central Line Procedure Note  Staff:     Anesthesiologist:  SETH FINCH  Location: In OR after induction  Procedure Start/Stop Times:     patient identified, IV checked, risks and benefits discussed, informed consent, monitors and equipment checked, pre-op evaluation and at physician/surgeon's request      Correct Patient: Yes      Correct Position: Yes      Correct Site: Yes      Correct Procedure: Yes      Correct Laterality:  N/A    Site Marked:  N/A  Line Placement:     Procedure:  Central Line    Insertion laterality:  Right    Insertion site:  Internal Jugular    Position:  Trendelenburg      Maximal Sterile Barriers: All elements of maximal sterile barrier technique followed      (Maximal sterile barriers include:   Sterile gown, Sterile Gloves, Mask, Cap, Whole body draped, hand hygiene and acceptable skin prep).Skin Prep: Chloraprep         Injection Technique:  Ultrasound guided    Sterile Ultrasound Technique:  Sterile probe cover and Sterile gel    Vein evaluated via U/S for patency/adequacy of catheter insertion and is adequate.  Using realtime U/S imaging the vein was punctured, and needle was observed entering vein on U/S      A permanent image is NOT entered into the patient's record.      Local skin infiltration:  None    Catheter size:  12 Fr, 3 lumen, 20 cm    Catheter length at skin (cm):  16    Cath secured with: suture and anchor securement device      Dressing:  Tegaderm and Biopatch    Complications:  None obvious    Blood aspirated all lumens: Yes      All Lumens Flushed: Yes      Tip termination: right atrium      Verification method:  Placement to be verified post-op

## 2017-08-28 NOTE — H&P
SURGICAL ICU ADMISSION NOTE  8/28/2017      ASSESSMENT: Lucas Brown is a 56 year old male w/ hx of chronic pancreatitis and intraductal papillary mucinous neoplasm s/p Whipple (8/28/17). EBL for the case was 500cc and his anticoagulation is currently being held. Patient extubated and not requiring pressors post operatively.    PMHx: pacemaker (for perioperative 3rd degree AV block, 08/2014), aortic valve replacement (on warfarin, 1999, 2014), aortic aneurysm repair (2014), HTN, HLD, hypothyroidism, COLBY, BPH, GERD, and depression/anxiety.     Procedures:  8/28: Whipple     PLAN:   Neuro/ pain/ sedation:  #post-operative pain  #sedation  #depression/anxiety  -Monitor neurological status. Notify the MD for any acute changes in exam.  -Dilaudid PCA of 0.5-1mg with 0.5-1mg continuous   -Ropivicaine paravertebral  -Tylenol, gabapentin, lidoderm patches, flexeril  -Precedex for sedation  -PTA sertraline     Pulmonary care:  #COLBY  -Supplemental oxygen to keep saturation above 92 %.  -Incentive spirometer every 15- 30 minutes when awake.  -ETCO2 on capnography does not correlate to ABG     Cardiovascular:    #s/p pacemaker (for perioperative 3rd degree AV block, 08/2014), aortic valve replacement (on warfarin, 1999, 2014), aortic aneurysm repair (2014)  #coronary artery disease, mild nonocclusive coronary artery disease of the proximal and mid LAD  #AV regurgitation (per echo 03/2017)  #HTN  #HLD   -Monitor hemodynamic status and telemetry  -Electrolyte replacement protocol    -Holding anticoagulation  -PTA fenofibrate, pravastatin     GI care:   #IPMN s/p Whipple 8/28/17  #post-operative pancreatitis from AAA repair   -NPO except swabs and medications.  -G and J tubes to gravity   -G and J tubes and VIRGIL drain secured with flexi-trac cheng   -Protonix 40 mg IV Qday  -Prochlorperazine prn for nausea     Fluids/ Electrolytes/ Nutrition:   -Nutrition consulted; appreciate recs  -Will start TFs once approved by gen  surg  -Electrolyte replacement protocol   -D5 LR at 125 for IV fluid hydration  -multivitamin  -No indication for parenteral nutrition.     Renal/ Fluid Balance:    #BPH  -Urine output is adequate so far.  -Will continue to monitor intake and output.  -Continue hernandes     Endocrine:    #Hypothyroidism  -Levothyroxine 150mcg  #chronic steroids for temporal arteritis  -Hydrocortisone taper 50-25-10 over 3 days  -Insulin gtt  -Endocrine consulted; appreciate recs   -Creon once starting tube feeds     ID/ Antibiotics:  -eric-op ertapenem     Heme:  #aortic valve replacement on warfarin  -Plan to restart therapeutic lovenox on POD1  #post-operative blood loss      -Monitor for surgical bleeding     Prophylaxis:    -Mechanical prophylaxis for DVT  -No chemical DVT prophylaxis due to high risk of bleeding. Plan to start therapeutic lovenox on POD 1  -Protonix 40 mg IV qday     Lines/ tubes/ drains:  -RIJ triple lumen, epidural, hernandes  -GJ tube to gravity  -VIRGIL drain     Disposition:  -Surgical ICU.    Patient seen, findings and plan discussed with surgical ICU staff Dr. Rosales.    Karey Brown MD  General Surgery, PGY-2  Pg [phone]    - - - - - - - - - - - - - - - - - - - - - - - - - - - - - - - - - - - - - - - - - - - - - - - - - - - - - - - - - - - - - - - - - - - - - - - -     PRIMARY TEAM: Transplant   PRIMARY PHYSICIAN: Vik Crum     REASON FOR CRITICAL CARE ADMISSION: s/p Whipple, pain control  ADMITTING PHYSICIAN: Chanelle Demarco     HISTORY PRESENTING ILLNESS:   Lucas Brown is a 56 year old male w/ hx of chronic pancreatitis, pacemaker (for perioperative 3rd degree AV block, 08/2014), aortic valve replacement (1999, 2014), aortic aneurysm repair (2014), HTN, HLD, hypothyroidism, COLBY, BPH, GERD, and depression/anxiety.     Today, on 8/28/17, the patient underwent a Whipple Procedure on 8/28/17 with Dr. Vik Crum for chronic pancreatitis and intraductal papillary mucinous neoplasm. Briefly,  the patient had pancreatitis after AAA repair with multiple flares January - April 2017. He tolerated the procedure well. He reports his pain is manageable. Some nausea in PACU, improved with compazine.      REVIEW OF SYSTEMS: As noted above. No headache, dizziness. No fever, chills. No chest pain or shortness of breath. No vomiting. No diarrhea or constipation. No urinary difficulties. No muscle or body aches.    PAST MEDICAL HISTORY:   Past Medical History:   Diagnosis Date     Anticoagulation adequate with anticoagulant therapy      Antiplatelet or antithrombotic long-term use      Anxiety      BPH (benign prostatic hyperplasia)      Chronic pancreatitis (H)      Complete heart block (H)     medtronic ppm     Depression      GERD (gastroesophageal reflux disease)      Heart murmur      HTN (hypertension)      Hyperlipidemia      Hypothyroidism      COLBY (obstructive sleep apnea)     Not using CPAP     Other chronic pain      Pacemaker      Pancreatic disease      Pancreatitis        SURGICAL HISTORY:   Past Surgical History:   Procedure Laterality Date     aortic aneurysm       BIOPSY ARTERY TEMPORAL       ENDOSCOPIC RETROGRADE CHOLANGIOPANCREATOGRAM N/A 10/13/2015    Procedure: COMBINED ENDOSCOPIC RETROGRADE CHOLANGIOPANCREATOGRAPHY, PLACE TUBE/STENT;  Surgeon: Oniel Cates MD;  Location: [facility]     ENDOSCOPIC RETROGRADE CHOLANGIOPANCREATOGRAM N/A 11/12/2015    Procedure: COMBINED ENDOSCOPIC RETROGRADE CHOLANGIOPANCREATOGRAPHY, REMOVE FOREIGN BODY OR STENT/TUBE;  Surgeon: Oniel Cates MD;  Location: [facility]     ENDOSCOPIC RETROGRADE CHOLANGIOPANCREATOGRAM N/A 12/1/2015    Procedure: COMBINED ENDOSCOPIC RETROGRADE CHOLANGIOPANCREATOGRAPHY, PLACE TUBE/STENT;  Surgeon: Oniel Cates MD;  [facility]: [facility]     ENDOSCOPIC RETROGRADE CHOLANGIOPANCREATOGRAM N/A 12/22/2015    Procedure: ENDOSCOPIC RETROGRADE CHOLANGIOPANCREATOGRAM;  Surgeon: Oniel Cates MD;  Location: [facility]      ENDOSCOPIC RETROGRADE CHOLANGIOPANCREATOGRAM N/A 1/19/2016    Procedure: COMBINED ENDOSCOPIC RETROGRADE CHOLANGIOPANCREATOGRAPHY, REMOVE FOREIGN BODY OR STENT/TUBE;  Surgeon: Oniel Cates MD;  [facility]: [facility]     ENDOSCOPIC RETROGRADE CHOLANGIOPANCREATOGRAM N/A 8/30/2016    Procedure: COMBINED ENDOSCOPIC RETROGRADE CHOLANGIOPANCREATOGRAPHY, PLACE TUBE/STENT;  Surgeon: Oniel Cates MD;  [facility]     ENDOSCOPIC RETROGRADE CHOLANGIOPANCREATOGRAM N/A 10/18/2016    Procedure: COMBINED ENDOSCOPIC RETROGRADE CHOLANGIOPANCREATOGRAPHY, REMOVE FOREIGN BODY OR STENT/TUBE;  Surgeon: Guru Amber Childs MD;  Location: [facility]     ESOPHAGOSCOPY, GASTROSCOPY, DUODENOSCOPY (EGD), COMBINED N/A 8/31/2016    Procedure: COMBINED ENDOSCOPIC ULTRASOUND, ESOPHAGOSCOPY, GASTROSCOPY, DUODENOSCOPY (EGD), FINE NEEDLE ASPIRATE/BIOPSY;  Surgeon: Loy Russ MD;  [facility]: [facility]     ESOPHAGOSCOPY, GASTROSCOPY, DUODENOSCOPY (EGD), COMBINED N/A 10/18/2016    Procedure: COMBINED ESOPHAGOSCOPY, GASTROSCOPY, DUODENOSCOPY (EGD), REMOVE FOREIGN BODY;  Surgeon: Guru Amber Childs MD;  [facility]: [facility]     ESOPHAGOSCOPY, GASTROSCOPY, DUODENOSCOPY (EGD), COMBINED N/A 5/15/2017    Procedure: COMBINED ENDOSCOPIC ULTRASOUND, ESOPHAGOSCOPY, GASTROSCOPY, DUODENOSCOPY (EGD), FINE NEEDLE ASPIRATE/BIOPSY;  Upper Endoscopic Ultrasound fine needle biopsy  ;  Surgeon: Titus Miguel MD;  Location: [facility] OR      UGI ENDOSCOPY W EUS N/A 10/9/2015    Procedure: COMBINED ENDOSCOPIC ULTRASOUND, ESOPHAGOSCOPY, GASTROSCOPY, DUODENOSCOPY (EGD);  Surgeon: Loy Russ MD;  Location: [facility]     IMPLANT PACEMAKER  08/27/14     REPLACE VALVE AORTIC  08/27/14    X2, 1999 and 2014       SOCIAL HISTORY: Tobacco - Former. Etoh - No. Works as .     FAMILY HISTORY: No bleeding/clotting disorders.     ALLERGIES:      Allergies   Allergen Reactions     Reclast [Zoledronic Acid] Other  (See Comments)     Caused pain in joints     Codeine Other (See Comments)     Gets garcia       MEDICATIONS:    Current Facility-Administered Medications on File Prior to Encounter:  midazolam (VERSED) injection   glycopyrrolate (ROBINUL) injection   neostigmine (PROSTIGMINE) injection     Current Outpatient Prescriptions on File Prior to Encounter:  amylase-lipase-protease (VIOKACE) 04577 UNITS TABS tablet Take 1-5 with snacks and meals, up to 15 per day.   Celecoxib (CELEBREX PO) Take 200 mg by mouth daily   FINASTERIDE PO Take 5 mg by mouth daily   PREDNISONE PO Take 5 mg by mouth daily    ACETAMINOPHEN PO Take 1,000 mg by mouth every 6 hours as needed for pain    sertraline (ZOLOFT) 50 MG tablet Take 50 mg by mouth daily   omeprazole (PRILOSEC) 20 MG capsule Take 20 mg by mouth 2 times daily as needed    pravastatin (PRAVACHOL) 40 MG tablet Take 40 mg by mouth daily   fenofibrate 145 MG tablet Take 145 mg by mouth daily   ondansetron (ZOFRAN-ODT) 4 MG disintegrating tablet Take 4 mg by mouth every 8 hours as needed for nausea   WARFARIN SODIUM PO Holding startin 8/23/17   levothyroxine (SYNTHROID, LEVOTHROID) 150 MCG tablet Take 150 mcg by mouth daily   lisinopril (PRINIVIL,ZESTRIL) 10 MG tablet Take 10 mg by mouth daily   Multiple Vitamins-Minerals (MULTIVITAMINS) CHEW Take 1 chew tab by mouth daily   aspirin 81 MG tablet Take 81 mg by mouth daily Holding       PHYSICAL EXAMINATION:  Temp:  [98.3  F (36.8  C)] 98.3  F (36.8  C)  Pulse:  [63] 63  Heart Rate:  [61-90] 71  Resp:  [8-16] 15  BP: (104-128)/(67-85) 104/67  SpO2:  [92 %-100 %] 92 %  General: Drowsy, well-appearing, in no acute distress.  HEENT: Normocephalic, atraumatic. Patent nares  Neck: No cervical lymphadenopathy. No thyromegaly.  Chest wall: Symmetric thorax. No masses or tenderness to palpation.  Respiratory: Non-labored breathing. Lung sounds clear to auscultation bilaterally.  Cardiovascular: Regular rate, paced. Audible mechanical  click  Gastrointestinal: Abdomen soft, mildly distended, appropriately tender to palpation throughout. GJ in place to gravity. VIRGIL with scant sanguinous output. Dressing dry with small amount of spotting.  Genitourinary: [place] in place  Extremities: Moving all four extremities. No limb deformities. No pedal edema.  Skin: As noted above. No rashes or lesions appreciated.    LABS: Reviewed.   Arterial Blood Gases   No lab results found in last 7 days.  Complete Blood Count     Recent Labs  Lab 08/24/17  0738   WBC 5.2   HGB 11.8*        Basic Metabolic Panel    Recent Labs  Lab 08/28/17  0601 08/24/17  0952 08/24/17  0852 08/24/17  0738   NA  --   --   --  142   POTASSIUM 3.8  --   --  3.6   CHLORIDE  --   --   --  109   CO2  --   --   --  26   BUN  --   --   --  16   CR  --   --   --  0.96   GLC  --  88 101* 88     Liver Function Tests    Recent Labs  Lab 08/28/17  0601 08/24/17  0738   AST  --  25   ALT  --  22   ALKPHOS  --  44   BILITOTAL  --  0.5   ALBUMIN  --  3.5   INR 1.15* 2.87*     Pancreatic Enzymes  No lab results found in last 7 days.  Coagulation Profile    Recent Labs  Lab 08/28/17  0601 08/24/17  0738   INR 1.15* 2.87*   PTT 31  --      Lactate  Invalid input(s): LACTATE    IMAGING:  Results for orders placed or performed during the hospital encounter of 04/19/17   CT Outside Read    Narrative    EXAMINATION: CT OUTSIDE READ, 4/19/2017 2:47 PM    TECHNIQUE: Outside films dated 4/4/2017 were submitted for  interpretation. CT images extending from lung bases to the pubic  symphysis were performed with contrast in portal venous and delayed  phases.    COMPARISON: CT of the abdomen and pelvis on 12/12/2016.    PREVIOUS REPORT: The original interpretation was available for review  at the time of this dictation.     HISTORY: Concern for pancreatic mass.    FINDINGS:   This report is designed to answer a focused clinical  question and should be interpreted in conjunction with the original  report.      Abdomen/pelvis: Resolution of small fluid collection in the pancreatic  head and peripancreatic inflammation. Prominence of the pancreatic  head and uncinate process. The pancreatic duct is within normal  limits. The pancreatic parenchyma enhances normally. There is   duodenal wall thickening adjacent to the pancreatic head, decreased  from prior. The thickening does not involve the hepatic artery or  portal vein.   Periduodenal fat stranding is also resolved.      No intra or extrahepatic bile duct dilatation. Punctate focus of air  along the ligamentum teres likely representing pneumobilia. The liver,  gallbladder, adrenal glands and spleen are unremarkable. Subcentimeter  hypodensity in the inferior pole the left kidney is too small to  characterize, statistically likely represents a cyst. No  hydronephrosis or hydroureter. Normal bilateral nephrograms.    The stomach and appendix are within normal limits. The small bowel is  nondistended. Scattered colonic diverticuli without adjacent  inflammation. No shift luminal air or free fluid. The prostate is  mildly enlarged impressing on the undersurface of the bladder. The  bladder is moderately distended and otherwise unremarkable. Mild  aortobiiliac atherosclerosis without significant stenosis.    Lower chest: The lung bases are clear.    Bones/soft tissues: Soft tissues are within normal limits. Superior  endplate compression deformity of T11. Mild convex left curvature of  the lumbar spine. Mild L5-S1 intervertebral disc height loss with  intradiscal gas.      Impression    IMPRESSION:  In this patient with a history of the current  pancreatitis possibly autoimmune:  1) Prominence in the pancreatic head with adjacent duodenal wall  thickening favored to represent sequelae of prior pancreatitis given  the history.  Pancreatic duodenal groove mass cannot be excluded but  this would best be assessed by direct biopsy which has already been  performed.  Alternatively, this could be followed with imaging.  Note  MR imaging during arterial phase which is more sensitive for  pancreatic masses has not been obtained.    I have personally reviewed the examination and initial interpretation  and I agree with the findings.    JASMIN SOLOMON MD

## 2017-08-28 NOTE — IP AVS SNAPSHOT
[address] [address]    Phone:  [phone]                                       After Visit Summary   8/28/2017    Lucas Brown    MRN: [id number]           After Visit Summary Signature Page     I have received my discharge instructions, and my questions have been answered. I have discussed any challenges I see with this plan with the nurse or doctor.    ..........................................................................................................................................  Patient/Patient Representative Signature      ..........................................................................................................................................  Patient Representative Print Name and Relationship to Patient    ..................................................               ................................................  Date                                            Time    ..........................................................................................................................................  Reviewed by Signature/Title    ...................................................              ..............................................  Date                                                            Time

## 2017-08-28 NOTE — OR NURSING
Pacemaker nurse has been called when patient arrived but has not been here yet. Report to 4A was given but room is not ready.

## 2017-08-28 NOTE — IP AVS SNAPSHOT
MRN:[id number]                      After Visit Summary   8/28/2017    Lucas Brown    MRN: [id number]           Thank you!     Thank you for choosing [facility] for your care. Our goal is always to provide you with excellent care. Hearing back from our patients is one way we can continue to improve our services. Please take a few minutes to complete the written survey that you may receive in the mail after you visit with us. Thank you!        Patient Information     Date Of Birth          1960        Designated Caregiver       Most Recent Value    Caregiver    Will someone help with your care after discharge? yes    Name of designated caregiver see note    Phone number of caregiver see note    Caregiver address see note      About your hospital stay     You were admitted on:  August 28, 2017 You last received care in the:  [address] [facility]    You were discharged on:  September 13, 2017        Reason for your hospital stay       Whipple procedure for chronic pancreatitis                  Who to Call     For medical emergencies, please call 911.  For non-urgent questions about your medical care, please call your primary care provider or clinic, [phone]  For questions related to your surgery, please call your surgery clinic        Attending Provider     Provider Specialty    Vik Crum MD Transplant       Primary Care Provider Office Phone # Fax #    Cyril Mackay [phone]      After Care Instructions     Activity       Your activity upon discharge: activity as tolerated, no lifting or strenuous exercise for 6-8 weeks and no driving while on opioid pain medication.            Diet       Continue with tube feeds in J-tube            Discharge Instructions       Continue with tube feeds for nutrition.    Do not lift more than 20 pounds for 6-8 weeks after surgery.     You may shower after surgery but do not scrub incisions; simply let soapy water run  over them. Pat area dry.     Do not soak in a bath tub or pool for 6 weeks after surgery.    No driving, no using heavy machinery and no major decision-making while taking narcotic pain medication. It is illegal to drive while taking narcotic pain medication. Narcotics can cause constipation. Take stool softener while taking narcotic pain medication. If no bowel movement for 2 days add a laxative to aid in bowel movement. You can obtain a laxative from your pharmacyst over the counter.     You may take Tylenol (acetaminophen) and/or Motrin (ibuprofen) in addition or instead of narcotic medication; it is helpful to take these medications as you wean down off of the narcotic medications. If you have been prescribed Percocet, be aware that it contains Tylenol and oxycodone together. Do not take a total of more than 3500 mg of Tylenol per 24 hours to avoid liver damage.    Please call if you experience increasing abdominal pain, nausea, vomiting, increasing drainage from your wounds, chills, or fever >101.5.    If you are a patient of Dr. Crum, please call [facility] [phone] for questions and to discuss appointment.   If you have questions about your follow-up appointment, please call the General Surgery Clinic at [phone].  Call [phone] and ask to speak with surgery resident if you are having troubles in the evenings, at night, or on weekends.            Tubes and drains       You are going home with the following tubes or drains: GJ tube            Tubes and drains       You are going home with the following tubes or drains: G tube - Keep open to drain with collection bag in place. J tube - Will be used for tube feeds                  Follow-up Appointments     Adult [facility] Follow-up and recommended labs and tests       Follow up at the infusion center tomorrow, 9/14 and with Dr. Crum in 1 week to evaluate after surgery and for hospital follow- up. No follow up labs or test are  needed.    Appointments on [place] and/or [facility] (with [facility] or [facility] provider or service). Call [phone] if you haven't heard regarding these appointments within 7 days of discharge.                  Your next 10 appointments already scheduled     Sep 19, 2017  7:00 AM CDT   (Arrive by 6:45 AM)   Post-Op with Vki Crum MD   [facility] ([facility] [place])    [address]  4th Floor  [facility] [address] [phone]              Additional Services     Home infusion referral       Your provider has referred you to: [facility]: [facility] [place] [phone]   [email or web address]    Local Address (if different from home address):   [facility]   [address] [place] [address]    Anticipated Length of Therapy: Per MD Order  Enteral Nutrition  Isosource 1.5   Goal 65 ml/hr(hang 260 ml of formula mixed with enzyme bicarb solution in bag q4h)    Home Infusion Pharmacist to adjust therapy based on labs and clinical assessments: Yes    Labs:  May draw labs from Venous Catheter: Yes  Home Infusion Pharmacist to order labs based on therapy type and clinical assessments: Yes  Labs: INR draws, with first draw 9/14,   BMP and CBC 9/14   Call/Fax Lab Results to: Dr. Crum at [phone]    Agency Staff to assess nursing needs for Infusion Therapy.            INR Clinic Referral       Indication for anticoagulation: Mechanical aortic valve replacement   Goal Range: INR 2-3   Expected duration of therapy: Lifetime   [facility] to draw INRs and report to [facility] for 4-6 weeks post op.  Dr. Crum to follow while staying local.  Then patient to resume with home anticoagulation clinic at [facility] in [place], [place].                  Further instructions from your care team           [facility]              Name:  "Lucas Brown  Date: 9/2017  To order your ostomy supplies    Call:      [facility]  Ph. [phone]  Or Call your insurance provider for their preferred supplier    Your Medical Supplier will need your insurance information and surgeon's name, phone and fax number    Clinic:                     Phone                            Fax  General Surgery:   [phone]  Verbal Order X 1 Month per: Heather Lopez RN CWON        Authorizing MD: Dr. ALESHA Crum    Request the following supplies:      David   2 piece flat wafer 57mm  # [id number] &  Urine pouch 57mm- # [id number]                    Mesalt Gauze Packing (2 x 100 cm) #[id number]          Change your pouch twice a week, more often if leaking.    . Call the Ostomy Nurse at [facility]       [address] [place] [address], [place] : [phone]   Schedule a follow-up visit if having problems pouching.       Problems you should Report  - Red, raw or painful skin around the stoma.  - A pouch that leaks every day.  - Problems making the right size hole in the pouch wafer.    Please call with any questions or concerns.    Pending Results     Date and Time Order Name Status Description    9/2/2017 0413 Phosphorus In process             Statement of Approval     Ordered          09/13/17 1220  I have reviewed and agree with all the recommendations and orders detailed in this document.  EFFECTIVE NOW     Approved and electronically signed by:  Mina Rosa MD             Admission Information     Date & Time Provider Department Dept. Phone    8/28/2017 Vik Crum MD [address] [facility] [phone]      Your Vitals Were     Blood Pressure Pulse Temperature Respirations Height Weight    129/68 (BP Location: Left arm) 79 96.8  F (36  C) (Oral) 16 1.803 m (5' 10.98\") 93.4 kg (205 lb 14.6 oz)    Pulse Oximetry BMI (Body Mass Index)                94% 28.73 kg/m2          MyChart Information     [facility] gives you secure " access to your electronic health record. If you see a primary care provider, you can also send messages to your care team and make appointments. If you have questions, please call your primary care clinic.  If you do not have a primary care provider, please call [phone] and they will assist you.        Care EveryWhere ID     This is your Care EveryWhere ID. This could be used by other organizations to access your [facility] medical records  [id number]        Equal Access to Services     LEE CORNELL : Hadii arabella borja hadasho Soomaali, waaxda luqadaha, qaybta kaalmada adeegyada, rimma rodriguez. So [facility] [phone].    ATENCIÓN: Si baronla rayshawn, tiene a rodriguez disposición servicios gratuitos de asistencia lingüística. Llame al [phone].    We comply with applicable federal civil rights laws and [place] laws. We do not discriminate on the basis of race, color, national origin, age, disability sex, sexual orientation or gender identity.               Review of your medicines      START taking        Dose / Directions    amylase-lipase-protease 25049-17847 UNITS Cpep per EC capsule   Commonly known as:  CREON 24   Replaces:  amylase-lipase-protease 39488 UNITS Tabs tablet        Dose:  2 capsule   2 capsules (48,000 Units) by Per J Tube route every 4 hours for 7 days   Quantity:  84 capsule   Refills:  0       fentaNYL 100 mcg/hr 72 hr patch   Commonly known as:  DURAGESIC   Used for:  Acute post-operative pain   Replaces:  fentaNYL 50 mcg/hr 72 hr patch        Dose:  1 patch   Place 1 patch onto the skin every 72 hours for 7 days   Quantity:  2 patch   Refills:  0       gabapentin 250 MG/5ML solution   Commonly known as:  NEURONTIN   Used for:  Acute post-operative pain        Dose:  300 mg   6 mLs (300 mg) by Per J Tube route 2 times daily for 7 days   Quantity:  84 mL   Refills:  0       omeprazole 2 mg/mL Susp   Commonly known as:  priLOSEC   Replaces:  omeprazole 20 MG CR capsule         Dose:  20 mg   10 mLs (20 mg) by Oral or Feeding Tube route 2 times daily for 7 days   Quantity:  140 mL   Refills:  0       oxyCODONE 5 MG/5ML solution   Commonly known as:  ROXICODONE   Used for:  Acute post-operative pain        Dose:  5 mg   5 mLs (5 mg) by Per J Tube route every 3 hours as needed for moderate to severe pain   Quantity:  250 mL   Refills:  0       potassium & sodium phosphates 280-160-250 MG Packet   Commonly known as:  NEUTRA-PHOS        Dose:  1 packet   Take 1 packet by mouth 3 times daily for 7 days   Quantity:  21 packet   Refills:  0       protein modular        Dose:  1 packet   1 packet by Per Feeding Tube route 3 times daily for 7 days   Quantity:  21 packet   Refills:  0       sodium bicarbonate 325 MG tablet        Dose:  325 mg   1 tablet (325 mg) by Per J Tube route every 4 hours for 7 days   Quantity:  42 tablet   Refills:  0         CONTINUE these medicines which may have CHANGED, or have new prescriptions. If we are uncertain of the size of tablets/capsules you have at home, strength may be listed as something that might have changed.        Dose / Directions    predniSONE 5 MG/5ML solution   This may have changed:    - medication strength  - how to take this        Dose:  5 mg   5 mLs (5 mg) by Per J Tube route daily for 7 days   Quantity:  35 mL   Refills:  0         CONTINUE these medicines which have NOT CHANGED        Dose / Directions    ACETAMINOPHEN PO        Dose:  1000 mg   Take 1,000 mg by mouth every 6 hours as needed for pain   Refills:  0       aspirin 81 MG tablet        Dose:  81 mg   Take 81 mg by mouth daily Holding   Refills:  0       fenofibrate 145 MG tablet        Dose:  145 mg   Take 145 mg by mouth daily   Refills:  0       FINASTERIDE PO        Dose:  5 mg   Take 5 mg by mouth daily   Refills:  0       levothyroxine 150 MCG tablet   Commonly known as:  SYNTHROID/LEVOTHROID        Dose:  150 mcg   Take 150 mcg by mouth daily   Refills:  0        lisinopril 10 MG tablet   Commonly known as:  PRINIVIL/ZESTRIL        Dose:  10 mg   Take 10 mg by mouth daily   Refills:  0       MULTIVITAMINS Chew        Dose:  1 chew tab   Take 1 chew tab by mouth daily   Refills:  0       ondansetron 4 MG ODT tab   Commonly known as:  ZOFRAN-ODT        Dose:  4 mg   Take 4 mg by mouth every 8 hours as needed for nausea   Refills:  0       pravastatin 40 MG tablet   Commonly known as:  PRAVACHOL        Dose:  40 mg   Take 40 mg by mouth daily   Refills:  0       sertraline 50 MG tablet   Commonly known as:  ZOLOFT        Dose:  50 mg   Take 50 mg by mouth daily   Refills:  0       TAMSULOSIN HCL PO        Dose:  0.4 mg   Take 0.4 mg by mouth daily   Refills:  0       WARFARIN SODIUM PO        Holding startin 8/23/17   Refills:  0         STOP taking     amylase-lipase-protease 99912 UNITS Tabs tablet   Commonly known as:  VIOKACE   Replaced by:  amylase-lipase-protease 99233-80140 UNITS Cpep per EC capsule           CELEBREX PO           enoxaparin 80 MG/0.8ML injection   Commonly known as:  LOVENOX           fentaNYL 50 mcg/hr 72 hr patch   Commonly known as:  DURAGESIC   Replaced by:  fentaNYL 100 mcg/hr 72 hr patch           HYDROMORPHONE HCL PO           omeprazole 20 MG CR capsule   Commonly known as:  priLOSEC   Replaced by:  omeprazole 2 mg/mL Susp                Where to get your medicines      Some of these will need a paper prescription and others can be bought over the counter. Ask your nurse if you have questions.     Bring a paper prescription for each of these medications     amylase-lipase-protease 97148-37211 UNITS Cpep per EC capsule    fentaNYL 100 mcg/hr 72 hr patch    gabapentin 250 MG/5ML solution    omeprazole 2 mg/mL Susp    oxyCODONE 5 MG/5ML solution    potassium & sodium phosphates 280-160-250 MG Packet    predniSONE 5 MG/5ML solution    protein modular    sodium bicarbonate 325 MG tablet                Protect others around you: Learn how to safely  use, store and throw away your medicines at www.disposemymeds.org.             Medication List: This is a list of all your medications and when to take them. Check marks below indicate your daily home schedule. Keep this list as a reference.      Medications           Morning Afternoon Evening Bedtime As Needed    ACETAMINOPHEN PO   Take 1,000 mg by mouth every 6 hours as needed for pain   Last time this was given:  975 mg on 8/28/2017  5:51 AM                                amylase-lipase-protease 45838-47750 UNITS Cpep per EC capsule   Commonly known as:  CREON 24   2 capsules (48,000 Units) by Per J Tube route every 4 hours for 7 days   Last time this was given:  48,000 Units on 9/13/2017 11:53 AM                                aspirin 81 MG tablet   Take 81 mg by mouth daily Holding                                fenofibrate 145 MG tablet   Take 145 mg by mouth daily   Last time this was given:  160 mg on 9/13/2017  8:45 AM                                fentaNYL 100 mcg/hr 72 hr patch   Commonly known as:  DURAGESIC   Place 1 patch onto the skin every 72 hours for 7 days   Last time this was given:  1 patch on 9/12/2017  6:14 PM                                FINASTERIDE PO   Take 5 mg by mouth daily                                gabapentin 250 MG/5ML solution   Commonly known as:  NEURONTIN   6 mLs (300 mg) by Per J Tube route 2 times daily for 7 days   Last time this was given:  300 mg on 9/13/2017  8:51 AM                                levothyroxine 150 MCG tablet   Commonly known as:  SYNTHROID/LEVOTHROID   Take 150 mcg by mouth daily   Last time this was given:  150 mcg on 9/13/2017  8:46 AM                                lisinopril 10 MG tablet   Commonly known as:  PRINIVIL/ZESTRIL   Take 10 mg by mouth daily                                MULTIVITAMINS Chew   Take 1 chew tab by mouth daily                                omeprazole 2 mg/mL Susp   Commonly known as:  priLOSEC   10 mLs (20 mg) by Oral or  Feeding Tube route 2 times daily for 7 days   Last time this was given:  20 mg on 9/13/2017  8:52 AM                                ondansetron 4 MG ODT tab   Commonly known as:  ZOFRAN-ODT   Take 4 mg by mouth every 8 hours as needed for nausea   Last time this was given:  4 mg on 9/3/2017  9:18 PM                                oxyCODONE 5 MG/5ML solution   Commonly known as:  ROXICODONE   5 mLs (5 mg) by Per J Tube route every 3 hours as needed for moderate to severe pain   Last time this was given:  10 mg on 9/13/2017  8:54 AM                                potassium & sodium phosphates 280-160-250 MG Packet   Commonly known as:  NEUTRA-PHOS   Take 1 packet by mouth 3 times daily for 7 days   Last time this was given:  1 packet on 9/13/2017 11:53 AM                                pravastatin 40 MG tablet   Commonly known as:  PRAVACHOL   Take 40 mg by mouth daily   Last time this was given:  40 mg on 9/13/2017  8:47 AM                                predniSONE 5 MG/5ML solution   5 mLs (5 mg) by Per J Tube route daily for 7 days   Last time this was given:  5 mg on 9/13/2017  8:51 AM                                protein modular   1 packet by Per Feeding Tube route 3 times daily for 7 days   Last time this was given:  1 packet on 9/13/2017  1:55 PM                                sertraline 50 MG tablet   Commonly known as:  ZOLOFT   Take 50 mg by mouth daily                                sodium bicarbonate 325 MG tablet   1 tablet (325 mg) by Per J Tube route every 4 hours for 7 days   Last time this was given:  325 mg on 9/13/2017 11:53 AM                                TAMSULOSIN HCL PO   Take 0.4 mg by mouth daily   Last time this was given:  0.4 mg on 9/11/2017  9:15 AM                                WARFARIN SODIUM PO   Holding startin 8/23/17   Last time this was given:  2.5 mg on 9/12/2017  6:15 PM

## 2017-08-28 NOTE — ANESTHESIA PROCEDURE NOTES
Arterial Line Procedure Note  Staff:     Anesthesiologist:  INO PRAJAPATI    Resident/CRNA:  SETH FINCH    Arterial line performed by resident/CRNA in presence of a teaching physician    Location: In OR After Induction  Procedure Start/Stop Times:     patient identified, IV checked, risks and benefits discussed, informed consent, monitors and equipment checked, pre-op evaluation and at physician/surgeon's request      Correct Patient: Yes      Correct Position: Yes      Correct Site: Yes      Correct Procedure: Yes      Correct Laterality:  N/A    Site Marked:  N/A  Line Placement:     Procedure:  Arterial Line    Insertion Site:  Radial    Insertion laterality:  Left    Skin Prep: Chloraprep      Patient Prep: mask, sterile gloves, hat and hand hygiene      Local skin infiltration:  None    Ultrasound Guided?: No      Catheter size:  20 gauge, Quick cath    Dressing:  Tegaderm    Complications:  None obvious    Arterial waveform: Yes      IBP within 10% of NIBP: Yes

## 2017-08-28 NOTE — OR NURSING
Dr. Mchugh ordered chest xray and then saw xray and stated the chest xray showed good  line placement.  was asked about setting up the PCA with a continuous rate and she approved the continuous rate.

## 2017-08-28 NOTE — BRIEF OP NOTE
[facility], [place]    Brief Operative Note    Pre-operative diagnosis: Pancreatitis   Post-operative diagnosis Same  Procedure: Pancreaticoduodenectomy (Whipple proceedure), umbilical hernia repair, incidental appendectomy , gastrojejunostomy tube placement, paraveretebral anesthia block.   Surgeon: Surgeon(s) and Role:     * Vik Crum MD - Primary     * Fernando Molina MD - Resident - Assisting     * Shanti Sommer MD PGY-6 - Assisting    Anesthesia: Combined General with Block   Estimated blood loss: 500 ml  Drains: Venancio-Edge  Specimens:   ID Type Source Tests Collected by Time Destination   1 : Pancreatic Duodenectomy Tissue Other OR DOCUMENTATION ONLY Vik Crum MD 8/28/2017 11:43 AM    A : Gallbladder Organ Gallbladder and Contents SURGICAL PATHOLOGY EXAM Vik Crum MD 8/28/2017 10:06 AM    B : Distal Duodenum, Proximal Jejunum Tissue Other SURGICAL PATHOLOGY EXAM Vik Crum MD 8/28/2017 11:09 AM    C : appendix  Tissue Appendix SURGICAL PATHOLOGY EXAM Vik Crum MD 8/28/2017  2:18 PM      Findings:   Firm pancreatic head.  Complications: None.  Implants: None.

## 2017-08-28 NOTE — ANESTHESIA POSTPROCEDURE EVALUATION
Patient: Lucas Brown    Procedure(s):  pancreaticoduodenectomy ( whipple proceedure) umbilical hernia repair, incidental appendectomy , gastrojejunostomy tube placement, paraveretebral anesthia block. - Wound Class: I-Clean    Diagnosis:Pancreatitis   Diagnosis Additional Information: No value filed.    Anesthesia Type:  General, ETT, Periph. Nerve Block for postop pain    Note:  Anesthesia Post Evaluation    Patient location during evaluation: PACU  Patient participation: Able to fully participate in evaluation  Level of consciousness: awake  Pain management: adequate  Airway patency: patent  Cardiovascular status: hemodynamically stable  Respiratory status: acceptable  Hydration status: acceptable  PONV: none     Anesthetic complications: None          Last vitals:  Vitals:    08/28/17 1500 08/28/17 1515 08/28/17 1530   BP: 102/70 101/72 104/73   Pulse:      Resp: 12 12 12   Temp: 36.7  C (98  F)  36.7  C (98  F)   SpO2: 100% 100% 96%         Electronically Signed By: Nubia Mchugh MD  August 28, 2017  3:56 PM

## 2017-08-28 NOTE — PROGRESS NOTES
SPIRITUAL HEALTH SERVICES  [facility] ([place] 3C   PRE-SURGERY VISIT    Had pre-surgery visit with pt.  Provided spiritual support, prayer.   Matt Donato   Resident   Pager [phone]

## 2017-08-28 NOTE — ANESTHESIA PROCEDURE NOTES
Peripheral Nerve Block Procedure Note    Staff:     Anesthesiologist:  INO PRAJAPATI    Resident/CRNA:  DARLENE COVINGTON    Block performed by resident/CRNA in the presence of a teaching physician    Location: Pre-op  Procedure Start/Stop TImes:      8/28/2017 6:50 AM     8/28/2017 7:15 AM    patient identified, IV checked, site marked, risks and benefits discussed, informed consent, monitors and equipment checked, pre-op evaluation, at physician/surgeon's request and post-op pain management      Correct Patient: Yes      Correct Position: Yes      Correct Site: Yes      Correct Procedure: Yes      Correct Laterality:  Yes    Site Marked:  Yes  Procedure details:     Procedure:  Paravertebral    Laterality:  Bilateral    Position:  Supine    Sterile Prep: chloraprep, patient draped, mask and sterile gloves      Local skin infiltration:  1% lidocaine    amount (mL):  4    Needle:  Insulated and short bevel    Needle gauge:  17    Needle length (inches):  3.5    Catheter gauge:  19    Catheter threaded easily: Yes      Ultrasound: Yes      Ultrasound used to identify targeted nerve, plexus, or vascular structure and placed a needle adjacent to it      Permanent Image entered into patiient's record      Abnormal pain on injection: No      Blood Aspirated: No      Paresthesias:  No    Bleeding at site: No      Test dose (mL):  3    Test dose local:   Lidocaine 1.5% w/ 1:200,000 epinephrine    Test dose negative for signs of intravascular injection: Yes      Bolus via:  Catheter    Infusion Method:  Continuous Infusion    Blood aspirated via catheter: No      Secured:  Tegaderm and Dermabond    Complications:  None

## 2017-08-28 NOTE — PROGRESS NOTES
Patient seen on 3C pre-operatively for evaluation and iterative programming of his Medtronic dual lead pacemaker per MD orders.  Normal pacemaker function.  No arrythmias recorded.  Intrinsic rhythm = NSR with  @ 30 bpm.  AP = 67.1%.   = 99.7%.  Estimated battery longevity to KALI = 6 years.  Pacing mode programmed from DDDR  to DOO 80 bpm per Anesthesia MD orders.  Please page device RN post-op for reprogramming of patient's pacemaker to pre-op settings.    Dual lead pacemaker

## 2017-08-28 NOTE — OR NURSING
Device nurse notified of this pt, recommend using a magnet for today's procedure, will notify anesthesia

## 2017-08-28 NOTE — LETTER
Transition Communication Hand-off for Care Transitions to Next Level of Care Provider    Name: Lucas Brown  MRN #: [id number]  Primary Care Provider: ANDREW ADRIAN     Primary Clinic: [address] [address]     Reason for Hospitalization:  Pancreatitis   Chronic pancreatitis (H)  Admit Date/Time: 8/28/2017  5:09 AM  Discharge Date: 9/13/2017  Payor Source: Payor: [employer] / Plan: [facility] / Product Type: Indemnity /          Reason for Communication Hand-off Referral: Other continuity of care    Discharge Plan: Discharged to home with [facility] for home tube feeding support and nursing visits.       Discharge Needs Assessment:  Needs       Most Recent Value    Equipment Currently Used at Home none    Transportation Available car, family or friend will provide        Follow-up plan:  Future Appointments  Date Time Provider Department Center   9/19/2017 7:00 AM Vik Crum MD [facility]       Any outstanding tests or procedures:        Referrals     Future Labs/Procedures    Home infusion referral     Comments:    Your provider has referred you to: [facility]: [facility] [phone]   [email or web address]    Local Address (if different from home address):   [facility]   [address] [place] [address]    Anticipated Length of Therapy: Per MD Order  Enteral Nutrition  Isosource 1.5   Goal 65 ml/hr(hang 260 ml of formula mixed with enzyme bicarb solution in bag q4h)    Home [facility]fusion Pharmacist to adjust therapy based on labs and clinical assessments: Yes    Labs:  May draw labs from Venous Catheter: Yes  [facility] Pharmacist to order labs based on therapy type and clinical assessments: Yes  Labs: INR draws, with first draw 9/14,   BMP and CBC 9/14   Call/Fax Lab Results to: Dr. Crum at [phone]    Agency Staff to assess nursing needs for  Infusion Therapy.    INR Clinic Referral     Comments:    Indication for anticoagulation: Mechanical aortic valve replacement   Goal Range: INR 2-3   Expected duration of therapy: Lifetime   [facility] to draw INRs and report [facility] for 4-6 weeks post op.  Dr. Crum to follow while staying local.  Then patient to resume with home anticoagulation clinic at [facility] in [place], [place].          Elisabeth Shelton, RN Care Coordinator    AVS/Discharge Summary is the source of truth; this is a helpful guide for improved communication of patient story

## 2017-08-28 NOTE — PROGRESS NOTES
Patient seen in PACU post-operatively for evaluation and iterative programming of his Medtronic dual lead pacemaker per MD orders.  Normal pacemaker function.  No arrythmias recorded.  Intrinsic rhythm = NSR with  @ 30 bpm.  AP = 100%.   = 100%.  Estimated battery longevity to KALI = 6 years.  Pacing mode programmed from DOO 80 bpm to DDDR  bpm.  PACU RN notified of interrogation results.    Dual lead pacemaker

## 2017-08-28 NOTE — ANESTHESIA CARE TRANSFER NOTE
Patient: Lucas Brown    Procedure(s):  pancreaticoduodenectomy ( whipple proceedure) umbilical hernia repair, incidental appendectomy , gastrojejunostomy tube placement, paraveretebral anesthia block. - Wound Class: I-Clean    Diagnosis: Pancreatitis   Diagnosis Additional Information: No value filed.    Anesthesia Type:   General, ETT, Periph. Nerve Block for postop pain     Note:  Airway :Face Mask  Patient transferred to:PACU  Comments: Extubated in OR 9, transferred to PACU with oxygen, hemodynamically stable. Upon arrival to PACU, pain is well controlled, sleeping, no nausea. SpO2 100% on 6L O2 via facemask.     Bryan Medina        Vitals: (Last set prior to Anesthesia Care Transfer)    CRNA VITALS  8/28/2017 1429 - 8/28/2017 1520      8/28/2017             Temp: (!)  19.2  C (66.6  F)    Resp Rate (set): 10                Electronically Signed By: Bryan Medina MD  August 28, 2017  3:20 PM

## 2017-08-28 NOTE — PLAN OF CARE
Problem: Pain, Acute (Adult)  Goal: Identify Related Risk Factors and Signs and Symptoms  Related risk factors and signs and symptoms are identified upon initiation of Human Response Clinical Practice Guideline (CPG)  Outcome: No Change  To floor around 1815 from PACU.   Neuro: Follows all commands, sleepy.   Pain: Precedex gtt, dilaudid PCA cont & demand. Bilateral patches.   Resp: 3L NC, capnography in place  Cardiac: pacer in place. MAPs>65.   GIGU: able to use J tube, G tube to drainage. PT NPO. Start insulin gtt if glucose >150. Durham in place.   Access: R IJ, CVP in place, Kayy will need to be taken out.   Skin: abd incision marked, VIRGIL in place.   Family at bedside. Will continue to monitor and notify MD of any acute concerns.

## 2017-08-29 ENCOUNTER — APPOINTMENT (OUTPATIENT)
Dept: PHYSICAL THERAPY | Facility: CLINIC | Age: 57
DRG: 406 | End: 2017-08-29
Attending: SURGERY
Payer: COMMERCIAL

## 2017-08-29 LAB
ALBUMIN SERPL-MCNC: 2.8 G/DL (ref 3.4–5)
ALP SERPL-CCNC: 25 U/L (ref 40–150)
ALT SERPL W P-5'-P-CCNC: 29 U/L (ref 0–70)
AMYLASE SERPL-CCNC: 84 U/L (ref 30–110)
ANION GAP SERPL CALCULATED.3IONS-SCNC: 6 MMOL/L (ref 3–14)
ANION GAP SERPL CALCULATED.3IONS-SCNC: 7 MMOL/L (ref 3–14)
AST SERPL W P-5'-P-CCNC: 43 U/L (ref 0–45)
BASOPHILS # BLD AUTO: 0 10E9/L (ref 0–0.2)
BASOPHILS NFR BLD AUTO: 0 %
BILIRUB DIRECT SERPL-MCNC: 0.3 MG/DL (ref 0–0.2)
BILIRUB SERPL-MCNC: 0.6 MG/DL (ref 0.2–1.3)
BUN SERPL-MCNC: 20 MG/DL (ref 7–30)
BUN SERPL-MCNC: 29 MG/DL (ref 7–30)
CALCIUM SERPL-MCNC: 6.9 MG/DL (ref 8.5–10.1)
CALCIUM SERPL-MCNC: 7 MG/DL (ref 8.5–10.1)
CHLORIDE SERPL-SCNC: 111 MMOL/L (ref 94–109)
CHLORIDE SERPL-SCNC: 113 MMOL/L (ref 94–109)
CO2 SERPL-SCNC: 25 MMOL/L (ref 20–32)
CO2 SERPL-SCNC: 27 MMOL/L (ref 20–32)
CREAT SERPL-MCNC: 1.73 MG/DL (ref 0.66–1.25)
CREAT SERPL-MCNC: 1.97 MG/DL (ref 0.66–1.25)
CREAT SERPL-MCNC: 1.99 MG/DL (ref 0.66–1.25)
CREAT UR-MCNC: 339 MG/DL
CRP SERPL-MCNC: 200 MG/L (ref 0–8)
DIFFERENTIAL METHOD BLD: ABNORMAL
EOSINOPHIL # BLD AUTO: 0 10E9/L (ref 0–0.7)
EOSINOPHIL NFR BLD AUTO: 0 %
ERYTHROCYTE [DISTWIDTH] IN BLOOD BY AUTOMATED COUNT: 17 % (ref 10–15)
ERYTHROCYTE [DISTWIDTH] IN BLOOD BY AUTOMATED COUNT: 17.4 % (ref 10–15)
FRACT EXCRET NA UR+SERPL-RTO: <0.1 %
GFR SERPL CREATININE-BSD FRML MDRD: 35 ML/MIN/1.7M2
GFR SERPL CREATININE-BSD FRML MDRD: 41 ML/MIN/1.7M2
GLUCOSE BLDC GLUCOMTR-MCNC: 124 MG/DL (ref 70–99)
GLUCOSE BLDC GLUCOMTR-MCNC: 148 MG/DL (ref 70–99)
GLUCOSE SERPL-MCNC: 140 MG/DL (ref 70–99)
GLUCOSE SERPL-MCNC: 98 MG/DL (ref 70–99)
HBA1C MFR BLD: 5.7 % (ref 4.3–6)
HCT VFR BLD AUTO: 25.8 % (ref 40–53)
HCT VFR BLD AUTO: 27.3 % (ref 40–53)
HGB BLD-MCNC: 8 G/DL (ref 13.3–17.7)
HGB BLD-MCNC: 8.5 G/DL (ref 13.3–17.7)
IMM GRANULOCYTES # BLD: 0 10E9/L (ref 0–0.4)
IMM GRANULOCYTES NFR BLD: 0.3 %
INTERPRETATION ECG - MUSE: NORMAL
LACTATE BLD-SCNC: 2.1 MMOL/L (ref 0.7–2)
LIPASE SERPL-CCNC: 358 U/L (ref 73–393)
LYMPHOCYTES # BLD AUTO: 0.5 10E9/L (ref 0.8–5.3)
LYMPHOCYTES NFR BLD AUTO: 8.5 %
MAGNESIUM SERPL-MCNC: 1.6 MG/DL (ref 1.6–2.3)
MCH RBC QN AUTO: 25.8 PG (ref 26.5–33)
MCH RBC QN AUTO: 26 PG (ref 26.5–33)
MCHC RBC AUTO-ENTMCNC: 31 G/DL (ref 31.5–36.5)
MCHC RBC AUTO-ENTMCNC: 31.1 G/DL (ref 31.5–36.5)
MCV RBC AUTO: 83 FL (ref 78–100)
MCV RBC AUTO: 84 FL (ref 78–100)
MONOCYTES # BLD AUTO: 0.7 10E9/L (ref 0–1.3)
MONOCYTES NFR BLD AUTO: 10.6 %
NEUTROPHILS # BLD AUTO: 5 10E9/L (ref 1.6–8.3)
NEUTROPHILS NFR BLD AUTO: 80.6 %
NRBC # BLD AUTO: 0 10*3/UL
NRBC BLD AUTO-RTO: 0 /100
PHOSPHATE SERPL-MCNC: 3.6 MG/DL (ref 2.5–4.5)
PLATELET # BLD AUTO: 125 10E9/L (ref 150–450)
PLATELET # BLD AUTO: 130 10E9/L (ref 150–450)
POTASSIUM SERPL-SCNC: 4.6 MMOL/L (ref 3.4–5.3)
POTASSIUM SERPL-SCNC: 4.6 MMOL/L (ref 3.4–5.3)
PROT SERPL-MCNC: 5.5 G/DL (ref 6.8–8.8)
RBC # BLD AUTO: 3.08 10E12/L (ref 4.4–5.9)
RBC # BLD AUTO: 3.29 10E12/L (ref 4.4–5.9)
SODIUM SERPL-SCNC: 144 MMOL/L (ref 133–144)
SODIUM SERPL-SCNC: 144 MMOL/L (ref 133–144)
SODIUM SERPL-SCNC: 145 MMOL/L (ref 133–144)
SODIUM UR-SCNC: 19 MMOL/L
WBC # BLD AUTO: 6.2 10E9/L (ref 4–11)
WBC # BLD AUTO: 6.6 10E9/L (ref 4–11)

## 2017-08-29 PROCEDURE — 83735 ASSAY OF MAGNESIUM: CPT | Performed by: SURGERY

## 2017-08-29 PROCEDURE — 85027 COMPLETE CBC AUTOMATED: CPT | Performed by: STUDENT IN AN ORGANIZED HEALTH CARE EDUCATION/TRAINING PROGRAM

## 2017-08-29 PROCEDURE — 84300 ASSAY OF URINE SODIUM: CPT | Performed by: STUDENT IN AN ORGANIZED HEALTH CARE EDUCATION/TRAINING PROGRAM

## 2017-08-29 PROCEDURE — 25000132 ZZH RX MED GY IP 250 OP 250 PS 637: Performed by: SURGERY

## 2017-08-29 PROCEDURE — 25000125 ZZHC RX 250: Performed by: SURGERY

## 2017-08-29 PROCEDURE — 97162 PT EVAL MOD COMPLEX 30 MIN: CPT | Mod: GP

## 2017-08-29 PROCEDURE — 27210436 ZZH NUTRITION PRODUCT SEMIELEM INTERMED CAN

## 2017-08-29 PROCEDURE — 25000128 H RX IP 250 OP 636: Performed by: SURGERY

## 2017-08-29 PROCEDURE — 82570 ASSAY OF URINE CREATININE: CPT | Performed by: STUDENT IN AN ORGANIZED HEALTH CARE EDUCATION/TRAINING PROGRAM

## 2017-08-29 PROCEDURE — 82150 ASSAY OF AMYLASE: CPT | Performed by: SURGERY

## 2017-08-29 PROCEDURE — 84295 ASSAY OF SERUM SODIUM: CPT | Performed by: SURGERY

## 2017-08-29 PROCEDURE — 40000275 ZZH STATISTIC RCP TIME EA 10 MIN

## 2017-08-29 PROCEDURE — 40000014 ZZH STATISTIC ARTERIAL MONITORING DAILY

## 2017-08-29 PROCEDURE — 80076 HEPATIC FUNCTION PANEL: CPT | Performed by: SURGERY

## 2017-08-29 PROCEDURE — 83690 ASSAY OF LIPASE: CPT | Performed by: SURGERY

## 2017-08-29 PROCEDURE — 83036 HEMOGLOBIN GLYCOSYLATED A1C: CPT | Performed by: SURGERY

## 2017-08-29 PROCEDURE — 85025 COMPLETE CBC W/AUTO DIFF WBC: CPT | Performed by: SURGERY

## 2017-08-29 PROCEDURE — 00000146 ZZHCL STATISTIC GLUCOSE BY METER IP

## 2017-08-29 PROCEDURE — 20000004 ZZH R&B ICU UMMC

## 2017-08-29 PROCEDURE — S0020 INJECTION, BUPIVICAINE HYDRO: HCPCS | Performed by: NURSE PRACTITIONER

## 2017-08-29 PROCEDURE — 86140 C-REACTIVE PROTEIN: CPT | Performed by: SURGERY

## 2017-08-29 PROCEDURE — 99291 CRITICAL CARE FIRST HOUR: CPT | Mod: GC | Performed by: SURGERY

## 2017-08-29 PROCEDURE — 40000196 ZZH STATISTIC RAPCV CVP MONITORING

## 2017-08-29 PROCEDURE — 97530 THERAPEUTIC ACTIVITIES: CPT | Mod: GP

## 2017-08-29 PROCEDURE — 80048 BASIC METABOLIC PNL TOTAL CA: CPT | Performed by: SURGERY

## 2017-08-29 PROCEDURE — 25000128 H RX IP 250 OP 636: Performed by: STUDENT IN AN ORGANIZED HEALTH CARE EDUCATION/TRAINING PROGRAM

## 2017-08-29 PROCEDURE — 80048 BASIC METABOLIC PNL TOTAL CA: CPT | Performed by: STUDENT IN AN ORGANIZED HEALTH CARE EDUCATION/TRAINING PROGRAM

## 2017-08-29 PROCEDURE — 84100 ASSAY OF PHOSPHORUS: CPT | Performed by: SURGERY

## 2017-08-29 PROCEDURE — 83605 ASSAY OF LACTIC ACID: CPT | Performed by: SURGERY

## 2017-08-29 PROCEDURE — 25000125 ZZHC RX 250: Performed by: NURSE PRACTITIONER

## 2017-08-29 PROCEDURE — 40000193 ZZH STATISTIC PT WARD VISIT

## 2017-08-29 RX ORDER — DEXTROSE, SODIUM CHLORIDE, SODIUM LACTATE, POTASSIUM CHLORIDE, AND CALCIUM CHLORIDE 5; .6; .31; .03; .02 G/100ML; G/100ML; G/100ML; G/100ML; G/100ML
1000 INJECTION, SOLUTION INTRAVENOUS ONCE
Status: COMPLETED | OUTPATIENT
Start: 2017-08-29 | End: 2017-08-29

## 2017-08-29 RX ORDER — HEPARIN SODIUM 5000 [USP'U]/.5ML
5000 INJECTION, SOLUTION INTRAVENOUS; SUBCUTANEOUS EVERY 8 HOURS
Status: DISCONTINUED | OUTPATIENT
Start: 2017-08-29 | End: 2017-09-01

## 2017-08-29 RX ORDER — SODIUM BICARBONATE 325 MG/1
325 TABLET ORAL EVERY 4 HOURS
Status: DISCONTINUED | OUTPATIENT
Start: 2017-08-29 | End: 2017-08-30

## 2017-08-29 RX ORDER — BUPIVACAINE HYDROCHLORIDE 2.5 MG/ML
10 INJECTION, SOLUTION INFILTRATION; PERINEURAL ONCE
Status: COMPLETED | OUTPATIENT
Start: 2017-08-29 | End: 2017-08-29

## 2017-08-29 RX ADMIN — Medication 2.5 ML: at 08:00

## 2017-08-29 RX ADMIN — SERTRALINE HYDROCHLORIDE 50 MG: 20 SOLUTION ORAL at 08:01

## 2017-08-29 RX ADMIN — PRAVASTATIN SODIUM 40 MG: 40 TABLET ORAL at 08:00

## 2017-08-29 RX ADMIN — HYDROCORTISONE SODIUM SUCCINATE 25 MG: 100 INJECTION, POWDER, FOR SOLUTION INTRAMUSCULAR; INTRAVENOUS at 19:48

## 2017-08-29 RX ADMIN — SODIUM CHLORIDE, SODIUM LACTATE, POTASSIUM CHLORIDE, CALCIUM CHLORIDE AND DEXTROSE MONOHYDRATE 1000 ML: 5; 600; 310; 30; 20 INJECTION, SOLUTION INTRAVENOUS at 16:14

## 2017-08-29 RX ADMIN — CYCLOBENZAPRINE HYDROCHLORIDE 10 MG: 10 TABLET, FILM COATED ORAL at 08:00

## 2017-08-29 RX ADMIN — Medication: at 05:04

## 2017-08-29 RX ADMIN — HEPARIN SODIUM 5000 UNITS: 5000 INJECTION, SOLUTION INTRAVENOUS; SUBCUTANEOUS at 19:49

## 2017-08-29 RX ADMIN — SODIUM CHLORIDE, SODIUM LACTATE, POTASSIUM CHLORIDE, CALCIUM CHLORIDE, AND DEXTROSE MONOHYDRATE 1000 ML: 600; 310; 30; 20; 5 INJECTION, SOLUTION INTRAVENOUS at 15:10

## 2017-08-29 RX ADMIN — ONDANSETRON 4 MG: 2 INJECTION INTRAMUSCULAR; INTRAVENOUS at 05:06

## 2017-08-29 RX ADMIN — SODIUM BICARBONATE 325 MG: 325 TABLET ORAL at 12:18

## 2017-08-29 RX ADMIN — RANITIDINE HYDROCHLORIDE 50 MG: 25 INJECTION INTRAMUSCULAR; INTRAVENOUS at 12:18

## 2017-08-29 RX ADMIN — SODIUM CHLORIDE, POTASSIUM CHLORIDE, SODIUM LACTATE AND CALCIUM CHLORIDE 500 ML: 600; 310; 30; 20 INJECTION, SOLUTION INTRAVENOUS at 04:10

## 2017-08-29 RX ADMIN — GABAPENTIN 300 MG: 250 SOLUTION ORAL at 08:00

## 2017-08-29 RX ADMIN — SODIUM CHLORIDE, SODIUM LACTATE, POTASSIUM CHLORIDE, CALCIUM CHLORIDE AND DEXTROSE MONOHYDRATE: 5; 600; 310; 30; 20 INJECTION, SOLUTION INTRAVENOUS at 22:50

## 2017-08-29 RX ADMIN — SODIUM BICARBONATE 325 MG: 325 TABLET ORAL at 16:10

## 2017-08-29 RX ADMIN — GABAPENTIN 300 MG: 250 SOLUTION ORAL at 19:50

## 2017-08-29 RX ADMIN — BUPIVACAINE HYDROCHLORIDE 25 MG: 2.5 INJECTION, SOLUTION EPIDURAL; INFILTRATION; INTRACAUDAL at 15:29

## 2017-08-29 RX ADMIN — FENOFIBRATE 160 MG: 160 TABLET ORAL at 08:01

## 2017-08-29 RX ADMIN — PROCHLORPERAZINE EDISYLATE 10 MG: 5 INJECTION INTRAMUSCULAR; INTRAVENOUS at 01:12

## 2017-08-29 RX ADMIN — RANITIDINE HYDROCHLORIDE 50 MG: 25 INJECTION INTRAMUSCULAR; INTRAVENOUS at 19:49

## 2017-08-29 RX ADMIN — Medication 2 G: at 06:11

## 2017-08-29 RX ADMIN — HYDROCORTISONE SODIUM SUCCINATE 50 MG: 100 INJECTION, POWDER, FOR SOLUTION INTRAMUSCULAR; INTRAVENOUS at 12:18

## 2017-08-29 RX ADMIN — ENOXAPARIN SODIUM 80 MG: 80 INJECTION SUBCUTANEOUS at 08:02

## 2017-08-29 RX ADMIN — HYDROCORTISONE SODIUM SUCCINATE 50 MG: 100 INJECTION, POWDER, FOR SOLUTION INTRAMUSCULAR; INTRAVENOUS at 04:10

## 2017-08-29 RX ADMIN — PANCRELIPASE 48000 UNITS: 24000; 76000; 120000 CAPSULE, DELAYED RELEASE PELLETS ORAL at 12:31

## 2017-08-29 RX ADMIN — SODIUM CHLORIDE, SODIUM LACTATE, POTASSIUM CHLORIDE, CALCIUM CHLORIDE AND DEXTROSE MONOHYDRATE: 5; 600; 310; 30; 20 INJECTION, SOLUTION INTRAVENOUS at 07:54

## 2017-08-29 RX ADMIN — SODIUM BICARBONATE 325 MG: 325 TABLET ORAL at 19:48

## 2017-08-29 RX ADMIN — RANITIDINE HYDROCHLORIDE 50 MG: 25 INJECTION INTRAMUSCULAR; INTRAVENOUS at 04:10

## 2017-08-29 RX ADMIN — Medication: at 13:16

## 2017-08-29 RX ADMIN — LEVOTHYROXINE SODIUM 150 MCG: 150 TABLET ORAL at 07:59

## 2017-08-29 RX ADMIN — PANCRELIPASE 48000 UNITS: 24000; 76000; 120000 CAPSULE, DELAYED RELEASE PELLETS ORAL at 16:10

## 2017-08-29 RX ADMIN — SODIUM CHLORIDE, POTASSIUM CHLORIDE, SODIUM LACTATE AND CALCIUM CHLORIDE 500 ML: 600; 310; 30; 20 INJECTION, SOLUTION INTRAVENOUS at 07:07

## 2017-08-29 RX ADMIN — PANCRELIPASE 48000 UNITS: 24000; 76000; 120000 CAPSULE, DELAYED RELEASE PELLETS ORAL at 19:50

## 2017-08-29 RX ADMIN — CYCLOBENZAPRINE HYDROCHLORIDE 10 MG: 10 TABLET, FILM COATED ORAL at 17:38

## 2017-08-29 ASSESSMENT — PAIN DESCRIPTION - DESCRIPTORS
DESCRIPTORS: SHARP

## 2017-08-29 NOTE — PLAN OF CARE
Problem: Goal Outcome Summary  Goal: Goal Outcome Summary  Outcome: Improving  Neuro: Intact.     CV: Paced rhthm. Stable BP.       Resp: Sounds clear with diminished bases. On 3 LPM sating 97%.     GI: Postive bowel sounds. Transverse surgical incision dressing marked no changes. RLQ VIRGIL. G-J tube in place. J tube clamped and G tube draining to gravity..     : Durham in place with inadequate urine production. 1500ml bolus of LR with no response. MD aware.     Access: RIJ 3X .     Meds: Preceded at 0.2, D5 w LR at 125ml/hr and dilaudid 0.4/10/1/2.4 and a continuous dose of 0.6mg. Ropivacaine to bilateral para vertebral at 14ml/hr.     Plan: Hemodynamic monitoring, pain management and notify the MD with any concerns.

## 2017-08-29 NOTE — PROGRESS NOTES
08/29/17 1400   Quick Adds   Type of Visit Initial PT Evaluation   Living Environment   Lives With spouse   Living Arrangements house   Home Accessibility stairs to enter home;stairs within home;bed not on first floor   Number of Stairs to Enter Home 1   Number of Stairs Within Home 12   Stair Railings at Home inside, present on right side   Transportation Available car;family or friend will provide   Living Environment Comment Shower upstairs. Will stay locally at [facility].   Self-Care   Usual Activity Tolerance excellent   Current Activity Tolerance moderate   Regular Exercise yes   Activity/Exercise Type walking   Exercise Amount/Frequency daily   Equipment Currently Used at Home none   Activity/Exercise/Self-Care Comment Works as a supervisor for [employer].   Functional Level Prior   Ambulation 0-->independent   Transferring 0-->independent   Fall history within last six months no   Which of the above functional risks had a recent onset or change? none   Prior Functional Level Comment Indep with mobility prior to admission. Wife will be staying with him at [facility] prior to d/c back to [place] (6 weeks in [place]).   General Information   Onset of Illness/Injury or Date of Surgery - Date 08/28/17   Patient/Family Goals Statement Decrease pain   Pertinent History of Current Problem (include personal factors and/or comorbidities that impact the POC) Lucas Brown is a 56 year old male w/ hx of chronic pancreatitis and intraductal papillary mucinous neoplasm (pathology pending) s/p Whipple (8/28/17).   Precautions/Limitations abdominal precautions;fall precautions   General Observations Pt supine in bed; agreeable to session.   Cognitive Status Examination   Orientation orientation to person, place and time   Level of Consciousness alert;lethargic/somnolent   Follows Commands and Answers Questions 100% of the time   Personal Safety and Judgment intact   Memory intact   Cognitive Comment Pain  "makes him lethargic   Pain Assessment   Patient Currently in Pain Yes, see Vital Sign flowsheet   Integumentary/Edema   Integumentary/Edema no deficits were identifed   Posture    Posture Forward head position;Protracted shoulders;Kyphosis   Range of Motion (ROM)   ROM Comment B LE AROM grossly WFL and limited by pain.   Strength   Strength Comments B LE strength at least 3+/5 but weakness noted with mobility   Bed Mobility   Bed Mobility Comments Supine>sit: mod A for LE/trunk support.   Transfer Skills   Transfer Comments Sit>stand: CGA/FWW   Gait   Gait Comments Not formally assessed.   Balance   Balance Comments CGA provided for standing dynamic/static balance.   Sensory Examination   Sensory Perception no deficits were identified   General Therapy Interventions   Planned Therapy Interventions balance training;bed mobility training;gait training;strengthening;stretching;transfer training;neuromuscular re-education;home program guidelines;progressive activity/exercise   Clinical Impression   Criteria for Skilled Therapeutic Intervention yes, treatment indicated   PT Diagnosis Impaired functional mobility   Influenced by the following impairments Pain, deconditioning, postop precautions   Functional limitations due to impairments Bed mobility, transfers, gait, balance, endurance.   Clinical Presentation Evolving/Changing   Clinical Presentation Rationale Clincial judgement   Clinical Decision Making (Complexity) Moderate complexity   Therapy Frequency` daily   Predicted Duration of Therapy Intervention (days/wks) 1 week   Anticipated Discharge Disposition Home with Assist   Risk & Benefits of therapy have been explained Yes   Patient, Family & other staff in agreement with plan of care Yes   [facility] AM-PAC  \"6 Clicks\" V.2 Basic Mobility Inpatient Short Form   1. Turning from your back to your side while in a flat bed without using bedrails? 3 - A Little   2. Moving from lying on your back to sitting on " the side of a flat bed without using bedrails? 2 - A Lot   3. Moving to and from a bed to a chair (including a wheelchair)? 3 - A Little   4. Standing up from a chair using your arms (e.g., wheelchair, or bedside chair)? 3 - A Little   5. To walk in hospital room? 3 - A Little   6. Climbing 3-5 steps with a railing? 2 - A Lot   Basic Mobility Raw Score (Score out of 24.Lower scores equate to lower levels of function) 16   Total Evaluation Time   Total Evaluation Time (Minutes) 14

## 2017-08-29 NOTE — PROGRESS NOTES
CLINICAL NUTRITION SERVICES - ASSESSMENT NOTE     Nutrition Prescription    Malnutrition Status:    Patient does not meet criteria necessary for diagnosing malnutrition    Recommendations already ordered by Registered Dietitian (RD):  Impact Peptide @ goal 80 ml/hr (1920 ml/day) to provide 2880 kcals (32 kcal/kg/day), 180 g PRO (2 g/kg/day), 1478 ml free H2O, 123 g Fat (50% from MCTs), 269 g CHO and no Fiber daily.    - Initiate @ 10 ml/hr and advancement per Dr Crum.   - Do not start or advance until lytes (Mg++,K+) WNL and phos>1.9   - Recommend 30 ml q4hr fluid flushes for tube patency. Note this does not meet pt's hydration needs.  - Daily check of K+, Mg, Phos until TF advances to goal rate for evaluation of refeeding syndrome and need for lyte replacement.   - Baseline CRP and monitor weekly for appro of immune-modulating regimen       Once begin TFs, begin the following pancreatic enzyme regimen and recommend order each of the following:   (please copy and paste administration instructions into each order)  A) Sodium Bicarb tablet (325 mg), 1 tablet q 4 hrs via Jtube. Administration Instructions: Crush 1 tablet and mix into 15 ml of warm water and use this solution to mix with Creon pancreatic enzymes. DO NOT administer directly into Jtube (to be mixed into TF formula with Creon enzyme - see Creon enzyme order)   B) Creon 24, 1- 2 capsules q 4 hrs via Jtube. Administration Instructions: If TF rate is running @ 10-50 ml/hr, administer 1 capsule q 4 hrs; once TF rate advances 60-80 ml/hr, increase to 2 capsule q 4 hrs. Open capsule and empty contents into 15 ml sodium bicarb solution (see sodium bicarb order), let dissolve for about 20-30 minutes and then add this solution to the amount of TF formula hung in TF bag every 4 hrs (i.e., once TF @ goal infusion 80 ml/hr will mix 2 capsules into 320 ml of TF formula every 4 hrs).   *Note: this enzyme regimen with TF @ goal infusion will provide approx 2341 units  "of lipase/gram of total Fat daily and approp dosing initially for pancreatic insufficiency with more elemental TF formula.     Future/Additional Recommendations:  Pending pt's ability to take PO meds and assuming TF @ goal, would recommend enzyme regimen of Viokase 64762, 4 capsules q 4 hrs to provide approx 2037 units of lipase/gram total Fat daily.     REASON FOR ASSESSMENT  Lucas Brown is a 56 year old male assessed by the dietitian for Provider Order - Registered Dietitian to Assess and Order TF per Medical Nutrition Therapy Protocol    NUTRITION HISTORY  Per chart review, pertinent PMH includes chronic pancreatitis and intraductal papillary mucinosis neoplasm now POD#1 s/p whipple.     Unable to obtain nutrition information from pt as sleeping at this time and unable to wake by increasing volume of writer's voice. Per pt's wife, pt eating mildly less than baseline (~75% intakes) for the past approx month due to appetite inhibited by symptoms of nausea/abdominal pain. Pt takes 1-5 tabs Viokase 17586 with meals (up to 15 tabs/day) providing range of 232-1160 units lipase/kg/meal.    CURRENT NUTRITION ORDERS  Diet: NPO x 1 day  Enteral access via GJ-tube. -> G tube draining to gravity and J tube clamped at this time.      LABS  Labs reviewed    MEDICATIONS  AquADEK liquid     ANTHROPOMETRICS  Height: 180.3 cm (5' 10.984\")  Most Recent Weight: 90.3 kg (199 lb 1.2 oz)    IBW: 78 kg  BMI: Overweight BMI 25-29.9  Weight History:  Per records, weight is stable near 200-205# over the past 9 months.   Wt Readings from Last 10 Encounters:   08/28/17 90.3 kg (199 lb 1.2 oz)   08/24/17 93.4 kg (205 lb 12.8 oz)   08/24/17 92.3 kg (203 lb 8 oz)   08/24/17 91.6 kg (202 lb)   07/20/17 91.1 kg (200 lb 12.8 oz)   06/07/17 90.2 kg (198 lb 14.4 oz)   05/15/17 89.5 kg (197 lb 5 oz)   05/10/17 89.9 kg (198 lb 1.6 oz)   01/20/17 89.8 kg (198 lb)   10/21/16 86.6 kg (191 lb)       Dosing Weight: 90 kg (adjusted, based on actual wt " of 90.3 kg upon admit)     ASSESSED NUTRITION NEEDS   Estimated Energy Needs: 4226-2125 kcals/day (30 - 35 kcals/kg )  Justification: Increased needs given chronic pancreatitis  Estimated Protein Needs: 135-180 grams protein/day (1.5 - 2 grams of pro/kg)  Justification: Increased needs given chronic pancreatitis  Estimated Fluid Needs: (1 mL/kcal)   Justification: Maintenance or per provider pending fluid status    PHYSICAL FINDINGS  See malnutrition section below.    MALNUTRITION  % Intake: </=75% for >/= 1 month (severe)  % Weight Loss: None noted  Subcutaneous Fat Loss: None observed  Muscle Loss: None observed  Fluid Accumulation/Edema: None noted per chart review   Malnutrition Diagnosis: Patient does not meet two of the above criteria necessary for diagnosing malnutrition    NUTRITION DIAGNOSIS  Inadequate oral intake related to altered GI status s/p whipple procedure as evidenced by NPO x 1 day and plan to initiate enteral nutrition support via J-tube access.      INTERVENTIONS  Implementation  Collaboration with other providers - SICU rounds  Enteral Nutrition / Feeding tube flush - Initiate per team discretion. Orders placed as above  Nutrition Education: Provided education regarding potential plan to initiate trophic TFs today pending team discretion.     Goals  Total avg nutritional intake to meet a minimum of 30 kcal/kg and 1.5 g PRO/kg daily (per dosing wt 90 kg).     Monitoring/Evaluation  Progress toward goals will be monitored and evaluated per protocol.    Eva Ramirez RD, LD  SICU Pager: [phone]

## 2017-08-29 NOTE — PROGRESS NOTES
SURGICAL ICU PROGRESS NOTE  August 29, 2017    ASSESSMENT: Lucas Brown is a 56 year old male w/ hx of chronic pancreatitis and intraductal papillary mucinous neoplasm (pathology pending) s/p Whipple (8/28/17).    PMHx: pacemaker (for perioperative 3rd degree AV block, 08/2014), aortic valve replacement (on warfarin, 1999, 2014), aortic aneurysm repair (2014), HTN, HLD, hypothyroidism, COLBY, BPH, GERD, and depression/anxiety.      TODAY'S PROGRESS/PLANS:   - started therapeutic Lovenox for mechanical valve  - trickle feeds through J tube  - FENa to assess etiology of poor urine output     PLAN:  Neuro/ pain/ sedation:  #post-operative pain  #sedation  #depression/anxiety  -Monitor neurological status. Notify the MD for any acute changes in exam.  -Dilaudid PCA with 0.5-1mg continuous   -Ropivicaine paravertebrals  -Tylenol, gabapentin, lidoderm patches, flexeril  -Precedex for sedation  -PTA sertraline    Pulmonary care:   #COLBY  - Supplemental oxygen to keep saturation above 92 %.  - Incentive spirometer every 15- 30 minutes when awake.    Cardiovascular:    #s/p pacemaker (for perioperative 3rd degree AV block, 08/2014), aortic valve replacement (on warfarin, 1999, 2014), aortic aneurysm repair (2014)  #coronary artery disease, mild nonocclusive coronary artery disease of the LAD  #AV regurgitation (per echo 03/2017)  #HTN  #HLD   - Monitor hemodynamic status.   - Electrolyte replacement protocol    - Starting therapeutic lovenox today at 80 bid  - PTA fenofibrate, pravastatin    GI care:   #IPMN s/p Whipple 8/28/17  #post-operative pancreatitis from prior AAA repair   -NPO except swabs and medications.  -G and J tubes to gravity   -G and J tubes and VIRGIL drain secured with flexi-trac cheng   -Protonix 40 mg IV Qday  -Prochlorperazine prn for nausea  -Tube feeds start today via J tube     Fluids/ Electrolytes/ Nutrition:   - Nutrition consulted; appreciate recs  - Trickle tube feeds start today @ 10/h   -  Electrolyte replacement protocol   - D5 LR at 125 for IV fluid hydration  - multivitamin  - No indication for parenteral nutrition.    Renal/ Fluid Balance:    #BPH  -Urine output poor; will obtain FENa to assess etiology  -Will continue to monitor intake and output.  -Continue hernandes    Endocrine:    #Hypothyroidism  -Levothyroxine 150mcg  #chronic steroids for temporal arteritis  -Hydrocortisone taper 50-25-10 over 3 days  -Goal sugars < 150; insulin drip available if > 150   -Endocrine consulted; appreciate recs   -Creon once starting tube feeds    ID/ Antibiotics:  - Completed eric-operative ertapenem     Heme:     #aortic valve replacement on warfarin  -On Lovenox 80 BID   #post-operative blood loss      -Monitor for surgical bleeding    MSK:  - PT/OT    Prophylaxis:    - Mechanical prophylaxis for DVT  - Lovenox 80 mg BID for mechanical valve   - Protonix 40 mg IV qday     Lines/ tubes/ drains:  - RIJ CVC, hernandes, On-Q paravertebrals  - GJ tube to gravity  - VIRGIL drain     Disposition:  - Surgical ICU    Tavon Cadena MD  Neurosurgery, PGY-1    ====================================    SUBJECTIVE:   - No acute events overnight.     OBJECTIVE:   1. VITAL SIGNS:   Temp:  [97.8  F (36.6  C)-99.3  F (37.4  C)] 99  F (37.2  C)  Heart Rate:  [59-79] 70  Resp:  [12-38] 18  BP: ()/(52-85) 103/68  MAP:  [35 mmHg-298 mmHg] 212 mmHg  Arterial Line BP: ()/() 222/222  SpO2:  [95 %-100 %] 96 %  Resp: 18    2. INTAKE/ OUTPUT:   I/O last 3 completed shifts:  In: 6827.2 [I.V.:4677.2; NG/GT:150; IV Piggyback:2000]  Out: 1280 [Urine:720; Emesis/NG output:40; Drains:20; Blood:500]    3. PHYSICAL EXAMINATION:   General: lying in bed. In discomfort but no distress.   Neuro: A&Ox3, strength in extremities symmetric   Resp: Breathing non-labored but shallow secondary to pain limitation   CV: RRR  Abdomen: Soft, Non-distended, appropriately tender   Incisions: no strikethrough evident   Extremities: warm and well  perfused    4. INVESTIGATIONS:   Arterial Blood Gases     Recent Labs  Lab 08/28/17  1841   PH 7.36   PCO2 44   PO2 156*   HCO3 25     Complete Blood Count     Recent Labs  Lab 08/29/17 0329 08/28/17 2005 08/28/17 1600 08/24/17  0738   WBC 6.2  --  6.4 5.2   HGB 8.5* 9.6* 9.6* 11.8*   *  --  166 257     Basic Metabolic Panel    Recent Labs  Lab 08/29/17 0329 08/28/17 1600 08/28/17 0601 08/24/17 0952 08/24/17 0852 08/24/17  0738    145*  --   --   --  142   POTASSIUM 4.6 4.3 3.8  --   --  3.6   CHLORIDE 111* 110*  --   --   --  109   CO2 27 27  --   --   --  26   BUN 20 13  --   --   --  16   CR 1.73* 0.99  --   --   --  0.96   * 122*  --  88 101* 88     Liver Function Tests    Recent Labs  Lab 08/29/17 0329 08/28/17 1600 08/28/17 0601 08/24/17  0738   AST 43 39  --  25   ALT 29 30  --  22   ALKPHOS 25* 28*  --  44   BILITOTAL 0.6 0.8  --  0.5   ALBUMIN 2.8* 3.2*  --  3.5   INR  --  1.26* 1.15* 2.87*     Pancreatic Enzymes    Recent Labs  Lab 08/29/17 0329   LIPASE 358   AMYLASE 84     Coagulation Profile    Recent Labs  Lab 08/28/17 1600 08/28/17 0601 08/24/17  0738   INR 1.26* 1.15* 2.87*   PTT 29 31  --      Lactate  Invalid input(s): LACTATE    5. RADIOLOGY:   Recent Results (from the past 24 hour(s))   XR Chest Port 1 View    Narrative    EXAMINATION: XR CHEST PORT 1 VW, 8/28/2017 4:13 PM    COMPARISON: None    HISTORY: central venous catheter placement    FINDINGS: Right IJ catheter tip in the low internal jugular vein.  Cardiac silhouette is enlarged. Aorta is tortuous. Pulmonary  vasculature is indistinct.      Impression    IMPRESSION:   1. Right IJ catheter tip at the low internal jugular vein.  2. Large cardiac silhouette.  3.  Pulmonary vascular congestion.    JOSÉ DIAMOND MD       =========================================

## 2017-08-29 NOTE — PROGRESS NOTES
SPIRITUAL HEALTH SERVICES  SPIRITUAL ASSESSMENT Progress Note  [facility] ([place]) 4A    Lucas was partially asleep yet was listening and was interested in praying the Lord's Prayer with his wife and . His wife shared the long health journey they have been on these past several years. She named a Yazidi and Shinto heritage for Lucas and herself. Their daughter lives in [place] can visit periodically so they are not alone in [place]. Wife is staying nearby Hospital in her own apt to sleep each night. Both Lucas and wife welcome additional  visits. Will visit 2ce week while on unit.      Geovani Perdue  Chaplain Resident  Pager [phone]

## 2017-08-29 NOTE — PLAN OF CARE
Problem: Goal Outcome Summary  Goal: Goal Outcome Summary  PT 4A: Pt tolerated session well. OOB and ambulated around bed with A x 1 and another assisting with lines. Pain limiting mostly but able to perform light activity. Expect that pt should be able to d/c to [facility] apartments with wife's assist once medically stable.

## 2017-08-29 NOTE — PHARMACY-CONSULT NOTE
Pharmacy Tube Feeding Consult    Medication reviewed for administration by feeding tube and for potential food/drug interactions.    Recommendation: No changes are needed at this time.     Pharmacy will continue to follow as new medications are ordered.    Danielle Upton Pharm.D. Resident

## 2017-08-29 NOTE — PROGRESS NOTES
REGIONAL ANESTHESIA PAIN SERVICE CONTINUOUS NERVE INFUSION NOTE  Subjective and Interval History: Pain control via nerve block continuous infusion Precedex infusion, and PCA HYDROmorphone.  Denies any weakness, paresthesias, circumoral numbness, metallic taste or tinnitus.  Patient denies nausea/vomiting. Patient is NPO.                             Clinically Aligned Pain Assessment (CAPA):   unable to assess at this time due to sedation           Anticoagulation:       enoxaparin (LOVENOX) injection 80 mg 80 mg, SC, Q12H Ordered         Medications related to Pain Management (Future)    Start       Dose/Rate Route Frequency Ordered Stop     08/29/17 0800   gabapentin (NEURONTIN) solution 300 mg       300 mg Oral or J tube 2 TIMES DAILY 08/28/17 1832 08/28/17 2000   lidocaine (LIDODERM) 5 % Patch 1 patch       1 patch Transdermal EVERY 24 HOURS 2000 08/28/17 1832 08/28/17 2000   lidocaine (LIDODERM) patch in PLACE         Transdermal EVERY 8 HOURS 08/28/17 1832 08/28/17 1845   dexmedetomidine (PRECEDEX) 400 mcg in 0.9% sodium chloride 100 mL       0.2-0.7 mcg/kg/hr × 90.3 kg  4.5-15.8 mL/hr  Intravenous CONTINUOUS 08/28/17 1832        08/28/17 1832   lidocaine 1 % 1 mL       1 mL Other EVERY 1 HOUR PRN 08/28/17 1832        08/28/17 1832   lidocaine (LMX4) kit         Topical EVERY 1 HOUR PRN 08/28/17 1832        08/28/17 1832   cyclobenzaprine (FLEXERIL) tablet 10 mg       10 mg Oral 3 TIMES DAILY PRN 08/28/17 1832        08/28/17 1615   HYDROmorphone (DILAUDID) PCA 1 mg/mL         Intravenous PCA 08/28/17 1601        08/28/17 0845   ROPivacaine 0.2% (NAROPIN) 750 mL in ON-Q C-Bloc select flow ([id number] holds 600-750 mL) dual cath disposable pump       14 mL/hr  Paravertebral Elastomeric Pump 08/28/17 0835                     OBJECTIVE:     Diagnostic:        Lab Results   Component Value Date     WBC 6.2 08/29/2017            Lab Results   Component Value Date     RBC 3.29 08/29/2017             Lab Results   Component Value Date     HGB 8.5 08/29/2017            Lab Results   Component Value Date     HCT 27.3 08/29/2017            Lab Results   Component Value Date      08/29/2017            Vitals:              Temp:  [98  F (36.7  C)-99.3  F (37.4  C)] 98.9  F (37.2  C)  Heart Rate:  [62-79] 62  Resp:  [9-38] 18  BP: ()/(52-78) 97/61  MAP:  [35 mmHg-298 mmHg] 212 mmHg  Arterial Line BP: ()/() 222/222  SpO2:  [92 %-100 %] 96 %     Exam:                         Strength 5/5 and symmetric grossly in bilateral LE                            B/L paravertebral (PV) catheter sites with dressing c/d/i, no tenderness, erythema, heme, edema        ASSESSMENT/PLAN:    Lucas Brown is a 56 year old male POD #1 s/p WHIPPLE PROCEDURE and placement of B/L Thoracic PV catheters for analgesia.  Pt is receiving adequate analgesia with Ropivacaine 0.2%, total infusion 14mL/hour - 7mL each catheter.  Pt is moderately sedated on precedex infusion and falls asleep frequently during our conversation.  No weakness or paresthesias.  No evidence of adverse side effects associated with local anesthetic.  Pt is opiate tolerant.  Per PAC note 8/24/17 pt is on Fentanyl Patch 50mcg/hr q 72 hrs, HYDROmorphone 2mg 1 tab q QID.  Pt was also on warfarin prior to this surgery.     - 0830 continue current total infusion of 14mL/hour - 7mL each catheter  - please page #[phone] if anticoagulation is going to change from Lovenox 80mg sq q 12 hrs  - will continue to follow and adjust as needed  - expected change of next On-Q pump is 2 days    Donta Hayes MD  08/29/2017 2:00 PM

## 2017-08-29 NOTE — OP NOTE
PREOPERATIVE DIAGNOSES: Acute relapsing pancreatitis  POSTOPERATIVE DIAGNOSES: Same  PROCEDURE:  Pancreaticoduodenectomy (Whipple proceedure), umbilical hernia repair, incidental appendectomy , gastrojejunostomy tube placement  SURGEON: Filipe Crum MD  ASSISTANT:  Shanti Sommer MD, Fernando Molina MD   ANESTHESIA:  General endotracheal  SPECIMENS: Whipple specimen, proximal jejunum, gallbladder, appendix.  DRAINS: Tyson drain.  EBL: 500 ml  COMPLICATIONS:  None apparent   FINDINGS: Abnormal head of pancreas with marked peripancreatic inflammation    INDICATIONS FOR PROCEDURE: Pt has chronic pancreatitis. Initial episode was post-op after aortic valve surgery in Oct 2014. He has had several ERCPs and stents with continued symptoms. He has had multiple ED visits for pancreatitis pain, sometimes as often as twice monthly.  He is on chronic opioids. His imaging shows pancreatic atrophy with abnormalities in the head of the pancreas. He presents at this time for a Whipple procedure  DESCRIPTION OF THE PROCEDURE: After obtaining informed consent, the patient was brought to the operative room and placed in a supine position. General endotracheal intubation and anesthesia was induced. After this, an arterial line and a central line were placed under sterile condition by the anesthesia team. A briefing was performed. SCD's and Durham catheter were placed. The abdomen was prepped and draped in the usual fashion. The patient received preoperative IV antibiotics (Ertapenem). A pause for the cause was performed and a bilateral subcostal incision was made sharply and carried down through the subcutaneous tissue. The peritoneum was opened under direct visualization. The small bowel was run from ligament of Treitz to ileocecal valve. After this, the abdominal retractor was placed in the upper abdomen. We proceeded to open the lesser sac and found the pancreatic head to be firm.  The duodenum was Kocherized, completely mobilizing the  head of the pancreas. At the superior border of the pancreas, the distal common bile duct and gastroduodenal artery were isolated. The proximal duodenum was divided 2 cm distal to the pylorus. The superior mesenteric vein was identified and traced to the SMV. Careful dissection was carried out between the pancreas and the SMV, freeing this space to the superior margin of the pancreas. We then divided the proximal jejunum approx 10 cm distal to the ligament of Treitz, then divided the jejunal vessels proximally with the ligasure to the LOT, which was taken down carefully until this space had been completely opened. The proximal jejunum was passed into the RUQ thru the opened LOT, then divided next to the head of the pancreas. The proximal jejunum was sent for pathologic examination.  After ensuring that it was not a replaced right hepatic artery by checking the direction of its flow, the gastroduodenal artery was divided using 2-0 silk ligatures. The bile duct was divided sharply. The pancreatic neck was divided using the ligasure device. The uncinate process of the pancreas was then dissected off the retroperitoneum and divided using first a  ligasure device, then a TA stapler. During removal of the uncinate process, 3 small branches off the SMV were avulsed and the venotomies were closed with 5-0 prolene suture. The Whipple specimen was sent for pathology. We verified that the operative field was hemostatic.   Next, we began the reconstruction. The defect in the ligament of Treitz was closed using running 3-0 vicryl suture. A Rachell en Y jejunojejunostomy was constructed with each limb of the Rachell being 60 cm in length. This anastomosis was constructed using MINA stapler with enterotomies closed using a single layer of 3-0 silk suture. The mesenteric defect in the Rachell limb was closed using several interrupted sutures of 3-0 silk.   A defect was created in the transverse mesocolon and the proximal of the two jejunal  segments was passed thru this defect. An end to side pancreaticojejunostomy was constructed after first spatulating the pancreatic duct. The pancreatic duct was anastamosed using several 5-0 PDS sutures to the enterotomy in the small bowel. The small bowel was then closed over the end of the pancreas using circumferential interrupted sutures of 3-0 silk.   A defect was created to the patient's right in the colonic mesentery and a limb of the jejunum passed thru this defect. We noted healthy bile flow. An end to side choledochojejunostomy was created using a single layer of 5-0 PDS. Several sutures of 3-0 silk were used to affix the jejunum to soft tissue around the hilum of the liver. The jejunum was affixed in the mesenteric defect with several 3-0 silk sutures.  An end to side duodenojejunostomy was constructed using a single layer anastomosis of 3-0 silk sutures. An incidental appendectomy was performed.  The fascia of his umbilical hernia was sutured together with an 0 vicryl figure of eight.   Two concentric pursestring 3-0 Silk sutures were placed in the anterior stomach and a gastrotomy was created. A 24 fr gastrojejunostomy was placed through the abdominal wall and into the gastrotomy, then passed without difficulty through the pylorus and into the proximal jejunum. The balloon in the stomach was inflated with 5 ml.   A drain was placed into the RUQ and brought out thru a separate right lower quadrant stab wound.  The abdomen was lavaged, then closed in layers with # 1 vicryl and 0 looped PDS, the subcutaneous tissue was irrigated, hemostasis was obtained and the skin was stapled. At the end of the case, all the sponge and needle counts were correct. Faculty was present during the entire procedure. The patient tolerated the procedure well and  was transferred in stable and satisfactory condition to the PACU.

## 2017-08-29 NOTE — PROGRESS NOTES
REGIONAL ANESTHESIA PAIN SERVICE CONTINUOUS NERVE INFUSION NOTE  Subjective and Interval History: Pain control via nerve block continuous infusion Precedex infusion, and PCA HYDROmorphone.  Denies any weakness, paresthesias, circumoral numbness, metallic taste or tinnitus.  Patient denies nausea/vomiting. Patient is NPO.       Clinically Aligned Pain Assessment (CAPA):   unable to assess at this time due to sedation        Anticoagulation:      enoxaparin (LOVENOX) injection 80 mg 80 mg, SC, Q12H Ordered       Medications related to Pain Management (Future)    Start     Dose/Rate Route Frequency Ordered Stop    08/29/17 0800  gabapentin (NEURONTIN) solution 300 mg      300 mg Oral or J tube 2 TIMES DAILY 08/28/17 1832 08/28/17 2000  lidocaine (LIDODERM) 5 % Patch 1 patch      1 patch Transdermal EVERY 24 HOURS 2000 08/28/17 1832 08/28/17 2000  lidocaine (LIDODERM) patch in PLACE       Transdermal EVERY 8 HOURS 08/28/17 1832 08/28/17 1845  dexmedetomidine (PRECEDEX) 400 mcg in 0.9% sodium chloride 100 mL      0.2-0.7 mcg/kg/hr × 90.3 kg  4.5-15.8 mL/hr  Intravenous CONTINUOUS 08/28/17 1832      08/28/17 1832  lidocaine 1 % 1 mL      1 mL Other EVERY 1 HOUR PRN 08/28/17 1832      08/28/17 1832  lidocaine (LMX4) kit       Topical EVERY 1 HOUR PRN 08/28/17 1832      08/28/17 1832  cyclobenzaprine (FLEXERIL) tablet 10 mg      10 mg Oral 3 TIMES DAILY PRN 08/28/17 1832      08/28/17 1615  HYDROmorphone (DILAUDID) PCA 1 mg/mL       Intravenous PCA 08/28/17 1601      08/28/17 0845  ROPivacaine 0.2% (NAROPIN) 750 mL in ON-Q C-Bloc select flow ([id number] holds 600-750 mL) dual cath disposable pump      14 mL/hr  Paravertebral Elastomeric Pump 08/28/17 0835                OBJECTIVE:    Diagnostic:  Lab Results   Component Value Date    WBC 6.2 08/29/2017     Lab Results   Component Value Date    RBC 3.29 08/29/2017     Lab Results   Component Value Date    HGB 8.5 08/29/2017     Lab Results   Component Value Date     HCT 27.3 08/29/2017     Lab Results   Component Value Date     08/29/2017         Vitals:    Temp:  [98  F (36.7  C)-99.3  F (37.4  C)] 98.9  F (37.2  C)  Heart Rate:  [62-79] 62  Resp:  [9-38] 18  BP: ()/(52-78) 97/61  MAP:  [35 mmHg-298 mmHg] 212 mmHg  Arterial Line BP: ()/() 222/222  SpO2:  [92 %-100 %] 96 %    Exam:    Strength 5/5 and symmetric grossly in bilateral LE      B/L paravertebral (PV) catheter sites with dressing c/d/i, no tenderness, erythema, heme, edema      ASSESSMENT/PLAN:    Luacs Brown is a 56 year old male POD #1 s/p WHIPPLE PROCEDURE and placement of B/L Thoracic PV catheters for analgesia.  Pt is receiving adequate analgesia with Ropivacaine 0.2%, total infusion 14mL/hour - 7mL each catheter.  Pt is moderately sedated on precedex infusion and falls asleep frequently during our conversation.  No weakness or paresthesias.  No evidence of adverse side effects associated with local anesthetic.  Pt is opiate tolerant.  Per PAC note 8/24/17 pt is on Fentanyl Patch 50mcg/hr q 72 hrs, HYDROmorphone 2mg 1 tab q QID.  Pt was also on warfarin prior to this surgery.    - 0830 continue current total infusion of 14mL/hour - 7mL each catheter   - 1520 B/L PV catheters were bolused with 5mL PF bupivacaine   RN to chart BP & P q 5 min for 30min - page #[phone] if MAP <60   Pt may be evaluated for q12 hr bolus' PRN by paging #[phone]  - please page #[phone] if anticoagulation is going to change from Lovenox 80mg sq q 12 hrs  - will continue to follow and adjust as needed  - expected change of next On-Q pump is 2 days  - discussed plan with attending anesthesiologist    TERRELL Gomez CNP  Regional Anesthesia Pain Service  8/29/2017 7:06 AM    24 hour Job Code Pager.  For in-house use only.     [place]:  * * [phone]  [place]: * * [phone]  Peds: * * [phone]  Enter call-back number and #      This pager only accepts text messages through [facility]

## 2017-08-29 NOTE — PROGRESS NOTES
"Surgery Progress note    S:  Low urine output and marginal pain control overnight. Received 1.5 L of IV boluses in total overnight. Pt seen at bedside resting comfortably.  No fevers overnight.     O:  BP 97/61  Pulse 63  Temp 98.9  F (37.2  C) (Oral)  Resp 18  Ht 1.803 m (5' 10.98\")  Wt 90.3 kg (199 lb 1.2 oz)  SpO2 96%  BMI 27.78 kg/m2  Awake, alert, NAD  Breathing non-labored  RRR  Soft, some bruising over the lower abdomen bilaterally. ND, felisa ttp over incisions. Dressing c/d/i. GJ to gravity with minimal bilious output. VIRGIL drain with SS output.   Distal extremities warm.         Intake/Output Summary (Last 24 hours) at 08/29/17 0636  Last data filed at 08/29/17 0600   Gross per 24 hour   Intake           6327.2 ml   Output             1280 ml   Net           5047.2 ml         BMP  Recent Labs  Lab 08/29/17 0329 08/28/17 1600 08/28/17 0601 08/24/17 0952 08/24/17 0852 08/24/17  0738    145*  --   --   --  142   POTASSIUM 4.6 4.3 3.8  --   --  3.6   CHLORIDE 111* 110*  --   --   --  109   NICHOLAS 7.0* 7.4*  --   --   --  8.6   CO2 27 27  --   --   --  26   BUN 20 13  --   --   --  16   CR 1.73* 0.99  --   --   --  0.96   * 122*  --  88 101* 88     CBC  Recent Labs  Lab 08/29/17 0329 08/28/17 2005 08/28/17 1600 08/24/17  0738   WBC 6.2  --  6.4 5.2   RBC 3.29*  --  3.67* 4.55   HGB 8.5* 9.6* 9.6* 11.8*   HCT 27.3*  --  29.7* 36.5*   MCV 83  --  81 80   MCH 25.8*  --  26.2* 25.9*   MCHC 31.1*  --  32.3 32.3   RDW 17.0*  --  16.2* 15.7*   *  --  166 257     INR  Recent Labs  Lab 08/28/17  1600 08/28/17  0601 08/24/17  0738   INR 1.26* 1.15* 2.87*      Liver Function Studies -   Recent Labs   Lab Test  08/29/17   0329   PROTTOTAL  5.5*   ALBUMIN  2.8*   BILITOTAL  0.6   ALKPHOS  25*   AST  43   ALT  29          A/P: Lucas Brown is a 56 year old male with history of chronic pancreatitis and 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 GJ tubes to gravity.    Renal: Marginal UOP and Cr bump post op. Most likely prerenal azotemia. Continue IVF resuscitation, Avoid nephrotoxic medications.   Endo: Monitor blood sugar levels. BS consistently below 150. Insulin drip if BS >150.    ID: Finished perioperative ertapenem   Heme: appropriate Hgb drop post op 8.5 from 9.6. Will monitor.   Prophylaxis: SCDs. lovenox therapeutic dosage to start today    Patient seen with chief resident, Dr. Sommer who will discuss with staff.    Fernando Molina MD  PGY-2, General Surgery  [phone]

## 2017-08-29 NOTE — PLAN OF CARE
Problem: Goal Outcome Summary  Goal: Goal Outcome Summary  OT 4A: OT evaluation not initiated today; patient not appropriate this am and will be seen by PT in pm for evaluation. Reschedule for 8/30.

## 2017-08-30 ENCOUNTER — APPOINTMENT (OUTPATIENT)
Dept: OCCUPATIONAL THERAPY | Facility: CLINIC | Age: 57
DRG: 406 | End: 2017-08-30
Attending: SURGERY
Payer: COMMERCIAL

## 2017-08-30 ENCOUNTER — APPOINTMENT (OUTPATIENT)
Dept: PHYSICAL THERAPY | Facility: CLINIC | Age: 57
DRG: 406 | End: 2017-08-30
Attending: SURGERY
Payer: COMMERCIAL

## 2017-08-30 LAB
ALBUMIN UR-MCNC: 30 MG/DL
ALBUMIN UR-MCNC: NORMAL MG/DL
AMORPH CRY #/AREA URNS HPF: ABNORMAL /HPF
ANION GAP SERPL CALCULATED.3IONS-SCNC: 4 MMOL/L (ref 3–14)
ANION GAP SERPL CALCULATED.3IONS-SCNC: 4 MMOL/L (ref 3–14)
APPEARANCE UR: ABNORMAL
APPEARANCE UR: NORMAL
BASE DEFICIT BLDV-SCNC: 0.1 MMOL/L
BASOPHILS # BLD AUTO: 0 10E9/L (ref 0–0.2)
BASOPHILS NFR BLD AUTO: 0.2 %
BILIRUB UR QL STRIP: ABNORMAL
BILIRUB UR QL STRIP: NORMAL
BUN SERPL-MCNC: 35 MG/DL (ref 7–30)
BUN SERPL-MCNC: 39 MG/DL (ref 7–30)
CALCIUM SERPL-MCNC: 6.8 MG/DL (ref 8.5–10.1)
CALCIUM SERPL-MCNC: 7.1 MG/DL (ref 8.5–10.1)
CHLORIDE SERPL-SCNC: 111 MMOL/L (ref 94–109)
CHLORIDE SERPL-SCNC: 113 MMOL/L (ref 94–109)
CO2 SERPL-SCNC: 27 MMOL/L (ref 20–32)
CO2 SERPL-SCNC: 28 MMOL/L (ref 20–32)
COLOR UR AUTO: ABNORMAL
COLOR UR AUTO: NORMAL
CREAT SERPL-MCNC: 2.18 MG/DL (ref 0.66–1.25)
CREAT SERPL-MCNC: 2.46 MG/DL (ref 0.66–1.25)
CREAT UR-MCNC: 323 MG/DL
DIFFERENTIAL METHOD BLD: ABNORMAL
EOSINOPHIL # BLD AUTO: 0 10E9/L (ref 0–0.7)
EOSINOPHIL NFR BLD AUTO: 0.2 %
ERYTHROCYTE [DISTWIDTH] IN BLOOD BY AUTOMATED COUNT: 17.6 % (ref 10–15)
FRACT EXCRET NA UR+SERPL-RTO: <0.1 %
GFR SERPL CREATININE-BSD FRML MDRD: 27 ML/MIN/1.7M2
GFR SERPL CREATININE-BSD FRML MDRD: 31 ML/MIN/1.7M2
GLUCOSE SERPL-MCNC: 123 MG/DL (ref 70–99)
GLUCOSE SERPL-MCNC: 134 MG/DL (ref 70–99)
GLUCOSE UR STRIP-MCNC: NEGATIVE MG/DL
GLUCOSE UR STRIP-MCNC: NORMAL MG/DL
HCO3 BLDV-SCNC: 26 MMOL/L (ref 21–28)
HCT VFR BLD AUTO: 25.7 % (ref 40–53)
HGB BLD-MCNC: 8 G/DL (ref 13.3–17.7)
HGB UR QL STRIP: ABNORMAL
HGB UR QL STRIP: NORMAL
HYALINE CASTS #/AREA URNS LPF: 6 /LPF (ref 0–2)
IMM GRANULOCYTES # BLD: 0 10E9/L (ref 0–0.4)
IMM GRANULOCYTES NFR BLD: 0.3 %
KETONES UR STRIP-MCNC: NEGATIVE MG/DL
KETONES UR STRIP-MCNC: NORMAL MG/DL
LACTATE BLD-SCNC: 0.9 MMOL/L (ref 0.7–2)
LEUKOCYTE ESTERASE UR QL STRIP: ABNORMAL
LEUKOCYTE ESTERASE UR QL STRIP: NORMAL
LYMPHOCYTES # BLD AUTO: 0.8 10E9/L (ref 0.8–5.3)
LYMPHOCYTES NFR BLD AUTO: 12.8 %
MAGNESIUM SERPL-MCNC: 2.1 MG/DL (ref 1.6–2.3)
MCH RBC QN AUTO: 26.6 PG (ref 26.5–33)
MCHC RBC AUTO-ENTMCNC: 31.1 G/DL (ref 31.5–36.5)
MCV RBC AUTO: 85 FL (ref 78–100)
MONOCYTES # BLD AUTO: 0.6 10E9/L (ref 0–1.3)
MONOCYTES NFR BLD AUTO: 9.6 %
MUCOUS THREADS #/AREA URNS LPF: PRESENT /LPF
NEUTROPHILS # BLD AUTO: 4.7 10E9/L (ref 1.6–8.3)
NEUTROPHILS NFR BLD AUTO: 76.9 %
NITRATE UR QL: NEGATIVE
NITRATE UR QL: NORMAL
NRBC # BLD AUTO: 0 10*3/UL
NRBC BLD AUTO-RTO: 0 /100
O2/TOTAL GAS SETTING VFR VENT: ABNORMAL %
PCO2 BLDV: 51 MM HG (ref 40–50)
PH BLDV: 7.32 PH (ref 7.32–7.43)
PH UR STRIP: 5.5 PH (ref 5–7)
PH UR STRIP: NORMAL PH (ref 5–7)
PHOSPHATE SERPL-MCNC: 2.8 MG/DL (ref 2.5–4.5)
PLATELET # BLD AUTO: 139 10E9/L (ref 150–450)
PO2 BLDV: 30 MM HG (ref 25–47)
POTASSIUM SERPL-SCNC: 4.5 MMOL/L (ref 3.4–5.3)
POTASSIUM SERPL-SCNC: 4.6 MMOL/L (ref 3.4–5.3)
RBC # BLD AUTO: 3.01 10E12/L (ref 4.4–5.9)
RBC #/AREA URNS AUTO: >182 /HPF (ref 0–2)
RBC #/AREA URNS AUTO: NORMAL /HPF (ref 0–2)
SODIUM SERPL-SCNC: 142 MMOL/L (ref 133–144)
SODIUM SERPL-SCNC: 144 MMOL/L (ref 133–144)
SODIUM UR-SCNC: 6 MMOL/L
SOURCE: ABNORMAL
SOURCE: NORMAL
SP GR UR STRIP: 1.01 (ref 1–1.03)
SP GR UR STRIP: NORMAL (ref 1–1.03)
SQUAMOUS #/AREA URNS AUTO: <1 /HPF (ref 0–1)
UROBILINOGEN UR STRIP-MCNC: 2 MG/DL (ref 0–2)
UROBILINOGEN UR STRIP-MCNC: NORMAL MG/DL (ref 0–2)
WBC # BLD AUTO: 6.2 10E9/L (ref 4–11)
WBC #/AREA URNS AUTO: 4 /HPF (ref 0–2)
WBC #/AREA URNS AUTO: NORMAL /HPF (ref 0–2)

## 2017-08-30 PROCEDURE — 20000004 ZZH R&B ICU UMMC

## 2017-08-30 PROCEDURE — 80048 BASIC METABOLIC PNL TOTAL CA: CPT | Performed by: SURGERY

## 2017-08-30 PROCEDURE — 25000128 H RX IP 250 OP 636: Performed by: SURGERY

## 2017-08-30 PROCEDURE — 97530 THERAPEUTIC ACTIVITIES: CPT | Mod: GP

## 2017-08-30 PROCEDURE — 40000133 ZZH STATISTIC OT WARD VISIT: Performed by: OCCUPATIONAL THERAPIST

## 2017-08-30 PROCEDURE — 82570 ASSAY OF URINE CREATININE: CPT | Performed by: SURGERY

## 2017-08-30 PROCEDURE — 82803 BLOOD GASES ANY COMBINATION: CPT | Performed by: STUDENT IN AN ORGANIZED HEALTH CARE EDUCATION/TRAINING PROGRAM

## 2017-08-30 PROCEDURE — 83605 ASSAY OF LACTIC ACID: CPT | Performed by: SURGERY

## 2017-08-30 PROCEDURE — P9041 ALBUMIN (HUMAN),5%, 50ML: HCPCS

## 2017-08-30 PROCEDURE — 25000132 ZZH RX MED GY IP 250 OP 250 PS 637: Performed by: SURGERY

## 2017-08-30 PROCEDURE — 40000193 ZZH STATISTIC PT WARD VISIT

## 2017-08-30 PROCEDURE — 80048 BASIC METABOLIC PNL TOTAL CA: CPT | Performed by: PHYSICIAN ASSISTANT

## 2017-08-30 PROCEDURE — 99291 CRITICAL CARE FIRST HOUR: CPT | Mod: GC | Performed by: SURGERY

## 2017-08-30 PROCEDURE — 25000128 H RX IP 250 OP 636: Performed by: STUDENT IN AN ORGANIZED HEALTH CARE EDUCATION/TRAINING PROGRAM

## 2017-08-30 PROCEDURE — 83735 ASSAY OF MAGNESIUM: CPT | Performed by: SURGERY

## 2017-08-30 PROCEDURE — 27210436 ZZH NUTRITION PRODUCT SEMIELEM INTERMED CAN

## 2017-08-30 PROCEDURE — 85025 COMPLETE CBC W/AUTO DIFF WBC: CPT | Performed by: SURGERY

## 2017-08-30 PROCEDURE — 84100 ASSAY OF PHOSPHORUS: CPT | Performed by: SURGERY

## 2017-08-30 PROCEDURE — 84300 ASSAY OF URINE SODIUM: CPT | Performed by: SURGERY

## 2017-08-30 PROCEDURE — 97165 OT EVAL LOW COMPLEX 30 MIN: CPT | Mod: GO | Performed by: OCCUPATIONAL THERAPIST

## 2017-08-30 PROCEDURE — 97530 THERAPEUTIC ACTIVITIES: CPT | Mod: GO | Performed by: OCCUPATIONAL THERAPIST

## 2017-08-30 PROCEDURE — P9041 ALBUMIN (HUMAN),5%, 50ML: HCPCS | Performed by: STUDENT IN AN ORGANIZED HEALTH CARE EDUCATION/TRAINING PROGRAM

## 2017-08-30 PROCEDURE — 25000125 ZZHC RX 250: Performed by: SURGERY

## 2017-08-30 PROCEDURE — 25000128 H RX IP 250 OP 636

## 2017-08-30 PROCEDURE — 81001 URINALYSIS AUTO W/SCOPE: CPT | Performed by: SURGERY

## 2017-08-30 PROCEDURE — 97535 SELF CARE MNGMENT TRAINING: CPT | Mod: GO | Performed by: OCCUPATIONAL THERAPIST

## 2017-08-30 RX ORDER — ALBUMIN, HUMAN INJ 5% 5 %
12.5 SOLUTION INTRAVENOUS ONCE
Status: COMPLETED | OUTPATIENT
Start: 2017-08-30 | End: 2017-08-30

## 2017-08-30 RX ORDER — ALBUMIN, HUMAN INJ 5% 5 %
SOLUTION INTRAVENOUS
Status: COMPLETED
Start: 2017-08-30 | End: 2017-08-30

## 2017-08-30 RX ORDER — ALBUMIN, HUMAN INJ 5% 5 %
25 SOLUTION INTRAVENOUS ONCE
Status: COMPLETED | OUTPATIENT
Start: 2017-08-30 | End: 2017-08-30

## 2017-08-30 RX ORDER — BUPIVACAINE HYDROCHLORIDE 2.5 MG/ML
10 INJECTION, SOLUTION INFILTRATION; PERINEURAL ONCE
Status: COMPLETED | OUTPATIENT
Start: 2017-08-30 | End: 2017-08-30

## 2017-08-30 RX ORDER — SODIUM BICARBONATE 325 MG/1
325 TABLET ORAL EVERY 4 HOURS
Status: DISCONTINUED | OUTPATIENT
Start: 2017-08-30 | End: 2017-09-13 | Stop reason: HOSPADM

## 2017-08-30 RX ADMIN — LEVOTHYROXINE SODIUM 150 MCG: 150 TABLET ORAL at 09:29

## 2017-08-30 RX ADMIN — GABAPENTIN 300 MG: 250 SOLUTION ORAL at 09:29

## 2017-08-30 RX ADMIN — PANCRELIPASE 48000 UNITS: 24000; 76000; 120000 CAPSULE, DELAYED RELEASE PELLETS ORAL at 03:34

## 2017-08-30 RX ADMIN — PANCRELIPASE 48000 UNITS: 24000; 76000; 120000 CAPSULE, DELAYED RELEASE PELLETS ORAL at 09:24

## 2017-08-30 RX ADMIN — SODIUM CHLORIDE, POTASSIUM CHLORIDE, SODIUM LACTATE AND CALCIUM CHLORIDE 1000 ML: 600; 310; 30; 20 INJECTION, SOLUTION INTRAVENOUS at 00:30

## 2017-08-30 RX ADMIN — PRAVASTATIN SODIUM 40 MG: 40 TABLET ORAL at 09:29

## 2017-08-30 RX ADMIN — SODIUM CHLORIDE, SODIUM LACTATE, POTASSIUM CHLORIDE, CALCIUM CHLORIDE AND DEXTROSE MONOHYDRATE: 5; 600; 310; 30; 20 INJECTION, SOLUTION INTRAVENOUS at 06:40

## 2017-08-30 RX ADMIN — DEXMEDETOMIDINE HYDROCHLORIDE 0.3 MCG/KG/HR: 4 INJECTION, SOLUTION INTRAVENOUS at 21:52

## 2017-08-30 RX ADMIN — HYDROCORTISONE SODIUM SUCCINATE 10 MG: 100 INJECTION, POWDER, FOR SOLUTION INTRAMUSCULAR; INTRAVENOUS at 20:34

## 2017-08-30 RX ADMIN — HEPARIN SODIUM 5000 UNITS: 5000 INJECTION, SOLUTION INTRAVENOUS; SUBCUTANEOUS at 03:34

## 2017-08-30 RX ADMIN — SODIUM BICARBONATE 325 MG: 325 TABLET ORAL at 03:34

## 2017-08-30 RX ADMIN — SODIUM BICARBONATE 325 MG: 325 TABLET ORAL at 09:25

## 2017-08-30 RX ADMIN — SODIUM BICARBONATE 325 MG: 325 TABLET ORAL at 20:34

## 2017-08-30 RX ADMIN — SODIUM BICARBONATE 325 MG: 325 TABLET ORAL at 23:53

## 2017-08-30 RX ADMIN — Medication 2.5 ML: at 09:28

## 2017-08-30 RX ADMIN — PANCRELIPASE 48000 UNITS: 24000; 76000; 120000 CAPSULE, DELAYED RELEASE PELLETS ORAL at 11:55

## 2017-08-30 RX ADMIN — RANITIDINE HYDROCHLORIDE 50 MG: 25 INJECTION INTRAMUSCULAR; INTRAVENOUS at 03:34

## 2017-08-30 RX ADMIN — SODIUM BICARBONATE 325 MG: 325 TABLET ORAL at 11:55

## 2017-08-30 RX ADMIN — HYDROCORTISONE SODIUM SUCCINATE 25 MG: 100 INJECTION, POWDER, FOR SOLUTION INTRAMUSCULAR; INTRAVENOUS at 11:53

## 2017-08-30 RX ADMIN — PANCRELIPASE 24000 UNITS: 24000; 76000; 120000 CAPSULE, DELAYED RELEASE PELLETS ORAL at 20:36

## 2017-08-30 RX ADMIN — FENOFIBRATE 160 MG: 160 TABLET ORAL at 09:29

## 2017-08-30 RX ADMIN — SERTRALINE HYDROCHLORIDE 50 MG: 20 SOLUTION ORAL at 09:28

## 2017-08-30 RX ADMIN — Medication 20 MG: at 20:36

## 2017-08-30 RX ADMIN — PANCRELIPASE 48000 UNITS: 24000; 76000; 120000 CAPSULE, DELAYED RELEASE PELLETS ORAL at 00:30

## 2017-08-30 RX ADMIN — SODIUM BICARBONATE 325 MG: 325 TABLET ORAL at 14:00

## 2017-08-30 RX ADMIN — HYDROCORTISONE SODIUM SUCCINATE 25 MG: 100 INJECTION, POWDER, FOR SOLUTION INTRAMUSCULAR; INTRAVENOUS at 03:34

## 2017-08-30 RX ADMIN — ALBUMIN HUMAN 12.5 G: 50 SOLUTION INTRAVENOUS at 16:26

## 2017-08-30 RX ADMIN — DEXMEDETOMIDINE HYDROCHLORIDE 0.3 MCG/KG/HR: 4 INJECTION, SOLUTION INTRAVENOUS at 06:40

## 2017-08-30 RX ADMIN — SODIUM BICARBONATE 325 MG: 325 TABLET ORAL at 00:30

## 2017-08-30 RX ADMIN — ALBUMIN (HUMAN) 25 G: 12.5 SOLUTION INTRAVENOUS at 14:11

## 2017-08-30 RX ADMIN — PANCRELIPASE 48000 UNITS: 24000; 76000; 120000 CAPSULE, DELAYED RELEASE PELLETS ORAL at 14:06

## 2017-08-30 RX ADMIN — HEPARIN SODIUM 5000 UNITS: 5000 INJECTION, SOLUTION INTRAVENOUS; SUBCUTANEOUS at 20:34

## 2017-08-30 RX ADMIN — GABAPENTIN 300 MG: 250 SOLUTION ORAL at 20:36

## 2017-08-30 RX ADMIN — BUPIVACAINE HYDROCHLORIDE 25 MG: 2.5 INJECTION, SOLUTION INFILTRATION; PERINEURAL at 08:14

## 2017-08-30 RX ADMIN — Medication 0.8 MG: at 14:00

## 2017-08-30 RX ADMIN — Medication 20 MG: at 14:20

## 2017-08-30 RX ADMIN — HEPARIN SODIUM 5000 UNITS: 5000 INJECTION, SOLUTION INTRAVENOUS; SUBCUTANEOUS at 11:53

## 2017-08-30 RX ADMIN — SODIUM CHLORIDE, SODIUM LACTATE, POTASSIUM CHLORIDE, CALCIUM CHLORIDE AND DEXTROSE MONOHYDRATE: 5; 600; 310; 30; 20 INJECTION, SOLUTION INTRAVENOUS at 23:59

## 2017-08-30 RX ADMIN — PANCRELIPASE 24000 UNITS: 24000; 76000; 120000 CAPSULE, DELAYED RELEASE PELLETS ORAL at 23:54

## 2017-08-30 RX ADMIN — ALBUMIN (HUMAN) 12.5 G: 12.5 SOLUTION INTRAVENOUS at 16:26

## 2017-08-30 ASSESSMENT — ACTIVITIES OF DAILY LIVING (ADL): PREVIOUS_RESPONSIBILITIES: MEAL PREP;HOUSEKEEPING;LAUNDRY;SHOPPING;YARDWORK;MEDICATION MANAGEMENT;FINANCES;DRIVING;WORK

## 2017-08-30 ASSESSMENT — PAIN DESCRIPTION - DESCRIPTORS
DESCRIPTORS: SHARP
DESCRIPTORS: STABBING
DESCRIPTORS: CRUSHING;STABBING
DESCRIPTORS: SHARP

## 2017-08-30 NOTE — PROGRESS NOTES
Nutrition Progress Note - Discussion of POC on SICU rounds and contacted by diet office for clarification on TF orders; f/u for progress towards previous nutrition POC (see previous 8/29 assessment for details)    -EN: Impact Peptide @ 10 ml/hr via Jtube; noted rec goal of Impact Peptide @ goal 80 ml/hr (1920 ml/day) to provide 2880 kcals (32 kcal/kg/day), 180 g PRO (2 g/kg/day), 1478 ml free H2O, 123 g Fat (50% from MCTs), 269 g CHO and no Fiber daily.     -FEN: K+/Mg++/Phos reviewed/unremarkable.  Prealb 20 (baseline 8/24)    -Labs/Micro: AquADEK ordered (no multivitamin/mineral).  Vitamin D <23 (WNL), Vitamin A 0.61 (WNL), Vitamin E 12.7 (WNL) all per 8/24/17 labs and pt does not warrant fat soluble vitamin supplementation at this time but would recommend regular multivitamin/mineral.    -GI: Na+ Bicarb and Creon 24 q 4 hrs (with above goal TF will provide 2341 units of lipase per gram of total fat)    -Weight & Est Needs: noted adm wt of 90.3 kg (upon adm/preop on 8/28) which is @ 116% of IBW of 78 kg with BMI of 26.7 kg/m2 (overweight).  Would continue to use actual/adm wt to dose nutrition needs (given <120% of IBW) but change est caloric needs to 25-30 kcal/kg/day for maintenance of this overweight pt postop and 1.2-1.5+ g PRO/kg/day for postop needs with potential for malabsorption post Whipple.    Interventions:  Collaboration and Referral of Nutrition care - Discussed plan for FEN/GI on rounds with Providers who approved to adjust TF/PERT/Micronutrients per below:      1.  Adjusted Impact Peptide TF order to increase rate to 20 ml/hr now per MD approval and adjusted to continue to adv every 24 hrs (upon MD/surgeon approval daily on rounds) to eventual new goal of Impact Peptide @ 65 ml/hr (1560 ml/day) to provide 2340 kcals (26 kcal/kg/day), 147 g PRO (1.6 g/kg/day), 1201 ml free H2O, 100 g Fat (50% from MCTs), 218 g CHO and no Fiber daily.    2.  Adjusted pancreatic enzyme replacement therapy (PERT) to  include the following instructions (same Na+ Bicarb but linked orders to specify administration with Creon): Creon 24, 1- 2 capsules q 4 hrs via Jtube.   Administration Instructions:   --If TF rate is running @ 10-30 ml/hr, administer 1 capsule q 4 hrs  --If TF rate is running @ 40-65 ml/hr, increase to 2 capsule q 4 hrs.    **Open capsule and empty contents into 15 ml sodium bicarb solution (see sodium bicarb order), let dissolve for about 20-30 minutes and then add this solution to the amount of TF formula hung in TF bag every 4 hrs (i.e., once TF @ goal infusion 65 ml/hr will mix 2 capsules into 260 ml of TF formula every 4 hrs).   *Note: this enzyme regimen with TF @ goal infusion will provide approx 2880 units of lipase/gram of total Fat daily and approp dosing initially for pancreatic insufficiency with more elemental TF formula.      3.  Discontinued CRP monitoring and changed to Prealb q Monday (for comparison to above/baseline value)    4.  Discontinued AquADEK.  Alternatively (based on above labs), order multivitamin/mineral (15 ml/day via FT) to help ensure micronutrient needs being met with suspected hypermetabolic demands and potential interruptions to TF infusions.     Maria Isabel Quintana, RD, LD, [facility] ([phone])

## 2017-08-30 NOTE — PROGRESS NOTES
08/30/17 1400   Quick Adds   Type of Visit Initial Occupational Therapy Evaluation   Living Environment   Lives With spouse   Living Arrangements house   Home Accessibility stairs to enter home;stairs within home   Number of Stairs to Enter Home 1   Number of Stairs Within Home 12   Transportation Available car;family or friend will provide   Self-Care   Usual Activity Tolerance excellent   Current Activity Tolerance moderate   Regular Exercise yes   Activity/Exercise Type walking   Exercise Amount/Frequency daily   Equipment Currently Used at Home none   Functional Level Prior   Ambulation 0-->independent   Transferring 0-->independent   Toileting 0-->independent   Bathing 0-->independent   Dressing 0-->independent   Eating 0-->independent   Communication 0-->understands/communicates without difficulty   Swallowing 0-->swallows foods/liquids without difficulty   Cognition 0 - no cognition issues reported   Fall history within last six months no   General Information   Referring Physician Shanti Sommer   Patient/Family Goals Statement return to I ADL's   Additional Occupational Profile Info/Pertinent History of Current Problem Pancreaticoduodenectomy (Whipple proceedure), umbilical hernia repair, incidental appendectomy , gastrojejunostomy tube placement   Precautions/Limitations fall precautions;abdominal precautions   General Observations pt motivated in therapy however confused.    Cognitive Status Examination   Orientation orientation to person, place and time   Level of Consciousness alert;confused   Able to Follow Commands mild impairment   Memory impaired   Cognitive Comment pt intermittently confused, further testing required.    Visual Perception   Visual Perception No deficits were identified;Wears glasses   Sensory Examination   Sensory Quick Adds No deficits were identified   Range of Motion (ROM)   ROM Quick Adds No deficits were identified   Strength   Manual Muscle Testing Quick Adds Other   Strength  Comments no MMT, B UE's grossly 3+/5 or greater   Hand Strength   Hand Strength Comments no deficits.    Coordination   Gross Motor Coordination mild deficits in B UE's and LE's.    Transfer Skill: Bed to Chair/Chair to Bed   Level of [place]: Bed to Chair moderate assist (50% patients effort)   Transfer Skill: Sit to Stand   Level of [place]: Sit/Stand minimum assist (75% patients effort)   Lower Body Dressing   Level of [place]: Dress Lower Body maximum assist (25% patients effort)   Instrumental Activities of Daily Living (IADL)   Previous Responsibilities meal prep;housekeeping;laundry;shopping;yardwork;medication management;finances;driving;work   Activities of Daily Living Analysis   Impairments Contributing to Impaired Activities of Daily Living balance impaired;cognition impaired;coordination impaired;fear and anxiety;pain;post surgical precautions;strength decreased   General Therapy Interventions   Planned Therapy Interventions ADL retraining;IADL retraining;cognition;strengthening;transfer training;home program guidelines;progressive activity/exercise;risk factor education   Clinical Impression   Criteria for Skilled Therapeutic Interventions Met yes, treatment indicated   OT Diagnosis decreased ADL I   Assessment of Occupational Performance 5 or more Performance Deficits   Identified Performance Deficits dressing, G/H. bathing, toileting, cooking, leisure, driving   Clinical Decision Making (Complexity) Low complexity   Therapy Frequency 5 times/wk   Predicted Duration of Therapy Intervention (days/wks) 3 weeks   Anticipated Discharge Disposition Transitional Care Facility   Risks and Benefits of Treatment have been explained. Yes   Patient, Family & other staff in agreement with plan of care Yes   Clinical Impression Comments Pt presents to OT with general deconditioning, post surgical precautions, cognitive deficits all leading to decreased ADL I. Pt to benefit from skilled OT  "intervention to address the above problem list See daily note for treatment provided today.    [facility] AM-PAC TM \"6 Clicks\"   2016, Trustees of [facility], under license to [employer].  All rights reserved.   6 Clicks Short Forms Daily Activity Inpatient Short Form   [facility] AM-PAC  \"6 Clicks\" Daily Activity Inpatient Short Form   1. Putting on and taking off regular lower body clothing? 2 - A Lot   2. Bathing (including washing, rinsing, drying)? 2 - A Lot   3. Toileting, which includes using toilet, bedpan or urinal? 2 - A Lot   4. Putting on and taking off regular upper body clothing? 2 - A Lot   5. Taking care of personal grooming such as brushing teeth? 3 - A Little   6. Eating meals? 3 - A Little   Daily Activity Raw Score (Score out of 24.Lower scores equate to lower levels of function) 14   Total Evaluation Time   Total Evaluation Time (Minutes) 5     "

## 2017-08-30 NOTE — PROGRESS NOTES
"Surgery Progress note    S:  Pain issues overnight.  Pt seen at bedside resting comfortably.  Marginal UOP. No nausea or vomiting. TF advancing. Having a cough. On 0.3 of precedex     O:  /60  Pulse 63  Temp 99.5  F (37.5  C) (Oral)  Resp 18  Ht 1.803 m (5' 10.98\")  Wt 95 kg (209 lb 7 oz)  SpO2 96%  BMI 29.22 kg/m2  A&Ox3, NAD  Breathing non-labored, wheezy   RRR  Soft, ND, appropriately ttp, incision c/d/i, VIRGIL drain with dark output. G tube to gravity.   Distal extremities warm.         Intake/Output Summary (Last 24 hours) at 08/30/17 0740  Last data filed at 08/30/17 0700   Gross per 24 hour   Intake          6522.35 ml   Output              608 ml   Net          5914.35 ml         BMP  Recent Labs  Lab 08/30/17 0340 08/29/17 2058 08/29/17 1221 08/29/17 0329 08/28/17  1600    144 145* 144 145*   POTASSIUM 4.5 4.6  --  4.6 4.3   CHLORIDE 113* 113*  --  111* 110*   NICHOLAS 6.8* 6.9*  --  7.0* 7.4*   CO2 27 25  --  27 27   BUN 35* 29  --  20 13   CR 2.18* 1.99* 1.97* 1.73* 0.99   * 98  --  140* 122*     CBC  Recent Labs  Lab 08/30/17 0340 08/29/17 2058 08/29/17 0329 08/28/17 2005 08/28/17  1600   WBC 6.2 6.6 6.2  --  6.4   RBC 3.01* 3.08* 3.29*  --  3.67*   HGB 8.0* 8.0* 8.5* 9.6* 9.6*   HCT 25.7* 25.8* 27.3*  --  29.7*   MCV 85 84 83  --  81   MCH 26.6 26.0* 25.8*  --  26.2*   MCHC 31.1* 31.0* 31.1*  --  32.3   RDW 17.6* 17.4* 17.0*  --  16.2*   * 125* 130*  --  166     INR  Recent Labs  Lab 08/28/17  1600 08/28/17  0601 08/24/17  0738   INR 1.26* 1.15* 2.87*      Liver Function Studies -   Recent Labs   Lab Test  08/29/17   0329   PROTTOTAL  5.5*   ALBUMIN  2.8*   BILITOTAL  0.6   ALKPHOS  25*   AST  43   ALT  29          A/P: Lucas Brown is a 56 year old male with history of chronic pancreatitis and 2 cm inflammatory mass in the head of the pancreas now POD1 s/p Whipple pancreaticoduodenectomy   Neuro: wean off precedex, dilaudid PCA, gabapentin for pain  Resp: Continue " pulmonary toilet, wean off O2 as able. Encourage IS.   CV: Continue hemodynamic monitoring   GI/FEN: NPO. G tube to gravity. Advance TF as tolerated. VIRGIL with dark output. WIll continue to monitor output and test for bilirubin is persists tomorrow.   Renal: ROSELYN. FeNa consistent prerenal azotemia. Continue IVF resuscitation, Avoid nephrotoxic medications.   Endo: Monitor blood sugar levels. BS consistently below 150. Insulin drip if BS >150.    ID: Finished perioperative ertapenem   Heme: stable Hgb. Will monitor.   Prophylaxis: SCDs. Heparin SC TID   Patient seen with chief resident, Dr. Sommer who will discuss with staff.    Fernando Molina MD  PGY-2, General Surgery  [phone]

## 2017-08-30 NOTE — PROGRESS NOTES
[facility]AL [facility]IA PAIN SERVICE CONTINUOUS NERVE INFUSION NOTE  Subjective and Interval History: Pt reports inadequate pain control via nerve block continuous infusion and pain medications listed below.  Denies any weakness, paresthesias, circumoral numbness, metallic taste or tinnitus.  Pt has not been OOB yet this am.  Patient currently denies nausea or vomiting. Patient is NPO.       Clinically Aligned Pain Assessment (CAPA):   Comfort (How is your pain?): Intolerable  Change in Pain (Since your last medication/intervention?): Getting worse  Pain Control (How are your pain treatments working?):  Partially effective pain control  Functioning (Are you able to do activities to get better?) : Pain keeps me from doing most of what I need to do  Sleep (Does your pain management allow you to sleep or rest?): Awake with occasional pain     Numerical Rating Scale (NRS):  2-3/10 after the bolus and 8/10 currently      Anticoagulation:    heparin sodium PF injection 5,000 Units 5,000 Units, SC, Q8H Given: 08/30 0334     Medications related to Pain Management (Future)    Start     Dose/Rate Route Frequency Ordered Stop    08/29/17 0800  gabapentin (NEURONTIN) solution 300 mg      300 mg Oral or J tube 2 TIMES DAILY 08/28/17 1832      08/28/17 1845  dexmedetomidine (PRECEDEX) 400 mcg in 0.9% sodium chloride 100 mL      0.2-0.7 mcg/kg/hr × 90.3 kg  4.5-15.8 mL/hr  Intravenous CONTINUOUS 08/28/17 1832      08/28/17 1832  lidocaine 1 % 1 mL      1 mL Other EVERY 1 HOUR PRN 08/28/17 1832      08/28/17 1832  lidocaine (LMX4) kit       Topical EVERY 1 HOUR PRN 08/28/17 1832      08/28/17 1832  cyclobenzaprine (FLEXERIL) tablet 10 mg      10 mg Oral 3 TIMES DAILY PRN 08/28/17 1832      08/28/17 1615  HYDROmorphone (DILAUDID) PCA 1 mg/mL       Intravenous PCA 08/28/17 1601      08/28/17 0845  ROPivacaine 0.2% (NAROPIN) 750 mL in ON-Q C-Bloc select flow ([id number] holds 600-750 mL) dual cath disposable pump      14 mL/hr  Paravertebral  Elastomeric Pump 08/28/17 0835                OBJECTIVE:    Diagnostic:  Lab Results   Component Value Date    WBC 6.2 08/30/2017     Lab Results   Component Value Date    RBC 3.01 08/30/2017     Lab Results   Component Value Date    HGB 8.0 08/30/2017     Lab Results   Component Value Date    HCT 25.7 08/30/2017     Lab Results   Component Value Date     08/30/2017         Vitals:    Temp:  [97.8  F (36.6  C)-99.5  F (37.5  C)] 99.5  F (37.5  C)  Heart Rate:  [59-74] 70  Resp:  [18-22] 18  BP: ()/(52-85) 100/60  SpO2:  [92 %-97 %] 96 %    Exam:    Strength 5/5 and symmetric grossly in bilateral LE   B/L paravertebral (PV) catheter sites with dressing c/d/i, no tenderness, erythema, heme, edema      ASSESSMENT/PLAN:    Lucas Brown is a 56 year old male POD #2 s/p WHIPPLE PROCEDURE and placement of B/L Thoracic PV catheters for analgesia.  Pt is receiving inadequate analgesia with Ropivacaine 0.2%, total infusion 14mL/hour - 7mL each catheter.  Pt has not been OOB yet this am.  PT has been ordered.  No weakness or paresthesias.  No evidence of adverse side effects associated with local anesthetic. Pt is opioid tolerant.  Pt was on warfarin prior to this surgery.    - 0800  continue current total infusion of 14mL/hour - 7mL each catheter  - 0800 B/L PV catheters were bolused with 5ml PF bupivacaine 0.25% - RN to chart BP & P q 5 min for 30 min - Page #[phone] if MAP < 60   Pt may be evaluated for q 12 hr bolus' - RN must page #[phone] and request a bolus  - 1030 pt sitting in chair and reports after bolus pain decreased to 3 from 8  - please page #[phone] if anticoagulation is changed  - will continue to follow and adjust as needed  - expected change of next On-Q pump is within 24 hrs  - discussed plan with attending anesthesiologist    TERRELL Gomez CNP  [facility]  8/30/2017 7:26 AM    24 hour Job Code Pager.  For in-house use only.     [place]:  * * [phone]  [place]: *  * [phone]  Peds: * * [phone]  Enter call-back number and #      This pager only accepts text messages through [facility]

## 2017-08-30 NOTE — PROGRESS NOTES
SURGICAL ICU PROGRESS NOTE  August 29, 2017    ASSESSMENT: Lucas Brown is a 56 year old male w/ hx of chronic pancreatitis and intraductal papillary mucinous neoplasm (pathology pending) s/p Whipple (8/28/17). Having low UOP and ROSELYN on labs. Continues to require precedex for pain control. Will attempt to wean.    PMHx: pacemaker (for perioperative 3rd degree AV block, 08/2014), aortic valve replacement (on warfarin, 1999, 2014), aortic aneurysm repair (2014), HTN, HLD, hypothyroidism, COLBY, BPH, GERD, and depression/anxiety.      TODAY'S PROGRESS/PLANS:   - Heparin TID for prophylaxis  - Pulmonary toilet increase  - Bedside ECHO without significant hyperdynamic function  - Baseline /70, within 10% baseline  - Start Creon  - Increased ROSELYN despite fluids, stabilizing    - FENa for fluid status  - Hold gabapentin increase with decreased renal function  - PM BMP  - Advance TF by 10ml/hr to 20ml/hr    PLAN:  Neuro/ pain/ sedation:  #post-operative pain  #sedation  #depression/anxiety  -Monitor neurological status. Notify the MD for any acute changes in exam.  -Dilaudid PCA with 0.5-1mg continuous   -Ropivicaine paravertebrals  -Tylenol, gabapentin, lidoderm patches, flexeril  -Precedex for sedation  -PTA sertraline    Pulmonary care:   #COLBY  - Supplemental oxygen to keep saturation above 92 %.  - Incentive spirometer every 15- 30 minutes when awake.  - Aggressive bedside pulmonary toilet    Cardiovascular:    #s/p pacemaker (for perioperative 3rd degree AV block, 08/2014), aortic valve replacement (on warfarin, 1999, 2014), aortic aneurysm repair (2014)  #coronary artery disease, mild nonocclusive coronary artery disease of the LAD  #AV regurgitation (per echo 03/2017)  #HTN  #HLD   - Monitor hemodynamic status.   - Electrolyte replacement protocol    - PTA fenofibrate, pravastatin  - prophylactic SQH. On Warfarin at home for mechanical valve, will defer to gen surg when he can be advanced to therapeutic  anticoagulation    GI care:   #IPMN s/p Sebastien 8/28/17  #post-operative pancreatitis from prior AAA repair   -NPO except swabs and medications.  -G and J tubes to gravity   -G and J tubes and VIRGIL drain secured with flexi-trac cheng   -Protonix 40 mg IV Qday  -Prochlorperazine prn for nausea  -Hold bowel regimen per gen surg    Fluids/ Electrolytes/ Nutrition:   - Nutrition consulted; appreciate recs  - Advance tube feeds by 10cc/hr per day. At 20cc/hr today.  - Electrolyte replacement protocol   - D5 LR at 125 for IV fluid hydration  - multivitamin    Renal/ Fluid Balance:    #BPH  -Urine output continues to be poor; repeat FENa today  -+6L over the last 24 hours  -ECHO today shows less hyperdynamic state  -Will continue to monitor intake and output.  -Continue hernandes for accurate I/Os    Endocrine:    #Hypothyroidism  -Levothyroxine 150mcg  #chronic steroids for temporal arteritis  -Hydrocortisone taper 50-25-10 over 3 days  -Goal BG < 150; insulin drip available if > 150   -Endocrine consulted; appreciate recs   -Start creon with tube feeds    ID/ Antibiotics:  - No antibiotics indicated    Heme:     #aortic valve replacement on warfarin  -prophylactic SQH. On Warfarin at home for mechanical valve, will defer to gen surg when he can be advanced to therapeutic anticoagulation  #post-operative blood loss      -Monitor for surgical bleeding    MSK:  - PT/OT    Prophylaxis:    - Mechanical prophylaxis for DVT  - SQH  - Omeprazole BID    Lines/ tubes/ drains:  - RIJ CVC, hernandes, On-Q paravertebrals  - GJ tube to gravity  - VIRGIL drain     Disposition:  - Surgical ICU    Karey Brown MD  General Surgery, PGY-2  Pg [phone]    ====================================    SUBJECTIVE:   - No acute events overnight. Increased work of breathing, noting significant pain with deep breathing and lack of IS. Tolerating initiating tube feeds. No nausea or vomiting.     OBJECTIVE:   1. VITAL SIGNS:   Temp:  [99  F (37.2  C)-99.5  F  (37.5  C)] 99.5  F (37.5  C)  Heart Rate:  [59-74] 70  Resp:  [18-22] 18  BP: ()/(52-85) 100/60  SpO2:  [92 %-97 %] 96 %  Resp: 18    2. INTAKE/ OUTPUT:   I/O last 3 completed shifts:  In: 6387.35 [I.V.:4957.35; NG/GT:250; IV Piggyback:1000]  Out: 608 [Urine:450; Emesis/NG output:150; Drains:8]    3. PHYSICAL EXAMINATION:   General: lying in bed. In discomfort but no distress.   Neuro: A&Ox3, strength in extremities symmetric   Resp: Breathing non-labored but shallow secondary to pain limitation   CV: RRR, paced  Abdomen: Soft, non-distended, appropriately tender   Incisions: dressing c/d/i  Extremities: warm and well perfused. 1+ pitting edema    4. INVESTIGATIONS:   Arterial Blood Gases     Recent Labs  Lab 08/28/17  1841   PH 7.36   PCO2 44   PO2 156*   HCO3 25     Complete Blood Count     Recent Labs  Lab 08/30/17 0340 08/29/17 2058 08/29/17 0329 08/28/17 2005 08/28/17  1600   WBC 6.2 6.6 6.2  --  6.4   HGB 8.0* 8.0* 8.5* 9.6* 9.6*   * 125* 130*  --  166     Basic Metabolic Panel    Recent Labs  Lab 08/30/17  0340 08/29/17 2058 08/29/17  1221 08/29/17 0329 08/28/17  1600    144 145* 144 145*   POTASSIUM 4.5 4.6  --  4.6 4.3   CHLORIDE 113* 113*  --  111* 110*   CO2 27 25  --  27 27   BUN 35* 29  --  20 13   CR 2.18* 1.99* 1.97* 1.73* 0.99   * 98  --  140* 122*     Liver Function Tests    Recent Labs  Lab 08/29/17 0329 08/28/17  1600 08/28/17  0601 08/24/17  0738   AST 43 39  --  25   ALT 29 30  --  22   ALKPHOS 25* 28*  --  44   BILITOTAL 0.6 0.8  --  0.5   ALBUMIN 2.8* 3.2*  --  3.5   INR  --  1.26* 1.15* 2.87*     Pancreatic Enzymes    Recent Labs  Lab 08/29/17  0329   LIPASE 358   AMYLASE 84     Coagulation Profile    Recent Labs  Lab 08/28/17  1600 08/28/17  0601 08/24/17  0738   INR 1.26* 1.15* 2.87*   PTT 29 31  --      Lactate  Invalid input(s): LACTATE    5. RADIOLOGY:   Recent Results (from the past 24 hour(s))   XR Chest Port 1 View    Narrative    EXAMINATION: XR CHEST  PORT 1 VW, 8/28/2017 4:13 PM    COMPARISON: None    HISTORY: central venous catheter placement    FINDINGS: Right IJ catheter tip in the low internal jugular vein.  Cardiac silhouette is enlarged. Aorta is tortuous. Pulmonary  vasculature is indistinct.      Impression    IMPRESSION:   1. Right IJ catheter tip at the low internal jugular vein.  2. Large cardiac silhouette.  3.  Pulmonary vascular congestion.    JOSÉ DIAMOND MD       =========================================

## 2017-08-30 NOTE — PLAN OF CARE
Problem: Goal Outcome Summary  Goal: Goal Outcome Summary  Outcome: No Change  Neuro: Pt lethargic at times. On 0.3 mcg/kg/hr of Precedex. Dilaudid PCA. Tearful at times due to pain. Moves all extremities. A&Ox4. Calls appropriately.  Cardiac: Paced rhythm in the 60s-70s, no ectopy. BP on the softer side but within MAP goal. 1L LR bolus given overnight.  Resp: On 3L NC with capnography. Lung sounds clear. Good cough.  GI:  G-tube to gravity, J-tube with trickle feeds running at 10 mL/hour. Bowel sounds hypoactive.  : [place] in place and patent. Low UO, SICU aware. No improvement in UO with bolus.   Drains: R) LQ VIRGIL drain to bulb suction. Minimal sanguinous output.  Drips: Precedex at 0.3 mcg/kg/hr. D5 LR at 125 mL/hour. Dilaudid PCA. Ropivacaine paravertebral blocks at 14 mL/hour.   Skin: Incision on abdomen, dressing marked, no change.   Access: R) triple lumen IJ.      Plan: Continue to work on pain control. Encourage independence. Continue with current plan of care. Notify MD of any changes.

## 2017-08-30 NOTE — PROGRESS NOTES
REGIONAL ANESTHESIA PAIN SERVICE CONTINUOUS NERVE INFUSION NOTE  Subjective and Interval History: Pt reports inadequate pain control via nerve block continuous infusion and pain medications listed below.  Denies any weakness, paresthesias, circumoral numbness, metallic taste or tinnitus.  Pt has not been OOB yet this am.  Patient currently denies nausea or vomiting. Patient is NPO.                             Clinically Aligned Pain Assessment (CAPA):   Comfort (How is your pain?): Intolerable  Change in Pain (Since your last medication/intervention?): Getting worse  Pain Control (How are your pain treatments working?):  Partially effective pain control  Functioning (Are you able to do activities to get better?) : Pain keeps me from doing most of what I need to do  Sleep (Does your pain management allow you to sleep or rest?): Awake with occasional pain                           Numerical Rating Scale (NRS):  2-3/10 after the bolus and 8/10 currently        Anticoagulation:    heparin sodium PF injection 5,000 Units 5,000 Units, SC, Q8H Given: 08/30 0334      Medications related to Pain Management (Future)    Start       Dose/Rate Route Frequency Ordered Stop     08/29/17 0800   gabapentin (NEURONTIN) solution 300 mg       300 mg Oral or J tube 2 TIMES DAILY 08/28/17 1832        08/28/17 1845   dexmedetomidine (PRECEDEX) 400 mcg in 0.9% sodium chloride 100 mL       0.2-0.7 mcg/kg/hr × 90.3 kg  4.5-15.8 mL/hr  Intravenous CONTINUOUS 08/28/17 1832        08/28/17 1832   lidocaine 1 % 1 mL       1 mL Other EVERY 1 HOUR PRN 08/28/17 1832        08/28/17 1832   lidocaine (LMX4) kit         Topical EVERY 1 HOUR PRN 08/28/17 1832        08/28/17 1832   cyclobenzaprine (FLEXERIL) tablet 10 mg       10 mg Oral 3 TIMES DAILY PRN 08/28/17 1832        08/28/17 1615   HYDROmorphone (DILAUDID) PCA 1 mg/mL         Intravenous PCA 08/28/17 1601        08/28/17 0845   ROPivacaine 0.2% (NAROPIN) 750 mL in ON-Q C-Bloc select flow  ([id number] holds 600-750 mL) dual cath disposable pump       14 mL/hr  Paravertebral Elastomeric Pump 08/28/17 0835                     OBJECTIVE:     Diagnostic:        Lab Results   Component Value Date     WBC 6.2 08/30/2017            Lab Results   Component Value Date     RBC 3.01 08/30/2017            Lab Results   Component Value Date     HGB 8.0 08/30/2017            Lab Results   Component Value Date     HCT 25.7 08/30/2017            Lab Results   Component Value Date      08/30/2017            Vitals:              Temp:  [97.8  F (36.6  C)-99.5  F (37.5  C)] 99.5  F (37.5  C)  Heart Rate:  [59-74] 70  Resp:  [18-22] 18  BP: ()/(52-85) 100/60  SpO2:  [92 %-97 %] 96 %     Exam:                         Strength 5/5 and symmetric grossly in bilateral LE                        B/L paravertebral (PV) catheter sites with dressing c/d/i, no tenderness, erythema, heme, edema        ASSESSMENT/PLAN:    Lucas Brown is a 56 year old male POD #2 s/p WHIPPLE PROCEDURE and placement of B/L Thoracic PV catheters for analgesia.  Pt is receiving inadequate analgesia with Ropivacaine 0.2%, total infusion 14mL/hour - 7mL each catheter.  Pt has not been OOB yet this am.  PT has been ordered.  No weakness or paresthesias.  No evidence of adverse side effects associated with local anesthetic. Pt is opioid tolerant.  Pt was on warfarin prior to this surgery.     - 0800  continue current total infusion of 14mL/hour - 7mL each catheter  - 0800 B/L PV catheters were bolused with 5ml PF bupivacaine 0.25% - RN to chart BP & P q 5 min for 30 min - Page #[phone] if MAP < 60                        Pt may be evaluated for q 12 hr bolus' - RN must page #[phone] and request a bolus  - 1030 pt sitting in chair and reports after bolus pain decreased to 3 from 8  - please page #[phone] if anticoagulation is changed  - will continue to follow and adjust as needed  - expected change of next On-Q pump is within 24 hrs    Donta Hayes  MD  08/30/2017 1:53 PM

## 2017-08-30 NOTE — PLAN OF CARE
Problem: Goal Outcome Summary  Goal: Goal Outcome Summary  PT 4A: Pt tolerated session fairly. Improved pain control with abd binder on and able to tolerate increased time upright. Assisted OOB and to chair with physical assist required. Mild delirium noted with disorientation to time. Re-oriented appropriately and had all lights turned on. Recommend TCU at discharge at this time. ARC if he begins to tolerate more activity and still has physical impairments.      Interdisciplinary Non-Pharmacological Management of Delirium:      General Supportive Measures: Ensure adequate hydration and nutrition. Schedule toileting. Appropriate assessment and treatment of pain.   Re-[place] Patient: Ensure clock has correct time and white board has correct date. Communicate clearly, providing explanations as appropriate. Encourage presence of family members for reassurance. Have family/caregiver bring in familiar objects/pictures.  Cognition: Engage in appropriate meaningful communication and activities with patient (current events discussion, word games, magazines, newspapers).   Sensory: Use eyeglasses, hearing aids, or voice amplifiers as appropriate.   Avoid Use of Physical Restraints as Appropriate: Indicate need and frequently re-assess hernandes catheters and other tethers (cap IVs if medically appropriate).   Maintain Mobility and Self Care Ability: Avoid bedrest. Have patient up in chair for meals if appropriate.   Normalize Sleep-Wake Cycle: Discourage too much daytime napping (less than 30 minutes at a time), aim for uninterrupted periods of sleep at night.   Days: Keep room well light with lights on and shades open.   Night: Keep room quiet with low level lighting.   For Agitation: Avoid overstimulating environment, try music, massage, appropriate TV stations, and relaxation techniques. Take patient for a walk if appropriate, even if in the middle of the night.   Safety Concerns: Patients with delirium are at high risk for  falls. Use bed and chair alarms along with frequent surveillance.

## 2017-08-31 ENCOUNTER — APPOINTMENT (OUTPATIENT)
Dept: PHYSICAL THERAPY | Facility: CLINIC | Age: 57
DRG: 406 | End: 2017-08-31
Attending: SURGERY
Payer: COMMERCIAL

## 2017-08-31 ENCOUNTER — APPOINTMENT (OUTPATIENT)
Dept: OCCUPATIONAL THERAPY | Facility: CLINIC | Age: 57
DRG: 406 | End: 2017-08-31
Attending: SURGERY
Payer: COMMERCIAL

## 2017-08-31 LAB
ABO + RH BLD: NORMAL
ABO + RH BLD: NORMAL
ALBUMIN SERPL-MCNC: 2.5 G/DL (ref 3.4–5)
ALP SERPL-CCNC: 28 U/L (ref 40–150)
ALT SERPL W P-5'-P-CCNC: 25 U/L (ref 0–70)
AMYLASE FLD-CCNC: NORMAL U/L
AMYLASE SERPL-CCNC: 242 U/L (ref 30–110)
ANION GAP SERPL CALCULATED.3IONS-SCNC: 3 MMOL/L (ref 3–14)
ANION GAP SERPL CALCULATED.3IONS-SCNC: 6 MMOL/L (ref 3–14)
AST SERPL W P-5'-P-CCNC: 41 U/L (ref 0–45)
BASE EXCESS BLDV CALC-SCNC: 0.7 MMOL/L
BASOPHILS # BLD AUTO: 0 10E9/L (ref 0–0.2)
BASOPHILS NFR BLD AUTO: 0.2 %
BILIRUB DIRECT SERPL-MCNC: 0.3 MG/DL (ref 0–0.2)
BILIRUB FLD-MCNC: 0.5 MG/DL
BILIRUB SERPL-MCNC: 0.5 MG/DL (ref 0.2–1.3)
BLD GP AB SCN SERPL QL: NORMAL
BLD PROD TYP BPU: NORMAL
BLD PROD TYP BPU: NORMAL
BLD UNIT ID BPU: 0
BLOOD BANK CMNT PATIENT-IMP: NORMAL
BLOOD PRODUCT CODE: NORMAL
BPU ID: NORMAL
BUN SERPL-MCNC: 44 MG/DL (ref 7–30)
BUN SERPL-MCNC: 48 MG/DL (ref 7–30)
CALCIUM SERPL-MCNC: 7 MG/DL (ref 8.5–10.1)
CALCIUM SERPL-MCNC: 7.2 MG/DL (ref 8.5–10.1)
CHLORIDE SERPL-SCNC: 111 MMOL/L (ref 94–109)
CHLORIDE SERPL-SCNC: 111 MMOL/L (ref 94–109)
CO2 SERPL-SCNC: 26 MMOL/L (ref 20–32)
CO2 SERPL-SCNC: 28 MMOL/L (ref 20–32)
CREAT SERPL-MCNC: 1.85 MG/DL (ref 0.66–1.25)
CREAT SERPL-MCNC: 2.47 MG/DL (ref 0.66–1.25)
DIFFERENTIAL METHOD BLD: ABNORMAL
EOSINOPHIL # BLD AUTO: 0 10E9/L (ref 0–0.7)
EOSINOPHIL NFR BLD AUTO: 0.2 %
ERYTHROCYTE [DISTWIDTH] IN BLOOD BY AUTOMATED COUNT: 17.6 % (ref 10–15)
GFR SERPL CREATININE-BSD FRML MDRD: 27 ML/MIN/1.7M2
GFR SERPL CREATININE-BSD FRML MDRD: 38 ML/MIN/1.7M2
GLUCOSE SERPL-MCNC: 110 MG/DL (ref 70–99)
GLUCOSE SERPL-MCNC: 139 MG/DL (ref 70–99)
HCO3 BLDV-SCNC: 27 MMOL/L (ref 21–28)
HCT VFR BLD AUTO: 23 % (ref 40–53)
HGB BLD-MCNC: 7.1 G/DL (ref 13.3–17.7)
IMM GRANULOCYTES # BLD: 0 10E9/L (ref 0–0.4)
IMM GRANULOCYTES NFR BLD: 0.5 %
LIPASE SERPL-CCNC: 1047 U/L (ref 73–393)
LYMPHOCYTES # BLD AUTO: 1 10E9/L (ref 0.8–5.3)
LYMPHOCYTES NFR BLD AUTO: 15.2 %
MAGNESIUM SERPL-MCNC: 2.2 MG/DL (ref 1.6–2.3)
MCH RBC QN AUTO: 26.5 PG (ref 26.5–33)
MCHC RBC AUTO-ENTMCNC: 30.9 G/DL (ref 31.5–36.5)
MCV RBC AUTO: 86 FL (ref 78–100)
MONOCYTES # BLD AUTO: 0.6 10E9/L (ref 0–1.3)
MONOCYTES NFR BLD AUTO: 9.6 %
NEUTROPHILS # BLD AUTO: 4.6 10E9/L (ref 1.6–8.3)
NEUTROPHILS NFR BLD AUTO: 74.3 %
NRBC # BLD AUTO: 0 10*3/UL
NRBC BLD AUTO-RTO: 0 /100
NUM BPU REQUESTED: 1
O2/TOTAL GAS SETTING VFR VENT: NORMAL %
PCO2 BLDV: 50 MM HG (ref 40–50)
PH BLDV: 7.33 PH (ref 7.32–7.43)
PHOSPHATE SERPL-MCNC: 2.4 MG/DL (ref 2.5–4.5)
PLATELET # BLD AUTO: 129 10E9/L (ref 150–450)
PO2 BLDV: 32 MM HG (ref 25–47)
POTASSIUM SERPL-SCNC: 4.2 MMOL/L (ref 3.4–5.3)
POTASSIUM SERPL-SCNC: 4.4 MMOL/L (ref 3.4–5.3)
PROT SERPL-MCNC: 5.4 G/DL (ref 6.8–8.8)
RBC # BLD AUTO: 2.68 10E12/L (ref 4.4–5.9)
SODIUM SERPL-SCNC: 143 MMOL/L (ref 133–144)
SODIUM SERPL-SCNC: 144 MMOL/L (ref 133–144)
SPECIMEN EXP DATE BLD: NORMAL
SPECIMEN SOURCE FLD: NORMAL
SPECIMEN SOURCE FLD: NORMAL
TRANSFUSION STATUS PATIENT QL: NORMAL
TRANSFUSION STATUS PATIENT QL: NORMAL
WBC # BLD AUTO: 6.2 10E9/L (ref 4–11)

## 2017-08-31 PROCEDURE — P9016 RBC LEUKOCYTES REDUCED: HCPCS | Performed by: STUDENT IN AN ORGANIZED HEALTH CARE EDUCATION/TRAINING PROGRAM

## 2017-08-31 PROCEDURE — 25000132 ZZH RX MED GY IP 250 OP 250 PS 637: Performed by: SURGERY

## 2017-08-31 PROCEDURE — 86900 BLOOD TYPING SEROLOGIC ABO: CPT | Performed by: STUDENT IN AN ORGANIZED HEALTH CARE EDUCATION/TRAINING PROGRAM

## 2017-08-31 PROCEDURE — 85025 COMPLETE CBC W/AUTO DIFF WBC: CPT | Performed by: SURGERY

## 2017-08-31 PROCEDURE — 83735 ASSAY OF MAGNESIUM: CPT | Performed by: SURGERY

## 2017-08-31 PROCEDURE — 20000004 ZZH R&B ICU UMMC

## 2017-08-31 PROCEDURE — 80048 BASIC METABOLIC PNL TOTAL CA: CPT | Performed by: SURGERY

## 2017-08-31 PROCEDURE — 40000193 ZZH STATISTIC PT WARD VISIT: Performed by: PHYSICAL THERAPIST

## 2017-08-31 PROCEDURE — 40000133 ZZH STATISTIC OT WARD VISIT: Performed by: OCCUPATIONAL THERAPIST

## 2017-08-31 PROCEDURE — 82150 ASSAY OF AMYLASE: CPT | Performed by: SURGERY

## 2017-08-31 PROCEDURE — 25000125 ZZHC RX 250: Performed by: SURGERY

## 2017-08-31 PROCEDURE — 86901 BLOOD TYPING SEROLOGIC RH(D): CPT | Performed by: STUDENT IN AN ORGANIZED HEALTH CARE EDUCATION/TRAINING PROGRAM

## 2017-08-31 PROCEDURE — 99291 CRITICAL CARE FIRST HOUR: CPT | Mod: GC | Performed by: SURGERY

## 2017-08-31 PROCEDURE — 83690 ASSAY OF LIPASE: CPT | Performed by: SURGERY

## 2017-08-31 PROCEDURE — 86923 COMPATIBILITY TEST ELECTRIC: CPT | Performed by: STUDENT IN AN ORGANIZED HEALTH CARE EDUCATION/TRAINING PROGRAM

## 2017-08-31 PROCEDURE — 40000196 ZZH STATISTIC RAPCV CVP MONITORING

## 2017-08-31 PROCEDURE — 97535 SELF CARE MNGMENT TRAINING: CPT | Mod: GO | Performed by: OCCUPATIONAL THERAPIST

## 2017-08-31 PROCEDURE — 97530 THERAPEUTIC ACTIVITIES: CPT | Mod: GP | Performed by: PHYSICAL THERAPIST

## 2017-08-31 PROCEDURE — 25000128 H RX IP 250 OP 636: Performed by: SURGERY

## 2017-08-31 PROCEDURE — 82247 BILIRUBIN TOTAL: CPT | Performed by: SURGERY

## 2017-08-31 PROCEDURE — 80076 HEPATIC FUNCTION PANEL: CPT | Performed by: SURGERY

## 2017-08-31 PROCEDURE — 27110038 ZZH RX 271: Performed by: ANESTHESIOLOGY

## 2017-08-31 PROCEDURE — 40000275 ZZH STATISTIC RCP TIME EA 10 MIN

## 2017-08-31 PROCEDURE — 27210436 ZZH NUTRITION PRODUCT SEMIELEM INTERMED CAN

## 2017-08-31 PROCEDURE — 80048 BASIC METABOLIC PNL TOTAL CA: CPT | Performed by: PHYSICIAN ASSISTANT

## 2017-08-31 PROCEDURE — 86850 RBC ANTIBODY SCREEN: CPT | Performed by: STUDENT IN AN ORGANIZED HEALTH CARE EDUCATION/TRAINING PROGRAM

## 2017-08-31 PROCEDURE — 84100 ASSAY OF PHOSPHORUS: CPT | Performed by: SURGERY

## 2017-08-31 PROCEDURE — 82803 BLOOD GASES ANY COMBINATION: CPT | Performed by: STUDENT IN AN ORGANIZED HEALTH CARE EDUCATION/TRAINING PROGRAM

## 2017-08-31 PROCEDURE — 25000125 ZZHC RX 250: Performed by: ANESTHESIOLOGY

## 2017-08-31 RX ADMIN — PANCRELIPASE 24000 UNITS: 24000; 76000; 120000 CAPSULE, DELAYED RELEASE PELLETS ORAL at 12:35

## 2017-08-31 RX ADMIN — HEPARIN SODIUM 5000 UNITS: 5000 INJECTION, SOLUTION INTRAVENOUS; SUBCUTANEOUS at 20:26

## 2017-08-31 RX ADMIN — PANCRELIPASE 24000 UNITS: 24000; 76000; 120000 CAPSULE, DELAYED RELEASE PELLETS ORAL at 03:32

## 2017-08-31 RX ADMIN — Medication: at 10:24

## 2017-08-31 RX ADMIN — PRAVASTATIN SODIUM 40 MG: 40 TABLET ORAL at 09:28

## 2017-08-31 RX ADMIN — SODIUM BICARBONATE 325 MG: 325 TABLET ORAL at 09:29

## 2017-08-31 RX ADMIN — SODIUM BICARBONATE 325 MG: 325 TABLET ORAL at 18:10

## 2017-08-31 RX ADMIN — Medication 15 ML: at 09:32

## 2017-08-31 RX ADMIN — HYDROCORTISONE SODIUM SUCCINATE 10 MG: 100 INJECTION, POWDER, FOR SOLUTION INTRAMUSCULAR; INTRAVENOUS at 12:32

## 2017-08-31 RX ADMIN — PANCRELIPASE 24000 UNITS: 24000; 76000; 120000 CAPSULE, DELAYED RELEASE PELLETS ORAL at 18:14

## 2017-08-31 RX ADMIN — HEPARIN SODIUM 5000 UNITS: 5000 INJECTION, SOLUTION INTRAVENOUS; SUBCUTANEOUS at 12:33

## 2017-08-31 RX ADMIN — SERTRALINE HYDROCHLORIDE 50 MG: 20 SOLUTION ORAL at 09:33

## 2017-08-31 RX ADMIN — PANCRELIPASE 24000 UNITS: 24000; 76000; 120000 CAPSULE, DELAYED RELEASE PELLETS ORAL at 09:30

## 2017-08-31 RX ADMIN — SODIUM BICARBONATE 325 MG: 325 TABLET ORAL at 12:15

## 2017-08-31 RX ADMIN — PANCRELIPASE 48000 UNITS: 24000; 76000; 120000 CAPSULE, DELAYED RELEASE PELLETS ORAL at 20:20

## 2017-08-31 RX ADMIN — HEPARIN SODIUM 5000 UNITS: 5000 INJECTION, SOLUTION INTRAVENOUS; SUBCUTANEOUS at 03:31

## 2017-08-31 RX ADMIN — Medication 20 MG: at 20:21

## 2017-08-31 RX ADMIN — FENOFIBRATE 160 MG: 160 TABLET ORAL at 09:31

## 2017-08-31 RX ADMIN — LEVOTHYROXINE SODIUM 150 MCG: 150 TABLET ORAL at 09:29

## 2017-08-31 RX ADMIN — Medication 20 MG: at 09:32

## 2017-08-31 RX ADMIN — HYDROCORTISONE SODIUM SUCCINATE 10 MG: 100 INJECTION, POWDER, FOR SOLUTION INTRAMUSCULAR; INTRAVENOUS at 03:31

## 2017-08-31 RX ADMIN — SODIUM BICARBONATE 325 MG: 325 TABLET ORAL at 03:31

## 2017-08-31 RX ADMIN — GABAPENTIN 300 MG: 250 SOLUTION ORAL at 20:21

## 2017-08-31 RX ADMIN — Medication: at 18:13

## 2017-08-31 RX ADMIN — SODIUM CHLORIDE, SODIUM LACTATE, POTASSIUM CHLORIDE, CALCIUM CHLORIDE AND DEXTROSE MONOHYDRATE: 5; 600; 310; 30; 20 INJECTION, SOLUTION INTRAVENOUS at 18:14

## 2017-08-31 RX ADMIN — SODIUM BICARBONATE 325 MG: 325 TABLET ORAL at 20:20

## 2017-08-31 RX ADMIN — GABAPENTIN 300 MG: 250 SOLUTION ORAL at 09:32

## 2017-08-31 RX ADMIN — SODIUM PHOSPHATE, MONOBASIC, MONOHYDRATE AND SODIUM PHOSPHATE, DIBASIC, ANHYDROUS 15 MMOL: 276; 142 INJECTION, SOLUTION INTRAVENOUS at 09:50

## 2017-08-31 ASSESSMENT — PAIN DESCRIPTION - DESCRIPTORS
DESCRIPTORS: ACHING
DESCRIPTORS: STABBING
DESCRIPTORS: ACHING
DESCRIPTORS: STABBING
DESCRIPTORS: ACHING
DESCRIPTORS: ACHING

## 2017-08-31 NOTE — PROGRESS NOTES
"Surgery Progress note    S:  No overnight events.  Pt seen at bedside resting comfortably.  Reports improvement in pain control. Cr stabilizing. Low UOP continues. TF@ 20 cc/hr    O:  /66  Pulse 63  Temp 100.5  F (38.1  C) (Axillary)  Resp 18  Ht 1.803 m (5' 10.98\")  Wt 95.3 kg (210 lb 1.6 oz)  SpO2 96%  BMI 29.32 kg/m2  A&Ox3, NAD  Breathing non-labored on 3 L NC  RRR  Soft, ND, felisa ttp over incision, small skin tear over upper part of incision. G tube to gravity. VIRGIL drain with dark bilious output  Distal extremities warm.         Intake/Output Summary (Last 24 hours) at 08/31/17 0933  Last data filed at 08/31/17 0700   Gross per 24 hour   Intake           3347.2 ml   Output              673 ml   Net           2674.2 ml         BMP  Recent Labs  Lab 08/31/17  0341 08/30/17  1338 08/30/17  0340 08/29/17 2058    142 144 144   POTASSIUM 4.4 4.6 4.5 4.6   CHLORIDE 111* 111* 113* 113*   NICHOLAS 7.0* 7.1* 6.8* 6.9*   CO2 26 28 27 25   BUN 48* 39* 35* 29   CR 2.47* 2.46* 2.18* 1.99*   * 123* 134* 98     CBC  Recent Labs  Lab 08/31/17  0341 08/30/17  0340 08/29/17 2058 08/29/17  0329   WBC 6.2 6.2 6.6 6.2   RBC 2.68* 3.01* 3.08* 3.29*   HGB 7.1* 8.0* 8.0* 8.5*   HCT 23.0* 25.7* 25.8* 27.3*   MCV 86 85 84 83   MCH 26.5 26.6 26.0* 25.8*   MCHC 30.9* 31.1* 31.0* 31.1*   RDW 17.6* 17.6* 17.4* 17.0*   * 139* 125* 130*     INR  Recent Labs  Lab 08/28/17  1600 08/28/17  0601   INR 1.26* 1.15*      Liver Function Studies -   Recent Labs   Lab Test  08/31/17   0341   PROTTOTAL  5.4*   ALBUMIN  2.5*   BILITOTAL  0.5   ALKPHOS  28*   AST  41   ALT  25          A/P: Lucas Brown is a 56 year old male with history of chronic pancreatitis and 2 cm inflammatory mass in the head of the pancreas now POD3 s/p Whipple pancreaticoduodenectomy   Neuro: wean off precedex, dilaudid PCA, gabapentin for pain  Resp: Continue pulmonary toilet, wean off O2 as able. Encourage IS.   CV: Continue hemodynamic " monitoring   GI/FEN: NPO. G tube to gravity. Advance TF as tolerated. VIRGIL with dark output. Will obtain bilirubin and amylase from the drain   Renal: ROSELYN. Cr stabilizing. FeNa consistent prerenal azotemia. Continue IVF resuscitation, Avoid nephrotoxic medications.   Endo: Monitor blood sugar levels.   ID: Finished perioperative ertapenem   Heme: Hgb dropped to 7.1. Will, continue to monitor. No indication for blood transfusion at this time.   Prophylaxis: SCDs. Heparin SC TID      Patient seen with chief resident, Dr. Sommer who will discuss with staff.     Fernando Molina MD  PGY-2, General Surgery  [phone]

## 2017-08-31 NOTE — PLAN OF CARE
Problem: Goal Outcome Summary  Goal: Goal Outcome Summary  OT: REC TCU, pt with improved mobility and cognition today however both continue to be limiting factors and acting as barriers to ADL I. Pt with post surgical precautions/pain and cognitive deficits leading to decreased ADL I.

## 2017-08-31 NOTE — PLAN OF CARE
Problem: Goal Outcome Summary  Goal: Goal Outcome Summary  Outcome: No Change  Neuro: Pt lethargic at times and intermittently confused and forgetful. On 0.3 mcg/kg/hr of Precedex. Dilaudid PCA. Tearful at times due to pain. Moves all extremities. A&Ox4. Ropivacaine bolus given around 0400.  Cardiac: Paced rhythm in the 60s-70s, no ectopy. BP on the softer side but within MAP goal. Will receive 1 unit PRBCs for Hgb of 7.1 this AM.  Resp: On 3L NC with capnography. Lung sounds coarse. Pt coughing up secretions all night. Using IS independently.  GI:  G-tube to gravity, J-tube with trickle feeds running at 20 mL/hour. Due to advance to 30 mL/hour at 0900. Bowel sounds active.  : Durham in place and patent. Continues to have lower UO, around 30 mL/hour. Creatinine climbing.  Drains: R) LQ VIRGIL drain to bulb suction. Minimal sanguinous output.  Drips: Precedex at 0.3 mcg/kg/hr. D5 LR at 125 mL/hour. Dilaudid PCA. Ropivacaine paravertebral blocks at 14 mL/hour.   Skin: Incision on abdomen, dressing marked, no change.   Access: R) triple lumen IJ.       Plan: Continue to work on pain control. Encourage independence. Encourage pulmonary toileting. Continue with current plan of care. Notify MD of any changes.

## 2017-08-31 NOTE — PROGRESS NOTES
Perioperative Pain Service Cross Cover Note    S: Called by RN to assess patient as he is having increased pain.  Denies circumoral numbness, metallic taste, lightheadedness, visual and auditory disturbances, disorientation, or drowsiness.     O: Patient is alert and oriented complaining of 8/10 pain. Paravertebral catheter sites c/d/i without edema, erythema, heme.    Vitals: 91/63, T: 98.5, P: 63, R: 18    A/P: 57 yo POD#3 now with increased pain.   paravertebral catheters bolused with PF bupivacaine 0.25%, 5 mL on each catheter incrementally with negative aspirate before and between each mL without complication, no symptoms of local anesthetic toxicity (LAST).  Remained with and assessed patient for 10 min post-injection.  Bedside nurse aware she should continue monitor BP/P, MAP Q 5 min x 30 min, and assess for any untoward effects to local anesthetic following injection and contact anesthesia for any questions or concerns.      Bety Canales MD  [facility]    August 31, 2017  4:06 AM  Jobcode pager: dial [phone] and enter [phone] ([place]) or [phone] ([facility] [facility]) to leave call-back number. Pager accepts text pages through [employer].

## 2017-08-31 NOTE — PROGRESS NOTES
SURGICAL ICU PROGRESS NOTE  August 31, 2017    ASSESSMENT: Lucas Brown is a 56 year old male w/ hx of chronic pancreatitis and intraductal papillary mucinous neoplasm (pathology pending) s/p Whipple (8/28/17). Continues to require precedex for pain control. Will attempt to wean. Has been oliguric but creatine stable today at ~2.4, will continue to monitor    PMHx: pacemaker (for perioperative 3rd degree AV block, 08/2014), aortic valve replacement (on warfarin, 1999, 2014), aortic aneurysm repair (2014), HTN, HLD, hypothyroidism, COLBY, BPH, GERD, and depression/anxiety.      TODAY'S PROGRESS/PLANS:   - Heparin TID for prophylaxis, discuss plans to advance to heparin gtt  - Cr stabilizing ~2.4, UOP increasing  - Hold gabapentin increase with decreased renal function  - BMP at 1600  - Hold feeds at 20ml/hr due to nausea and abdominal distension  - Drain amylase and bili    PLAN:  Neuro/ pain/ sedation:  #post-operative pain  #sedation  #depression/anxiety  -Monitor neurological status. Notify the MD for any acute changes in exam.  -Dilaudid PCA with 0.3-0.5 bumps, 0.5-1mg continuous   -Ropivicaine paravertebrals  -Tylenol, gabapentin, lidoderm patches, flexeril  -Precedex for sedation, still at 0.3, attempt to wean  -PTA sertraline    Pulmonary care:   #COLBY  - Supplemental oxygen to keep saturation above 92 %.  - Incentive spirometer every 15- 30 minutes when awake.  - Aggressive bedside pulmonary toilet    Cardiovascular:    #s/p pacemaker (for perioperative 3rd degree AV block, 08/2014), aortic valve replacement (on warfarin, 1999, 2014), aortic aneurysm repair (2014)  #coronary artery disease, mild nonocclusive coronary artery disease of the LAD  #AV regurgitation (per echo 03/2017)  #HTN  #HLD   - Monitor hemodynamic status.   - Electrolyte replacement protocol    - PTA fenofibrate, pravastatin  - prophylactic SQH. On Warfarin at home for mechanical valve, will defer to gen surg when he can be advanced to  therapeutic anticoagulation    GI care:   #IPMN s/p Gabrielipplida 8/28/17  #post-operative pancreatitis from prior AAA repair   -NPO except swabs and medications.  -G and J tubes to gravity   -G and J tubes and VIRGIL drain secured with flexi-trac cheng   -Protonix 40 mg IV Qday  -Prochlorperazine prn for nausea  -Hold bowel regimen per gen surg    Fluids/ Electrolytes/ Nutrition:   - Nutrition consulted; appreciate recs  - Tube feeds at 20cc/hr today. Hold advancement due to nausea and distension.  - Electrolyte replacement protocol   - D5 LR at 125 for IV fluid hydration  - multivitamin    Renal/ Fluid Balance:    #BPH  #ROSELYN vs ATN  -Cr stable ~2.4, UOP increasing  -+4L over the last 24 hours  -Will continue to monitor intake and output.  -Continue hernandes for accurate I/Os    Endocrine:    #Hypothyroidism  -Levothyroxine 150mcg  #chronic steroids for temporal arteritis  -Hydrocortisone taper 50-25-10 over 3 days, finishing today  -Goal BG < 150, has not required supplemental insulin  -Endocrine consulted; appreciate recs   -Start creon with tube feeds    ID/ Antibiotics:  - No antibiotics indicated    Heme:     #aortic valve replacement on warfarin  -prophylactic SQH. On Warfarin at home for mechanical valve, will defer to gen surg to determine when he can be advanced to therapeutic anticoagulation  #post-operative blood loss      -Monitor for surgical bleeding, got 1u RBC for hgb 7.1    MSK:  - PT/OT    Prophylaxis:    - Mechanical prophylaxis for DVT  - SQH  - Omeprazole BID    Lines/ tubes/ drains:  - RIJ CVC, hernandes, On-Q paravertebrals  - GJ tube to gravity  - VIRGIL drain     Disposition:  - Surgical ICU    Karey Brown MD  General Surgery, PGY-2  Pg [phone]    ====================================    SUBJECTIVE:   - No acute events overnight. Pain better controlled and able to participate in pulmonary toilet. Having some nausea this AM. No emesis. Has not passed flatus or BM. Improved UOP since  midnight.    OBJECTIVE:   1. VITAL SIGNS:   Temp:  [98.5  F (36.9  C)-101.9  F (38.8  C)] 100.5  F (38.1  C)  Heart Rate:  [65-83] 72  Resp:  [18-20] 18  BP: ()/(54-77) 101/66  SpO2:  [90 %-100 %] 96 %  Resp: 18    2. INTAKE/ OUTPUT:   I/O last 3 completed shifts:  In: 3698.8 [I.V.:3108.8; NG/GT:130]  Out: 748 [Urine:483; Emesis/NG output:265]    3. PHYSICAL EXAMINATION:   General: lying in bed. In NAD  Neuro: A&Ox3, strength in extremities symmetric   Resp: Breathing non-labored, less crackles  CV: RRR, paced  Abdomen: Soft, distended, appropriately tender   Incisions: incision c/d/i, VIRGIL with dark brown/red output  Extremities: warm and well perfused. 1+ pitting edema    4. INVESTIGATIONS:   Arterial Blood Gases     Recent Labs  Lab 08/28/17  1841   PH 7.36   PCO2 44   PO2 156*   HCO3 25     Complete Blood Count     Recent Labs  Lab 08/31/17  0341 08/30/17  0340 08/29/17 2058 08/29/17 0329   WBC 6.2 6.2 6.6 6.2   HGB 7.1* 8.0* 8.0* 8.5*   * 139* 125* 130*     Basic Metabolic Panel    Recent Labs  Lab 08/31/17  0341 08/30/17  1338 08/30/17  0340 08/29/17 2058    142 144 144   POTASSIUM 4.4 4.6 4.5 4.6   CHLORIDE 111* 111* 113* 113*   CO2 26 28 27 25   BUN 48* 39* 35* 29   CR 2.47* 2.46* 2.18* 1.99*   * 123* 134* 98     Liver Function Tests    Recent Labs  Lab 08/31/17  0341 08/29/17  0329 08/28/17  1600 08/28/17  0601   AST 41 43 39  --    ALT 25 29 30  --    ALKPHOS 28* 25* 28*  --    BILITOTAL 0.5 0.6 0.8  --    ALBUMIN 2.5* 2.8* 3.2*  --    INR  --   --  1.26* 1.15*     Pancreatic Enzymes    Recent Labs  Lab 08/31/17  0341 08/29/17  0329   LIPASE 1047* 358   AMYLASE 242* 84     Coagulation Profile    Recent Labs  Lab 08/28/17  1600 08/28/17  0601   INR 1.26* 1.15*   PTT 29 31     Lactate  Invalid input(s): LACTATE    5. RADIOLOGY:   Recent Results (from the past 24 hour(s))   XR Chest Port 1 View    Narrative    EXAMINATION: XR CHEST PORT 1 VW, 8/28/2017 4:13 PM    COMPARISON:  None    HISTORY: central venous catheter placement    FINDINGS: Right IJ catheter tip in the low internal jugular vein.  Cardiac silhouette is enlarged. Aorta is tortuous. Pulmonary  vasculature is indistinct.      Impression    IMPRESSION:   1. Right IJ catheter tip at the low internal jugular vein.  2. Large cardiac silhouette.  3.  Pulmonary vascular congestion.    JOSÉ DIAMOND MD       =========================================

## 2017-08-31 NOTE — PROGRESS NOTES
Nutrition Progress Note - Discussion of POC on SICU rounds; f/u for progress towards previous nutrition POC (see previous 8/31 reassessment for details)    -EN: Impact Peptide @ 20 ml/hr  -GI: pt with c/o nausea and per MDs exam with abd distention and no BM documented yet (no scheduled bowel med regimen per surgery request)    Interventions:  Collaboration and Referral of Nutrition care - Discussed plan for FEN/GI on rounds with Providers who requested order to hold TF rate at 20 ml/hr until see improved GI sx.  Communicated plan to bedside RN.    Maria Isabel Quintana ,RD, LD, CNSC (pgr [phone])

## 2017-08-31 NOTE — PLAN OF CARE
Problem: Goal Outcome Summary  Goal: Goal Outcome Summary  D/I: Dilaudid cont. 0.8 with 0.4mg bumps, precedex 0.3mg. This afternoon pt appeared a bit more confused than this morning and what was noted to prior shift; unsure where he was at, not able to follow most commands, and repeated questions,  however this afternoon was back to baseline. Pt stated this evening pt's pain has been a bit better than this morning. Pt did get up twice to chair for PT and OT.  Pt VSS, afebrile, on 1L NC; was on RA for almost 4 hours. Pt given total of 750ml albumin, very minimal improvement of u/o, 10-20ml/hr. No BM this shit ; pt unsure when last BM was.     A/P: Start bowel regime, wean off O2 when able, urine specimen still needs to be collected for U/A, continue to follow POC, notify team for any changes or concern.

## 2017-08-31 NOTE — PLAN OF CARE
Problem: Goal Outcome Summary  Goal: Goal Outcome Summary  PT 4A: patient oriented to self and place, remains confused at times.  Transferred to EOB with min assist. Stood with FWW and SBA, took a few steps and transferred to chair. Mobility limited primarily by pain.      Currently recommend transfer to TCU setting due to limited tolerance to activity and dependence with functional mobility, however pending length of acute stay and subsequent improvement may be appropriate to discharge to local apartment with assistance when stable.

## 2017-08-31 NOTE — PROGRESS NOTES
[facility] PA[facility] CONTINUOUS NERVE INFUSION NOTE  Subjective and Interval History: Pt reports adequate pain control via nerve block continuous infusion.  Denies any weakness, paresthesias, circumoral numbness, metallic taste or tinnitus.  Pt is ambulating with assistance.  Patient denies nausea or vomiting. Patient is tolerating TF.       Clinically Aligned Pain Assessment (CAPA):   Comfort (How is your pain?): Tolerable with discomfort  Change in Pain (Since your last medication/intervention?): Getting better  Pain Control (How are your pain treatments working?):  Partially effective pain control  Functioning (Are you able to do activities to get better?) : Can do most things, but pain gets in the way of some   Sleep (Does your pain management allow you to sleep or rest?): Awake with occasional pain     Numerical Rating Scale (NRS):  4/10 at rest and 4/10 with movement.        Anticoagulation:      heparin sodium PF injection 5,000 Units 5,000 Units, SC, Q8H Given: 08/31 0331           Medications related to Pain Management (Future)    Start     Dose/Rate Route Frequency Ordered Stop    08/29/17 0800  gabapentin (NEURONTIN) solution 300 mg      300 mg Oral or J tube 2 TIMES DAILY 08/28/17 1832      08/28/17 1845  dexmedetomidine (PRECEDEX) 400 mcg in 0.9% sodium chloride 100 mL      0.2-0.7 mcg/kg/hr × 90.3 kg  4.5-15.8 mL/hr  Intravenous CONTINUOUS 08/28/17 1832      08/28/17 1832  lidocaine 1 % 1 mL      1 mL Other EVERY 1 HOUR PRN 08/28/17 1832      08/28/17 1832  lidocaine (LMX4) kit       Topical EVERY 1 HOUR PRN 08/28/17 1832      08/28/17 1832  cyclobenzaprine (FLEXERIL) tablet 10 mg      10 mg Oral 3 TIMES DAILY PRN 08/28/17 1832      08/28/17 1615  HYDROmorphone (DILAUDID) PCA 1 mg/mL       Intravenous PCA 08/28/17 1601      08/28/17 0845  ROPivacaine 0.2% (NAROPIN) 750 mL in ON-Q C-Bloc select flow ([id number] holds 600-750 mL) dual cath disposable pump      14 mL/hr  Paravertebral Elastomeric  Pump 08/28/17 0835              OBJECTIVE:    Diagnostic:  Lab Results   Component Value Date    WBC 6.2 08/31/2017     Lab Results   Component Value Date    RBC 2.68 08/31/2017     Lab Results   Component Value Date    HGB 7.1 08/31/2017     Lab Results   Component Value Date    HCT 23.0 08/31/2017     Lab Results   Component Value Date     08/31/2017         Vitals:    Temp:  [98.5  F (36.9  C)-101.9  F (38.8  C)] 100.5  F (38.1  C)  Heart Rate:  [65-83] 72  Resp:  [18-20] 18  BP: ()/(54-91) 101/66  SpO2:  [90 %-100 %] 96 %    Exam:    Strength 5/5 and symmetric grossly in bilateral LE      B/L paravertebral (PV) catheter sites with dressing c/d/i, no tenderness, erythema, heme, edema      ASSESSMENT/PLAN:    Lucas Brown is a 56 year old male POD #3 s/p WHIPPLE PROCEDURE and placement of B/L Thoracic PV catheters for analgesia.  Pt is receiving adequate analgesia with Ropivacaine 0.2%, total infusion 14mL/hour - 7mL each catheter.  Pt is ambulating without difficulty.  No weakness or paresthesias.  No evidence of adverse side effects associated with local anesthetic. Last bolus was at 0400 this morning.    - continue current total infusion of 14mL/hour - 7mL each catheter  - RN can page #[phone] for q 12 hr bolus' PRN   - please page #[phone] if anticoagulation is going to be changed   - will continue to follow and adjust as needed  - expected change of next On-Q pump is this morning - RN aware  - discussed plan with attending anesthesiologist    TERRELL Gomez CNP  [facility]  8/31/2017 7:17 AM    24 hour Job Code Pager.  For in-house use only.     [place]:  * * [phone]  [place]: * * [phone]  Peds: * * [phone]  Enter call-back number and #      This pager only accepts text messages through [facility]

## 2017-09-01 ENCOUNTER — APPOINTMENT (OUTPATIENT)
Dept: OCCUPATIONAL THERAPY | Facility: CLINIC | Age: 57
DRG: 406 | End: 2017-09-01
Attending: SURGERY
Payer: COMMERCIAL

## 2017-09-01 ENCOUNTER — APPOINTMENT (OUTPATIENT)
Dept: PHYSICAL THERAPY | Facility: CLINIC | Age: 57
DRG: 406 | End: 2017-09-01
Attending: SURGERY
Payer: COMMERCIAL

## 2017-09-01 LAB
ANION GAP SERPL CALCULATED.3IONS-SCNC: 5 MMOL/L (ref 3–14)
BASOPHILS # BLD AUTO: 0 10E9/L (ref 0–0.2)
BASOPHILS NFR BLD AUTO: 0.2 %
BUN SERPL-MCNC: 35 MG/DL (ref 7–30)
CALCIUM SERPL-MCNC: 7.4 MG/DL (ref 8.5–10.1)
CHLORIDE SERPL-SCNC: 112 MMOL/L (ref 94–109)
CO2 SERPL-SCNC: 28 MMOL/L (ref 20–32)
CREAT SERPL-MCNC: 1.47 MG/DL (ref 0.66–1.25)
DIFFERENTIAL METHOD BLD: ABNORMAL
EOSINOPHIL # BLD AUTO: 0.1 10E9/L (ref 0–0.7)
EOSINOPHIL NFR BLD AUTO: 1.1 %
ERYTHROCYTE [DISTWIDTH] IN BLOOD BY AUTOMATED COUNT: 17.7 % (ref 10–15)
GFR SERPL CREATININE-BSD FRML MDRD: 49 ML/MIN/1.7M2
GLUCOSE BLDC GLUCOMTR-MCNC: 120 MG/DL (ref 70–99)
GLUCOSE BLDC GLUCOMTR-MCNC: 123 MG/DL (ref 70–99)
GLUCOSE SERPL-MCNC: 113 MG/DL (ref 70–99)
HCT VFR BLD AUTO: 24.9 % (ref 40–53)
HGB BLD-MCNC: 7.8 G/DL (ref 13.3–17.7)
IMM GRANULOCYTES # BLD: 0 10E9/L (ref 0–0.4)
IMM GRANULOCYTES NFR BLD: 0.2 %
LMWH PPP CHRO-ACNC: <0.1 IU/ML
LYMPHOCYTES # BLD AUTO: 1 10E9/L (ref 0.8–5.3)
LYMPHOCYTES NFR BLD AUTO: 15.5 %
MAGNESIUM SERPL-MCNC: 2.2 MG/DL (ref 1.6–2.3)
MCH RBC QN AUTO: 26.4 PG (ref 26.5–33)
MCHC RBC AUTO-ENTMCNC: 31.3 G/DL (ref 31.5–36.5)
MCV RBC AUTO: 84 FL (ref 78–100)
MONOCYTES # BLD AUTO: 0.7 10E9/L (ref 0–1.3)
MONOCYTES NFR BLD AUTO: 12.1 %
NEUTROPHILS # BLD AUTO: 4.4 10E9/L (ref 1.6–8.3)
NEUTROPHILS NFR BLD AUTO: 70.9 %
NRBC # BLD AUTO: 0 10*3/UL
NRBC BLD AUTO-RTO: 0 /100
PHOSPHATE SERPL-MCNC: 1.6 MG/DL (ref 2.5–4.5)
PHOSPHATE SERPL-MCNC: 1.8 MG/DL (ref 2.5–4.5)
PLATELET # BLD AUTO: 169 10E9/L (ref 150–450)
POTASSIUM SERPL-SCNC: 3.8 MMOL/L (ref 3.4–5.3)
RBC # BLD AUTO: 2.95 10E12/L (ref 4.4–5.9)
SODIUM SERPL-SCNC: 145 MMOL/L (ref 133–144)
WBC # BLD AUTO: 6.1 10E9/L (ref 4–11)

## 2017-09-01 PROCEDURE — 25000128 H RX IP 250 OP 636: Performed by: SURGERY

## 2017-09-01 PROCEDURE — 85520 HEPARIN ASSAY: CPT | Performed by: SURGERY

## 2017-09-01 PROCEDURE — 80048 BASIC METABOLIC PNL TOTAL CA: CPT | Performed by: SURGERY

## 2017-09-01 PROCEDURE — 12000003 ZZH R&B CRITICAL UMMC

## 2017-09-01 PROCEDURE — 27210436 ZZH NUTRITION PRODUCT SEMIELEM INTERMED CAN

## 2017-09-01 PROCEDURE — S5010 5% DEXTROSE AND 0.45% SALINE: HCPCS | Performed by: SURGERY

## 2017-09-01 PROCEDURE — 84100 ASSAY OF PHOSPHORUS: CPT | Performed by: SURGERY

## 2017-09-01 PROCEDURE — 40000196 ZZH STATISTIC RAPCV CVP MONITORING

## 2017-09-01 PROCEDURE — 25000132 ZZH RX MED GY IP 250 OP 250 PS 637: Performed by: SURGERY

## 2017-09-01 PROCEDURE — 25000125 ZZHC RX 250: Performed by: SURGERY

## 2017-09-01 PROCEDURE — 83735 ASSAY OF MAGNESIUM: CPT | Performed by: ANESTHESIOLOGY

## 2017-09-01 PROCEDURE — 83735 ASSAY OF MAGNESIUM: CPT | Performed by: SURGERY

## 2017-09-01 PROCEDURE — 99231 SBSQ HOSP IP/OBS SF/LOW 25: CPT | Mod: GC | Performed by: SURGERY

## 2017-09-01 PROCEDURE — 00000146 ZZHCL STATISTIC GLUCOSE BY METER IP

## 2017-09-01 PROCEDURE — 36415 COLL VENOUS BLD VENIPUNCTURE: CPT | Performed by: SURGERY

## 2017-09-01 PROCEDURE — 85025 COMPLETE CBC W/AUTO DIFF WBC: CPT | Performed by: SURGERY

## 2017-09-01 PROCEDURE — 25000131 ZZH RX MED GY IP 250 OP 636 PS 637: Performed by: SURGERY

## 2017-09-01 PROCEDURE — 40000133 ZZH STATISTIC OT WARD VISIT: Performed by: OCCUPATIONAL THERAPIST

## 2017-09-01 PROCEDURE — 97116 GAIT TRAINING THERAPY: CPT | Mod: GP | Performed by: PHYSICAL THERAPIST

## 2017-09-01 PROCEDURE — 97530 THERAPEUTIC ACTIVITIES: CPT | Mod: GP | Performed by: PHYSICAL THERAPIST

## 2017-09-01 PROCEDURE — 40000193 ZZH STATISTIC PT WARD VISIT: Performed by: PHYSICAL THERAPIST

## 2017-09-01 PROCEDURE — 40000275 ZZH STATISTIC RCP TIME EA 10 MIN

## 2017-09-01 PROCEDURE — 25800025 ZZH RX 258: Performed by: SURGERY

## 2017-09-01 PROCEDURE — 97535 SELF CARE MNGMENT TRAINING: CPT | Mod: GO | Performed by: OCCUPATIONAL THERAPIST

## 2017-09-01 RX ORDER — DOXAZOSIN 1 MG/1
1 TABLET ORAL DAILY
Status: DISCONTINUED | OUTPATIENT
Start: 2017-09-01 | End: 2017-09-04

## 2017-09-01 RX ORDER — TAMSULOSIN HYDROCHLORIDE 0.4 MG/1
0.4 CAPSULE ORAL DAILY
Status: DISCONTINUED | OUTPATIENT
Start: 2017-09-01 | End: 2017-09-01

## 2017-09-01 RX ORDER — HEPARIN SODIUM 10000 [USP'U]/100ML
0-3500 INJECTION, SOLUTION INTRAVENOUS CONTINUOUS
Status: DISCONTINUED | OUTPATIENT
Start: 2017-09-01 | End: 2017-09-02

## 2017-09-01 RX ADMIN — PANCRELIPASE 24000 UNITS: 24000; 76000; 120000 CAPSULE, DELAYED RELEASE PELLETS ORAL at 20:14

## 2017-09-01 RX ADMIN — SODIUM BICARBONATE 325 MG: 325 TABLET ORAL at 20:14

## 2017-09-01 RX ADMIN — SODIUM BICARBONATE 325 MG: 325 TABLET ORAL at 00:16

## 2017-09-01 RX ADMIN — GABAPENTIN 300 MG: 250 SOLUTION ORAL at 20:06

## 2017-09-01 RX ADMIN — Medication 20 MG: at 07:56

## 2017-09-01 RX ADMIN — Medication: at 00:21

## 2017-09-01 RX ADMIN — DOXAZOSIN 1 MG: 1 TABLET ORAL at 12:26

## 2017-09-01 RX ADMIN — PANCRELIPASE 24000 UNITS: 24000; 76000; 120000 CAPSULE, DELAYED RELEASE PELLETS ORAL at 07:55

## 2017-09-01 RX ADMIN — GABAPENTIN 300 MG: 250 SOLUTION ORAL at 07:56

## 2017-09-01 RX ADMIN — Medication 15 ML: at 07:57

## 2017-09-01 RX ADMIN — Medication: at 22:16

## 2017-09-01 RX ADMIN — PRAVASTATIN SODIUM 40 MG: 40 TABLET ORAL at 07:53

## 2017-09-01 RX ADMIN — Medication: at 14:53

## 2017-09-01 RX ADMIN — SODIUM BICARBONATE 325 MG: 325 TABLET ORAL at 12:23

## 2017-09-01 RX ADMIN — HEPARIN SODIUM 5000 UNITS: 5000 INJECTION, SOLUTION INTRAVENOUS; SUBCUTANEOUS at 12:23

## 2017-09-01 RX ADMIN — PANCRELIPASE 24000 UNITS: 24000; 76000; 120000 CAPSULE, DELAYED RELEASE PELLETS ORAL at 03:56

## 2017-09-01 RX ADMIN — HEPARIN SODIUM 1150 UNITS/HR: 10000 INJECTION, SOLUTION INTRAVENOUS at 14:00

## 2017-09-01 RX ADMIN — PANCRELIPASE 24000 UNITS: 24000; 76000; 120000 CAPSULE, DELAYED RELEASE PELLETS ORAL at 12:26

## 2017-09-01 RX ADMIN — LEVOTHYROXINE SODIUM 150 MCG: 150 TABLET ORAL at 07:53

## 2017-09-01 RX ADMIN — ONDANSETRON 4 MG: 2 INJECTION INTRAMUSCULAR; INTRAVENOUS at 08:02

## 2017-09-01 RX ADMIN — DEXTROSE AND SODIUM CHLORIDE: 5; 450 INJECTION, SOLUTION INTRAVENOUS at 08:02

## 2017-09-01 RX ADMIN — Medication 20 MG: at 20:06

## 2017-09-01 RX ADMIN — PREDNISONE 5 MG: 5 SOLUTION ORAL at 07:57

## 2017-09-01 RX ADMIN — SODIUM BICARBONATE 325 MG: 325 TABLET ORAL at 07:54

## 2017-09-01 RX ADMIN — SERTRALINE HYDROCHLORIDE 50 MG: 20 SOLUTION ORAL at 07:56

## 2017-09-01 RX ADMIN — FENOFIBRATE 160 MG: 160 TABLET ORAL at 07:55

## 2017-09-01 RX ADMIN — PANCRELIPASE 24000 UNITS: 24000; 76000; 120000 CAPSULE, DELAYED RELEASE PELLETS ORAL at 00:16

## 2017-09-01 RX ADMIN — SODIUM BICARBONATE 325 MG: 325 TABLET ORAL at 03:56

## 2017-09-01 RX ADMIN — Medication: at 18:33

## 2017-09-01 RX ADMIN — SODIUM CHLORIDE, SODIUM LACTATE, POTASSIUM CHLORIDE, CALCIUM CHLORIDE AND DEXTROSE MONOHYDRATE: 5; 600; 310; 30; 20 INJECTION, SOLUTION INTRAVENOUS at 04:13

## 2017-09-01 RX ADMIN — HEPARIN SODIUM 5000 UNITS: 5000 INJECTION, SOLUTION INTRAVENOUS; SUBCUTANEOUS at 03:56

## 2017-09-01 RX ADMIN — HEPARIN SODIUM 1450 UNITS/HR: 10000 INJECTION, SOLUTION INTRAVENOUS at 22:03

## 2017-09-01 RX ADMIN — POTASSIUM PHOSPHATE, MONOBASIC AND POTASSIUM PHOSPHATE, DIBASIC 20 MMOL: 224; 236 INJECTION, SOLUTION INTRAVENOUS at 10:00

## 2017-09-01 ASSESSMENT — PAIN DESCRIPTION - DESCRIPTORS
DESCRIPTORS: ACHING;SHARP
DESCRIPTORS: ACHING
DESCRIPTORS: ACHING;SHARP

## 2017-09-01 NOTE — PLAN OF CARE
Problem: Goal Outcome Summary  Goal: Goal Outcome Summary  OT: REC TCU, pt limited by deconditioning and post surgical precautions. Pt standing today at bedside table GCA to min assist for simple G/H.
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
## 2017-09-01 NOTE — PROGRESS NOTES
"Surgery Progress note    S:  No overnight events.  Pt seen at bedside resting comfortably.  Off precedex, Pain well controlled. No nausea or vomiting. Tolerating TF @ 20cc/hr. Kidney function improving.     O:  /83  Pulse 63  Temp 100.5  F (38.1  C) (Axillary)  Resp 22  Ht 1.803 m (5' 10.98\")  Wt 95.3 kg (210 lb 1.6 oz)  SpO2 97%  BMI 29.32 kg/m2  A&Ox3, NAD  Breathing non-labored on 2 L NC  RRR  Soft, ND, felisa ttp, incision c/d/i, VIRGIL with minimal dark output. G tube to gravity   Distal extremities warm.         Intake/Output Summary (Last 24 hours) at 09/01/17 0659  Last data filed at 09/01/17 0600   Gross per 24 hour   Intake             3611 ml   Output             2210 ml   Net             1401 ml         BMP  Recent Labs  Lab 09/01/17  0344 08/31/17  1816 08/31/17  0341 08/30/17  1338   * 143 144 142   POTASSIUM 3.8 4.2 4.4 4.6   CHLORIDE 112* 111* 111* 111*   NICHOLAS 7.4* 7.2* 7.0* 7.1*   CO2 28 28 26 28   BUN 35* 44* 48* 39*   CR 1.47* 1.85* 2.47* 2.46*   * 110* 139* 123*     CBC  Recent Labs  Lab 09/01/17  0344 08/31/17  0341 08/30/17  0340 08/29/17  2058   WBC 6.1 6.2 6.2 6.6   RBC 2.95* 2.68* 3.01* 3.08*   HGB 7.8* 7.1* 8.0* 8.0*   HCT 24.9* 23.0* 25.7* 25.8*   MCV 84 86 85 84   MCH 26.4* 26.5 26.6 26.0*   MCHC 31.3* 30.9* 31.1* 31.0*   RDW 17.7* 17.6* 17.6* 17.4*    129* 139* 125*     INR  Recent Labs  Lab 08/28/17  1600 08/28/17  0601   INR 1.26* 1.15*      Liver Function Studies -   Recent Labs   Lab Test  08/31/17   0341   PROTTOTAL  5.4*   ALBUMIN  2.5*   BILITOTAL  0.5   ALKPHOS  28*   AST  41   ALT  25          A/P: Lucas Brown is a 56 year old male with history of chronic pancreatitis and 2 cm inflammatory mass in the head of the pancreas now POD4 s/p Whipple pancreaticoduodenectomy   Neuro: dilaudid PCA, gabapentin for pain  Resp: Continue pulmonary toilet, wean off O2 as able. Encourage IS.   CV: Continue hemodynamic monitoring, PTA medications   GI/FEN: NPO. G " tube to gravity. Advance TF as tolerated 10 cc/day. VIRGIL with dark output. Bili wnl, amylase elevated.    Renal: ROSELYN. Cr improving 2.47 -> 1.47. FeNa consistent prerenal azotemia. Continue IVF resuscitation, Avoid nephrotoxic medications. Remove hernandes today   Endo: Monitor blood sugar levels.   ID: Finished perioperative ertapenem   Heme: Hgb stable 7.8 Will continue to monitor.  Prophylaxis: SCDs. Heparin SC TID. Will discuss with staff resuming therapeutic anticoagulation today.       Patient seen with chief resident, Dr. Sommer who will discuss with staff.    Fernando Molina MD  PGY-2, General Surgery  [phone]

## 2017-09-01 NOTE — PROGRESS NOTES
SURGICAL ICU PROGRESS NOTE  September 1, 2017    ASSESSMENT: Lucas Brown is a 56 year old male w/ hx of chronic pancreatitis and intraductal papillary mucinous neoplasm (pathology pending) s/p Whipple (8/28/17). Off precedex. Creatinine improving, UOP increasing. Ready for transfer to floor.    PMHx: pacemaker (for perioperative 3rd degree AV block, 08/2014), aortic valve replacement (on warfarin, 1999, 2014), aortic aneurysm repair (2014), HTN, HLD, hypothyroidism, COLBY, BPH, GERD, and depression/anxiety.      TODAY'S PROGRESS/PLANS:   - Decrease IVF 50 D5 1/2NS  - 60 FWF q4h  - 38.5 Tmax overnight, aggressive pulmonary toilet  - Off precedex  - Transfer to floor    PLAN:  Neuro/ pain/ sedation:  #post-operative pain  #sedation  #depression/anxiety  -Monitor neurological status. Notify the MD for any acute changes in exam.  -Dilaudid PCA with 0.3-0.5 bumps, 0.5-1mg continuous   -Ropivicaine paravertebrals  -Tylenol, gabapentin, lidoderm patches, flexeril  -PTA sertraline    Pulmonary care:   #COLBY  - Supplemental oxygen to keep saturation above 92 %.  - Incentive spirometer every 15- 30 minutes when awake.  - Aggressive bedside pulmonary toilet  - Accapella valve    Cardiovascular:    #s/p pacemaker (for perioperative 3rd degree AV block, 08/2014), aortic valve replacement (on warfarin, 1999, 2014), aortic aneurysm repair (2014)  #coronary artery disease, mild nonocclusive coronary artery disease of the LAD  #AV regurgitation (per echo 03/2017)  #HTN  #HLD   - Monitor hemodynamic status.   - Electrolyte replacement protocol    - PTA fenofibrate, pravastatin  - prophylactic SQH. On Warfarin at home for mechanical valve, will defer to gen surg when he can be advanced to therapeutic anticoagulation    GI care:   #IPMN s/p Whipple 8/28/17  #post-operative pancreatitis from prior AAA repair   -NPO except swabs and medications.  -G and J tubes to gravity   -G and J tubes and VIRGIL drain secured with flexi-trac cheng    -omeprazole BID  -Prochlorperazine prn for nausea  -Hold bowel regimen, until passing gas    Fluids/ Electrolytes/ Nutrition:   - Nutrition consulted; appreciate recs  - Increase tube feeds to 30cc/hr today  - FWF increase to 60cc q4h  - Electrolyte replacement protocol   - D5 1/2NS at 50 for IV fluid hydration  - multivitamin    Renal/ Fluid Balance:    #BPH  #ROSELYN vs ATN  -Cr improving at 1.4, UOP improved  -Will continue to monitor intake and output.  -Discontinue hernandes    Endocrine:    #Hypothyroidism  -Levothyroxine 150mcg  #chronic steroids for temporal arteritis  -Hydrocortisone taper 50-25-10 over 3 days. Completed.  -Goal BG < 150, has not required supplemental insulin  -Creon    ID/ Antibiotics:  - Tmax 38.5 x1 overnight  - No antibiotics indicated    Heme:     #aortic valve replacement on warfarin  -prophylactic SQH. On Warfarin at home for mechanical valve, will defer to gen surg to determine when he can be advanced to therapeutic anticoagulation  #post-operative blood loss      -Hgb stable in 7-8    MSK:  - PT/OT    Prophylaxis:    - Mechanical prophylaxis for DVT  - SQH TID  - Omeprazole BID    Lines/ tubes/ drains:  - RIJ CVC, hernandes, On-Q paravertebrals  - GJ tube to gravity  - VIRGIL drain     Disposition:  - Transfer to     Karey Brown MD  General Surgery, PGY-2  Pg [phone]    ====================================    SUBJECTIVE:   - No acute events overnight. Off precedex. Pain well controlled. Denies nausea or vomiting. Has not passed flatus or BM. Improved UOP.    OBJECTIVE:   1. VITAL SIGNS:   Temp:  [98.3  F (36.8  C)-101.3  F (38.5  C)] 98.3  F (36.8  C)  Heart Rate:  [66-91] 88  Resp:  [14-23] 21  BP: (102-146)/() 144/103  SpO2:  [88 %-100 %] 92 %  Resp: 21    2. INTAKE/ OUTPUT:   I/O last 3 completed shifts:  In: 3611 [I.V.:2911; NG/GT:240]  Out: 2210 [Urine:1830; Emesis/NG output:375; Drains:5]    3. PHYSICAL EXAMINATION:   General: lying in bed. In NAD  Neuro: A&Ox3, strength  in extremities symmetric   Resp: Slight splinting with pulmonary toilet  CV: RRR, paced  Abdomen: Soft, distended, appropriately tender   Incisions: incision c/d/i, VIRGIL with dark brown/red output  Extremities: warm and well perfused. 1+ pitting edema    4. INVESTIGATIONS:   Arterial Blood Gases     Recent Labs  Lab 08/28/17  1841   PH 7.36   PCO2 44   PO2 156*   HCO3 25     Complete Blood Count     Recent Labs  Lab 09/01/17  0344 08/31/17  0341 08/30/17  0340 08/29/17  2058   WBC 6.1 6.2 6.2 6.6   HGB 7.8* 7.1* 8.0* 8.0*    129* 139* 125*     Basic Metabolic Panel    Recent Labs  Lab 09/01/17  0344 08/31/17  1816 08/31/17  0341 08/30/17  1338   * 143 144 142   POTASSIUM 3.8 4.2 4.4 4.6   CHLORIDE 112* 111* 111* 111*   CO2 28 28 26 28   BUN 35* 44* 48* 39*   CR 1.47* 1.85* 2.47* 2.46*   * 110* 139* 123*     Liver Function Tests    Recent Labs  Lab 08/31/17  0341 08/29/17  0329 08/28/17  1600 08/28/17  0601   AST 41 43 39  --    ALT 25 29 30  --    ALKPHOS 28* 25* 28*  --    BILITOTAL 0.5 0.6 0.8  --    ALBUMIN 2.5* 2.8* 3.2*  --    INR  --   --  1.26* 1.15*     Pancreatic Enzymes    Recent Labs  Lab 08/31/17  0341 08/29/17  0329   LIPASE 1047* 358   AMYLASE 242* 84     Coagulation Profile    Recent Labs  Lab 08/28/17  1600 08/28/17  0601   INR 1.26* 1.15*   PTT 29 31     Lactate  Invalid input(s): LACTATE    5. RADIOLOGY:   Recent Results (from the past 24 hour(s))   XR Chest Port 1 View    Narrative    EXAMINATION: XR CHEST PORT 1 VW, 8/28/2017 4:13 PM    COMPARISON: None    HISTORY: central venous catheter placement    FINDINGS: Right IJ catheter tip in the low internal jugular vein.  Cardiac silhouette is enlarged. Aorta is tortuous. Pulmonary  vasculature is indistinct.      Impression    IMPRESSION:   1. Right IJ catheter tip at the low internal jugular vein.  2. Large cardiac silhouette.  3.  Pulmonary vascular congestion.    JOSÉ DIAMOND MD        =========================================

## 2017-09-01 NOTE — PROGRESS NOTES
REGIONAL ANESTHESIA PAIN SERVICE CONTINUOUS NERVE INFUSION NOTE  Subjective and Interval History: Pt reports adequate pain control via nerve block continuous infusion and IV PCA, rating pain at 4/10.  Denies any weakness, paresthesias, circumoral numbness, metallic taste or tinnitus.  Pt is ambulating with assistance.  Patient currently denies nausea or vomiting. Patient is tolerating a tube feedings       Clinically Aligned Pain Assessment (CAPA):   Comfort (How is your pain?): Comfortably manageable  Change in Pain (Since your last medication/intervention?): About the same  Pain Control (How are your pain treatments working?):  Fully effective pain control  Functioning (Are you able to do activities to get better?) : Can do most things, but pain gets in the way of some           Anticoagulation:    heparin  drip 25,000 units in 0.45% NaCl 250 mL (see additional administration details for dose) 1,150 Units/hr, IV, Continuous New Ba/01 1400         Medications related to Pain Management (Future)    Start     Dose/Rate Route Frequency Ordered Stop    17 0800  gabapentin (NEURONTIN) solution 300 mg      300 mg Oral or J tube 2 TIMES DAILY 17 1832      17 1832  lidocaine 1 % 1 mL      1 mL Other EVERY 1 HOUR PRN 17 1832      17 1832  lidocaine (LMX4) kit       Topical EVERY 1 HOUR PRN 17 1832      17 1832  cyclobenzaprine (FLEXERIL) tablet 10 mg      10 mg Oral 3 TIMES DAILY PRN 17 1832      17 1615  HYDROmorphone (DILAUDID) PCA 1 mg/mL       Intravenous PCA 17 1601      17 0845  ROPivacaine 0.2% (NAROPIN) 750 mL in ON-Q C-Bloc select flow ([id number] holds 600-750 mL) dual cath disposable pump      14 mL/hr  Paravertebral Elastomeric Pump 17 0835              OBJECTIVE:  CBC RESULTS:   Recent Labs   Lab Test  17   0344   WBC  6.1   RBC  2.95*   HGB  7.8*   HCT  24.9*   MCV  84   MCH  26.4*   MCHC  31.3*   RDW  17.7*   PLT  169          Vitals:    Temp:  [98.6  F (37  C)-101.3  F (38.5  C)] 100.5  F (38.1  C)  Heart Rate:  [64-91] 88  Resp:  [14-23] 15  BP: ()/(55-84) 124/78  SpO2:  [88 %-100 %] 91 %    Exam:    Strength 5/5 and symmetric grossly in bilateral LE   Bilateral paravertebral (PV) catheter sites with loosened dressings changed.  Insertion sites c/d/i, no tenderness, erythema, heme, edema      ASSESSMENT/PLAN:    Lucas Brown is a 56 year old male POD #4 s/p WHIPPLE PROCEDURE and placement of BILATERAL Thoracic PV catheters for analgesia.  Pt is receiving adequate analgesia with Ropivacaine 0.2% at 7 mL/hr each catheter, total infusion 14mL/hour.  Pt is ambulating without difficulty.  No weakness or paresthesias, adequate sensory block.  No evidence of adverse side effects associated with local anesthetic.     - Bilateral thoracic PV catheter placement 8/28/2017, catheter day #4: continue current Ropivacaine 0.2% total infusion of 14mL/hour  -- pt can receive local anesthetic bolus Q 12 hr PRN, [phone]  -- expected change of next On-Q pump is today  - Anticoagulation: heparin SC Q 8 hrs, please contact [facility] if dose or medication is changed  -- plan catheter removal in 3 days, a minimum of 4 hours after last dose of heparin and may resume 1 hr after catheters removed    - will continue to follow and adjust as needed  - discussed plan with attending anesthesiologist    TERRELL Stapleton [facility]  Regional Anesthesia Pain Service  9/1/2017 7:25 AM    24 hour Job Code Pager.  For in-house use only.     [place]:  * * [phone]  [place]: * * [phone]  Peds: * * [phone]  Enter call-back number and #      This pager only accepts text messages through [facility]

## 2017-09-01 NOTE — PROGRESS NOTES
Shift Summary for 11p-7a    D/I:  Overnight Ox4 with intermittent confusion with conversion.  Reoriented frequently, redirected easily, and reassurance given frequently due to anxiety.  PCA dilaudid (continuous and bumps) for pain.  Also has an On Q pump in place for pain.  Declined need for more pain meds overnight.    Temp (max) 100.5 (ax).  Patient performed IS independently.  Turned with minimal assistance.    A:  Good pain control overnight.  Still confused with conversation.  0400 labs- Cr=1.47 (trending down) and hgb=7.8 (trending up from 7.1).    P: Continue to monitor.  See flowsheets for details.

## 2017-09-01 NOTE — PROGRESS NOTES
Nutrition Progress Note - Discussion of POC on SICU rounds; f/u for progress towards previous nutrition POC (see previous 8/29 assessment for details)    -EN: Impact Peptide @ 20 ml/hr    Interventions:  Collaboration and Referral of Nutrition care - Discussed plan for FEN/GI on rounds with Providers who approved to adv now to 30 ml/hr and will continue to evaluate daily for adv by 10 ml (adjusted orders for rate/formula supply)      Maria Isabel Quintana ,LIANA, LD, [facility] (pgr [phone])

## 2017-09-01 NOTE — PLAN OF CARE
Problem: Pain, Acute (Adult)  Goal: Identify Related Risk Factors and Signs and Symptoms  Related risk factors and signs and symptoms are identified upon initiation of Human Response Clinical Practice Guideline (CPG)   See flowsheets and nurse note for details.

## 2017-09-01 NOTE — PLAN OF CARE
Problem: PT General Care Plan  Goal: Stairs (PT)  PT Stairs   Outcome: Change based on patient need/priority Date Met:  09/01/17

## 2017-09-01 NOTE — PLAN OF CARE
Problem: Goal Outcome Summary  Goal: Goal Outcome Summary  Outcome: No Change  Vitals:     09/01/17 1200 09/01/17 1300 09/01/17 1400 09/01/17 1445   BP: 124/88 133/71 133/70 144/72   BP Location: Left arm         Cuff Size: Adult Regular         Pulse:           Resp: 18 24 28 28   Temp: 97.7  F (36.5  C)         TempSrc: Oral         SpO2: 93% 94% 98%     Weight:           Height:             Pt arrived to unit from 4A around 1430, AVSS, pt A&O x 4, Pt comfortable and settled in to room. Report given to evening RN.

## 2017-09-01 NOTE — PROGRESS NOTES
Pt is alert and oriented but still confused. Will answer all orientation questions correct, but follows with some illogical statements. Sat in chair X2 for 1-2.5 hrs each time. Stood and took a couple steps with PT. 2 person assist for transfers. Vitals stable. Adequate UO (30-75 ml/hr). Precedex turned off today. 3 L NC. G tube to gravity. J tube still to trickle feeds per dietary order. On-Q and dilaudid PCA for pain.     Plan: Continue to monitor. Notify MD of any changes.

## 2017-09-02 ENCOUNTER — APPOINTMENT (OUTPATIENT)
Dept: CT IMAGING | Facility: CLINIC | Age: 57
DRG: 406 | End: 2017-09-02
Attending: SURGERY
Payer: COMMERCIAL

## 2017-09-02 LAB
AMYLASE FLD-CCNC: NORMAL U/L
ANION GAP SERPL CALCULATED.3IONS-SCNC: 5 MMOL/L (ref 3–14)
BASOPHILS # BLD AUTO: 0 10E9/L (ref 0–0.2)
BASOPHILS NFR BLD AUTO: 0.2 %
BILIRUB FLD-MCNC: NORMAL MG/DL
BUN SERPL-MCNC: 23 MG/DL (ref 7–30)
CALCIUM SERPL-MCNC: 7.6 MG/DL (ref 8.5–10.1)
CHLORIDE SERPL-SCNC: 111 MMOL/L (ref 94–109)
CO2 SERPL-SCNC: 28 MMOL/L (ref 20–32)
CREAT SERPL-MCNC: 1.04 MG/DL (ref 0.66–1.25)
DIFFERENTIAL METHOD BLD: ABNORMAL
EOSINOPHIL # BLD AUTO: 0.1 10E9/L (ref 0–0.7)
EOSINOPHIL NFR BLD AUTO: 1.3 %
ERYTHROCYTE [DISTWIDTH] IN BLOOD BY AUTOMATED COUNT: 17.9 % (ref 10–15)
GFR SERPL CREATININE-BSD FRML MDRD: 73 ML/MIN/1.7M2
GLUCOSE BLDC GLUCOMTR-MCNC: 119 MG/DL (ref 70–99)
GLUCOSE BLDC GLUCOMTR-MCNC: 122 MG/DL (ref 70–99)
GLUCOSE BLDC GLUCOMTR-MCNC: 126 MG/DL (ref 70–99)
GLUCOSE SERPL-MCNC: 120 MG/DL (ref 70–99)
GRAM STN SPEC: NORMAL
GRAM STN SPEC: NORMAL
HCT VFR BLD AUTO: 23.6 % (ref 40–53)
HGB BLD-MCNC: 7.3 G/DL (ref 13.3–17.7)
IMM GRANULOCYTES # BLD: 0 10E9/L (ref 0–0.4)
IMM GRANULOCYTES NFR BLD: 0.4 %
LMWH PPP CHRO-ACNC: <0.1 IU/ML
LYMPHOCYTES # BLD AUTO: 0.7 10E9/L (ref 0.8–5.3)
LYMPHOCYTES NFR BLD AUTO: 12.9 %
MCH RBC QN AUTO: 26.4 PG (ref 26.5–33)
MCHC RBC AUTO-ENTMCNC: 30.9 G/DL (ref 31.5–36.5)
MCV RBC AUTO: 86 FL (ref 78–100)
MONOCYTES # BLD AUTO: 0.7 10E9/L (ref 0–1.3)
MONOCYTES NFR BLD AUTO: 13.7 %
NEUTROPHILS # BLD AUTO: 3.7 10E9/L (ref 1.6–8.3)
NEUTROPHILS NFR BLD AUTO: 71.5 %
NRBC # BLD AUTO: 0 10*3/UL
NRBC BLD AUTO-RTO: 0 /100
PHOSPHATE SERPL-MCNC: 2 MG/DL (ref 2.5–4.5)
PLATELET # BLD AUTO: 181 10E9/L (ref 150–450)
POTASSIUM SERPL-SCNC: 3.7 MMOL/L (ref 3.4–5.3)
RBC # BLD AUTO: 2.76 10E12/L (ref 4.4–5.9)
SODIUM SERPL-SCNC: 144 MMOL/L (ref 133–144)
SPECIMEN SOURCE FLD: NORMAL
SPECIMEN SOURCE: NORMAL
WBC # BLD AUTO: 5.2 10E9/L (ref 4–11)

## 2017-09-02 PROCEDURE — 74176 CT ABD & PELVIS W/O CONTRAST: CPT

## 2017-09-02 PROCEDURE — 36592 COLLECT BLOOD FROM PICC: CPT | Performed by: SURGERY

## 2017-09-02 PROCEDURE — 12000003 ZZH R&B CRITICAL UMMC

## 2017-09-02 PROCEDURE — 27110038 ZZH RX 271: Performed by: ANESTHESIOLOGY

## 2017-09-02 PROCEDURE — 82150 ASSAY OF AMYLASE: CPT | Performed by: SURGERY

## 2017-09-02 PROCEDURE — 25000125 ZZHC RX 250: Performed by: SURGERY

## 2017-09-02 PROCEDURE — 85520 HEPARIN ASSAY: CPT | Performed by: SURGERY

## 2017-09-02 PROCEDURE — 84100 ASSAY OF PHOSPHORUS: CPT | Performed by: SURGERY

## 2017-09-02 PROCEDURE — 80048 BASIC METABOLIC PNL TOTAL CA: CPT | Performed by: SURGERY

## 2017-09-02 PROCEDURE — 25000132 ZZH RX MED GY IP 250 OP 250 PS 637: Performed by: SURGERY

## 2017-09-02 PROCEDURE — S5010 5% DEXTROSE AND 0.45% SALINE: HCPCS | Performed by: PHYSICIAN ASSISTANT

## 2017-09-02 PROCEDURE — 25800025 ZZH RX 258: Performed by: PHYSICIAN ASSISTANT

## 2017-09-02 PROCEDURE — 85025 COMPLETE CBC W/AUTO DIFF WBC: CPT | Performed by: SURGERY

## 2017-09-02 PROCEDURE — 25000128 H RX IP 250 OP 636

## 2017-09-02 PROCEDURE — 87077 CULTURE AEROBIC IDENTIFY: CPT | Performed by: SURGERY

## 2017-09-02 PROCEDURE — 27210436 ZZH NUTRITION PRODUCT SEMIELEM INTERMED CAN

## 2017-09-02 PROCEDURE — 87070 CULTURE OTHR SPECIMN AEROBIC: CPT | Performed by: SURGERY

## 2017-09-02 PROCEDURE — 87205 SMEAR GRAM STAIN: CPT | Performed by: SURGERY

## 2017-09-02 PROCEDURE — 25000125 ZZHC RX 250: Performed by: ANESTHESIOLOGY

## 2017-09-02 PROCEDURE — 25000128 H RX IP 250 OP 636: Performed by: SURGERY

## 2017-09-02 PROCEDURE — 89051 BODY FLUID CELL COUNT: CPT | Performed by: SURGERY

## 2017-09-02 PROCEDURE — 25000131 ZZH RX MED GY IP 250 OP 636 PS 637: Performed by: SURGERY

## 2017-09-02 PROCEDURE — 00000146 ZZHCL STATISTIC GLUCOSE BY METER IP

## 2017-09-02 PROCEDURE — 82247 BILIRUBIN TOTAL: CPT | Performed by: SURGERY

## 2017-09-02 RX ORDER — MEROPENEM 1 G/1
1 INJECTION, POWDER, FOR SOLUTION INTRAVENOUS EVERY 8 HOURS
Status: DISCONTINUED | OUTPATIENT
Start: 2017-09-02 | End: 2017-09-04

## 2017-09-02 RX ADMIN — GABAPENTIN 300 MG: 250 SOLUTION ORAL at 08:01

## 2017-09-02 RX ADMIN — FENOFIBRATE 160 MG: 160 TABLET ORAL at 07:59

## 2017-09-02 RX ADMIN — SODIUM BICARBONATE 325 MG: 325 TABLET ORAL at 07:59

## 2017-09-02 RX ADMIN — DEXTROSE AND SODIUM CHLORIDE: 5; 450 INJECTION, SOLUTION INTRAVENOUS at 00:04

## 2017-09-02 RX ADMIN — DOXAZOSIN 1 MG: 1 TABLET ORAL at 07:59

## 2017-09-02 RX ADMIN — SODIUM CHLORIDE 1000 ML: 9 INJECTION, SOLUTION INTRAVENOUS at 15:55

## 2017-09-02 RX ADMIN — PANCRELIPASE 48000 UNITS: 24000; 76000; 120000 CAPSULE, DELAYED RELEASE PELLETS ORAL at 16:01

## 2017-09-02 RX ADMIN — Medication: at 18:22

## 2017-09-02 RX ADMIN — ONDANSETRON 4 MG: 2 INJECTION INTRAMUSCULAR; INTRAVENOUS at 04:29

## 2017-09-02 RX ADMIN — MEROPENEM 1 G: 1 INJECTION, POWDER, FOR SOLUTION INTRAVENOUS at 09:41

## 2017-09-02 RX ADMIN — PANCRELIPASE 24000 UNITS: 24000; 76000; 120000 CAPSULE, DELAYED RELEASE PELLETS ORAL at 00:07

## 2017-09-02 RX ADMIN — PREDNISONE 5 MG: 5 SOLUTION ORAL at 08:01

## 2017-09-02 RX ADMIN — LEVOTHYROXINE SODIUM 150 MCG: 150 TABLET ORAL at 07:59

## 2017-09-02 RX ADMIN — PANCRELIPASE 48000 UNITS: 24000; 76000; 120000 CAPSULE, DELAYED RELEASE PELLETS ORAL at 08:00

## 2017-09-02 RX ADMIN — SERTRALINE HYDROCHLORIDE 50 MG: 20 SOLUTION ORAL at 08:00

## 2017-09-02 RX ADMIN — SODIUM BICARBONATE 325 MG: 325 TABLET ORAL at 05:13

## 2017-09-02 RX ADMIN — Medication 20 MG: at 08:00

## 2017-09-02 RX ADMIN — Medication 20 MG: at 20:00

## 2017-09-02 RX ADMIN — Medication: at 18:21

## 2017-09-02 RX ADMIN — PANCRELIPASE 24000 UNITS: 24000; 76000; 120000 CAPSULE, DELAYED RELEASE PELLETS ORAL at 05:13

## 2017-09-02 RX ADMIN — MEROPENEM 1 G: 1 INJECTION, POWDER, FOR SOLUTION INTRAVENOUS at 17:59

## 2017-09-02 RX ADMIN — GABAPENTIN 300 MG: 250 SOLUTION ORAL at 20:00

## 2017-09-02 RX ADMIN — ENOXAPARIN SODIUM 80 MG: 80 INJECTION SUBCUTANEOUS at 15:59

## 2017-09-02 RX ADMIN — SODIUM BICARBONATE 325 MG: 325 TABLET ORAL at 16:01

## 2017-09-02 RX ADMIN — HEPARIN SODIUM 1450 UNITS/HR: 10000 INJECTION, SOLUTION INTRAVENOUS at 04:21

## 2017-09-02 RX ADMIN — PRAVASTATIN SODIUM 40 MG: 40 TABLET ORAL at 07:59

## 2017-09-02 RX ADMIN — LIDOCAINE HYDROCHLORIDE 10 ML: 10 INJECTION, SOLUTION EPIDURAL; INFILTRATION; INTRACAUDAL; PERINEURAL at 09:46

## 2017-09-02 RX ADMIN — PANCRELIPASE 48000 UNITS: 24000; 76000; 120000 CAPSULE, DELAYED RELEASE PELLETS ORAL at 22:34

## 2017-09-02 RX ADMIN — SODIUM BICARBONATE 325 MG: 325 TABLET ORAL at 00:07

## 2017-09-02 RX ADMIN — VANCOMYCIN HYDROCHLORIDE 1750 MG: 10 INJECTION, POWDER, LYOPHILIZED, FOR SOLUTION INTRAVENOUS at 21:24

## 2017-09-02 RX ADMIN — POTASSIUM PHOSPHATE, MONOBASIC AND POTASSIUM PHOSPHATE, DIBASIC 20 MMOL: 224; 236 INJECTION, SOLUTION INTRAVENOUS at 00:01

## 2017-09-02 RX ADMIN — VANCOMYCIN HYDROCHLORIDE 2000 MG: 10 INJECTION, POWDER, LYOPHILIZED, FOR SOLUTION INTRAVENOUS at 10:19

## 2017-09-02 RX ADMIN — SODIUM BICARBONATE 325 MG: 325 TABLET ORAL at 22:34

## 2017-09-02 RX ADMIN — Medication 15 ML: at 08:01

## 2017-09-02 NOTE — PLAN OF CARE
Problem: Goal Outcome Summary  Goal: Goal Outcome Summary  Vitals:     09/01/17 1557 09/01/17 2204 09/01/17 2220 09/02/17 0423   BP: 123/67 144/67 128/65 136/68   BP Location: Left arm Left arm Left arm Left arm   Cuff Size:           Pulse:           Resp: 18 18 16   Temp: 98.2  F (36.8  C) 99.7  F (37.6  C)   96.5  F (35.8  C)   TempSrc: Oral Oral   Oral   SpO2: 96% 95%   91%   Weight:           Height:           Patient is forgetful, needs to remind call light use and time, bed alarm activated for safety. Pain is managed by PCA Dilaudid with a total used of 8.4, and On-Q bloc at 14 ml/hr, no s/sx of toxicity. Weaned form O2, encouraged DBE and IS use. Kept HOB elevated. Dyspnea on exertion. Diminished LS. +BS, abdomen is distended, wound is covered, tender to palpate. VIRGIL is intact, +erythema surrounding skin. G-tube to gravity, dark output; J-tube with TF at 30 ml/hr, will increase at 0800 to 40 cc/hr, refill Q 4 hours with creon and sodium bicarb. NPO with ice chips. Zofran iv 1x for nausea with relief. Voiding in urinal, adequate amount, ambulates with assist of 1. Hep xa result - 0.10; 2850 units bolus given per order, then regulated to 1600 units/hr, placed recheck hep xa at 12 nn. Continue poc.

## 2017-09-02 NOTE — PROGRESS NOTES
REGIONAL ANESTHESIA PAIN SERVICE CONTINUOUS NERVE INFUSION NOTE  Subjective and Interval History: Pt reports adequate pain control via nerve block continuous infusion and IV PCA, rating pain at 5/10. Denies any weakness, paresthesias, circumoral numbness, metallic taste or tinnitus.  Pt is ambulating with assistance.  Patient currently denies nausea or vomiting. Patient is tolerating a tube feedings      Anticoagulation:    heparin  drip 25,000 units in 0.45% NaCl 250 mL (see additional administration details for dose) 1,150 Units/hr, IV, Continuous New Ba/01 1400       Medications related to Pain Management (Future)    Start     Dose/Rate Route Frequency Ordered Stop    17 0800  gabapentin (NEURONTIN) solution 300 mg      300 mg Oral or J tube 2 TIMES DAILY 17 18317 183  lidocaine 1 % 1 mL      1 mL Other EVERY 1 HOUR PRN 17 18317 1832  lidocaine (LMX4) kit       Topical EVERY 1 HOUR PRN 17 1832      17 1832  cyclobenzaprine (FLEXERIL) tablet 10 mg      10 mg Oral 3 TIMES DAILY PRN 17 1832      17 1615  HYDROmorphone (DILAUDID) PCA 1 mg/mL       Intravenous PCA 17 1601      17 0845  ROPivacaine 0.2% (NAROPIN) 750 mL in ON-Q C-Bloc select flow ([id number] holds 600-750 mL) dual cath disposable pump      14 mL/hr  Paravertebral Elastomeric Pump 17 0835          OBJECTIVE:  CBC RESULTS:   Recent Labs   Lab Test  17   0344   WBC  6.1   RBC  2.95*   HGB  7.8*   HCT  24.9*   MCV  84   MCH  26.4*   MCHC  31.3*   RDW  17.7*   PLT  169         Vitals:    Temp:  [35.8  C (96.5  F)-37.8  C (100.1  F)] 37.8  C (100.1  F)  Heart Rate:  [77-84] 84  Resp:  [16-28] 16  BP: (123-144)/(65-88) 136/69  SpO2:  [88 %-98 %] 95 %    Exam:    Strength 5/5 and symmetric grossly in bilateral LE   Bilateral paravertebral (PV) catheter sites dressings intact.  Insertion sites c/d/i, no tenderness, erythema, heme, edema      ASSESSMENT/PLAN:    Lucas  ROBINSON Brown is a 56 year old male POD #5 s/p WHIPPLE PROCEDURE and placement of BILATERAL Thoracic PV catheters for analgesia.  Pt is receiving adequate analgesia with Ropivacaine 0.2% at 7 mL/hr each catheter, total infusion 14mL/hour.  Pt is ambulating without difficulty.  No weakness or paresthesias, adequate sensory block.  No evidence of adverse side effects associated with local anesthetic.     - Bilateral thoracic PV catheter placement 8/28/2017, catheter day #5: continue current Ropivacaine 0.2% total infusion of 14mL/hour  -- pt can receive local anesthetic bolus Q 12 hr PRN, [facility] [phone]  - Anticoagulation: heparin SC Q 8 hrs, please contact [facility] if dose or medication is changed  -- plan catheter removal in 2 days, a minimum of 4 hours after last dose of heparin and may resume 1 hr after catheters removed    - will continue to follow and adjust as needed  - discussed plan with attending anesthesiologist    Bryan Medina MD  Regional Anesthesia Pain Service  9/1/2017 7:25 AM    24 hour Job Code Pager.  For in-house use only.     [place]:  * * [phone]  [place]: * * [phone]  Peds: * * [phone]  Enter call-back number and #      This pager only accepts text messages through [employer]

## 2017-09-02 NOTE — PROGRESS NOTES
"Surgery Progress note    S:  Low grade fever to 100.1 this morning and new onset of lower abdominal cellulitis. Patient having discomfort in the area of cellulitis. Otherwise pain is under good control and he denies nausea. Has been tolerating tube feeds at 40 ml/hr. Passing flatus, no bowel movement yet. Good UOP and voiding normally without hernandes.     O:  /69 (BP Location: Left arm)  Pulse 63  Temp 100.1  F (37.8  C) (Oral)  Resp 16  Ht 1.803 m (5' 10.98\")  Wt 95.3 kg (210 lb 1.6 oz)  SpO2 95%  BMI 29.32 kg/m2  Gen: A&Ox3, NAD  Resp: Breathing non-labored on 2 L NC  CV: RRR  Abd: Soft, ND, felisa ttp, incision c/d/i with 2 skin tears above and below incision.  G tube to gravity. Skin of lower abdominal wall erythematous and edematous, consistent with cellulitis. VIRGIL with minimal dark output. When drain was pulled, a stream of purulent drainage was released. With the benefit of local anesthetic, the drain site was opened to ~ 3 cm and packed with gauze to allow for adequate drainage.    Ext: Distal extremities warm, minimal edema      BMP    Recent Labs  Lab 09/02/17  0543 09/01/17 0344 08/31/17  1816 08/31/17  0341    145* 143 144   POTASSIUM 3.7 3.8 4.2 4.4   CHLORIDE 111* 112* 111* 111*   NICHOLAS 7.6* 7.4* 7.2* 7.0*   CO2 28 28 28 26   BUN 23 35* 44* 48*   CR 1.04 1.47* 1.85* 2.47*   * 113* 110* 139*     CBC    Recent Labs  Lab 09/02/17  0413 09/01/17  0344 08/31/17  0341 08/30/17  0340   WBC 5.2 6.1 6.2 6.2   RBC 2.76* 2.95* 2.68* 3.01*   HGB 7.3* 7.8* 7.1* 8.0*   HCT 23.6* 24.9* 23.0* 25.7*   MCV 86 84 86 85   MCH 26.4* 26.4* 26.5 26.6   MCHC 30.9* 31.3* 30.9* 31.1*   RDW 17.9* 17.7* 17.6* 17.6*    169 129* 139*     A/P: Lucas Brown is a 56 year old male with history of chronic pancreatitis and 2 cm inflammatory mass in the head of the pancreas now POD#5 s/p Whipple pancreaticoduodenectomy   Neuro: Dilaudid PCA, gabapentin for pain  Resp: Continue pulmonary toilet, wean off O2 " as able. Encourage IS.   CV: Continue hemodynamic monitoring, PTA medications   GI/FEN: NPO. G tube to gravity. CT abd/pelvis today, hold tube feeds, may need percutaneous drain if he has intraabdominal fluid collection. Increase IVF while tube feeds are on hold.   Renal: ROSELYN. Cr improving 2.47 -> 1.47 -> 1.04. Avoid nephrotoxic medications.   Endo: Monitor blood sugar levels. Home synthroid.  ID: Start meropenem and vanco for cellulitis. Abscess cultures pending, Dressing changes BID to RLQ wound.   Heme: Hgb stable 7.3. Will continue to monitor. Hold low intensity heparin gtt while awaiting CT scan (may need perc drain).   Prophylaxis: SCDs.      Patient seen with Dr. Crum.     Shanti Sommer MD PGY-4  General Surgery Resident  Pager:[phone]

## 2017-09-02 NOTE — ANESTHESIA POST-OP FOLLOW-UP NOTE
REGIONAL ANESTHESIA PAIN SERVICE CONTINUOUS NERVE INFUSION NOTE  Subjective and Interval History: Pt reports adequate pain control via nerve block continuous infusion and IV PCA, rating pain at 4/10.  Denies any weakness, paresthesias, circumoral numbness, metallic taste or tinnitus.  Pt is ambulating with assistance.  Patient currently denies nausea or vomiting. Patient is tolerating a tube feedings                             Clinically Aligned Pain Assessment (CAPA):   Comfort (How is your pain?): Comfortably manageable  Change in Pain (Since your last medication/intervention?): About the same  Pain Control (How are your pain treatments working?):  Fully effective pain control  Functioning (Are you able to do activities to get better?) : Can do most things, but pain gets in the way of some             Anticoagulation:    heparin  drip 25,000 units in 0.45% NaCl 250 mL (see additional administration details for dose) 1,150 Units/hr, IV, Continuous New Ba/01 1400            Medications related to Pain Management (Future)    Start       Dose/Rate Route Frequency Ordered Stop     17 0800   gabapentin (NEURONTIN) solution 300 mg       300 mg Oral or J tube 2 TIMES DAILY 17 1832        17 1832   lidocaine 1 % 1 mL       1 mL Other EVERY 1 HOUR PRN 17 1832        17 1832   lidocaine (LMX4) kit         Topical EVERY 1 HOUR PRN 17 1832        17 1832   cyclobenzaprine (FLEXERIL) tablet 10 mg       10 mg Oral 3 TIMES DAILY PRN 17 1832        17 1615   HYDROmorphone (DILAUDID) PCA 1 mg/mL         Intravenous PCA 17 1601        17 0845   ROPivacaine 0.2% (NAROPIN) 750 mL in ON-Q C-Bloc select flow ([id number] holds 600-750 mL) dual cath disposable pump       14 mL/hr  Paravertebral Elastomeric Pump 17 0835                  OBJECTIVE:  CBC RESULTS:       Recent Labs   Lab Test  17   0344   WBC  6.1   RBC  2.95*   HGB  7.8*   HCT  24.9*   MCV  84    MCH  26.4*   MCHC  31.3*   RDW  17.7*   PLT  169            Vitals:              Temp:  [98.6  F (37  C)-101.3  F (38.5  C)] 100.5  F (38.1  C)  Heart Rate:  [64-91] 88  Resp:  [14-23] 15  BP: ()/(55-84) 124/78  SpO2:  [88 %-100 %] 91 %     Exam:                         Strength 5/5 and symmetric grossly in bilateral LE                        Bilateral paravertebral (PV) catheter sites with loosened dressings changed.  Insertion sites c/d/i, no tenderness, erythema, heme, edema        ASSESSMENT/PLAN:    Lucas Brown is a 56 year old male POD #4 s/p WHIPPLE PROCEDURE and placement of BILATERAL Thoracic PV catheters for analgesia.  Pt is receiving adequate analgesia with Ropivacaine 0.2% at 7 mL/hr each catheter, total infusion 14mL/hour.  Pt is ambulating without difficulty.  No weakness or paresthesias, adequate sensory block.  No evidence of adverse side effects associated with local anesthetic.      - Bilateral thoracic PV catheter placement 8/28/2017, catheter day #4: continue current Ropivacaine 0.2% total infusion of 14mL/hour  -- pt can receive local anesthetic bolus Q 12 hr PRN, bedside [facility] [phone]  -- expected change of next On-Q pump is today  - Anticoagulation: heparin SC Q 8 hrs, please contact [facility] if dose or medication is changed  -- plan catheter removal in 3 days, a minimum of 4 hours after last dose of heparin and may resume 1 hr after catheters removed     - will continue to follow and adjust as needed      Charbel Sanders MD  Perioperative and Interventional Pain Service  9/1/2017 9:42 PM  [facility]IPS 24-hour Job Code Pagers (for in-house use only, may text page using [employer])  [place]:  [phone]  [place]:  [phone]  Peds [facility]: [phone]

## 2017-09-02 NOTE — PROGRESS NOTES
Perioperative Pain Service Cross Cover Note    S: Patient is having 8/10 pain.  Denies circumoral numbness, metallic taste, lightheadedness, visual and auditory disturbances, disorientation, or drowsiness.      O: Patient is alert and oriented complaining of 8/10 pain   Vitals: 144/67, T: 99.7, P: 63, R: 18    A/P: 57 yo POD# now with increased pain.     Bilateral paravertebral catheters bolused with PF bupivacaine 0.25%, 5 mL each catheter incrementally with negative aspirate before and between each mL without complication, no symptoms of local anesthetic toxicity (LAST).  Remained with and assessed patient for 10 min post-injection.  Bedside nurse aware she should continue monitor BP/P, MAP Q 5 min x 30 min, and assess for any untoward effects to local anesthetic following injection and contact anesthesia for any questions or concerns.    Bety Canales MD  Anesthesiology Resident   [phone]    9/1/2017 10:20 PM

## 2017-09-02 NOTE — PLAN OF CARE
Problem: Individualization  Goal: Patient Preferences  Outcome: No Change  RN - Vitals stable. Pt with low grade temp resolving throughout the day. Abdominal erythema noted and addressed by pt primary team. Bedside I&D completed following removal of VIRGIL drain - purulent drainage noted and specimens sent. Pt with new abdominal wound and dressing - instructions to change dressing BID. Pt states abdominal pain is controllable. Anesthesia administered a bolus through paravertebral lines. Pt noted times of lethargy and making illogical statements - also improving throughout the day following a decrease in pt PCA basal rate. Remains A&O. Abdominal incision with staples intact and dry - skin tears observed on abdomen - covered with dressing. Pt voided x1 - Pt passing flatus - no bowel movemen, abdomen remains round and distended. Pt received CT for concerns of fluid collections. Heparin and tube feeds on hold until further notice. Significant other at bedside.

## 2017-09-02 NOTE — ANESTHESIA POST-OP FOLLOW-UP NOTE
REGIONAL ANESTHESIA PAIN SERVICE CONTINUOUS NERVE INFUSION NOTE  Subjective and Interval History: Pt reports adequate pain control via nerve block continuous infusion and IV PCA, rating pain at 5/10. Denies any weakness, paresthesias, circumoral numbness, metallic taste or tinnitus.  Pt is ambulating with assistance.  Patient currently denies nausea or vomiting. Patient is tolerating tube feedings.     Anticoagulation:    heparin  drip 25,000 units in 0.45% NaCl 250 mL (see additional administration details for dose) 1,150 Units/hr, IV, Continuous New Ba/01 1400         Medications related to Pain Management (Future)    Start       Dose/Rate Route Frequency Ordered Stop     17 0800   gabapentin (NEURONTIN) solution 300 mg       300 mg Oral or J tube 2 TIMES DAILY 17 1832        17 1832   lidocaine 1 % 1 mL       1 mL Other EVERY 1 HOUR PRN 17 1832        17 1832   lidocaine (LMX4) kit         Topical EVERY 1 HOUR PRN 17 1832        17 1832   cyclobenzaprine (FLEXERIL) tablet 10 mg       10 mg Oral 3 TIMES DAILY PRN 17 1832        17 1615   HYDROmorphone (DILAUDID) PCA 1 mg/mL         Intravenous PCA 17 1601        17 0845   ROPivacaine 0.2% (NAROPIN) 750 mL in ON-Q C-Bloc select flow ([id number] holds 600-750 mL) dual cath disposable pump       14 mL/hr  Paravertebral Elastomeric Pump 17 0835            OBJECTIVE:  CBC RESULTS:       Recent Labs   Lab Test  17   0344   WBC  6.1   RBC  2.95*   HGB  7.8*   HCT  24.9*   MCV  84   MCH  26.4*   MCHC  31.3*   RDW  17.7*   PLT  169            Vitals:              Temp:  [35.8  C (96.5  F)-37.8  C (100.1  F)] 37.8  C (100.1  F)  Heart Rate:  [77-84] 84  Resp:  [16-28] 16  BP: (123-144)/(65-88) 136/69  SpO2:  [88 %-98 %] 95 %     Exam:                         Strength 5/5 and symmetric grossly in bilateral LE                        Bilateral paravertebral (PV) catheter sites dressings intact.   Insertion sites c/d/i, no tenderness, erythema, heme, edema        ASSESSMENT/PLAN:    Lucas Brown is a 56 year old male POD #5 s/p WHIPPLE PROCEDURE and placement of BILATERAL Thoracic PV catheters for analgesia.  Pt is receiving adequate analgesia with Ropivacaine 0.2% at 7 mL/hr each catheter, total infusion 14mL/hour.  Pt is ambulating without difficulty.  No weakness or paresthesias, adequate sensory block.  No evidence of adverse side effects associated with local anesthetic.      - Bilateral thoracic PV catheter placement 8/28/2017, catheter day #5: continue current Ropivacaine 0.2% total infusion of 14mL/hour  -- pt can receive local anesthetic bolus Q 12 hr PRN, bedside nurse must contact [facility] jobcode pager [phone]  - Anticoagulation: heparin SC Q 8 hrs, please contact [facility] if dose or medication is changed  -- plan catheter removal within 2 days, a minimum of 4 hours after last dose of heparin and may resume 1 hr after catheters removed  - will continue to follow and adjust as needed     Loy Vincent   Regional Anesthesia Pain Service  9/1/2017 7:25 AM     24 hour Job Code Pager.  For in-house use only.     [place]:  * * [phone]  [place]: * * [phone]  Peds: * * [phone]  Enter call-back number and #      This pager only accepts text messages through [facility]

## 2017-09-02 NOTE — PLAN OF CARE
Problem: Goal Outcome Summary  Goal: Goal Outcome Summary  Outcome: No Change  Pt alert and oriented. VSS. LS are course. Pt on 1 L NC with O2 sats > 95%. TL IJ dressing CDI. Pt denies pain - states pain control is adequate. Pt has dilaudid PCA with a 0.7 mg/hr basal rate with bumps of 0.4 mg q 10 minutes with 1 hour lockout of 4 mg. Patient also has epidural at 14 mg/hr.  Tube feed at 30 ml/hr (rate was increased to 30 ml at 0800) with 60 ml free H2O flushes q 4 hr. Heparin infusion at 1150 units/hr at start of shift. Hep 10a level was < 0.10 at 2048. Per protocol pt was given a heparin bolus of 4750 units and infusion rate was increased by 300 units/hr to 1450 units/hr. Next hep 10a level ordered for 0400. Follow-up phosphorus level was 1.6. Will continue to monitor and with POC.

## 2017-09-02 NOTE — PHARMACY-VANCOMYCIN DOSING SERVICE
Pharmacy Vancomycin Initial Note  Date of Service 2017  Patient's  1960  56 year old, male    Indication: Intra-abdominal infection    Current estimated CrCl = Estimated Creatinine Clearance: 92.3 mL/min (based on Cr of 1.04).    Creatinine for last 3 days  2017:  1:38 PM Creatinine 2.46 mg/dL  2017:  3:41 AM Creatinine 2.47 mg/dL;  6:16 PM Creatinine 1.85 mg/dL  2017:  3:44 AM Creatinine 1.47 mg/dL  2017:  5:43 AM Creatinine 1.04 mg/dL    Recent Vancomycin Level(s) for last 3 days  No results found for requested labs within last 72 hours.      Vancomycin IV Administrations (past 72 hours)      No vancomycin orders with administrations in past 72 hours.                Nephrotoxins and other renal medications (Future)    Start     Dose/Rate Route Frequency Ordered Stop    17 2130  vancomycin (VANCOCIN) 1,750 mg in NaCl 0.9 % 500 mL intermittent infusion      1,750 mg Intravenous EVERY 12 HOURS 17 0925      17 0930  vancomycin (VANCOCIN) 2,000 mg in NaCl 0.9 % 500 mL intermittent infusion      2,000 mg Intravenous ONCE 17 0925            Contrast Orders - past 72 hours     None                Plan:  1.  Start vancomycin  2000 mg (21 mg/kg) IV once then 1750 mg (18 mg/kg) q 12 hours.  2.  Goal Trough Level: 15-20 mg/L   3.  Pharmacy will check trough levels as appropriate in 1-3 Days.    4. Serum creatinine levels will be ordered daily for the first week of therapy and at least twice weekly for subsequent weeks.    5. [place] method utilized to dose vancomycin therapy: Method 2    Shweta Jackson, HennaD

## 2017-09-02 NOTE — PROGRESS NOTES
RN - Rounded with [facility] team. Per discussion at bedside, possible for pt to receive procedure today following findings of cellulitis and drainage from drain removal. Received instruction from providers to turn tube feeds off and increase rate of IVF to 100ml/hr.

## 2017-09-03 ENCOUNTER — APPOINTMENT (OUTPATIENT)
Dept: GENERAL RADIOLOGY | Facility: CLINIC | Age: 57
DRG: 406 | End: 2017-09-03
Attending: SURGERY
Payer: COMMERCIAL

## 2017-09-03 LAB
ALBUMIN UR-MCNC: 30 MG/DL
ANION GAP SERPL CALCULATED.3IONS-SCNC: 5 MMOL/L (ref 3–14)
APPEARANCE UR: ABNORMAL
BACTERIA #/AREA URNS HPF: ABNORMAL /HPF
BASOPHILS # BLD AUTO: 0 10E9/L (ref 0–0.2)
BASOPHILS NFR BLD AUTO: 0.2 %
BILIRUB UR QL STRIP: NEGATIVE
BUN SERPL-MCNC: 18 MG/DL (ref 7–30)
CALCIUM SERPL-MCNC: 7.2 MG/DL (ref 8.5–10.1)
CHLORIDE SERPL-SCNC: 111 MMOL/L (ref 94–109)
CO2 SERPL-SCNC: 28 MMOL/L (ref 20–32)
COLOR UR AUTO: YELLOW
CREAT SERPL-MCNC: 0.97 MG/DL (ref 0.66–1.25)
DIFFERENTIAL METHOD BLD: ABNORMAL
EOSINOPHIL # BLD AUTO: 0.1 10E9/L (ref 0–0.7)
EOSINOPHIL NFR BLD AUTO: 1.9 %
ERYTHROCYTE [DISTWIDTH] IN BLOOD BY AUTOMATED COUNT: 17.6 % (ref 10–15)
GFR SERPL CREATININE-BSD FRML MDRD: 79 ML/MIN/1.7M2
GLUCOSE BLDC GLUCOMTR-MCNC: 102 MG/DL (ref 70–99)
GLUCOSE BLDC GLUCOMTR-MCNC: 112 MG/DL (ref 70–99)
GLUCOSE SERPL-MCNC: 112 MG/DL (ref 70–99)
GLUCOSE UR STRIP-MCNC: NEGATIVE MG/DL
HCT VFR BLD AUTO: 23.4 % (ref 40–53)
HGB BLD-MCNC: 7.4 G/DL (ref 13.3–17.7)
HGB UR QL STRIP: NEGATIVE
IMM GRANULOCYTES # BLD: 0.1 10E9/L (ref 0–0.4)
IMM GRANULOCYTES NFR BLD: 1.4 %
KETONES UR STRIP-MCNC: NEGATIVE MG/DL
LEUKOCYTE ESTERASE UR QL STRIP: NEGATIVE
LYMPHOCYTES # BLD AUTO: 0.8 10E9/L (ref 0.8–5.3)
LYMPHOCYTES NFR BLD AUTO: 13.6 %
MCH RBC QN AUTO: 26.5 PG (ref 26.5–33)
MCHC RBC AUTO-ENTMCNC: 31.6 G/DL (ref 31.5–36.5)
MCV RBC AUTO: 84 FL (ref 78–100)
MONOCYTES # BLD AUTO: 0.7 10E9/L (ref 0–1.3)
MONOCYTES NFR BLD AUTO: 12.2 %
MUCOUS THREADS #/AREA URNS LPF: PRESENT /LPF
NEUTROPHILS # BLD AUTO: 4.1 10E9/L (ref 1.6–8.3)
NEUTROPHILS NFR BLD AUTO: 70.7 %
NITRATE UR QL: NEGATIVE
NRBC # BLD AUTO: 0 10*3/UL
NRBC BLD AUTO-RTO: 0 /100
PH UR STRIP: 5.5 PH (ref 5–7)
PHOSPHATE SERPL-MCNC: 1.4 MG/DL (ref 2.5–4.5)
PHOSPHATE SERPL-MCNC: 2.2 MG/DL (ref 2.5–4.5)
PLATELET # BLD AUTO: 230 10E9/L (ref 150–450)
POTASSIUM SERPL-SCNC: 3.4 MMOL/L (ref 3.4–5.3)
RBC # BLD AUTO: 2.79 10E12/L (ref 4.4–5.9)
RBC #/AREA URNS AUTO: 1 /HPF (ref 0–2)
SODIUM SERPL-SCNC: 144 MMOL/L (ref 133–144)
SOURCE: ABNORMAL
SP GR UR STRIP: 1.02 (ref 1–1.03)
TRANS CELLS #/AREA URNS HPF: <1 /HPF (ref 0–1)
UROBILINOGEN UR STRIP-MCNC: 8 MG/DL (ref 0–2)
WBC # BLD AUTO: 5.8 10E9/L (ref 4–11)
WBC #/AREA URNS AUTO: 4 /HPF (ref 0–2)

## 2017-09-03 PROCEDURE — 25000125 ZZHC RX 250: Performed by: SURGERY

## 2017-09-03 PROCEDURE — 85025 COMPLETE CBC W/AUTO DIFF WBC: CPT | Performed by: SURGERY

## 2017-09-03 PROCEDURE — 25000132 ZZH RX MED GY IP 250 OP 250 PS 637: Performed by: SURGERY

## 2017-09-03 PROCEDURE — 25000128 H RX IP 250 OP 636: Performed by: SURGERY

## 2017-09-03 PROCEDURE — 25000131 ZZH RX MED GY IP 250 OP 636 PS 637: Performed by: SURGERY

## 2017-09-03 PROCEDURE — 80048 BASIC METABOLIC PNL TOTAL CA: CPT | Performed by: SURGERY

## 2017-09-03 PROCEDURE — 00000146 ZZHCL STATISTIC GLUCOSE BY METER IP

## 2017-09-03 PROCEDURE — 74020 XR ABDOMEN 2 VW: CPT

## 2017-09-03 PROCEDURE — 25000128 H RX IP 250 OP 636

## 2017-09-03 PROCEDURE — 84100 ASSAY OF PHOSPHORUS: CPT | Performed by: SURGERY

## 2017-09-03 PROCEDURE — 36592 COLLECT BLOOD FROM PICC: CPT | Performed by: SURGERY

## 2017-09-03 PROCEDURE — S5010 5% DEXTROSE AND 0.45% SALINE: HCPCS | Performed by: SURGERY

## 2017-09-03 PROCEDURE — 81001 URINALYSIS AUTO W/SCOPE: CPT | Performed by: SURGERY

## 2017-09-03 PROCEDURE — 12000003 ZZH R&B CRITICAL UMMC

## 2017-09-03 PROCEDURE — 27210436 ZZH NUTRITION PRODUCT SEMIELEM INTERMED CAN

## 2017-09-03 PROCEDURE — 25800025 ZZH RX 258: Performed by: SURGERY

## 2017-09-03 RX ADMIN — MEROPENEM 1 G: 1 INJECTION, POWDER, FOR SOLUTION INTRAVENOUS at 18:56

## 2017-09-03 RX ADMIN — FENOFIBRATE 160 MG: 160 TABLET ORAL at 08:00

## 2017-09-03 RX ADMIN — VANCOMYCIN HYDROCHLORIDE 1750 MG: 10 INJECTION, POWDER, LYOPHILIZED, FOR SOLUTION INTRAVENOUS at 09:38

## 2017-09-03 RX ADMIN — LEVOTHYROXINE SODIUM 150 MCG: 150 TABLET ORAL at 08:00

## 2017-09-03 RX ADMIN — GABAPENTIN 300 MG: 250 SOLUTION ORAL at 20:38

## 2017-09-03 RX ADMIN — DEXTROSE AND SODIUM CHLORIDE: 5; 450 INJECTION, SOLUTION INTRAVENOUS at 00:53

## 2017-09-03 RX ADMIN — SERTRALINE HYDROCHLORIDE 50 MG: 20 SOLUTION ORAL at 08:01

## 2017-09-03 RX ADMIN — MEROPENEM 1 G: 1 INJECTION, POWDER, FOR SOLUTION INTRAVENOUS at 00:44

## 2017-09-03 RX ADMIN — Medication: at 14:43

## 2017-09-03 RX ADMIN — SODIUM BICARBONATE 325 MG: 325 TABLET ORAL at 03:22

## 2017-09-03 RX ADMIN — PREDNISONE 5 MG: 5 SOLUTION ORAL at 08:01

## 2017-09-03 RX ADMIN — POTASSIUM PHOSPHATE, MONOBASIC AND POTASSIUM PHOSPHATE, DIBASIC 15 MMOL: 224; 236 INJECTION, SOLUTION INTRAVENOUS at 01:05

## 2017-09-03 RX ADMIN — SODIUM BICARBONATE 325 MG: 325 TABLET ORAL at 12:10

## 2017-09-03 RX ADMIN — DEXTROSE AND SODIUM CHLORIDE: 5; 450 INJECTION, SOLUTION INTRAVENOUS at 23:33

## 2017-09-03 RX ADMIN — Medication 15 ML: at 08:01

## 2017-09-03 RX ADMIN — PANCRELIPASE 48000 UNITS: 24000; 76000; 120000 CAPSULE, DELAYED RELEASE PELLETS ORAL at 08:02

## 2017-09-03 RX ADMIN — PRAVASTATIN SODIUM 40 MG: 40 TABLET ORAL at 08:00

## 2017-09-03 RX ADMIN — Medication: at 23:32

## 2017-09-03 RX ADMIN — DOXAZOSIN 1 MG: 1 TABLET ORAL at 08:00

## 2017-09-03 RX ADMIN — POTASSIUM PHOSPHATE, MONOBASIC AND POTASSIUM PHOSPHATE, DIBASIC 20 MMOL: 224; 236 INJECTION, SOLUTION INTRAVENOUS at 09:38

## 2017-09-03 RX ADMIN — PANCRELIPASE 48000 UNITS: 24000; 76000; 120000 CAPSULE, DELAYED RELEASE PELLETS ORAL at 03:22

## 2017-09-03 RX ADMIN — ONDANSETRON 4 MG: 4 TABLET, ORALLY DISINTEGRATING ORAL at 21:18

## 2017-09-03 RX ADMIN — MEROPENEM 1 G: 1 INJECTION, POWDER, FOR SOLUTION INTRAVENOUS at 08:56

## 2017-09-03 RX ADMIN — PANCRELIPASE 48000 UNITS: 24000; 76000; 120000 CAPSULE, DELAYED RELEASE PELLETS ORAL at 16:17

## 2017-09-03 RX ADMIN — SODIUM BICARBONATE 325 MG: 325 TABLET ORAL at 20:38

## 2017-09-03 RX ADMIN — SODIUM BICARBONATE 325 MG: 325 TABLET ORAL at 16:18

## 2017-09-03 RX ADMIN — PANCRELIPASE 48000 UNITS: 24000; 76000; 120000 CAPSULE, DELAYED RELEASE PELLETS ORAL at 20:38

## 2017-09-03 RX ADMIN — ENOXAPARIN SODIUM 80 MG: 80 INJECTION SUBCUTANEOUS at 14:07

## 2017-09-03 RX ADMIN — VANCOMYCIN HYDROCHLORIDE 1750 MG: 10 INJECTION, POWDER, LYOPHILIZED, FOR SOLUTION INTRAVENOUS at 22:45

## 2017-09-03 RX ADMIN — SODIUM BICARBONATE 325 MG: 325 TABLET ORAL at 08:00

## 2017-09-03 RX ADMIN — PANCRELIPASE 48000 UNITS: 24000; 76000; 120000 CAPSULE, DELAYED RELEASE PELLETS ORAL at 12:10

## 2017-09-03 RX ADMIN — Medication: at 16:18

## 2017-09-03 RX ADMIN — Medication 20 MG: at 20:38

## 2017-09-03 RX ADMIN — ONDANSETRON 4 MG: 2 INJECTION INTRAMUSCULAR; INTRAVENOUS at 07:12

## 2017-09-03 RX ADMIN — ENOXAPARIN SODIUM 80 MG: 80 INJECTION SUBCUTANEOUS at 03:22

## 2017-09-03 RX ADMIN — GABAPENTIN 300 MG: 250 SOLUTION ORAL at 08:01

## 2017-09-03 RX ADMIN — Medication 20 MG: at 08:01

## 2017-09-03 NOTE — ANESTHESIA POST-OP FOLLOW-UP NOTE
REGIONAL ANESTHESIA PAIN SERVICE CONTINUOUS NERVE INFUSION NOTE  Subjective and Interval History: Pt reports moderate pain control via nerve block continuous infusion.  Denies any weakness, paresthesias, circumoral numbness, metallic taste or tinnitus.  Pt is ambulating without assistance, but feels lower abdominal pain with transfers. Patient currently without nausea or vomiting. Patient is tolerating tube feeds without out any distention. Passing flatus no bowel movement yet.                             Clinically Aligned Pain Assessment (CAPA):   Comfort (How is your pain?): Tolerable with discomfort  Change in Pain (Since your last medication/intervention?): About the same  Pain Control (How are your pain treatments working?):  Partially effective pain control  Functioning (Are you able to do activities to get better?) : Can do most things, but pain gets in the way of some   Sleep (Does your pain management allow you to sleep or rest?): Awake with occasional pain                           Numerical Rating Scale (NRS):  3/10 at rest and 7/10 with movement.           Current Facility-Administered Medications   Medication     meropenem (MERREM) 1 g vial to attach to  mL bag     vancomycin (VANCOCIN) 1,750 mg in NaCl 0.9 % 500 mL intermittent infusion     enoxaparin (LOVENOX) injection 80 mg     potassium phosphate 15 mmol in D5W 250 mL intermittent infusion     potassium phosphate 20 mmol in D5W 500 mL intermittent infusion     potassium phosphate 20 mmol in D5W 250 mL intermittent infusion     potassium phosphate 25 mmol in D5W 500 mL intermittent infusion     dextrose 5% and 0.45% NaCl infusion     doxazosin (CARDURA) tablet 1 mg     amylase-lipase-protease (CREON 24) 83540 UNITS per EC capsule 24,000-48,000 Units     And     sodium bicarbonate tablet 325 mg     multivitamins with minerals (CERTAVITE/CEROVITE) liquid 15 mL     omeprazole (priLOSEC) suspension 20 mg     dextrose 10 % 1,000 mL infusion     -  MEDICATION INSTRUCTIONS -     ROPivacaine 0.2% (NAROPIN) 750 mL in ON-Q C-Bloc select flow ([id number] holds 600-750 mL) dual cath disposable pump     fenofibrate tablet 160 mg     levothyroxine (SYNTHROID/LEVOTHROID) tablet 150 mcg     pravastatin (PRAVACHOL) tablet 40 mg     sertraline (ZOLOFT) 20 MG/ML (HIGH CONC) solution 50 mg     lidocaine 1 % 1 mL     lidocaine (LMX4) kit     sodium chloride (PF) 0.9% PF flush 3 mL     sodium chloride (PF) 0.9% PF flush 3 mL     gabapentin (NEURONTIN) solution 300 mg     naloxone (NARCAN) injection 0.1-0.4 mg     HYDROmorphone (DILAUDID) PCA 1 mg/mL     cyclobenzaprine (FLEXERIL) tablet 10 mg     prochlorperazine (COMPAZINE) injection 5-10 mg     Or     prochlorperazine (COMPAZINE) tablet 5-10 mg     ondansetron (ZOFRAN-ODT) ODT tab 4 mg     Or     ondansetron (ZOFRAN) injection 4 mg     No Medication Sleep Aids for this Patient     glucose 40 % gel 15-30 g     Or     dextrose 50 % injection 25-50 mL     Or     glucagon injection 1 mg     predniSONE solution 5 mg     potassium chloride SA (K-DUR/KLOR-CON M) CR tablet 20-40 mEq     potassium chloride (KLOR-CON) Packet 20-40 mEq     potassium chloride 10 mEq in 100 mL intermittent infusion     potassium chloride 10 mEq in 100 mL intermittent infusion with 10 mg lidocaine     potassium chloride 20 mEq in 50 mL intermittent infusion     magnesium sulfate 2 g in NS intermittent infusion (PharMEDium or FV Cmpd)     magnesium sulfate 4 g in 100 mL sterile water (premade)          Facility-Administered Medications Ordered in Other Encounters   Medication     neostigmine (PROSTIGMINE) injection         Anticoagulation:  LOVENOX 80        OBJECTIVE:     Diagnostic:        Lab Results   Component Value Date     WBC 5.8 09/03/2017            Lab Results   Component Value Date     RBC 2.79 09/03/2017            Lab Results   Component Value Date     HGB 7.4 09/03/2017            Lab Results   Component Value Date     HCT 23.4 09/03/2017             Lab Results   Component Value Date      09/03/2017            Vitals:              Temp:  [36.8  C (98.2  F)-38.8  C (101.8  F)] 37.9  C (100.3  F)  Pulse:  [74-77] 74  Heart Rate:  [81-85] 85  Resp:  [14-20] 18  BP: (114-153)/(62-81) 153/74  SpO2:  [93 %-99 %] 99 %     Exam:                         Strength 5/5 and symmetric grossly in bilateral LE                        Bilateral Thoracic paravertebral (PV) catheter sites with dressing c/d/i, no tenderness, erythema, heme, edema        ASSESSMENT/PLAN:    Lucas Brown is a 56 year old male POD #6 s/p WHIPPLE PROCEDURE and placement of Bilateral Thoracic PV catheters for analgesia.  Pt is receiving moderately adequate analgesia with ropivacaine 0.2%, total infusion 14mL/hour.  Pt is ambulating without difficulty.  No weakness or paresthesias, adequate sensory block.  No evidence of adverse side effects associated with local anesthetic.      - continue current total infusion of 14mL/hour; anticipate possible removal tomorrow  - will continue to follow and adjust as needed  - Patient is currently written for lovenox 80mg Q12 hours. According to our guidelines, we can remove 8 hours after the last dose of lovenox.     Loy Vincent   Regional Anesthesia Pain Service  9/3/2017 5:36 PM      24 hour Job Code Pager.  For in-house use only.     [place]:  * * [phone]  [facility]: * * [phone]  Peds: * * [phone]  Enter call-back number and #      This pager only accepts text messages through [facility]

## 2017-09-03 NOTE — PLAN OF CARE
Problem: Goal Outcome Summary  Goal: Goal Outcome Summary  PT/7B: Attempted to see pt in PM however pt w/ inc c/o pain, RN informed. Pt politely declining therapy. Stated he has been up ambulating. Pt's daughter present who is RN. Cancel therapy this day

## 2017-09-03 NOTE — PROGRESS NOTES
REGIONAL ANESTHESIA PAIN SERVICE CONTINUOUS NERVE INFUSION NOTE  Subjective and Interval History: Pt reports moderate pain control via nerve block continuous infusion.  Denies any weakness, paresthesias, circumoral numbness, metallic taste or tinnitus.  Pt is ambulating without assistance, but feels lower abdominal pain with transfers. Patient currently without nausea or vomiting. Patient is tolerating tube feeds without out any distention. Passing flatus no bowel movement yet.       Clinically Aligned Pain Assessment (CAPA):   Comfort (How is your pain?): Tolerable with discomfort  Change in Pain (Since your last medication/intervention?): About the same  Pain Control (How are your pain treatments working?):  Partially effective pain control  Functioning (Are you able to do activities to get better?) : Can do most things, but pain gets in the way of some   Sleep (Does your pain management allow you to sleep or rest?): Awake with occasional pain     Numerical Rating Scale (NRS):  3/10 at rest and 7/10 with movement.      Current Facility-Administered Medications   Medication     meropenem (MERREM) 1 g vial to attach to  mL bag     vancomycin (VANCOCIN) 1,750 mg in NaCl 0.9 % 500 mL intermittent infusion     enoxaparin (LOVENOX) injection 80 mg     potassium phosphate 15 mmol in D5W 250 mL intermittent infusion     potassium phosphate 20 mmol in D5W 500 mL intermittent infusion     potassium phosphate 20 mmol in D5W 250 mL intermittent infusion     potassium phosphate 25 mmol in D5W 500 mL intermittent infusion     dextrose 5% and 0.45% NaCl infusion     doxazosin (CARDURA) tablet 1 mg     amylase-lipase-protease (CREON 24) 23279 UNITS per EC capsule 24,000-48,000 Units    And     sodium bicarbonate tablet 325 mg     multivitamins with minerals (CERTAVITE/CEROVITE) liquid 15 mL     omeprazole (priLOSEC) suspension 20 mg     dextrose 10 % 1,000 mL infusion     - MEDICATION INSTRUCTIONS -     ROPivacaine 0.2%  (NAROPIN) 750 mL in ON-Q C-Bloc select flow ([id number] holds 600-750 mL) dual cath disposable pump     fenofibrate tablet 160 mg     levothyroxine (SYNTHROID/LEVOTHROID) tablet 150 mcg     pravastatin (PRAVACHOL) tablet 40 mg     sertraline (ZOLOFT) 20 MG/ML (HIGH CONC) solution 50 mg     lidocaine 1 % 1 mL     lidocaine (LMX4) kit     sodium chloride (PF) 0.9% PF flush 3 mL     sodium chloride (PF) 0.9% PF flush 3 mL     gabapentin (NEURONTIN) solution 300 mg     naloxone (NARCAN) injection 0.1-0.4 mg     HYDROmorphone (DILAUDID) PCA 1 mg/mL     cyclobenzaprine (FLEXERIL) tablet 10 mg     prochlorperazine (COMPAZINE) injection 5-10 mg    Or     prochlorperazine (COMPAZINE) tablet 5-10 mg     ondansetron (ZOFRAN-ODT) ODT tab 4 mg    Or     ondansetron (ZOFRAN) injection 4 mg     No Medication Sleep Aids for this Patient     glucose 40 % gel 15-30 g    Or     dextrose 50 % injection 25-50 mL    Or     glucagon injection 1 mg     predniSONE solution 5 mg     potassium chloride SA (K-DUR/KLOR-CON M) CR tablet 20-40 mEq     potassium chloride (KLOR-CON) Packet 20-40 mEq     potassium chloride 10 mEq in 100 mL intermittent infusion     potassium chloride 10 mEq in 100 mL intermittent infusion with 10 mg lidocaine     potassium chloride 20 mEq in 50 mL intermittent infusion     magnesium sulfate 2 g in NS intermittent infusion (PharMEDium or FV Cmpd)     magnesium sulfate 4 g in 100 mL sterile water (premade)     Facility-Administered Medications Ordered in Other Encounters   Medication     neostigmine (PROSTIGMINE) injection       Anticoagulation:  LOVENOX 80      OBJECTIVE:    Diagnostic:  Lab Results   Component Value Date    WBC 5.8 09/03/2017     Lab Results   Component Value Date    RBC 2.79 09/03/2017     Lab Results   Component Value Date    HGB 7.4 09/03/2017     Lab Results   Component Value Date    HCT 23.4 09/03/2017     Lab Results   Component Value Date     09/03/2017         Vitals:    Temp:  [36.8  C  (98.2  F)-38.8  C (101.8  F)] 37.9  C (100.3  F)  Pulse:  [74-77] 74  Heart Rate:  [81-85] 85  Resp:  [14-20] 18  BP: (114-153)/(62-81) 153/74  SpO2:  [93 %-99 %] 99 %    Exam:    Strength 5/5 and symmetric grossly in bilateral LE   Bilateral Thoracic paravertebral (PV) catheter sites with dressing c/d/i, no tenderness, erythema, heme, edema      ASSESSMENT/PLAN:    Lucas Brown is a 56 year old male POD #6 s/p WHIPPLE PROCEDURE and placement of Bilateral Thoracic PV catheters for analgesia.  Pt is receiving moderately adequate analgesia with ropivacaine 0.2%, total infusion 14mL/hour.  Pt is ambulating without difficulty.  No weakness or paresthesias, adequate sensory block.  No evidence of adverse side effects associated with local anesthetic.     - continue current total infusion of 14mL/hour  - will continue to follow and adjust as needed  - Patient is currently written for lovenox 80mg Q12 hours. Likely therapeutic due to AVR with plan to bridge to warfarin later. According to our guidelines, we can remove 8 hours after the last dose of lovenox.   - discussed plan with attending anesthesiologist    Kenny Mtz MD  Regional Anesthesia Pain Service  9/3/2017 8:48 AM    24 hour Job Code Pager.  For in-house use only.     [place]:  * * [phone]  [facility]: * * [phone]  Peds: * * [phone]  Enter call-back number and #      This pager only accepts text messages through [facility]

## 2017-09-03 NOTE — PLAN OF CARE
Problem: Goal Outcome Summary  Goal: Goal Outcome Summary  Febrile before start of shift, MD aware. Applied wet towel in forehead, cold pack in arm pits. Currently afebrile T- 98.3, patient is more alert and oriented, uses call light. Dyspneic with exertion, +wheezing every after ambulation then resolves, remains in RA, maintaining >90% SpO2. C/o SOB around 0630, put on O2 at 2 L/nc, sitting on chair right now. Coughs productively, uses yanker to suction phlegm. Pt does DBE. Ambulates assist of 1. Pain is managed with PCA Dilaudid 0.5 Q 10 mins, with continuous dose of 0.5 mg, 9.9 mg total used; and On-Q at 14 cc/hr, no s/sx of toxicity noted, site cdi. Abdominal incision intact with staples. Old VIRGIL site in RLQ is draining purulent discharge, changed dressing 3x. Still reddened lower abdomen. G-tube to gravity with gray colored output; J-tube with TF ongoing at 40 cc with 2 caps creon and 1 tab Sodium Bicarb, will increase to 50 cc/hr at 0800. NPO with ice chips. Phos replaced, recheck later. Voiding adequately in urinal, needs assistance. +BLE edema. Text page sent to on-call, re: wheezing. Continue poc.

## 2017-09-03 NOTE — PROGRESS NOTES
"Surgery Progress note    S:  Febrile to 101.8 overnight. Feeling mildly nauseated as well. Zofran is helping. Continues to pass flatus, no BM yet. G tube output is minimal and does not look like tube feeds. Ttube feeds running at 50 ml/hr. Good UOP.    O:  /74 (BP Location: Left arm)  Pulse 74  Temp 99.7  F (37.6  C) (Oral)  Resp 18  Ht 1.803 m (5' 10.98\")  Wt 103 kg (227 lb 1.2 oz)  SpO2 99%  BMI 31.68 kg/m2  Gen: A&Ox3, uncomfortable appearing  Resp: Breathing non-labored on 2 L NC  CV: RRR  Abd: Soft, ND, felisa ttp, incision c/d/i with 2 skin tears above and below incision.  G tube to gravity. Skin of lower abdomen erythematous and edematous, consistent with cellulitis, in the same distribution as yesterday.Old drain site probed with no return of purulence.   Ext: Distal extremities warm, minimal edema      BMP    Recent Labs  Lab 09/03/17  0726 09/02/17  0543 09/01/17  0344 08/31/17  1816    144 145* 143   POTASSIUM 3.4 3.7 3.8 4.2   CHLORIDE 111* 111* 112* 111*   NICHOLAS 7.2* 7.6* 7.4* 7.2*   CO2 28 28 28 28   BUN 18 23 35* 44*   CR 0.97 1.04 1.47* 1.85*   * 120* 113* 110*     CBC    Recent Labs  Lab 09/03/17  0726 09/02/17  0413 09/01/17  0344 08/31/17  0341   WBC 5.8 5.2 6.1 6.2   RBC 2.79* 2.76* 2.95* 2.68*   HGB 7.4* 7.3* 7.8* 7.1*   HCT 23.4* 23.6* 24.9* 23.0*   MCV 84 86 84 86   MCH 26.5 26.4* 26.4* 26.5   MCHC 31.6 30.9* 31.3* 30.9*   RDW 17.6* 17.9* 17.7* 17.6*    181 169 129*     A/P: Lucas Brown is a 56 year old male with history of chronic pancreatitis and 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 Encourage IS.   CV: Continue hemodynamic monitoring, PTA medications   GI/FEN: NPO. G tube to gravity. AXR to ensure no ileus/obstruction (looked fine on CT yesterday), continue TFs at 50.   Renal: ROSELYN, resolved, Avoid " nephrotoxic medications.   Endo: Monitor blood sugar levels. Home synthroid.  ID: Continue meropenem and vanco for cellulitis. UA/UC, conditional blood cx ordered if febrile again.  Abscess cultures pending with NGTD, Dressing changes BID to RLQ wound.   Heme: Hgb stable 7.4. Will continue to monitor. Therapeutic lovenox (mechanical aortic valve)  Prophylaxis: SCDs.  On lovenox    Patient seen with Dr. Collado.     Shanti Sommer MD PGY-6  General Surgery Resident  Pager:[phone]

## 2017-09-03 NOTE — PLAN OF CARE
Problem: Individualization  Goal: Patient Preferences  Outcome: No Change  RN - Vitals stable. Pt using 1L O2 via nasal cannula to maintain adequate O2 saturation. C/o ongoing abdominal pain - intermitently relieved with PCA and OnQ infusions. PCA basal dose increased today - Pt received OnQ bolus as well. Continues with intermittent illogical comments, however remains A&O. Transverse abd incision with staples intact. Noting skin tears near from adhesive near incision - open to air. RLQ former drain site draining large amount of serous fluid - requiring multiple dressing changes. Minimal adhesive used due to pt sensitivity. G tube remains to gravity with moderate brown clear output. J-tube with tube feeds at 50ml/hr. Pt c/o nausea x1 - received zofran with relief. Bowel sounds hypoactive - pt passing flatus - no BM today. Voiding adequately - UA/UC sent. Pt to have blood cultures if temp >101.5. Phos replaced at 1.4. Recheck scheduled for 1530.

## 2017-09-03 NOTE — PLAN OF CARE
Problem: Goal Outcome Summary  Goal: Goal Outcome Summary  Outcome: No Change  Pt alert and oriented. VSS. Pt has had low grade temps during shift.  Pt has a expiratory wheeze occassionally. Pt on RA with O2 sats > 92%. TL IJ dressing CDI. Pt  states that he has more  pain to abdomen today.  Pain control is adequate. Pt has dilaudid PCA with a 0.5 mg/hr basal rate with bumps of 0.5 mg q 10 minutes with 1 hour lockout of 4 mg. Patient also has paravertebral at 14 mg/hr. Pt given 1L NS bolus per MD order.  Tube feed at 40 ml/hr (rate was increased to 40 ml at 0800) with 60 ml free H2O flushes q 4 hr. Heparin infusion d/c ed . Pt on lovenox  80 mg q 12 hrs. VIRGIL drain to RLQ d/c ed   drain site now has loose packing covered with 4x4s and ABD pad   Stephens straps used to secure dressing d/t patient s tape sensitivity. Gastric output from GJ tube was 300 ml brownish in color. Will continue to monitor and with POC.

## 2017-09-03 NOTE — PROGRESS NOTES
Perioperative Pain Service Cross Cover Note    S: Patient received good relief from 10am bolus. Denies circumoral numbness, metallic taste, lightheadedness, visual and auditory disturbances, disorientation, or drowsiness.Patient requests additional PM bolus.  O:  Bilateral paravertebral catheter bolused @2200 with PF bupivacaine 0.25%, 5 mL in each catheter incrementally with negative aspirate before and between each mL without complication, no symptoms of local anesthetic toxicity (LAST).   Bedside nurse aware they should continue monitor BP/P, MAP Q 5 min x 30 min, and assess for any untoward effects to local anesthetic following injection and contact anesthesia for any questions or concerns.    Bryan Medina MD  CA-2/PGY-3 Anesthesiology  [phone]    9/3/2017 12:38 AM

## 2017-09-03 NOTE — PROGRESS NOTES
"Paged by RN regarding T 101.8.    Patient with known RLQ cellulitis, VIRGIL removed with copious purulent output earlier today, wound opened to 3 cm to facilitate drainage, packed with gauze.  Started on Tavia, Vanco today.  Feeling slightly better than earlier today, some soreness RLQ at drain site, denies chills, SOB, CP, other abdominal pain.    Fluid cx pending.  Tmax over the last few days has been 100.5.    /81 (BP Location: Left arm)  Pulse 74  Temp 101.8  F (38.8  C) (Oral)  Resp 20  Ht 1.803 m (5' 10.98\")  Wt 103 kg (227 lb 1.2 oz)  SpO2 93%  BMI 31.68 kg/m2    Resting in bed, looks slightly uncomfortable  Breathing nonlabored on RA  Abd soft, nontender to palpation   Lower abdomen erythematous, not well demarcated, area previously marked, nonindurated, no crepitus, wound packed and dressing clean    -Cellulitis does not seem to be worsening  -Does not appear to be septic  -Continue current antibiotics  -Will discuss with heidy whether to send blood cultures    Pippa Couch MD  General Surgery, PGY1  [phone]  "

## 2017-09-04 ENCOUNTER — APPOINTMENT (OUTPATIENT)
Dept: GENERAL RADIOLOGY | Facility: CLINIC | Age: 57
DRG: 406 | End: 2017-09-04
Attending: SURGERY
Payer: COMMERCIAL

## 2017-09-04 LAB
ANION GAP SERPL CALCULATED.3IONS-SCNC: 5 MMOL/L (ref 3–14)
ANION GAP SERPL CALCULATED.3IONS-SCNC: 5 MMOL/L (ref 3–14)
BACTERIA SPEC CULT: ABNORMAL
BASOPHILS # BLD AUTO: 0 10E9/L (ref 0–0.2)
BASOPHILS # BLD AUTO: 0 10E9/L (ref 0–0.2)
BASOPHILS NFR BLD AUTO: 0.1 %
BASOPHILS NFR BLD AUTO: 0.2 %
BUN SERPL-MCNC: 17 MG/DL (ref 7–30)
BUN SERPL-MCNC: 18 MG/DL (ref 7–30)
CALCIUM SERPL-MCNC: 6.7 MG/DL (ref 8.5–10.1)
CALCIUM SERPL-MCNC: 7 MG/DL (ref 8.5–10.1)
CHLORIDE SERPL-SCNC: 111 MMOL/L (ref 94–109)
CHLORIDE SERPL-SCNC: 112 MMOL/L (ref 94–109)
CO2 SERPL-SCNC: 29 MMOL/L (ref 20–32)
CO2 SERPL-SCNC: 30 MMOL/L (ref 20–32)
CREAT SERPL-MCNC: 0.88 MG/DL (ref 0.66–1.25)
CREAT SERPL-MCNC: 0.9 MG/DL (ref 0.66–1.25)
DIFFERENTIAL METHOD BLD: ABNORMAL
DIFFERENTIAL METHOD BLD: ABNORMAL
EOSINOPHIL # BLD AUTO: 0.1 10E9/L (ref 0–0.7)
EOSINOPHIL # BLD AUTO: 0.1 10E9/L (ref 0–0.7)
EOSINOPHIL NFR BLD AUTO: 1.4 %
EOSINOPHIL NFR BLD AUTO: 1.7 %
ERYTHROCYTE [DISTWIDTH] IN BLOOD BY AUTOMATED COUNT: 17.5 % (ref 10–15)
ERYTHROCYTE [DISTWIDTH] IN BLOOD BY AUTOMATED COUNT: 17.7 % (ref 10–15)
GFR SERPL CREATININE-BSD FRML MDRD: 86 ML/MIN/1.7M2
GFR SERPL CREATININE-BSD FRML MDRD: 89 ML/MIN/1.7M2
GLUCOSE SERPL-MCNC: 111 MG/DL (ref 70–99)
GLUCOSE SERPL-MCNC: 112 MG/DL (ref 70–99)
HCT VFR BLD AUTO: 22.5 % (ref 40–53)
HCT VFR BLD AUTO: 23.4 % (ref 40–53)
HGB BLD-MCNC: 7.1 G/DL (ref 13.3–17.7)
HGB BLD-MCNC: 7.2 G/DL (ref 13.3–17.7)
IMM GRANULOCYTES # BLD: 0.1 10E9/L (ref 0–0.4)
IMM GRANULOCYTES # BLD: 0.2 10E9/L (ref 0–0.4)
IMM GRANULOCYTES NFR BLD: 2.1 %
IMM GRANULOCYTES NFR BLD: 2.1 %
INR PPP: 1.81 (ref 0.86–1.14)
LYMPHOCYTES # BLD AUTO: 0.9 10E9/L (ref 0.8–5.3)
LYMPHOCYTES # BLD AUTO: 1 10E9/L (ref 0.8–5.3)
LYMPHOCYTES NFR BLD AUTO: 12.9 %
LYMPHOCYTES NFR BLD AUTO: 15.5 %
MCH RBC QN AUTO: 25.5 PG (ref 26.5–33)
MCH RBC QN AUTO: 26.1 PG (ref 26.5–33)
MCHC RBC AUTO-ENTMCNC: 30.8 G/DL (ref 31.5–36.5)
MCHC RBC AUTO-ENTMCNC: 31.6 G/DL (ref 31.5–36.5)
MCV RBC AUTO: 83 FL (ref 78–100)
MCV RBC AUTO: 83 FL (ref 78–100)
MONOCYTES # BLD AUTO: 0.7 10E9/L (ref 0–1.3)
MONOCYTES # BLD AUTO: 0.8 10E9/L (ref 0–1.3)
MONOCYTES NFR BLD AUTO: 10.4 %
MONOCYTES NFR BLD AUTO: 11.5 %
NEUTROPHILS # BLD AUTO: 4.4 10E9/L (ref 1.6–8.3)
NEUTROPHILS # BLD AUTO: 5 10E9/L (ref 1.6–8.3)
NEUTROPHILS NFR BLD AUTO: 70.4 %
NEUTROPHILS NFR BLD AUTO: 71.7 %
NRBC # BLD AUTO: 0 10*3/UL
NRBC # BLD AUTO: 0 10*3/UL
NRBC BLD AUTO-RTO: 0 /100
NRBC BLD AUTO-RTO: 0 /100
PLATELET # BLD AUTO: 272 10E9/L (ref 150–450)
PLATELET # BLD AUTO: 314 10E9/L (ref 150–450)
POTASSIUM SERPL-SCNC: 3.2 MMOL/L (ref 3.4–5.3)
POTASSIUM SERPL-SCNC: 3.4 MMOL/L (ref 3.4–5.3)
RBC # BLD AUTO: 2.72 10E12/L (ref 4.4–5.9)
RBC # BLD AUTO: 2.82 10E12/L (ref 4.4–5.9)
SODIUM SERPL-SCNC: 145 MMOL/L (ref 133–144)
SODIUM SERPL-SCNC: 147 MMOL/L (ref 133–144)
SPECIMEN SOURCE: ABNORMAL
VANCOMYCIN SERPL-MCNC: 17 MG/L
WBC # BLD AUTO: 6.2 10E9/L (ref 4–11)
WBC # BLD AUTO: 7 10E9/L (ref 4–11)

## 2017-09-04 PROCEDURE — 36592 COLLECT BLOOD FROM PICC: CPT | Performed by: SURGERY

## 2017-09-04 PROCEDURE — 25000132 ZZH RX MED GY IP 250 OP 250 PS 637

## 2017-09-04 PROCEDURE — 12000008 ZZH R&B INTERMEDIATE UMMC

## 2017-09-04 PROCEDURE — C1751 CATH, INF, PER/CENT/MIDLINE: HCPCS

## 2017-09-04 PROCEDURE — 36569 INSJ PICC 5 YR+ W/O IMAGING: CPT

## 2017-09-04 PROCEDURE — 40000986 XR CHEST PORT 1 VW

## 2017-09-04 PROCEDURE — 25000128 H RX IP 250 OP 636: Performed by: SURGERY

## 2017-09-04 PROCEDURE — C1769 GUIDE WIRE: HCPCS

## 2017-09-04 PROCEDURE — 27210436 ZZH NUTRITION PRODUCT SEMIELEM INTERMED CAN

## 2017-09-04 PROCEDURE — 25000125 ZZHC RX 250: Performed by: SURGERY

## 2017-09-04 PROCEDURE — 25000128 H RX IP 250 OP 636

## 2017-09-04 PROCEDURE — 25000132 ZZH RX MED GY IP 250 OP 250 PS 637: Performed by: SURGERY

## 2017-09-04 PROCEDURE — 85610 PROTHROMBIN TIME: CPT | Performed by: SURGERY

## 2017-09-04 PROCEDURE — 80048 BASIC METABOLIC PNL TOTAL CA: CPT | Performed by: SURGERY

## 2017-09-04 PROCEDURE — 84134 ASSAY OF PREALBUMIN: CPT | Performed by: SURGERY

## 2017-09-04 PROCEDURE — 85025 COMPLETE CBC W/AUTO DIFF WBC: CPT | Performed by: SURGERY

## 2017-09-04 PROCEDURE — 25000131 ZZH RX MED GY IP 250 OP 636 PS 637: Performed by: SURGERY

## 2017-09-04 PROCEDURE — 80202 ASSAY OF VANCOMYCIN: CPT | Performed by: SURGERY

## 2017-09-04 RX ORDER — LEVOTHYROXINE SODIUM 150 UG/1
150 TABLET ORAL DAILY
Status: DISCONTINUED | OUTPATIENT
Start: 2017-09-05 | End: 2017-09-13 | Stop reason: HOSPADM

## 2017-09-04 RX ORDER — PRAVASTATIN SODIUM 40 MG
40 TABLET ORAL DAILY
Status: DISCONTINUED | OUTPATIENT
Start: 2017-09-05 | End: 2017-09-13 | Stop reason: HOSPADM

## 2017-09-04 RX ORDER — HEPARIN SODIUM,PORCINE 10 UNIT/ML
2-5 VIAL (ML) INTRAVENOUS
Status: COMPLETED | OUTPATIENT
Start: 2017-09-04 | End: 2017-09-04

## 2017-09-04 RX ORDER — LIDOCAINE 40 MG/G
CREAM TOPICAL
Status: DISCONTINUED | OUTPATIENT
Start: 2017-09-04 | End: 2017-09-13 | Stop reason: HOSPADM

## 2017-09-04 RX ORDER — WARFARIN SODIUM 2.5 MG/1
2.5 TABLET ORAL
Status: DISCONTINUED | OUTPATIENT
Start: 2017-09-04 | End: 2017-09-04

## 2017-09-04 RX ORDER — FUROSEMIDE 10 MG/ML
20 INJECTION INTRAMUSCULAR; INTRAVENOUS EVERY 8 HOURS
Status: COMPLETED | OUTPATIENT
Start: 2017-09-04 | End: 2017-09-05

## 2017-09-04 RX ORDER — GABAPENTIN 250 MG/5ML
300 SOLUTION ORAL 2 TIMES DAILY
Status: DISCONTINUED | OUTPATIENT
Start: 2017-09-04 | End: 2017-09-13 | Stop reason: HOSPADM

## 2017-09-04 RX ORDER — FENOFIBRATE 160 MG/1
160 TABLET ORAL DAILY
Status: DISCONTINUED | OUTPATIENT
Start: 2017-09-05 | End: 2017-09-13 | Stop reason: HOSPADM

## 2017-09-04 RX ORDER — DOXAZOSIN 1 MG/1
1 TABLET ORAL DAILY
Status: DISCONTINUED | OUTPATIENT
Start: 2017-09-05 | End: 2017-09-13 | Stop reason: HOSPADM

## 2017-09-04 RX ORDER — CYCLOBENZAPRINE HCL 5 MG
10 TABLET ORAL 3 TIMES DAILY PRN
Status: DISCONTINUED | OUTPATIENT
Start: 2017-09-04 | End: 2017-09-13 | Stop reason: HOSPADM

## 2017-09-04 RX ORDER — WARFARIN SODIUM 2.5 MG/1
2.5 TABLET ORAL
Status: COMPLETED | OUTPATIENT
Start: 2017-09-04 | End: 2017-09-04

## 2017-09-04 RX ADMIN — POTASSIUM PHOSPHATE, MONOBASIC AND POTASSIUM PHOSPHATE, DIBASIC 15 MMOL: 224; 236 INJECTION, SOLUTION INTRAVENOUS at 03:03

## 2017-09-04 RX ADMIN — SODIUM BICARBONATE 325 MG: 325 TABLET ORAL at 04:04

## 2017-09-04 RX ADMIN — LEVOTHYROXINE SODIUM 150 MCG: 150 TABLET ORAL at 07:47

## 2017-09-04 RX ADMIN — Medication: at 11:17

## 2017-09-04 RX ADMIN — PRAVASTATIN SODIUM 40 MG: 40 TABLET ORAL at 07:47

## 2017-09-04 RX ADMIN — DOXAZOSIN 1 MG: 1 TABLET ORAL at 07:47

## 2017-09-04 RX ADMIN — VANCOMYCIN HYDROCHLORIDE 1750 MG: 10 INJECTION, POWDER, LYOPHILIZED, FOR SOLUTION INTRAVENOUS at 11:22

## 2017-09-04 RX ADMIN — PANCRELIPASE 48000 UNITS: 24000; 76000; 120000 CAPSULE, DELAYED RELEASE PELLETS ORAL at 12:41

## 2017-09-04 RX ADMIN — Medication: at 07:48

## 2017-09-04 RX ADMIN — LIDOCAINE HYDROCHLORIDE 3.5 ML: 10 INJECTION, SOLUTION EPIDURAL; INFILTRATION; INTRACAUDAL; PERINEURAL at 12:02

## 2017-09-04 RX ADMIN — VANCOMYCIN HYDROCHLORIDE 1750 MG: 10 INJECTION, POWDER, LYOPHILIZED, FOR SOLUTION INTRAVENOUS at 22:07

## 2017-09-04 RX ADMIN — ENOXAPARIN SODIUM 80 MG: 80 INJECTION SUBCUTANEOUS at 04:05

## 2017-09-04 RX ADMIN — Medication 15 ML: at 07:47

## 2017-09-04 RX ADMIN — SERTRALINE HYDROCHLORIDE 50 MG: 20 SOLUTION ORAL at 07:47

## 2017-09-04 RX ADMIN — FUROSEMIDE 20 MG: 10 INJECTION, SOLUTION INTRAVENOUS at 15:55

## 2017-09-04 RX ADMIN — FUROSEMIDE 20 MG: 10 INJECTION, SOLUTION INTRAVENOUS at 09:25

## 2017-09-04 RX ADMIN — PANCRELIPASE 48000 UNITS: 24000; 76000; 120000 CAPSULE, DELAYED RELEASE PELLETS ORAL at 16:06

## 2017-09-04 RX ADMIN — PANCRELIPASE 48000 UNITS: 24000; 76000; 120000 CAPSULE, DELAYED RELEASE PELLETS ORAL at 00:42

## 2017-09-04 RX ADMIN — WARFARIN SODIUM 2.5 MG: 2.5 TABLET ORAL at 19:49

## 2017-09-04 RX ADMIN — ONDANSETRON 4 MG: 2 INJECTION INTRAMUSCULAR; INTRAVENOUS at 14:09

## 2017-09-04 RX ADMIN — Medication: at 06:20

## 2017-09-04 RX ADMIN — Medication: at 14:12

## 2017-09-04 RX ADMIN — FENOFIBRATE 160 MG: 160 TABLET ORAL at 07:47

## 2017-09-04 RX ADMIN — Medication: at 09:35

## 2017-09-04 RX ADMIN — MEROPENEM 1 G: 1 INJECTION, POWDER, FOR SOLUTION INTRAVENOUS at 01:32

## 2017-09-04 RX ADMIN — Medication 20 MG: at 19:49

## 2017-09-04 RX ADMIN — SODIUM BICARBONATE 325 MG: 325 TABLET ORAL at 12:41

## 2017-09-04 RX ADMIN — ENOXAPARIN SODIUM 80 MG: 80 INJECTION SUBCUTANEOUS at 14:09

## 2017-09-04 RX ADMIN — PANCRELIPASE 48000 UNITS: 24000; 76000; 120000 CAPSULE, DELAYED RELEASE PELLETS ORAL at 07:45

## 2017-09-04 RX ADMIN — SODIUM BICARBONATE 325 MG: 325 TABLET ORAL at 15:55

## 2017-09-04 RX ADMIN — PREDNISONE 5 MG: 5 SOLUTION ORAL at 07:48

## 2017-09-04 RX ADMIN — SODIUM BICARBONATE 325 MG: 325 TABLET ORAL at 07:46

## 2017-09-04 RX ADMIN — PANCRELIPASE 48000 UNITS: 24000; 76000; 120000 CAPSULE, DELAYED RELEASE PELLETS ORAL at 04:04

## 2017-09-04 RX ADMIN — Medication: at 22:09

## 2017-09-04 RX ADMIN — SODIUM BICARBONATE 325 MG: 325 TABLET ORAL at 00:42

## 2017-09-04 RX ADMIN — PANCRELIPASE 48000 UNITS: 24000; 76000; 120000 CAPSULE, DELAYED RELEASE PELLETS ORAL at 22:17

## 2017-09-04 RX ADMIN — GABAPENTIN 300 MG: 250 SOLUTION ORAL at 07:48

## 2017-09-04 RX ADMIN — Medication 20 MG: at 07:48

## 2017-09-04 RX ADMIN — SODIUM BICARBONATE 325 MG: 325 TABLET ORAL at 22:17

## 2017-09-04 RX ADMIN — SODIUM CHLORIDE, PRESERVATIVE FREE 5 ML: 5 INJECTION INTRAVENOUS at 10:22

## 2017-09-04 RX ADMIN — GABAPENTIN 300 MG: 250 SOLUTION ORAL at 19:49

## 2017-09-04 NOTE — PROGRESS NOTES
REGIONAL ANESTHESIA PAIN SERVICE EVALUATION:   Called to evaluate patient for local anesthetic bolus via bilateral thoracic PV catheter.    - TIME: 1100  - EVALUATION:   -- Patient reports pain at the lower abdomen with current medication therapy   -- bilateral nerve block/epidural dressing clean, dry and intact  - MEDICATION: PF bupivacaine 0.25% 5mL given via each catheter (10ml total)  - PROCEDURE: local anesthetic bolus given in 1mL aliquots without complication. Negative aspirate.  No symptoms of local anesthetic systemic toxicity (LAST), BP and P at 5 min and 10 min reading stable  - POST-PROCEDURE PLAN:    -- continue BP and P monitoring Q 10 min x 20 min, Contact RAPS (jobcode ID [id number]) if MAP less than 60      FOLLOW UP: Patient reports pain improved with bolus (mildly)  PLAN:   -- patient can be evaluated to receive local anesthetic bolus Q 12 hr PRN, bedside nurse must page RAPS to request bolus    Kenny Mtz MD  [facility]  [phone]  [employer] phone dial * * [phone] or text page using [employer] on the intranet

## 2017-09-04 NOTE — PLAN OF CARE
Problem: Goal Outcome Summary  Goal: Goal Outcome Summary  PT 7B: Cancel. Pt declining therapy d/t increased nausea and pain. Schedule not permitting later check-back.

## 2017-09-04 NOTE — PROGRESS NOTES
REGIONAL ANESTHESIA PAIN SERVICE CONTINUOUS NERVE INFUSION NOTE  Subjective and Interval History: Pt reports moderate pain control via nerve block continuous infusion.  Denies any weakness, paresthesias, circumoral numbness, metallic taste or tinnitus.  Pt is ambulating without assistance, but feels lower abdominal pain with transfers. Patient currently without nausea or vomiting. Patient is tolerating tube feeds without out any distention.        Clinically Aligned Pain Assessment (CAPA):   Comfort (How is your pain?): Tolerable with discomfort  Change in Pain (Since your last medication/intervention?): Improving  Pain Control (How are your pain treatments working?):  Partially effective pain control  Functioning (Are you able to do activities to get better?) : Can do most things, but pain gets in the way of some   Sleep (Does your pain management allow you to sleep or rest?): Awake with occasional pain     Numerical Rating Scale (NRS):  3/10 at rest and 7/10 with movement.      Current Facility-Administered Medications   Medication     furosemide (LASIX) injection 20 mg     Warfarin Therapy Reminder (Check START DATE - warfarin may be starting in the FUTURE)     [START ON 9/5/2017] doxazosin (CARDURA) tablet 1 mg     [START ON 9/5/2017] fenofibrate tablet 160 mg     gabapentin (NEURONTIN) solution 300 mg     [START ON 9/5/2017] levothyroxine (SYNTHROID/LEVOTHROID) tablet 150 mcg     [START ON 9/5/2017] pravastatin (PRAVACHOL) tablet 40 mg     cyclobenzaprine (FLEXERIL) tablet 10 mg     HYDROmorphone (DILAUDID) PCA 1 mg/mL     lidocaine (LMX4) kit     warfarin (COUMADIN) tablet 2.5 mg     vancomycin (VANCOCIN) 1,750 mg in NaCl 0.9 % 500 mL intermittent infusion     enoxaparin (LOVENOX) injection 80 mg     potassium phosphate 15 mmol in D5W 250 mL intermittent infusion     potassium phosphate 20 mmol in D5W 500 mL intermittent infusion     potassium phosphate 20 mmol in D5W 250 mL intermittent infusion     potassium  phosphate 25 mmol in D5W 500 mL intermittent infusion     dextrose 5% and 0.45% NaCl infusion     amylase-lipase-protease (CREON 24) 61501 UNITS per EC capsule 24,000-48,000 Units    And     sodium bicarbonate tablet 325 mg     multivitamins with minerals (CERTAVITE/CEROVITE) liquid 15 mL     omeprazole (priLOSEC) suspension 20 mg     dextrose 10 % 1,000 mL infusion     - MEDICATION INSTRUCTIONS -     ROPivacaine 0.2% (NAROPIN) 750 mL in ON-Q C-Bloc select flow ([id number] holds 600-750 mL) dual cath disposable pump     sertraline (ZOLOFT) 20 MG/ML (HIGH CONC) solution 50 mg     lidocaine 1 % 1 mL     lidocaine (LMX4) kit     sodium chloride (PF) 0.9% PF flush 3 mL     sodium chloride (PF) 0.9% PF flush 3 mL     naloxone (NARCAN) injection 0.1-0.4 mg     prochlorperazine (COMPAZINE) injection 5-10 mg    Or     prochlorperazine (COMPAZINE) tablet 5-10 mg     ondansetron (ZOFRAN-ODT) ODT tab 4 mg    Or     ondansetron (ZOFRAN) injection 4 mg     No Medication Sleep Aids for this Patient     glucose 40 % gel 15-30 g    Or     dextrose 50 % injection 25-50 mL    Or     glucagon injection 1 mg     predniSONE solution 5 mg     potassium chloride SA (K-DUR/KLOR-CON M) CR tablet 20-40 mEq     potassium chloride (KLOR-CON) Packet 20-40 mEq     potassium chloride 10 mEq in 100 mL intermittent infusion     potassium chloride 10 mEq in 100 mL intermittent infusion with 10 mg lidocaine     potassium chloride 20 mEq in 50 mL intermittent infusion     magnesium sulfate 2 g in NS intermittent infusion (PharMEDium or FV Cmpd)     magnesium sulfate 4 g in 100 mL sterile water (premade)     Facility-Administered Medications Ordered in Other Encounters   Medication     neostigmine (PROSTIGMINE) injection       Anticoagulation:  LOVENOX 80      OBJECTIVE:    Diagnostic:  Lab Results   Component Value Date    WBC 5.8 09/03/2017     Lab Results   Component Value Date    RBC 2.79 09/03/2017     Lab Results   Component Value Date    HGB 7.4  09/03/2017     Lab Results   Component Value Date    HCT 23.4 09/03/2017     Lab Results   Component Value Date     09/03/2017         Vitals:    Temp:  [35.4  C (95.7  F)-38.3  C (101  F)] 36.8  C (98.3  F)  Pulse:  [80] 80  Heart Rate:  [76-79] 77  Resp:  [20-22] 20  BP: (130-161)/(60-78) 161/70  SpO2:  [94 %-99 %] 98 %    Exam:    Strength 5/5 and symmetric grossly in bilateral LE   Bilateral Thoracic paravertebral (PV) catheter sites with dressing c/d/i, no tenderness, erythema, heme, edema      ASSESSMENT/PLAN:    Lucas Brown is a 56 year old male POD #7 s/p WHIPPLE PROCEDURE and placement of BilateralThoracic PV catheterw for analgesia.  Pt is receiving moderately adequate analgesia with ropivacaine 0.2%, total infusion 14mL/hour.  Pt is ambulating without difficulty.  No weakness or paresthesias, adequate sensory block.  No evidence of adverse side effects associated with local anesthetic.     - continue current total infusion of 14mL/hour  - will continue to follow and adjust as needed  - Patient is currently written for lovenox 80mg Q12 hours. Likely therapeutic due to AVR with plan to bridge to warfarin later. According to our guidelines, we can remove 8 hours after the last dose of lovenox.   -INR was checked on 9/4 and was 1.8. Would like to trend it tomorrow and pull the catheter if it is below 1.5 and lovenox is held for 8 hours.  - discussed plan with attending anesthesiologist    Manuel Marie MD  Regional Anesthesia Pain Service      24 hour Job Code Pager.  For in-house use only.     [place]:  * * [phone]  [place]: * * [phone]  Peds: * * [phone]  Enter call-back number and #      This pager only accepts text messages through [facility]

## 2017-09-04 NOTE — PLAN OF CARE
Problem: Goal Outcome Summary  Goal: Goal Outcome Summary  Outcome: No Change  VSS on 1L NC. C/o nausea and heartburn at night. OnQ pump with leaking connections throughout shift, anesthesia notified. A&Ox4. Transverse abd incision with staples intact. Noting skin tears near from adhesive near incision - open to air. RLQ former drain site draining large amount of serous fluid - requiring multiple dressing changes. Minimal adhesive used due to pt sensitivity. G tube remains to gravity with moderate brown clear output. J-tube with tube feeds at 50ml/hr. Pt c/o nausea x1 - received zofran with relief. Bowel sounds hypoactive - pt passing flatus - BM today. Voiding. Pt to have blood cultures if temp >101.5. Phos back 2.2, please replace, recheck in AM. Continue with POC.

## 2017-09-04 NOTE — PLAN OF CARE
Problem: Individualization  Goal: Patient Preferences  Outcome: No Change  Vitals:     09/03/17 1949 09/03/17 2347 09/04/17 0136 09/04/17 0402   BP: 148/78 130/60   135/69   BP Location: Left arm Left arm   Left arm   Cuff Size:           Pulse:           Resp: 20 20   20   Temp: 99.1  F (37.3  C) 101  F (38.3  C) 98.7  F (37.1  C) 95.7  F (35.4  C)   TempSrc: Oral Oral Oral Axillary   SpO2: 96% 94% 99% 97%   Weight:           Height:             Max temp 101, recheck 98.7, other VSS, transverse abdominal incision is c/d/i with staples, g/j-tube site c/d/i, g-tube to gravity with 300 ml of brown output, j-tube with tube feeding at 50 ml/hr with sodium bicarb and creon added every 4 hours, wound on RLQ pouched around 0400 d/t copious drainage and skin around wound getting red, voiding and had one brown loose BM, weaned oxygen from 2L down to 1L overnight, continue with plan of care

## 2017-09-04 NOTE — PROGRESS NOTES
"Surgery Progress note    S:  Tmax 101 yesterday. Overall doing about the same. Pain control is adequate. Continues to pass flatus, started having bowel movements. G tube output is minimal and does not look like tube feeds. J-tube feeds running at 50 ml/hr. Good UOP. Lots of drainage from old drain site.      O:  /70 (BP Location: Left arm)  Pulse 80  Temp 98.3  F (36.8  C) (Oral)  Resp 20  Ht 1.803 m (5' 10.98\")  Wt 103 kg (227 lb 1.2 oz)  SpO2 98%  BMI 31.68 kg/m2  Gen: A&Ox3, mildly uncomfortable appearing  Resp: Breathing non-labored on 1 L NC  CV: RRR  Abd: Soft, moderately distended, appropriately ttp, incision c/d/i with 2 skin tears above and below incision. G tube to gravity. Skin of lower abdomen remains mildly erythematous, but cellulitis is improving.Old drain site with cloudy tan output.   Ext: Distal extremities warm, bilateral pedal edema      BMP    Recent Labs  Lab 09/04/17  0652 09/04/17  0003 09/03/17  0726 09/02/17  0543   * 145* 144 144   POTASSIUM 3.4 3.2* 3.4 3.7   CHLORIDE 112* 111* 111* 111*   NICHOLAS 6.7* 7.0* 7.2* 7.6*   CO2 30 29 28 28   BUN 17 18 18 23   CR 0.90 0.88 0.97 1.04   * 111* 112* 120*     CBC    Recent Labs  Lab 09/04/17  0652 09/04/17  0003 09/03/17  0726 09/02/17  0413   WBC 7.0 6.2 5.8 5.2   RBC 2.82* 2.72* 2.79* 2.76*   HGB 7.2* 7.1* 7.4* 7.3*   HCT 23.4* 22.5* 23.4* 23.6*   MCV 83 83 84 86   MCH 25.5* 26.1* 26.5 26.4*   MCHC 30.8* 31.6 31.6 30.9*   RDW 17.7* 17.5* 17.6* 17.9*    272 230 181     A/P: Lucas Brown is a 56 year old male with history of chronic pancreatitis and 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  Resp: Continue pulmonary toilet, wean off O2 as able. Encourage IS.   CV: Continue hemodynamic monitoring, PTA medications   GI/FEN: NPO. G tube to gravity, " continue TFs at 50, MIVF TKO today  Renal: ROSELYN, resolved, Avoid nephrotoxic medications.   Endo: Monitor blood sugar levels. Home synthroid.  ID: Continue vanco for cellulitis, stop meropenem. Abscess cultures with Staph epidermidis.  Heme: Hgb stable 7.2. Will continue to monitor. Therapeutic lovenox (mechanical aortic valve), warfarin.  Prophylaxis: SCDs.  On lovenox  Lines: Remove RIJ central line today and place PICC    Patient seen with Dr. Crum.    Shanti Sommer MD PGY-6  General Surgery Resident  Pager:[phone]

## 2017-09-04 NOTE — PHARMACY-ANTICOAGULATION SERVICE
Clinical Pharmacy - Warfarin Dosing Consult     Pharmacy has been consulted to manage this patient s warfarin therapy.  Indication: Mechanical Aortic Valve Replacement  Therapy Goal: INR 2-3  Warfarin Prior to Admission: Yes  Warfarin PTA Regimen: 2.5 mg daily  Significant drug interactions: enoxaparin, fenofibrate, sertraline, omeprazole, vancomycin (all with increased risk of bleeding); meropenem was discontinued today (also increased risk of bleeding)  Recent documented change in oral intake/nutrition: Yes    INR   Date Value Ref Range Status   09/04/2017 1.81 (H) 0.86 - 1.14 Final   08/28/2017 1.26 (H) 0.86 - 1.14 Final       Recommend warfarin 2.5 mg today.  Pharmacy will monitor Lucas Brown daily and order warfarin doses to achieve specified goal.      Please contact pharmacy as soon as possible if the warfarin needs to be held for a procedure or if the warfarin goals change.     Henna GalvezD

## 2017-09-04 NOTE — PLAN OF CARE
Problem: Goal Outcome Summary  Goal: Goal Outcome Summary  OT 7B: Cancel-pt declined x2 this am 2/2 pain

## 2017-09-04 NOTE — PHARMACY-VANCOMYCIN DOSING SERVICE
Pharmacy Vancomycin Note  Date of Service 2017  Patient's  1960   56 year old, male    Indication: Intra-abdominal infection  Goal Trough Level: 15-20 mg/L  Day of Therapy: 3  Current Vancomycin regimen:  1750 mg IV q12h    Current estimated CrCl = Estimated Creatinine Clearance: 110.7 mL/min (based on Cr of 0.9).    Creatinine for last 3 days  2017:  5:43 AM Creatinine 1.04 mg/dL  9/3/2017:  7:26 AM Creatinine 0.97 mg/dL  2017: 12:03 AM Creatinine 0.88 mg/dL;  6:52 AM Creatinine 0.90 mg/dL    Recent Vancomycin Levels (past 3 days)  2017: 10:21 AM Vancomycin Level 17.0 mg/L (11.5 hour trough)    Vancomycin IV Administrations (past 72 hours)                   vancomycin (VANCOCIN) 1,750 mg in NaCl 0.9 % 500 mL intermittent infusion (mg) 1,750 mg New Bag 17 1122     1,750 mg New Bag 17 2245     1,750 mg New Bag  0938     1,750 mg New Bag 17 2124                Nephrotoxins and other renal medications (Future)    Start     Dose/Rate Route Frequency Ordered Stop    17 0830  furosemide (LASIX) injection 20 mg      20 mg Intravenous EVERY 8 HOURS 17 0817 17 0829    17 2130  vancomycin (VANCOCIN) 1,750 mg in NaCl 0.9 % 500 mL intermittent infusion      1,750 mg Intravenous EVERY 12 HOURS 17 0925               Contrast Orders - past 72 hours     None          Interpretation of levels and current regimen:  Trough level is  Therapeutic    Has serum creatinine changed > 50% in last 72 hours: No, but serum creatinine has continued to improve    Urine output:  good urine output    Renal Function: Improving    Plan:  1.  Continue Current Dose  2.  Pharmacy will check trough levels as appropriate in 1-3 Days.    3. Serum creatinine levels will be ordered daily for the first week of therapy and at least twice weekly for subsequent weeks.      Shweta Jackson, HennaD        .

## 2017-09-04 NOTE — ANESTHESIA POST-OP FOLLOW-UP NOTE
REGIONAL ANESTHESIA PAIN SERVICE CONTINUOUS NERVE INFUSION NOTE  Subjective and Interval History: Pt reports moderate pain control via nerve block continuous infusion.  Denies any weakness, paresthesias, circumoral numbness, metallic taste or tinnitus.  Pt is ambulating without assistance, but feels lower abdominal pain with transfers. Patient currently without nausea or vomiting. Patient is tolerating tube feeds without out any distention.                              Clinically Aligned Pain Assessment (CAPA):   Comfort (How is your pain?): Tolerable with discomfort  Change in Pain (Since your last medication/intervention?): Improving  Pain Control (How are your pain treatments working?):  Partially effective pain control  Functioning (Are you able to do activities to get better?) : Can do most things, but pain gets in the way of some   Sleep (Does your pain management allow you to sleep or rest?): Awake with occasional pain                           Numerical Rating Scale (NRS):  3/10 at rest and 7/10 with movement.           Current Facility-Administered Medications   Medication     furosemide (LASIX) injection 20 mg     Warfarin Therapy Reminder (Check START DATE - warfarin may be starting in the FUTURE)     [START ON 9/5/2017] doxazosin (CARDURA) tablet 1 mg     [START ON 9/5/2017] fenofibrate tablet 160 mg     gabapentin (NEURONTIN) solution 300 mg     [START ON 9/5/2017] levothyroxine (SYNTHROID/LEVOTHROID) tablet 150 mcg     [START ON 9/5/2017] pravastatin (PRAVACHOL) tablet 40 mg     cyclobenzaprine (FLEXERIL) tablet 10 mg     HYDROmorphone (DILAUDID) PCA 1 mg/mL     lidocaine (LMX4) kit     warfarin (COUMADIN) tablet 2.5 mg     vancomycin (VANCOCIN) 1,750 mg in NaCl 0.9 % 500 mL intermittent infusion     enoxaparin (LOVENOX) injection 80 mg     potassium phosphate 15 mmol in D5W 250 mL intermittent infusion     potassium phosphate 20 mmol in D5W 500 mL intermittent infusion     potassium phosphate 20 mmol  in D5W 250 mL intermittent infusion     potassium phosphate 25 mmol in D5W 500 mL intermittent infusion     dextrose 5% and 0.45% NaCl infusion     amylase-lipase-protease (CREON 24) 38787 UNITS per EC capsule 24,000-48,000 Units     And     sodium bicarbonate tablet 325 mg     multivitamins with minerals (CERTAVITE/CEROVITE) liquid 15 mL     omeprazole (priLOSEC) suspension 20 mg     dextrose 10 % 1,000 mL infusion     - MEDICATION INSTRUCTIONS -     ROPivacaine 0.2% (NAROPIN) 750 mL in ON-Q C-Bloc select flow ([id number] holds 600-750 mL) dual cath disposable pump     sertraline (ZOLOFT) 20 MG/ML (HIGH CONC) solution 50 mg     lidocaine 1 % 1 mL     lidocaine (LMX4) kit     sodium chloride (PF) 0.9% PF flush 3 mL     sodium chloride (PF) 0.9% PF flush 3 mL     naloxone (NARCAN) injection 0.1-0.4 mg     prochlorperazine (COMPAZINE) injection 5-10 mg     Or     prochlorperazine (COMPAZINE) tablet 5-10 mg     ondansetron (ZOFRAN-ODT) ODT tab 4 mg     Or     ondansetron (ZOFRAN) injection 4 mg     No Medication Sleep Aids for this Patient     glucose 40 % gel 15-30 g     Or     dextrose 50 % injection 25-50 mL     Or     glucagon injection 1 mg     predniSONE solution 5 mg     potassium chloride SA (K-DUR/KLOR-CON M) CR tablet 20-40 mEq     potassium chloride (KLOR-CON) Packet 20-40 mEq     potassium chloride 10 mEq in 100 mL intermittent infusion     potassium chloride 10 mEq in 100 mL intermittent infusion with 10 mg lidocaine     potassium chloride 20 mEq in 50 mL intermittent infusion     magnesium sulfate 2 g in NS intermittent infusion (PharMEDium or FV Cmpd)     magnesium sulfate 4 g in 100 mL sterile water (premade)          Facility-Administered Medications Ordered in Other Encounters   Medication     neostigmine (PROSTIGMINE) injection         Anticoagulation:  LOVENOX 80        OBJECTIVE:     Diagnostic:        Lab Results   Component Value Date     WBC 5.8 09/03/2017            Lab Results   Component Value  Date     RBC 2.79 09/03/2017            Lab Results   Component Value Date     HGB 7.4 09/03/2017      Lab Results   Component Value Date     HCT 23.4 09/03/2017            Lab Results   Component Value Date      09/03/2017            Vitals:              Temp:  [35.4  C (95.7  F)-38.3  C (101  F)] 36.8  C (98.3  F)  Pulse:  [80] 80  Heart Rate:  [76-79] 77  Resp:  [20-22] 20  BP: (130-161)/(60-78) 161/70  SpO2:  [94 %-99 %] 98 %     Exam:                         Strength 5/5 and symmetric grossly in bilateral LE                        Bilateral Thoracic paravertebral (PV) catheter sites with dressing c/d/i, no tenderness, erythema, heme, edema        ASSESSMENT/PLAN:    Lucas Brown is a 56 year old male POD #7 s/p WHIPPLE PROCEDURE and placement of BilateralThoracic PV catheterw for analgesia.  Pt is receiving moderately adequate analgesia with ropivacaine 0.2%, total infusion 14mL/hour.  Pt is ambulating without difficulty.  No weakness or paresthesias, adequate sensory block.  No evidence of adverse side effects associated with local anesthetic.      - continue current total infusion of 14mL/hour   - planned to remove catheter today however:   - Patient is currently receiving lovenox 80mg Q12 hours and INR returned today at 1.8. Given the combination of these two factors, likely safer to leave the  catheter at least 1 additional day and trend the INR tomorrow. If improving, we may be able to pull the catheter without further intervention tomorrow.        Loy Vincent   Regional Anesthesia Pain Service        24 hour Job Code Pager.  For in-house use only.     [place]:  * * [phone]  [place]: * * [phone]  Peds: * * [phone]  Enter call-back number and #      This pager only accepts text messages through [facility]

## 2017-09-04 NOTE — PLAN OF CARE
Problem: Goal Outcome Summary  Goal: Goal Outcome Summary  Vitals:     09/04/17 0136 09/04/17 0402 09/04/17 0806 09/04/17 1222   BP:   135/69 161/70 138/71   BP Location:   Left arm Left arm     Cuff Size:           Pulse:           Resp:   20 20 20   Temp: 98.7  F (37.1  C) 95.7  F (35.4  C) 98.3  F (36.8  C) 98  F (36.7  C)   TempSrc: Oral Axillary Oral     SpO2: 99% 97% 98% 97%   Weight:           Height:           afebrile vital stable, sating 97% on 1 lit O2, lungs diminished at the base,  belly slight distended, transverse incision staple intact open to air.  Right side wound putting out copious amount serous out put, pouched to small bag dressing change,   G-tube to gravity, J-tube tube feed increased to 60 cc this am, Zofran x1 with relief, PCA dilaudid dose increased,   Up to bathroom, multiple loose BM, voiding adequate, on vancomycin antibiotic, wife is at bed side,   Continue with plan of care.

## 2017-09-05 ENCOUNTER — APPOINTMENT (OUTPATIENT)
Dept: OCCUPATIONAL THERAPY | Facility: CLINIC | Age: 57
DRG: 406 | End: 2017-09-05
Attending: SURGERY
Payer: COMMERCIAL

## 2017-09-05 ENCOUNTER — APPOINTMENT (OUTPATIENT)
Dept: PHYSICAL THERAPY | Facility: CLINIC | Age: 57
DRG: 406 | End: 2017-09-05
Attending: SURGERY
Payer: COMMERCIAL

## 2017-09-05 LAB
ALBUMIN UR-MCNC: NEGATIVE MG/DL
ANION GAP SERPL CALCULATED.3IONS-SCNC: 6 MMOL/L (ref 3–14)
APPEARANCE UR: ABNORMAL
BACTERIA #/AREA URNS HPF: ABNORMAL /HPF
BASOPHILS # BLD AUTO: 0 10E9/L (ref 0–0.2)
BASOPHILS NFR BLD AUTO: 0.2 %
BILIRUB UR QL STRIP: NEGATIVE
BUN SERPL-MCNC: 19 MG/DL (ref 7–30)
CALCIUM SERPL-MCNC: 7.3 MG/DL (ref 8.5–10.1)
CHLORIDE SERPL-SCNC: 108 MMOL/L (ref 94–109)
CO2 SERPL-SCNC: 31 MMOL/L (ref 20–32)
COLOR UR AUTO: ABNORMAL
CREAT SERPL-MCNC: 0.84 MG/DL (ref 0.66–1.25)
CRP SERPL-MCNC: 130 MG/L (ref 0–8)
DIFFERENTIAL METHOD BLD: ABNORMAL
EOSINOPHIL # BLD AUTO: 0.1 10E9/L (ref 0–0.7)
EOSINOPHIL NFR BLD AUTO: 1.1 %
ERYTHROCYTE [DISTWIDTH] IN BLOOD BY AUTOMATED COUNT: 17.6 % (ref 10–15)
GFR SERPL CREATININE-BSD FRML MDRD: >90 ML/MIN/1.7M2
GLUCOSE SERPL-MCNC: 111 MG/DL (ref 70–99)
GLUCOSE UR STRIP-MCNC: NEGATIVE MG/DL
HCT VFR BLD AUTO: 25.4 % (ref 40–53)
HGB BLD-MCNC: 8.2 G/DL (ref 13.3–17.7)
HGB UR QL STRIP: NEGATIVE
IMM GRANULOCYTES # BLD: 0.3 10E9/L (ref 0–0.4)
IMM GRANULOCYTES NFR BLD: 3 %
INR PPP: 2.24 (ref 0.86–1.14)
KETONES UR STRIP-MCNC: NEGATIVE MG/DL
LEUKOCYTE ESTERASE UR QL STRIP: NEGATIVE
LYMPHOCYTES # BLD AUTO: 1.5 10E9/L (ref 0.8–5.3)
LYMPHOCYTES NFR BLD AUTO: 14.7 %
MAGNESIUM SERPL-MCNC: 1.9 MG/DL (ref 1.6–2.3)
MCH RBC QN AUTO: 26.9 PG (ref 26.5–33)
MCHC RBC AUTO-ENTMCNC: 32.3 G/DL (ref 31.5–36.5)
MCV RBC AUTO: 83 FL (ref 78–100)
MONOCYTES # BLD AUTO: 0.8 10E9/L (ref 0–1.3)
MONOCYTES NFR BLD AUTO: 8.1 %
MUCOUS THREADS #/AREA URNS LPF: PRESENT /LPF
NEUTROPHILS # BLD AUTO: 7.4 10E9/L (ref 1.6–8.3)
NEUTROPHILS NFR BLD AUTO: 72.9 %
NITRATE UR QL: NEGATIVE
NRBC # BLD AUTO: 0 10*3/UL
NRBC BLD AUTO-RTO: 0 /100
PH UR STRIP: 6 PH (ref 5–7)
PHOSPHATE SERPL-MCNC: 1.5 MG/DL (ref 2.5–4.5)
PHOSPHATE SERPL-MCNC: 1.8 MG/DL (ref 2.5–4.5)
PLATELET # BLD AUTO: 431 10E9/L (ref 150–450)
POTASSIUM SERPL-SCNC: 3 MMOL/L (ref 3.4–5.3)
POTASSIUM SERPL-SCNC: 3.5 MMOL/L (ref 3.4–5.3)
PREALB SERPL IA-MCNC: 5 MG/DL (ref 15–45)
RBC # BLD AUTO: 3.05 10E12/L (ref 4.4–5.9)
RBC #/AREA URNS AUTO: 1 /HPF (ref 0–2)
SODIUM SERPL-SCNC: 144 MMOL/L (ref 133–144)
SOURCE: ABNORMAL
SP GR UR STRIP: 1.01 (ref 1–1.03)
UROBILINOGEN UR STRIP-MCNC: 2 MG/DL (ref 0–2)
WBC # BLD AUTO: 10.1 10E9/L (ref 4–11)
WBC #/AREA URNS AUTO: 1 /HPF (ref 0–2)

## 2017-09-05 PROCEDURE — 25000132 ZZH RX MED GY IP 250 OP 250 PS 637: Performed by: SURGERY

## 2017-09-05 PROCEDURE — 27210436 ZZH NUTRITION PRODUCT SEMIELEM INTERMED CAN

## 2017-09-05 PROCEDURE — 36415 COLL VENOUS BLD VENIPUNCTURE: CPT | Performed by: SURGERY

## 2017-09-05 PROCEDURE — 12000008 ZZH R&B INTERMEDIATE UMMC

## 2017-09-05 PROCEDURE — 80048 BASIC METABOLIC PNL TOTAL CA: CPT | Performed by: SURGERY

## 2017-09-05 PROCEDURE — 97530 THERAPEUTIC ACTIVITIES: CPT | Mod: GP | Performed by: PHYSICAL THERAPIST

## 2017-09-05 PROCEDURE — 25000125 ZZHC RX 250

## 2017-09-05 PROCEDURE — 40000802 ZZH SITE CHECK

## 2017-09-05 PROCEDURE — 84134 ASSAY OF PREALBUMIN: CPT | Performed by: SURGERY

## 2017-09-05 PROCEDURE — 97116 GAIT TRAINING THERAPY: CPT | Mod: GP | Performed by: PHYSICAL THERAPIST

## 2017-09-05 PROCEDURE — 25000128 H RX IP 250 OP 636: Performed by: SURGERY

## 2017-09-05 PROCEDURE — 25000128 H RX IP 250 OP 636

## 2017-09-05 PROCEDURE — 84132 ASSAY OF SERUM POTASSIUM: CPT | Performed by: SURGERY

## 2017-09-05 PROCEDURE — 40000133 ZZH STATISTIC OT WARD VISIT

## 2017-09-05 PROCEDURE — 36592 COLLECT BLOOD FROM PICC: CPT | Performed by: SURGERY

## 2017-09-05 PROCEDURE — 81001 URINALYSIS AUTO W/SCOPE: CPT

## 2017-09-05 PROCEDURE — 85610 PROTHROMBIN TIME: CPT | Performed by: SURGERY

## 2017-09-05 PROCEDURE — 25000131 ZZH RX MED GY IP 250 OP 636 PS 637: Performed by: SURGERY

## 2017-09-05 PROCEDURE — 99211 OFF/OP EST MAY X REQ PHY/QHP: CPT

## 2017-09-05 PROCEDURE — 85025 COMPLETE CBC W/AUTO DIFF WBC: CPT | Performed by: SURGERY

## 2017-09-05 PROCEDURE — 84100 ASSAY OF PHOSPHORUS: CPT | Performed by: SURGERY

## 2017-09-05 PROCEDURE — 25000125 ZZHC RX 250: Performed by: SURGERY

## 2017-09-05 PROCEDURE — 86140 C-REACTIVE PROTEIN: CPT | Performed by: SURGERY

## 2017-09-05 PROCEDURE — 40000193 ZZH STATISTIC PT WARD VISIT: Performed by: PHYSICAL THERAPIST

## 2017-09-05 PROCEDURE — 83735 ASSAY OF MAGNESIUM: CPT | Performed by: SURGERY

## 2017-09-05 PROCEDURE — 97535 SELF CARE MNGMENT TRAINING: CPT | Mod: GO

## 2017-09-05 RX ORDER — TAMSULOSIN HYDROCHLORIDE 0.4 MG/1
0.4 CAPSULE ORAL DAILY
Status: DISCONTINUED | OUTPATIENT
Start: 2017-09-05 | End: 2017-09-12

## 2017-09-05 RX ADMIN — ENOXAPARIN SODIUM 80 MG: 80 INJECTION SUBCUTANEOUS at 04:12

## 2017-09-05 RX ADMIN — ENOXAPARIN SODIUM 80 MG: 80 INJECTION SUBCUTANEOUS at 15:30

## 2017-09-05 RX ADMIN — POTASSIUM & SODIUM PHOSPHATES POWDER PACK 280-160-250 MG 1 PACKET: 280-160-250 PACK at 14:30

## 2017-09-05 RX ADMIN — Medication 1.5 MG: at 20:14

## 2017-09-05 RX ADMIN — PANCRELIPASE 48000 UNITS: 24000; 76000; 120000 CAPSULE, DELAYED RELEASE PELLETS ORAL at 07:56

## 2017-09-05 RX ADMIN — PANCRELIPASE 48000 UNITS: 24000; 76000; 120000 CAPSULE, DELAYED RELEASE PELLETS ORAL at 12:11

## 2017-09-05 RX ADMIN — PANCRELIPASE 48000 UNITS: 24000; 76000; 120000 CAPSULE, DELAYED RELEASE PELLETS ORAL at 20:28

## 2017-09-05 RX ADMIN — VANCOMYCIN HYDROCHLORIDE 1750 MG: 10 INJECTION, POWDER, LYOPHILIZED, FOR SOLUTION INTRAVENOUS at 22:45

## 2017-09-05 RX ADMIN — SODIUM BICARBONATE 325 MG: 325 TABLET ORAL at 17:15

## 2017-09-05 RX ADMIN — Medication 20 MG: at 07:57

## 2017-09-05 RX ADMIN — Medication 20 MG: at 20:14

## 2017-09-05 RX ADMIN — PRAVASTATIN SODIUM 40 MG: 40 TABLET ORAL at 07:59

## 2017-09-05 RX ADMIN — LEVOTHYROXINE SODIUM 150 MCG: 150 TABLET ORAL at 07:58

## 2017-09-05 RX ADMIN — SODIUM BICARBONATE 325 MG: 325 TABLET ORAL at 20:28

## 2017-09-05 RX ADMIN — VANCOMYCIN HYDROCHLORIDE 1750 MG: 10 INJECTION, POWDER, LYOPHILIZED, FOR SOLUTION INTRAVENOUS at 10:31

## 2017-09-05 RX ADMIN — PANCRELIPASE 48000 UNITS: 24000; 76000; 120000 CAPSULE, DELAYED RELEASE PELLETS ORAL at 00:41

## 2017-09-05 RX ADMIN — SODIUM BICARBONATE 325 MG: 325 TABLET ORAL at 04:13

## 2017-09-05 RX ADMIN — PANCRELIPASE 48000 UNITS: 24000; 76000; 120000 CAPSULE, DELAYED RELEASE PELLETS ORAL at 17:15

## 2017-09-05 RX ADMIN — SODIUM BICARBONATE 325 MG: 325 TABLET ORAL at 12:12

## 2017-09-05 RX ADMIN — POTASSIUM CHLORIDE 20 MEQ: 400 INJECTION, SOLUTION INTRAVENOUS at 14:53

## 2017-09-05 RX ADMIN — Medication: at 04:23

## 2017-09-05 RX ADMIN — FENOFIBRATE 160 MG: 160 TABLET ORAL at 07:59

## 2017-09-05 RX ADMIN — SODIUM BICARBONATE 325 MG: 325 TABLET ORAL at 07:59

## 2017-09-05 RX ADMIN — Medication 2 G: at 14:54

## 2017-09-05 RX ADMIN — Medication: at 17:30

## 2017-09-05 RX ADMIN — PREDNISONE 5 MG: 5 SOLUTION ORAL at 07:57

## 2017-09-05 RX ADMIN — SERTRALINE HYDROCHLORIDE 50 MG: 20 SOLUTION ORAL at 07:57

## 2017-09-05 RX ADMIN — TAMSULOSIN HYDROCHLORIDE 0.4 MG: 0.4 CAPSULE ORAL at 07:59

## 2017-09-05 RX ADMIN — POTASSIUM CHLORIDE 20 MEQ: 400 INJECTION, SOLUTION INTRAVENOUS at 13:12

## 2017-09-05 RX ADMIN — SODIUM BICARBONATE 325 MG: 325 TABLET ORAL at 00:40

## 2017-09-05 RX ADMIN — GABAPENTIN 300 MG: 250 SOLUTION ORAL at 07:57

## 2017-09-05 RX ADMIN — Medication: at 14:06

## 2017-09-05 RX ADMIN — FUROSEMIDE 20 MG: 10 INJECTION, SOLUTION INTRAVENOUS at 00:40

## 2017-09-05 RX ADMIN — PANCRELIPASE 48000 UNITS: 24000; 76000; 120000 CAPSULE, DELAYED RELEASE PELLETS ORAL at 04:12

## 2017-09-05 RX ADMIN — GABAPENTIN 300 MG: 250 SOLUTION ORAL at 20:14

## 2017-09-05 RX ADMIN — Medication 15 ML: at 07:57

## 2017-09-05 RX ADMIN — POTASSIUM & SODIUM PHOSPHATES POWDER PACK 280-160-250 MG 1 PACKET: 280-160-250 PACK at 20:14

## 2017-09-05 RX ADMIN — POTASSIUM PHOSPHATE, MONOBASIC AND POTASSIUM PHOSPHATE, DIBASIC 20 MMOL: 224; 236 INJECTION, SOLUTION INTRAVENOUS at 09:41

## 2017-09-05 RX ADMIN — LIDOCAINE HYDROCHLORIDE 10 ML: 20 JELLY TOPICAL at 07:11

## 2017-09-05 RX ADMIN — POTASSIUM PHOSPHATE, MONOBASIC AND POTASSIUM PHOSPHATE, DIBASIC 20 MMOL: 224; 236 INJECTION, SOLUTION INTRAVENOUS at 20:16

## 2017-09-05 RX ADMIN — DOXAZOSIN 1 MG: 1 TABLET ORAL at 07:59

## 2017-09-05 NOTE — PLAN OF CARE
Problem: Goal Outcome Summary  Goal: Goal Outcome Summary  OT/7B: Educated pt on abdominal precautions and implications for ADLs and transfers including no lifting >10 pounds, proper body mechanics and log roll technique to increase independence and safety for bed mobility, and to avoid straining the incision site. Pt verbalized understanding of information, handout provided for increased carryover. Reviewed ADLs and how caregiver can assist as needed, wife verbalized understanding of recommendations. Pt transferred from supine > sitting EOB with SBA and VCs. Multiple providers arriving during session, session ended early.     Recommendation: Home to local apartment with wife assist when medically stable. Pt continues to be limited by decreased strength/activity tolerance, post-surgical precautions, and pain impacting pt's independence in ADLs/IADLs.

## 2017-09-05 NOTE — PROGRESS NOTES
REGIONAL ANESTHESIA PAIN SERVICE PVC NOTE    SUBJECTIVE:   Called by nursing with concern regarding the connection cap from the right catheter coming dislodged. The patient was intended to have the catheter pulled in the AM but had not due to acutely elevated INR. The patient had not noted a significant difference in pain management since it had come dislodged and agreed that since the time of dislodgement was understandable to not reconnect the catheter or remove it until an AM INR was drawn.     OBJECTIVE:  Diagnostics:  Lab Results   Component Value Date    WBC 7.0 09/04/2017     Lab Results   Component Value Date    RBC 2.82 09/04/2017     Lab Results   Component Value Date    HGB 7.2 09/04/2017     Lab Results   Component Value Date    HCT 23.4 09/04/2017     Lab Results   Component Value Date     09/04/2017       Lab Results   Component Value Date    INR 1.81 09/04/2017    INR 1.26 08/28/2017    INR 1.15 08/28/2017       Vitals:    Temp:  [35.4  C (95.7  F)-38.3  C (101  F)] 37.3  C (99.1  F)  Heart Rate:  [74-77] 75  Resp:  [20] 20  BP: (130-161)/(60-79) 141/79  SpO2:  [90 %-99 %] 91 %    Exam:    Strength 5/5 and symmetric grossly in bilateral LE   PVC site with dressing c/d/i, no tenderness, erythema, heme, edema. Right catheter dislodged from cap      ASSESSMENT/PLAN:    Lucas Brown is a 56 year old male POD #7 s/p WHIPPLE PROCEDURE and placement of bialteral PVC. Right catheter connection had come free from catheter and due to unknown time of disconnection it was determined it would not be reconnected. Also with elevated INR it was determined not to pull the catheter until return of AM INR. No weakness or paresthesias, no change in sensory block. No evidence of adverse side effects related to local anesthetic.    - continue current epidural infusion at 7mL/hour on left side, right side clamped off  - will continue to follow and adjust as needed        Derik Figueroa MD  Regional  Anesthesia Pain Service  9/4/2017 10:31 PM    24 hour Job Code Pager.  For in-house use only.     Dial * * [phone]  [place]:  [phone]  [place]: [phone]  Peds:  [phone]  Then enter call-back number  May text page using [employer], but NOT [employer].

## 2017-09-05 NOTE — PROGRESS NOTES
"SPIRITUAL HEALTH SERVICES  SPIRITUAL ASSESSMENT Progress Note  [facility] 7B     REFERRAL SOURCE: patient spouse    Follow up visit. Pt sitting in chair and spouse, Karmen at the bedside. Lucas is alert and conversant. I mentioned post-op visit last week and noted patients marked improvement since surgery. Lucas agreed he is recovering well and feels \"good about how things are going\".  Lucas and Karmen shared about their home in [place], [place] and about their daughters, grandchildren and pets.  Lucas misses his dogs and is looking forward to returning to home when his recovery permits.  Karmen is staying with dtr living in [place]. Lucas and Karmen declined prayer stating \"we have nuns, priests and a whole bunch of prayers coming this way.\" They expressed gratitude for cares provided by clinical team.        PLAN:  will follow up junior García  Chaplain Resident  Pager [phone]  "

## 2017-09-05 NOTE — PLAN OF CARE
Problem: Individualization  Goal: Patient Preferences  Outcome: No Change  Vitals:     09/04/17 1541 09/04/17 2041 09/04/17 2100 09/04/17 2224   BP: 143/77 149/68   141/79   BP Location: Left arm Left arm   Left arm   Cuff Size:           Pulse:           Resp: 20 20   20   Temp: 99.5  F (37.5  C) 96.5  F (35.8  C)   99.1  F (37.3  C)   TempSrc: Oral Axillary   Oral   SpO2: 90% 94%   91%   Weight:     108.1 kg (238 lb 6.4 oz)     Height:           AVSS. Pain well controled with PCA and an on Q-pump at 7cc bilaterally. Right side On q-pump cathter was found broken around 9pm. Called anaesthesia but was unable to fix the cathter or to remove it due to high INR. Cathter was coiled with clear dressing and clamped with a small adán clamp by anaesthesia. May remove in am if INR is WDL. left side on q-pump infusing at 7cc per hr. Current status of NPO with tube feeding at 60cc per hr. Tolerating feedings well. Will increase to goal of 65cc in am. GT to gravity. Moderate out put. Feedings through JT. Abdominal wound pouched during the day shift with small drainage. Has bilateral LE edema. On iv lasix. Using urinal at bed side. Currently resting in bed. Continue with current care.

## 2017-09-05 NOTE — PROGRESS NOTES
LAY. 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 Now with I&D RLQ with draining wound. Nursing placed a urostomy pouch around wound over weekend, seal remains intact, gauze has been packed into wound. WOC consult to assess wound pouching needs.  I. 10 minutes face to face with patient and wife assessing current pouch status, delivered additional urostomy pouching supplies. Discussed pouching education plan with patient, updated nurse regarding plan.  A. Right lower quadrant wound with heavy drainage requiring ostomy pouch to manage output.  P. Plan of care for RLQ Wound Pouching: Nursing to change pouch PRN if leaking, use 2 piece urostomy pouching system stocked in 7B supply room. Change gauze packing 1-2 times daily or as directed by MD. WOC to f/u on Thursday to teach pouching to patient/wife and give supply ordering instructions.

## 2017-09-05 NOTE — PLAN OF CARE
Problem: Goal Outcome Summary  Goal: Goal Outcome Summary  Outcome: No Change  VSS on RA. Afebrile. On Q pump catheter tubing on the Left broke this AM. Anesthesia paged and aware. Both on Q pumps off at this time. Waiting for INR results and plan to pull epidural. Both on Q pumps catheters clamped. Pain being managed with PCA dilaudid. Pt NPO ex ice chips. TF at 60cc/hr. Plan to increase to 65cc/hr in am. Tolerating feeding. Denied N/V  GT to gravity with moderate output. Abdominal wound pouched with large output.  Large Loose BM x2. Pt reporting pain with urination. MD notified. UA ordered. Yet to be collected.  Using bedside commode. Up w/SBA. Continue POC.

## 2017-09-05 NOTE — PLAN OF CARE
Problem: Individualization  Goal: Patient Preferences  Outcome: No Change  VSS. O2 sats mid 90s on RA. ON-Q pump removed today. Pt c/o pain at ON-Q catheter site after removal, however, reports decreased pain now. Neuros unchanged. IJ removed--gauze and tegaderm. TF @ 50mL/hour, not advancing to 65 mL per MD. RLQ wound --gauze and 2 piece urostomy pouch. PCA dilaudid delivering 0.5 mg q10min. Adequate output from both G-tube and Durham cath. K, Phos, and Mg replaced. Ordered recheck phos @1600. Pt showered today.

## 2017-09-05 NOTE — PLAN OF CARE
Problem: Goal Outcome Summary  Goal: Goal Outcome Summary  PT 7B: Pt progressing well with respect to gait, as he ambulated without a walker today nearly 200 ft and used IV pole for single UE support. Pt SBA for basic transfers and bed mobility.      Recommend discharge with assist from spouse to local apt.

## 2017-09-05 NOTE — PROGRESS NOTES
CLINICAL NUTRITION SERVICES - REASSESSMENT NOTE     Nutrition Prescription    RECOMMENDATIONS FOR MDs/PROVIDERS TO ORDER:  1. Given ongoing low phos (and low K+ today), recommend ordering 1 pkt Neutra-Phos TID-QID, or per provider, on top of IV replacement as appropriate. Each packet of Neutra-Phos contains 8 mmol Phos, 160 mg Na+, and 280 mg K+.    2. Consider switch to non-dextrose containing IVF given ongoing low phos, pt was at refeeding risk at start of TF and lytes continue to trend low    Malnutrition Status:    Non-severe malnutrition in the context of acute on chronic illness    Recommendations already ordered by Registered Dietitian (RD):  1. Mg++ add-on  2. CRP and and pre-albumin add-on.  Recommend check weekly to assess ongoing need for immune-modulating TF formula    Future/Additional Recommendations:  If pt to require full hydration from TF + free water flushes, would recommend 135 mL water flushes q3h (8 times per day) so TF + free water = 2281 mL free water daily (Or per provider pending ongoing fluid status/other sources of free water)     EVALUATION OF THE PROGRESS TOWARD GOALS   Diet: NPO    Nutrition Support: Impact Peptide via J-tube @ goal 65 ml/hr (1560 ml/day) to provide 2340 kcals (26 kcal/kg/day), 147 g PRO (1.6 g/kg/day), 1201 ml free H2O, 100 g Fat (50% from MCTs), 218 g CHO and no Fiber daily.     Free Water: 60 mL q4h (360 mL/day free water). D5 IVF also running at 10 mL/hr.  Total free water from TF + free water flushes + IVF = 1801 mL/day    PERT: 1-2 Creon 24 mixed with sodium bicarb added to TF Q4H.  When at goal, to get 2880 units lipase/g fat/day which is within recommended dosing range.    Intake: Advanced to goal rate this morning.  Patient reports he is tolerating TF fine and has not complaints or questions at this time. Tolerating TF advancement the past week per review of progress notes. 7-day avg TF intake = 592 mL/day Impact Peptide 1.5 which provided 893 kcal (10 kcal/kg)  "and 56 g PRO (0.6 g/kg) which meets 40% kcal needs and 52% PRO needs per dosing weight 90 kg. TF slowly advanced by 10 mL every day.    NEW FINDINGS   Weight: 108.1 kg on 9/4, up 18 kg since admit, suspect almost all fluid related.  Dosing weight remains 90 kg.  Labs: K+ 3 (L), Phos 1.5 (L) and has been low since 8/31, no Mg++ since 9/1  Meds: D5 IVF @ 10 mL/hr  GI: Increased G-tube outputs over the past 3 days  9/2: 1420 mL output  9/3: 1025 mL output  9/4: 1000 mL output    ASSESSED NUTRITION NEEDS (updated)  Estimated Energy Needs: 2273-8406 kcals/day (25 - 30 cals/kg )  Justification: Maintenance  Estimated Protein Needs: 135-180 grams protein/day (1.5 - 2 grams of pro/kg)  Justification: Post-op  Estimated Fluid Needs: 1 mL/kcal, or per provider    MALNUTRITION  % Intake: </= 50% for >/= 5 days (severe) and PTA </=75% for >/= 1 month (severe)  % Weight Loss: None noted, suspect fluid related wt gains  Subcutaneous Fat Loss: None observed  Muscle Loss: None observed  Fluid Accumulation/Edema: \"bilateral pedal edema\" per MD note today; mild right and left leg edema per nursing documentation  Malnutrition Diagnosis: Non-severe malnutrition in the context of acute on chronic illness    Previous Goals   Total avg nutritional intake to meet a minimum of 30 kcal/kg and 1.5 g PRO/kg daily (per dosing wt 90 kg).  Evaluation: Not met    Previous Nutrition Diagnosis  Inadequate oral intake related to altered GI status s/p whipple procedure as evidenced by NPO x 1 day and plan to initiate enteral nutrition support via J-tube access.    Evaluation: No change    CURRENT NUTRITION DIAGNOSIS  Inadequate protein-energy intake related to slow advancement to goal TF rate the past week as evidenced by 7-day avg TF intake = 40% kcal needs and 52% PRO needs and remains NPO    INTERVENTIONS  Implementation  Collaboration with other providers: paged team with above recs  Nutrition education: role of RD and nutrition " POC    Goals  Total avg nutritional intake to meet a minimum of 25 kcal/kg and 1.5 g PRO/kg daily (per dosing wt 90 kg).    Monitoring/Evaluation  Progress toward goals will be monitored and evaluated per protocol.     Jennie Grigsby RD, LD   7B RD Pager: [phone]

## 2017-09-05 NOTE — PROGRESS NOTES
Care Coordinator Progress Note     Admission Date/Time:  8/28/2017  Attending MD:  Vik Crum,*     Data  Chart reviewed, discussed with interdisciplinary team.   Patient was admitted for:    H/O aortic valve replacement  Chronic pancreatitis, unspecified pancreatitis type (H).    Concerns with insurance coverage for discharge needs: None.  Current Living Situation: Patient lives with spouse.  Spouse, Karmen, is patients primary care giver  Support System: Supportive and Involved  Services Involved: Coumadin Clinic, Pain Clinic  Transportation: Family to provide  Barriers to Discharge: medical clearance    Coordination of Care and Referrals: Provided patient/family with options for Home Infusion.        Assessment  Patient is a 56 year old male with history of chronic pancreatitis now s/p whipple pancreaticoduodenectomy.  Per MD team at discharge patient will likely need tube feedings via J tube and IV hydration via PICC.  Met with patient and wife, Karmen, at bedside to introduce RNCC role and discuss discharge planning.  Patient plans to stay at [facility] at discharge where him and his wife are renting an apartment for 6-8 weeks post surgery.  His wife Karmen is currently staying there while he is in the hospital.  PT working with patient and anticipate a discharge to apartment with assist from his wife to be appropriate.  Spoke with patient and Karmen regarding discharging on tube feedings.  Patient and Karmen would like to attend PLC on enteral nutrition.  PLC consult placed, class scheduled for 9/6 at 11am.  After giving patient choice of home infusion agency patient chose [facility](P:[phone], F: [phone]).  Referral made and orders placed.  Patient is on coumadin at home which is managed by his home anticoagulation clinic.  Patient is also seen at a pain clinic at home.  RNCC to continue to follow and assist with discharge planning as needed.        Plan  Anticipated Discharge Date:  9/11/2017  Anticipated Discharge Plan:  Discharge to local apartment with wife assist and [facility] for enteral/IV hydration/nursing support    Elisabeth Shelton, RNCC  [phone]

## 2017-09-05 NOTE — PROGRESS NOTES
REGIONAL ANESTHESIA PAIN SERVICE CONTINUOUS NERVE INFUSION NOTE  Subjective and Interval History: Pt reports adequate pain control via nerve block continuous infusion and is anxious to have catheters out today.  Denies any weakness, paresthesias, circumoral numbness, metallic taste or tinnitus.  Pt is ambulating with standby assistance.  Patient currently NPO, tolerating tube feedings, denies nausea.          Anticoagulation:    enoxaparin (LOVENOX) injection 80 mg 80 mg, SC, Q12H Given: 09/05 0412   Warfarin 2.5 mg PO given 9/4 at 1949      OBJECTIVE:  CBC RESULTS:   Recent Labs   Lab Test  09/04/17   0652   WBC  7.0   RBC  2.82*   HGB  7.2*   HCT  23.4*   MCV  83   MCH  25.5*   MCHC  30.8*   RDW  17.7*   PLT  314       Lab Results   Component Value Date    INR 1.81 09/04/2017    INR 1.26 08/28/2017    INR 1.15 08/28/2017     Vitals:    Temp:  [96.5  F (35.8  C)-99.5  F (37.5  C)] 97.4  F (36.3  C)  Heart Rate:  [72-77] 72  Resp:  [20] 20  BP: (138-161)/(68-79) 148/68  SpO2:  [90 %-98 %] 94 %    Exam:    Strength 5/5 and symmetric grossly in bilateral LE   Bilateral paravertebral (PV) catheters clamped with Joaquina and covered with tegaderm.  Insertion sites with dressing c/d/i, no tenderness, erythema, heme, edema      ASSESSMENT/PLAN:    Lucas Brown is a 56 year old male POD #8 s/p WHIPPLE PROCEDURE and placement of BILATERAL Thoracic PV catheters for analgesia, catheters disconnected overnight so bilateral catheters clamped, no infusion.  Pt is receiving adequate analgesia with IV PCA.  Pt is ambulating with PT, without difficulty today.  No weakness or paresthesias.  No evidence of adverse side effects associated with local anesthetic.     - Bilateral thoracic PV catheters placement 8/28/2017, catheter day #8: catheters clamped    - Anticoagulation: enoxaparin 80 mg SC Q 12 hr, last dose given today (9/5 at 0415). Warfarin 2.5 mg PO started yesterday (9/4 give at 1949).  Today's INR 2.24.  Discussed with  attending anesthesiologist (DIPAK Mchugh MD): Plan is to remove catheters at 1215 today this is 8 hrs after last dose enoxaparin (last dose given today at 0415).   Following catheter removal, monitor neuro checks Q 1 hr x 4, then Q 2 hrs x 24 hrs.  Contact RAPS (pgr #[phone]) immediately if motor or sensory changes in BLE, or acute onset of radicular low back pain.      1230: Bilateral catheters removed without complication, dark tips intact.  No heme from insertion sites.  No erythema, discharge or edema.  Surrounding skin cleansed with chloraprep and covered with bandaids.  Continue to assess for sensory and motor deficit Q 1 hr x 4, then Q 2 hrs until 1230 tomorrow.  Patient instructed to wait until tomorrow to shower.    - will continue to follow    - discussed plan with attending anesthesiologist         TERRELL Stapleton [facility]  Regional Anesthesia Pain Service  9/5/2017 7:35 AM    24 hour Job Code Pager.  For in-house use only.     [place]:  * * [phone]  [facility]: * * [phone]  Peds: * * [phone]  Enter call-back number and #      This pager only accepts text messages through [facility]

## 2017-09-05 NOTE — PROGRESS NOTES
"Surgery Progress note    S:  Tmax 99.5 yesterday. Having urinary retention today, bladder scanned for 1L, will replace hernandes now. Aside from suprapubic discomfort, pain control is adequate. Continues to pass flatus and bowel movements. J-tube feeds running at 50 ml/hr. Good UOP. Tan drainage from old drain site.      O:  /79 (BP Location: Left arm)  Pulse 80  Temp 99.1  F (37.3  C) (Oral)  Resp 20  Ht 1.803 m (5' 10.98\")  Wt 108.1 kg (238 lb 6.4 oz)  SpO2 91%  BMI 33.27 kg/m2  Gen: A&Ox3, mildly uncomfortable appearing  Resp: Breathing non-labored on room air  CV: RRR  Abd: Soft, moderately distended, appropriately ttp, incision c/d/i with 2 skin tears above and below incision. G tube to gravity. Skin of lower abdomen remains mildly erythematous, but cellulitis is improving. Old drain site with cloudy tan output.   Ext: Distal extremities warm, bilateral pedal edema      BMP    Recent Labs  Lab 09/04/17  0652 09/04/17  0003 09/03/17  0726 09/02/17  0543   * 145* 144 144   POTASSIUM 3.4 3.2* 3.4 3.7   CHLORIDE 112* 111* 111* 111*   NICHOLAS 6.7* 7.0* 7.2* 7.6*   CO2 30 29 28 28   BUN 17 18 18 23   CR 0.90 0.88 0.97 1.04   * 111* 112* 120*     CBC    Recent Labs  Lab 09/04/17  0652 09/04/17  0003 09/03/17  0726 09/02/17  0413   WBC 7.0 6.2 5.8 5.2   RBC 2.82* 2.72* 2.79* 2.76*   HGB 7.2* 7.1* 7.4* 7.3*   HCT 23.4* 22.5* 23.4* 23.6*   MCV 83 83 84 86   MCH 25.5* 26.1* 26.5 26.4*   MCHC 30.8* 31.6 31.6 30.9*   RDW 17.7* 17.5* 17.6* 17.9*    272 230 181     A/P: Lucas Brown is a 56 year old male with history of chronic pancreatitis and 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   CV: Continue hemodynamic monitoring, PTA medications   GI/FEN: NPO. G tube to gravity, continue TFs at 50, MIVF TKO   Renal: Replace hernandes for urinary retention. ROSELYN, " resolved, Avoid nephrotoxic medications. Resume home medications for urinary retention  Endo: Monitor blood sugar levels. Home synthroid.  ID: Continue vanco for cellulitis.  Abscess cultures with Staph epidermidis.  Heme: Hgb stable 7.2. Will continue to monitor. Therapeutic lovenox (mechanical aortic valve), warfarin.  Prophylaxis: SCDs. On lovenox    Shanti Sommer MD PGY-6  General Surgery Resident  Pager:[phone]

## 2017-09-06 ENCOUNTER — APPOINTMENT (OUTPATIENT)
Dept: PHYSICAL THERAPY | Facility: CLINIC | Age: 57
DRG: 406 | End: 2017-09-06
Attending: SURGERY
Payer: COMMERCIAL

## 2017-09-06 LAB
ANION GAP SERPL CALCULATED.3IONS-SCNC: 3 MMOL/L (ref 3–14)
ANION GAP SERPL CALCULATED.3IONS-SCNC: 8 MMOL/L (ref 3–14)
BASOPHILS # BLD AUTO: 0 10E9/L (ref 0–0.2)
BASOPHILS # BLD AUTO: 0 10E9/L (ref 0–0.2)
BASOPHILS NFR BLD AUTO: 0.1 %
BASOPHILS NFR BLD AUTO: 0.2 %
BUN SERPL-MCNC: 19 MG/DL (ref 7–30)
BUN SERPL-MCNC: 20 MG/DL (ref 7–30)
CALCIUM SERPL-MCNC: 7.1 MG/DL (ref 8.5–10.1)
CALCIUM SERPL-MCNC: 7.3 MG/DL (ref 8.5–10.1)
CHLORIDE SERPL-SCNC: 107 MMOL/L (ref 94–109)
CHLORIDE SERPL-SCNC: 107 MMOL/L (ref 94–109)
CO2 SERPL-SCNC: 28 MMOL/L (ref 20–32)
CO2 SERPL-SCNC: 32 MMOL/L (ref 20–32)
CREAT SERPL-MCNC: 0.76 MG/DL (ref 0.66–1.25)
CREAT SERPL-MCNC: 0.79 MG/DL (ref 0.66–1.25)
DIFFERENTIAL METHOD BLD: ABNORMAL
DIFFERENTIAL METHOD BLD: ABNORMAL
EOSINOPHIL # BLD AUTO: 0.1 10E9/L (ref 0–0.7)
EOSINOPHIL # BLD AUTO: 0.1 10E9/L (ref 0–0.7)
EOSINOPHIL NFR BLD AUTO: 1 %
EOSINOPHIL NFR BLD AUTO: 1.2 %
ERYTHROCYTE [DISTWIDTH] IN BLOOD BY AUTOMATED COUNT: 17.7 % (ref 10–15)
ERYTHROCYTE [DISTWIDTH] IN BLOOD BY AUTOMATED COUNT: 17.9 % (ref 10–15)
GFR SERPL CREATININE-BSD FRML MDRD: >90 ML/MIN/1.7M2
GFR SERPL CREATININE-BSD FRML MDRD: >90 ML/MIN/1.7M2
GLUCOSE BLDC GLUCOMTR-MCNC: 97 MG/DL (ref 70–99)
GLUCOSE SERPL-MCNC: 110 MG/DL (ref 70–99)
GLUCOSE SERPL-MCNC: 112 MG/DL (ref 70–99)
HCT VFR BLD AUTO: 25.1 % (ref 40–53)
HCT VFR BLD AUTO: 26 % (ref 40–53)
HGB BLD-MCNC: 8 G/DL (ref 13.3–17.7)
HGB BLD-MCNC: 8.2 G/DL (ref 13.3–17.7)
IMM GRANULOCYTES # BLD: 0.2 10E9/L (ref 0–0.4)
IMM GRANULOCYTES # BLD: 0.3 10E9/L (ref 0–0.4)
IMM GRANULOCYTES NFR BLD: 1.8 %
IMM GRANULOCYTES NFR BLD: 2.6 %
INR PPP: 2.61 (ref 0.86–1.14)
LYMPHOCYTES # BLD AUTO: 1.2 10E9/L (ref 0.8–5.3)
LYMPHOCYTES # BLD AUTO: 1.3 10E9/L (ref 0.8–5.3)
LYMPHOCYTES NFR BLD AUTO: 10.8 %
LYMPHOCYTES NFR BLD AUTO: 13.4 %
MAGNESIUM SERPL-MCNC: 2.2 MG/DL (ref 1.6–2.3)
MCH RBC QN AUTO: 26 PG (ref 26.5–33)
MCH RBC QN AUTO: 26.3 PG (ref 26.5–33)
MCHC RBC AUTO-ENTMCNC: 31.5 G/DL (ref 31.5–36.5)
MCHC RBC AUTO-ENTMCNC: 31.9 G/DL (ref 31.5–36.5)
MCV RBC AUTO: 83 FL (ref 78–100)
MCV RBC AUTO: 83 FL (ref 78–100)
MONOCYTES # BLD AUTO: 0.9 10E9/L (ref 0–1.3)
MONOCYTES # BLD AUTO: 1 10E9/L (ref 0–1.3)
MONOCYTES NFR BLD AUTO: 9.3 %
MONOCYTES NFR BLD AUTO: 9.3 %
NEUTROPHILS # BLD AUTO: 7.4 10E9/L (ref 1.6–8.3)
NEUTROPHILS # BLD AUTO: 8.4 10E9/L (ref 1.6–8.3)
NEUTROPHILS NFR BLD AUTO: 74.4 %
NEUTROPHILS NFR BLD AUTO: 75.9 %
NRBC # BLD AUTO: 0 10*3/UL
NRBC # BLD AUTO: 0 10*3/UL
NRBC BLD AUTO-RTO: 0 /100
NRBC BLD AUTO-RTO: 0 /100
PHOSPHATE SERPL-MCNC: 1.8 MG/DL (ref 2.5–4.5)
PLATELET # BLD AUTO: 373 10E9/L (ref 150–450)
PLATELET # BLD AUTO: 449 10E9/L (ref 150–450)
POTASSIUM SERPL-SCNC: 3.3 MMOL/L (ref 3.4–5.3)
POTASSIUM SERPL-SCNC: 3.7 MMOL/L (ref 3.4–5.3)
PREALB SERPL IA-MCNC: 6 MG/DL (ref 15–45)
RBC # BLD AUTO: 3.04 10E12/L (ref 4.4–5.9)
RBC # BLD AUTO: 3.15 10E12/L (ref 4.4–5.9)
SODIUM SERPL-SCNC: 142 MMOL/L (ref 133–144)
SODIUM SERPL-SCNC: 143 MMOL/L (ref 133–144)
WBC # BLD AUTO: 11.1 10E9/L (ref 4–11)
WBC # BLD AUTO: 9.9 10E9/L (ref 4–11)

## 2017-09-06 PROCEDURE — 97110 THERAPEUTIC EXERCISES: CPT | Mod: GP

## 2017-09-06 PROCEDURE — 25000132 ZZH RX MED GY IP 250 OP 250 PS 637: Performed by: SURGERY

## 2017-09-06 PROCEDURE — 40000193 ZZH STATISTIC PT WARD VISIT

## 2017-09-06 PROCEDURE — 84100 ASSAY OF PHOSPHORUS: CPT | Performed by: SURGERY

## 2017-09-06 PROCEDURE — 85025 COMPLETE CBC W/AUTO DIFF WBC: CPT | Performed by: SURGERY

## 2017-09-06 PROCEDURE — 25000125 ZZHC RX 250: Performed by: SURGERY

## 2017-09-06 PROCEDURE — 25000128 H RX IP 250 OP 636

## 2017-09-06 PROCEDURE — 83735 ASSAY OF MAGNESIUM: CPT | Performed by: SURGERY

## 2017-09-06 PROCEDURE — 12000008 ZZH R&B INTERMEDIATE UMMC

## 2017-09-06 PROCEDURE — 97530 THERAPEUTIC ACTIVITIES: CPT | Mod: GP

## 2017-09-06 PROCEDURE — 36592 COLLECT BLOOD FROM PICC: CPT | Performed by: SURGERY

## 2017-09-06 PROCEDURE — 25000131 ZZH RX MED GY IP 250 OP 636 PS 637: Performed by: SURGERY

## 2017-09-06 PROCEDURE — 25000132 ZZH RX MED GY IP 250 OP 250 PS 637: Performed by: STUDENT IN AN ORGANIZED HEALTH CARE EDUCATION/TRAINING PROGRAM

## 2017-09-06 PROCEDURE — 97116 GAIT TRAINING THERAPY: CPT | Mod: GP

## 2017-09-06 PROCEDURE — 27210436 ZZH NUTRITION PRODUCT SEMIELEM INTERMED CAN

## 2017-09-06 PROCEDURE — 85610 PROTHROMBIN TIME: CPT | Performed by: SURGERY

## 2017-09-06 PROCEDURE — 25000128 H RX IP 250 OP 636: Performed by: SURGERY

## 2017-09-06 PROCEDURE — 00000146 ZZHCL STATISTIC GLUCOSE BY METER IP

## 2017-09-06 PROCEDURE — 25000128 H RX IP 250 OP 636: Performed by: STUDENT IN AN ORGANIZED HEALTH CARE EDUCATION/TRAINING PROGRAM

## 2017-09-06 PROCEDURE — 40000802 ZZH SITE CHECK

## 2017-09-06 PROCEDURE — 80048 BASIC METABOLIC PNL TOTAL CA: CPT | Performed by: SURGERY

## 2017-09-06 PROCEDURE — 40000556 ZZH STATISTIC PERIPHERAL IV START W US GUIDANCE

## 2017-09-06 RX ORDER — FUROSEMIDE 10 MG/ML
20 INJECTION INTRAMUSCULAR; INTRAVENOUS EVERY 8 HOURS
Status: COMPLETED | OUTPATIENT
Start: 2017-09-06 | End: 2017-09-07

## 2017-09-06 RX ADMIN — Medication 0.5 MG: at 17:06

## 2017-09-06 RX ADMIN — VANCOMYCIN HYDROCHLORIDE 1750 MG: 10 INJECTION, POWDER, LYOPHILIZED, FOR SOLUTION INTRAVENOUS at 22:00

## 2017-09-06 RX ADMIN — PREDNISONE 5 MG: 5 SOLUTION ORAL at 07:46

## 2017-09-06 RX ADMIN — POTASSIUM & SODIUM PHOSPHATES POWDER PACK 280-160-250 MG 1 PACKET: 280-160-250 PACK at 07:48

## 2017-09-06 RX ADMIN — GABAPENTIN 300 MG: 250 SOLUTION ORAL at 20:20

## 2017-09-06 RX ADMIN — SERTRALINE HYDROCHLORIDE 50 MG: 20 SOLUTION ORAL at 07:46

## 2017-09-06 RX ADMIN — LEVOTHYROXINE SODIUM 150 MCG: 150 TABLET ORAL at 07:48

## 2017-09-06 RX ADMIN — SODIUM BICARBONATE 325 MG: 325 TABLET ORAL at 00:29

## 2017-09-06 RX ADMIN — Medication 20 MG: at 20:21

## 2017-09-06 RX ADMIN — FUROSEMIDE 20 MG: 10 INJECTION, SOLUTION INTRAVENOUS at 16:46

## 2017-09-06 RX ADMIN — POTASSIUM CHLORIDE 20 MEQ: 400 INJECTION, SOLUTION INTRAVENOUS at 02:50

## 2017-09-06 RX ADMIN — FENOFIBRATE 160 MG: 160 TABLET ORAL at 07:47

## 2017-09-06 RX ADMIN — TAMSULOSIN HYDROCHLORIDE 0.4 MG: 0.4 CAPSULE ORAL at 07:47

## 2017-09-06 RX ADMIN — PANCRELIPASE 48000 UNITS: 24000; 76000; 120000 CAPSULE, DELAYED RELEASE PELLETS ORAL at 21:16

## 2017-09-06 RX ADMIN — Medication 15 ML: at 07:46

## 2017-09-06 RX ADMIN — SODIUM BICARBONATE 325 MG: 325 TABLET ORAL at 07:46

## 2017-09-06 RX ADMIN — Medication: at 14:49

## 2017-09-06 RX ADMIN — VANCOMYCIN HYDROCHLORIDE 1750 MG: 10 INJECTION, POWDER, LYOPHILIZED, FOR SOLUTION INTRAVENOUS at 13:05

## 2017-09-06 RX ADMIN — DOXAZOSIN 1 MG: 1 TABLET ORAL at 07:47

## 2017-09-06 RX ADMIN — FUROSEMIDE 20 MG: 10 INJECTION, SOLUTION INTRAVENOUS at 08:10

## 2017-09-06 RX ADMIN — PANCRELIPASE 48000 UNITS: 24000; 76000; 120000 CAPSULE, DELAYED RELEASE PELLETS ORAL at 00:29

## 2017-09-06 RX ADMIN — POTASSIUM CHLORIDE 20 MEQ: 400 INJECTION, SOLUTION INTRAVENOUS at 03:59

## 2017-09-06 RX ADMIN — POTASSIUM PHOSPHATE, MONOBASIC AND POTASSIUM PHOSPHATE, DIBASIC 20 MMOL: 224; 236 INJECTION, SOLUTION INTRAVENOUS at 16:46

## 2017-09-06 RX ADMIN — PRAVASTATIN SODIUM 40 MG: 40 TABLET ORAL at 07:47

## 2017-09-06 RX ADMIN — MENTHOL 1 PATCH: 205.5 PATCH TOPICAL at 20:21

## 2017-09-06 RX ADMIN — GABAPENTIN 300 MG: 250 SOLUTION ORAL at 07:46

## 2017-09-06 RX ADMIN — SODIUM BICARBONATE 325 MG: 325 TABLET ORAL at 04:12

## 2017-09-06 RX ADMIN — SODIUM BICARBONATE 325 MG: 325 TABLET ORAL at 11:54

## 2017-09-06 RX ADMIN — SODIUM BICARBONATE 325 MG: 325 TABLET ORAL at 16:47

## 2017-09-06 RX ADMIN — PANCRELIPASE 24000 UNITS: 24000; 76000; 120000 CAPSULE, DELAYED RELEASE PELLETS ORAL at 03:59

## 2017-09-06 RX ADMIN — Medication: at 05:48

## 2017-09-06 RX ADMIN — PANCRELIPASE 48000 UNITS: 24000; 76000; 120000 CAPSULE, DELAYED RELEASE PELLETS ORAL at 11:54

## 2017-09-06 RX ADMIN — Medication 20 MG: at 07:46

## 2017-09-06 RX ADMIN — POTASSIUM & SODIUM PHOSPHATES POWDER PACK 280-160-250 MG 1 PACKET: 280-160-250 PACK at 20:21

## 2017-09-06 RX ADMIN — PANCRELIPASE 48000 UNITS: 24000; 76000; 120000 CAPSULE, DELAYED RELEASE PELLETS ORAL at 07:46

## 2017-09-06 RX ADMIN — PANCRELIPASE 48000 UNITS: 24000; 76000; 120000 CAPSULE, DELAYED RELEASE PELLETS ORAL at 16:46

## 2017-09-06 RX ADMIN — SODIUM BICARBONATE 325 MG: 325 TABLET ORAL at 21:16

## 2017-09-06 RX ADMIN — POTASSIUM & SODIUM PHOSPHATES POWDER PACK 280-160-250 MG 1 PACKET: 280-160-250 PACK at 14:32

## 2017-09-06 NOTE — PROGRESS NOTES
Care Coordinator- Discharge Planning     Admission Date/Time:  8/28/2017  Attending MD:  Vik Crum,*     Data  Date of initial CC assessment:  9/5/2017  Chart reviewed, discussed with interdisciplinary team.   Patient was admitted for:   1. H/O aortic valve replacement    2. Chronic pancreatitis, unspecified pancreatitis type (H)         Assessment  Full assessment completed in previous note    Coordination of Care and Referrals: Provided patient/family with options for Home Infusion.    Received notice from [facility] that patient will have 100% coverage for supplies and nursing support for IV hydration and enteral nutrition but does NOT have coverage for the tube feeding formula.  Current formula patient is on, Impact Peptide 1.5, is approximately $9/can.  Talked to unit dietician and she will check with the surgical team to see if patient can be changed to Isosource 1.5 prior to discharge which is significantly cheaper, approximately $1/can.  Patient and wife updated with the above information.  RNCC to continue to follow and assist with discharge planning as needed.         Plan  Anticipated Discharge Date:  TBD  Anticipated Discharge Plan:  Discharge to local apartment with wife assist and [facility]HI for enteral/IV hydration/nursing support    Elisabeth Shelton, YESSICA  [phone]

## 2017-09-06 NOTE — PLAN OF CARE
Problem: Goal Outcome Summary  Goal: Goal Outcome Summary  Outcome: No Change  Pt A&O x4. VSS on RA. Afebrile. Pain managed with PCA dilaudid. R side wound packed and site pouched with moderate output. Dressing changed. Transverse incision with staples CDI. TF at 50ml/hr. Denied N/V. Tolerating well. G tube to gravity with moderate output. Durham with adequate UOP. potasium replaced. Recheck  with AM draws.

## 2017-09-06 NOTE — PLAN OF CARE
Problem: Individualization  Goal: Patient Preferences  Outcome: No Change  VSS. O2 sats mid 90's on RA. Neuros remain unchanged. PICC found pulled out this afternoon. PIV in LFA placed. TF @ 50 ml/hr, not advancing to 65 mL/hr. PCA dilaudid @ 0.5 mg q10min. Phos of 1.8, needs replacement. Large output from Durham (675 mL) and G tube (750 mL) this AM. Pt reports decreased pain today. Pt showered today with assist of wife.

## 2017-09-06 NOTE — PROGRESS NOTES
REGIONAL ANESTHESIA PAIN SERVICE CONTINUOUS NERVE INFUSION NOTE  S/P removal of bilateral PV catheters yesterday 9/5/17 at 1230 when INR 2.24.  Neuro checks Q 1 hr x 4, then Q 2 hrs x 20 hrs.      Pt denies radicular back pain, numbness or weakness.  Has been ambulating independently in room and halls without difficulty.      - will sign off.  Thank you.  Please contact us if future questions or concerns    TERRELL Stapleton [facility]  Regional Anesthesia Pain Service  9/6/2017 10:45 AM    24 hour Job Code Pager.  For in-house use only.     [place]:  * * [phone]  [place]: * * [phone]  Peds: * * [phone]  Enter call-back number and #      This pager only accepts text messages through [facility]

## 2017-09-06 NOTE — PLAN OF CARE
Problem: Discharge Planning  Goal: Discharge Planning (Adult, OB, Behavioral, Peds)  09/06/17 8617     Rubi Morales-Registered Nurse (Nursing)  Patient and wife attended PICC/IV medication class. RD correctly on model with all cares including Saline flush, cap change, use of Home Pump for IV medication administration. Received written material related to all content taught. Pt. Observed wife practice using Home Pump. Both state they understand all information but are too tired to continue with G-J tube cares and feeding. Rescheduled G-J tube class for 9/7 @ 11:30am. Wife will attend with patient. Patient states his pain is getting worse and prefers to return to unit 7B. States he may want to review all skills closer to discharge.

## 2017-09-06 NOTE — PROGRESS NOTES
[facility] Surgery Progress Note    S:  No acute events overnight. Pain has improved since yesterday and is adequate. Continues to pass flatus and bowel movements. J-tube feeds running at 50 ml/hr. Good UOP.    O:  Temp:  [97  F (36.1  C)-98.8  F (37.1  C)] (P) 97  F (36.1  C)  Pulse:  [80] (P) 80  Heart Rate:  [73-75] 75  Resp:  [18-20] (P) 20  BP: (133-160)/(67-81) (P) 135/72  SpO2:  [95 %-99 %] 96 %    I/O last 3 completed shifts:  In: 2610 [P.O.:50; I.V.:1370; NG/GT:190]  Out: 4750 [Urine:2400; Emesis/NG output:2350]    Gen: A&Ox3,  Resp: Breathing non-labored on room air  CV: RRR  Abd: Soft, moderately distended, appropriately ttp, incision c/d/i with 2 skin tears above and below incision. G tube to gravity. Skin of lower abdomen remains mildly erythematous, but cellulitis is improving. Old drain site with cloudy tan output.   Ext: Distal extremities warm, bilateral pedal edema    Labs:  Complete Blood Count     Recent Labs  Lab 09/06/17  0712 09/05/17  2344 09/05/17  0731 09/04/17  0652   WBC 11.1* 9.9 10.1 7.0   HGB 8.2* 8.0* 8.2* 7.2*    373 431 314     Basic Metabolic Panel    Recent Labs  Lab 09/06/17  0712 09/05/17  2344 09/05/17  1811 09/05/17  0731 09/04/17  0652    142  --  144 147*   POTASSIUM 3.7 3.3* 3.5 3.0* 3.4   CHLORIDE 107 107  --  108 112*   CO2 28 32  --  31 30   BUN 20 19  --  19 17   CR 0.76 0.79  --  0.84 0.90   * 112*  --  111* 112*     Liver Function Tests    Recent Labs  Lab 09/06/17  0712 09/05/17  0731 09/04/17  1021 08/31/17  0341   AST  --   --   --  41   ALT  --   --   --  25   ALKPHOS  --   --   --  28*   BILITOTAL  --   --   --  0.5   ALBUMIN  --   --   --  2.5*   INR 2.61* 2.24* 1.81*  --      Pancreatic Enzymes    Recent Labs  Lab 08/31/17  0341   LIPASE 1047*   AMYLASE 242*     Coagulation Profile    Recent Labs  Lab 09/06/17  0712 09/05/17  0731 09/04/17  1021   INR 2.61* 2.24* 1.81*          A/P: Lucas Brown is a 56 year old male with history of chronic  pancreatitis and 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   CV: Continue hemodynamic monitoring, PTA medications   GI/FEN: NPO. G tube to gravity, continue TFs at 60, MIVF TKO   Renal: Durham for urinary retention. ROSELYN, resolved, Avoid nephrotoxic medications. Resume home medications for urinary retention. Will restart diuresis today (20 mg lasix q8 hours)  Endo: Monitor blood sugar levels. Home synthroid.  ID: Continue vanco for cellulitis.  Abscess cultures with Staph epidermidis.  Heme: Hgb stable 8.0. Will continue to monitor. Therapeutic lovenox (mechanical aortic valve), warfarin.  Prophylaxis: SCDs. On lovenox    Patient seen and discussed with chief resident, Dr. Sommer and staff, Dr. Tobias Rosa, PGY-1  General Surgery  [phone]

## 2017-09-06 NOTE — PROGRESS NOTES
Afebrile. Slightly hypertensive. Otherwise VSS. Neuro intact. O2 sats in mid 90 on RA. Pain controlled with PCA Dilaudid.Tolerating TF at 50 ml/hr. Per report, not advancing TF to  65 ml/hr. Denies nausea. NPO with ice chips. Meds given through J tube. G tube to gravity with 500 cc output. Transverse abd incision stapled, KIMO. RLQ wound packed, site pouched with serosanguinous drainage. Adequate urine output via hernandes. Phos 1.8 on recheck. Replacement in process. Continue to monitor.

## 2017-09-06 NOTE — PLAN OF CARE
Problem: Goal Outcome Summary  Goal: Goal Outcome Summary  PT / 7B: Pt transfers sit<>stand with SBA. Pt ambulates 250ft x 2 with no AD - slow pace however overall stable on feet. Pt engaged in standing LE HEP and provided handout. Reviewed post-op precautions.  REC - discharge with assist from spouse to local apt.

## 2017-09-07 ENCOUNTER — APPOINTMENT (OUTPATIENT)
Dept: PHYSICAL THERAPY | Facility: CLINIC | Age: 57
DRG: 406 | End: 2017-09-07
Attending: SURGERY
Payer: COMMERCIAL

## 2017-09-07 LAB
ANION GAP SERPL CALCULATED.3IONS-SCNC: 7 MMOL/L (ref 3–14)
BUN SERPL-MCNC: 17 MG/DL (ref 7–30)
CALCIUM SERPL-MCNC: 7.6 MG/DL (ref 8.5–10.1)
CHLORIDE SERPL-SCNC: 103 MMOL/L (ref 94–109)
CO2 SERPL-SCNC: 28 MMOL/L (ref 20–32)
CREAT SERPL-MCNC: 0.8 MG/DL (ref 0.66–1.25)
GFR SERPL CREATININE-BSD FRML MDRD: >90 ML/MIN/1.7M2
GLUCOSE SERPL-MCNC: 105 MG/DL (ref 70–99)
INR PPP: 2.13 (ref 0.86–1.14)
MAGNESIUM SERPL-MCNC: 2.1 MG/DL (ref 1.6–2.3)
PHOSPHATE SERPL-MCNC: 2.7 MG/DL (ref 2.5–4.5)
POTASSIUM SERPL-SCNC: 3.5 MMOL/L (ref 3.4–5.3)
SODIUM SERPL-SCNC: 138 MMOL/L (ref 133–144)
VANCOMYCIN SERPL-MCNC: 19.4 MG/L

## 2017-09-07 PROCEDURE — 25000132 ZZH RX MED GY IP 250 OP 250 PS 637: Performed by: SURGERY

## 2017-09-07 PROCEDURE — 25000128 H RX IP 250 OP 636: Performed by: SURGERY

## 2017-09-07 PROCEDURE — 83735 ASSAY OF MAGNESIUM: CPT | Performed by: SURGERY

## 2017-09-07 PROCEDURE — 80202 ASSAY OF VANCOMYCIN: CPT | Performed by: SURGERY

## 2017-09-07 PROCEDURE — 85610 PROTHROMBIN TIME: CPT | Performed by: SURGERY

## 2017-09-07 PROCEDURE — 93005 ELECTROCARDIOGRAM TRACING: CPT

## 2017-09-07 PROCEDURE — 97116 GAIT TRAINING THERAPY: CPT | Mod: GP

## 2017-09-07 PROCEDURE — 97530 THERAPEUTIC ACTIVITIES: CPT | Mod: GP

## 2017-09-07 PROCEDURE — 25000128 H RX IP 250 OP 636: Performed by: STUDENT IN AN ORGANIZED HEALTH CARE EDUCATION/TRAINING PROGRAM

## 2017-09-07 PROCEDURE — 12000008 ZZH R&B INTERMEDIATE UMMC

## 2017-09-07 PROCEDURE — 40000193 ZZH STATISTIC PT WARD VISIT

## 2017-09-07 PROCEDURE — 80048 BASIC METABOLIC PNL TOTAL CA: CPT | Performed by: SURGERY

## 2017-09-07 PROCEDURE — 25000131 ZZH RX MED GY IP 250 OP 636 PS 637: Performed by: SURGERY

## 2017-09-07 PROCEDURE — 99212 OFFICE O/P EST SF 10 MIN: CPT

## 2017-09-07 PROCEDURE — 36415 COLL VENOUS BLD VENIPUNCTURE: CPT | Performed by: SURGERY

## 2017-09-07 PROCEDURE — 84100 ASSAY OF PHOSPHORUS: CPT | Performed by: SURGERY

## 2017-09-07 PROCEDURE — 93010 ELECTROCARDIOGRAM REPORT: CPT | Performed by: INTERNAL MEDICINE

## 2017-09-07 PROCEDURE — 27210436 ZZH NUTRITION PRODUCT SEMIELEM INTERMED CAN

## 2017-09-07 PROCEDURE — 25000128 H RX IP 250 OP 636

## 2017-09-07 RX ORDER — FUROSEMIDE 10 MG/ML
40 INJECTION INTRAMUSCULAR; INTRAVENOUS EVERY 8 HOURS
Status: COMPLETED | OUTPATIENT
Start: 2017-09-07 | End: 2017-09-08

## 2017-09-07 RX ORDER — FENTANYL 50 UG/1
50 PATCH TRANSDERMAL
Status: DISCONTINUED | OUTPATIENT
Start: 2017-09-07 | End: 2017-09-09

## 2017-09-07 RX ORDER — OXYCODONE HCL 5 MG/5 ML
5-10 SOLUTION, ORAL ORAL
Status: DISCONTINUED | OUTPATIENT
Start: 2017-09-07 | End: 2017-09-07

## 2017-09-07 RX ORDER — WARFARIN SODIUM 1 MG/1
2 TABLET ORAL
Status: COMPLETED | OUTPATIENT
Start: 2017-09-07 | End: 2017-09-07

## 2017-09-07 RX ORDER — METHADONE HYDROCHLORIDE 5 MG/5ML
5 SOLUTION ORAL EVERY 8 HOURS
Status: DISCONTINUED | OUTPATIENT
Start: 2017-09-07 | End: 2017-09-07

## 2017-09-07 RX ORDER — HYDROMORPHONE HYDROCHLORIDE 1 MG/ML
.3-.5 INJECTION, SOLUTION INTRAMUSCULAR; INTRAVENOUS; SUBCUTANEOUS
Status: DISCONTINUED | OUTPATIENT
Start: 2017-09-07 | End: 2017-09-07

## 2017-09-07 RX ADMIN — POTASSIUM & SODIUM PHOSPHATES POWDER PACK 280-160-250 MG 1 PACKET: 280-160-250 PACK at 08:22

## 2017-09-07 RX ADMIN — PANCRELIPASE 48000 UNITS: 24000; 76000; 120000 CAPSULE, DELAYED RELEASE PELLETS ORAL at 09:31

## 2017-09-07 RX ADMIN — Medication 5 MG: at 08:58

## 2017-09-07 RX ADMIN — PANCRELIPASE 2 CAPSULE: 24000; 76000; 120000 CAPSULE, DELAYED RELEASE PELLETS ORAL at 16:20

## 2017-09-07 RX ADMIN — HYDROMORPHONE HYDROCHLORIDE: 10 INJECTION, SOLUTION INTRAMUSCULAR; INTRAVENOUS; SUBCUTANEOUS at 22:10

## 2017-09-07 RX ADMIN — DOXAZOSIN 1 MG: 1 TABLET ORAL at 08:22

## 2017-09-07 RX ADMIN — GABAPENTIN 300 MG: 250 SOLUTION ORAL at 19:39

## 2017-09-07 RX ADMIN — PANCRELIPASE 48000 UNITS: 24000; 76000; 120000 CAPSULE, DELAYED RELEASE PELLETS ORAL at 00:13

## 2017-09-07 RX ADMIN — Medication 20 MG: at 08:21

## 2017-09-07 RX ADMIN — Medication: at 01:34

## 2017-09-07 RX ADMIN — SODIUM BICARBONATE 325 MG: 325 TABLET ORAL at 04:14

## 2017-09-07 RX ADMIN — HYDROMORPHONE HYDROCHLORIDE: 10 INJECTION, SOLUTION INTRAMUSCULAR; INTRAVENOUS; SUBCUTANEOUS at 09:27

## 2017-09-07 RX ADMIN — PANCRELIPASE 2 CAPSULE: 24000; 76000; 120000 CAPSULE, DELAYED RELEASE PELLETS ORAL at 19:38

## 2017-09-07 RX ADMIN — FENTANYL 1 PATCH: 50 PATCH, EXTENDED RELEASE TRANSDERMAL at 19:38

## 2017-09-07 RX ADMIN — HYDROMORPHONE HYDROCHLORIDE: 10 INJECTION, SOLUTION INTRAMUSCULAR; INTRAVENOUS; SUBCUTANEOUS at 14:10

## 2017-09-07 RX ADMIN — Medication 20 MG: at 19:39

## 2017-09-07 RX ADMIN — Medication 5 MG: at 16:24

## 2017-09-07 RX ADMIN — SERTRALINE HYDROCHLORIDE 50 MG: 20 SOLUTION ORAL at 08:22

## 2017-09-07 RX ADMIN — POTASSIUM & SODIUM PHOSPHATES POWDER PACK 280-160-250 MG 1 PACKET: 280-160-250 PACK at 14:10

## 2017-09-07 RX ADMIN — SODIUM BICARBONATE 325 MG: 325 TABLET ORAL at 09:31

## 2017-09-07 RX ADMIN — POTASSIUM & SODIUM PHOSPHATES POWDER PACK 280-160-250 MG 1 PACKET: 280-160-250 PACK at 19:39

## 2017-09-07 RX ADMIN — LEVOTHYROXINE SODIUM 150 MCG: 150 TABLET ORAL at 08:22

## 2017-09-07 RX ADMIN — SODIUM BICARBONATE 325 MG: 325 TABLET ORAL at 19:39

## 2017-09-07 RX ADMIN — FUROSEMIDE 40 MG: 10 INJECTION, SOLUTION INTRAVENOUS at 16:24

## 2017-09-07 RX ADMIN — WARFARIN SODIUM 2 MG: 1 TABLET ORAL at 18:05

## 2017-09-07 RX ADMIN — PRAVASTATIN SODIUM 40 MG: 40 TABLET ORAL at 08:22

## 2017-09-07 RX ADMIN — SODIUM BICARBONATE 325 MG: 325 TABLET ORAL at 00:14

## 2017-09-07 RX ADMIN — FUROSEMIDE 20 MG: 10 INJECTION, SOLUTION INTRAVENOUS at 00:14

## 2017-09-07 RX ADMIN — GABAPENTIN 300 MG: 250 SOLUTION ORAL at 08:21

## 2017-09-07 RX ADMIN — Medication 15 ML: at 08:21

## 2017-09-07 RX ADMIN — TAMSULOSIN HYDROCHLORIDE 0.4 MG: 0.4 CAPSULE ORAL at 08:23

## 2017-09-07 RX ADMIN — SODIUM BICARBONATE 325 MG: 325 TABLET ORAL at 16:21

## 2017-09-07 RX ADMIN — PANCRELIPASE 48000 UNITS: 24000; 76000; 120000 CAPSULE, DELAYED RELEASE PELLETS ORAL at 04:13

## 2017-09-07 RX ADMIN — FENOFIBRATE 160 MG: 160 TABLET ORAL at 08:22

## 2017-09-07 RX ADMIN — FUROSEMIDE 40 MG: 10 INJECTION, SOLUTION INTRAVENOUS at 08:59

## 2017-09-07 RX ADMIN — VANCOMYCIN HYDROCHLORIDE 1750 MG: 10 INJECTION, POWDER, LYOPHILIZED, FOR SOLUTION INTRAVENOUS at 12:11

## 2017-09-07 RX ADMIN — PREDNISONE 5 MG: 5 SOLUTION ORAL at 08:22

## 2017-09-07 NOTE — PHARMACY-VANCOMYCIN DOSING SERVICE
Pharmacy Vancomycin Note  Date of Service 2017  Patient's  1960   56 year old, male,  kg    Indication: Intra-abdominal infection  Goal Trough Level: 15-20 mg/L  Day of Therapy: 6  Current Vancomycin regimen:  1750 mg IV q12h (16 mg/kg)    Current estimated CrCl = Estimated Creatinine Clearance: 128 mL/min (based on Cr of 0.8).    Creatinine for last 3 days  2017:  7:31 AM Creatinine 0.84 mg/dL; 11:44 PM Creatinine 0.79 mg/dL  2017:  7:12 AM Creatinine 0.76 mg/dL  2017:  7:55 AM Creatinine 0.80 mg/dL    Recent Vancomycin Levels (past 3 days)  2017: 10:33 AM Vancomycin Level 19.4 mg/L - 12.5 hr trough    Vancomycin IV Administrations (past 72 hours)                   vancomycin (VANCOCIN) 1,750 mg in NaCl 0.9 % 500 mL intermittent infusion (mg) 1,750 mg New Bag 17 1211     1,750 mg New Bag 17 2200     1,750 mg New Bag  1305     1,750 mg New Bag 17 2245     1,750 mg New Bag  1031     1,750 mg New Bag 17 2207                Nephrotoxins and other renal medications (Future)    Start     Dose/Rate Route Frequency Ordered Stop    17 0815  furosemide (LASIX) injection 40 mg      40 mg Intravenous EVERY 8 HOURS 17 0808 17 0814    17 2130  vancomycin (VANCOCIN) 1,750 mg in NaCl 0.9 % 500 mL intermittent infusion      1,750 mg Intravenous EVERY 12 HOURS 17 0925               Contrast Orders - past 72 hours     None          Interpretation of levels and current regimen:  Trough level is  Therapeutic, though patient appears to be starting to accumulate drug.    Has serum creatinine changed > 50% in last 72 hours: No    Urine output:  good urine output    Renal Function: Stable    Plan:  1.  Decrease Dose to 1500 mg IV q12h (~14 mg/kg)  2.  Pharmacy will check trough levels as appropriate in 1-3 Days.    3. Serum creatinine levels will be ordered a minimum of twice weekly.      Dariusz Peña, HennaD

## 2017-09-07 NOTE — PLAN OF CARE
Alert and oriented. Tmax 99.5. BP elevated. O2 sats in mid 90s on RA. Using PCA Dilaudid bolus 0.7 mg q 10 min and basal rate 0.5 mg/hr for pain control with partial relief. Tolerating TF at 50 cc/hr per J tube. G to gravity with large output. Abdominal incision stabled. Loose BMx2. Lasix IV given. Durham patent draining clear urine in large amounts. Old VIRGIL site pouched with thick serosanguinous drainage. Phos replaced. Recheck tomorrow am. Vanco IV infusing via PIV at slow rate.  Pt. Has learning conference on TF tomorrow at 11.30 am. Continue with poc.

## 2017-09-07 NOTE — PROGRESS NOTES
CLINICAL NUTRITION SERVICES - BRIEF NOTE  *See RD note on 9/5 for last nutrition reassessment details   Nutrition Prescription    Future/Additional Recommendations:  If plan to trial Isosource 1.5 to see if tolerated, would recommend start @ 35 mL/hr and adv by 10 mL q4h as tolerated to goal rate Isosource 1.5 @ goal 65 ml/hr (1560 ml/day) to provide 2340 kcals (26 kcal/kg/day), 106 g PRO (~1.2 g/kg/day), 92 g fat, 1186 ml free H2O, 275 g CHO and 23 g Fiber daily.     -- Would recommend 1 pkt Prosource TID (each pkt contains 40 kcal and 11 g PRO) so TF + Prosource = 2460 kcal (27 kcal/kg) and 139 g PRO (1.5 g/kg)    -- Could keep enzymes/bicarb the same as currently ordered. (2 capsules Creon 24 Q4H (mixed w/ sodium bicarb). With Isosource 1.5 @ 65 mL/hr, this would provide 3130 units lipase/g fat/day which is within recommended dosing range     INTERVENTIONS  Implementation  Collaboration with other providers:   1. Per unit Care Coordinator, pt will not have coverage for TF formula at home and will have to pay out of pocket.  Both Impact Peptide 1.5 and Peptamen 1.5 (both semi-elemental, fiber free formulas) are $9/can.  Isosource 1.5 would be $1/can.  Of note, Isosource 1.5 is a standard polymeric, fiber containing formula which may not be tolerated pt s/p Whipple.      2. Discussed TF formula with primary team, team would be okay with trying Isosource before pt discharges (likely not until early next week) to see if tolerated before discharge.  However, TF rate was decreased yesterday to 50 mL/hr but plan to advance back today to 65 mL/hr (goal) today,  so do not plan to try Isosource today, but will consider in future before discharge    Monitoring/Evaluation  Progress toward goals will be monitored and evaluated per protocol.     Jennie Grigsby, RD, LD   7B RD Pager: [phone]

## 2017-09-07 NOTE — DISCHARGE INSTRUCTIONS
[facility]              Name: Lucas Brown  Date: 9/2017  To order your ostomy supplies    Call:      [facility]. [phone]  Or Call your insurance provider for their preferred supplier    Your Medical Supplier will need your insurance information and surgeon's name, phone and fax number    Clinic:                     Phone                            Fax  General Surgery:   [phone]  Verbal Order X 1 Month per: Heather Lopez RN CWON        Authorizing MD: Dr. ALESHA Crum    Request the following supplies:      [place]   2 piece flat wafer 57mm  # [id number] &  Urine pouch 57mm- # [id number]                    [employer] Gauze Packing (2 x 100 cm) #[id number]          Change your pouch twice a week, more often if leaking.    . Call the Ostomy Nurse at [facility]       [address] [place] [address] : [phone]   Schedule a follow-up visit if having problems pouching.       Problems you should Report  - Red, raw or painful skin around the stoma.  - A pouch that leaks every day.  - Problems making the right size hole in the pouch wafer.    Please call with any questions or concerns.

## 2017-09-07 NOTE — PLAN OF CARE
Problem: Goal Outcome Summary  Goal: Goal Outcome Summary  PT-7B: Performing bed mobility and transfers + MOD I.  Amb safely and independently with no AD; session limited this day secondary to excessive drain leakage.  However pt ambulating multiple times daily, performing LE standing exercises.  All goals met.     Recommend discharge to local lodging with PRN assistance from wife.      Physical Therapy Discharge Summary     Reason for therapy discharge:    All goals and outcomes met, no further needs identified.     Progress towards therapy goal(s). See goals on Care Plan in [facility] electronic health record for goal details.  Goals met     Therapy recommendation(s):    Continue home exercise program. Ambulating 3x daily and performing standing LE exercises 3x daily.

## 2017-09-07 NOTE — PLAN OF CARE
Problem: Goal Outcome Summary  Goal: Goal Outcome Summary  Outcome: No Change  T max 99.1 Other VSS on RA.Pain managed with PCA dilaudid. Pain well managed. R side wound pouched with small serosanguineous drainage.  Transverse incision with staples CDI. TF at 50ml/hr. Denied N/V. Tolerating feeding well.  Pt NPO ex Ice chips. G tube to gravity with moderate output. PT on IV lasix.  Durham with large UOP. MIVF at 10ml/hr via PIV. Pt resting well in between cares.

## 2017-09-07 NOTE — PROGRESS NOTES
[facility] Surgery Progress Note    S:  No acute events overnight. Pain has improved since yesterday and is adequately controlled. Had hiccups overnight and this morning that were causing him pain. Continues to pass flatus and bowel movements. J-tube feeds running at 50 ml/hr. No nausea or vomiting. Durham in place. Good UOP.    O:  Temp:  [97.7  F (36.5  C)-99.9  F (37.7  C)] 98.1  F (36.7  C)  Pulse:  [70-75] 75  Heart Rate:  [76-83] 76  Resp:  [19-20] 19  BP: (134-175)/(60-86) 156/73  SpO2:  [95 %-98 %] 98 %    I/O last 3 completed shifts:  In: 1550 [I.V.:530; NG/GT:120]  Out: 6200 [Urine:4225; Emesis/NG output:1975]    Gen: A&Ox3,  Resp: Breathing non-labored on room air  CV: RRR  Abd: Soft, moderately distended, appropriately ttp, incision c/d/i with 2 skin tears above and below incision. G tube to gravity. Skin of lower abdomen remains mildly erythematous, but cellulitis is improving. Old drain site with dark brown output.   Ext: Distal extremities warm, bilateral pedal edema    Labs:  Complete Blood Count     Recent Labs  Lab 09/06/17  0712 09/05/17  2344 09/05/17  0731 09/04/17  0652   WBC 11.1* 9.9 10.1 7.0   HGB 8.2* 8.0* 8.2* 7.2*    373 431 314     Basic Metabolic Panel    Recent Labs  Lab 09/06/17  0712 09/05/17  2344 09/05/17  1811 09/05/17  0731 09/04/17  0652    142  --  144 147*   POTASSIUM 3.7 3.3* 3.5 3.0* 3.4   CHLORIDE 107 107  --  108 112*   CO2 28 32  --  31 30   BUN 20 19  --  19 17   CR 0.76 0.79  --  0.84 0.90   * 112*  --  111* 112*     Liver Function Tests    Recent Labs  Lab 09/06/17  0712 09/05/17  0731 09/04/17  1021   INR 2.61* 2.24* 1.81*     Pancreatic Enzymes  No lab results found in last 7 days.  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   CV: Continue hemodynamic monitoring, PTA medications   GI/FEN: NPO. G tube to gravity, increase TFs to goal of 65 cc/hr, MIVF TKO   Renal: Durham can be removed today. ROSELYN, resolved, Avoid nephrotoxic medications. Resume home medications for urinary retention. Will restart diuresis today (40 mg lasix q8 hours).  Endo: Monitor blood sugar levels. Home synthroid.   ID: Continue vanco for cellulitis.  Abscess cultures with Staph epidermidis.  Heme: Hgb stable 8.2. Will continue to monitor. Therapeutic lovenox (mechanical aortic valve), warfarin.  Prophylaxis: SCDs. On lovenox     Patient seen and discussed with chief resident, Dr. Sommer.    Mina Rosa, PGY-1  General Surgery  [phone]

## 2017-09-07 NOTE — PLAN OF CARE
Lucas and his wife Karmen came to the [facility] today for GJ tube and feedings with the james pump. Karmen did well with site care, water and medications and use of the james pump for his feedings. They will both greatly benefit from doing cares while inpatient to ensure safety and instill confidence at home.

## 2017-09-07 NOTE — PLAN OF CARE
Problem: Pain, Acute (Adult)  Goal: Acceptable Pain Control/Comfort Level  Patient will demonstrate the desired outcomes by discharge/transition of care.   Outcome: No Change  Vitals:     09/06/17 2130 09/06/17 2349 09/07/17 0342 09/07/17 0715   BP: 153/74 147/60 147/69 156/73   BP Location: Left arm Right arm Left arm Left arm   Cuff Size:           Pulse:           Resp:   20 19 19   Temp:   99.9  F (37.7  C) 99.1  F (37.3  C) 98.1  F (36.7  C)   TempSrc:   Oral Oral Oral   SpO2: 96% 95% 96% 98%   Weight:           Height:           AVSS. Transitioning off pca to pain mediation vis JT. Was on pca 0.5 mg q 10 min through 1400. Reports good pain control. PCA decreased to 0.3 as pt has a range of 0.2- 0.5. Methadone 5mg scheduled q 8 hr. Plan is to dc pca tomorrow. GJ tube site dressing changed x2 due to leaking with activity. Wound pouch was changed by [employer] nurse this am. Transverse incision with staples and KIMO. Tube feeding infusing via JT at 50cc per hr. To PLC at 1100 today for tube feeding teaching with his wife. Good uop with 40mg of iv furosemide. Will keep hernandes for now. No new changes. Continue with current cares.

## 2017-09-08 ENCOUNTER — APPOINTMENT (OUTPATIENT)
Dept: CT IMAGING | Facility: CLINIC | Age: 57
DRG: 406 | End: 2017-09-08
Attending: SURGERY
Payer: COMMERCIAL

## 2017-09-08 LAB
ANION GAP SERPL CALCULATED.3IONS-SCNC: 5 MMOL/L (ref 3–14)
ANION GAP SERPL CALCULATED.3IONS-SCNC: 8 MMOL/L (ref 3–14)
ANISOCYTOSIS BLD QL SMEAR: SLIGHT
BASOPHILS # BLD AUTO: 0 10E9/L (ref 0–0.2)
BASOPHILS NFR BLD AUTO: 0 %
BUN SERPL-MCNC: 19 MG/DL (ref 7–30)
BUN SERPL-MCNC: 20 MG/DL (ref 7–30)
C DIFF TOX B STL QL: NEGATIVE
CALCIUM SERPL-MCNC: 7.5 MG/DL (ref 8.5–10.1)
CALCIUM SERPL-MCNC: 7.6 MG/DL (ref 8.5–10.1)
CHLORIDE SERPL-SCNC: 102 MMOL/L (ref 94–109)
CHLORIDE SERPL-SCNC: 102 MMOL/L (ref 94–109)
CO2 SERPL-SCNC: 29 MMOL/L (ref 20–32)
CO2 SERPL-SCNC: 32 MMOL/L (ref 20–32)
CREAT SERPL-MCNC: 0.91 MG/DL (ref 0.66–1.25)
CREAT SERPL-MCNC: 0.94 MG/DL (ref 0.66–1.25)
DIFFERENTIAL METHOD BLD: ABNORMAL
EOSINOPHIL # BLD AUTO: 0 10E9/L (ref 0–0.7)
EOSINOPHIL NFR BLD AUTO: 0 %
ERYTHROCYTE [DISTWIDTH] IN BLOOD BY AUTOMATED COUNT: 17.4 % (ref 10–15)
GFR SERPL CREATININE-BSD FRML MDRD: 83 ML/MIN/1.7M2
GFR SERPL CREATININE-BSD FRML MDRD: 86 ML/MIN/1.7M2
GLUCOSE SERPL-MCNC: 106 MG/DL (ref 70–99)
GLUCOSE SERPL-MCNC: 121 MG/DL (ref 70–99)
GRAM STN SPEC: ABNORMAL
GRAM STN SPEC: ABNORMAL
HCT VFR BLD AUTO: 24.8 % (ref 40–53)
HGB BLD-MCNC: 7.8 G/DL (ref 13.3–17.7)
INR PPP: 2.46 (ref 0.86–1.14)
INTERPRETATION ECG - MUSE: NORMAL
INTERPRETATION ECG - MUSE: NORMAL
LYMPHOCYTES # BLD AUTO: 0.9 10E9/L (ref 0.8–5.3)
LYMPHOCYTES NFR BLD AUTO: 7 %
MAGNESIUM SERPL-MCNC: 2.1 MG/DL (ref 1.6–2.3)
MCH RBC QN AUTO: 25.7 PG (ref 26.5–33)
MCHC RBC AUTO-ENTMCNC: 31.5 G/DL (ref 31.5–36.5)
MCV RBC AUTO: 82 FL (ref 78–100)
MICROCYTES BLD QL SMEAR: PRESENT
MONOCYTES # BLD AUTO: 0.1 10E9/L (ref 0–1.3)
MONOCYTES NFR BLD AUTO: 0.9 %
NEUTROPHILS # BLD AUTO: 11.6 10E9/L (ref 1.6–8.3)
NEUTROPHILS NFR BLD AUTO: 92.1 %
OVALOCYTES BLD QL SMEAR: SLIGHT
PLATELET # BLD AUTO: 485 10E9/L (ref 150–450)
PLATELET # BLD EST: ABNORMAL 10*3/UL
POIKILOCYTOSIS BLD QL SMEAR: SLIGHT
POLYCHROMASIA BLD QL SMEAR: SLIGHT
POTASSIUM SERPL-SCNC: 3.2 MMOL/L (ref 3.4–5.3)
POTASSIUM SERPL-SCNC: 3.4 MMOL/L (ref 3.4–5.3)
POTASSIUM SERPL-SCNC: 3.5 MMOL/L (ref 3.4–5.3)
RBC # BLD AUTO: 3.04 10E12/L (ref 4.4–5.9)
SODIUM SERPL-SCNC: 139 MMOL/L (ref 133–144)
SODIUM SERPL-SCNC: 140 MMOL/L (ref 133–144)
SPECIMEN SOURCE: ABNORMAL
SPECIMEN SOURCE: NORMAL
WBC # BLD AUTO: 12.6 10E9/L (ref 4–11)

## 2017-09-08 PROCEDURE — 27210436 ZZH NUTRITION PRODUCT SEMIELEM INTERMED CAN

## 2017-09-08 PROCEDURE — 25000128 H RX IP 250 OP 636: Performed by: SURGERY

## 2017-09-08 PROCEDURE — 87070 CULTURE OTHR SPECIMN AEROBIC: CPT | Performed by: STUDENT IN AN ORGANIZED HEALTH CARE EDUCATION/TRAINING PROGRAM

## 2017-09-08 PROCEDURE — 93010 ELECTROCARDIOGRAM REPORT: CPT | Performed by: INTERNAL MEDICINE

## 2017-09-08 PROCEDURE — 25000132 ZZH RX MED GY IP 250 OP 250 PS 637: Performed by: SURGERY

## 2017-09-08 PROCEDURE — S5010 5% DEXTROSE AND 0.45% SALINE: HCPCS | Performed by: SURGERY

## 2017-09-08 PROCEDURE — 87077 CULTURE AEROBIC IDENTIFY: CPT | Performed by: STUDENT IN AN ORGANIZED HEALTH CARE EDUCATION/TRAINING PROGRAM

## 2017-09-08 PROCEDURE — 87493 C DIFF AMPLIFIED PROBE: CPT | Performed by: SURGERY

## 2017-09-08 PROCEDURE — 25000125 ZZHC RX 250: Performed by: SURGERY

## 2017-09-08 PROCEDURE — 25800025 ZZH RX 258: Performed by: SURGERY

## 2017-09-08 PROCEDURE — 74177 CT ABD & PELVIS W/CONTRAST: CPT

## 2017-09-08 PROCEDURE — 80048 BASIC METABOLIC PNL TOTAL CA: CPT | Performed by: SURGERY

## 2017-09-08 PROCEDURE — 25000131 ZZH RX MED GY IP 250 OP 636 PS 637: Performed by: SURGERY

## 2017-09-08 PROCEDURE — 87205 SMEAR GRAM STAIN: CPT | Performed by: STUDENT IN AN ORGANIZED HEALTH CARE EDUCATION/TRAINING PROGRAM

## 2017-09-08 PROCEDURE — 85610 PROTHROMBIN TIME: CPT | Performed by: SURGERY

## 2017-09-08 PROCEDURE — 83735 ASSAY OF MAGNESIUM: CPT | Performed by: SURGERY

## 2017-09-08 PROCEDURE — 36415 COLL VENOUS BLD VENIPUNCTURE: CPT | Performed by: SURGERY

## 2017-09-08 PROCEDURE — 84132 ASSAY OF SERUM POTASSIUM: CPT | Performed by: SURGERY

## 2017-09-08 PROCEDURE — 93005 ELECTROCARDIOGRAM TRACING: CPT

## 2017-09-08 PROCEDURE — 12000008 ZZH R&B INTERMEDIATE UMMC

## 2017-09-08 PROCEDURE — 85025 COMPLETE CBC W/AUTO DIFF WBC: CPT | Performed by: SURGERY

## 2017-09-08 PROCEDURE — 87106 FUNGI IDENTIFICATION YEAST: CPT | Performed by: STUDENT IN AN ORGANIZED HEALTH CARE EDUCATION/TRAINING PROGRAM

## 2017-09-08 PROCEDURE — 40000556 ZZH STATISTIC PERIPHERAL IV START W US GUIDANCE

## 2017-09-08 RX ORDER — HYDROMORPHONE HYDROCHLORIDE 1 MG/ML
.3-.5 INJECTION, SOLUTION INTRAMUSCULAR; INTRAVENOUS; SUBCUTANEOUS
Status: DISCONTINUED | OUTPATIENT
Start: 2017-09-08 | End: 2017-09-13 | Stop reason: HOSPADM

## 2017-09-08 RX ORDER — OXYCODONE HCL 5 MG/5 ML
5-10 SOLUTION, ORAL ORAL
Status: DISCONTINUED | OUTPATIENT
Start: 2017-09-08 | End: 2017-09-13 | Stop reason: HOSPADM

## 2017-09-08 RX ORDER — WARFARIN SODIUM 1 MG/1
1 TABLET ORAL
Status: COMPLETED | OUTPATIENT
Start: 2017-09-08 | End: 2017-09-08

## 2017-09-08 RX ORDER — IOPAMIDOL 755 MG/ML
135 INJECTION, SOLUTION INTRAVASCULAR ONCE
Status: COMPLETED | OUTPATIENT
Start: 2017-09-08 | End: 2017-09-08

## 2017-09-08 RX ADMIN — PANCRELIPASE 48000 UNITS: 24000; 76000; 120000 CAPSULE, DELAYED RELEASE PELLETS ORAL at 22:28

## 2017-09-08 RX ADMIN — POTASSIUM & SODIUM PHOSPHATES POWDER PACK 280-160-250 MG 1 PACKET: 280-160-250 PACK at 20:06

## 2017-09-08 RX ADMIN — HYDROMORPHONE HYDROCHLORIDE 0.5 MG: 1 INJECTION, SOLUTION INTRAMUSCULAR; INTRAVENOUS; SUBCUTANEOUS at 22:27

## 2017-09-08 RX ADMIN — PANCRELIPASE 48000 UNITS: 24000; 76000; 120000 CAPSULE, DELAYED RELEASE PELLETS ORAL at 16:36

## 2017-09-08 RX ADMIN — FUROSEMIDE 40 MG: 10 INJECTION, SOLUTION INTRAVENOUS at 01:31

## 2017-09-08 RX ADMIN — OXYCODONE HYDROCHLORIDE 10 MG: 5 SOLUTION ORAL at 17:33

## 2017-09-08 RX ADMIN — VANCOMYCIN HYDROCHLORIDE 1500 MG: 10 INJECTION, POWDER, LYOPHILIZED, FOR SOLUTION INTRAVENOUS at 01:37

## 2017-09-08 RX ADMIN — SODIUM BICARBONATE 325 MG: 325 TABLET ORAL at 00:23

## 2017-09-08 RX ADMIN — SODIUM CHLORIDE, PRESERVATIVE FREE 85 ML: 5 INJECTION INTRAVENOUS at 07:30

## 2017-09-08 RX ADMIN — DOXAZOSIN 1 MG: 1 TABLET ORAL at 08:29

## 2017-09-08 RX ADMIN — POTASSIUM & SODIUM PHOSPHATES POWDER PACK 280-160-250 MG 1 PACKET: 280-160-250 PACK at 08:28

## 2017-09-08 RX ADMIN — DEXTROSE AND SODIUM CHLORIDE: 5; 450 INJECTION, SOLUTION INTRAVENOUS at 05:12

## 2017-09-08 RX ADMIN — OXYCODONE HYDROCHLORIDE 10 MG: 5 SOLUTION ORAL at 12:26

## 2017-09-08 RX ADMIN — IOPAMIDOL 135 ML: 755 INJECTION, SOLUTION INTRAVENOUS at 07:30

## 2017-09-08 RX ADMIN — HYDROMORPHONE HYDROCHLORIDE 0.5 MG: 1 INJECTION, SOLUTION INTRAMUSCULAR; INTRAVENOUS; SUBCUTANEOUS at 18:56

## 2017-09-08 RX ADMIN — ONDANSETRON 4 MG: 2 INJECTION INTRAMUSCULAR; INTRAVENOUS at 00:04

## 2017-09-08 RX ADMIN — SODIUM BICARBONATE 325 MG: 325 TABLET ORAL at 16:37

## 2017-09-08 RX ADMIN — PRAVASTATIN SODIUM 40 MG: 40 TABLET ORAL at 08:38

## 2017-09-08 RX ADMIN — WARFARIN SODIUM 1 MG: 1 TABLET ORAL at 17:33

## 2017-09-08 RX ADMIN — POTASSIUM & SODIUM PHOSPHATES POWDER PACK 280-160-250 MG 1 PACKET: 280-160-250 PACK at 16:36

## 2017-09-08 RX ADMIN — SODIUM BICARBONATE 325 MG: 325 TABLET ORAL at 22:28

## 2017-09-08 RX ADMIN — SERTRALINE HYDROCHLORIDE 50 MG: 20 SOLUTION ORAL at 08:29

## 2017-09-08 RX ADMIN — PANCRELIPASE 48000 UNITS: 24000; 76000; 120000 CAPSULE, DELAYED RELEASE PELLETS ORAL at 08:28

## 2017-09-08 RX ADMIN — SODIUM BICARBONATE 325 MG: 325 TABLET ORAL at 11:54

## 2017-09-08 RX ADMIN — FENOFIBRATE 160 MG: 160 TABLET ORAL at 08:29

## 2017-09-08 RX ADMIN — GABAPENTIN 300 MG: 250 SOLUTION ORAL at 08:29

## 2017-09-08 RX ADMIN — TAMSULOSIN HYDROCHLORIDE 0.4 MG: 0.4 CAPSULE ORAL at 08:28

## 2017-09-08 RX ADMIN — POTASSIUM CHLORIDE 20 MEQ: 1.5 POWDER, FOR SOLUTION ORAL at 05:36

## 2017-09-08 RX ADMIN — POTASSIUM CHLORIDE 40 MEQ: 1.5 POWDER, FOR SOLUTION ORAL at 01:58

## 2017-09-08 RX ADMIN — PANCRELIPASE 48000 UNITS: 24000; 76000; 120000 CAPSULE, DELAYED RELEASE PELLETS ORAL at 11:53

## 2017-09-08 RX ADMIN — GABAPENTIN 300 MG: 250 SOLUTION ORAL at 20:06

## 2017-09-08 RX ADMIN — HYDROMORPHONE HYDROCHLORIDE 0.5 MG: 1 INJECTION, SOLUTION INTRAMUSCULAR; INTRAVENOUS; SUBCUTANEOUS at 12:02

## 2017-09-08 RX ADMIN — SODIUM BICARBONATE 325 MG: 325 TABLET ORAL at 08:29

## 2017-09-08 RX ADMIN — Medication 20 MG: at 08:28

## 2017-09-08 RX ADMIN — LEVOTHYROXINE SODIUM 150 MCG: 150 TABLET ORAL at 08:28

## 2017-09-08 RX ADMIN — PANCRELIPASE 48000 UNITS: 24000; 76000; 120000 CAPSULE, DELAYED RELEASE PELLETS ORAL at 00:23

## 2017-09-08 RX ADMIN — PREDNISONE 5 MG: 5 SOLUTION ORAL at 08:28

## 2017-09-08 RX ADMIN — PANCRELIPASE 48000 UNITS: 24000; 76000; 120000 CAPSULE, DELAYED RELEASE PELLETS ORAL at 05:03

## 2017-09-08 RX ADMIN — SODIUM BICARBONATE 325 MG: 325 TABLET ORAL at 05:04

## 2017-09-08 RX ADMIN — Medication 20 MG: at 20:07

## 2017-09-08 RX ADMIN — VANCOMYCIN HYDROCHLORIDE 1500 MG: 10 INJECTION, POWDER, LYOPHILIZED, FOR SOLUTION INTRAVENOUS at 12:26

## 2017-09-08 RX ADMIN — HYDROMORPHONE HYDROCHLORIDE 0.5 MG: 1 INJECTION, SOLUTION INTRAMUSCULAR; INTRAVENOUS; SUBCUTANEOUS at 16:36

## 2017-09-08 RX ADMIN — Medication 15 ML: at 08:28

## 2017-09-08 ASSESSMENT — PAIN DESCRIPTION - DESCRIPTORS: DESCRIPTORS: ACHING

## 2017-09-08 NOTE — PLAN OF CARE
"Problem: Goal Outcome Summary  Goal: Goal Outcome Summary  Outcome: No Change  /61 (BP Location: Left arm)  Pulse 75  Temp 98.8  F (37.1  C) (Oral)  Resp 18  Ht 1.803 m (5' 10.98\")  Wt 102.1 kg (225 lb 1.6 oz)  SpO2 97%  BMI 31.41 kg/m2  AVSS.  Patient states pain is well controlled with PCA dilaudid 0.3 mg q 10 min.  D/c methadone, applied fentanyl patch 50 mcg to left shoulder.  GJ tube site dressing changed once during shift.  TF at 50 cc/hr via JT.  G tube to gravity drain with minimal output.  Durham intact with adequate UOP, IV lasix 40 mg given during shift.  Wound pouch with moderate serosanguinous drainage out.  Transverse abd incision with stable, draining moderate to large serosanguinous drainage.  MD notified about the abd incision drainage.  MD came and assessed the pt, no new order at this time. Will continue to monitor.                    "

## 2017-09-08 NOTE — PLAN OF CARE
Problem: Goal Outcome Summary  Goal: Goal Outcome Summary  Outcome: No Change  Vitals:     09/07/17 2239 09/08/17 0005 09/08/17 0006 09/08/17 0331   BP: 126/56 119/58   107/59   BP Location: Left arm Left arm   Right arm   Cuff Size:           Pulse:   72       Resp: 16 16   20   Temp: 97  F (36.1  C) 97.9  F (36.6  C)   97.9  F (36.6  C)   TempSrc: Oral Oral   Oral   SpO2: 99% 92% 97% 98%   Weight:           Height:             Patient lethargic, alert to self, time, place, and situation at beginning of shift, currently alert and oriented x4. Afebrile. VSS. O2 sats 92% on RA at beginning of shift, applied 1 L/min NC, O2 sats 97%, then O2 sats decreased to 88%, applied 2 L/min NC, O2 sats remained high 90s, patient has history of sleep apnea. Lung sounds diminished in bases. Right incision oozing copious green drainage at 0330, notified Dr. Garcia and resident who saw patient, ordered CT scan this AM with oral contrast. Dressing changed x2. Patient tolerated oral contrast, no nausea. Had moderate clear emesis at beginning of shift, denied further nausea, administered PRN Zofran x1. Right drain has moderate dark green drainage, earlier was dark serosanguinous. G-tube to gravity with 100cc clear yellowish thick drainage. TF running with Creon enzymes at 50 mL/hr. Lasix 40mg administered per orders, med completed. Durham patent with large terry/yellow urine output. PCA Dilaudid at 0.3mg with 10 minute lockout, denies pain, did not press PCA button, slept in between cares. Continue POC.

## 2017-09-08 NOTE — PROGRESS NOTES
"Surgery Cross-Cover Note  9/7/2017 11:53 PM     Was paged regarding brownish discharge from a small portion on the latera, incision.  The patient is asymptomatic     /56 (BP Location: Left arm)  Pulse 75  Temp 97  F (36.1  C) (Oral)  Resp 16  Ht 1.803 m (5' 10.98\")  Wt 102.1 kg (225 lb 1.6 oz)  SpO2 99%  BMI 31.41 kg/m2      Exam:  Abd: distended, non tendersmall amount of brownish discharge expressed from the lateral incision, it was similar to output from the previous VIRGIL site  No fluctuating mass near the lateral incision, his cellulitis has improved from before, no other complain    Discussed with Dr Levi (heidy resident on call)    Assessment/Plan:  Discharge from wound likely due to wound infection, unlikely to be fascial dehiscence due to small amount of fluid being expressed  - No need of US  - To continue with IV antibiotics as per the primary team  - Morning team to decide regarding further management  - Please inform if the discharge increases in amount    Jose Howell MD  PGY-1 General Surgery  Pager # [phone]    =============================================================================================    Surgery Cross-Cover Note  9/8/2017 3:12 AM     Was paged regarding increase in the amount of fluid from the wound and the color of the fluid changed to green in the previous 30-45 mins  The patient doesn't complain of pain or any other symptom     /58 (BP Location: Left arm)  Pulse 72  Temp 97.9  F (36.6  C) (Oral)  Resp 16  Ht 1.803 m (5' 10.98\")  Wt 102.1 kg (225 lb 1.6 oz)  SpO2 97%  BMI 31.41 kg/m2    Patient seen with Dr Levi (heidy resident on call)    Exam:  Abd: soft, distended as per the body habitus  Green coloured fluid soaking the dressing on the wound and soiling the bed and gown and his blanket  We were able to express more fluid from the incision, the characteristic of the fluid was thinner than before and it was coming out in large amounts with the colour " ranging from green to brown  The ostomy bag at the previous drain site was showing brown fluid with greenish tinge    Dr Levi spoke to Dr Collado    Assessment/Plan:  Discharge from wound site probably because of underlying abscess vs enterocutaneous fistula. The patient is hemodynamically stable at the moment  - for CT scan with oral contrast  - Dress the leaking site  - Continue with current management and IV antibiotics    Jose Howell MD  PGY-1 General Surgery  Pager # [phone]

## 2017-09-08 NOTE — PLAN OF CARE
Problem: Goal Outcome Summary  Goal: Goal Outcome Summary  OT:  Patient declined any activity, can not keep eyes open while talking with him.  Declined any therapy despite encouragement from OT/nursing.

## 2017-09-08 NOTE — PROGRESS NOTES
"Surgery Progress note    S:  Had increased abdominal discomfort and drainage from the right lateral side of the incision.  Pt seen at bedside resting comfortably.  Does complain of abdominal pain. No n/v. Tolerating TFs    O:  /59 (BP Location: Right arm)  Pulse 72  Temp 97.9  F (36.6  C) (Oral)  Resp 20  Ht 1.803 m (5' 10.98\")  Wt 102.1 kg (225 lb 1.6 oz)  SpO2 98%  BMI 31.41 kg/m2  A&Ox3, NAD  Breathing non-labored  RRR  Soft, mildly distended, ttp over the right side of incision. No skin erythema. Lateral side of incision drainage green thick output. Several staples removed and fluid collection drained. GJ intact. Previous drain site with dark drainage.   Distal extremities warm.         Intake/Output Summary (Last 24 hours) at 09/08/17 0726  Last data filed at 09/08/17 0722   Gross per 24 hour   Intake             1250 ml   Output             5600 ml   Net            -4350 ml         BMP  Recent Labs  Lab 09/08/17  0533 09/08/17  0030 09/07/17  0755 09/06/17  0712    140 138 143   POTASSIUM 3.4 3.2* 3.5 3.7   CHLORIDE 102 102 103 107   NICHOLAS 7.6* 7.5* 7.6* 7.3*   CO2 32 29 28 28   BUN 20 19 17 20   CR 0.94 0.91 0.80 0.76   * 106* 105* 110*     CBC  Recent Labs  Lab 09/08/17  0030 09/06/17  0712 09/05/17  2344 09/05/17  0731   WBC 12.6* 11.1* 9.9 10.1   RBC 3.04* 3.15* 3.04* 3.05*   HGB 7.8* 8.2* 8.0* 8.2*   HCT 24.8* 26.0* 25.1* 25.4*   MCV 82 83 83 83   MCH 25.7* 26.0* 26.3* 26.9   MCHC 31.5 31.5 31.9 32.3   RDW 17.4* 17.9* 17.7* 17.6*   * 449 373 431     INR  Recent Labs  Lab 09/08/17  0530 09/07/17  0755 09/06/17  0712 09/05/17  0731   INR 2.46* 2.13* 2.61* 2.24*      Liver Function Studies -   Recent Labs   Lab Test  08/31/17   0341   PROTTOTAL  5.4*   ALBUMIN  2.5*   BILITOTAL  0.5   ALKPHOS  28*   AST  41   ALT  25          A/P: Lucas Brown is a 56 year old male with history of chronic pancreatitis and 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   CV: Continue hemodynamic monitoring, PTA medications   GI/FEN: NPO. G tube to gravity, increase TFs to goal of 65 cc/hr, MIVF TKO   Renal: Durham can be removed today. ROSELYN, resolved, Avoid nephrotoxic medications. Resume home medications for urinary retention. Received lasix (40 mg lasix q8 hours for three doses with good response). PTA flomax   Endo: Monitor blood sugar levels. Home synthroid.   ID: On vanco for cellulitis.  Abscess cultures with Staph epidermidis. Wound cultures sent for culture. Will reassess abx coverage after culture comes back  Heme: Hgb stable 7.8. Will continue to monitor. (mechanical aortic valve), warfarin INR 2.46.  Prophylaxis: SCDs. Warfarin, PPI      Patient seen and discussed with chief resident, Dr. Kemal Molina MD  General Surgery, PGY-2  [phone]     (0) understands/communicates without difficulty

## 2017-09-08 NOTE — PLAN OF CARE
Problem: Goal Outcome Summary  Goal: Goal Outcome Summary  Outcome: No Change  A&O X4, VSS. CT in a.m due to copious amounts of drainage.  Dilaudid PCA dc'ed. Pain controlled with oxycodone via J-tube and IV dilaudid for break through pain.  Urinary catheter dc'ed.  Voiding spontaneously, voided X4 since catheter pulled.  PVR of 188 and 155.  Abdomen has 2 current incisions.  RLQ has old VIRGIL site that is packed with Mesalt gauze and covered by abd.  Mid abdomen has horizontal incision that is secured by staples and has open area on R side.  Packed with Mesalt gauze and covered with ostomy pouch to collect drainage.  Copious amounts of dark green drainage from site.  Saturated surrounding abd 4 times, increased drainage when repositioning.  Surrounding skin is irritated and erythematous.  MD notifed of continued drainage.  PEG tube is patent.  TF and meds to J-tube.  TF increased to goal of 65 cc/hr.  G tube to gravity drainage with thick tan/clear drainage.  Dressing around PEG was changed once due to increased drainage.  X4 loose BM, C-diff sample pending.

## 2017-09-09 ENCOUNTER — APPOINTMENT (OUTPATIENT)
Dept: OCCUPATIONAL THERAPY | Facility: CLINIC | Age: 57
DRG: 406 | End: 2017-09-09
Attending: SURGERY
Payer: COMMERCIAL

## 2017-09-09 LAB
ANION GAP SERPL CALCULATED.3IONS-SCNC: 6 MMOL/L (ref 3–14)
BUN SERPL-MCNC: 20 MG/DL (ref 7–30)
CALCIUM SERPL-MCNC: 7.7 MG/DL (ref 8.5–10.1)
CHLORIDE SERPL-SCNC: 105 MMOL/L (ref 94–109)
CO2 SERPL-SCNC: 29 MMOL/L (ref 20–32)
CREAT SERPL-MCNC: 0.95 MG/DL (ref 0.66–1.25)
GFR SERPL CREATININE-BSD FRML MDRD: 82 ML/MIN/1.7M2
GLUCOSE SERPL-MCNC: 105 MG/DL (ref 70–99)
INR PPP: 3.45 (ref 0.86–1.14)
MAGNESIUM SERPL-MCNC: 2.2 MG/DL (ref 1.6–2.3)
POTASSIUM SERPL-SCNC: 3.7 MMOL/L (ref 3.4–5.3)
SODIUM SERPL-SCNC: 140 MMOL/L (ref 133–144)
VANCOMYCIN SERPL-MCNC: 49 MG/L

## 2017-09-09 PROCEDURE — 85610 PROTHROMBIN TIME: CPT | Performed by: SURGERY

## 2017-09-09 PROCEDURE — 25000132 ZZH RX MED GY IP 250 OP 250 PS 637: Performed by: SURGERY

## 2017-09-09 PROCEDURE — 36415 COLL VENOUS BLD VENIPUNCTURE: CPT | Performed by: SURGERY

## 2017-09-09 PROCEDURE — 25000128 H RX IP 250 OP 636: Performed by: SURGERY

## 2017-09-09 PROCEDURE — 97535 SELF CARE MNGMENT TRAINING: CPT | Mod: GO

## 2017-09-09 PROCEDURE — 40000133 ZZH STATISTIC OT WARD VISIT

## 2017-09-09 PROCEDURE — 25000131 ZZH RX MED GY IP 250 OP 636 PS 637: Performed by: SURGERY

## 2017-09-09 PROCEDURE — 80048 BASIC METABOLIC PNL TOTAL CA: CPT | Performed by: SURGERY

## 2017-09-09 PROCEDURE — 83735 ASSAY OF MAGNESIUM: CPT | Performed by: SURGERY

## 2017-09-09 PROCEDURE — 12000008 ZZH R&B INTERMEDIATE UMMC

## 2017-09-09 PROCEDURE — 27210436 ZZH NUTRITION PRODUCT SEMIELEM INTERMED CAN

## 2017-09-09 PROCEDURE — 80202 ASSAY OF VANCOMYCIN: CPT | Performed by: SURGERY

## 2017-09-09 RX ORDER — FUROSEMIDE 10 MG/ML
40 INJECTION INTRAMUSCULAR; INTRAVENOUS
Status: COMPLETED | OUTPATIENT
Start: 2017-09-09 | End: 2017-09-10

## 2017-09-09 RX ORDER — FENTANYL 100 UG/1
100 PATCH TRANSDERMAL
Status: DISCONTINUED | OUTPATIENT
Start: 2017-09-09 | End: 2017-09-13 | Stop reason: HOSPADM

## 2017-09-09 RX ADMIN — SODIUM BICARBONATE 325 MG: 325 TABLET ORAL at 17:21

## 2017-09-09 RX ADMIN — PREDNISONE 5 MG: 5 SOLUTION ORAL at 08:10

## 2017-09-09 RX ADMIN — GABAPENTIN 300 MG: 250 SOLUTION ORAL at 19:17

## 2017-09-09 RX ADMIN — TAMSULOSIN HYDROCHLORIDE 0.4 MG: 0.4 CAPSULE ORAL at 08:11

## 2017-09-09 RX ADMIN — FUROSEMIDE 40 MG: 10 INJECTION, SOLUTION INTRAVENOUS at 19:15

## 2017-09-09 RX ADMIN — OXYCODONE HYDROCHLORIDE 10 MG: 5 SOLUTION ORAL at 03:46

## 2017-09-09 RX ADMIN — GABAPENTIN 300 MG: 250 SOLUTION ORAL at 08:09

## 2017-09-09 RX ADMIN — PANCRELIPASE 48000 UNITS: 24000; 76000; 120000 CAPSULE, DELAYED RELEASE PELLETS ORAL at 10:28

## 2017-09-09 RX ADMIN — HYDROMORPHONE HYDROCHLORIDE 0.5 MG: 1 INJECTION, SOLUTION INTRAMUSCULAR; INTRAVENOUS; SUBCUTANEOUS at 07:52

## 2017-09-09 RX ADMIN — HYDROMORPHONE HYDROCHLORIDE 0.5 MG: 1 INJECTION, SOLUTION INTRAMUSCULAR; INTRAVENOUS; SUBCUTANEOUS at 17:20

## 2017-09-09 RX ADMIN — Medication 15 ML: at 08:10

## 2017-09-09 RX ADMIN — FENOFIBRATE 160 MG: 160 TABLET ORAL at 08:10

## 2017-09-09 RX ADMIN — FENTANYL 1 PATCH: 100 PATCH, EXTENDED RELEASE TRANSDERMAL at 19:16

## 2017-09-09 RX ADMIN — POTASSIUM & SODIUM PHOSPHATES POWDER PACK 280-160-250 MG 1 PACKET: 280-160-250 PACK at 08:11

## 2017-09-09 RX ADMIN — SODIUM BICARBONATE 325 MG: 325 TABLET ORAL at 10:28

## 2017-09-09 RX ADMIN — OXYCODONE HYDROCHLORIDE 10 MG: 5 SOLUTION ORAL at 10:10

## 2017-09-09 RX ADMIN — OXYCODONE HYDROCHLORIDE 10 MG: 5 SOLUTION ORAL at 00:56

## 2017-09-09 RX ADMIN — Medication 0.5 MG: at 17:20

## 2017-09-09 RX ADMIN — SODIUM BICARBONATE 325 MG: 325 TABLET ORAL at 21:28

## 2017-09-09 RX ADMIN — PRAVASTATIN SODIUM 40 MG: 40 TABLET ORAL at 08:11

## 2017-09-09 RX ADMIN — VANCOMYCIN HYDROCHLORIDE 1500 MG: 10 INJECTION, POWDER, LYOPHILIZED, FOR SOLUTION INTRAVENOUS at 13:04

## 2017-09-09 RX ADMIN — PANCRELIPASE 48000 UNITS: 24000; 76000; 120000 CAPSULE, DELAYED RELEASE PELLETS ORAL at 06:15

## 2017-09-09 RX ADMIN — PANCRELIPASE 48000 UNITS: 24000; 76000; 120000 CAPSULE, DELAYED RELEASE PELLETS ORAL at 02:37

## 2017-09-09 RX ADMIN — DOXAZOSIN 1 MG: 1 TABLET ORAL at 08:10

## 2017-09-09 RX ADMIN — Medication 20 MG: at 19:17

## 2017-09-09 RX ADMIN — PANCRELIPASE 48000 UNITS: 24000; 76000; 120000 CAPSULE, DELAYED RELEASE PELLETS ORAL at 17:20

## 2017-09-09 RX ADMIN — SERTRALINE HYDROCHLORIDE 50 MG: 20 SOLUTION ORAL at 08:09

## 2017-09-09 RX ADMIN — OXYCODONE HYDROCHLORIDE 10 MG: 5 SOLUTION ORAL at 14:22

## 2017-09-09 RX ADMIN — ONDANSETRON 4 MG: 2 INJECTION INTRAMUSCULAR; INTRAVENOUS at 15:56

## 2017-09-09 RX ADMIN — VANCOMYCIN HYDROCHLORIDE 1500 MG: 10 INJECTION, POWDER, LYOPHILIZED, FOR SOLUTION INTRAVENOUS at 00:46

## 2017-09-09 RX ADMIN — LEVOTHYROXINE SODIUM 150 MCG: 150 TABLET ORAL at 08:10

## 2017-09-09 RX ADMIN — PANCRELIPASE 48000 UNITS: 24000; 76000; 120000 CAPSULE, DELAYED RELEASE PELLETS ORAL at 21:28

## 2017-09-09 RX ADMIN — Medication 20 MG: at 08:10

## 2017-09-09 RX ADMIN — HYDROMORPHONE HYDROCHLORIDE 0.5 MG: 1 INJECTION, SOLUTION INTRAMUSCULAR; INTRAVENOUS; SUBCUTANEOUS at 20:54

## 2017-09-09 RX ADMIN — POTASSIUM & SODIUM PHOSPHATES POWDER PACK 280-160-250 MG 1 PACKET: 280-160-250 PACK at 17:20

## 2017-09-09 RX ADMIN — SODIUM BICARBONATE 325 MG: 325 TABLET ORAL at 02:37

## 2017-09-09 RX ADMIN — SODIUM BICARBONATE 325 MG: 325 TABLET ORAL at 06:14

## 2017-09-09 ASSESSMENT — PAIN DESCRIPTION - DESCRIPTORS: DESCRIPTORS: ACHING;SORE

## 2017-09-09 NOTE — PROGRESS NOTES
"Surgery Cross-Cover Note  9/8/2017 10:36 PM     Was paged regarding output in the ostomy bag over the wound. The primary team had been notified about it  The patient is asymptomatic and doing better than yesterday     /54 (BP Location: Right arm)  Pulse 62  Temp 97.5  F (36.4  C) (Oral)  Resp 18  Ht 1.803 m (5' 10.98\")  Wt 99.9 kg (220 lb 3.8 oz)  SpO2 98%  BMI 30.73 kg/m2    Exam:  Alert, comfortable  Abd: soft, distended as per baseline, packed right lateral incision, gauze is not entirely soaked, the ostomy bag has a small amount of fluid  His clothes and blanket are not soiled     Assessment/Plan:  Mild drainage from the wound infection, not continuous copious amount  To continue to observe and please inform if the drainage is in continuous and great quantity    Jose Howell MD  PGY-1 General Surgery  Pager # [phone]    "

## 2017-09-09 NOTE — PLAN OF CARE
Problem: Goal Outcome Summary  Goal: Goal Outcome Summary  OT 7B: Facilitated engagement in self-cares this session to promote increased activity tolerance within precautions. ECWS education provided for fatigue management during ADL performance, handout provided to increase carryover, would benefit from further education/reinforcement of ECWS during activity as pt is motivated to return to PLOF as able. Pt is Mod IND with toileting/toilet transfer (use of grab bar for balance), required Mod A to complete full body sponge bath in standing d/t precautions/fatigue, Min A with LB dressing d/t precautions, and Min A for line/drain management during UB dressing. Pt shows increased tolerance for activity this session, O2 sats dropped briefly to high 80's during activity on RA, able to recover to mid 90's within 10 secs through self-initiation of PLB technique and return to seated position.      Per plan established by the OT, the recommendation for dc location is to home to local apartment with wife assist when medically stable. Pt continues to be limited by decreased strength/activity tolerance, post-surgical precautions, and pain impacting pt's independence in ADLs/IADLs.

## 2017-09-09 NOTE — PLAN OF CARE
Problem: Goal Outcome Summary  Goal: Goal Outcome Summary     POD # 11 s/p Whipple pancreaticoduodenectomy. A&Ox4. VSS on 1L O2 NC. Pt moves with SBA/assist of 1. Pt voiding on own. Pt with R abdominal drain, L side GJ, G to gravity and J Tube feeds. Critically high vancomycin level, med d/c'ed at this time. Pt received prn IV dilaudid this morning for break through pain and then oxycodone solution in J tube q3hrs. Continue to monitor and with POC.

## 2017-09-09 NOTE — PROGRESS NOTES
[facility] Surgery Progress Note    S:  No acute events overnight. Continues to complain of abdominal pain, slightly improved from yesterday.  No nausea/emesis. Passing flatus and having BMs. Tolerating TFs at goal. Voiding without issue.    O:  Temp:  [96  F (35.6  C)-98.1  F (36.7  C)] 96  F (35.6  C)  Pulse:  [62-84] 64  Heart Rate:  [63] 63  Resp:  [18-20] 20  BP: (105-136)/(54-69) 122/55  SpO2:  [86 %-99 %] 99 %    I/O last 3 completed shifts:  In: 1615 [NG/GT:130]  Out: 1425 [Urine:800; Emesis/NG output:475; Drains:150]    Gen: A&Ox3, NAD  Resp: non-labored breathing  Abd: Soft, mildly distended, ttp over the right side of incision. No skin erythema. Lateral side of incision drainage green thick output, now with ostomy bag over draining wound. GJ intact. Previous drain site with dark drainage.   Ext: warm and well perfused bROM intact      Labs:  Complete Blood Count     Recent Labs  Lab 09/08/17  0030 09/06/17  0712 09/05/17  2344 09/05/17  0731   WBC 12.6* 11.1* 9.9 10.1   HGB 7.8* 8.2* 8.0* 8.2*   * 449 373 431     Basic Metabolic Panel    Recent Labs  Lab 09/09/17  0815 09/08/17  1006 09/08/17  0533 09/08/17  0030 09/07/17  0755     --  139 140 138   POTASSIUM 3.7 3.5 3.4 3.2* 3.5   CHLORIDE 105  --  102 102 103   CO2 29  --  32 29 28   BUN 20  --  20 19 17   CR 0.95  --  0.94 0.91 0.80   *  --  121* 106* 105*     Liver Function Tests    Recent Labs  Lab 09/08/17  0530 09/07/17  0755 09/06/17  0712 09/05/17  0731   INR 2.46* 2.13* 2.61* 2.24*     Pancreatic Enzymes  No lab results found in last 7 days.  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 CT on 9/8 demonstrated a possible superficial wound infection and  additional fluid collection anterior liver that communicates with the resected pancreas concerning for pancreatic leak.    Neuro: liquid oxy PRN, fentanyl patch, gabapentin  Resp: Continue pulmonary toilet, Encourage IS.   CV: Continue hemodynamic monitoring, PTA medications   GI/FEN: NPO. G tube to gravity, TFs at goal of 65 cc/hr, MIVF TKO   Renal:  ROSELYN, resolved, Avoid nephrotoxic medications. Resume home medications for urinary retention.. PTA flomax   Endo: Monitor blood sugar levels. Home synthroid.   ID: On vanco for cellulitis.  Abscess cultures with Staph epidermidis. Wound cultures sent for culture. Gram stain showed GPCs. CDiff negative.  Heme: Hgb stable. Will continue to monitor. (mechanical aortic valve), warfarin INR 2.46 yesterday.  Prophylaxis: SCDs. Warfarin, PPI      Patient seen and discussed with chief resident, Dr. Ibrahim.       Mina Rosa, PGY-1  General Surgery  [phone]

## 2017-09-09 NOTE — PLAN OF CARE
19.00-22.00 pm    No change. 20.00 pm vitals stable. Alert and oriented. Pain is managed with IV dilaudid. Fentanyl patch to left shoulder. TF infusing at 65 ml/hr. Denies nausea. G tube to gravity with 75 cc clear clear yellow drainage. C diff neg. Abdominal incision with staples and RLQ packing intact. Had undocumented void and post void residual volume was 200 cc. Continue to monitor.

## 2017-09-09 NOTE — PROGRESS NOTES
Pt doing well  Therapeutic on coumadin  Cultures: yeast  Plan:  24 hrs of diuresis  D/C vanco  Increase dose of fentanyl patch    GB

## 2017-09-09 NOTE — PHARMACY-VANCOMYCIN DOSING SERVICE
Pharmacy Vancomycin Note  Date of Service 2017  Patient's  1960   56 year old, male    Indication: Intra-abdominal infection  Goal Trough Level: 15-20 mg/L  Day of Therapy: 8  Current Vancomycin regimen:  1500 mg IV q12h    Current estimated CrCl = Estimated Creatinine Clearance: 103.3 mL/min (based on Cr of 0.95).    Creatinine for last 3 days  2017:  7:55 AM Creatinine 0.80 mg/dL  2017: 12:30 AM Creatinine 0.91 mg/dL;  5:33 AM Creatinine 0.94 mg/dL  2017:  8:15 AM Creatinine 0.95 mg/dL    Recent Vancomycin Levels (past 3 days)  2017: 10:33 AM Vancomycin Level 19.4 mg/L  2017: 12:42 PM Vancomycin Level 49.0 mg/L    Vancomycin IV Administrations (past 72 hours)                   vancomycin (VANCOCIN) 1,500 mg in NaCl 0.9 % 250 mL intermittent infusion (mg) 1,500 mg New Bag 17 1304     1,500 mg New Bag  0046     1,500 mg New Bag 17 1226     1,500 mg New Bag  0137                Nephrotoxins and other renal medications     None             Contrast Orders - past 72 hours (72h ago through future)    Start     Dose/Rate Route Frequency Ordered Stop    17 0700  iopamidol (ISOVUE-370) solution 135 mL      135 mL Intravenous ONCE 17 0650 17 0730    17 0440  iohexol (OMNIPAQUE) solution 50 mL      50 mL Oral 2 TIMES DAILY PRN 17 0416 17 0540          Interpretation of levels and current regimen:  Trough level is  Supratherapeutic for a 12 hour trough    Has serum creatinine changed > 50% in last 72 hours: No, but has increased since last vanco evaluation.     Urine output:  good urine output    Renal Function: Worsening    Plan:  1.  Asked nurse to stop infusion then will switch to intermittent dosing. I verified with nurse to see if the vanco was hung early and documentation was recorded later and she stated no, that she hung it at 1pm.  2.  Pharmacy will check trough levels as appropriate in 1-3 Days.    3. Serum creatinine levels  will be ordered daily for the first week of therapy and at least twice weekly for subsequent weeks.      Kosta Pedersen, Pharm.D, BCPS

## 2017-09-09 NOTE — PLAN OF CARE
Problem: Individualization  Goal: Patient Preferences  Outcome: No Change  Vitals:     09/08/17 1804 09/08/17 2008 09/08/17 2231 09/09/17 0358   BP:   124/60 105/54 131/54   BP Location:   Right arm Right arm Right arm   Cuff Size:           Pulse:   69 62 84   Resp:   18 18 20   Temp:   98.1  F (36.7  C) 97.5  F (36.4  C) 97.6  F (36.4  C)   TempSrc:   Oral Oral     SpO2:   98%   98%   Weight: 99.9 kg (220 lb 3.8 oz)         Height:           Patient with complaints of abd pain, given oxycodone per jtube with some relief.  RUQ drain pulling green drainage (100cc this shift).  Lft Gtube pulling 200cc tan/white drainage.  Wound packing intact.  Patient up to void without saving-reinforced to use urinal and put light on to do post void residuals.  Tolerating tube feedings at 65cc/hr.  Denies nausea.  Lungs clear, diminished.  Continue with POC.

## 2017-09-10 ENCOUNTER — APPOINTMENT (OUTPATIENT)
Dept: OCCUPATIONAL THERAPY | Facility: CLINIC | Age: 57
DRG: 406 | End: 2017-09-10
Attending: SURGERY
Payer: COMMERCIAL

## 2017-09-10 LAB
ANION GAP SERPL CALCULATED.3IONS-SCNC: 4 MMOL/L (ref 3–14)
ANION GAP SERPL CALCULATED.3IONS-SCNC: 4 MMOL/L (ref 3–14)
BACTERIA SPEC CULT: ABNORMAL
BACTERIA SPEC CULT: ABNORMAL
BASOPHILS # BLD AUTO: 0 10E9/L (ref 0–0.2)
BASOPHILS NFR BLD AUTO: 0.2 %
BUN SERPL-MCNC: 20 MG/DL (ref 7–30)
BUN SERPL-MCNC: 21 MG/DL (ref 7–30)
CALCIUM SERPL-MCNC: 7.9 MG/DL (ref 8.5–10.1)
CALCIUM SERPL-MCNC: 7.9 MG/DL (ref 8.5–10.1)
CHLORIDE SERPL-SCNC: 102 MMOL/L (ref 94–109)
CHLORIDE SERPL-SCNC: 103 MMOL/L (ref 94–109)
CO2 SERPL-SCNC: 29 MMOL/L (ref 20–32)
CO2 SERPL-SCNC: 31 MMOL/L (ref 20–32)
CREAT SERPL-MCNC: 0.96 MG/DL (ref 0.66–1.25)
CREAT SERPL-MCNC: 0.98 MG/DL (ref 0.66–1.25)
DIFFERENTIAL METHOD BLD: ABNORMAL
EOSINOPHIL # BLD AUTO: 0.2 10E9/L (ref 0–0.7)
EOSINOPHIL NFR BLD AUTO: 1.6 %
ERYTHROCYTE [DISTWIDTH] IN BLOOD BY AUTOMATED COUNT: 17.3 % (ref 10–15)
GFR SERPL CREATININE-BSD FRML MDRD: 79 ML/MIN/1.7M2
GFR SERPL CREATININE-BSD FRML MDRD: 80 ML/MIN/1.7M2
GLUCOSE BLDC GLUCOMTR-MCNC: 93 MG/DL (ref 70–99)
GLUCOSE SERPL-MCNC: 100 MG/DL (ref 70–99)
GLUCOSE SERPL-MCNC: 101 MG/DL (ref 70–99)
HCT VFR BLD AUTO: 25.9 % (ref 40–53)
HGB BLD-MCNC: 8.2 G/DL (ref 13.3–17.7)
IMM GRANULOCYTES # BLD: 0.2 10E9/L (ref 0–0.4)
IMM GRANULOCYTES NFR BLD: 1.9 %
INR PPP: 2.12 (ref 0.86–1.14)
LYMPHOCYTES # BLD AUTO: 2.3 10E9/L (ref 0.8–5.3)
LYMPHOCYTES NFR BLD AUTO: 19 %
MCH RBC QN AUTO: 26.1 PG (ref 26.5–33)
MCHC RBC AUTO-ENTMCNC: 31.7 G/DL (ref 31.5–36.5)
MCV RBC AUTO: 83 FL (ref 78–100)
MONOCYTES # BLD AUTO: 0.8 10E9/L (ref 0–1.3)
MONOCYTES NFR BLD AUTO: 6.7 %
NEUTROPHILS # BLD AUTO: 8.4 10E9/L (ref 1.6–8.3)
NEUTROPHILS NFR BLD AUTO: 70.6 %
NRBC # BLD AUTO: 0 10*3/UL
NRBC BLD AUTO-RTO: 0 /100
PLATELET # BLD AUTO: 608 10E9/L (ref 150–450)
POTASSIUM SERPL-SCNC: 3.6 MMOL/L (ref 3.4–5.3)
POTASSIUM SERPL-SCNC: 3.7 MMOL/L (ref 3.4–5.3)
RBC # BLD AUTO: 3.14 10E12/L (ref 4.4–5.9)
SODIUM SERPL-SCNC: 136 MMOL/L (ref 133–144)
SODIUM SERPL-SCNC: 138 MMOL/L (ref 133–144)
SPECIMEN SOURCE: ABNORMAL
WBC # BLD AUTO: 11.9 10E9/L (ref 4–11)

## 2017-09-10 PROCEDURE — 25000131 ZZH RX MED GY IP 250 OP 636 PS 637: Performed by: SURGERY

## 2017-09-10 PROCEDURE — 85610 PROTHROMBIN TIME: CPT | Performed by: SURGERY

## 2017-09-10 PROCEDURE — 80048 BASIC METABOLIC PNL TOTAL CA: CPT | Performed by: SURGERY

## 2017-09-10 PROCEDURE — 25000128 H RX IP 250 OP 636: Performed by: SURGERY

## 2017-09-10 PROCEDURE — 12000008 ZZH R&B INTERMEDIATE UMMC

## 2017-09-10 PROCEDURE — 40000133 ZZH STATISTIC OT WARD VISIT: Performed by: OCCUPATIONAL THERAPIST

## 2017-09-10 PROCEDURE — 25000132 ZZH RX MED GY IP 250 OP 250 PS 637: Performed by: SURGERY

## 2017-09-10 PROCEDURE — 27210436 ZZH NUTRITION PRODUCT SEMIELEM INTERMED CAN

## 2017-09-10 PROCEDURE — 00000146 ZZHCL STATISTIC GLUCOSE BY METER IP

## 2017-09-10 PROCEDURE — 36415 COLL VENOUS BLD VENIPUNCTURE: CPT | Performed by: SURGERY

## 2017-09-10 PROCEDURE — 97535 SELF CARE MNGMENT TRAINING: CPT | Mod: GO | Performed by: OCCUPATIONAL THERAPIST

## 2017-09-10 PROCEDURE — 85025 COMPLETE CBC W/AUTO DIFF WBC: CPT | Performed by: SURGERY

## 2017-09-10 RX ADMIN — POTASSIUM & SODIUM PHOSPHATES POWDER PACK 280-160-250 MG 1 PACKET: 280-160-250 PACK at 17:39

## 2017-09-10 RX ADMIN — POTASSIUM & SODIUM PHOSPHATES POWDER PACK 280-160-250 MG 1 PACKET: 280-160-250 PACK at 00:17

## 2017-09-10 RX ADMIN — PREDNISONE 5 MG: 5 SOLUTION ORAL at 09:29

## 2017-09-10 RX ADMIN — FUROSEMIDE 40 MG: 10 INJECTION, SOLUTION INTRAVENOUS at 09:29

## 2017-09-10 RX ADMIN — PANCRELIPASE 48000 UNITS: 24000; 76000; 120000 CAPSULE, DELAYED RELEASE PELLETS ORAL at 19:31

## 2017-09-10 RX ADMIN — SODIUM BICARBONATE 325 MG: 325 TABLET ORAL at 02:22

## 2017-09-10 RX ADMIN — LEVOTHYROXINE SODIUM 150 MCG: 150 TABLET ORAL at 09:30

## 2017-09-10 RX ADMIN — HYDROMORPHONE HYDROCHLORIDE 0.5 MG: 1 INJECTION, SOLUTION INTRAMUSCULAR; INTRAVENOUS; SUBCUTANEOUS at 12:16

## 2017-09-10 RX ADMIN — HYDROMORPHONE HYDROCHLORIDE 0.5 MG: 1 INJECTION, SOLUTION INTRAMUSCULAR; INTRAVENOUS; SUBCUTANEOUS at 03:32

## 2017-09-10 RX ADMIN — FENOFIBRATE 160 MG: 160 TABLET ORAL at 09:31

## 2017-09-10 RX ADMIN — SODIUM BICARBONATE 325 MG: 325 TABLET ORAL at 06:32

## 2017-09-10 RX ADMIN — DOXAZOSIN 1 MG: 1 TABLET ORAL at 09:30

## 2017-09-10 RX ADMIN — OXYCODONE HYDROCHLORIDE 10 MG: 5 SOLUTION ORAL at 07:22

## 2017-09-10 RX ADMIN — SODIUM BICARBONATE 325 MG: 325 TABLET ORAL at 10:58

## 2017-09-10 RX ADMIN — PANCRELIPASE 48000 UNITS: 24000; 76000; 120000 CAPSULE, DELAYED RELEASE PELLETS ORAL at 06:32

## 2017-09-10 RX ADMIN — Medication 20 MG: at 09:30

## 2017-09-10 RX ADMIN — HYDROMORPHONE HYDROCHLORIDE 0.5 MG: 1 INJECTION, SOLUTION INTRAMUSCULAR; INTRAVENOUS; SUBCUTANEOUS at 09:25

## 2017-09-10 RX ADMIN — SODIUM BICARBONATE 325 MG: 325 TABLET ORAL at 19:32

## 2017-09-10 RX ADMIN — OXYCODONE HYDROCHLORIDE 10 MG: 5 SOLUTION ORAL at 17:39

## 2017-09-10 RX ADMIN — SERTRALINE HYDROCHLORIDE 50 MG: 20 SOLUTION ORAL at 09:30

## 2017-09-10 RX ADMIN — GABAPENTIN 300 MG: 250 SOLUTION ORAL at 19:31

## 2017-09-10 RX ADMIN — PRAVASTATIN SODIUM 40 MG: 40 TABLET ORAL at 09:31

## 2017-09-10 RX ADMIN — PANCRELIPASE 48000 UNITS: 24000; 76000; 120000 CAPSULE, DELAYED RELEASE PELLETS ORAL at 23:03

## 2017-09-10 RX ADMIN — PANCRELIPASE 48000 UNITS: 24000; 76000; 120000 CAPSULE, DELAYED RELEASE PELLETS ORAL at 14:57

## 2017-09-10 RX ADMIN — SODIUM BICARBONATE 325 MG: 325 TABLET ORAL at 23:03

## 2017-09-10 RX ADMIN — PANCRELIPASE 48000 UNITS: 24000; 76000; 120000 CAPSULE, DELAYED RELEASE PELLETS ORAL at 02:22

## 2017-09-10 RX ADMIN — Medication 20 MG: at 19:31

## 2017-09-10 RX ADMIN — HYDROMORPHONE HYDROCHLORIDE 0.5 MG: 1 INJECTION, SOLUTION INTRAMUSCULAR; INTRAVENOUS; SUBCUTANEOUS at 23:44

## 2017-09-10 RX ADMIN — SODIUM BICARBONATE 325 MG: 325 TABLET ORAL at 14:58

## 2017-09-10 RX ADMIN — TAMSULOSIN HYDROCHLORIDE 0.4 MG: 0.4 CAPSULE ORAL at 09:31

## 2017-09-10 RX ADMIN — OXYCODONE HYDROCHLORIDE 10 MG: 5 SOLUTION ORAL at 00:16

## 2017-09-10 RX ADMIN — POTASSIUM & SODIUM PHOSPHATES POWDER PACK 280-160-250 MG 1 PACKET: 280-160-250 PACK at 19:31

## 2017-09-10 RX ADMIN — Medication 15 ML: at 09:29

## 2017-09-10 RX ADMIN — PANCRELIPASE 48000 UNITS: 24000; 76000; 120000 CAPSULE, DELAYED RELEASE PELLETS ORAL at 10:58

## 2017-09-10 RX ADMIN — HYDROMORPHONE HYDROCHLORIDE 0.5 MG: 1 INJECTION, SOLUTION INTRAMUSCULAR; INTRAVENOUS; SUBCUTANEOUS at 15:05

## 2017-09-10 RX ADMIN — Medication 1.5 MG: at 17:40

## 2017-09-10 RX ADMIN — GABAPENTIN 300 MG: 250 SOLUTION ORAL at 09:30

## 2017-09-10 RX ADMIN — OXYCODONE HYDROCHLORIDE 10 MG: 5 SOLUTION ORAL at 21:17

## 2017-09-10 ASSESSMENT — PAIN DESCRIPTION - DESCRIPTORS
DESCRIPTORS: SORE
DESCRIPTORS: ACHING;SORE

## 2017-09-10 NOTE — PLAN OF CARE
Problem: Individualization  Goal: Patient Preferences  Outcome: No Change  Vitals:     09/09/17 2004 09/09/17 2032 09/10/17 0000 09/10/17 0406   BP:   106/67 130/66 146/70   BP Location:   Left arm Left arm Left arm   Cuff Size:           Pulse:       80   Resp:   20 20 18   Temp:   96  F (35.6  C) 97.8  F (36.6  C) 97.1  F (36.2  C)   TempSrc:   Oral Oral Oral   SpO2:   95% 95% 96%   Weight: 98.5 kg (217 lb 3.2 oz)         Height:           Patient with abdominal pain, adequate relief with oxycodone, fentanyl patch, and dilaudid.  Having loose stools, passing gas.  Good UV, using urinal but not saving urine when stooling.  No output via RLQ drain.  Lft gtube pulling thick, clear white drainage.  Tolerating tube feeding at goal rate of 65cc/hr.  Less edema in BLE.  Lungs clear.  Continue with POC.

## 2017-09-10 NOTE — PLAN OF CARE
Afebrile. VSS on RA. Abdominal pain controlled with IV dilaudid. Zofran given x1 with relief. TF infusing at 65 m/hr with creon and sodium bicarb. RLQ pouch leaking and became disconnected when pt got up. Wound repacked with gauze and new pouch applied. Old pouch had 50 cc of green drainage. No output for the rest of shift noted. Reports having BMs and passing gas  G tube to gravity with 400 cc of thick clear drainage. NPO with ice chips. Started on Lasix IV, received 40 mg. Voided 850 cc. Continue with poc.

## 2017-09-10 NOTE — PROGRESS NOTES
[facility] Progress Note    S:  No acute events overnight. Pt seen at bedside resting comfortably. Does complain of mild abdominal pain, improved from yesterday. No nausea/emesis. Passing flatus and having BMs. Tolerating TFs at goal. Voiding without issue.     O:  Temp:  [96  F (35.6  C)-98  F (36.7  C)] 97.6  F (36.4  C)  Pulse:  [60-80] 75  Heart Rate:  [68-77] 77  Resp:  [18-20] 18  BP: (106-146)/(49-70) 110/63  SpO2:  [94 %-97 %] 96 %    I/O last 3 completed shifts:  In: 1600 [P.O.:30; I.V.:75]  Out: 3090 [Urine:1825; Emesis/NG output:1215; Drains:50]    Gen: A&Ox3, NAD  Resp: non-labored breathing  Abd: Soft, mildly distended, TTP over right side of incision. No erythema. R lateral side of incision drainage thick blood tinged mucous, now with ostomy bag over draining wound. Previous drain site with blood tinged drainage. GJ intact.   Ext: warm and well perfused, ROM intact    Labs:  Complete Blood Count     Recent Labs  Lab 09/10/17  0005 09/08/17  0030 09/06/17  0712 09/05/17  2344   WBC 11.9* 12.6* 11.1* 9.9   HGB 8.2* 7.8* 8.2* 8.0*   * 485* 449 373     Basic Metabolic Panel    Recent Labs  Lab 09/10/17  0906 09/10/17  0005 09/09/17  0815 09/08/17  1006 09/08/17  0533    138 140  --  139   POTASSIUM 3.7 3.6 3.7 3.5 3.4   CHLORIDE 102 103 105  --  102   CO2 29 31 29  --  32   BUN 20 21 20  --  20   CR 0.98 0.96 0.95  --  0.94   * 100* 105*  --  121*     Liver Function Tests    Recent Labs  Lab 09/10/17  0906 09/09/17  0830 09/08/17  0530 09/07/17  0755   INR 2.12* 3.45* 2.46* 2.13*     Pancreatic Enzymes  No lab results found in last 7 days.  Coagulation Profile    Recent Labs  Lab 09/10/17  0906 09/09/17  0830 09/08/17  0530 09/07/17  0755   INR 2.12* 3.45* 2.46* 2.13*          A/P: Lucas Brown is a 56 year old male with a history of chronic pancreatitis and a 2cm inflammatory mass in the head of the pancreas now POD# 12 s/p Whipple pancreaticoduodenectomy. CT on 9/8 demonstrated  possible superficial wound infection and additional fluid collection anterior liver that communicates with the resected pancreas concerning for pancreatic leak.      Neuro: liquid oxy PRN, fentanyl patch, gabapentin   CV: Continue hemodynamic monitoring, PTA medications  Pulm: continue pulmonary toilet, encourage IS  FEN/GI: NPO, TF at goal of 65 cc/hr, G tube to gravity, MIVF TKO   Renal: ROSELYN resolved, avoid nephrotoxic medications. Continue doxazosin per j tube   Endo: Monitor blood sugar levels. Home synthroid  ID: Discontinue vancomycin. Wound culture 9/8 growing moderate yeast. Cdiff negative  Heme: Hgb stable. Will continue to monitor. Warfarin INR goal 2-3 (mechanical aortic valve)    PPx: SCDs, warfarin PPI    Patient seen and discussed with chief resident, Dr. Ibrahim.    Mina Rosa, PGY-1  General Surgery  [phone]

## 2017-09-10 NOTE — PLAN OF CARE
Vitals:    09/10/17 0000 09/10/17 0406 09/10/17 0723 09/10/17 1206   BP: 130/66 146/70 110/63 143/66   BP Location: Left arm Left arm Left arm Right arm   Cuff Size:       Pulse:  80 75 81   Resp: 20 18 18 18   Temp: 97.8  F (36.6  C) 97.1  F (36.2  C) 97.6  F (36.4  C) 97.9  F (36.6  C)   TempSrc: Oral Oral Oral Oral   SpO2: 95% 96% 96% 97%   Weight:       Height:         Afebrile, 97% RA, other VSS. Denies nausea. Abdominal pain controlled with IV dilaudid, Oxycodone and Fentanyl patch. RLQ with scant drainage. G tube to gravity, clear yellow drainage. Abdominal incision with staples, slightly reddened but no drainage. Remains NPO. TF running at 65ml/hr. Continues to have loose BM. Voiding not saving. Spouse at bedside. Continue plan of care.

## 2017-09-10 NOTE — PLAN OF CARE
Problem: Goal Outcome Summary  Goal: Goal Outcome Summary  OT/7B: Reviewed post op precautions, pt continuing to need min VCs to state post op precautions.  Educated pt on AE and adhering to precautions during LB dressing and LB bathing.  Reviewed and reinforced energy conservation and work simplification strategies to address fatigue and activity tolerance.     Recommend: Home with assist from spouse at local apt

## 2017-09-11 LAB
ALBUMIN UR-MCNC: NEGATIVE MG/DL
APPEARANCE UR: CLEAR
BILIRUB UR QL STRIP: NEGATIVE
COLOR UR AUTO: ABNORMAL
COPATH REPORT: NORMAL
CRP SERPL-MCNC: 86 MG/L (ref 0–8)
GLUCOSE BLDC GLUCOMTR-MCNC: 132 MG/DL (ref 70–99)
GLUCOSE UR STRIP-MCNC: NEGATIVE MG/DL
HGB UR QL STRIP: NEGATIVE
INR PPP: 2.22 (ref 0.86–1.14)
KETONES UR STRIP-MCNC: NEGATIVE MG/DL
LEUKOCYTE ESTERASE UR QL STRIP: NEGATIVE
NITRATE UR QL: NEGATIVE
PH UR STRIP: 8 PH (ref 5–7)
PREALB SERPL IA-MCNC: 12 MG/DL (ref 15–45)
RBC #/AREA URNS AUTO: 1 /HPF (ref 0–2)
SOURCE: ABNORMAL
SP GR UR STRIP: 1.01 (ref 1–1.03)
TRANS CELLS #/AREA URNS HPF: <1 /HPF (ref 0–1)
UROBILINOGEN UR STRIP-MCNC: NORMAL MG/DL (ref 0–2)
WBC #/AREA URNS AUTO: 1 /HPF (ref 0–2)

## 2017-09-11 PROCEDURE — 85025 COMPLETE CBC W/AUTO DIFF WBC: CPT | Performed by: SURGERY

## 2017-09-11 PROCEDURE — 81001 URINALYSIS AUTO W/SCOPE: CPT | Performed by: STUDENT IN AN ORGANIZED HEALTH CARE EDUCATION/TRAINING PROGRAM

## 2017-09-11 PROCEDURE — 25000132 ZZH RX MED GY IP 250 OP 250 PS 637: Performed by: SURGERY

## 2017-09-11 PROCEDURE — 36415 COLL VENOUS BLD VENIPUNCTURE: CPT | Performed by: SURGERY

## 2017-09-11 PROCEDURE — 25000128 H RX IP 250 OP 636: Performed by: SURGERY

## 2017-09-11 PROCEDURE — 84134 ASSAY OF PREALBUMIN: CPT | Performed by: SURGERY

## 2017-09-11 PROCEDURE — 25000131 ZZH RX MED GY IP 250 OP 636 PS 637: Performed by: SURGERY

## 2017-09-11 PROCEDURE — 00000146 ZZHCL STATISTIC GLUCOSE BY METER IP

## 2017-09-11 PROCEDURE — 85610 PROTHROMBIN TIME: CPT | Performed by: SURGERY

## 2017-09-11 PROCEDURE — 12000008 ZZH R&B INTERMEDIATE UMMC

## 2017-09-11 PROCEDURE — 86140 C-REACTIVE PROTEIN: CPT | Performed by: SURGERY

## 2017-09-11 PROCEDURE — 80048 BASIC METABOLIC PNL TOTAL CA: CPT | Performed by: SURGERY

## 2017-09-11 RX ORDER — AMINO ACIDS/PROTEIN HYDROLYS 11G-40/45
1 LIQUID IN PACKET (ML) ORAL 3 TIMES DAILY
Status: DISCONTINUED | OUTPATIENT
Start: 2017-09-11 | End: 2017-09-13 | Stop reason: HOSPADM

## 2017-09-11 RX ADMIN — SODIUM BICARBONATE 325 MG: 325 TABLET ORAL at 21:33

## 2017-09-11 RX ADMIN — PANCRELIPASE 48000 UNITS: 24000; 76000; 120000 CAPSULE, DELAYED RELEASE PELLETS ORAL at 07:02

## 2017-09-11 RX ADMIN — OXYCODONE HYDROCHLORIDE 10 MG: 5 SOLUTION ORAL at 05:43

## 2017-09-11 RX ADMIN — HYDROMORPHONE HYDROCHLORIDE 0.5 MG: 1 INJECTION, SOLUTION INTRAMUSCULAR; INTRAVENOUS; SUBCUTANEOUS at 19:44

## 2017-09-11 RX ADMIN — Medication 1.5 MG: at 18:10

## 2017-09-11 RX ADMIN — PRAVASTATIN SODIUM 40 MG: 40 TABLET ORAL at 09:15

## 2017-09-11 RX ADMIN — Medication 20 MG: at 09:15

## 2017-09-11 RX ADMIN — FENOFIBRATE 160 MG: 160 TABLET ORAL at 09:15

## 2017-09-11 RX ADMIN — PANCRELIPASE 48000 UNITS: 24000; 76000; 120000 CAPSULE, DELAYED RELEASE PELLETS ORAL at 21:33

## 2017-09-11 RX ADMIN — Medication 20 MG: at 19:45

## 2017-09-11 RX ADMIN — Medication 15 ML: at 09:15

## 2017-09-11 RX ADMIN — SODIUM BICARBONATE 325 MG: 325 TABLET ORAL at 11:49

## 2017-09-11 RX ADMIN — GABAPENTIN 300 MG: 250 SOLUTION ORAL at 19:45

## 2017-09-11 RX ADMIN — HYDROMORPHONE HYDROCHLORIDE 0.5 MG: 1 INJECTION, SOLUTION INTRAMUSCULAR; INTRAVENOUS; SUBCUTANEOUS at 09:00

## 2017-09-11 RX ADMIN — MENTHOL 1 PATCH: 205.5 PATCH TOPICAL at 11:49

## 2017-09-11 RX ADMIN — GABAPENTIN 300 MG: 250 SOLUTION ORAL at 09:15

## 2017-09-11 RX ADMIN — POTASSIUM & SODIUM PHOSPHATES POWDER PACK 280-160-250 MG 1 PACKET: 280-160-250 PACK at 02:12

## 2017-09-11 RX ADMIN — DOXAZOSIN 1 MG: 1 TABLET ORAL at 09:15

## 2017-09-11 RX ADMIN — LEVOTHYROXINE SODIUM 150 MCG: 150 TABLET ORAL at 09:15

## 2017-09-11 RX ADMIN — OXYCODONE HYDROCHLORIDE 10 MG: 5 SOLUTION ORAL at 02:11

## 2017-09-11 RX ADMIN — OXYCODONE HYDROCHLORIDE 10 MG: 5 SOLUTION ORAL at 22:55

## 2017-09-11 RX ADMIN — TAMSULOSIN HYDROCHLORIDE 0.4 MG: 0.4 CAPSULE ORAL at 09:15

## 2017-09-11 RX ADMIN — SODIUM BICARBONATE 325 MG: 325 TABLET ORAL at 07:01

## 2017-09-11 RX ADMIN — POTASSIUM & SODIUM PHOSPHATES POWDER PACK 280-160-250 MG 1 PACKET: 280-160-250 PACK at 18:10

## 2017-09-11 RX ADMIN — PANCRELIPASE 48000 UNITS: 24000; 76000; 120000 CAPSULE, DELAYED RELEASE PELLETS ORAL at 03:12

## 2017-09-11 RX ADMIN — PREDNISONE 5 MG: 5 SOLUTION ORAL at 09:15

## 2017-09-11 RX ADMIN — SODIUM BICARBONATE 325 MG: 325 TABLET ORAL at 18:09

## 2017-09-11 RX ADMIN — Medication 1 PACKET: at 22:53

## 2017-09-11 RX ADMIN — PANCRELIPASE 48000 UNITS: 24000; 76000; 120000 CAPSULE, DELAYED RELEASE PELLETS ORAL at 11:49

## 2017-09-11 RX ADMIN — OXYCODONE HYDROCHLORIDE 10 MG: 5 SOLUTION ORAL at 16:43

## 2017-09-11 RX ADMIN — SODIUM BICARBONATE 325 MG: 325 TABLET ORAL at 03:12

## 2017-09-11 RX ADMIN — OXYCODONE HYDROCHLORIDE 10 MG: 5 SOLUTION ORAL at 11:41

## 2017-09-11 RX ADMIN — PANCRELIPASE 48000 UNITS: 24000; 76000; 120000 CAPSULE, DELAYED RELEASE PELLETS ORAL at 18:09

## 2017-09-11 RX ADMIN — SERTRALINE HYDROCHLORIDE 50 MG: 20 SOLUTION ORAL at 09:15

## 2017-09-11 ASSESSMENT — PAIN DESCRIPTION - DESCRIPTORS: DESCRIPTORS: SORE

## 2017-09-11 NOTE — PLAN OF CARE
Problem: Individualization  Goal: Patient Preferences  Vitals:     09/10/17 1648 09/10/17 1949 09/10/17 2324 09/11/17 0449   BP: 116/67 116/66 147/76 147/75   BP Location: Right arm Right arm Left arm Left arm   Cuff Size:           Pulse: 53 66 79     Resp: 18 18 18 18   Temp: 97.3  F (36.3  C) 96.9  F (36.1  C) 97.7  F (36.5  C) 96.6  F (35.9  C)   TempSrc: Oral Oral Oral     SpO2: 96% 96% 97% 99%   Weight:   95.7 kg (210 lb 14.4 oz)       Height:           Patient complaining of urgency, burning, pain during urination.  Voiding not saving-requested patient void in urinal to measure, voiding in 100cc amts.  Will notify MD on am rounds.  Had 1 lse bm.  RLQ drain pulled 25cc of thick brown drainage.  Gtube 325cc of thick white drainage.  Tolerating tube feedings at goal of 65cc/hr.  Had oxycodone x2 and dilaudid x1 for pain with fair relief.  Continue with POC.    Ua ordered and sent to lab.

## 2017-09-11 NOTE — PROGRESS NOTES
CLINICAL NUTRITION SERVICES - BRIEF NOTE  *See RD note on 9/5 for last nutrition reassessment details   Nutrition Prescription    Recommendations already ordered by Registered Dietitian (RD):  When new formula arrives, start Isosource 1.5 @ 35 mL/hr and adv by 10 mL q4h as tolerated to goal rate Isosource 1.5 @ goal 65 ml/hr (1560 ml/day) to provide 2340 kcals (26 kcal/kg/day), 106 g PRO (~1.2 g/kg/day), 92 g fat, 1186 ml free H2O, 275 g CHO and 23 g Fiber daily.      --  1 pkt Prosource TID (each pkt contains 40 kcal and 11 g PRO) so TF + Prosource = 2460 kcal (27 kcal/kg) and 139 g PRO (1.5 g/kg)     -- Could keep enzymes/bicarb the same as currently ordered. (2 capsules Creon 24 Q4H (mixed w/ sodium bicarb). With Isosource 1.5 @ 65 mL/hr, this would provide 3130 units lipase/g fat/day which is within recommended dosing range       INTERVENTIONS  Implementation  Collaboration with other providers: spoke with primary team, plan to trial switching to Isosource 1.5 (standard formula) today to see if tolerated as is a less expensive formula that Impact Peptide or Peptamen 1.5 (semi-elemental formulas)    Enteral Nutrition - Modify composition    Monitoring/Evaluation  Progress toward goals will be monitored and evaluated per protocol.     Jennie Grigsby, LIANA, LD   [address] RD Pager: [phone]

## 2017-09-11 NOTE — PROGRESS NOTES
[facility] Progress Note    S:  No acute events overnight. Pt seen at bedside resting comfortably with minimal abdominal pain. Started to have pain and urgency with urination overnight. No nausea/emesis. Passing flatus, and continues to have BMs.    O:  Temp:  [96.6  F (35.9  C)-97.9  F (36.6  C)] 96.6  F (35.9  C)  Pulse:  [53-81] 79  Heart Rate:  [79] 79  Resp:  [18] 18  BP: (110-147)/(63-76) 147/75  SpO2:  [96 %-99 %] 99 %    I/O last 3 completed shifts:  In: 1170 [P.O.:55; I.V.:75]  Out: 1950 [Urine:925; Emesis/NG output:990; Drains:35]    Gen: A&Ox3, NAD  Resp: non-labored breathing  Abd: Soft, mildly distended, TTP over right side of incision. No erythema. R lateral side of incision drainage thick blood tinged mucous, now with ostomy bag over draining wound. Previous drain site with blood tinged drainage. GJ intact.   Ext: warm and well perfused, ROM intact    Labs:  Complete Blood Count     Recent Labs  Lab 09/10/17  0005 09/08/17  0030 09/06/17  0712 09/05/17  2344   WBC 11.9* 12.6* 11.1* 9.9   HGB 8.2* 7.8* 8.2* 8.0*   * 485* 449 373     Basic Metabolic Panel    Recent Labs  Lab 09/10/17  0906 09/10/17  0005 09/09/17  0815 09/08/17  1006 09/08/17  0533    138 140  --  139   POTASSIUM 3.7 3.6 3.7 3.5 3.4   CHLORIDE 102 103 105  --  102   CO2 29 31 29  --  32   BUN 20 21 20  --  20   CR 0.98 0.96 0.95  --  0.94   * 100* 105*  --  121*     Liver Function Tests    Recent Labs  Lab 09/10/17  0906 09/09/17  0830 09/08/17  0530 09/07/17  0755   INR 2.12* 3.45* 2.46* 2.13*     Pancreatic Enzymes  No lab results found in last 7 days.  Coagulation Profile    Recent Labs  Lab 09/10/17  0906 09/09/17  0830 09/08/17  0530 09/07/17  0755   INR 2.12* 3.45* 2.46* 2.13*          A/P: Lucas Brown is a 56 year old male with a history of chronic pancreatitis and a 2cm inflammatory mass in the head of the pancreas now POD# 13 s/p Whipple pancreaticoduodenectomy. CT on 9/8 demonstrated possible  superficial wound infection and additional fluid collection anterior liver that communicates with the resected pancreas concerning for pancreatic leak.       Neuro: liquid oxy PRN, fentanyl patch, gabapentin   CV: Continue hemodynamic monitoring, PTA medications  Pulm: continue pulmonary toilet, encourage IS  FEN/GI: NPO, TF at goal of 65 cc/hr, G tube to gravity, MIVF TKO   Renal: ROSELYN resolved, avoid nephrotoxic medications. Continue doxazosin per j tube   Endo: Monitor blood sugar levels. Home synthroid  ID: Discontinue vancomycin. Wound culture 9/8 growing moderate yeast. Cdiff negative. UA today  Heme: Hgb stable. Will continue to monitor. Warfarin INR goal 2-3 (mechanical aortic valve)    PPx: SCDs, warfarin PPI     Patient seen and discussed with chief resident, Dr. Sommer.    Mina Rosa, PGY-1  General Surgery  [phone]

## 2017-09-11 NOTE — PROGRESS NOTES
VSS. Pain controlled with oxycodone solution x2 per J tube. Abdominal staples removed at bedside by MD. Incision CDI with steri strips. Right abdominal drain with small amount of brownish red output. NPO except ice chips. GT tube intact. TF infusing at goal rate 65 cc/hr. Reports having multiple loose stools. Voiding spontaneously, not saving. Right PIV at TKO. Continue with poc

## 2017-09-11 NOTE — PLAN OF CARE
Vitals:    09/10/17 1949 09/10/17 2324 09/11/17 0449 09/11/17 0717   BP: 116/66 147/76 147/75 137/64   BP Location: Right arm Left arm Left arm Left arm   Cuff Size:       Pulse: 66 79  66   Resp: 18 18 18 18   Temp: 96.9  F (36.1  C) 97.7  F (36.5  C) 96.6  F (35.9  C) 97.4  F (36.3  C)   TempSrc: Oral Oral  Oral   SpO2: 96% 97% 99% 96%   Weight: 95.7 kg (210 lb 14.4 oz)      Height:         Afebrile, 96% RA, other VSS. Oxycodone x1 and dilaudid x1 for pain. Denies nausea. Remains NPO, tolerating TF at 65ml/hr. UA negative, result in chart. Pt c/o bladder pain w/ frequency. Bladder scanned for 747ml, Straight cathed for 700ml. Stated feeling much better after being straight cathed and was able to sleep/rest. RLQ drain w/ scant output. G tube to gravity 160ml output. Incision with steri strips. Continues to have loose BM. Spouse at bedside. Continue plan of care.

## 2017-09-12 LAB
ALBUMIN UR-MCNC: NEGATIVE MG/DL
ANION GAP SERPL CALCULATED.3IONS-SCNC: 5 MMOL/L (ref 3–14)
ANISOCYTOSIS BLD QL SMEAR: SLIGHT
APPEARANCE UR: CLEAR
BASOPHILS # BLD AUTO: 0 10E9/L (ref 0–0.2)
BASOPHILS NFR BLD AUTO: 0 %
BILIRUB UR QL STRIP: NEGATIVE
BUN SERPL-MCNC: 18 MG/DL (ref 7–30)
CALCIUM SERPL-MCNC: 7.8 MG/DL (ref 8.5–10.1)
CHLORIDE SERPL-SCNC: 103 MMOL/L (ref 94–109)
CO2 SERPL-SCNC: 27 MMOL/L (ref 20–32)
COLOR UR AUTO: YELLOW
CREAT SERPL-MCNC: 0.87 MG/DL (ref 0.66–1.25)
DIFFERENTIAL METHOD BLD: ABNORMAL
EOSINOPHIL # BLD AUTO: 0 10E9/L (ref 0–0.7)
EOSINOPHIL NFR BLD AUTO: 0 %
ERYTHROCYTE [DISTWIDTH] IN BLOOD BY AUTOMATED COUNT: 16.9 % (ref 10–15)
GFR SERPL CREATININE-BSD FRML MDRD: >90 ML/MIN/1.7M2
GLUCOSE BLDC GLUCOMTR-MCNC: 112 MG/DL (ref 70–99)
GLUCOSE BLDC GLUCOMTR-MCNC: 116 MG/DL (ref 70–99)
GLUCOSE SERPL-MCNC: 114 MG/DL (ref 70–99)
GLUCOSE UR STRIP-MCNC: NEGATIVE MG/DL
HCT VFR BLD AUTO: 25.3 % (ref 40–53)
HGB BLD-MCNC: 7.9 G/DL (ref 13.3–17.7)
HGB UR QL STRIP: NEGATIVE
INR PPP: 1.75 (ref 0.86–1.14)
KETONES UR STRIP-MCNC: NEGATIVE MG/DL
LEUKOCYTE ESTERASE UR QL STRIP: NEGATIVE
LYMPHOCYTES # BLD AUTO: 1.4 10E9/L (ref 0.8–5.3)
LYMPHOCYTES NFR BLD AUTO: 9.7 %
MCH RBC QN AUTO: 25.6 PG (ref 26.5–33)
MCHC RBC AUTO-ENTMCNC: 31.2 G/DL (ref 31.5–36.5)
MCV RBC AUTO: 82 FL (ref 78–100)
METAMYELOCYTES # BLD: 0.1 10E9/L
METAMYELOCYTES NFR BLD MANUAL: 0.9 %
MONOCYTES # BLD AUTO: 0.8 10E9/L (ref 0–1.3)
MONOCYTES NFR BLD AUTO: 5.3 %
MYELOCYTES # BLD: 0.1 10E9/L
MYELOCYTES NFR BLD MANUAL: 0.9 %
NEUTROPHILS # BLD AUTO: 12.1 10E9/L (ref 1.6–8.3)
NEUTROPHILS NFR BLD AUTO: 83.2 %
NITRATE UR QL: NEGATIVE
OVALOCYTES BLD QL SMEAR: ABNORMAL
PH UR STRIP: 7 PH (ref 5–7)
PHOSPHATE SERPL-MCNC: 3.1 MG/DL (ref 2.5–4.5)
PLATELET # BLD AUTO: 581 10E9/L (ref 150–450)
PLATELET # BLD EST: ABNORMAL 10*3/UL
POIKILOCYTOSIS BLD QL SMEAR: ABNORMAL
POTASSIUM SERPL-SCNC: 3.9 MMOL/L (ref 3.4–5.3)
RBC # BLD AUTO: 3.09 10E12/L (ref 4.4–5.9)
RBC #/AREA URNS AUTO: 1 /HPF (ref 0–2)
SODIUM SERPL-SCNC: 136 MMOL/L (ref 133–144)
SOURCE: NORMAL
SP GR UR STRIP: 1.01 (ref 1–1.03)
UROBILINOGEN UR STRIP-MCNC: NORMAL MG/DL (ref 0–2)
WBC # BLD AUTO: 14.5 10E9/L (ref 4–11)
WBC #/AREA URNS AUTO: 1 /HPF (ref 0–2)

## 2017-09-12 PROCEDURE — 25000132 ZZH RX MED GY IP 250 OP 250 PS 637: Performed by: SURGERY

## 2017-09-12 PROCEDURE — 27210913 ZZH NUTRITION PRODUCT INTERMEDIATE PACKET

## 2017-09-12 PROCEDURE — 12000008 ZZH R&B INTERMEDIATE UMMC

## 2017-09-12 PROCEDURE — 81001 URINALYSIS AUTO W/SCOPE: CPT | Performed by: SURGERY

## 2017-09-12 PROCEDURE — 00000146 ZZHCL STATISTIC GLUCOSE BY METER IP

## 2017-09-12 PROCEDURE — 85610 PROTHROMBIN TIME: CPT | Performed by: SURGERY

## 2017-09-12 PROCEDURE — 36415 COLL VENOUS BLD VENIPUNCTURE: CPT | Performed by: SURGERY

## 2017-09-12 PROCEDURE — 25000128 H RX IP 250 OP 636: Performed by: SURGERY

## 2017-09-12 PROCEDURE — 84100 ASSAY OF PHOSPHORUS: CPT | Performed by: SURGERY

## 2017-09-12 PROCEDURE — 25000131 ZZH RX MED GY IP 250 OP 636 PS 637: Performed by: SURGERY

## 2017-09-12 RX ORDER — FENTANYL 100 UG/1
1 PATCH TRANSDERMAL
Qty: 2 PATCH | Refills: 0 | Status: SHIPPED | OUTPATIENT
Start: 2017-09-12 | End: 2017-09-19

## 2017-09-12 RX ORDER — WARFARIN SODIUM 2.5 MG/1
2.5 TABLET ORAL
Status: COMPLETED | OUTPATIENT
Start: 2017-09-12 | End: 2017-09-12

## 2017-09-12 RX ORDER — SODIUM BICARBONATE 325 MG/1
325 TABLET ORAL EVERY 4 HOURS
Qty: 42 TABLET | Refills: 0 | Status: SHIPPED | OUTPATIENT
Start: 2017-09-12 | End: 2017-09-18

## 2017-09-12 RX ORDER — OXYCODONE HCL 5 MG/5 ML
5 SOLUTION, ORAL ORAL
Qty: 250 ML | Refills: 0 | Status: SHIPPED | OUTPATIENT
Start: 2017-09-12 | End: 2017-09-19

## 2017-09-12 RX ADMIN — SODIUM BICARBONATE 325 MG: 325 TABLET ORAL at 22:59

## 2017-09-12 RX ADMIN — LEVOTHYROXINE SODIUM 150 MCG: 150 TABLET ORAL at 09:16

## 2017-09-12 RX ADMIN — PRAVASTATIN SODIUM 40 MG: 40 TABLET ORAL at 09:12

## 2017-09-12 RX ADMIN — OXYCODONE HYDROCHLORIDE 10 MG: 5 SOLUTION ORAL at 02:46

## 2017-09-12 RX ADMIN — Medication 15 ML: at 09:11

## 2017-09-12 RX ADMIN — PANCRELIPASE 48000 UNITS: 24000; 76000; 120000 CAPSULE, DELAYED RELEASE PELLETS ORAL at 09:05

## 2017-09-12 RX ADMIN — ONDANSETRON 4 MG: 2 INJECTION INTRAMUSCULAR; INTRAVENOUS at 02:44

## 2017-09-12 RX ADMIN — POTASSIUM & SODIUM PHOSPHATES POWDER PACK 280-160-250 MG 1 PACKET: 280-160-250 PACK at 13:00

## 2017-09-12 RX ADMIN — PANCRELIPASE 48000 UNITS: 24000; 76000; 120000 CAPSULE, DELAYED RELEASE PELLETS ORAL at 01:04

## 2017-09-12 RX ADMIN — PANCRELIPASE 48000 UNITS: 24000; 76000; 120000 CAPSULE, DELAYED RELEASE PELLETS ORAL at 04:28

## 2017-09-12 RX ADMIN — Medication 20 MG: at 20:45

## 2017-09-12 RX ADMIN — GABAPENTIN 300 MG: 250 SOLUTION ORAL at 09:11

## 2017-09-12 RX ADMIN — GABAPENTIN 300 MG: 250 SOLUTION ORAL at 20:46

## 2017-09-12 RX ADMIN — SODIUM BICARBONATE 325 MG: 325 TABLET ORAL at 01:03

## 2017-09-12 RX ADMIN — Medication 1 PACKET: at 14:26

## 2017-09-12 RX ADMIN — PANCRELIPASE 48000 UNITS: 24000; 76000; 120000 CAPSULE, DELAYED RELEASE PELLETS ORAL at 18:14

## 2017-09-12 RX ADMIN — OXYCODONE HYDROCHLORIDE 10 MG: 5 SOLUTION ORAL at 16:14

## 2017-09-12 RX ADMIN — POTASSIUM & SODIUM PHOSPHATES POWDER PACK 280-160-250 MG 1 PACKET: 280-160-250 PACK at 09:15

## 2017-09-12 RX ADMIN — SODIUM BICARBONATE 325 MG: 325 TABLET ORAL at 18:15

## 2017-09-12 RX ADMIN — FENOFIBRATE 160 MG: 160 TABLET ORAL at 09:15

## 2017-09-12 RX ADMIN — SODIUM BICARBONATE 325 MG: 325 TABLET ORAL at 09:06

## 2017-09-12 RX ADMIN — SODIUM BICARBONATE 325 MG: 325 TABLET ORAL at 13:00

## 2017-09-12 RX ADMIN — DOXAZOSIN 1 MG: 1 TABLET ORAL at 09:12

## 2017-09-12 RX ADMIN — Medication 1 PACKET: at 20:47

## 2017-09-12 RX ADMIN — FENTANYL 1 PATCH: 100 PATCH, EXTENDED RELEASE TRANSDERMAL at 18:14

## 2017-09-12 RX ADMIN — SERTRALINE HYDROCHLORIDE 50 MG: 20 SOLUTION ORAL at 09:12

## 2017-09-12 RX ADMIN — OXYCODONE HYDROCHLORIDE 10 MG: 5 SOLUTION ORAL at 21:25

## 2017-09-12 RX ADMIN — SODIUM BICARBONATE 325 MG: 325 TABLET ORAL at 04:28

## 2017-09-12 RX ADMIN — WARFARIN SODIUM 2.5 MG: 2.5 TABLET ORAL at 18:15

## 2017-09-12 RX ADMIN — PANCRELIPASE 48000 UNITS: 24000; 76000; 120000 CAPSULE, DELAYED RELEASE PELLETS ORAL at 13:00

## 2017-09-12 RX ADMIN — POTASSIUM & SODIUM PHOSPHATES POWDER PACK 280-160-250 MG 1 PACKET: 280-160-250 PACK at 18:15

## 2017-09-12 RX ADMIN — PANCRELIPASE 48000 UNITS: 24000; 76000; 120000 CAPSULE, DELAYED RELEASE PELLETS ORAL at 22:59

## 2017-09-12 RX ADMIN — Medication 20 MG: at 09:11

## 2017-09-12 RX ADMIN — OXYCODONE HYDROCHLORIDE 10 MG: 5 SOLUTION ORAL at 06:41

## 2017-09-12 RX ADMIN — PREDNISONE 5 MG: 5 SOLUTION ORAL at 09:12

## 2017-09-12 RX ADMIN — Medication 1 PACKET: at 09:17

## 2017-09-12 NOTE — PROGRESS NOTES
G[facility] Surgery Progress Note    S:  No acute events overnight. Pt seen at bedside resting comfortably with minimal abdominal pain. Continues to have pain and urgency with urination. Also having some heartburn and mild nausea overnight. Passing flatus, and continues to have BMs.    O:  Temp:  [97.4  F (36.3  C)-99.8  F (37.7  C)] 99.8  F (37.7  C)  Pulse:  [66-76] 73  Resp:  [18] 18  BP: (120-137)/(56-65) 130/64  SpO2:  [94 %-97 %] 94 %    I/O last 3 completed shifts:  In: 1914 [P.O.:50; I.V.:244; NG/GT:60]  Out: 3610 [Urine:2825; Emesis/NG output:710; Drains:75]    Gen: A&Ox3, NAD  Resp: non-labored breathing  Abd: Soft, mildly distended, TTP over right side of incision. No erythema. R lateral side of incision drainage thick blood tinged mucous, now with ostomy bag over draining wound. Previous drain site with blood tinged drainage. GJ intact.   Ext: warm and well perfused, ROM intact    Labs:  Complete Blood Count     Recent Labs  Lab 09/11/17  2339 09/10/17  0005 09/08/17  0030 09/06/17  0712   WBC 14.5* 11.9* 12.6* 11.1*   HGB 7.9* 8.2* 7.8* 8.2*   * 608* 485* 449     Basic Metabolic Panel    Recent Labs  Lab 09/11/17  2339 09/10/17  0906 09/10/17  0005 09/09/17  0815    136 138 140   POTASSIUM 3.9 3.7 3.6 3.7   CHLORIDE 103 102 103 105   CO2 27 29 31 29   BUN 18 20 21 20   CR 0.87 0.98 0.96 0.95   * 101* 100* 105*     Liver Function Tests    Recent Labs  Lab 09/11/17  0810 09/10/17  0906 09/09/17  0830 09/08/17  0530   INR 2.22* 2.12* 3.45* 2.46*     Pancreatic Enzymes  No lab results found in last 7 days.  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 CT on 9/8 demonstrated possible superficial wound infection and  additional fluid collection anterior liver that communicates with the resected pancreas concerning for pancreatic leak.       Neuro: liquid oxy PRN, fentanyl patch, gabapentin   CV: Continue hemodynamic monitoring, PTA medications  Pulm: continue pulmonary toilet, encourage IS  FEN/GI: NPO, TF at goal of 65 cc/hr, switched tube feeds per dietician with small amount of reflux/nausea, G tube to gravity, MIVF TKO   Renal: ROSELYN resolved, avoid nephrotoxic medications. Continue doxazosin per j tube   Endo: Monitor blood sugar levels. Home synthroid  ID: Discontinue vancomycin. Wound culture 9/8 growing moderate yeast. Cdiff negative. UA yesterday was negative.  Heme: Hgb stable. Will continue to monitor. Warfarin INR goal 2-3 (mechanical aortic valve)    PPx: SCDs, warfarin PPI      Patient seen and discussed with chief resident, Dr. oSmmer.    Mina Rosa, PGY-1  General Surgery  [phone]

## 2017-09-12 NOTE — PLAN OF CARE
Problem: Goal Outcome Summary  Goal: Goal Outcome Summary  Outcome: Improving  B/P: 125/49, T: 99.5, P: 78, R: 18  Patient states pain is tolerable with Fentanyl patch.  Abdomen soft with bowel sounds present, patient reports passing gas but no BM today.  Tube feeds infusing at 65 ml/hr with bicarb in enzymes per orders.  G-tube intact and hooked to gravity pulling large amounts of tan, thick drainage. Abdominal incision intact with adhesive strips in place. Up independently.  Voiding adequate amounts of urine.  Continue with POC.

## 2017-09-12 NOTE — PROGRESS NOTES
Alert and oriented. VSS on RA. Abdominal pain is managed with PRN Oxcodone and Dilaudid. Continues to report frequency, voiding in small amounts. Straight cathed x2 this shift for 600 cc each time for high post void residuals (see flow sheet for details). Abdominal incision with steri strips, CDI. TF infusing via J tube. G tube to gravity moderate output. NPO with ice chips. Multiple watery stools.Up independently. Continue with poc.

## 2017-09-12 NOTE — PROGRESS NOTES
CLINICAL NUTRITION SERVICES - REASSESSMENT NOTE     Nutrition Prescription    RECOMMENDATIONS FOR MDs/PROVIDERS TO ORDER:  If phos remains stable and WNL, consider d/c Neutra-Phos    Malnutrition Status:    Patient does not meet two of the above criteria necessary for diagnosing malnutrition but is at risk for malnutrition    Recommendations already ordered by Registered Dietitian (RD):  Phos add-on     Future/Additional Recommendations:  1. If pt to require full hydration from TF + free water flushes, would recommend 135 mL water flushes q3h (8 times per day) so TF + free water = 2266 mL free water daily (Or per provider pending ongoing fluid status/other sources of free water)    2. If pt with water loose stools that are not believed to be related to malabsorption (which would cause oily, frothy stools), consider ordering Nutrisource fiber (soluble fiber) to help thicken stools.  Could start with 1 pkt BID-TID (provides 15 kcal and 3 g fiber each)     EVALUATION OF THE PROGRESS TOWARD GOALS   Diet: NPO    Nutrition Support: Isosource 1.5 @ goal 65 ml/hr (1560 ml/day) to provide 2340 kcals (26+ kcal/kg/day), 106 g PRO (1.2+ g/kg/day), 1186 ml free H2O, 275 g CHO and 23 g Fiber daily.     Modulars: 1 pkt Prosource TID (40 kcal and 11 g protein per pkt); TF + Prosource = 2460 kcal (27 kcal/kg) and 139 g PRO (1.5 g/kg) which meets 100% estimated kcal and protein needs.    Free Water: 60 mL q4h for FT patency    Intake: Was on continuous Impact Peptide 1.5 (goal 65 mL/hr to provide 2340 kcal (26 kcal/kg) and 147 g PRO (1.6 g/kg)) since 6/29. Yesterday (9/11) switched to current regimen of  Isosource 1.5 to see if tolerated and be used after discharge as is a less expensive formula than Impact and pt will have to pay for TF formula out of pocket at home after discharge.  Spoke with pt and pt's family/significant other, both report pt tolerated TF change last night and have no complaints.  Are grateful he tolerated  Isosource as this will be a lot less expensive.    7-day avg TF Intake  = 980 mL/day (831 mL from Impact Peptide 1.5 + 149 mL/day from Isosource 1.5) + 1 pkt Prosource which provided 1476 kcal (16 kcal/kg) and 90 g PRO (1 g/kg) which meets 66% estimated kcal needs and 67% estimated protein needs.  No documentation of TF interruptions per review of progress notes the past week, suspect some I/O documentation error?      NEW FINDINGS   Weight: wt up ~5 kg from admit, suspect mostly fluid related vs scale error  Labs: CRP 86 (H) on 9/11, trending down;   Meds: 1 pkt Neutra-Phos TID since 9/5, was ordered due to low phos.  Of note, phos last checked 9/7 and was WNL  GI: per MD note today, small amount of nausea/reflux yesterday after TF formula switch.  Loose stools continue, watery. C.diff negative on 9/8.  Skin: 9/7 WOC note: RLQ I&D incisional wound with moderate drainage    MALNUTRITION  % Intake: Suspect decreased intake noted  % Weight Loss: None noted  Subcutaneous Fat Loss: None observed  Muscle Loss: None observed  Fluid Accumulation/Edema: None noted per chart review  Malnutrition Diagnosis: Patient does not meet two of the above criteria necessary for diagnosing malnutrition but is at risk for malnutrition    Previous Goals   Total avg nutritional intake to meet a minimum of 25 kcal/kg and 1.5 g PRO/kg daily (per dosing wt 90 kg).  Evaluation: Suspect met    Previous Nutrition Diagnosis  Inadequate protein-energy intake related to slow advancement to goal TF rate the past week as evidenced by 7-day avg TF intake = 40% kcal needs and 52% PRO needs and remains NPO  Evaluation: Improving, updated    CURRENT NUTRITION DIAGNOSIS  Predicted inadequate nutrient intake (protein-kcal) related to reporting goal TF rate the past week per chart review/pt report but potential for TF interruptions     INTERVENTIONS  Implementation  Nutrition Education: discussed new formula    Goals  Total avg nutritional intake to meet a  minimum of 25 kcal/kg and 1.5 g PRO/kg daily (per dosing wt 90 kg).    Monitoring/Evaluation  Progress toward goals will be monitored and evaluated per protocol.     Jennie Grigsby RD, LD   7B RD Pager: [phone]

## 2017-09-12 NOTE — PROGRESS NOTES
Home Infusion  Lucas is expected to dc tomorrow and will be going home on continuous enteral feeds.  He has never done home enteral therapy before however he and his wife Karmen have had some initial teaching by [facility] nurse.  Met with Lucas at bedside and provided information about [facility] services.  Explained about administration method via the Marv pump and that we will plan for a hook up of his home pump at the hospital prior to dc. Informed him about supplies and delivery of supplies, storage of formula, plan for SNV and 24/7 availability of I staff while on enteral therapy.  Lucas stated he has not been participating in managing his enteral therapy/cares and I encouraged him to do so with his bedside nurse so he is better prepared for his discharge.  He will be staying locally for a few weeks at [facility].  Lucas verbalized understanding of all information given. He and his wife are willing and able to learn and manage home enteral therapy.  Questions answered.  Will continue to follow and revisit pt tomorow for hook up and additional teaching once dc orders are complete.    Savana Guzman RN [facility] Home Infusion Liaison  [phone] pager    Addendum  Lucas is discharging today and going home on continuous enteral feeds with enzymes.   He and his wife Karmen have been to the [facility] and did well at the class.  Lucas needs a change over from hospital pump to home feeding and pump and some additional instruction as he will not have home nurse visit this evening.     Met with Lucas and Karmen at bedside once supplies arrived.   Had Karmen program the pump, set up and prime the tubing once filled with formula and dissolved enzymes, load pump and bag into the backpack and initiate the feeding. Instructed in 4 hr hang time for formula and to use a new bag q 24 hrs as well as flushing and additional fluid requirements.  Provided information about supplies and supply delivery, storage  of formula, use of clog zapper and 24/7 availability of I staff.  Lucas will have home nurse visit tomorrow for f/u, labs and any additional education needed.   [facility] contact info provided.     Lucas and Karmen verbalized understanding of all information given.  They feel comfortable discharging and managing the enteral therapy.   His TF is running and he is ready for discharge from [facility] perspective

## 2017-09-12 NOTE — PLAN OF CARE
Problem: Goal Outcome Summary  Goal: Goal Outcome Summary  Outcome: No Change  Vital signs:  Temp: 99.8  F (37.7  C) Temp src: Oral BP: 130/64 Pulse: 73   Resp: 18 SpO2: 94 % O2 Device: None (Room air)   Pt is alert and oriented. Pain managed with oxycodone prn x2. Zofran prn x1 for nausea with relief. Tube feeding infusing at 65 ml/hr via J tube. G-tube to gravity with moderate output. Midline dressing, CDI. Abdominal steri strips intact. NPO, ice chips. Up independently. Voided x3. Bladder scanned 380 at 0529. Pt refused to be straight cath overnight. Pt encouraged to void while awake. Small stool x2. Continue POC.

## 2017-09-13 ENCOUNTER — APPOINTMENT (OUTPATIENT)
Dept: OCCUPATIONAL THERAPY | Facility: CLINIC | Age: 57
DRG: 406 | End: 2017-09-13
Attending: SURGERY
Payer: COMMERCIAL

## 2017-09-13 VITALS
TEMPERATURE: 96.8 F | HEIGHT: 71 IN | BODY MASS INDEX: 28.83 KG/M2 | SYSTOLIC BLOOD PRESSURE: 129 MMHG | DIASTOLIC BLOOD PRESSURE: 68 MMHG | RESPIRATION RATE: 16 BRPM | HEART RATE: 79 BPM | WEIGHT: 205.91 LBS | OXYGEN SATURATION: 94 %

## 2017-09-13 DIAGNOSIS — K86.1 CHRONIC PANCREATITIS, UNSPECIFIED PANCREATITIS TYPE (H): Primary | ICD-10-CM

## 2017-09-13 LAB
GLUCOSE BLDC GLUCOMTR-MCNC: 117 MG/DL (ref 70–99)
GLUCOSE BLDC GLUCOMTR-MCNC: 119 MG/DL (ref 70–99)
GLUCOSE BLDC GLUCOMTR-MCNC: 120 MG/DL (ref 70–99)
INR PPP: 1.38 (ref 0.86–1.14)

## 2017-09-13 PROCEDURE — 25000128 H RX IP 250 OP 636: Performed by: STUDENT IN AN ORGANIZED HEALTH CARE EDUCATION/TRAINING PROGRAM

## 2017-09-13 PROCEDURE — 25000132 ZZH RX MED GY IP 250 OP 250 PS 637: Performed by: SURGERY

## 2017-09-13 PROCEDURE — 40000133 ZZH STATISTIC OT WARD VISIT

## 2017-09-13 PROCEDURE — 25000131 ZZH RX MED GY IP 250 OP 636 PS 637: Performed by: SURGERY

## 2017-09-13 PROCEDURE — 00000146 ZZHCL STATISTIC GLUCOSE BY METER IP

## 2017-09-13 PROCEDURE — 36415 COLL VENOUS BLD VENIPUNCTURE: CPT | Performed by: SURGERY

## 2017-09-13 PROCEDURE — 27210913 ZZH NUTRITION PRODUCT INTERMEDIATE PACKET

## 2017-09-13 PROCEDURE — 97535 SELF CARE MNGMENT TRAINING: CPT | Mod: GO

## 2017-09-13 PROCEDURE — 85610 PROTHROMBIN TIME: CPT | Performed by: SURGERY

## 2017-09-13 RX ORDER — GABAPENTIN 250 MG/5ML
300 SOLUTION ORAL 2 TIMES DAILY
Qty: 84 ML | Refills: 0 | Status: SHIPPED | OUTPATIENT
Start: 2017-09-13 | End: 2017-09-19

## 2017-09-13 RX ORDER — WARFARIN SODIUM 7.5 MG/1
3.5 TABLET ORAL DAILY
Qty: 30 TABLET | Refills: 0 | Status: SHIPPED | OUTPATIENT
Start: 2017-09-13 | End: 2017-10-12

## 2017-09-13 RX ORDER — AMINO ACIDS/PROTEIN HYDROLYS 11G-40/45
1 LIQUID IN PACKET (ML) ORAL 3 TIMES DAILY
Qty: 21 PACKET | Refills: 0 | Status: SHIPPED | OUTPATIENT
Start: 2017-09-13 | End: 2017-09-20

## 2017-09-13 RX ORDER — PREDNISONE 5 MG/ML
5 SOLUTION ORAL DAILY
Qty: 35 ML | Refills: 0 | Status: SHIPPED | OUTPATIENT
Start: 2017-09-13 | End: 2017-09-19

## 2017-09-13 RX ADMIN — Medication 1 PACKET: at 08:55

## 2017-09-13 RX ADMIN — Medication 20 MG: at 08:52

## 2017-09-13 RX ADMIN — SERTRALINE HYDROCHLORIDE 50 MG: 20 SOLUTION ORAL at 08:51

## 2017-09-13 RX ADMIN — Medication 3.5 MG: at 17:01

## 2017-09-13 RX ADMIN — Medication 15 ML: at 08:48

## 2017-09-13 RX ADMIN — PANCRELIPASE 48000 UNITS: 24000; 76000; 120000 CAPSULE, DELAYED RELEASE PELLETS ORAL at 11:53

## 2017-09-13 RX ADMIN — PANCRELIPASE 2 CAPSULE: 24000; 76000; 120000 CAPSULE, DELAYED RELEASE PELLETS ORAL at 15:42

## 2017-09-13 RX ADMIN — OXYCODONE HYDROCHLORIDE 10 MG: 5 SOLUTION ORAL at 00:58

## 2017-09-13 RX ADMIN — PANCRELIPASE 48000 UNITS: 24000; 76000; 120000 CAPSULE, DELAYED RELEASE PELLETS ORAL at 08:49

## 2017-09-13 RX ADMIN — PRAVASTATIN SODIUM 40 MG: 40 TABLET ORAL at 08:47

## 2017-09-13 RX ADMIN — SODIUM BICARBONATE 325 MG: 325 TABLET ORAL at 04:20

## 2017-09-13 RX ADMIN — SODIUM BICARBONATE 325 MG: 325 TABLET ORAL at 11:53

## 2017-09-13 RX ADMIN — SODIUM BICARBONATE 325 MG: 325 TABLET ORAL at 15:42

## 2017-09-13 RX ADMIN — DOXAZOSIN 1 MG: 1 TABLET ORAL at 08:47

## 2017-09-13 RX ADMIN — POTASSIUM & SODIUM PHOSPHATES POWDER PACK 280-160-250 MG 1 PACKET: 280-160-250 PACK at 11:53

## 2017-09-13 RX ADMIN — PREDNISONE 5 MG: 5 SOLUTION ORAL at 08:51

## 2017-09-13 RX ADMIN — SODIUM BICARBONATE 325 MG: 325 TABLET ORAL at 08:54

## 2017-09-13 RX ADMIN — LEVOTHYROXINE SODIUM 150 MCG: 150 TABLET ORAL at 08:46

## 2017-09-13 RX ADMIN — PANCRELIPASE 48000 UNITS: 24000; 76000; 120000 CAPSULE, DELAYED RELEASE PELLETS ORAL at 04:19

## 2017-09-13 RX ADMIN — OXYCODONE HYDROCHLORIDE 10 MG: 5 SOLUTION ORAL at 08:54

## 2017-09-13 RX ADMIN — GABAPENTIN 300 MG: 250 SOLUTION ORAL at 08:51

## 2017-09-13 RX ADMIN — POTASSIUM & SODIUM PHOSPHATES POWDER PACK 280-160-250 MG 1 PACKET: 280-160-250 PACK at 08:48

## 2017-09-13 RX ADMIN — OXYCODONE HYDROCHLORIDE 10 MG: 5 SOLUTION ORAL at 04:50

## 2017-09-13 RX ADMIN — Medication 1 PACKET: at 13:55

## 2017-09-13 RX ADMIN — POTASSIUM & SODIUM PHOSPHATES POWDER PACK 280-160-250 MG 1 PACKET: 280-160-250 PACK at 16:20

## 2017-09-13 RX ADMIN — FENOFIBRATE 160 MG: 160 TABLET ORAL at 08:45

## 2017-09-13 NOTE — PROGRESS NOTES
Care Coordinator- Discharge Planning     Admission Date/Time:  8/28/2017  Attending MD:  Vik Crum,*     Data  Date of initial CC assessment:  9/5/2017  Chart reviewed, discussed with interdisciplinary team.   Patient was admitted for:   1. H/O aortic valve replacement    2. Chronic pancreatitis, unspecified pancreatitis type (H)    3. Acute post-operative pain         Assessment  Full assessment completed in previous note    Per MD team patient medically ready for discharge today. Patient will discharge with continuous tube feedings and be hooked up at the hospital prior to discharging by I liaison.  Patient will have a nurse visit tomorrow which will include labs and then based on lab results may require IV hydration.  Adeola Juarez, patients outpatient coordinator, will watch for these labs and set up an appointment for IV hydration at the [facility] if needed tomorrow.  Patient also has f/u appt scheduled for 9/19 with Dr. Crum.  Patient updated regarding these plans.          Plan  Anticipated Discharge Date:  9/13/2017  Anticipated Discharge Plan:  Discharge to home with [facility] for home enteral nutrition nursing visits and plan for outpatient f/u.     CTS Handoff completed:  YES    Elisabeth Shelton, RNCC  [phone]

## 2017-09-13 NOTE — PLAN OF CARE
Problem: Goal Outcome Summary  Goal: Goal Outcome Summary  Outcome: Improving  Vitals:     09/12/17 1710 09/12/17 2009 09/12/17 2315 09/13/17 0812   BP:   131/62 109/61 129/68   BP Location:   Left arm Left arm Left arm   Cuff Size:           Pulse:   74 71 79   Resp:   16 16 16   Temp:   99.8  F (37.7  C) 99.2  F (37.3  C) 96.8  F (36  C)   TempSrc:   Oral Oral Oral   SpO2:   97% 95% 94%   Weight: 93.4 kg (205 lb 14.6 oz)         Height:             AVSS, pt A&O x 4, intermittent complaints of pain, PO oxycodone given x 1 with adequate relief. Midline transverse incision KIMO with steri-strips. G-tube to gravity with small amount of drainage. Wound pouch on right, dressing changed this am via MD, draining scant tan drainage. J-tube with continuous TF at 65 ml/hr, pt tolerating. Pt plan to discharge this afternoon after 4pm. Tube feed teaching with teach back done with pt and spouse. Med administration teaching with teach back done with pt and spouse. Pt up with SBA, voiding adequate. Continuing to monitor per POC.

## 2017-09-13 NOTE — PLAN OF CARE
Problem: Goal Outcome Summary  Goal: Goal Outcome Summary  Outcome: Improving  Vitals:     09/12/17 1518 09/12/17 1710 09/12/17 2009 09/12/17 2315   BP: 118/62   131/62 109/61   BP Location: Left arm   Left arm Left arm   Cuff Size:           Pulse: 77   74 71   Resp: 16   16 16   Temp: 99.7  F (37.6  C)   99.8  F (37.7  C) 99.2  F (37.3  C)   TempSrc: Oral   Oral Oral   SpO2: 95%   97% 95%   Weight:   93.4 kg (205 lb 14.6 oz)       Height:             Pt Temp max 99.2, VSS. NPO except ice chips, tolerating well. Denies nausea. Reports abd pain, relief with PRN oxycodone via J tube. Has abd incision with steristrips KIMO, CDI. Old tapeburn on abd, CDI. Tube feeds running at 65mls/hr with enzymes via J tube. BS:120. G tube to gravity with moderate, tan thick output. Pouch on R. Abdomen with small output. Pt up to bathroom independently, total void 550 this shift. No BM, +flatus, +BS. Remains asleep between cares. Will continue with POC.

## 2017-09-13 NOTE — PLAN OF CARE
"Problem: Goal Outcome Summary  Goal: Goal Outcome Summary  Outcome: Adequate for Discharge Date Met:  09/13/17  /68 (BP Location: Left arm)  Pulse 79  Temp 96.8  F (36  C) (Oral)  Resp 16  Ht 1.803 m (5' 10.98\")  Wt 93.4 kg (205 lb 14.6 oz)  SpO2 94%  BMI 28.73 kg/m2     AVSS.  Pt denies pain and nausea.  Discharge education provided with written AVS to pt.  Home care supplies including tube feeding, wound care, and drain care supplies all provided for pt.  Given warfarin 3.5 mg prior d/c patient.  Patient left at 1730 with all his belongings.        "

## 2017-09-13 NOTE — PLAN OF CARE
Alert and oriented. Tmax 99.9. Pain controled with oxycodone and fentanyl patch to left chest. GT intact. TF at 65 m/hr. Denies N/V. G to gravity with large output. Abdominal steri strips intact. UA collected and sent lab, urinalysis negative for a UTI. Voiding frequently in small amounts. Bladder scanned for 358 cc at 2130. +BS,  having loose stools. NPO. . Encouraged pt to participate in TF cares. Pt stated they wanted to observe RN with programing the james pump and administering TF for now. Possible discharge tomorrow.

## 2017-09-13 NOTE — PLAN OF CARE
Problem: Goal Outcome Summary  Goal: Goal Outcome Summary  OT/7B: Reviewed abdominal precautions, pt independent to identify. Reviewed LB dressing within precautions, pt able to demonstrate ROM needed while supine to complete LB dressing within precautions, has AD as needed. Verbalized understanding in use of equipment, no further concerns. Reviewed bathing and transfers, pt able to identify independently how to modify task within precautions. Pt demonstrates good carryover of EC/WS teaching, able to incorporate into daily routine.     Recommendation: Home with assist as needed from wife when medically stable. Pt reports all goals are met at this time, has no further concerns or needs to be addressed by OT services. Pt reports independence with walking and exercise program, completes 3-4x/day. Will discharge from OT as pt reports goals are met. Please re-consult if change in medical status.     Occupational Therapy Discharge Summary     Reason for therapy discharge:    Pt reports all goals/outcomes are met, no further needs identified     Progress towards therapy goal(s). See goals on Care Plan in [facility] electronic health record for goal details.  Goals met     Therapy recommendation(s):    Continue home exercise program.

## 2017-09-13 NOTE — DISCHARGE SUMMARY
"S SURGERY DISCHARGE SUMMARY  Patient Name: Lucas Brown  MR#: [id number]  Date of Admission: 8/28/2017  5:09 AM  Date of Discharge: 9/13/2017  Operating Physician: Dr. Crum  Discharging Physician: Dr. Collado    Discharge Diagnoses:  1. H/O aortic valve replacement    2. Chronic pancreatitis, unspecified pancreatitis type (H)    3. Acute post-operative pain        Procedures Performed this admission:  Procedure(s):  WHIPPLE PROCEDURE    Consultations:  PHYSICAL THERAPY ADULT IP CONSULT  NUTRITION SERVICES ADULT IP CONSULT  OCCUPATIONAL THERAPY ADULT IP CONSULT  PHARMACY IP CONSULT  PHARMACY IP CONSULT  PHARMACY TO DOSE VANCO  WOUND OSTOMY CONTINENCE NURSE  IP CONSULT  PHARMACY TO DOSE WARFARIN  VASCULAR ACCESS ADULT IP CONSULT  PATIENT LEARNING CENTER IP CONSULT  VASCULAR ACCESS CARE ADULT IP CONSULT  VASCULAR ACCESS CARE ADULT IP CONSULT  VASCULAR ACCESS CARE ADULT IP CONSULT    Brief HPI:  As per admission HPI \"Lucas Brown has chronic pancreatitis. Initial episode was post-op after aortic valve surgery in Oct 2014. He has had several ERCPs and stents with continued symptoms. He has had multiple ED visits for pancreatitis pain, sometimes as often as twice monthly.  He is on chronic opioids. His imaging shows pancreatic atrophy with abnormalities in the head of the pancreas. He presents at this time for a Whipple procedure.\"    Hospital Course:   Lucas Brown was admitted s/p Whipple procedure, umbilical hernia repair, incidental appendectomy and placement of GJ tube which the patient tolerated well without complications. The patient was transferred to the surgical ICU for postoperative recovery. On POD1, the patient developed pre-renal ROSELYN which resolved with fluid resuscitation and labs (creatinine and BUN) normalized on by POD4. Trickle tube feeds were also started on POD1 and were slowly advanced to goal feeding rate of 65 cc/hr by POD7. On POD3 the patient was transferred to the general floor " in stable condition. On POD5, there was increased erythema and edema around his lower abdominal incision and he was diagnosed with a wound infection and abscess. The wound was opened at bedside and the abscess culture grew Staph epidermidis. He was treated with vancomycin and meropenem. On POD9, he started to have drainage from the right side of his superior, subcostal wound. The wound was opened at bedside with expression of brown, mucous-y fluid. The wound culture grew out Lactobacillus and Candida. Both abdominal wounds were packed during daily dressing changes. The inferior wound was no longer draining at time of discharge. The superior abdomen wound continued to have brown, thick output (although decreasing) and he was discharged with an ostomy bag in place to allow drainage. The patient had issues throughout his hospital stay with urinary retention requiring multiple straight catheterizations and replacement of his Durham catheter for a 2 day period. The retention resolved after restarting home BPH medications.    He was discharged to home on POD14. Upon discharge, he was tolerating tube feeds at goal, ambulating, voiding spontaneously without difficulty, and pain was controlled with pain medications through his G-tube and Fentanyl patches. The patient was discharged home in stable and improved condition. He will follow up with home nursing staff tomorrow and with Dr. Crum in clinic in 1 week.    Pathology:    ID Type Source Tests Collected by Time Destination   1 : Pancreatic Duodenectomy Tissue Other OR DOCUMENTATION ONLY Vik Crum MD 8/28/2017 11:43 AM    A : Gallbladder Organ Gallbladder and Contents SURGICAL PATHOLOGY EXAM Vik Crum MD 8/28/2017 10:06 AM    B : Distal Duodenum, Proximal Jejunum Tissue Other SURGICAL PATHOLOGY EXAM Vik Crum MD 8/28/2017 11:09 AM    C : appendix  Tissue Appendix SURGICAL PATHOLOGY EXAM Vik Crum MD 8/28/2017   "2:18 PM        Discharge Exam:  /68 (BP Location: Left arm)  Pulse 79  Temp 96.8  F (36  C) (Oral)  Resp 16  Ht 1.803 m (5' 10.98\")  Wt 93.4 kg (205 lb 14.6 oz)  SpO2 94%  BMI 28.73 kg/m2  General: Alert, in no acute distress.  HEENT: Normocephalic, atraumatic.   Respiratory: Breathing comfortably.  Cardiovascular: Regular rate and rhythm.   Gastrointestinal: Non-distended, appropriately tender, no rebound or guarding; Subcostal midline incision with staples in place along left portion of incision, right side of incision is open with packing in place and covered by an ostomy bag with thick, mucous-y brown output. Lower abdominal wound with gauze packing and covered by bandage, no drainage, wound edges appear healthy and viable.  Extremities: no edema, wwp    Medications on Discharge:      Review of your medicines      START taking       Dose / Directions    amylase-lipase-protease 19088-18761 UNITS Cpep per EC capsule   Commonly known as:  CREON 24   Replaces:  amylase-lipase-protease 16422 UNITS Tabs tablet        Dose:  2 capsule   2 capsules (48,000 Units) by Per J Tube route every 4 hours for 7 days   Quantity:  84 capsule   Refills:  0       fentaNYL 100 mcg/hr 72 hr patch   Commonly known as:  DURAGESIC   Used for:  Acute post-operative pain   Replaces:  fentaNYL 50 mcg/hr 72 hr patch        Dose:  1 patch   Place 1 patch onto the skin every 72 hours for 7 days   Quantity:  2 patch   Refills:  0       gabapentin 250 MG/5ML solution   Commonly known as:  NEURONTIN   Used for:  Acute post-operative pain        Dose:  300 mg   6 mLs (300 mg) by Per J Tube route 2 times daily for 7 days   Quantity:  84 mL   Refills:  0       omeprazole 2 mg/mL Susp   Commonly known as:  priLOSEC   Replaces:  omeprazole 20 MG CR capsule        Dose:  20 mg   10 mLs (20 mg) by Oral or Feeding Tube route 2 times daily for 7 days   Quantity:  140 mL   Refills:  0       oxyCODONE 5 MG/5ML solution   Commonly known as:  " ROXICODONE   Used for:  Acute post-operative pain        Dose:  5 mg   5 mLs (5 mg) by Per J Tube route every 3 hours as needed for moderate to severe pain   Quantity:  250 mL   Refills:  0       potassium & sodium phosphates 280-160-250 MG Packet   Commonly known as:  NEUTRA-PHOS        Dose:  1 packet   Take 1 packet by mouth 3 times daily for 7 days   Quantity:  21 packet   Refills:  0       protein modular        Dose:  1 packet   1 packet by Per Feeding Tube route 3 times daily for 7 days   Quantity:  21 packet   Refills:  0       sodium bicarbonate 325 MG tablet        Dose:  325 mg   1 tablet (325 mg) by Per J Tube route every 4 hours for 7 days   Quantity:  42 tablet   Refills:  0         CONTINUE these medicines which may have CHANGED, or have new prescriptions. If we are uncertain of the size of tablets/capsules you have at home, strength may be listed as something that might have changed.       Dose / Directions    predniSONE 5 MG/5ML solution   This may have changed:    - medication strength  - how to take this        Dose:  5 mg   5 mLs (5 mg) by Per J Tube route daily for 7 days   Quantity:  35 mL   Refills:  0         CONTINUE these medicines which have NOT CHANGED       Dose / Directions    ACETAMINOPHEN PO        Dose:  1000 mg   Take 1,000 mg by mouth every 6 hours as needed for pain   Refills:  0       aspirin 81 MG tablet        Dose:  81 mg   Take 81 mg by mouth daily Holding   Refills:  0       fenofibrate 145 MG tablet        Dose:  145 mg   Take 145 mg by mouth daily   Refills:  0       FINASTERIDE PO        Dose:  5 mg   Take 5 mg by mouth daily   Refills:  0       levothyroxine 150 MCG tablet   Commonly known as:  SYNTHROID/LEVOTHROID        Dose:  150 mcg   Take 150 mcg by mouth daily   Refills:  0       lisinopril 10 MG tablet   Commonly known as:  PRINIVIL/ZESTRIL        Dose:  10 mg   Take 10 mg by mouth daily   Refills:  0       MULTIVITAMINS Chew        Dose:  1 chew tab   Take 1 chew  tab by mouth daily   Refills:  0       ondansetron 4 MG ODT tab   Commonly known as:  ZOFRAN-ODT        Dose:  4 mg   Take 4 mg by mouth every 8 hours as needed for nausea   Refills:  0       pravastatin 40 MG tablet   Commonly known as:  PRAVACHOL        Dose:  40 mg   Take 40 mg by mouth daily   Refills:  0       sertraline 50 MG tablet   Commonly known as:  ZOLOFT        Dose:  50 mg   Take 50 mg by mouth daily   Refills:  0       TAMSULOSIN HCL PO        Dose:  0.4 mg   Take 0.4 mg by mouth daily   Refills:  0       WARFARIN SODIUM PO        Holding startin 8/23/17   Refills:  0         STOP taking          amylase-lipase-protease 24470 UNITS Tabs tablet   Commonly known as:  VIOKACE   Replaced by:  amylase-lipase-protease 47503-67697 UNITS Cpep per EC capsule           CELEBREX PO           enoxaparin 80 MG/0.8ML injection   Commonly known as:  LOVENOX           fentaNYL 50 mcg/hr 72 hr patch   Commonly known as:  DURAGESIC   Replaced by:  fentaNYL 100 mcg/hr 72 hr patch           HYDROMORPHONE HCL PO           omeprazole 20 MG CR capsule   Commonly known as:  priLOSEC   Replaced by:  omeprazole 2 mg/mL Susp                Where to get your medicines      Some of these will need a paper prescription and others can be bought over the counter. Ask your nurse if you have questions.     Bring a paper prescription for each of these medications      amylase-lipase-protease 43629-00035 UNITS Cpep per EC capsule     fentaNYL 100 mcg/hr 72 hr patch     gabapentin 250 MG/5ML solution     omeprazole 2 mg/mL Susp     oxyCODONE 5 MG/5ML solution     potassium & sodium phosphates 280-160-250 MG Packet     predniSONE 5 MG/5ML solution     protein modular     sodium bicarbonate 325 MG tablet             Discharge Procedure Orders  Home infusion referral     INR Clinic Referral     Reason for your hospital stay   Order Comments: Whipple procedure for chronic pancreatitis     Adult [facility] Follow-up and recommended labs and  tests   Order Comments: Follow up at the infusion center tomorrow, 9/14 and with Dr. Crum in 1 week to evaluate after surgery and for hospital follow- up. No follow up labs or test are needed.    Appointments on [place] and/or [facility] (with [facility] or [facility] provider or service). Call [phone] if you haven't heard regarding these appointments within 7 days of discharge.     Activity   Order Comments: Your activity upon discharge: activity as tolerated, no lifting or strenuous exercise for 6-8 weeks and no driving while on opioid pain medication.   Order Specific Question Answer Comments   Is discharge order? Yes      Tubes and drains   Order Comments: You are going home with the following tubes or drains: GJ tube     Discharge Instructions   Order Comments: Continue with tube feeds for nutrition.    Do not lift more than 20 pounds for 6-8 weeks after surgery.     You may shower after surgery but do not scrub incisions; simply let soapy water run over them. Pat area dry.     Do not soak in a bath tub or pool for 6 weeks after surgery.    No driving, no using heavy machinery and no major decision-making while taking narcotic pain medication. It is illegal to drive while taking narcotic pain medication. Narcotics can cause constipation. Take stool softener while taking narcotic pain medication. If no bowel movement for 2 days add a laxative to aid in bowel movement. You can obtain a laxative from your pharmacyst over the counter.     You may take Tylenol (acetaminophen) and/or Motrin (ibuprofen) in addition or instead of narcotic medication; it is helpful to take these medications as you wean down off of the narcotic medications. If you have been prescribed Percocet, be aware that it contains Tylenol and oxycodone together. Do not take a total of more than 3500 mg of Tylenol per 24 hours to avoid liver damage.    Please call if you experience increasing abdominal pain, nausea, vomiting, increasing drainage  from your wounds, chills, or fever >101.5.    If you are a patient of Dr. Crum, please call [place] or [place] [phone] for questions and to discuss appointment.   If you have questions about your follow-up appointment, please call the General Surgery Clinic at [phone].  Call [phone] and ask to speak with surgery resident if you are having troubles in the evenings, at night, or on weekends.     Tubes and drains   Order Comments: You are going home with the following tubes or drains: G tube - Keep open to drain with collection bag in place. J tube - Will be used for tube feeds     Full Code     Diet   Order Comments: Continue with tube feeds in J-tube   Order Specific Question Answer Comments   Is discharge order? Yes      Mina Rosa, PGY-1  General Surgery  [phone]

## 2017-09-14 ENCOUNTER — CARE COORDINATION (OUTPATIENT)
Dept: CARE COORDINATION | Facility: CLINIC | Age: 57
End: 2017-09-14

## 2017-09-14 ENCOUNTER — ANTICOAGULATION THERAPY VISIT (OUTPATIENT)
Dept: ANTICOAGULATION | Facility: CLINIC | Age: 57
End: 2017-09-14

## 2017-09-14 DIAGNOSIS — Z79.01 LONG-TERM (CURRENT) USE OF ANTICOAGULANTS: ICD-10-CM

## 2017-09-14 DIAGNOSIS — Z53.9 ERRONEOUS ENCOUNTER--DISREGARD: Primary | ICD-10-CM

## 2017-09-14 DIAGNOSIS — Z95.2 H/O AORTIC VALVE REPLACEMENT: ICD-10-CM

## 2017-09-14 DIAGNOSIS — E87.8 ELECTROLYTE IMBALANCE: Primary | ICD-10-CM

## 2017-09-14 LAB
ANION GAP SERPL CALCULATED.3IONS-SCNC: 8 MMOL/L (ref 3–14)
BASOPHILS # BLD AUTO: 0 10E9/L (ref 0–0.2)
BASOPHILS NFR BLD AUTO: 0.3 %
BUN SERPL-MCNC: 24 MG/DL (ref 7–30)
CALCIUM SERPL-MCNC: 7.7 MG/DL (ref 8.5–10.1)
CHLORIDE SERPL-SCNC: 105 MMOL/L (ref 94–109)
CO2 SERPL-SCNC: 28 MMOL/L (ref 20–32)
CREAT SERPL-MCNC: 0.9 MG/DL (ref 0.66–1.25)
DIFFERENTIAL METHOD BLD: ABNORMAL
EOSINOPHIL # BLD AUTO: 0.1 10E9/L (ref 0–0.7)
EOSINOPHIL NFR BLD AUTO: 0.9 %
ERYTHROCYTE [DISTWIDTH] IN BLOOD BY AUTOMATED COUNT: 16.4 % (ref 10–15)
GFR SERPL CREATININE-BSD FRML MDRD: 87 ML/MIN/1.7M2
GLUCOSE SERPL-MCNC: 107 MG/DL (ref 70–99)
HCT VFR BLD AUTO: 26.9 % (ref 40–53)
HGB BLD-MCNC: 8.3 G/DL (ref 13.3–17.7)
IMM GRANULOCYTES # BLD: 0.1 10E9/L (ref 0–0.4)
IMM GRANULOCYTES NFR BLD: 1 %
INR PPP: 1.17 (ref 0.86–1.14)
LYMPHOCYTES # BLD AUTO: 1.4 10E9/L (ref 0.8–5.3)
LYMPHOCYTES NFR BLD AUTO: 17.4 %
MCH RBC QN AUTO: 25.3 PG (ref 26.5–33)
MCHC RBC AUTO-ENTMCNC: 30.9 G/DL (ref 31.5–36.5)
MCV RBC AUTO: 82 FL (ref 78–100)
MONOCYTES # BLD AUTO: 0.7 10E9/L (ref 0–1.3)
MONOCYTES NFR BLD AUTO: 8.3 %
NEUTROPHILS # BLD AUTO: 5.8 10E9/L (ref 1.6–8.3)
NEUTROPHILS NFR BLD AUTO: 72.1 %
NRBC # BLD AUTO: 0 10*3/UL
NRBC BLD AUTO-RTO: 0 /100
PLATELET # BLD AUTO: 501 10E9/L (ref 150–450)
POTASSIUM SERPL-SCNC: 4.6 MMOL/L (ref 3.4–5.3)
RBC # BLD AUTO: 3.28 10E12/L (ref 4.4–5.9)
SODIUM SERPL-SCNC: 141 MMOL/L (ref 133–144)
WBC # BLD AUTO: 8 10E9/L (ref 4–11)

## 2017-09-14 PROCEDURE — 85610 PROTHROMBIN TIME: CPT | Performed by: SURGERY

## 2017-09-14 PROCEDURE — 80048 BASIC METABOLIC PNL TOTAL CA: CPT | Performed by: SURGERY

## 2017-09-14 PROCEDURE — 85025 COMPLETE CBC W/AUTO DIFF WBC: CPT | Performed by: SURGERY

## 2017-09-14 NOTE — MR AVS SNAPSHOT
Lucas Brown   9/14/2017   Anticoagulation Therapy Visit    Description:  56 year old male   Provider:  Gwendolyn Goodman, RN   Department:  [facility]           INR as of 9/14/2017     Today's INR       Anticoagulation Summary as of 9/14/2017     INR goal 2.0-3.0   Today's INR    Full instructions No maintenance plan   Next INR check 9/14/2017    Indications   H/O aortic valve replacement [Z95.2]  Long-term (current) use of anticoagulants [Z79.01] [Z79.01]         September 2017 Details    Sun Mon Tue Wed Thu Fri Sat          1               2                 3               4               5               6               7               8               9                 10               11               12               13               14      See details      15               16                 17               18               19               20               21               22               23                 24               25               26               27               28               29               30                Date Details   09/14 This INR check       Date of next INR:  9/14/2017

## 2017-09-14 NOTE — PROGRESS NOTES
This patient was seen at or by General Surgery Department and Care Coordinators from that department will handle this patient's post discharge follow up               Adult [facility] Follow-up and recommended labs and tests   Order Comments: Follow up at the [facility] center tomorrow, 9/14 and with Dr. Crum in 1 week to evaluate after surgery and for hospital follow- up. No follow up labs or test are needed.

## 2017-09-14 NOTE — PROGRESS NOTES
Dates of hospitalization: 8/28/17 to 9/13/17  Reason for hospitalization: Whipple procedure for chronic pancreatitis    Procedures performed: See above  Vitamin K or FFP administered? No  Inpatient warfarin doses added to calendar? Yes  Medication changes at discharge: Creon 24 2 cap Q 4 hours for 7 days, Duragesic 1 patch Q 72 hours for 7 days, Neurontin 300mg per J-Tube BID for 7 days, Prilosec 20mg BID, Roxicodone 5mg Q 3 hours PRn, Potassium/Sodium Phosphate 1 packet TID, and Sodium Bicard 325mg Q 4 Hours  Warfarin dosing after DC: 3.75mg 9/13/17. Pt was D/C with 7.5mg tablets please verify this  Patient discharged on Lovenox? No  Next INR date: Thursday 9/14/17  Where is the patient discharging to? (home, TCU, staying locally, etc.): [facility] (pt and spouse is renting an apt)  Will patient have home care? [facility]     We are only following pt for 4-6 weeks then he will go back to PCP who will follow pt's INR/dosing

## 2017-09-15 ENCOUNTER — ANTICOAGULATION THERAPY VISIT (OUTPATIENT)
Dept: ANTICOAGULATION | Facility: CLINIC | Age: 57
End: 2017-09-15

## 2017-09-15 ENCOUNTER — TELEPHONE (OUTPATIENT)
Dept: TRANSPLANT | Facility: CLINIC | Age: 57
End: 2017-09-15

## 2017-09-15 DIAGNOSIS — Z79.01 LONG-TERM (CURRENT) USE OF ANTICOAGULANTS: ICD-10-CM

## 2017-09-15 DIAGNOSIS — K86.89 PANCREATIC INSUFFICIENCY: Primary | ICD-10-CM

## 2017-09-15 DIAGNOSIS — Z95.2 H/O AORTIC VALVE REPLACEMENT: ICD-10-CM

## 2017-09-15 NOTE — TELEPHONE ENCOUNTER
Patient phoned in this morning and was wondering what his lab results showed from yesterday as no one updated him what to do with his coumadin. The coumadin clinic was phoned and they were going to give the patient a call his morning regarding his INR of 1.17. So patient was phoned back and informed that the coumadin clinic will give him an updated with what to do with his coumadin this morning. He was also informed that the rest of his labs looked good so that he did not need to come in for a [facility] appointment today. Patient was advised to come back for labs on Saturday morning to repeat his labs. He was also reminded that he can take his pills by mouth with water, but has to clamp his G-tube for 1 hour after. Then he can rehook up his drainage bag. Patient voided understanding.

## 2017-09-15 NOTE — PROGRESS NOTES
ANTICOAGULATION FOLLOW-UP CLINIC VISIT    Patient Name:  Lucas Brown  Date:  9/15/2017  Contact Type:  Telephone    SUBJECTIVE:     Patient Findings     Comments  Pt reports he didn't take any Warfarin due to waiting for a call from the Clinic. Writer explained that we close at a certain time and when labs get done late in the afternoon pt should call the on-call MD to get a dose instead of missing dose. Pt communicated understanding. Pt has 2.5mg and 5mg tablets.           OBJECTIVE    INR   Date Value Ref Range Status   09/14/2017 1.17 (H) 0.86 - 1.14 Final       ASSESSMENT / PLAN  INR assessment SUB    Recheck INR In: 4 DAYS    INR Location Clinic      Anticoagulation Summary as of 9/15/2017     INR goal 2.0-3.0   Today's INR 1.17! (9/14/2017)   Maintenance plan No maintenance plan   Full instructions 9/15: 7.5 mg; 9/16: 5 mg; 9/17: 5 mg   Plan last modified Gwendolyn Goodman RN (9/15/2017)   Next INR check 9/18/2017   Priority INR   Target end date     Indications   H/O aortic valve replacement [Z95.2]  Long-term (current) use of anticoagulants [Z79.01] [Z79.01]         Anticoagulation Episode Summary     INR check location     Preferred lab     Send INR reminders to [facility]    Comments Pt is staying local and [facility] comes out  Monitoring pt for 4-6 weeks then go back to PCP at [facility] [place]  Has 2.5 and 5mg tablets       Anticoagulation Care Providers     Provider Role Specialty Phone number    Vik Crum MD Responsible Transplant [phone]            See the Encounter Report to view Anticoagulation Flowsheet and Dosing Calendar (Go to Encounters tab in chart review, and find the Anticoagulation Therapy Visit)     Spoke with patient. Gave them their lab results and new warfarin recommendation.  No changes in health, medication, or diet. No missed doses, no falls. No signs or symptoms of bleed or clotting.       Gwendolyn Goodman, RN

## 2017-09-15 NOTE — MR AVS SNAPSHOT
Lucas BOWERS Stephanie   9/15/2017   Anticoagulation Therapy Visit    Description:  56 year old male   Provider:  Gwendolyn Goodman, RN   [facility]           INR as of 9/15/2017     Today's INR 1.17! (9/14/2017)      Anticoagulation Summary as of 9/15/2017     INR goal 2.0-3.0   Today's INR 1.17! (9/14/2017)   Full instructions 9/15: 7.5 mg; 9/16: 5 mg; 9/17: 5 mg   Next INR check 9/18/2017    Indications   H/O aortic valve replacement [Z95.2]  Long-term (current) use of anticoagulants [Z79.01] [Z79.01]         September 2017 Details    Sun Mon Tue Wed Thu Fri Sat          1               2                 3               4               5               6               7               8               9                 10               11               12               13               14               15      7.5 mg   See details      16      5 mg           17      5 mg         18            19               20               21               22               23                 24               25               26               27               28               29               30                Date Details   09/15 This INR check       Date of next INR:  9/18/2017         How to take your warfarin dose     To take:  5 mg Take 1 of the 5 mg tablets.    To take:  7.5 mg Take 1 of the 7.5 mg tablets.

## 2017-09-15 NOTE — Clinical Note
Carlos Marrero,  Can you please set this patient up with a lab appointment at the clinic tomorrow at 10am? I already informed him of the appointment.  Thanks, West

## 2017-09-16 DIAGNOSIS — Z95.2 H/O AORTIC VALVE REPLACEMENT: ICD-10-CM

## 2017-09-16 DIAGNOSIS — Z79.01 LONG-TERM (CURRENT) USE OF ANTICOAGULANTS: ICD-10-CM

## 2017-09-16 DIAGNOSIS — K86.89 PANCREATIC INSUFFICIENCY: ICD-10-CM

## 2017-09-16 LAB
ANION GAP SERPL CALCULATED.3IONS-SCNC: 6 MMOL/L (ref 3–14)
BASOPHILS # BLD AUTO: 0 10E9/L (ref 0–0.2)
BASOPHILS NFR BLD AUTO: 0.2 %
BUN SERPL-MCNC: 30 MG/DL (ref 7–30)
CALCIUM SERPL-MCNC: 8.3 MG/DL (ref 8.5–10.1)
CHLORIDE SERPL-SCNC: 99 MMOL/L (ref 94–109)
CO2 SERPL-SCNC: 30 MMOL/L (ref 20–32)
CREAT SERPL-MCNC: 0.99 MG/DL (ref 0.66–1.25)
DIFFERENTIAL METHOD BLD: ABNORMAL
EOSINOPHIL # BLD AUTO: 0 10E9/L (ref 0–0.7)
EOSINOPHIL NFR BLD AUTO: 0.1 %
ERYTHROCYTE [DISTWIDTH] IN BLOOD BY AUTOMATED COUNT: 15.7 % (ref 10–15)
GFR SERPL CREATININE-BSD FRML MDRD: 78 ML/MIN/1.7M2
GLUCOSE SERPL-MCNC: 115 MG/DL (ref 70–99)
HCT VFR BLD AUTO: 28.4 % (ref 40–53)
HGB BLD-MCNC: 9.1 G/DL (ref 13.3–17.7)
IMM GRANULOCYTES # BLD: 0.1 10E9/L (ref 0–0.4)
IMM GRANULOCYTES NFR BLD: 0.9 %
INR PPP: 1.12 (ref 0.86–1.14)
LYMPHOCYTES # BLD AUTO: 1.5 10E9/L (ref 0.8–5.3)
LYMPHOCYTES NFR BLD AUTO: 18.1 %
MCH RBC QN AUTO: 26 PG (ref 26.5–33)
MCHC RBC AUTO-ENTMCNC: 32 G/DL (ref 31.5–36.5)
MCV RBC AUTO: 81 FL (ref 78–100)
MONOCYTES # BLD AUTO: 1 10E9/L (ref 0–1.3)
MONOCYTES NFR BLD AUTO: 12.1 %
NEUTROPHILS # BLD AUTO: 5.8 10E9/L (ref 1.6–8.3)
NEUTROPHILS NFR BLD AUTO: 68.6 %
NRBC # BLD AUTO: 0 10*3/UL
NRBC BLD AUTO-RTO: 0 /100
PLATELET # BLD AUTO: 436 10E9/L (ref 150–450)
POTASSIUM SERPL-SCNC: 4.1 MMOL/L (ref 3.4–5.3)
RBC # BLD AUTO: 3.5 10E12/L (ref 4.4–5.9)
SODIUM SERPL-SCNC: 135 MMOL/L (ref 133–144)
WBC # BLD AUTO: 8.5 10E9/L (ref 4–11)

## 2017-09-18 ENCOUNTER — CARE COORDINATION (OUTPATIENT)
Dept: SURGERY | Facility: CLINIC | Age: 57
End: 2017-09-18

## 2017-09-18 ENCOUNTER — ANTICOAGULATION THERAPY VISIT (OUTPATIENT)
Dept: ANTICOAGULATION | Facility: CLINIC | Age: 57
End: 2017-09-18

## 2017-09-18 ENCOUNTER — TELEPHONE (OUTPATIENT)
Dept: TRANSPLANT | Facility: CLINIC | Age: 57
End: 2017-09-18

## 2017-09-18 DIAGNOSIS — K86.1 CHRONIC PANCREATITIS, UNSPECIFIED PANCREATITIS TYPE (H): ICD-10-CM

## 2017-09-18 DIAGNOSIS — Z95.2 H/O AORTIC VALVE REPLACEMENT: ICD-10-CM

## 2017-09-18 DIAGNOSIS — R11.0 NAUSEA: ICD-10-CM

## 2017-09-18 DIAGNOSIS — K86.89 PANCREATIC INSUFFICIENCY: Primary | ICD-10-CM

## 2017-09-18 DIAGNOSIS — Z79.01 LONG-TERM (CURRENT) USE OF ANTICOAGULANTS: ICD-10-CM

## 2017-09-18 RX ORDER — ONDANSETRON 4 MG/1
4 TABLET, ORALLY DISINTEGRATING ORAL EVERY 8 HOURS PRN
Qty: 120 TABLET | Refills: 0 | Status: ON HOLD | OUTPATIENT
Start: 2017-09-18 | End: 2017-10-06

## 2017-09-18 RX ORDER — SODIUM BICARBONATE 325 MG/1
325 TABLET ORAL EVERY 4 HOURS
Qty: 42 TABLET | Refills: 1 | Status: SHIPPED | OUTPATIENT
Start: 2017-09-18 | End: 2017-11-02

## 2017-09-18 NOTE — PROGRESS NOTES
Pre Visit Call and Assessment    Date of call:  09/18/2017    Phone numbers:  Home number on file [phone] (home)    Reached patient/confirmed appointment:  Yes  Patient care team/Primary provider:  Cyril Mackay outpatient pharmacy:    [facility] [place] - [address] [place] [address]  Phone: [phone] Fax: [phone]    Referred to:  Dr. Filipe Crum    Reason for visit:  Follow up

## 2017-09-18 NOTE — MR AVS SNAPSHOT
Lucas Brown   9/18/2017   Anticoagulation Therapy Visit    Description:  56 year old male   Provider:  Aidee Torres RN   [facility]           INR as of 9/18/2017     Today's INR 1.12! (9/16/2017)      Anticoagulation Summary as of 9/18/2017     INR goal 2.0-3.0   Today's INR 1.12! (9/16/2017)   Full instructions 9/18: 5 mg   Next INR check 9/19/2017    Indications   H/O aortic valve replacement [Z95.2]  Long-term (current) use of anticoagulants [Z79.01] [Z79.01]         September 2017 Details    Sun Mon Tue Wed Thu Fri Sat          1               2                 3               4               5               6               7               8               9                 10               11               12               13               14               15               16                 17               18      5 mg   See details      19            20               21               22               23                 24               25               26               27               28               29               30                Date Details   09/18 This INR check       Date of next INR:  9/19/2017         How to take your warfarin dose     To take:  5 mg Take 1 of the 5 mg tablets.

## 2017-09-18 NOTE — PROGRESS NOTES
ANTICOAGULATION FOLLOW-UP CLINIC VISIT    Patient Name:  Lucas Brown  Date:  9/18/2017  Contact Type:  Telephone    SUBJECTIVE:        OBJECTIVE    INR   Date Value Ref Range Status   09/16/2017 1.12 0.86 - 1.14 Final       ASSESSMENT / PLAN  INR assessment SUB    Recheck INR In: 1 DAY    INR Location Clinic      Anticoagulation Summary as of 9/18/2017     INR goal 2.0-3.0   Today's INR 1.12! (9/16/2017)   Maintenance plan No maintenance plan   Full instructions 9/18: 5 mg   Plan last modified Gwendolyn Goodman RN (9/15/2017)   Next INR check 9/19/2017   Priority INR   Target end date     Indications   H/O aortic valve replacement [Z95.2]  Long-term (current) use of anticoagulants [Z79.01] [Z79.01]         Anticoagulation Episode Summary     INR check location     Preferred lab     Send INR reminders to [facility]    Comments Pt is staying local and [facility] comes out  Monitoring pt for 4-6 weeks then go back to PCP at [facility] [place]  Has 2.5 and 5mg tablets       Anticoagulation Care Providers     Provider Role Specialty Phone number    Vik Crum MD Responsible Transplant [phone]            See the Encounter Report to view Anticoagulation Flowsheet and Dosing Calendar (Go to Encounters tab in chart review, and find the Anticoagulation Therapy Visit)    Spoke with Lucas.  He had his INR checked on Saturday, 9/16/17 and it was 1.12.  He had already been given dosing through 9/18/17.  He will recheck his INR 9/19/17.      Aidee Torres RN

## 2017-09-18 NOTE — Clinical Note
Carlos Marrero,  Can you please schedule this patient for a lab appointment tomorrow 9/19 at 0630? He is already aware of the appointment.  Thanks, [place]

## 2017-09-18 NOTE — TELEPHONE ENCOUNTER
Patient called in on Saturday and left a message stating that he was getting low on zofran and sodium bicarb and that he was also having some issues with nausea. Patient was phoned back this morning. He stated that he is still having some issues with nausea. He is having regular bowel movements and does not feel like he is getting constipated. He also stated that he had what felt like an air bubble at 0530 this morning, but the bubble did pass. He stated he has had a few of them in the hospital. He was informed that his labs from Saturday looked good per Dr. Crum and that he should come in for another set tomorrow before his appointment with Dr. Crum. He also stated he would like his refills sent to the clinic pharmacy.

## 2017-09-19 ENCOUNTER — ANTICOAGULATION THERAPY VISIT (OUTPATIENT)
Dept: ANTICOAGULATION | Facility: CLINIC | Age: 57
End: 2017-09-19

## 2017-09-19 ENCOUNTER — OFFICE VISIT (OUTPATIENT)
Dept: SURGERY | Facility: CLINIC | Age: 57
End: 2017-09-19

## 2017-09-19 VITALS
WEIGHT: 192.8 LBS | BODY MASS INDEX: 26.99 KG/M2 | TEMPERATURE: 98.5 F | DIASTOLIC BLOOD PRESSURE: 61 MMHG | HEIGHT: 71 IN | SYSTOLIC BLOOD PRESSURE: 101 MMHG | HEART RATE: 80 BPM | OXYGEN SATURATION: 98 %

## 2017-09-19 DIAGNOSIS — K86.89 PANCREATIC INSUFFICIENCY: Primary | ICD-10-CM

## 2017-09-19 DIAGNOSIS — Z79.01 LONG-TERM (CURRENT) USE OF ANTICOAGULANTS: ICD-10-CM

## 2017-09-19 DIAGNOSIS — K86.89 PANCREATIC INSUFFICIENCY: ICD-10-CM

## 2017-09-19 DIAGNOSIS — K86.1 CHRONIC PANCREATITIS, UNSPECIFIED PANCREATITIS TYPE (H): ICD-10-CM

## 2017-09-19 DIAGNOSIS — G89.18 ACUTE POST-OPERATIVE PAIN: ICD-10-CM

## 2017-09-19 DIAGNOSIS — Z95.2 H/O AORTIC VALVE REPLACEMENT: ICD-10-CM

## 2017-09-19 LAB
ANION GAP SERPL CALCULATED.3IONS-SCNC: 8 MMOL/L (ref 3–14)
BASOPHILS # BLD AUTO: 0 10E9/L (ref 0–0.2)
BASOPHILS NFR BLD AUTO: 0.3 %
BUN SERPL-MCNC: 29 MG/DL (ref 7–30)
CALCIUM SERPL-MCNC: 8.5 MG/DL (ref 8.5–10.1)
CHLORIDE SERPL-SCNC: 99 MMOL/L (ref 94–109)
CO2 SERPL-SCNC: 27 MMOL/L (ref 20–32)
CREAT SERPL-MCNC: 1.04 MG/DL (ref 0.66–1.25)
DIFFERENTIAL METHOD BLD: ABNORMAL
EOSINOPHIL # BLD AUTO: 0.1 10E9/L (ref 0–0.7)
EOSINOPHIL NFR BLD AUTO: 0.6 %
ERYTHROCYTE [DISTWIDTH] IN BLOOD BY AUTOMATED COUNT: 15.3 % (ref 10–15)
GFR SERPL CREATININE-BSD FRML MDRD: 73 ML/MIN/1.7M2
GLUCOSE SERPL-MCNC: 138 MG/DL (ref 70–99)
HCT VFR BLD AUTO: 30.9 % (ref 40–53)
HGB BLD-MCNC: 9.6 G/DL (ref 13.3–17.7)
IMM GRANULOCYTES # BLD: 0.2 10E9/L (ref 0–0.4)
IMM GRANULOCYTES NFR BLD: 1.5 %
INR PPP: 1.35 (ref 0.86–1.14)
LYMPHOCYTES # BLD AUTO: 2 10E9/L (ref 0.8–5.3)
LYMPHOCYTES NFR BLD AUTO: 16.5 %
MCH RBC QN AUTO: 25.4 PG (ref 26.5–33)
MCHC RBC AUTO-ENTMCNC: 31.1 G/DL (ref 31.5–36.5)
MCV RBC AUTO: 82 FL (ref 78–100)
MONOCYTES # BLD AUTO: 1.1 10E9/L (ref 0–1.3)
MONOCYTES NFR BLD AUTO: 9.3 %
NEUTROPHILS # BLD AUTO: 8.7 10E9/L (ref 1.6–8.3)
NEUTROPHILS NFR BLD AUTO: 71.8 %
NRBC # BLD AUTO: 0 10*3/UL
NRBC BLD AUTO-RTO: 0 /100
PLATELET # BLD AUTO: 497 10E9/L (ref 150–450)
POTASSIUM SERPL-SCNC: 4.8 MMOL/L (ref 3.4–5.3)
RBC # BLD AUTO: 3.78 10E12/L (ref 4.4–5.9)
SODIUM SERPL-SCNC: 134 MMOL/L (ref 133–144)
WBC # BLD AUTO: 12.1 10E9/L (ref 4–11)

## 2017-09-19 RX ORDER — OXYCODONE HCL 5 MG/5 ML
5 SOLUTION, ORAL ORAL
Qty: 350 ML | Refills: 0 | Status: ON HOLD | OUTPATIENT
Start: 2017-09-19 | End: 2017-10-06

## 2017-09-19 RX ORDER — PREDNISONE 5 MG/ML
5 SOLUTION ORAL DAILY
Qty: 70 ML | Refills: 0 | Status: SHIPPED | OUTPATIENT
Start: 2017-09-19

## 2017-09-19 RX ORDER — FENTANYL 100 UG/1
1 PATCH TRANSDERMAL
Qty: 5 PATCH | Refills: 0 | Status: ON HOLD | OUTPATIENT
Start: 2017-09-19 | End: 2017-10-06

## 2017-09-19 ASSESSMENT — PAIN SCALES - GENERAL: PAINLEVEL: MILD PAIN (3)

## 2017-09-19 NOTE — NURSING NOTE
"Chief Complaint   Patient presents with     Surgical Followup     post op       Vitals:    09/19/17 0725   BP: 101/61   BP Location: Left arm   Patient Position: Chair   Cuff Size: Adult Regular   Pulse: 80   Temp: 98.5  F (36.9  C)   SpO2: 98%   Weight: 192 lb 12.8 oz   Height: 5' 11\"       Body mass index is 26.89 kg/(m^2).    Demarco Pittman MA                          "

## 2017-09-19 NOTE — PROGRESS NOTES
HISTORY OF PRESENT ILLNESS:  Mr. Brown is here about 3 weeks after a Whipple procedure for chronic pancreatitis involving the head of his pancreas.  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  He has been doing reasonably well at home but notes for the last 2 days he has had a daily episode of vomiting basically everything in his stomach.  He also noted that the G tube stopped working 2 days ago.  With regards to his pancreatic fistula, he has 2 small incisions that had been packed.  The lower incision has essentially stopped draining and is not tender and is just being packed once a day.  The upper incision continues to have a bag over it and the output is not recorded.  Mr. Brown's meds were reviewed and are as noted in electronic medical record.      PHYSICAL EXAMINATION:   VITAL SIGNS:  Stable.  He is afebrile.   HEENT:  Normocephalic, atraumatic.  Oral mucosa moist.   NECK:  Supple.   SKIN:  Turgor is good.     ABDOMEN:  Soft and nontender.  His wound is healing nicely.  The wound is packed on the right lateral side with a bag over it.  The bag is draining some milky fluid.  The wound in the right lower quadrant is granulating nicely and is only about 0.5 cm deep.  The G tube is deflated and advanced apparently into the stomach.  The cuff was then reinflated in the G tube and the G tube secured.  Prior to me manipulating the G tube, Mr. Brown vomited a large amount (my estimate would be 500-600 mL) of clear gastric contents.      1.  Gastric emptying delay secondary to Whipple procedure.  Continue G tube.   2.  Malnutrition secondary to above.  Continue tube feeds with pancreatic enzymes mixed with his tube feeds.   3.  Postoperative pain and pain from his chronic pancreatitis.  I refilled his fentanyl patch (100 mcg, 5 patches) and oxycodone liquid (300 mL).  I did stop his gabapentin today.  Follow up in 1 week.      cc:   Cyril Mackay MD   [address] [place]  [address]         BABAR HERNANDEZ MD             D: 2017 07:22   T: 2017 11:28   MT: nadine      Name:     AMELIE MCCRACKEN   MRN:      [id number]        Account:      [id number]   :      1960           Service Date: 2017      Document: [id number]

## 2017-09-19 NOTE — LETTER
9/19/2017       RE: Lucas Brown  [address] [place] [id number]     Dear Colleague,    Thank you for referring your patient, Lucas Brown, to the [facility] GENERAL SURGERY at [facility]. Please see a copy of my visit note below.    HISTORY OF PRESENT ILLNESS:  Mr. rBown is here about 3 weeks after a Whipple procedure for chronic pancreatitis involving the head of his pancreas.  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  He has been doing reasonably well at home but notes for the last 2 days he has had a daily episode of vomiting basically everything in his stomach.  He also noted that the G tube stopped working 2 days ago.  With regards to his pancreatic fistula, he has 2 small incisions that had been packed.  The lower incision has essentially stopped draining and is not tender and is just being packed once a day.  The upper incision continues to have a bag over it and the output is not recorded.  Mr. Brown's meds were reviewed and are as noted in electronic medical record.      PHYSICAL EXAMINATION:   VITAL SIGNS:  Stable.  He is afebrile.   HEENT:  Normocephalic, atraumatic.  Oral mucosa moist.   NECK:  Supple.   SKIN:  Turgor is good.     ABDOMEN:  Soft and nontender.  His wound is healing nicely.  The wound is packed on the right lateral side with a bag over it.  The bag is draining some milky fluid.  The wound in the right lower quadrant is granulating nicely and is only about 0.5 cm deep.  The G tube is deflated and advanced apparently into the stomach.  The cuff was then reinflated in the G tube and the G tube secured.  Prior to me manipulating the G tube, Mr. Brown vomited a large amount (my estimate would be 500-600 mL) of clear gastric contents.      1.  Gastric emptying delay secondary to Whipple procedure.  Continue G tube.   2.  Malnutrition secondary to above.  Continue tube feeds with pancreatic enzymes mixed with his  tube feeds.   3.  Postoperative pain and pain from his chronic pancreatitis.  I refilled his fentanyl patch (100 mcg, 5 patches) and oxycodone liquid (300 mL).  I did stop his gabapentin today.  Follow up in 1 week.      cc:   Cyril Mackay MD   [address]  [address]         VIK HERNANDEZ MD             D: 2017 07:22   T: 2017 11:28   MT: nadine      Name:     AMELIE MCCRACKEN   MRN:      [id number]        Account:      [id number]   :      1960           Service Date: 2017      Document: [id number]       Again, thank you for allowing me to participate in the care of your patient.      Sincerely,    Vik Hernandez MD

## 2017-09-19 NOTE — PROGRESS NOTES
ANTICOAGULATION FOLLOW-UP CLINIC VISIT    Patient Name:  Lucas Brown  Date:  9/19/2017  Contact Type:  Telephone    SUBJECTIVE:        OBJECTIVE    INR   Date Value Ref Range Status   09/19/2017 1.35 (H) 0.86 - 1.14 Final       ASSESSMENT / PLAN  INR assessment SUB    Recheck INR In: 2 DAYS    INR Location Clinic      Anticoagulation Summary as of 9/19/2017     INR goal 2.0-3.0   Today's INR 1.35!   Maintenance plan No maintenance plan   Full instructions 9/19: 7.5 mg; 9/20: 5 mg   Plan last modified Gwendolyn Goodman RN (9/15/2017)   Next INR check 9/21/2017   Priority INR   Target end date     Indications   H/O aortic valve replacement [Z95.2]  Long-term (current) use of anticoagulants [Z79.01] [Z79.01]         Anticoagulation Episode Summary     INR check location     Preferred lab     Send INR reminders to [facility]    Comments Pt is staying local and [facility] comes out  Monitoring pt for 4-6 weeks then go back to PCP at [facility] BERNIE Mcgee  Has 2.5 and 5mg tablets       Anticoagulation Care Providers     Provider Role Specialty Phone number    Vik Crum MD Responsible Transplant [phone]            See the Encounter Report to view Anticoagulation Flowsheet and Dosing Calendar (Go to Encounters tab in chart review, and find the Anticoagulation Therapy Visit)    Spoke with Lucas.  He reports he saw Dr. Crum this morning and was instructed by him to take warfarin 7.5 mg today and 5 mg tomorrow (9/20/17) and to recheck INR 9/21/17.  Lucas has 5 mg and 2.5 mg tablets.  He has been on warfarin for years and he states his INR has been up and down for years--he thinks his INR was the most stable at 5 mg daily.  However, before this last surgery he was taking about 2 mg daily.      Aidee Torres RN

## 2017-09-19 NOTE — MR AVS SNAPSHOT
[place] Stephanie   9/19/2017   Anticoagulation Therapy Visit    Description:  56 year old male   Provider:  Aidee Torres, RN   Department:  [facility]           INR as of 9/19/2017     Today's INR 1.35!      Anticoagulation Summary as of 9/19/2017     INR goal 2.0-3.0   Today's INR 1.35!   Full instructions 9/19: 7.5 mg; 9/20: 5 mg   Next INR check 9/21/2017    Indications   H/O aortic valve replacement [Z95.2]  Long-term (current) use of anticoagulants [Z79.01] [Z79.01]         September 2017 Details    Sun Mon Tue Wed Thu Fri Sat          1               2                 3               4               5               6               7               8               9                 10               11               12               13               14               15               16                 17               18               19      7.5 mg   See details      20      5 mg         21            22               23                 24               25               26               27               28               29               30                Date Details   09/19 This INR check       Date of next INR:  9/21/2017         How to take your warfarin dose     To take:  5 mg Take 1 of the 5 mg tablets.    To take:  7.5 mg Take 1 of the 5 mg tablets and 1 of the 2.5 mg tablets.

## 2017-09-20 ENCOUNTER — HOSPITAL ENCOUNTER (OUTPATIENT)
Facility: CLINIC | Age: 57
Discharge: HOME | End: 2017-09-20
Attending: RADIOLOGY | Admitting: RADIOLOGY
Payer: COMMERCIAL

## 2017-09-20 ENCOUNTER — APPOINTMENT (OUTPATIENT)
Dept: INTERVENTIONAL RADIOLOGY/VASCULAR | Facility: CLINIC | Age: 57
End: 2017-09-20
Attending: NURSE PRACTITIONER
Payer: COMMERCIAL

## 2017-09-20 VITALS
HEART RATE: 74 BPM | RESPIRATION RATE: 16 BRPM | DIASTOLIC BLOOD PRESSURE: 69 MMHG | OXYGEN SATURATION: 98 % | SYSTOLIC BLOOD PRESSURE: 118 MMHG

## 2017-09-20 DIAGNOSIS — K86.89 PANCREATIC INSUFFICIENCY: Primary | ICD-10-CM

## 2017-09-20 DIAGNOSIS — K86.1 OTHER CHRONIC PANCREATITIS (H): Primary | ICD-10-CM

## 2017-09-20 DIAGNOSIS — K86.1 OTHER CHRONIC PANCREATITIS (H): ICD-10-CM

## 2017-09-20 PROCEDURE — 49465 FLUORO EXAM OF G/COLON TUBE: CPT

## 2017-09-20 NOTE — IR NOTE
Interventional Radiology Intra-procedural Nursing Note    Patient Name: Lucas Brown  Medical Record Number: [id number]  Today's Date: September 20, 2017    Start Time: 1140  End of procedure time: 1150  Procedure: G J tube check  Time pt departs:  1200      Other Notes: Pt to IR 1 from . Meds and allergies are reviewed. Pt placed supine and prepped. Time out done with Placido Middleton PA-C. Fire safety of 0.  done. Balloon was deflated and tube pulled back and balloon re inflated. Pt tolerated well and returns to .     Lima Garcia

## 2017-09-20 NOTE — PROCEDURES
Interventional Radiology Brief Post Procedure Note    Procedure: IR GASTRO JEJUNOSTOMY TUBE CHECK    Proceduralist: Placido Middleton PA-C    Assistant: None    Time Out: Prior to the start of the procedure and with procedural staff participation, I verbally confirmed the patient s identity using two indicators, relevant allergies, that the procedure was appropriate and matched the consent or emergent situation, and that the correct equipment/implants were available. Immediately prior to starting the procedure I conducted the Time Out with the procedural staff and re-confirmed the patient s name, procedure, and site/side. (The Joint Commission universal protocol was followed.)  Yes    Medications   Medication Event Details Admin User Admin Time       Sedation: None    Findings: Evaluated dysfunctional gastrostomy portion of patient's gastrojejunostomy tube. Under fluoroscopic guidance the gastrostomy portion of the tube was injected and the contrast/saline mixture immediately filled the duodenum, indicating that the retention balloon is post-pyloric. The balloon was deflated and the tube was backed into the stomach and the retention balloon was filled with 8 mL of contrast/saline mixture. Injection of the gastrostomy portion of the tube revealed an appropriately placed and function gastrostomy portion of the tube. The disc was snugged down to 4 cm.     Estimated Blood Loss: None    Fluoroscopy Time: 1.1 minute(s)    SPECIMENS: None    Complications: 1. None     Condition: Stable    Plan: Follow-up per primary team. Keep original appointment for GJ exchange 9-12 months from original placement.     Comments: See dictated procedure note for full details.    Placido Middleton PA-C

## 2017-09-21 ENCOUNTER — ANTICOAGULATION THERAPY VISIT (OUTPATIENT)
Dept: ANTICOAGULATION | Facility: CLINIC | Age: 57
End: 2017-09-21

## 2017-09-21 DIAGNOSIS — Z95.2 H/O AORTIC VALVE REPLACEMENT: ICD-10-CM

## 2017-09-21 DIAGNOSIS — Z79.01 LONG-TERM (CURRENT) USE OF ANTICOAGULANTS: ICD-10-CM

## 2017-09-21 DIAGNOSIS — K86.89 PANCREATIC INSUFFICIENCY: ICD-10-CM

## 2017-09-21 LAB
ANION GAP SERPL CALCULATED.3IONS-SCNC: 7 MMOL/L (ref 3–14)
BASOPHILS # BLD AUTO: 0 10E9/L (ref 0–0.2)
BASOPHILS NFR BLD AUTO: 0.3 %
BUN SERPL-MCNC: 42 MG/DL (ref 7–30)
CALCIUM SERPL-MCNC: 8.7 MG/DL (ref 8.5–10.1)
CHLORIDE SERPL-SCNC: 97 MMOL/L (ref 94–109)
CO2 SERPL-SCNC: 30 MMOL/L (ref 20–32)
CREAT SERPL-MCNC: 1.11 MG/DL (ref 0.66–1.25)
DIFFERENTIAL METHOD BLD: ABNORMAL
EOSINOPHIL # BLD AUTO: 0.1 10E9/L (ref 0–0.7)
EOSINOPHIL NFR BLD AUTO: 0.6 %
ERYTHROCYTE [DISTWIDTH] IN BLOOD BY AUTOMATED COUNT: 15.1 % (ref 10–15)
GFR SERPL CREATININE-BSD FRML MDRD: 68 ML/MIN/1.7M2
GLUCOSE SERPL-MCNC: 134 MG/DL (ref 70–99)
HCT VFR BLD AUTO: 31.4 % (ref 40–53)
HGB BLD-MCNC: 9.9 G/DL (ref 13.3–17.7)
IMM GRANULOCYTES # BLD: 0.2 10E9/L (ref 0–0.4)
IMM GRANULOCYTES NFR BLD: 1.5 %
INR PPP: 1.52 (ref 0.86–1.14)
LYMPHOCYTES # BLD AUTO: 1.8 10E9/L (ref 0.8–5.3)
LYMPHOCYTES NFR BLD AUTO: 13.5 %
MCH RBC QN AUTO: 25.6 PG (ref 26.5–33)
MCHC RBC AUTO-ENTMCNC: 31.5 G/DL (ref 31.5–36.5)
MCV RBC AUTO: 81 FL (ref 78–100)
MONOCYTES # BLD AUTO: 1.1 10E9/L (ref 0–1.3)
MONOCYTES NFR BLD AUTO: 8 %
NEUTROPHILS # BLD AUTO: 10 10E9/L (ref 1.6–8.3)
NEUTROPHILS NFR BLD AUTO: 76.1 %
NRBC # BLD AUTO: 0 10*3/UL
NRBC BLD AUTO-RTO: 0 /100
PLATELET # BLD AUTO: 443 10E9/L (ref 150–450)
POTASSIUM SERPL-SCNC: 4.5 MMOL/L (ref 3.4–5.3)
RBC # BLD AUTO: 3.87 10E12/L (ref 4.4–5.9)
SODIUM SERPL-SCNC: 134 MMOL/L (ref 133–144)
WBC # BLD AUTO: 13.1 10E9/L (ref 4–11)

## 2017-09-21 NOTE — PROGRESS NOTES
ANTICOAGULATION FOLLOW-UP CLINIC VISIT    Patient Name:  Lucas Brown  Date:  9/21/2017  Contact Type:  Telephone    SUBJECTIVE:        OBJECTIVE    INR   Date Value Ref Range Status   09/21/2017 1.52 (H) 0.86 - 1.14 Final       ASSESSMENT / PLAN  INR assessment THER    Recheck INR In: 4 DAYS    INR Location Clinic      Anticoagulation Summary as of 9/21/2017     INR goal 2.0-3.0   Today's INR 1.52!   Maintenance plan No maintenance plan   Full instructions 9/21: 7.5 mg; 9/22: 7.5 mg; 9/23: 5 mg; 9/24: 5 mg   Plan last modified Gwendolyn Goodman RN (9/15/2017)   Next INR check 9/25/2017   Priority INR   Target end date     Indications   H/O aortic valve replacement [Z95.2]  Long-term (current) use of anticoagulants [Z79.01] [Z79.01]         Anticoagulation Episode Summary     INR check location     Preferred lab     Send INR reminders to [facility]    Comments Pt is staying local and [facility] comes out  Monitoring pt for 4-6 weeks then go back to PCP at [facility] BERNIE Mcgee  Has 2.5 and 5mg tablets       Anticoagulation Care Providers     Provider Role Specialty Phone number    RyanjarodVik MD Responsible Transplant [phone]            See the Encounter Report to view Anticoagulation Flowsheet and Dosing Calendar (Go to Encounters tab in chart review, and find the Anticoagulation Therapy Visit)    Spoke with Inocencio Johnson RN

## 2017-09-21 NOTE — MR AVS SNAPSHOT
Lucas BOWERS Stephanie   9/21/2017   Anticoagulation Therapy Visit    Description:  56 year old male   Provider:  Justine Johnson, RN   Department:  [facility]           INR as of 9/21/2017     Today's INR 1.52!      Anticoagulation Summary as of 9/21/2017     INR goal 2.0-3.0   Today's INR 1.52!   Full instructions 9/21: 7.5 mg; 9/22: 7.5 mg; 9/23: 5 mg; 9/24: 5 mg   Next INR check 9/25/2017    Indications   H/O aortic valve replacement [Z95.2]  Long-term (current) use of anticoagulants [Z79.01] [Z79.01]         September 2017 Details    Sun Mon Tue Wed Thu Fri Sat          1               2                 3               4               5               6               7               8               9                 10               11               12               13               14               15               16                 17               18               19               20               21      7.5 mg   See details      22      7.5 mg         23      5 mg           24      5 mg         25            26               27               28               29               30                Date Details   09/21 This INR check       Date of next INR:  9/25/2017         How to take your warfarin dose     To take:  5 mg Take 1 of the 5 mg tablets.    To take:  7.5 mg Take 1.5 of the 5 mg tablets.

## 2017-09-22 DIAGNOSIS — E86.0 DEHYDRATION: Primary | ICD-10-CM

## 2017-09-22 DIAGNOSIS — K59.00 CONSTIPATION: Primary | ICD-10-CM

## 2017-09-22 DIAGNOSIS — R11.2 NAUSEA & VOMITING: ICD-10-CM

## 2017-09-23 ENCOUNTER — INFUSION THERAPY VISIT (OUTPATIENT)
Dept: INFUSION THERAPY | Facility: CLINIC | Age: 57
End: 2017-09-23
Attending: SURGERY
Payer: COMMERCIAL

## 2017-09-23 VITALS
BODY MASS INDEX: 26.61 KG/M2 | OXYGEN SATURATION: 96 % | HEART RATE: 92 BPM | SYSTOLIC BLOOD PRESSURE: 110 MMHG | RESPIRATION RATE: 16 BRPM | DIASTOLIC BLOOD PRESSURE: 68 MMHG | WEIGHT: 190.8 LBS | TEMPERATURE: 98.3 F

## 2017-09-23 DIAGNOSIS — E86.0 DEHYDRATION: ICD-10-CM

## 2017-09-23 DIAGNOSIS — R11.2 NAUSEA & VOMITING: ICD-10-CM

## 2017-09-23 DIAGNOSIS — K59.00 CONSTIPATION: ICD-10-CM

## 2017-09-23 LAB
ANION GAP SERPL CALCULATED.3IONS-SCNC: 7 MMOL/L (ref 3–14)
BASOPHILS # BLD AUTO: 0 10E9/L (ref 0–0.2)
BASOPHILS NFR BLD AUTO: 0.2 %
BUN SERPL-MCNC: 45 MG/DL (ref 7–30)
CALCIUM SERPL-MCNC: 8.6 MG/DL (ref 8.5–10.1)
CHLORIDE SERPL-SCNC: 94 MMOL/L (ref 94–109)
CO2 SERPL-SCNC: 30 MMOL/L (ref 20–32)
CREAT SERPL-MCNC: 1.05 MG/DL (ref 0.66–1.25)
DIFFERENTIAL METHOD BLD: ABNORMAL
EOSINOPHIL # BLD AUTO: 0 10E9/L (ref 0–0.7)
EOSINOPHIL NFR BLD AUTO: 0.3 %
ERYTHROCYTE [DISTWIDTH] IN BLOOD BY AUTOMATED COUNT: 14.8 % (ref 10–15)
GFR SERPL CREATININE-BSD FRML MDRD: 73 ML/MIN/1.7M2
GLUCOSE SERPL-MCNC: 134 MG/DL (ref 70–99)
HCT VFR BLD AUTO: 27.6 % (ref 40–53)
HGB BLD-MCNC: 8.9 G/DL (ref 13.3–17.7)
IMM GRANULOCYTES # BLD: 0.1 10E9/L (ref 0–0.4)
IMM GRANULOCYTES NFR BLD: 0.9 %
LYMPHOCYTES # BLD AUTO: 1.4 10E9/L (ref 0.8–5.3)
LYMPHOCYTES NFR BLD AUTO: 10.7 %
MCH RBC QN AUTO: 25.3 PG (ref 26.5–33)
MCHC RBC AUTO-ENTMCNC: 32.2 G/DL (ref 31.5–36.5)
MCV RBC AUTO: 78 FL (ref 78–100)
MONOCYTES # BLD AUTO: 1.1 10E9/L (ref 0–1.3)
MONOCYTES NFR BLD AUTO: 8.4 %
NEUTROPHILS # BLD AUTO: 10.2 10E9/L (ref 1.6–8.3)
NEUTROPHILS NFR BLD AUTO: 79.5 %
NRBC # BLD AUTO: 0 10*3/UL
NRBC BLD AUTO-RTO: 0 /100
PLATELET # BLD AUTO: 354 10E9/L (ref 150–450)
POTASSIUM SERPL-SCNC: 4.3 MMOL/L (ref 3.4–5.3)
RBC # BLD AUTO: 3.52 10E12/L (ref 4.4–5.9)
SODIUM SERPL-SCNC: 132 MMOL/L (ref 133–144)
WBC # BLD AUTO: 12.8 10E9/L (ref 4–11)

## 2017-09-23 PROCEDURE — 99215 OFFICE O/P EST HI 40 MIN: CPT

## 2017-09-23 PROCEDURE — 80048 BASIC METABOLIC PNL TOTAL CA: CPT | Performed by: SURGERY

## 2017-09-23 PROCEDURE — 25000132 ZZH RX MED GY IP 250 OP 250 PS 637: Mod: ZF | Performed by: SURGERY

## 2017-09-23 PROCEDURE — 85025 COMPLETE CBC W/AUTO DIFF WBC: CPT | Performed by: SURGERY

## 2017-09-23 PROCEDURE — 96361 HYDRATE IV INFUSION ADD-ON: CPT

## 2017-09-23 PROCEDURE — 99215 OFFICE O/P EST HI 40 MIN: CPT | Mod: 25,ZF

## 2017-09-23 PROCEDURE — 25000128 H RX IP 250 OP 636: Mod: ZF | Performed by: SURGERY

## 2017-09-23 PROCEDURE — 96376 TX/PRO/DX INJ SAME DRUG ADON: CPT

## 2017-09-23 PROCEDURE — 96374 THER/PROPH/DIAG INJ IV PUSH: CPT

## 2017-09-23 RX ADMIN — SODIUM PHOSPHATE 1 ENEMA: 7; 19 ENEMA RECTAL at 10:16

## 2017-09-23 RX ADMIN — ONDANSETRON 4 MG: 2 INJECTION INTRAMUSCULAR; INTRAVENOUS at 10:37

## 2017-09-23 RX ADMIN — SODIUM CHLORIDE, POTASSIUM CHLORIDE, SODIUM LACTATE AND CALCIUM CHLORIDE 2000 ML: 600; 310; 30; 20 INJECTION, SOLUTION INTRAVENOUS at 10:37

## 2017-09-23 RX ADMIN — ONDANSETRON 4 MG: 2 INJECTION INTRAMUSCULAR; INTRAVENOUS at 12:25

## 2017-09-23 ASSESSMENT — PAIN SCALES - GENERAL: PAINLEVEL: MODERATE PAIN (4)

## 2017-09-23 NOTE — PROGRESS NOTES
Infusion Nursing Note:  Lucas Brown presents today to [facility] for a infusion post labs and x-ray infusion.  During today's [facility] appointment orders from Dr. Crum were completed.  Frequency: once    Progress note:  ID verified by name and .  Assessment completed. Vitals were stable throughout time in [facility]. Patient education regarding infusion and possible side effects provided.  Patient verbalized understanding. yes    Patient arrived to [facility] with nausea, denies vomiting. feeling awful.  Labs collected, and X-ray obtained. then started vomiting.    Notified Dr. Crum with results. Which he ordered the following.  1. 2L LR (PIV placed)  2. Fleet enema  3. Zofran 4 mg now, may repeat if needed.    Patient had wonderful results with enema, ( Xtra Lrg BM)    Infusion Rates: Infusion given over approximately 3 hours (1.5 hours per bag).     Labs were drawn per orders.     Vascular access: Peripheral IV placed today.    Treatment Conditions: Patient received 2 doses of Zofran about an 1.5 hours apart. Tolerated well. 2nd dose relieved nausea    Patient tolerated infusion well.    Discharge Plan:   Follow up plan of care with: Transplant Coordinator  Discharge instructions were reviewed with patient.  Patient/representative verbalized understanding of discharge instructions and all questions answered.    [facility] in stable condition.    Jayne Brooks RN    Administrations This Visit     lactated ringers BOLUS 2,000 mL     Admin Date Action Dose Rate Route Administered By          2017 New Bag 2000 mL 650 mL/hr Intravenous Jayne Brooks, RN                   ondansetron (ZOFRAN) 4 mg in NaCl 0.9 % 50 mL     Admin Date Action Dose Rate Route Administered By          2017 New Bag 4 mg 200 mL/hr Intravenous Jayne Brooks RN             Admin Date Action Dose Rate Route Administered By          2017 New Bag 4 mg 200 mL/hr Intravenous  Jayne Brooks, RN                   sodium phosphate (FLEET ENEMA) 1 enema     Admin Date Action Dose Route Administered By             09/23/2017 Given 1 enema Rectal Jayne Brooks, RN                          /68 (BP Location: Left arm, Patient Position: Sitting, Cuff Size: Adult Regular)  Pulse 92  Temp 98.3  F (36.8  C) (Oral)  Resp 16  Wt 86.5 kg (190 lb 12.8 oz)  SpO2 96%  BMI 26.61 kg/m2

## 2017-09-23 NOTE — MR AVS SNAPSHOT
After Visit Summary   9/23/2017    Lucas Brown    MRN: [id number]           Patient Information     Date Of Birth          1960        Visit Information        Provider Department      9/23/2017 9:00 AM  45 ATC;  SPEC [facility] Specialty and Procedure        Today's Diagnoses     Dehydration        Nausea & vomiting        Constipation           Follow-ups after your visit        Your next 10 appointments already scheduled     Sep 25, 2017 11:00 AM CDT   LAB with [facility] ([facility])    [address] [place] [id number] [phone]           Patient must bring picture ID. Patient should be prepared to give a urine specimen  Please do not eat 10-12 hours before your appointment if you are coming in fasting for labs on lipids, cholesterol, or glucose (sugar). Pregnant women should follow their Care Team instructions. Water with medications is okay. Do not drink coffee or other fluids. If you have concerns about taking  your medications, please ask at office or if scheduling via [employer], send a message by clicking on Secure Messaging, Message Your Care Team.            Sep 27, 2017  4:30 PM CDT   LAB with [facility] ([facility])    [address] [place] [id number] [phone]           Patient must bring picture ID. Patient should be prepared to give a urine specimen  Please do not eat 10-12 hours before your appointment if you are coming in fasting for labs on lipids, cholesterol, or glucose (sugar). Pregnant women should follow their Care Team instructions. Water with medications is okay. Do not drink coffee or other fluids. If you have concerns about taking  your medications, please ask at office or if scheduling via [employer], send a message by clicking on Secure Messaging, Message Your Care Team.            Sep 27, 2017   4:50 PM CDT   (Arrive by 4:35 PM)   Post-Op with Vik Crum MD   [facility] General Surgery ([facility])    [address]  [address] [facility] [address] [phone]              Who to contact     If you have questions or need follow up information about today's clinic visit or your schedule please contact [facility] SPECIALTY AND PROCEDURE directly at [phone].  Normal or non-critical lab and imaging results will be communicated to you by [email or web address], letter or phone within 4 business days after the clinic has received the results. If you do not hear from us within 7 days, please contact the clinic through [employer] [facility] or phone. If you have a critical or abnormal lab result, we will notify you by phone as soon as possible.  Submit refill requests through [facility] or call your pharmacy and they will forward the refill request to us. Please allow 3 business days for your refill to be completed.          Additional Information About Your Visit        [facility] Information     [facility] gives you secure access to your electronic health record. If you see a primary care provider, you can also send messages to your care team and make appointments. If you have questions, please call your primary care clinic.  If you do not have a primary care provider, please call [phone] and they will assist you.        Care EveryWhere ID     This is your Care EveryWhere ID. This could be used by other organizations to access your [facility] medical records  [id number]        Your Vitals Were     Pulse Temperature Respirations Pulse Oximetry BMI (Body Mass Index)       92 98.3  F (36.8  C) (Oral) 16 96% 26.61 kg/m2        Blood Pressure from Last 3 Encounters:   09/23/17 110/68   09/20/17 118/69   09/19/17 101/61    Weight from Last 3 Encounters:   09/23/17 86.5 kg (190 lb 12.8 oz)   09/19/17 87.5 kg (192 lb 12.8 oz)   09/12/17 93.4 kg (205 lb 14.6 oz)               We Performed the Following     Basic metabolic panel     CBC with platelets differential     XR Abdomen 2 Views        Primary Care Provider Office Phone # Fax #    Cyril Mackay [phone]       [facility] [address] [facility] [address]        Equal Access to Services     DANIELITOMELISSA ANETA : Hadii arabella borja eyal Sonick, waaxda luqadaha, qaybta kaalmada adeegyada, rimma kylein hayaacitlalli incoleshelby philippeludivina rodriguez. So [facility] [phone].    ATENCIÓN: Si habla español, tiene a rodriguez disposición servicios gratuitos de asistencia lingüística. [facility] [phone].    We comply with applicable federal civil rights laws and [place] laws. We do not discriminate on the basis of race, color, national origin, age, disability sex, sexual orientation or gender identity.            Thank you!     Thank you for choosing [facility] SPECIALTY AND PROCEDURE  for your care. Our goal is always to provide you with excellent care. Hearing back from our patients is one way we can continue to improve our services. Please take a few minutes to complete the written survey that you may receive in the mail after your visit with us. Thank you!             Your Updated Medication List - Protect others around you: Learn how to safely use, store and throw away your medicines at www.disposemymeds.org.          This list is accurate as of: 9/23/17  2:46 PM.  Always use your most recent med list.                   Brand Name Dispense Instructions for use Diagnosis    ACETAMINOPHEN PO      Take 1,000 mg by mouth every 6 hours as needed for pain        amylase-lipase-protease [id number] UNITS Cpep per EC capsule    CREON 24    200 capsule    2 capsules (48,000 Units) by Per J Tube route every 4 hours    Chronic pancreatitis, unspecified pancreatitis type (H)       aspirin 81 MG tablet      Take 81 mg by mouth daily Holding        fenofibrate 145 MG tablet      Take 145 mg by mouth daily         fentaNYL 100 mcg/hr 72 hr patch    DURAGESIC    5 patch    Place 1 patch onto the skin every 72 hours    Acute post-operative pain       FINASTERIDE PO      Take 5 mg by mouth daily        levothyroxine 150 MCG tablet    SYNTHROID/LEVOTHROID     Take 150 mcg by mouth daily        lisinopril 10 MG tablet    PRINIVIL/ZESTRIL     Take 10 mg by mouth daily        MULTIVITAMINS Chew      Take 1 chew tab by mouth daily        omeprazole 2 mg/mL Susp    priLOSEC    300 mL    10 mLs (20 mg) by Oral or Feeding Tube route 2 times daily    Chronic pancreatitis, unspecified pancreatitis type (H)       ondansetron 4 MG ODT tab    ZOFRAN-ODT    120 tablet    Take 1 tablet (4 mg) by mouth every 8 hours as needed for nausea    Nausea       oxyCODONE 5 MG/5ML solution    ROXICODONE    350 mL    5 mLs (5 mg) by Per J Tube route every 3 hours as needed for moderate to severe pain    Acute post-operative pain       pravastatin 40 MG tablet    PRAVACHOL     Take 40 mg by mouth daily        predniSONE 5 MG/5ML solution     70 mL    5 mLs (5 mg) by Per J Tube route daily    Chronic pancreatitis, unspecified pancreatitis type (H)       sertraline 50 MG tablet    ZOLOFT     Take 50 mg by mouth daily        sodium bicarbonate 325 MG tablet     42 tablet    1 tablet (325 mg) by Per J Tube route every 4 hours    Chronic pancreatitis, unspecified pancreatitis type (H)       TAMSULOSIN HCL PO      Take 0.4 mg by mouth daily        warfarin 7.5 MG tablet    COUMADIN    30 tablet    Take 0.5 tablets (3.75 mg) by mouth daily    H/O aortic valve replacement

## 2017-09-25 ENCOUNTER — TELEPHONE (OUTPATIENT)
Dept: TRANSPLANT | Facility: CLINIC | Age: 57
End: 2017-09-25

## 2017-09-25 ENCOUNTER — ANTICOAGULATION THERAPY VISIT (OUTPATIENT)
Dept: ANTICOAGULATION | Facility: CLINIC | Age: 57
End: 2017-09-25

## 2017-09-25 DIAGNOSIS — Z79.01 LONG-TERM (CURRENT) USE OF ANTICOAGULANTS: ICD-10-CM

## 2017-09-25 DIAGNOSIS — Z95.2 H/O AORTIC VALVE REPLACEMENT: ICD-10-CM

## 2017-09-25 LAB — INR PPP: 2.66 (ref 0.86–1.14)

## 2017-09-25 NOTE — MR AVS SNAPSHOT
Lucas Brown   9/25/2017   Anticoagulation Therapy Visit    Description:  56 year old male   Provider:  Justine Johnson, RN   Department:  [facility]           INR as of 9/25/2017     Today's INR 2.66      Anticoagulation Summary as of 9/25/2017     INR goal 2.0-3.0   Today's INR 2.66   Full instructions 9/25: 5 mg; 9/26: 5 mg; 9/27: 5 mg   Next INR check 9/28/2017    Indications   H/O aortic valve replacement [Z95.2]  Long-term (current) use of anticoagulants [Z79.01] [Z79.01]         September 2017 Details    Sun Mon Tue Wed Thu Fri Sat          1               2                 3               4               5               6               7               8               9                 10               11               12               13               14               15               16                 17               18               19               20               21               22               23                 24               25      5 mg   See details      26      5 mg         27      5 mg         28            29               30                Date Details   09/25 This INR check       Date of next INR:  9/28/2017         How to take your warfarin dose     To take:  5 mg Take 1 of the 5 mg tablets.

## 2017-09-25 NOTE — PROGRESS NOTES
ANTICOAGULATION FOLLOW-UP CLINIC VISIT    Patient Name:  Lucas Brown  Date:  9/25/2017  Contact Type:  Telephone    SUBJECTIVE:        OBJECTIVE    INR   Date Value Ref Range Status   09/25/2017 2.66 (H) 0.86 - 1.14 Final       ASSESSMENT / PLAN  INR assessment THER    Recheck INR In: 2 DAYS    INR Location Clinic      Anticoagulation Summary as of 9/25/2017     INR goal 2.0-3.0   Today's INR 2.66   Maintenance plan No maintenance plan   Full instructions 9/25: 5 mg; 9/26: 5 mg; 9/27: 5 mg   Plan last modified Gwendolyn Goodman RN (9/15/2017)   Next INR check 9/28/2017   Priority INR   Target end date     Indications   H/O aortic valve replacement [Z95.2]  Long-term (current) use of anticoagulants [Z79.01] [Z79.01]         Anticoagulation Episode Summary     INR check location     Preferred lab     Send INR reminders to  [facility]    Comments Pt is staying local and [facility] comes out  Monitoring pt for 4-6 weeks then go back to PCP at [facility]  Has 2.5 and 5mg tablets       Anticoagulation Care Providers     Provider Role Specialty Phone number    Vik Crum MD Responsible Transplant [phone]            See the Encounter Report to view Anticoagulation Flowsheet and Dosing Calendar (Go to Encounters tab in chart review, and find the Anticoagulation Therapy Visit)    Spoke with Lucas.    Justine Johnson RN

## 2017-09-25 NOTE — TELEPHONE ENCOUNTER
Spoke briefly to Lucas-he was on his way out for labs and will call me to discuss clamping his G tube later today.

## 2017-09-26 ENCOUNTER — HOSPITAL ENCOUNTER (INPATIENT)
Facility: CLINIC | Age: 57
LOS: 10 days | Discharge: HOME HEALTH | DRG: 381 | End: 2017-10-06
Attending: SURGERY | Admitting: SURGERY
Payer: COMMERCIAL

## 2017-09-26 ENCOUNTER — APPOINTMENT (OUTPATIENT)
Dept: CT IMAGING | Facility: CLINIC | Age: 57
DRG: 381 | End: 2017-09-26
Attending: SURGERY
Payer: COMMERCIAL

## 2017-09-26 DIAGNOSIS — R11.2 NAUSEA AND VOMITING, INTRACTABILITY OF VOMITING NOT SPECIFIED, UNSPECIFIED VOMITING TYPE: ICD-10-CM

## 2017-09-26 DIAGNOSIS — R11.0 NAUSEA: ICD-10-CM

## 2017-09-26 DIAGNOSIS — R10.84 GENERALIZED ABDOMINAL PAIN: Primary | ICD-10-CM

## 2017-09-26 DIAGNOSIS — G89.18 ACUTE POST-OPERATIVE PAIN: ICD-10-CM

## 2017-09-26 LAB
ANION GAP SERPL CALCULATED.3IONS-SCNC: 7 MMOL/L (ref 3–14)
BUN SERPL-MCNC: 29 MG/DL (ref 7–30)
CALCIUM SERPL-MCNC: 8.7 MG/DL (ref 8.5–10.1)
CHLORIDE SERPL-SCNC: 100 MMOL/L (ref 94–109)
CO2 SERPL-SCNC: 29 MMOL/L (ref 20–32)
CREAT SERPL-MCNC: 0.94 MG/DL (ref 0.66–1.25)
ERYTHROCYTE [DISTWIDTH] IN BLOOD BY AUTOMATED COUNT: 15.2 % (ref 10–15)
GFR SERPL CREATININE-BSD FRML MDRD: 83 ML/MIN/1.7M2
GLUCOSE SERPL-MCNC: 106 MG/DL (ref 70–99)
HCT VFR BLD AUTO: 27.2 % (ref 40–53)
HGB BLD-MCNC: 8.5 G/DL (ref 13.3–17.7)
INR PPP: 2.6 (ref 0.86–1.14)
MAGNESIUM SERPL-MCNC: 2.3 MG/DL (ref 1.6–2.3)
MCH RBC QN AUTO: 25.4 PG (ref 26.5–33)
MCHC RBC AUTO-ENTMCNC: 31.3 G/DL (ref 31.5–36.5)
MCV RBC AUTO: 81 FL (ref 78–100)
PHOSPHATE SERPL-MCNC: 3.8 MG/DL (ref 2.5–4.5)
PLATELET # BLD AUTO: 283 10E9/L (ref 150–450)
POTASSIUM SERPL-SCNC: 4.3 MMOL/L (ref 3.4–5.3)
RBC # BLD AUTO: 3.35 10E12/L (ref 4.4–5.9)
SODIUM SERPL-SCNC: 135 MMOL/L (ref 133–144)
WBC # BLD AUTO: 8.1 10E9/L (ref 4–11)

## 2017-09-26 PROCEDURE — 25000128 H RX IP 250 OP 636: Performed by: SURGERY

## 2017-09-26 PROCEDURE — 80048 BASIC METABOLIC PNL TOTAL CA: CPT | Performed by: STUDENT IN AN ORGANIZED HEALTH CARE EDUCATION/TRAINING PROGRAM

## 2017-09-26 PROCEDURE — 84100 ASSAY OF PHOSPHORUS: CPT | Performed by: STUDENT IN AN ORGANIZED HEALTH CARE EDUCATION/TRAINING PROGRAM

## 2017-09-26 PROCEDURE — 25000128 H RX IP 250 OP 636: Performed by: STUDENT IN AN ORGANIZED HEALTH CARE EDUCATION/TRAINING PROGRAM

## 2017-09-26 PROCEDURE — 83735 ASSAY OF MAGNESIUM: CPT | Performed by: STUDENT IN AN ORGANIZED HEALTH CARE EDUCATION/TRAINING PROGRAM

## 2017-09-26 PROCEDURE — 12000008 ZZH R&B INTERMEDIATE UMMC

## 2017-09-26 PROCEDURE — 36415 COLL VENOUS BLD VENIPUNCTURE: CPT | Performed by: STUDENT IN AN ORGANIZED HEALTH CARE EDUCATION/TRAINING PROGRAM

## 2017-09-26 PROCEDURE — 25000125 ZZHC RX 250: Performed by: STUDENT IN AN ORGANIZED HEALTH CARE EDUCATION/TRAINING PROGRAM

## 2017-09-26 PROCEDURE — 25000132 ZZH RX MED GY IP 250 OP 250 PS 637: Performed by: STUDENT IN AN ORGANIZED HEALTH CARE EDUCATION/TRAINING PROGRAM

## 2017-09-26 PROCEDURE — 85027 COMPLETE CBC AUTOMATED: CPT | Performed by: STUDENT IN AN ORGANIZED HEALTH CARE EDUCATION/TRAINING PROGRAM

## 2017-09-26 PROCEDURE — 74177 CT ABD & PELVIS W/CONTRAST: CPT

## 2017-09-26 PROCEDURE — 40000141 ZZH STATISTIC PERIPHERAL IV START W/O US GUIDANCE

## 2017-09-26 PROCEDURE — 85610 PROTHROMBIN TIME: CPT | Performed by: STUDENT IN AN ORGANIZED HEALTH CARE EDUCATION/TRAINING PROGRAM

## 2017-09-26 RX ORDER — AMOXICILLIN 250 MG
1-2 CAPSULE ORAL 2 TIMES DAILY
Status: DISCONTINUED | OUTPATIENT
Start: 2017-09-26 | End: 2017-09-27

## 2017-09-26 RX ORDER — FENOFIBRATE 145 MG/1
145 TABLET, COATED ORAL DAILY
Status: DISCONTINUED | OUTPATIENT
Start: 2017-09-26 | End: 2017-09-27

## 2017-09-26 RX ORDER — POLYETHYLENE GLYCOL 3350 17 G/17G
17 POWDER, FOR SOLUTION ORAL DAILY PRN
Status: DISCONTINUED | OUTPATIENT
Start: 2017-09-26 | End: 2017-10-06 | Stop reason: HOSPADM

## 2017-09-26 RX ORDER — FINASTERIDE 5 MG/1
5 TABLET, FILM COATED ORAL DAILY
Status: DISCONTINUED | OUTPATIENT
Start: 2017-09-26 | End: 2017-10-06 | Stop reason: HOSPADM

## 2017-09-26 RX ORDER — ONDANSETRON 4 MG/1
4 TABLET, ORALLY DISINTEGRATING ORAL EVERY 6 HOURS PRN
Status: DISCONTINUED | OUTPATIENT
Start: 2017-09-26 | End: 2017-10-06 | Stop reason: HOSPADM

## 2017-09-26 RX ORDER — SODIUM CHLORIDE, SODIUM LACTATE, POTASSIUM CHLORIDE, CALCIUM CHLORIDE 600; 310; 30; 20 MG/100ML; MG/100ML; MG/100ML; MG/100ML
INJECTION, SOLUTION INTRAVENOUS CONTINUOUS
Status: DISCONTINUED | OUTPATIENT
Start: 2017-09-26 | End: 2017-10-06 | Stop reason: HOSPADM

## 2017-09-26 RX ORDER — HYDROMORPHONE HYDROCHLORIDE 1 MG/ML
0.3 INJECTION, SOLUTION INTRAMUSCULAR; INTRAVENOUS; SUBCUTANEOUS ONCE
Status: COMPLETED | OUTPATIENT
Start: 2017-09-26 | End: 2017-09-26

## 2017-09-26 RX ORDER — PROCHLORPERAZINE 25 MG
25 SUPPOSITORY, RECTAL RECTAL EVERY 12 HOURS PRN
Status: DISCONTINUED | OUTPATIENT
Start: 2017-09-26 | End: 2017-10-06 | Stop reason: HOSPADM

## 2017-09-26 RX ORDER — HYDROMORPHONE HYDROCHLORIDE 1 MG/ML
0.5 INJECTION, SOLUTION INTRAMUSCULAR; INTRAVENOUS; SUBCUTANEOUS
Status: DISCONTINUED | OUTPATIENT
Start: 2017-09-26 | End: 2017-09-26

## 2017-09-26 RX ORDER — PROCHLORPERAZINE MALEATE 5 MG
5-10 TABLET ORAL EVERY 6 HOURS PRN
Status: DISCONTINUED | OUTPATIENT
Start: 2017-09-26 | End: 2017-10-06 | Stop reason: HOSPADM

## 2017-09-26 RX ORDER — NALOXONE HYDROCHLORIDE 0.4 MG/ML
.1-.4 INJECTION, SOLUTION INTRAMUSCULAR; INTRAVENOUS; SUBCUTANEOUS
Status: DISCONTINUED | OUTPATIENT
Start: 2017-09-26 | End: 2017-09-26

## 2017-09-26 RX ORDER — METOCLOPRAMIDE 10 MG/1
10 TABLET ORAL EVERY 6 HOURS PRN
Status: DISCONTINUED | OUTPATIENT
Start: 2017-09-26 | End: 2017-10-06 | Stop reason: HOSPADM

## 2017-09-26 RX ORDER — ONDANSETRON 2 MG/ML
4 INJECTION INTRAMUSCULAR; INTRAVENOUS EVERY 6 HOURS PRN
Status: DISCONTINUED | OUTPATIENT
Start: 2017-09-26 | End: 2017-09-26

## 2017-09-26 RX ORDER — METOCLOPRAMIDE HYDROCHLORIDE 5 MG/ML
10 INJECTION INTRAMUSCULAR; INTRAVENOUS EVERY 6 HOURS PRN
Status: DISCONTINUED | OUTPATIENT
Start: 2017-09-26 | End: 2017-10-06 | Stop reason: HOSPADM

## 2017-09-26 RX ORDER — ASPIRIN 81 MG/1
81 TABLET ORAL DAILY
Status: DISCONTINUED | OUTPATIENT
Start: 2017-09-26 | End: 2017-09-27

## 2017-09-26 RX ORDER — NALOXONE HYDROCHLORIDE 0.4 MG/ML
.1-.4 INJECTION, SOLUTION INTRAMUSCULAR; INTRAVENOUS; SUBCUTANEOUS
Status: DISCONTINUED | OUTPATIENT
Start: 2017-09-26 | End: 2017-10-06 | Stop reason: HOSPADM

## 2017-09-26 RX ORDER — OXYCODONE HYDROCHLORIDE 5 MG/1
5-10 TABLET ORAL
Status: DISCONTINUED | OUTPATIENT
Start: 2017-09-26 | End: 2017-09-27

## 2017-09-26 RX ORDER — HYDROMORPHONE HCL/0.9% NACL/PF 0.2MG/0.2
0.2 SYRINGE (ML) INTRAVENOUS
Status: DISCONTINUED | OUTPATIENT
Start: 2017-09-26 | End: 2017-09-26

## 2017-09-26 RX ORDER — ACETAMINOPHEN 325 MG/1
650 TABLET ORAL EVERY 4 HOURS PRN
Status: DISCONTINUED | OUTPATIENT
Start: 2017-09-26 | End: 2017-09-27

## 2017-09-26 RX ORDER — HYDROMORPHONE HCL/0.9% NACL/PF 0.2MG/0.2
.2-.3 SYRINGE (ML) INTRAVENOUS
Status: DISCONTINUED | OUTPATIENT
Start: 2017-09-26 | End: 2017-10-06 | Stop reason: HOSPADM

## 2017-09-26 RX ORDER — IOPAMIDOL 755 MG/ML
116 INJECTION, SOLUTION INTRAVASCULAR ONCE
Status: COMPLETED | OUTPATIENT
Start: 2017-09-26 | End: 2017-09-26

## 2017-09-26 RX ORDER — WARFARIN SODIUM 5 MG/1
5 TABLET ORAL
Status: DISCONTINUED | OUTPATIENT
Start: 2017-09-26 | End: 2017-09-26

## 2017-09-26 RX ORDER — BISACODYL 10 MG
10 SUPPOSITORY, RECTAL RECTAL DAILY PRN
Status: DISCONTINUED | OUTPATIENT
Start: 2017-09-26 | End: 2017-10-06 | Stop reason: HOSPADM

## 2017-09-26 RX ORDER — LEVOTHYROXINE SODIUM 150 UG/1
150 TABLET ORAL DAILY
Status: DISCONTINUED | OUTPATIENT
Start: 2017-09-26 | End: 2017-09-27

## 2017-09-26 RX ORDER — LIDOCAINE 40 MG/G
CREAM TOPICAL
Status: DISCONTINUED | OUTPATIENT
Start: 2017-09-26 | End: 2017-10-06 | Stop reason: HOSPADM

## 2017-09-26 RX ORDER — SODIUM BICARBONATE 325 MG/1
325 TABLET ORAL EVERY 4 HOURS
Status: DISCONTINUED | OUTPATIENT
Start: 2017-09-26 | End: 2017-09-27

## 2017-09-26 RX ORDER — PREDNISONE 5 MG/ML
5 SOLUTION ORAL DAILY
Status: DISCONTINUED | OUTPATIENT
Start: 2017-09-26 | End: 2017-09-27

## 2017-09-26 RX ORDER — TAMSULOSIN HYDROCHLORIDE 0.4 MG/1
0.4 CAPSULE ORAL DAILY
Status: DISCONTINUED | OUTPATIENT
Start: 2017-09-26 | End: 2017-10-06 | Stop reason: HOSPADM

## 2017-09-26 RX ORDER — ONDANSETRON 2 MG/ML
4 INJECTION INTRAMUSCULAR; INTRAVENOUS EVERY 6 HOURS PRN
Status: DISCONTINUED | OUTPATIENT
Start: 2017-09-26 | End: 2017-10-06 | Stop reason: HOSPADM

## 2017-09-26 RX ORDER — PRAVASTATIN SODIUM 40 MG
40 TABLET ORAL DAILY
Status: DISCONTINUED | OUTPATIENT
Start: 2017-09-26 | End: 2017-09-27

## 2017-09-26 RX ADMIN — PROCHLORPERAZINE EDISYLATE 10 MG: 5 INJECTION INTRAMUSCULAR; INTRAVENOUS at 18:40

## 2017-09-26 RX ADMIN — HYDROMORPHONE HYDROCHLORIDE 0.5 MG: 1 INJECTION, SOLUTION INTRAMUSCULAR; INTRAVENOUS; SUBCUTANEOUS at 09:38

## 2017-09-26 RX ADMIN — SODIUM CHLORIDE, POTASSIUM CHLORIDE, SODIUM LACTATE AND CALCIUM CHLORIDE 1000 ML: 600; 310; 30; 20 INJECTION, SOLUTION INTRAVENOUS at 14:08

## 2017-09-26 RX ADMIN — ONDANSETRON 4 MG: 2 INJECTION INTRAMUSCULAR; INTRAVENOUS at 22:30

## 2017-09-26 RX ADMIN — Medication 0.3 MG: at 14:19

## 2017-09-26 RX ADMIN — Medication 0.1 MG: at 18:08

## 2017-09-26 RX ADMIN — PROCHLORPERAZINE EDISYLATE 10 MG: 5 INJECTION INTRAMUSCULAR; INTRAVENOUS at 12:56

## 2017-09-26 RX ADMIN — IOPAMIDOL 116 ML: 755 INJECTION, SOLUTION INTRAVENOUS at 16:02

## 2017-09-26 RX ADMIN — Medication 0.3 MG: at 22:50

## 2017-09-26 RX ADMIN — HYDROMORPHONE HYDROCHLORIDE 0.5 MG: 1 INJECTION, SOLUTION INTRAMUSCULAR; INTRAVENOUS; SUBCUTANEOUS at 11:41

## 2017-09-26 RX ADMIN — ONDANSETRON 4 MG: 2 INJECTION INTRAMUSCULAR; INTRAVENOUS at 16:23

## 2017-09-26 RX ADMIN — SODIUM CHLORIDE, PRESERVATIVE FREE 79 ML: 5 INJECTION INTRAVENOUS at 16:02

## 2017-09-26 RX ADMIN — Medication 0.2 MG: at 16:27

## 2017-09-26 RX ADMIN — Medication 0.3 MG: at 18:38

## 2017-09-26 RX ADMIN — HYDROMORPHONE HYDROCHLORIDE 0.5 MG: 1 INJECTION, SOLUTION INTRAMUSCULAR; INTRAVENOUS; SUBCUTANEOUS at 10:40

## 2017-09-26 RX ADMIN — SODIUM CHLORIDE, POTASSIUM CHLORIDE, SODIUM LACTATE AND CALCIUM CHLORIDE: 600; 310; 30; 20 INJECTION, SOLUTION INTRAVENOUS at 11:37

## 2017-09-26 RX ADMIN — ONDANSETRON 4 MG: 2 INJECTION INTRAMUSCULAR; INTRAVENOUS at 09:37

## 2017-09-26 RX ADMIN — Medication 0.3 MG: at 20:46

## 2017-09-26 RX ADMIN — Medication 0.2 MG: at 12:50

## 2017-09-26 ASSESSMENT — PAIN DESCRIPTION - DESCRIPTORS
DESCRIPTORS: CONSTANT
DESCRIPTORS: SHARP
DESCRIPTORS: CONSTANT;ACHING;PRESSURE

## 2017-09-26 NOTE — PROGRESS NOTES
"  Care Coordinator- Transition/ Discharge Planning     Admission Date/Time:  9/26/2017  Attending MD:  Haylee Bryan MD     Data  Date of initial CC assessment:  Chart review initiated today and Mr. Brown was discussed in morning patient care interdisciplinary rounds.    Chart reviewed, discussed with interdisciplinary team.   Patient was admitted for: Per H and P: \"  Mr. Brown is a 55 y/o male ~1 month s/p whipple for chronic pancreatitis that has had 3 days of steadily increasing nausea and vomiting with associated abdominal pain. He describes the abdominal pain as diffuse and getting worse.\"   Assessment  Per chart review, patient upon recent discharge was being followed by [facility] for home enteral feedings post recent surgery as above.  [facility] has been notified of re admission to the acute care hospital setting.  At this time, inpatient work up is in progress.  [facility] will plan to follow patient for updated discharge needs once stable for discharge by the MD team.    Plan  Anticipated Discharge Date:  To be determined pending inpatient work up.  Anticipated Discharge Plan:  Inpatient Care Mgmt.Team will continue to follow for updated discharge needs.    CTS Handoff completed:  (Clinic letter)  To be sent at time of discharge.    RADHA Walton.S.N., P.H.N.,R.N.         Pager     "

## 2017-09-26 NOTE — PROGRESS NOTES
Patient admitted from home at 0845, history of Whipple on 8/28/17. Nausea, vomiting and increased abdominal pain at home. Emesis of 300cc clear fluid on arrival to floor. Tube feeding stopped,GT to gravity with drainage that looks like tube feed. MD notified, abdomen firm and distended, c/o of unmangeable abdominal pain at home, passing some gas.Dilaudid iv q1 hour with relief, nausea improving with Zofran and Compazine. Voided terry urine, fluid flush started via piv.Patient scheduled for abdominal CT at 1500,contrast given via GT per order.Right lower abdomen has two small open sites from previous  surgery that had creamy drainage on dressing, dressings changed.Wife at bedside and supportive

## 2017-09-26 NOTE — H&P
General Surgery History & Physical    Lucas Brown MRN# [id number]   Age: 56 year old YOB: 1960     Date of Admission:  9/26/2017         Assessment and Plan:   Assessment:   Lucas Brown is a 56 year old man with history of chronic pancreatitis s/p Whipple in August of 2017 who presented with 4 days of increasing abdominal pain, nausea and vomiting and exam concerning for a partial small bowel obstruction.         Plan:   - NPO and hold tube feeds  - IV ondansetron for nausea  - IV hydromorphone, PO oxy, tylenol for pain  - CBC, BMP, INR, magnesium and phosphorus  - LR @ 100 mL/hr  - NG tube placement if continued emesis  - Imaging: abdominal/pelvic ct with g-tube and iv contrast  - Warfarin dosing per pharmacy      Discussed with staff, Dr. Bryan                  Chief Complaint:   Nausea, vomiting          History of Present Illness:   Mr. Brown is a 57 y/o male ~1 month s/p whipple for chronic pancreatitis that has had 3 days of steadily increasing nausea and vomiting with associated abdominal pain. He describes the abdominal pain as diffuse and getting worse. His symptoms started on 9/23 when Mr Brown started to have nausea and vomiting that seemed related to clamping his G-tube for medication administration. He has also not had a bowel movement since that date. He does say he has been passing gas. Further, Mr Brown had an x-ray on 9-23 which was negative for signs of obstruction. He denies any fevers, loss of consciousness, chest pain, shortness of breath.          Past Medical History:     Past Medical History:   Diagnosis Date     Anticoagulation adequate with anticoagulant therapy      Antiplatelet or antithrombotic long-term use      Anxiety      BPH (benign prostatic hyperplasia)      Chronic pancreatitis (H)      Complete heart block (H)     medtronic ppm     Depression      GERD (gastroesophageal reflux disease)      Heart murmur      HTN (hypertension)       Hyperlipidemia      Hypothyroidism      COLBY (obstructive sleep apnea)     Not using CPAP     Other chronic pain      Pacemaker      Pancreatic disease      Pancreatitis              Past Surgical History:     Past Surgical History:   Procedure Laterality Date     aortic aneurysm       BIOPSY ARTERY TEMPORAL       ENDOSCOPIC RETROGRADE CHOLANGIOPANCREATOGRAM N/A 10/13/2015    Procedure: COMBINED ENDOSCOPIC RETROGRADE CHOLANGIOPANCREATOGRAPHY, PLACE TUBE/STENT;  Surgeon: Oniel Cates MD;  Location: [facility]     ENDOSCOPIC RETROGRADE CHOLANGIOPANCREATOGRAM N/A 11/12/2015    Procedure: COMBINED ENDOSCOPIC RETROGRADE CHOLANGIOPANCREATOGRAPHY, REMOVE FOREIGN BODY OR STENT/TUBE;  Surgeon: Oniel Cates MD;  Location: [facility]     ENDOSCOPIC RETROGRADE CHOLANGIOPANCREATOGRAM N/A 12/1/2015    Procedure: COMBINED ENDOSCOPIC RETROGRADE CHOLANGIOPANCREATOGRAPHY, PLACE TUBE/STENT;  Surgeon: Oniel Cates MD;  Location: [facility]     ENDOSCOPIC RETROGRADE CHOLANGIOPANCREATOGRAM N/A 12/22/2015    Procedure: ENDOSCOPIC RETROGRADE CHOLANGIOPANCREATOGRAM;  Surgeon: Oniel Cates MD;  Location: [facility]     ENDOSCOPIC RETROGRADE CHOLANGIOPANCREATOGRAM N/A 1/19/2016    Procedure: COMBINED ENDOSCOPIC RETROGRADE CHOLANGIOPANCREATOGRAPHY, REMOVE FOREIGN BODY OR STENT/TUBE;  Surgeon: Oniel Cates MD;  Location: [facility]     ENDOSCOPIC RETROGRADE CHOLANGIOPANCREATOGRAM N/A 8/30/2016    Procedure: COMBINED ENDOSCOPIC RETROGRADE CHOLANGIOPANCREATOGRAPHY, PLACE TUBE/STENT;  Surgeon: Oniel Cates MD;  Location: [facility]     ENDOSCOPIC RETROGRADE CHOLANGIOPANCREATOGRAM N/A 10/18/2016    Procedure: COMBINED ENDOSCOPIC RETROGRADE CHOLANGIOPANCREATOGRAPHY, REMOVE FOREIGN BODY OR STENT/TUBE;  Surgeon: Guru Amber Childs MD;  Location: [facility]     ESOPHAGOSCOPY, GASTROSCOPY, DUODENOSCOPY (EGD), COMBINED N/A 8/31/2016    Procedure: COMBINED ENDOSCOPIC ULTRASOUND, ESOPHAGOSCOPY, GASTROSCOPY,  DUODENOSCOPY (EGD), FINE NEEDLE ASPIRATE/BIOPSY;  Surgeon: Loy Russ MD;  [facility]     ESOPHAGOSCOPY, GASTROSCOPY, DUODENOSCOPY (EGD), COMBINED N/A 10/18/2016    Procedure: COMBINED ESOPHAGOSCOPY, GASTROSCOPY, DUODENOSCOPY (EGD), REMOVE FOREIGN BODY;  Surgeon: Guru Amber Childs MD;  [facility]     ESOPHAGOSCOPY, GASTROSCOPY, DUODENOSCOPY (EGD), COMBINED N/A 5/15/2017    Procedure: COMBINED ENDOSCOPIC ULTRASOUND, ESOPHAGOSCOPY, GASTROSCOPY, DUODENOSCOPY (EGD), FINE NEEDLE ASPIRATE/BIOPSY;  Upper Endoscopic Ultrasound fine needle biopsy  ;  Surgeon: Titus Miguel MD;  Location:  OR      [facility] ENDOSCOPY W EUS N/A 10/9/2015    Procedure: COMBINED ENDOSCOPIC ULTRASOUND, ESOPHAGOSCOPY, GASTROSCOPY, DUODENOSCOPY (EGD);  Surgeon: Loy Russ MD;  Location:  GI     IMPLANT PACEMAKER  08/27/14     REPLACE VALVE AORTIC  08/27/14    X2, 1999 and 2014     WHIPPLE PROCEDURE N/A 8/28/2017    Procedure: WHIPPLE PROCEDURE;  pancreaticoduodenectomy ( whipple proceedure) umbilical hernia repair, incidental appendectomy , gastrojejunostomy tube placement, paraveretebral anesthia block.;  Surgeon: Vik Crum MD;  Location:  OR             Social History:     Social History   Substance Use Topics     Smoking status: Former Smoker     Packs/day: 1.50     Years: 25.00     Quit date: 3/9/2012     Smokeless tobacco: Former User      Comment: Quit 2012     Alcohol use No             Family History:     Family History   Problem Relation Age of Onset     Alzheimer Disease Mother                 Allergies:     Allergies   Allergen Reactions     Reclast [Zoledronic Acid] Other (See Comments)     Caused pain in joints     Codeine Other (See Comments)     Gets garcia             Medications:       Current Facility-Administered Medications on File Prior to Encounter:  neostigmine (PROSTIGMINE) injection     Current Outpatient Prescriptions on File Prior to  Encounter:  predniSONE 5 MG/5ML solution 5 mLs (5 mg) by Per J Tube route daily   oxyCODONE (ROXICODONE) 5 MG/5ML solution 5 mLs (5 mg) by Per J Tube route every 3 hours as needed for moderate to severe pain   fentaNYL (DURAGESIC) 100 mcg/hr 72 hr patch Place 1 patch onto the skin every 72 hours   amylase-lipase-protease (CREON 24) [id number] UNITS CPEP per EC capsule 2 capsules (48,000 Units) by Per J Tube route every 4 hours   sodium bicarbonate 325 MG tablet 1 tablet (325 mg) by Per J Tube route every 4 hours   ondansetron (ZOFRAN-ODT) 4 MG ODT tab Take 1 tablet (4 mg) by mouth every 8 hours as needed for nausea   warfarin (COUMADIN) 7.5 MG tablet Take 0.5 tablets (3.75 mg) by mouth daily   TAMSULOSIN HCL PO Take 0.4 mg by mouth daily   levothyroxine (SYNTHROID, LEVOTHROID) 150 MCG tablet Take 150 mcg by mouth daily   sertraline (ZOLOFT) 50 MG tablet Take 50 mg by mouth daily   lisinopril (PRINIVIL,ZESTRIL) 10 MG tablet Take 10 mg by mouth daily   Multiple Vitamins-Minerals (MULTIVITAMINS) CHEW Take 1 chew tab by mouth daily   pravastatin (PRAVACHOL) 40 MG tablet Take 40 mg by mouth daily   fenofibrate 145 MG tablet Take 145 mg by mouth daily   omeprazole (PRILOSEC) 2 mg/mL SUSP 10 mLs (20 mg) by Oral or Feeding Tube route 2 times daily   FINASTERIDE PO Take 5 mg by mouth daily   ACETAMINOPHEN PO Take 1,000 mg by mouth every 6 hours as needed for pain    aspirin 81 MG tablet Take 81 mg by mouth daily Holding               Review of Systems:   12-point ROS otherwise negative except as noted above in the HPI.          Physical Exam:   All vitals have been reviewed    Temp:  [95.2  F (35.1  C)] 95.2  F (35.1  C)  Pulse:  [98] 98  Resp:  [18] 18  BP: (117)/(73) 117/73        Intake/Output Summary (Last 24 hours) at 09/26/17 1101  Last data filed at 09/26/17 0900   Gross per 24 hour   Intake                0 ml   Output              300 ml   Net             -300 ml         Physical Exam:  Gen: alert, responsive, NAD,  comfortable in bed  HEENT: No scleral icterus  CV/Pulm: NLB on RA. RRR w/o murmurs gallops or rubs. CTAB.   Abd: Soft, distended with mild tenderness worse in the upper right quadrant. G-tube draining tube feed to gravity. Bag over old wound site from Whipple on right side and well healing packed wound in lower abdomen.   Ext: WWP  Neuro: Responds appropriately and moves all extremities  Skin: No lesions  Psych: Cooperative          Data:   All laboratory data reviewed    Results:  BMP  Recent Labs  Lab 09/23/17  0855 09/21/17  1100   * 134   POTASSIUM 4.3 4.5   CHLORIDE 94 97   CO2 30 30   BUN 45* 42*   CR 1.05 1.11   * 134*     CBC  Recent Labs  Lab 09/23/17  0855 09/21/17  1100   WBC 12.8* 13.1*   HGB 8.9* 9.9*    443     LFT  Recent Labs  Lab 09/25/17  1056 09/21/17  1100   INR 2.66* 1.52*       Recent Labs  Lab 09/23/17  0855 09/21/17  1100   * 134*                  --  Gary Wilkerson MD  Gen Surg PGY1

## 2017-09-26 NOTE — PHARMACY-ANTICOAGULATION SERVICE
Clinical Pharmacy - Warfarin Dosing Consult     Pharmacy has been consulted to manage this patient s warfarin therapy.  Indication: Mechanical Aortic Valve Replacement  Therapy Goal: INR 2-3  Provider/Team: Surgery  OP Anticoag Clinic: [facility]  Warfarin Prior to Admission: Yes  Warfarin PTA Regimen: 5 mg daily, patient reports post-Whipple procedure had difficult time reaching therapeutic INR, follows dosing instructions from anticoagulation clinic  Significant drug interactions: Aspirin, fenofibrate, sertraline  Recent documented change in oral intake/nutrition: Unknown  Dose Comments: Has 5 mg and 2.5 mg tablets at home    INR   Date Value Ref Range Status   09/26/2017 2.60 (H) 0.86 - 1.14 Final   09/25/2017 2.66 (H) 0.86 - 1.14 Final       Recommend warfarin 5 mg today.  Pharmacy will monitor Lucas Brown daily and order warfarin doses to achieve specified goal.      Please contact pharmacy as soon as possible if the warfarin needs to be held for a procedure or if the warfarin goals change.      Scarlet Lucero, PharmD

## 2017-09-26 NOTE — IP AVS SNAPSHOT
MRN:[id number]                      After Visit Summary   9/26/2017    Lucas Brown    MRN: [id number]           Thank you!     Thank you for choosing [facility] for your care. Our goal is always to provide you with excellent care. Hearing back from our patients is one way we can continue to improve our services. Please take a few minutes to complete the written survey that you may receive in the mail after you visit with us. Thank you!        Patient Information     Date Of Birth          1960        Designated Caregiver       Most Recent Value    Caregiver    Will someone help with your care after discharge? yes    Name of designated caregiver ralph    Phone number of caregiver [phone]    Caregiver address staying locally until MD releases.      About your hospital stay     You were admitted on:  September 26, 2017 You last received care in the:  [address] [facility]    You were discharged on:  October 6, 2017        Reason for your hospital stay       Gastric outlet obstruction                  Who to Call     For medical emergencies, please call 911.  For non-urgent questions about your medical care, please call your primary care provider or clinic, [phone]          Attending Provider     Provider Specialty    Vik Crum MD Transplant    Haylee Bryan MD Surgery       Primary Care Provider Office Phone # Fax #    Cyril Mackay [phone]      After Care Instructions     Activity       Your activity upon discharge: activity as tolerated            Diet       Follow this diet upon discharge: NOTHING BY MOUTH (occassional sips okay)            Discharge Instructions       Please take all medications through the J tube. You may take sips of water keeping the g tube vented at all times. You will receive 1L of LR infusions through your PICC line once a day at home. You should return to taking your warfarin and managing it as you were before  you came to the hospital. For your tube feeds, you should use 1.5 Isosource @ 65mL/hr at all times. You will follow up with Dr. Crum within the week, and are being sent out with 2 weeks supply of oxycodone, acetaminophen, and miralax. We have discontinued sublingual zofran and replaced it at your request.    If you are receiving home care please inform your home care nurse of our contact number.    You may also call the Surgery Call-Center to speak with a Triage Nurse or to make an appointment: [phone].            IV access       You are going home with the following vascular access device: PICC.            Tubes and drains       You are going home with the following tubes or drains: feeding tube GJ-Tube.   Follow these instructions  to care for your tube: please keep the G tube vented, meds and feeds per the J tube. NOTHING BY MOUTH other than occasional sips of water                  Additional Services     Home infusion referral       Please fax discharge orders to [facility]    Ph:  [phone]    Fax: [phone]    Skilled home care RN for resumption of home care plans of care as prior to hospitalization and to assist with the MD team updated plans of care as designated in discharge orders.    Skilled home care RN to evaluate medication management, nutrition and hydration evaluation, endurance evaluation, and general status evaluation after discharge from the acute care hospital setting.    Skilled home care RN to assist with home enteral feedings via J-Tube per MD orders.  J-Tube cares per home care agency routine.    Skilled home care RN to assist with home IV hydration 1 liter LR daily via double lumen picc line per MD orders.  Picc line cares per home care agency routine.    Skilled home care RN to evaluated gastro intestinal status in home setting.    Skilled home care RN to assist with anticoagulation management per MD orders.                  Pending Results     Date and Time Order  Name Status Description    10/1/2017 1623 IR PICC Vascular In process     10/1/2017 1518 Blood culture Preliminary     10/1/2017 1455 Blood culture Preliminary     10/1/2017 1445 Transfusion reaction evaluation In process             Statement of Approval     Ordered          10/06/17 1242  I have reviewed and agree with all the recommendations and orders detailed in this document.  EFFECTIVE NOW     Approved and electronically signed by:  Miguel Wilkerson MD             Admission Information     Date & Time Provider Department Dept. Phone    9/26/2017 Haylee Bryan MD [address] [facility] [place] [phone]      Your Vitals Were     Blood Pressure Pulse Temperature Respirations Weight Pulse Oximetry    129/66 (BP Location: Left arm) 60 97.2  F (36.2  C) (Oral) 18 88.1 kg (194 lb 3.2 oz) 92%    BMI (Body Mass Index)                   27.09 kg/m2           MyChart Information     [facility] gives you secure access to your electronic health record. If you see a primary care provider, you can also send messages to your care team and make appointments. If you have questions, please call your primary care clinic.  If you do not have a primary care provider, please call [phone] and they will assist you.        Care EveryWhere ID     This is your Care EveryWhere ID. This could be used by other organizations to access your [facility] medical records  [id number]        Equal Access to Services     LEE CORNELL : Hadshelbi Dc, waaxda luqadaha, qaybta kaalmada joselin, rimma rodriguez. So [facility] [phone].    ATENCIÓN: Si habla español, tiene a rodriguez disposición servicios gratuitos de asistencia lingüística. Llame al [phone].    We comply with applicable federal civil rights laws and [place] laws. We do not discriminate on the basis of race, color, national origin, age, disability, sex, sexual orientation, or gender identity.               Review of your medicines       START taking        Dose / Directions    fentaNYL 75 mcg/hr 72 hr patch   Commonly known as:  DURAGESIC   Replaces:  fentaNYL 100 mcg/hr 72 hr patch        Dose:  1 patch   Place 1 patch onto the skin every 72 hours   Quantity:  5 patch   Refills:  0       lactated ringers Soln        Dose:  1000 mL   Inject 1,000 mLs into the vein daily   Quantity:  97522 mL   Refills:  0       ondansetron 4 MG tablet   Commonly known as:  ZOFRAN        Dose:  4-8 mg   1-2 tablets (4-8 mg) by Per J Tube route every 8 hours as needed for nausea   Quantity:  90 tablet   Refills:  0       polyethylene glycol Packet   Commonly known as:  MIRALAX/GLYCOLAX   Indication:  Constipation        Dose:  17 g   17 g by Per J Tube route daily as needed for constipation   Quantity:  14 packet   Refills:  0         CONTINUE these medicines which may have CHANGED, or have new prescriptions. If we are uncertain of the size of tablets/capsules you have at home, strength may be listed as something that might have changed.        Dose / Directions    acetaminophen 32 mg/mL solution   Commonly known as:  TYLENOL   This may have changed:    - medication strength  - how to take this  - when to take this  - reasons to take this        Dose:  1000 mg   31.25 mLs (1,000 mg) by Per J Tube route every 8 hours   Quantity:  1000 mL   Refills:  0       oxyCODONE 5 MG/5ML solution   Commonly known as:  ROXICODONE   This may have changed:    - how much to take  - when to take this   Used for:  Acute post-operative pain        Dose:  10 mg   10 mLs (10 mg) by Per J Tube route every 4 hours as needed for moderate to severe pain   Quantity:  840 mL   Refills:  0         CONTINUE these medicines which have NOT CHANGED        Dose / Directions    amylase-lipase-protease [id number] UNITS Cpep per EC capsule   Commonly known as:  CREON 24   Used for:  Nausea and vomiting, intractability of vomiting not specified, unspecified vomiting type        Dose:  2 capsule   2  capsules (48,000 Units) by Per J Tube route every 4 hours   Quantity:  180 capsule   Refills:  0       aspirin 81 MG tablet        Dose:  81 mg   Take 81 mg by mouth daily Holding   Refills:  0       fenofibrate 145 MG tablet        Dose:  145 mg   Take 145 mg by mouth daily   Refills:  0       FINASTERIDE PO        Dose:  5 mg   Take 5 mg by mouth daily   Refills:  0       levothyroxine 150 MCG tablet   Commonly known as:  SYNTHROID/LEVOTHROID        Dose:  150 mcg   Take 150 mcg by mouth daily   Refills:  0       lisinopril 10 MG tablet   Commonly known as:  PRINIVIL/ZESTRIL        Dose:  10 mg   Take 10 mg by mouth daily   Refills:  0       MULTIVITAMINS Chew        Dose:  1 chew tab   Take 1 chew tab by mouth daily   Refills:  0       omeprazole 2 mg/mL Susp   Commonly known as:  priLOSEC   Used for:  Chronic pancreatitis, unspecified pancreatitis type (H)        Dose:  20 mg   10 mLs (20 mg) by Oral or Feeding Tube route 2 times daily   Quantity:  300 mL   Refills:  0       pravastatin 40 MG tablet   Commonly known as:  PRAVACHOL        Dose:  40 mg   Take 40 mg by mouth daily   Refills:  0       predniSONE 5 MG/5ML solution   Used for:  Chronic pancreatitis, unspecified pancreatitis type (H)        Dose:  5 mg   5 mLs (5 mg) by Per J Tube route daily   Quantity:  70 mL   Refills:  0       sertraline 50 MG tablet   Commonly known as:  ZOLOFT        Dose:  50 mg   Take 50 mg by mouth daily   Refills:  0       sodium bicarbonate 325 MG tablet   Used for:  Chronic pancreatitis, unspecified pancreatitis type (H)        Dose:  325 mg   1 tablet (325 mg) by Per J Tube route every 4 hours   Quantity:  42 tablet   Refills:  1       TAMSULOSIN HCL PO        Dose:  0.4 mg   Take 0.4 mg by mouth daily   Refills:  0       warfarin 7.5 MG tablet   Commonly known as:  COUMADIN   Used for:  H/O aortic valve replacement        Dose:  3.5 mg   Take 0.5 tablets (3.75 mg) by mouth daily   Quantity:  30 tablet   Refills:  0          STOP taking     fentaNYL 100 mcg/hr 72 hr patch   Commonly known as:  DURAGESIC   Replaced by:  fentaNYL 75 mcg/hr 72 hr patch           ondansetron 4 MG ODT tab   Commonly known as:  ZOFRAN-ODT                Where to get your medicines      Some of these will need a paper prescription and others can be bought over the counter. Ask your nurse if you have questions.     Bring a paper prescription for each of these medications     acetaminophen 32 mg/mL solution    amylase-lipase-protease [id number] UNITS Cpep per EC capsule    fentaNYL 75 mcg/hr 72 hr patch    lactated ringers Soln    ondansetron 4 MG tablet    oxyCODONE 5 MG/5ML solution    polyethylene glycol Packet                Protect others around you: Learn how to safely use, store and throw away your medicines at www.disposemymeds.org.             Medication List: This is a list of all your medications and when to take them. Check marks below indicate your daily home schedule. Keep this list as a reference.      Medications           Morning Afternoon Evening Bedtime As Needed    acetaminophen 32 mg/mL solution   Commonly known as:  TYLENOL   31.25 mLs (1,000 mg) by Per J Tube route every 8 hours   Last time this was given:  1,000 mg on 10/5/2017  8:33 PM                                amylase-lipase-protease [id number] UNITS Cpep per EC capsule   Commonly known as:  CREON 24   2 capsules (48,000 Units) by Per J Tube route every 4 hours   Last time this was given:  48,000 Units on 10/6/2017 11:43 AM                                aspirin 81 MG tablet   Take 81 mg by mouth daily Holding                                fenofibrate 145 MG tablet   Take 145 mg by mouth daily                                fentaNYL 75 mcg/hr 72 hr patch   Commonly known as:  DURAGESIC   Place 1 patch onto the skin every 72 hours   Last time this was given:  1 patch on 10/4/2017 10:01 PM                                FINASTERIDE PO   Take 5 mg by mouth daily   Last time this  was given:  5 mg on 10/6/2017 10:05 AM                                lactated ringers Soln   Inject 1,000 mLs into the vein daily   Last time this was given:  10/4/2017 10:23 AM                                levothyroxine 150 MCG tablet   Commonly known as:  SYNTHROID/LEVOTHROID   Take 150 mcg by mouth daily   Last time this was given:  150 mcg on 10/6/2017 10:05 AM                                lisinopril 10 MG tablet   Commonly known as:  PRINIVIL/ZESTRIL   Take 10 mg by mouth daily                                MULTIVITAMINS Chew   Take 1 chew tab by mouth daily                                omeprazole 2 mg/mL Susp   Commonly known as:  priLOSEC   10 mLs (20 mg) by Oral or Feeding Tube route 2 times daily                                ondansetron 4 MG tablet   Commonly known as:  ZOFRAN   1-2 tablets (4-8 mg) by Per J Tube route every 8 hours as needed for nausea                                oxyCODONE 5 MG/5ML solution   Commonly known as:  ROXICODONE   10 mLs (10 mg) by Per J Tube route every 4 hours as needed for moderate to severe pain   Last time this was given:  10 mg on 10/6/2017 11:20 AM                                polyethylene glycol Packet   Commonly known as:  MIRALAX/GLYCOLAX   17 g by Per J Tube route daily as needed for constipation                                pravastatin 40 MG tablet   Commonly known as:  PRAVACHOL   Take 40 mg by mouth daily                                predniSONE 5 MG/5ML solution   5 mLs (5 mg) by Per J Tube route daily   Last time this was given:  5 mg on 10/6/2017 10:04 AM                                sertraline 50 MG tablet   Commonly known as:  ZOLOFT   Take 50 mg by mouth daily   Last time this was given:  50 mg on 10/6/2017  8:36 AM                                sodium bicarbonate 325 MG tablet   1 tablet (325 mg) by Per J Tube route every 4 hours   Last time this was given:  325 mg on 10/6/2017 11:43 AM                                TAMSULOSIN HCL PO    Take 0.4 mg by mouth daily   Last time this was given:  0.4 mg on 10/6/2017  8:36 AM                                warfarin 7.5 MG tablet   Commonly known as:  COUMADIN   Take 0.5 tablets (3.75 mg) by mouth daily   Last time this was given:  7.5 mg on 10/5/2017  6:10 PM

## 2017-09-26 NOTE — LETTER
Transition Communication Hand-off for Care Transitions to Next Level of Care Provider    Name: Lucas Brown  MRN #: [id number]  Primary Care Provider: ANDREW ADRIAN     Primary Clinic: [address] [address]   Reason for Hospitalization:  Uncontrolled nausea and vomiting  Abdominal pain  Admit Date/Time: 9/26/2017  8:16 AM  Discharge Date:  Possible discharge today October 6, 2017 with resumption of home care services.  Payor Source: Payor: [employer] / Plan: [facility] / Product Type: Indemnity /   Discharge Plan:  Discharge Plan:       Most Recent Value    Concerns To Be Addressed adjustment to diagnosis/illness concerns    Concerns Comments Home care services resumed with [facility] once stable for discharge.       Discharge Needs Assessment:  Needs       Most Recent Value    Home Infusion Provider [facility] [phone], Fax: [phone] [Resmption of home TPN services.]      Any outstanding tests or procedures:        Referrals     Future Labs/Procedures    Home infusion referral     Comments:    Please fax discharge orders to [facility]    Ph:  [phone]    Fax: [phone]    Skilled home care RN for resumption of home care plans of care as prior to hospitalization and to assist with the MD team updated plans of care as designated in discharge orders.    Skilled home care RN to evaluate medication management, nutrition and hydration evaluation, endurance evaluation, and general status evaluation after discharge from the acute care hospital setting.    Skilled home care RN to evaluated gastro intestinal status in home setting.    Skilled home care RN to assist with anticoagulation management per MD orders.           Delfina Cross, B.S.N., P.H.N.,R.N.         Pager

## 2017-09-26 NOTE — IP AVS SNAPSHOT
[address] [address]    Phone:  [phone]                                       After Visit Summary   9/26/2017    Lucas Brown    MRN: [id number]           After Visit Summary Signature Page     I have received my discharge instructions, and my questions have been answered. I have discussed any challenges I see with this plan with the nurse or doctor.    ..........................................................................................................................................  Patient/Patient Representative Signature      ..........................................................................................................................................  Patient Representative Print Name and Relationship to Patient    ..................................................               ................................................  Date                                            Time    ..........................................................................................................................................  Reviewed by Signature/Title    ...................................................              ..............................................  Date                                                            Time

## 2017-09-27 LAB — INR PPP: 3.25 (ref 0.86–1.14)

## 2017-09-27 PROCEDURE — 25000132 ZZH RX MED GY IP 250 OP 250 PS 637: Performed by: STUDENT IN AN ORGANIZED HEALTH CARE EDUCATION/TRAINING PROGRAM

## 2017-09-27 PROCEDURE — 25000132 ZZH RX MED GY IP 250 OP 250 PS 637

## 2017-09-27 PROCEDURE — 12000008 ZZH R&B INTERMEDIATE UMMC

## 2017-09-27 PROCEDURE — 25000128 H RX IP 250 OP 636: Performed by: STUDENT IN AN ORGANIZED HEALTH CARE EDUCATION/TRAINING PROGRAM

## 2017-09-27 PROCEDURE — 40000893 ZZH STATISTIC PT IP EVAL DEFER

## 2017-09-27 PROCEDURE — 27210429 ZZH NUTRITION PRODUCT INTERMEDIATE LITER

## 2017-09-27 PROCEDURE — 85610 PROTHROMBIN TIME: CPT | Performed by: STUDENT IN AN ORGANIZED HEALTH CARE EDUCATION/TRAINING PROGRAM

## 2017-09-27 PROCEDURE — 25000125 ZZHC RX 250: Performed by: STUDENT IN AN ORGANIZED HEALTH CARE EDUCATION/TRAINING PROGRAM

## 2017-09-27 PROCEDURE — 27210913 ZZH NUTRITION PRODUCT INTERMEDIATE PACKET

## 2017-09-27 PROCEDURE — 36415 COLL VENOUS BLD VENIPUNCTURE: CPT | Performed by: STUDENT IN AN ORGANIZED HEALTH CARE EDUCATION/TRAINING PROGRAM

## 2017-09-27 RX ORDER — FENTANYL 100 UG/1
100 PATCH TRANSDERMAL
Status: DISCONTINUED | OUTPATIENT
Start: 2017-09-27 | End: 2017-10-04

## 2017-09-27 RX ORDER — AMINO ACIDS/PROTEIN HYDROLYS 11G-40/45
2 LIQUID IN PACKET (ML) ORAL DAILY
Status: DISCONTINUED | OUTPATIENT
Start: 2017-09-27 | End: 2017-10-06 | Stop reason: HOSPADM

## 2017-09-27 RX ORDER — SODIUM BICARBONATE 325 MG/1
325 TABLET ORAL EVERY 4 HOURS
Status: DISCONTINUED | OUTPATIENT
Start: 2017-09-27 | End: 2017-10-06 | Stop reason: HOSPADM

## 2017-09-27 RX ORDER — LEVOTHYROXINE SODIUM ANHYDROUS 100 UG/5ML
75 INJECTION, POWDER, LYOPHILIZED, FOR SOLUTION INTRAVENOUS DAILY
Status: DISCONTINUED | OUTPATIENT
Start: 2017-09-27 | End: 2017-09-28

## 2017-09-27 RX ORDER — ACETAMINOPHEN 10 MG/ML
1000 INJECTION, SOLUTION INTRAVENOUS EVERY 8 HOURS
Status: DISCONTINUED | OUTPATIENT
Start: 2017-09-27 | End: 2017-09-28

## 2017-09-27 RX ORDER — CALCIUM CARBONATE 500 MG/1
500 TABLET, CHEWABLE ORAL 4 TIMES DAILY PRN
Status: DISCONTINUED | OUTPATIENT
Start: 2017-09-27 | End: 2017-10-06 | Stop reason: HOSPADM

## 2017-09-27 RX ORDER — PREDNISONE 5 MG/ML
5 SOLUTION ORAL DAILY
Status: DISCONTINUED | OUTPATIENT
Start: 2017-09-28 | End: 2017-10-06 | Stop reason: HOSPADM

## 2017-09-27 RX ORDER — CALCIUM CARBONATE 500 MG/1
500 TABLET, CHEWABLE ORAL 2 TIMES DAILY PRN
Status: DISCONTINUED | OUTPATIENT
Start: 2017-09-27 | End: 2017-09-27

## 2017-09-27 RX ADMIN — SERTRALINE HYDROCHLORIDE 50 MG: 50 TABLET ORAL at 10:32

## 2017-09-27 RX ADMIN — PROCHLORPERAZINE EDISYLATE 10 MG: 5 INJECTION INTRAMUSCULAR; INTRAVENOUS at 07:48

## 2017-09-27 RX ADMIN — SODIUM CHLORIDE, POTASSIUM CHLORIDE, SODIUM LACTATE AND CALCIUM CHLORIDE: 600; 310; 30; 20 INJECTION, SOLUTION INTRAVENOUS at 00:14

## 2017-09-27 RX ADMIN — ACETAMINOPHEN 1000 MG: 10 INJECTION, SOLUTION INTRAVENOUS at 17:53

## 2017-09-27 RX ADMIN — SODIUM BICARBONATE 325 MG: 325 TABLET ORAL at 17:59

## 2017-09-27 RX ADMIN — CALCIUM CARBONATE (ANTACID) CHEW TAB 500 MG 500 MG: 500 CHEW TAB at 20:16

## 2017-09-27 RX ADMIN — ONDANSETRON 4 MG: 2 INJECTION INTRAMUSCULAR; INTRAVENOUS at 21:26

## 2017-09-27 RX ADMIN — PANCRELIPASE 48000 UNITS: 24000; 76000; 120000 CAPSULE, DELAYED RELEASE PELLETS ORAL at 22:59

## 2017-09-27 RX ADMIN — Medication 0.3 MG: at 12:21

## 2017-09-27 RX ADMIN — PANCRELIPASE 24000 UNITS: 24000; 76000; 120000 CAPSULE, DELAYED RELEASE PELLETS ORAL at 17:57

## 2017-09-27 RX ADMIN — TAMSULOSIN HYDROCHLORIDE 0.4 MG: 0.4 CAPSULE ORAL at 10:32

## 2017-09-27 RX ADMIN — Medication 0.3 MG: at 14:25

## 2017-09-27 RX ADMIN — ACETAMINOPHEN 1000 MG: 10 INJECTION, SOLUTION INTRAVENOUS at 10:14

## 2017-09-27 RX ADMIN — SODIUM CHLORIDE, POTASSIUM CHLORIDE, SODIUM LACTATE AND CALCIUM CHLORIDE: 600; 310; 30; 20 INJECTION, SOLUTION INTRAVENOUS at 18:17

## 2017-09-27 RX ADMIN — SODIUM BICARBONATE 325 MG: 325 TABLET ORAL at 22:59

## 2017-09-27 RX ADMIN — BENZOCAINE AND MENTHOL 1 LOZENGE: 15; 3.6 LOZENGE ORAL at 13:24

## 2017-09-27 RX ADMIN — Medication 0.3 MG: at 07:47

## 2017-09-27 RX ADMIN — BENZOCAINE AND MENTHOL 1 LOZENGE: 15; 3.6 LOZENGE ORAL at 22:17

## 2017-09-27 RX ADMIN — ONDANSETRON 4 MG: 2 INJECTION INTRAMUSCULAR; INTRAVENOUS at 04:45

## 2017-09-27 RX ADMIN — LEVOTHYROXINE SODIUM ANHYDROUS 75 MCG: 100 INJECTION, POWDER, LYOPHILIZED, FOR SOLUTION INTRAVENOUS at 11:40

## 2017-09-27 RX ADMIN — PROCHLORPERAZINE EDISYLATE 10 MG: 5 INJECTION INTRAMUSCULAR; INTRAVENOUS at 00:57

## 2017-09-27 RX ADMIN — Medication 2 PACKET: at 17:59

## 2017-09-27 RX ADMIN — SODIUM CHLORIDE, POTASSIUM CHLORIDE, SODIUM LACTATE AND CALCIUM CHLORIDE: 600; 310; 30; 20 INJECTION, SOLUTION INTRAVENOUS at 07:51

## 2017-09-27 RX ADMIN — ONDANSETRON 4 MG: 2 INJECTION INTRAMUSCULAR; INTRAVENOUS at 14:24

## 2017-09-27 RX ADMIN — Medication 0.3 MG: at 22:11

## 2017-09-27 RX ADMIN — BENZOCAINE AND MENTHOL 1 LOZENGE: 15; 3.6 LOZENGE ORAL at 10:33

## 2017-09-27 RX ADMIN — Medication 0.3 MG: at 17:55

## 2017-09-27 RX ADMIN — FENTANYL 1 PATCH: 100 PATCH, EXTENDED RELEASE TRANSDERMAL at 21:25

## 2017-09-27 RX ADMIN — SENNOSIDES AND DOCUSATE SODIUM 1 TABLET: 8.6; 5 TABLET ORAL at 10:33

## 2017-09-27 RX ADMIN — Medication 0.2 MG: at 01:02

## 2017-09-27 RX ADMIN — Medication 0.2 MG: at 05:39

## 2017-09-27 RX ADMIN — Medication 0.3 MG: at 10:14

## 2017-09-27 RX ADMIN — Medication 0.3 MG: at 20:17

## 2017-09-27 RX ADMIN — Medication 0.2 MG: at 03:32

## 2017-09-27 RX ADMIN — CALCIUM CARBONATE (ANTACID) CHEW TAB 500 MG 500 MG: 500 CHEW TAB at 04:34

## 2017-09-27 ASSESSMENT — PAIN DESCRIPTION - DESCRIPTORS
DESCRIPTORS: SHARP
DESCRIPTORS: ACHING
DESCRIPTORS: SHARP
DESCRIPTORS: SHARP
DESCRIPTORS: SORE
DESCRIPTORS: SORE
DESCRIPTORS: ACHING
DESCRIPTORS: STABBING
DESCRIPTORS: SORE
DESCRIPTORS: SHARP
DESCRIPTORS: SHARP

## 2017-09-27 ASSESSMENT — ACTIVITIES OF DAILY LIVING (ADL)
RETIRED_EATING: 0-->INDEPENDENT
FALL_HISTORY_WITHIN_LAST_SIX_MONTHS: NO
TRANSFERRING: 0-->INDEPENDENT
RETIRED_COMMUNICATION: 0-->UNDERSTANDS/COMMUNICATES WITHOUT DIFFICULTY
TOILETING: 0-->INDEPENDENT
SWALLOWING: 0-->SWALLOWS FOODS/LIQUIDS WITHOUT DIFFICULTY
DRESS: 0-->INDEPENDENT
AMBULATION: 0-->INDEPENDENT
COGNITION: 0 - NO COGNITION ISSUES REPORTED
BATHING: 0-->INDEPENDENT

## 2017-09-27 NOTE — PROGRESS NOTES
"GENERAL SURGERY PROGRESS NOTE    Patient: Lucas Brown  MRN: [id number]    Subjective  Had nausea and emesis, G tube was placed to suction. Complaining about heartburn. Pain well controlled. Voiding.  Had a BM this AM. Hiccups.    Objective  Temp:  [95.2  F (35.1  C)-99.9  F (37.7  C)] 97.1  F (36.2  C)  Pulse:  [75-98] 75  Heart Rate:  [76-78] 78  Resp:  [16-18] 16  BP: ()/(48-73) 115/64  SpO2:  [90 %-98 %] 98 %    I/O last 3 completed shifts:  In: 1216.67 [P.O.:50; I.V.:1136.67; NG/GT:30]  Out: 1725 [Urine:750; Emesis/NG output:975]    Gen: NAD, alert  CV/Pulm: NLB on RA  Abd: soft, distended, minimal tenderness, G tube intact. G tube output is light brown.  Ext: WWP    Labs:  INR 3.25    Imaging:   CT Abd/Pelvis \"Impression:   1. Postoperative changes of Whipple procedure with near complete resolution postoperative fluid collection adjacent to the partial pancreatectomy with mild residual inflammatory stranding.   2. Decreased size of rim-enhancing gas-containing fluid collection in the ventral abdomen extending towards the gastrojejunal anastomosis with persistent adjacent inflammatory stranding and associated gastrojejunal wall edema. Ongoing inflammation and ulceration at the  gastrojejunal anastomosis cannot be excluded. If there is concern consider endoscopy for further evaluation.  3. Decreased size of small fluid collection in the right anterior abdominal wall, likely resolving postoperative seroma/hematoma.\"    Assessment & Plan  Lucas Brown is a 56 year old man with history of chronic pancreatitis s/p Whipple in August of 2017 who presented with 4 days of increasing abdominal pain, nausea and vomiting and exam concerning for a partial small bowel obstruction. His imaging shows a gas fluid collection of questionable significance near the gastrojejunal anastamosis. Abdominal wall fluid collection likely not significant. Stomach is distended on ct, ngt may help also w/ hiccups. He appears to " be slowly improving.     - NG tube placement. NG tube to low continuous suction, G tube to be clamped. Starting tube feeds through the J  - Senna per J tube, prn suppository  - D/Cing or using IV equivalents for meds ordered po to minimize time NG tube needs to be clamped.  - Continue with NPO  - Flushed G tube this AM w/ >100mL out afterwards  - Has tums available for heartburn, NGT should help  - Warfarin dosing per pharmacy    Discussed with staff.    Scribed by Graham Dolan    --  Gary Wilkerson MD  Gen Surg PGY1

## 2017-09-27 NOTE — PHARMACY-CONSULT NOTE
Pharmacy Tube Feeding Consult    Medication reviewed for administration by feeding tube and for potential food/drug interactions.    Recommendation: No changes are needed at this time.     Pharmacy will continue to follow as new medications are ordered.    Thee Dickson, PharmD [phone]

## 2017-09-27 NOTE — PROGRESS NOTES
SPIRITUAL HEALTH SERVICES  SPIRITUAL ASSESSMENT Progress Note  [facility]     Offered to visit pt per request on admission.  Altagracia opened his eyes long enough from resting to indicate that he would prefer a visit another day.    Will notify the 9/28 on call  of his request.    Mina Couch M.Div.  Staff   Pager [phone]

## 2017-09-27 NOTE — PLAN OF CARE
Problem: Patient Care Overview  Goal: Plan of Care/Patient Progress Review  OT 7C: cancel and defer, per discussion with physical therapist, pt is completely independent in all functional mobility and ADLs, no acute therapy needs identified, will complete orders.

## 2017-09-27 NOTE — PLAN OF CARE
Problem: Patient Care Overview  Goal: Plan of Care/Patient Progress Review  PT/7C: Initial evaluation received. Chart reviewed. Spoke w/ pt and wife regarding pt's current mobility and safety w/ ADLs. Pt currently limited in tolerance to OOB activity by pain however has been ambulating in hallways independently. Pt educated on purpose of skilled therapy in hospital setting. No concerns w/ returning home from mobility stand point. No skilled therapy needs at this time. Will complete orders. Please re order if status changes     Pt safe to DC home once medically stable

## 2017-09-27 NOTE — PROGRESS NOTES
CLINICAL NUTRITION SERVICES - ASSESSMENT NOTE     Nutrition Prescription    RECOMMENDATIONS FOR MDs/PROVIDERS TO ORDER:  None at this time     Malnutrition Status:    Patient does not meet two of the above criteria necessary for diagnosing malnutrition but is at risk for malnutrition.    Recommendations already ordered by Registered Dietitian (RD):  --Isosource 1.5 @ goal 65 ml/hr (1560 ml/day) to provide 2340 kcals (27+ kcal/kg/day), 106 g PRO (1.2 g/kg/day), 1186 ml free H2O, 275 g CHO and 23 g Fiber daily.    --Start TF at 10ml/hr and advance by 10 Q8hr. Only advance if pt it tolerating TF and K+/Mg++ and Phos are WNL.     --Add 2 pkts of ProSource TF daily to provide an additional 80 kcals and 22 g PRO.  TF + 2 pkts ProSource= 2420 kcals (28 kcal/kg/day) and 128 g PRO (1.5 g PRO/kg)    --Flush 30 ml of Free water Q4 hr for Tube patency     Once begin TFs, begin the following pancreatic enzyme regimen and recommend order each of the following:   (please copy and paste administration instructions into each order)  A) Sodium Bicarb tablet (325 mg), 1 tablet q 4 hrs via Jtube. Administration Instructions: Crush 1 tablet and mix into 15 ml of warm water and use this solution to mix with Creon pancreatic enzymes. DO NOT administer directly into Jtube (to be mixed into TF formula with Creon enzyme - see Creon enzyme order)   B) Creon 24, 1- 2 capsules q 4 hrs via Jtube. Administration Instructions: If TF rate is running @ 10-50 ml/hr, administer 1 capsule q 4 hrs; once TF rate advances 60-65 ml/hr, increase to 2 capsule q 4 hrs.  Open capsule and empty contents into 15 ml sodium bicarb solution (see sodium bicarb order), let dissolve for about 20-30 minutes and then add this solution to the amount of TF formula hung in TF bag every 4 hrs (i.e., once TF @ goal infusion 65 ml/hr will mix 2 capsules into 260 ml of TF formula every 4 hrs).   *Note: this enzyme regimen with TF @ goal infusion will provide approx 3129 units  "of lipase/gram of total Fat daily and approp dosing initially for pancreatic insufficiency with more elemental TF formula.     Future/Additional Recommendations:  --If pt does not tolerate formula please switch pt to semi-elemental formula Peptamen 1.5 @ goal 65 ml/hr (1560 ml/day) to provide 2340 kcals (27+ kcal/kg/day), 106 g PRO (1.2 g/kg/day), 1201 ml free H2O, 87 g Fat (70% from MCTs), 293 g CHO and no Fiber daily.    --Add 2 pkts of ProSource TF daily to provide an additional 80 kcals and 22 g PRO.  TF + 2 pkts ProSource= 2420 kcals (28 kcal/kg/day) and 128 g PRO (1.5 g PRO/kg)     REASON FOR ASSESSMENT  Lucas Brown is a/an 56 year old male assessed by the dietitian for Provider Order - Please restart tube feed per J-tube.     NUTRITION HISTORY  Pt had a hx for chronic pancreatitis and recently had a Wipple in August 2017. He was admitted to the hospital for abdominal pain, nausea and vomiting.      Pt reports constant nausea and vomiting for the past four days prior to admission. He reported over these four days he did not have a bowel movement, but finally had one today. This bowel movement was confirmed by I/O documentation. Prior to this abdominal pain pt reports tolerating IsoSource 1.5 at home and he was dosing the enzymes every 4 hours.      Spoke with team today and discussed standard formula vs semi-elemental. Provider did not have preference. Plan is to continue on IsoSource d/t previous tolerance and if he does not tolerate will change formula to semi-elemental.       CURRENT NUTRITION ORDERS  Diet: NPO  Intake/Tolerance: NPO status, pt has J-tube     LABS  Labs reviewed    MEDICATIONS  Medications reviewed    ANTHROPOMETRICS  Height: 180.3 cm (5' 10.984\") -Used height from previous admission  Most Recent Weight: 84.8 kg (187 lb)    IBW: 75.5 kg  BMI: Overweight BMI 25-29.9  Weight History:   Wt Readings from Last 10 Encounters:   09/27/17 84.8 kg (187 lb)   09/23/17 86.5 kg (190 lb 12.8 oz) "   09/19/17 87.5 kg (192 lb 12.8 oz)   09/12/17 93.4 kg (205 lb 14.6 oz)   08/24/17 93.4 kg (205 lb 12.8 oz)   08/24/17 92.3 kg (203 lb 8 oz)   08/24/17 91.6 kg (202 lb)   07/20/17 91.1 kg (200 lb 12.8 oz)   06/07/17 90.2 kg (198 lb 14.4 oz)   05/15/17 89.5 kg (197 lb 5 oz)     Dosing Weight: 85 kg (actual)    ASSESSED NUTRITION NEEDS  Estimated Energy Needs: 6978-0755 kcals/day (25-30 kcals/kg )  Justification: Increased needs and recent Wipple Post-op  Estimated Protein Needs: 128-170 grams protein/day (1.5 - 2 grams of pro/kg)  Justification: Increased needs  Estimated Fluid Needs: (1 mL/kcal) or per provider pending fluid status.   Justification: Maintenance and Per provider pending fluid status    PHYSICAL FINDINGS  See malnutrition section below.    MALNUTRITION  % Intake: No decreased intake noted- pt was on TF at home.   % Weight Loss: Up to 7.5% in 3 months (non-severe) - likely fluid loss, d/t previous surgery and weight trend from past hospital admission.    Subcutaneous Fat Loss: None observed  Muscle Loss: None observed  Fluid Accumulation/Edema: None noted  Malnutrition Diagnosis: Patient does not meet two of the above criteria necessary for diagnosing malnutrition but is at risk for malnutrition    NUTRITION DIAGNOSIS  Inadequate protein-energy intake related to nausea, vomiting and TF being stopped as evidenced by pt report and chart documentation.       INTERVENTIONS  Implementation  Collaboration with other nutrition professionals  Collaboration with other providers - Discussed TF formula with resident.   Enteral Nutrition - Initiate     Goals  Total avg nutritional intake to meet a minimum of 25 kcal/kg and 1.5 g PRO/kg daily (per dosing wt 85 kg).      Monitoring/Evaluation  Progress toward goals will be monitored and evaluated per protocol.    Ivette Billingsley RD, LD   Pgr: [id number]

## 2017-09-27 NOTE — PLAN OF CARE
Problem: Patient Care Overview  Goal: Plan of Care/Patient Progress Review  Outcome: No Change  VSS, low grade temp. Dilaudid effective for about an hour, then abdominal pain increased again. Surg Onc resident notified, dose range increased. Nausea only partially managed with Zofran and Compazine prn. Intermittent dry heaves, small emesis. MD Couch notified - ordered G tube to intermittent wall suction. Up to bathroom with SBA of 1. Voiding spontaneously. Not passing gas. Abdomen distended.      Continue with POC - monitor pain/nausea.

## 2017-09-27 NOTE — PLAN OF CARE
Problem: Patient Care Overview  Goal: Plan of Care/Patient Progress Review  Outcome: No Change   Vitals stable   , /64 (BP Location: Right arm)  Pulse 75  Temp 97.1  F (36.2  C) (Oral)  Resp 16  Wt 86.1 kg (189 lb 14.4 oz)  SpO2 98%  BMI 26.49 kg/m2.  Nausea and vomiting poorly controlled with prn zofran and compazine. Had one emesis of 200cc of brown liquid. PRN dilaudid given every 2 hours with temporary relief of abdominal pain. Chief complaint of chest and stomach discomfort related to heartburn. Dr. Pippa Couch notified and new order of calcium carbonate ordered and given to patient with relief.  G- tube with intermittent wall suction. p to bathroom with SBA of 1. Voiding spontaneously. Not passing gas. Abdomen distended. Continue with plan of care to monitor pain and nausea management.     Update: 0640 hours had emesis of 300cc , and just stated the heart burn has returned

## 2017-09-27 NOTE — PLAN OF CARE
Problem: Patient Care Overview  Goal: Plan of Care/Patient Progress Review  Outcome: Therapy, progress towards functional goals is fair  Pt. Gagging and retching in the am. 1 lg. Formed stool per pt, + flatus.  GT flushed , not able to withdraw. NG , 2,600cc out since 1000, initially red/pink, now clearing. Pt. Feels better, abd. Less firm and distended. IV Dil. For pain  q 2 hr. Zofran and Comp. For nausea prn with improvement. G/J tube clamped. Voiding in good amounts. NPO. IV fluids thru PIV. Wfe at bedside. Amb with SBA in the slaughter. i

## 2017-09-28 LAB
ANION GAP SERPL CALCULATED.3IONS-SCNC: ABNORMAL MMOL/L (ref 3–14)
BUN SERPL-MCNC: 32 MG/DL (ref 7–30)
CALCIUM SERPL-MCNC: 9.8 MG/DL (ref 8.5–10.1)
CHLORIDE SERPL-SCNC: 80 MMOL/L (ref 94–109)
CO2 SERPL-SCNC: >45 MMOL/L (ref 20–32)
CREAT SERPL-MCNC: 1.48 MG/DL (ref 0.66–1.25)
ERYTHROCYTE [DISTWIDTH] IN BLOOD BY AUTOMATED COUNT: 14.6 % (ref 10–15)
GFR SERPL CREATININE-BSD FRML MDRD: 49 ML/MIN/1.7M2
GLUCOSE SERPL-MCNC: 174 MG/DL (ref 70–99)
HCT VFR BLD AUTO: 30.6 % (ref 40–53)
HGB BLD-MCNC: 9.3 G/DL (ref 13.3–17.7)
INR PPP: 4.28 (ref 0.86–1.14)
MAGNESIUM SERPL-MCNC: 2.4 MG/DL (ref 1.6–2.3)
MCH RBC QN AUTO: 25.2 PG (ref 26.5–33)
MCHC RBC AUTO-ENTMCNC: 30.4 G/DL (ref 31.5–36.5)
MCV RBC AUTO: 83 FL (ref 78–100)
PHOSPHATE SERPL-MCNC: 5.9 MG/DL (ref 2.5–4.5)
PLATELET # BLD AUTO: 346 10E9/L (ref 150–450)
POTASSIUM SERPL-SCNC: 2.8 MMOL/L (ref 3.4–5.3)
RBC # BLD AUTO: 3.69 10E12/L (ref 4.4–5.9)
SODIUM SERPL-SCNC: 138 MMOL/L (ref 133–144)
WBC # BLD AUTO: 8.6 10E9/L (ref 4–11)

## 2017-09-28 PROCEDURE — 25000132 ZZH RX MED GY IP 250 OP 250 PS 637: Performed by: SURGERY

## 2017-09-28 PROCEDURE — 25000132 ZZH RX MED GY IP 250 OP 250 PS 637

## 2017-09-28 PROCEDURE — 25000128 H RX IP 250 OP 636: Performed by: STUDENT IN AN ORGANIZED HEALTH CARE EDUCATION/TRAINING PROGRAM

## 2017-09-28 PROCEDURE — 12000008 ZZH R&B INTERMEDIATE UMMC

## 2017-09-28 PROCEDURE — 40000141 ZZH STATISTIC PERIPHERAL IV START W/O US GUIDANCE

## 2017-09-28 PROCEDURE — 25000128 H RX IP 250 OP 636: Performed by: SURGERY

## 2017-09-28 PROCEDURE — 84100 ASSAY OF PHOSPHORUS: CPT | Performed by: STUDENT IN AN ORGANIZED HEALTH CARE EDUCATION/TRAINING PROGRAM

## 2017-09-28 PROCEDURE — 90686 IIV4 VACC NO PRSV 0.5 ML IM: CPT | Performed by: SURGERY

## 2017-09-28 PROCEDURE — 25000132 ZZH RX MED GY IP 250 OP 250 PS 637: Performed by: STUDENT IN AN ORGANIZED HEALTH CARE EDUCATION/TRAINING PROGRAM

## 2017-09-28 PROCEDURE — 25000131 ZZH RX MED GY IP 250 OP 636 PS 637: Performed by: STUDENT IN AN ORGANIZED HEALTH CARE EDUCATION/TRAINING PROGRAM

## 2017-09-28 PROCEDURE — 83735 ASSAY OF MAGNESIUM: CPT | Performed by: SURGERY

## 2017-09-28 PROCEDURE — 85610 PROTHROMBIN TIME: CPT | Performed by: STUDENT IN AN ORGANIZED HEALTH CARE EDUCATION/TRAINING PROGRAM

## 2017-09-28 PROCEDURE — 25000125 ZZHC RX 250: Performed by: STUDENT IN AN ORGANIZED HEALTH CARE EDUCATION/TRAINING PROGRAM

## 2017-09-28 PROCEDURE — 36415 COLL VENOUS BLD VENIPUNCTURE: CPT | Performed by: STUDENT IN AN ORGANIZED HEALTH CARE EDUCATION/TRAINING PROGRAM

## 2017-09-28 PROCEDURE — 27210913 ZZH NUTRITION PRODUCT INTERMEDIATE PACKET

## 2017-09-28 PROCEDURE — 80048 BASIC METABOLIC PNL TOTAL CA: CPT | Performed by: SURGERY

## 2017-09-28 PROCEDURE — S0138 FINASTERIDE, 5 MG: HCPCS | Performed by: STUDENT IN AN ORGANIZED HEALTH CARE EDUCATION/TRAINING PROGRAM

## 2017-09-28 PROCEDURE — 36415 COLL VENOUS BLD VENIPUNCTURE: CPT | Performed by: SURGERY

## 2017-09-28 PROCEDURE — 85027 COMPLETE CBC AUTOMATED: CPT | Performed by: SURGERY

## 2017-09-28 RX ORDER — POTASSIUM CHLORIDE 1.5 G/1.58G
20-40 POWDER, FOR SOLUTION ORAL
Status: DISCONTINUED | OUTPATIENT
Start: 2017-09-28 | End: 2017-10-06 | Stop reason: HOSPADM

## 2017-09-28 RX ORDER — POTASSIUM CHLORIDE 7.45 MG/ML
10 INJECTION INTRAVENOUS
Status: DISCONTINUED | OUTPATIENT
Start: 2017-09-28 | End: 2017-10-06 | Stop reason: HOSPADM

## 2017-09-28 RX ORDER — ACETAMINOPHEN 500 MG
1000 TABLET ORAL EVERY 8 HOURS
Status: DISCONTINUED | OUTPATIENT
Start: 2017-09-28 | End: 2017-10-03

## 2017-09-28 RX ORDER — LEVOTHYROXINE SODIUM 150 UG/1
150 TABLET ORAL DAILY
Status: DISCONTINUED | OUTPATIENT
Start: 2017-09-29 | End: 2017-10-06 | Stop reason: HOSPADM

## 2017-09-28 RX ORDER — MAGNESIUM SULFATE HEPTAHYDRATE 40 MG/ML
4 INJECTION, SOLUTION INTRAVENOUS EVERY 4 HOURS PRN
Status: DISCONTINUED | OUTPATIENT
Start: 2017-09-28 | End: 2017-10-06 | Stop reason: HOSPADM

## 2017-09-28 RX ORDER — POTASSIUM CHLORIDE 750 MG/1
20-40 TABLET, EXTENDED RELEASE ORAL
Status: DISCONTINUED | OUTPATIENT
Start: 2017-09-28 | End: 2017-10-06 | Stop reason: HOSPADM

## 2017-09-28 RX ORDER — POTASSIUM CHLORIDE 29.8 MG/ML
20 INJECTION INTRAVENOUS
Status: DISCONTINUED | OUTPATIENT
Start: 2017-09-28 | End: 2017-10-06 | Stop reason: HOSPADM

## 2017-09-28 RX ORDER — POTASSIUM CL/LIDO/0.9 % NACL 10MEQ/0.1L
10 INTRAVENOUS SOLUTION, PIGGYBACK (ML) INTRAVENOUS
Status: DISCONTINUED | OUTPATIENT
Start: 2017-09-28 | End: 2017-10-06 | Stop reason: HOSPADM

## 2017-09-28 RX ADMIN — CALCIUM CARBONATE (ANTACID) CHEW TAB 500 MG 500 MG: 500 CHEW TAB at 02:11

## 2017-09-28 RX ADMIN — TAMSULOSIN HYDROCHLORIDE 0.4 MG: 0.4 CAPSULE ORAL at 11:40

## 2017-09-28 RX ADMIN — Medication 2 PACKET: at 12:13

## 2017-09-28 RX ADMIN — LEVOTHYROXINE SODIUM ANHYDROUS 75 MCG: 100 INJECTION, POWDER, LYOPHILIZED, FOR SOLUTION INTRAVENOUS at 09:47

## 2017-09-28 RX ADMIN — ONDANSETRON 4 MG: 2 INJECTION INTRAMUSCULAR; INTRAVENOUS at 05:38

## 2017-09-28 RX ADMIN — Medication 0.3 MG: at 21:55

## 2017-09-28 RX ADMIN — Medication 10 MEQ: at 21:26

## 2017-09-28 RX ADMIN — SERTRALINE HYDROCHLORIDE 50 MG: 50 TABLET ORAL at 11:40

## 2017-09-28 RX ADMIN — Medication 5 ML: at 09:46

## 2017-09-28 RX ADMIN — PANCRELIPASE 48000 UNITS: 24000; 76000; 120000 CAPSULE, DELAYED RELEASE PELLETS ORAL at 03:12

## 2017-09-28 RX ADMIN — PANCRELIPASE 24000 UNITS: 24000; 76000; 120000 CAPSULE, DELAYED RELEASE PELLETS ORAL at 19:44

## 2017-09-28 RX ADMIN — SODIUM BICARBONATE 325 MG: 325 TABLET ORAL at 07:05

## 2017-09-28 RX ADMIN — Medication 10 MEQ: at 19:34

## 2017-09-28 RX ADMIN — SODIUM CHLORIDE, POTASSIUM CHLORIDE, SODIUM LACTATE AND CALCIUM CHLORIDE: 600; 310; 30; 20 INJECTION, SOLUTION INTRAVENOUS at 14:38

## 2017-09-28 RX ADMIN — BENZOCAINE AND MENTHOL 1 LOZENGE: 15; 3.6 LOZENGE ORAL at 12:54

## 2017-09-28 RX ADMIN — SODIUM BICARBONATE 325 MG: 325 TABLET ORAL at 19:44

## 2017-09-28 RX ADMIN — PANCRELIPASE 24000 UNITS: 24000; 76000; 120000 CAPSULE, DELAYED RELEASE PELLETS ORAL at 15:13

## 2017-09-28 RX ADMIN — Medication 10 MEQ: at 12:56

## 2017-09-28 RX ADMIN — Medication 10 MEQ: at 15:12

## 2017-09-28 RX ADMIN — SODIUM CHLORIDE 1000 ML: 9 INJECTION, SOLUTION INTRAVENOUS at 14:05

## 2017-09-28 RX ADMIN — Medication 0.3 MG: at 05:16

## 2017-09-28 RX ADMIN — Medication 0.3 MG: at 03:08

## 2017-09-28 RX ADMIN — ONDANSETRON 4 MG: 2 INJECTION INTRAMUSCULAR; INTRAVENOUS at 12:27

## 2017-09-28 RX ADMIN — Medication 0.3 MG: at 09:48

## 2017-09-28 RX ADMIN — PANCRELIPASE 48000 UNITS: 24000; 76000; 120000 CAPSULE, DELAYED RELEASE PELLETS ORAL at 12:42

## 2017-09-28 RX ADMIN — ACETAMINOPHEN 1000 MG: 10 INJECTION, SOLUTION INTRAVENOUS at 09:58

## 2017-09-28 RX ADMIN — Medication 5 ML: at 19:44

## 2017-09-28 RX ADMIN — PANTOPRAZOLE SODIUM 40 MG: 40 INJECTION, POWDER, FOR SOLUTION INTRAVENOUS at 09:47

## 2017-09-28 RX ADMIN — INFLUENZA A VIRUS A/MICHIGAN/45/2015 X-275 (H1N1) ANTIGEN (FORMALDEHYDE INACTIVATED), INFLUENZA A VIRUS A/HONG KONG/4801/2014 X-263B (H3N2) ANTIGEN (FORMALDEHYDE INACTIVATED), INFLUENZA B VIRUS B/PHUKET/3073/2013 ANTIGEN (FORMALDEHYDE INACTIVATED), AND INFLUENZA B VIRUS B/BRISBANE/60/2008 ANTIGEN (FORMALDEHYDE INACTIVATED) 0.5 ML: 15; 15; 15; 15 INJECTION, SUSPENSION INTRAMUSCULAR at 18:22

## 2017-09-28 RX ADMIN — PANCRELIPASE 24000 UNITS: 24000; 76000; 120000 CAPSULE, DELAYED RELEASE PELLETS ORAL at 11:00

## 2017-09-28 RX ADMIN — SODIUM CHLORIDE, POTASSIUM CHLORIDE, SODIUM LACTATE AND CALCIUM CHLORIDE: 600; 310; 30; 20 INJECTION, SOLUTION INTRAVENOUS at 03:43

## 2017-09-28 RX ADMIN — FINASTERIDE 5 MG: 5 TABLET, FILM COATED ORAL at 11:40

## 2017-09-28 RX ADMIN — Medication 0.3 MG: at 16:44

## 2017-09-28 RX ADMIN — Medication 0.3 MG: at 00:47

## 2017-09-28 RX ADMIN — SODIUM BICARBONATE 325 MG: 325 TABLET ORAL at 11:40

## 2017-09-28 RX ADMIN — Medication 10 MEQ: at 18:09

## 2017-09-28 RX ADMIN — Medication 10 MEQ: at 16:49

## 2017-09-28 RX ADMIN — ACETAMINOPHEN 1000 MG: 10 INJECTION, SOLUTION INTRAVENOUS at 02:16

## 2017-09-28 RX ADMIN — PREDNISONE 5 MG: 5 SOLUTION ORAL at 09:46

## 2017-09-28 RX ADMIN — Medication 0.3 MG: at 19:38

## 2017-09-28 RX ADMIN — SODIUM BICARBONATE 325 MG: 325 TABLET ORAL at 03:12

## 2017-09-28 RX ADMIN — Medication 0.3 MG: at 12:27

## 2017-09-28 RX ADMIN — PANCRELIPASE 48000 UNITS: 24000; 76000; 120000 CAPSULE, DELAYED RELEASE PELLETS ORAL at 07:07

## 2017-09-28 RX ADMIN — SODIUM BICARBONATE 325 MG: 325 TABLET ORAL at 15:13

## 2017-09-28 RX ADMIN — ACETAMINOPHEN 1000 MG: 500 TABLET, FILM COATED ORAL at 18:09

## 2017-09-28 RX ADMIN — BENZOCAINE AND MENTHOL 1 LOZENGE: 15; 3.6 LOZENGE ORAL at 22:34

## 2017-09-28 RX ADMIN — Medication 0.2 MG: at 07:44

## 2017-09-28 ASSESSMENT — PAIN DESCRIPTION - DESCRIPTORS
DESCRIPTORS: SHARP;SHOOTING
DESCRIPTORS: STABBING
DESCRIPTORS: STABBING
DESCRIPTORS: CONSTANT;CRAMPING
DESCRIPTORS: SHARP
DESCRIPTORS: ACHING;SORE

## 2017-09-28 NOTE — PROGRESS NOTES
GENERAL SURGERY PROGRESS NOTE    Patient: Lucas Brown  MRN: [id number]    Subjective  No acute overnight events. Pain well controlled. Voiding. BM yesterday. Feeling better today. Tolerating tube feeds from yesterday.    Objective  Temp:  [95.3  F (35.2  C)-96.7  F (35.9  C)] 96  F (35.6  C)  Pulse:  [72-75] 72  Heart Rate:  [67] 67  Resp:  [18-20] 18  BP: (103-117)/(55-75) 117/64  SpO2:  [93 %-94 %] 94 %    Well appearing elderly man w/ sandor NGT output and tube feeds running per J tube at 30 mL/hr. Non-labored breathing on room air. Abd is soft, minimally distended, GJ tube is intact.     I/O last 3 completed shifts:  In: 2500 [I.V.:2400; NG/GT:60]  Out: 35292 [Urine:1225; Emesis/NG output:9050]    Labs: Reviewed.    Imaging: Reviewed.    Assessment & Plan  57 y/o male 1 month s/p whipple now w/ gastric outlet obstruction doing better w/ decompression. A large amount of NG output.  - Up on tube feeds as able  - Ambulation, up and out of bed   - Continuing with decompression  - NPO  - Warfarin per pharmacy    --  Gary Wilkerson MD  Gen Surg PGY1

## 2017-09-28 NOTE — PLAN OF CARE
Problem: Patient Care Overview  Goal: Plan of Care/Patient Progress Review  Outcome: Therapy, progress toward functional goals is gradual  A&Ox4, AVSS. C/o pain with movement, has relief with IV dilaudid and tylenol. Nauseous once this shift, given IV zofran. NG tube to LCS, pt taking lots of ice chips, strict I/O recorded. Voiding spontaneously in adequate volumes. Creatinine 1.48. Tolerated 30 min clamp for PO meds. Has continuous tube feeding at 30 ml/hr. Tube feedings not advanced d/t electrolytes out of range. Dressings changed on G/J site, past surgical incision and old VIRGIL site. Potassium at 2.8 today, potassium replacement started.  1L IV NS bolus given at 1400. Pt up with SBA. Continue potassium replacement, recheck electrolytes when complete, and monitor pt for pain and nausea.

## 2017-09-28 NOTE — PLAN OF CARE
Problem: Patient Care Overview  Goal: Plan of Care/Patient Progress Review  Outcome: Therapy, progress towards functional goals is fair  /65 (BP Location: Right arm)  Pulse 75  Temp 95.3  F (35.2  C) (Oral)  Resp 20  Wt 84.8 kg (187 lb)  SpO2 93%  BMI 26.08 kg/m2     Pain managed with PRN dilaudid and fentanyl patch. Had an episode of nausea due to NG not pulling fluid. Manually irrigated. NG now on low continous suction with copious amounts of fluid out. Reminded pt to only eat ice chips when absolutely necessary. Gastric content may be increased due to water intake. PIV infusing MIVF. Abd Inc KIMO. Tube feedings started tonight at 1900 at 10cc/hr. Needs to be increased to 20cc/hr at 0300. Tolerating tube feeds. Voiding spont. No BM. Passing some gas. Up for two walks.

## 2017-09-28 NOTE — PLAN OF CARE
Problem: Patient Care Overview  Goal: Plan of Care/Patient Progress Review  Vitals stable   Pt alert and oriented x4, /66 (BP Location: Right arm)  Pulse 77  Temp 97.8  F (36.6  C) (Axillary)  Resp 16  Wt 84.8 kg (187 lb)  SpO2 99%  BMI 26.08 kg/m2. Pain managed with PRN dilaudid every 2 hours and fentanyl patch.  Complained of nausea- zofran given with relief.  NG now on low continous suction with copious amounts of fluid draining out.  PIV infusing MIVF. Abd incision open to air. Tube feedings increased every 8 hours/.Next increase due at 1100 hours. Goal infusing rate 65cc/hr. Had large bowel movement this shift. Voiding adequate amount of urine. Plan to continue to manage pain and monitor NG output.

## 2017-09-28 NOTE — PROVIDER NOTIFICATION
Notified Resident at 1230 PM regarding lab results.      Spoke with: Dr. Miguel Wilkerson    Orders were obtained.    Comments: Critical lab value CO2>45.  K low, 2.8.  Magnesium not drawn.  MD ordered Mg add-on and electrolyte replacements.

## 2017-09-28 NOTE — PROGRESS NOTES
Care Coordinator- Discharge Planning     Admission Date/Time:  9/26/2017  Attending MD:  Haylee Bryan MD     Data  Date of initial CC assessment:  Mr. Brown has been followed since day of admission.  Chart reviewed, discussed with interdisciplinary team.   Patient was admitted for:   1. Generalized abdominal pain    2. Nausea and vomiting, intractability of vomiting not specified, unspecified vomiting type         Assessment    Mr. Brown was being followed by [facility] prior to admission therefore, the following tentative home care plans of care have been resumed for patient in his home setting once he is stable for discharge per MD team:    Please fax discharge orders to [facility]     Ph:  [phone]     Fax: [phone]     Skilled home care RN for resumption of home care plans of care as prior to hospitalization and to assist with the MD team updated plans of care as designated in discharge orders.     Skilled home care RN to evaluate medication management, nutrition and hydration evaluation, endurance evaluation, and general status evaluation after discharge from the acute care hospital setting.     Skilled home care RN to evaluated gastro intestinal status in home setting.     Skilled home care RN to assist with anticoagulation management per MD orders.       Coordination of Care and Referrals: Provided patient/family with options for home care agency of choice.      Plan  Anticipated Discharge Date:  To be determined.  Anticipated Discharge Plan:  As above and per MD team final plans of care at time of discharge.  The inpatient Care Mgmt.Team will continue to follow for updated transition needs.    CTS Handoff completed:  (Clinic Letter)  To be sent.    Delfina Cross, RADHA.S.WANDER., P.H.N.,R.N.         Pager

## 2017-09-28 NOTE — PROGRESS NOTES
"SPIRITUAL HEALTH SERVICES  [facility] 7C  ON-CALL VISIT     REFERRAL SOURCE: On-call  visit with pt, per request for hospital  visit as noted in initial nursing assessment.     Pt welcomed  visit today, said his tiffany is very important to him (\"I have both Adventist and Religion in my family, and lately some family members have some Religion sisters and bishops praying for me...\" Pt said \"please pray for me to get better, so I can be back home with my family. I especially look forward to spending more time with my 9 grandchildren.\") Prayer was shared.     PLAN: unit  will be informed of visit.     Zackary Mir) Radha Benjamin M.Div., [facility]  Staff   Pager [phone]                                                                                          "

## 2017-09-29 LAB
ANION GAP SERPL CALCULATED.3IONS-SCNC: 6 MMOL/L (ref 3–14)
BUN SERPL-MCNC: 34 MG/DL (ref 7–30)
CALCIUM SERPL-MCNC: 8.4 MG/DL (ref 8.5–10.1)
CHLORIDE SERPL-SCNC: 97 MMOL/L (ref 94–109)
CO2 SERPL-SCNC: 39 MMOL/L (ref 20–32)
CREAT SERPL-MCNC: 1.86 MG/DL (ref 0.66–1.25)
ERYTHROCYTE [DISTWIDTH] IN BLOOD BY AUTOMATED COUNT: 15.1 % (ref 10–15)
GFR SERPL CREATININE-BSD FRML MDRD: 38 ML/MIN/1.7M2
GLUCOSE SERPL-MCNC: 110 MG/DL (ref 70–99)
HCT VFR BLD AUTO: 26.1 % (ref 40–53)
HGB BLD-MCNC: 7.7 G/DL (ref 13.3–17.7)
INR PPP: 3.58 (ref 0.86–1.14)
MCH RBC QN AUTO: 24.9 PG (ref 26.5–33)
MCHC RBC AUTO-ENTMCNC: 29.5 G/DL (ref 31.5–36.5)
MCV RBC AUTO: 85 FL (ref 78–100)
PLATELET # BLD AUTO: 302 10E9/L (ref 150–450)
POTASSIUM SERPL-SCNC: 3.1 MMOL/L (ref 3.4–5.3)
POTASSIUM SERPL-SCNC: 3.4 MMOL/L (ref 3.4–5.3)
POTASSIUM SERPL-SCNC: 3.6 MMOL/L (ref 3.4–5.3)
RBC # BLD AUTO: 3.09 10E12/L (ref 4.4–5.9)
SODIUM SERPL-SCNC: 142 MMOL/L (ref 133–144)
WBC # BLD AUTO: 8.1 10E9/L (ref 4–11)

## 2017-09-29 PROCEDURE — 36415 COLL VENOUS BLD VENIPUNCTURE: CPT | Performed by: SURGERY

## 2017-09-29 PROCEDURE — 36415 COLL VENOUS BLD VENIPUNCTURE: CPT | Performed by: STUDENT IN AN ORGANIZED HEALTH CARE EDUCATION/TRAINING PROGRAM

## 2017-09-29 PROCEDURE — 25000132 ZZH RX MED GY IP 250 OP 250 PS 637: Performed by: SURGERY

## 2017-09-29 PROCEDURE — 25000128 H RX IP 250 OP 636: Performed by: SURGERY

## 2017-09-29 PROCEDURE — 25000132 ZZH RX MED GY IP 250 OP 250 PS 637: Performed by: STUDENT IN AN ORGANIZED HEALTH CARE EDUCATION/TRAINING PROGRAM

## 2017-09-29 PROCEDURE — 25000128 H RX IP 250 OP 636: Performed by: STUDENT IN AN ORGANIZED HEALTH CARE EDUCATION/TRAINING PROGRAM

## 2017-09-29 PROCEDURE — 25000132 ZZH RX MED GY IP 250 OP 250 PS 637

## 2017-09-29 PROCEDURE — 12000001 ZZH R&B MED SURG/OB UMMC

## 2017-09-29 PROCEDURE — S0138 FINASTERIDE, 5 MG: HCPCS | Performed by: STUDENT IN AN ORGANIZED HEALTH CARE EDUCATION/TRAINING PROGRAM

## 2017-09-29 PROCEDURE — 85610 PROTHROMBIN TIME: CPT | Performed by: STUDENT IN AN ORGANIZED HEALTH CARE EDUCATION/TRAINING PROGRAM

## 2017-09-29 PROCEDURE — 27210913 ZZH NUTRITION PRODUCT INTERMEDIATE PACKET

## 2017-09-29 PROCEDURE — 84132 ASSAY OF SERUM POTASSIUM: CPT | Performed by: SURGERY

## 2017-09-29 PROCEDURE — 80048 BASIC METABOLIC PNL TOTAL CA: CPT | Performed by: STUDENT IN AN ORGANIZED HEALTH CARE EDUCATION/TRAINING PROGRAM

## 2017-09-29 PROCEDURE — 85027 COMPLETE CBC AUTOMATED: CPT | Performed by: STUDENT IN AN ORGANIZED HEALTH CARE EDUCATION/TRAINING PROGRAM

## 2017-09-29 PROCEDURE — 25000131 ZZH RX MED GY IP 250 OP 636 PS 637: Performed by: STUDENT IN AN ORGANIZED HEALTH CARE EDUCATION/TRAINING PROGRAM

## 2017-09-29 PROCEDURE — 40000141 ZZH STATISTIC PERIPHERAL IV START W/O US GUIDANCE

## 2017-09-29 RX ORDER — OXYCODONE HYDROCHLORIDE 5 MG/1
5-10 TABLET ORAL EVERY 4 HOURS PRN
Status: DISCONTINUED | OUTPATIENT
Start: 2017-09-29 | End: 2017-09-29

## 2017-09-29 RX ORDER — OXYCODONE HCL 5 MG/5 ML
5-10 SOLUTION, ORAL ORAL EVERY 4 HOURS PRN
Status: DISCONTINUED | OUTPATIENT
Start: 2017-09-29 | End: 2017-10-06 | Stop reason: HOSPADM

## 2017-09-29 RX ADMIN — Medication 10 MEQ: at 20:05

## 2017-09-29 RX ADMIN — SODIUM CHLORIDE, POTASSIUM CHLORIDE, SODIUM LACTATE AND CALCIUM CHLORIDE 1000 ML: 600; 310; 30; 20 INJECTION, SOLUTION INTRAVENOUS at 06:57

## 2017-09-29 RX ADMIN — Medication 0.3 MG: at 12:50

## 2017-09-29 RX ADMIN — SODIUM CHLORIDE, POTASSIUM CHLORIDE, SODIUM LACTATE AND CALCIUM CHLORIDE: 600; 310; 30; 20 INJECTION, SOLUTION INTRAVENOUS at 02:41

## 2017-09-29 RX ADMIN — SODIUM CHLORIDE, POTASSIUM CHLORIDE, SODIUM LACTATE AND CALCIUM CHLORIDE: 600; 310; 30; 20 INJECTION, SOLUTION INTRAVENOUS at 18:11

## 2017-09-29 RX ADMIN — Medication 0.3 MG: at 10:41

## 2017-09-29 RX ADMIN — PANCRELIPASE 24000 UNITS: 24000; 76000; 120000 CAPSULE, DELAYED RELEASE PELLETS ORAL at 12:54

## 2017-09-29 RX ADMIN — SODIUM CHLORIDE, POTASSIUM CHLORIDE, SODIUM LACTATE AND CALCIUM CHLORIDE 1000 ML: 600; 310; 30; 20 INJECTION, SOLUTION INTRAVENOUS at 18:24

## 2017-09-29 RX ADMIN — Medication 0.3 MG: at 03:25

## 2017-09-29 RX ADMIN — SODIUM BICARBONATE 325 MG: 325 TABLET ORAL at 21:14

## 2017-09-29 RX ADMIN — ONDANSETRON 4 MG: 2 INJECTION INTRAMUSCULAR; INTRAVENOUS at 19:51

## 2017-09-29 RX ADMIN — ACETAMINOPHEN 1000 MG: 500 TABLET, FILM COATED ORAL at 18:15

## 2017-09-29 RX ADMIN — PANCRELIPASE 48000 UNITS: 24000; 76000; 120000 CAPSULE, DELAYED RELEASE PELLETS ORAL at 23:57

## 2017-09-29 RX ADMIN — SODIUM BICARBONATE 325 MG: 325 TABLET ORAL at 23:57

## 2017-09-29 RX ADMIN — PANCRELIPASE 48000 UNITS: 24000; 76000; 120000 CAPSULE, DELAYED RELEASE PELLETS ORAL at 21:14

## 2017-09-29 RX ADMIN — PANCRELIPASE 48000 UNITS: 24000; 76000; 120000 CAPSULE, DELAYED RELEASE PELLETS ORAL at 04:37

## 2017-09-29 RX ADMIN — SODIUM CHLORIDE, POTASSIUM CHLORIDE, SODIUM LACTATE AND CALCIUM CHLORIDE 250 ML: 600; 310; 30; 20 INJECTION, SOLUTION INTRAVENOUS at 23:42

## 2017-09-29 RX ADMIN — SODIUM CHLORIDE, POTASSIUM CHLORIDE, SODIUM LACTATE AND CALCIUM CHLORIDE 600 ML: 600; 310; 30; 20 INJECTION, SOLUTION INTRAVENOUS at 09:46

## 2017-09-29 RX ADMIN — Medication 5 ML: at 21:14

## 2017-09-29 RX ADMIN — SODIUM BICARBONATE 325 MG: 325 TABLET ORAL at 12:51

## 2017-09-29 RX ADMIN — Medication 10 MEQ: at 14:30

## 2017-09-29 RX ADMIN — SERTRALINE HYDROCHLORIDE 50 MG: 50 TABLET ORAL at 08:02

## 2017-09-29 RX ADMIN — SODIUM BICARBONATE 325 MG: 325 TABLET ORAL at 08:01

## 2017-09-29 RX ADMIN — SODIUM BICARBONATE 325 MG: 325 TABLET ORAL at 00:55

## 2017-09-29 RX ADMIN — OXYCODONE HYDROCHLORIDE 10 MG: 5 SOLUTION ORAL at 23:56

## 2017-09-29 RX ADMIN — Medication 10 MEQ: at 01:46

## 2017-09-29 RX ADMIN — FINASTERIDE 5 MG: 5 TABLET, FILM COATED ORAL at 08:02

## 2017-09-29 RX ADMIN — Medication 10 MEQ: at 09:47

## 2017-09-29 RX ADMIN — LEVOTHYROXINE SODIUM 150 MCG: 150 TABLET ORAL at 08:02

## 2017-09-29 RX ADMIN — Medication 10 MEQ: at 12:54

## 2017-09-29 RX ADMIN — SODIUM CHLORIDE 1000 ML: 9 INJECTION, SOLUTION INTRAVENOUS at 02:44

## 2017-09-29 RX ADMIN — ONDANSETRON 4 MG: 2 INJECTION INTRAMUSCULAR; INTRAVENOUS at 12:54

## 2017-09-29 RX ADMIN — ACETAMINOPHEN 1000 MG: 500 TABLET, FILM COATED ORAL at 10:41

## 2017-09-29 RX ADMIN — PANCRELIPASE 48000 UNITS: 24000; 76000; 120000 CAPSULE, DELAYED RELEASE PELLETS ORAL at 00:55

## 2017-09-29 RX ADMIN — OXYCODONE HYDROCHLORIDE 5 MG: 5 TABLET ORAL at 15:34

## 2017-09-29 RX ADMIN — SODIUM BICARBONATE 325 MG: 325 TABLET ORAL at 17:00

## 2017-09-29 RX ADMIN — PREDNISONE 5 MG: 5 SOLUTION ORAL at 08:02

## 2017-09-29 RX ADMIN — PANCRELIPASE 24000 UNITS: 24000; 76000; 120000 CAPSULE, DELAYED RELEASE PELLETS ORAL at 08:02

## 2017-09-29 RX ADMIN — PANCRELIPASE 48000 UNITS: 24000; 76000; 120000 CAPSULE, DELAYED RELEASE PELLETS ORAL at 16:59

## 2017-09-29 RX ADMIN — Medication 0.3 MG: at 05:47

## 2017-09-29 RX ADMIN — ACETAMINOPHEN 1000 MG: 500 TABLET, FILM COATED ORAL at 03:00

## 2017-09-29 RX ADMIN — Medication 0.3 MG: at 08:18

## 2017-09-29 RX ADMIN — PANTOPRAZOLE SODIUM 40 MG: 40 TABLET, DELAYED RELEASE ORAL at 08:01

## 2017-09-29 RX ADMIN — Medication 10 MEQ: at 03:13

## 2017-09-29 RX ADMIN — Medication 2 PACKET: at 13:11

## 2017-09-29 RX ADMIN — SODIUM BICARBONATE 325 MG: 325 TABLET ORAL at 04:37

## 2017-09-29 RX ADMIN — OXYCODONE HYDROCHLORIDE 10 MG: 5 TABLET ORAL at 19:51

## 2017-09-29 RX ADMIN — Medication 10 MEQ: at 11:34

## 2017-09-29 RX ADMIN — SODIUM CHLORIDE, POTASSIUM CHLORIDE, SODIUM LACTATE AND CALCIUM CHLORIDE 400 ML: 600; 310; 30; 20 INJECTION, SOLUTION INTRAVENOUS at 15:07

## 2017-09-29 RX ADMIN — Medication 0.3 MG: at 00:52

## 2017-09-29 RX ADMIN — Medication 10 MEQ: at 21:31

## 2017-09-29 RX ADMIN — TAMSULOSIN HYDROCHLORIDE 0.4 MG: 0.4 CAPSULE ORAL at 08:02

## 2017-09-29 RX ADMIN — Medication 5 ML: at 08:01

## 2017-09-29 ASSESSMENT — PAIN DESCRIPTION - DESCRIPTORS
DESCRIPTORS: DISCOMFORT
DESCRIPTORS: STABBING
DESCRIPTORS: DISCOMFORT;CRAMPING;SHOOTING
DESCRIPTORS: DISCOMFORT;STABBING
DESCRIPTORS: STABBING
DESCRIPTORS: DISCOMFORT

## 2017-09-29 NOTE — PLAN OF CARE
Problem: Patient Care Overview  Goal: Plan of Care/Patient Progress Review  Outcome: Improving  A/O x4, VSS. Pain is controlled with scheduled Tylenol and IV Dilaudid q2h. No n/v. G tube clamped. J tube feeding at 30ml/hr. Tube feeds not adjusted yet d/t electrolytes out of range. NG to LIS, moderate brown drainage. NPO, Pt taking ice chips. Voiding spontaneously, marginal output. PIVF at 150/hr. K+ replaced x4 this shift. K+ recheck ordered for 2330. Past surgical incision and VIRGIL site c/d/i. SBA. Cont to monitor NG output and UO. Cont POC.

## 2017-09-29 NOTE — PLAN OF CARE
Problem: Pain, Acute (Adult)  Goal: Identify Related Risk Factors and Signs and Symptoms  Related risk factors and signs and symptoms are identified upon initiation of Human Response Clinical Practice Guideline (CPG).   All vitals stable. K+ reading 3.4. Potassium protocol initiated and replaced as ordered. Potassium Recheck ordered for tomorrow AM. G tube clamped. J tube feeding at 30ml/hr. J tube not advanced due to potassium being out of range. NG to LIS, moderate brown drainage.  NG tube irrigated. NPO, Pt taking ice chips.T Pain managed with scheduled tylenol and Dilaudid q2h prn. Pt verbalized that pain has improved greatly from yesterday. Denied Nausea and vomiting. Slept comfortably between cares. Independently ambulated in the halls during the night. Voiding adequte amount of urine NPO, Pt taking ice chips.Past surgical incision and VIRGIL site c/d/i. Cont to monitor NG output.

## 2017-09-29 NOTE — PROGRESS NOTES
GENERAL SURGERY PROGRESS NOTE    Patient: Lucas Bronw  MRN: [id number]    Subjective  No acute overnight events. Pain well controlled. Voiding. +BMs.     Objective  Temp:  [96.3  F (35.7  C)-97.4  F (36.3  C)] 96.3  F (35.7  C)  Pulse:  [71-77] 71  Heart Rate:  [71] 71  Resp:  [16-18] 16  BP: (115-129)/(63-67) 129/63  SpO2:  [95 %-97 %] 96 %    Well appearing man w/ NGT in place.  NLB at RA  Thin light brown NG T output. Tube feeds running at 30. GJ tube in place. Soft, Slightly distended.    I/O last 3 completed shifts:  In: 4329.17 [P.O.:840; I.V.:1709.17; NG/GT:270; IV Piggyback:1000]  Out: 8425 [Urine:1375; Emesis/NG output:7050]    Labs:   Sodium 142  Potassium 3.1  Chloride 97  Bicarb 39  BUN 34  Creatinine 1.86  Calcium 8.4  Glucose 110    WBC 8.1  Hgb 7.7  Hct 26.1  Platelet 302    INR 3.58    Imaging: Reviewed.    Assessment & Plan  Lucas Brown is a 56 year old male s/p whipple done in August 2017 who now was has gastric outlet obstruction. Feeling better with decompression. Has had remarkably high NGT output (9L 2 days prior, 7L yesterday).  -Replace NG losses 2:1 w/ LR  -Got 2L bolus this AM  -Mg > 2, K>4  -High output NGT. Continue w/ NGT to low continuous suction  -No abx  -NPO, limit ice chips  -warfarin dosing per pharmacy, target INR 2-3 for mechanical aortic valve    Scribed by Yanet Narvaez, MS3    Discussed with staff.    --  Gary Wilkerson MD  Gen Surg PGY1

## 2017-09-29 NOTE — PLAN OF CARE
Problem: Patient Care Overview  Goal: Plan of Care/Patient Progress Review  VSS. Pain managed with fentanyl patch, around-the-clock Tylenol and PRN Dilaudid. C/o nausea, given Zofran with relief. Independently ambulating in halls. NG tube to LCS with large clear/tan output, 720 out this shift. Pt on strict I/O, has difficulty limiting ice chip/water intake. Pt has q4h fluid replacement per NG output. Gtube clamped, tube feeds to 30 cc/hr to Jtube, rate not increased d/t electrolytes out of range. K replaced, recheck scheduled for 1800. Continue to monitor pain, N/V and I/O.

## 2017-09-30 LAB
ANION GAP SERPL CALCULATED.3IONS-SCNC: 5 MMOL/L (ref 3–14)
BUN SERPL-MCNC: 27 MG/DL (ref 7–30)
CALCIUM SERPL-MCNC: 8.3 MG/DL (ref 8.5–10.1)
CHLORIDE SERPL-SCNC: 103 MMOL/L (ref 94–109)
CO2 SERPL-SCNC: 35 MMOL/L (ref 20–32)
CREAT SERPL-MCNC: 1.67 MG/DL (ref 0.66–1.25)
ERYTHROCYTE [DISTWIDTH] IN BLOOD BY AUTOMATED COUNT: 15.1 % (ref 10–15)
GFR SERPL CREATININE-BSD FRML MDRD: 42 ML/MIN/1.7M2
GLUCOSE SERPL-MCNC: 103 MG/DL (ref 70–99)
HCT VFR BLD AUTO: 23.2 % (ref 40–53)
HGB BLD-MCNC: 6.9 G/DL (ref 13.3–17.7)
INR PPP: 2.37 (ref 0.86–1.14)
MAGNESIUM SERPL-MCNC: 1.9 MG/DL (ref 1.6–2.3)
MCH RBC QN AUTO: 24.6 PG (ref 26.5–33)
MCHC RBC AUTO-ENTMCNC: 29.7 G/DL (ref 31.5–36.5)
MCV RBC AUTO: 83 FL (ref 78–100)
PHOSPHATE SERPL-MCNC: 2.1 MG/DL (ref 2.5–4.5)
PLATELET # BLD AUTO: 252 10E9/L (ref 150–450)
POTASSIUM SERPL-SCNC: 3.6 MMOL/L (ref 3.4–5.3)
RBC # BLD AUTO: 2.8 10E12/L (ref 4.4–5.9)
SODIUM SERPL-SCNC: 142 MMOL/L (ref 133–144)
WBC # BLD AUTO: 6.3 10E9/L (ref 4–11)

## 2017-09-30 PROCEDURE — 00000146 ZZHCL STATISTIC GLUCOSE BY METER IP

## 2017-09-30 PROCEDURE — 84100 ASSAY OF PHOSPHORUS: CPT | Performed by: SURGERY

## 2017-09-30 PROCEDURE — 25000131 ZZH RX MED GY IP 250 OP 636 PS 637: Performed by: STUDENT IN AN ORGANIZED HEALTH CARE EDUCATION/TRAINING PROGRAM

## 2017-09-30 PROCEDURE — S0138 FINASTERIDE, 5 MG: HCPCS | Performed by: STUDENT IN AN ORGANIZED HEALTH CARE EDUCATION/TRAINING PROGRAM

## 2017-09-30 PROCEDURE — 25000132 ZZH RX MED GY IP 250 OP 250 PS 637: Performed by: SURGERY

## 2017-09-30 PROCEDURE — 25000128 H RX IP 250 OP 636: Performed by: SURGERY

## 2017-09-30 PROCEDURE — 85610 PROTHROMBIN TIME: CPT | Performed by: STUDENT IN AN ORGANIZED HEALTH CARE EDUCATION/TRAINING PROGRAM

## 2017-09-30 PROCEDURE — 85027 COMPLETE CBC AUTOMATED: CPT | Performed by: STUDENT IN AN ORGANIZED HEALTH CARE EDUCATION/TRAINING PROGRAM

## 2017-09-30 PROCEDURE — 36415 COLL VENOUS BLD VENIPUNCTURE: CPT | Performed by: STUDENT IN AN ORGANIZED HEALTH CARE EDUCATION/TRAINING PROGRAM

## 2017-09-30 PROCEDURE — 27210913 ZZH NUTRITION PRODUCT INTERMEDIATE PACKET

## 2017-09-30 PROCEDURE — 25000125 ZZHC RX 250: Performed by: STUDENT IN AN ORGANIZED HEALTH CARE EDUCATION/TRAINING PROGRAM

## 2017-09-30 PROCEDURE — 25000132 ZZH RX MED GY IP 250 OP 250 PS 637: Performed by: STUDENT IN AN ORGANIZED HEALTH CARE EDUCATION/TRAINING PROGRAM

## 2017-09-30 PROCEDURE — 80048 BASIC METABOLIC PNL TOTAL CA: CPT | Performed by: STUDENT IN AN ORGANIZED HEALTH CARE EDUCATION/TRAINING PROGRAM

## 2017-09-30 PROCEDURE — 25000128 H RX IP 250 OP 636: Performed by: STUDENT IN AN ORGANIZED HEALTH CARE EDUCATION/TRAINING PROGRAM

## 2017-09-30 PROCEDURE — 12000001 ZZH R&B MED SURG/OB UMMC

## 2017-09-30 PROCEDURE — 83735 ASSAY OF MAGNESIUM: CPT | Performed by: STUDENT IN AN ORGANIZED HEALTH CARE EDUCATION/TRAINING PROGRAM

## 2017-09-30 PROCEDURE — 25000132 ZZH RX MED GY IP 250 OP 250 PS 637

## 2017-09-30 PROCEDURE — 84100 ASSAY OF PHOSPHORUS: CPT | Performed by: STUDENT IN AN ORGANIZED HEALTH CARE EDUCATION/TRAINING PROGRAM

## 2017-09-30 RX ADMIN — SODIUM BICARBONATE 325 MG: 325 TABLET ORAL at 08:58

## 2017-09-30 RX ADMIN — Medication 0.2 MG: at 16:45

## 2017-09-30 RX ADMIN — ACETAMINOPHEN 1000 MG: 500 TABLET, FILM COATED ORAL at 18:01

## 2017-09-30 RX ADMIN — SODIUM BICARBONATE 325 MG: 325 TABLET ORAL at 04:47

## 2017-09-30 RX ADMIN — PANCRELIPASE 48000 UNITS: 24000; 76000; 120000 CAPSULE, DELAYED RELEASE PELLETS ORAL at 12:46

## 2017-09-30 RX ADMIN — Medication 2 G: at 10:06

## 2017-09-30 RX ADMIN — Medication 0.2 MG: at 02:39

## 2017-09-30 RX ADMIN — Medication 10 MEQ: at 13:34

## 2017-09-30 RX ADMIN — FINASTERIDE 5 MG: 5 TABLET, FILM COATED ORAL at 08:57

## 2017-09-30 RX ADMIN — PANCRELIPASE 24000 UNITS: 24000; 76000; 120000 CAPSULE, DELAYED RELEASE PELLETS ORAL at 08:58

## 2017-09-30 RX ADMIN — PANCRELIPASE 24000 UNITS: 24000; 76000; 120000 CAPSULE, DELAYED RELEASE PELLETS ORAL at 16:37

## 2017-09-30 RX ADMIN — SODIUM CHLORIDE, POTASSIUM CHLORIDE, SODIUM LACTATE AND CALCIUM CHLORIDE: 600; 310; 30; 20 INJECTION, SOLUTION INTRAVENOUS at 00:04

## 2017-09-30 RX ADMIN — OXYCODONE HYDROCHLORIDE 10 MG: 5 SOLUTION ORAL at 14:02

## 2017-09-30 RX ADMIN — Medication 10 MEQ: at 12:31

## 2017-09-30 RX ADMIN — LEVOTHYROXINE SODIUM 150 MCG: 150 TABLET ORAL at 08:57

## 2017-09-30 RX ADMIN — FENTANYL 1 PATCH: 100 PATCH, EXTENDED RELEASE TRANSDERMAL at 21:43

## 2017-09-30 RX ADMIN — PREDNISONE 5 MG: 5 SOLUTION ORAL at 08:56

## 2017-09-30 RX ADMIN — OXYCODONE HYDROCHLORIDE 10 MG: 5 SOLUTION ORAL at 09:19

## 2017-09-30 RX ADMIN — SODIUM CHLORIDE, POTASSIUM CHLORIDE, SODIUM LACTATE AND CALCIUM CHLORIDE 150 ML: 600; 310; 30; 20 INJECTION, SOLUTION INTRAVENOUS at 06:00

## 2017-09-30 RX ADMIN — SERTRALINE HYDROCHLORIDE 50 MG: 50 TABLET ORAL at 08:57

## 2017-09-30 RX ADMIN — OXYCODONE HYDROCHLORIDE 10 MG: 5 SOLUTION ORAL at 18:01

## 2017-09-30 RX ADMIN — SODIUM CHLORIDE, POTASSIUM CHLORIDE, SODIUM LACTATE AND CALCIUM CHLORIDE: 600; 310; 30; 20 INJECTION, SOLUTION INTRAVENOUS at 07:05

## 2017-09-30 RX ADMIN — SODIUM CHLORIDE, POTASSIUM CHLORIDE, SODIUM LACTATE AND CALCIUM CHLORIDE 100 ML: 600; 310; 30; 20 INJECTION, SOLUTION INTRAVENOUS at 14:32

## 2017-09-30 RX ADMIN — SODIUM CHLORIDE, POTASSIUM CHLORIDE, SODIUM LACTATE AND CALCIUM CHLORIDE 210 ML: 600; 310; 30; 20 INJECTION, SOLUTION INTRAVENOUS at 10:15

## 2017-09-30 RX ADMIN — Medication 5 ML: at 08:56

## 2017-09-30 RX ADMIN — POTASSIUM PHOSPHATE, MONOBASIC AND POTASSIUM PHOSPHATE, DIBASIC 15 MMOL: 224; 236 INJECTION, SOLUTION INTRAVENOUS at 18:49

## 2017-09-30 RX ADMIN — SODIUM CHLORIDE, POTASSIUM CHLORIDE, SODIUM LACTATE AND CALCIUM CHLORIDE 300 ML: 600; 310; 30; 20 INJECTION, SOLUTION INTRAVENOUS at 02:45

## 2017-09-30 RX ADMIN — SODIUM BICARBONATE 325 MG: 325 TABLET ORAL at 16:39

## 2017-09-30 RX ADMIN — Medication 2 PACKET: at 12:46

## 2017-09-30 RX ADMIN — OXYCODONE HYDROCHLORIDE 10 MG: 5 SOLUTION ORAL at 04:46

## 2017-09-30 RX ADMIN — ACETAMINOPHEN 1000 MG: 500 TABLET, FILM COATED ORAL at 10:31

## 2017-09-30 RX ADMIN — ONDANSETRON 4 MG: 2 INJECTION INTRAMUSCULAR; INTRAVENOUS at 07:10

## 2017-09-30 RX ADMIN — PANCRELIPASE 24000 UNITS: 24000; 76000; 120000 CAPSULE, DELAYED RELEASE PELLETS ORAL at 20:16

## 2017-09-30 RX ADMIN — PANTOPRAZOLE SODIUM 40 MG: 40 TABLET, DELAYED RELEASE ORAL at 08:56

## 2017-09-30 RX ADMIN — SODIUM BICARBONATE 325 MG: 325 TABLET ORAL at 12:46

## 2017-09-30 RX ADMIN — Medication 5 ML: at 20:16

## 2017-09-30 RX ADMIN — SODIUM CHLORIDE, POTASSIUM CHLORIDE, SODIUM LACTATE AND CALCIUM CHLORIDE: 600; 310; 30; 20 INJECTION, SOLUTION INTRAVENOUS at 16:48

## 2017-09-30 RX ADMIN — OXYCODONE HYDROCHLORIDE 10 MG: 5 SOLUTION ORAL at 21:50

## 2017-09-30 RX ADMIN — ACETAMINOPHEN 1000 MG: 500 TABLET, FILM COATED ORAL at 02:39

## 2017-09-30 RX ADMIN — SODIUM BICARBONATE 325 MG: 325 TABLET ORAL at 20:16

## 2017-09-30 RX ADMIN — TAMSULOSIN HYDROCHLORIDE 0.4 MG: 0.4 CAPSULE ORAL at 08:56

## 2017-09-30 RX ADMIN — BENZOCAINE AND MENTHOL 1 LOZENGE: 15; 3.6 LOZENGE ORAL at 16:35

## 2017-09-30 RX ADMIN — PANCRELIPASE 48000 UNITS: 24000; 76000; 120000 CAPSULE, DELAYED RELEASE PELLETS ORAL at 04:47

## 2017-09-30 ASSESSMENT — PAIN DESCRIPTION - DESCRIPTORS
DESCRIPTORS: DISCOMFORT;HEADACHE
DESCRIPTORS: SORE
DESCRIPTORS: DISCOMFORT
DESCRIPTORS: SORE
DESCRIPTORS: DISCOMFORT
DESCRIPTORS: DISCOMFORT

## 2017-09-30 NOTE — PLAN OF CARE
Problem: Patient Care Overview  Goal: Individualization & Mutuality  Outcome: Therapy, progress toward functional goals is gradual  Abdominal discomfort managed with oxycodone (into J), PO tylenol (NG clamped after), IV dilaudid 1x, fentanyl patch. NPO + ice, TF increased to 50/hr at 0200, NG output replaced 0.5:1 q4h. MIVF at 150/hr. Voiding adequate amts. Passing gas, 1 lg BM. Pt UAL. Mild nausea relieved with Zofran.

## 2017-09-30 NOTE — PLAN OF CARE
Problem: Patient Care Overview  Goal: Plan of Care/Patient Progress Review  AVSS. Pain relief with q4h oxycodone and fentanyl patch. Mild nausea reported, declined medication interventions. Abdominal dressings c/d/i. Continuous tube feedings to 50 ml/hr. NG tube to CLS with good output. Tolerating NG clamp for PO meds, all others given in Jtube. Mg and K replaced. Phosphorus to be replaced this evening. Replacing NG output with LR bolus q4h. Voiding adequate amounts. On strict I/O, ice chips only. Independently ambulating in room, showered this AM. Continue POC.

## 2017-09-30 NOTE — PROGRESS NOTES
Surgery Progress Note    S:  No acute events overnight. Pt seen at bedside resting comfortably. No nausea/emesis. Pain well controlled. Having BMs. Voiding. NPO. Ambulating.      O:  Temp:  [97.5  F (36.4  C)-99.1  F (37.3  C)] 97.5  F (36.4  C)  Pulse:  [64-70] 64  Resp:  [16] 16  BP: (123-131)/(52-74) 131/61  SpO2:  [94 %-99 %] 99 %    I/O last 3 completed shifts:  In: 8495 [P.O.:1380; I.V.:3575; NG/GT:560; IV Piggyback:2100]  Out: 5000 [Urine:1400; Emesis/NG output:3600]    Gen: Well appearing pleasant man with NG tube in place  Resp: non-labored breathing on RA  Abd: Soft, Non-distended, Non-tender, No rebound or guarding.  Ext: warm and well perfused    Tube feeds running at 50    Labs:  Complete Blood Count     Recent Labs  Lab 09/29/17  0816 09/28/17  1150 09/26/17  1236 09/23/17  0855   WBC 8.1 8.6 8.1 12.8*   HGB 7.7* 9.3* 8.5* 8.9*    346 283 354     Basic Metabolic Panel    Recent Labs  Lab 09/29/17  1805 09/29/17  0816 09/29/17  0034 09/28/17  1150 09/26/17  1236 09/23/17  0855   NA  --  142  --  138 135 132*   POTASSIUM 3.6 3.1* 3.4 2.8* 4.3 4.3   CHLORIDE  --  97  --  80* 100 94   CO2  --  39*  --  >45* 29 30   BUN  --  34*  --  32* 29 45*   CR  --  1.86*  --  1.48* 0.94 1.05   GLC  --  110*  --  174* 106* 134*     Liver Function Tests    Recent Labs  Lab 09/29/17  0816 09/28/17  0916 09/27/17  0748 09/26/17  1236   INR 3.58* 4.28* 3.25* 2.60*     Pancreatic Enzymes  No lab results found in last 7 days.  Coagulation Profile    Recent Labs  Lab 09/29/17  0816 09/28/17  0916 09/27/17  0748 09/26/17  1236   INR 3.58* 4.28* 3.25* 2.60*       Imaging: Reviewed         A/P: Lucas Brown is a 56 year old male s/p whipple in August 2017 now with gastric outlet obstruction. Feeling better currently after gastric decompression. Still having high NG tube output (3,600 yesterday, 900 so far today).    -Replace NG losses by 1/2   -Advance TFs to goal  -Strict I/Os  -NPO  -NGT to continuous low  suction  -Labs repeated this AM, Hgb 6.9 today, will monitor and hold off on transfusion for now.    Discussed with staff, Dr. Emerson.    Bakari Teresa MD  PGY-2 General Surgery        Scribed by Yanet Narvaez MS3

## 2017-10-01 ENCOUNTER — APPOINTMENT (OUTPATIENT)
Dept: LAB | Facility: CLINIC | Age: 57
End: 2017-10-01
Attending: SURGERY
Payer: COMMERCIAL

## 2017-10-01 ENCOUNTER — HOSPITAL ENCOUNTER (INPATIENT)
Dept: VASCULAR ULTRASOUND | Facility: CLINIC | Age: 57
DRG: 381 | End: 2017-10-01
Attending: SURGERY | Admitting: SURGERY
Payer: COMMERCIAL

## 2017-10-01 LAB
ANION GAP SERPL CALCULATED.3IONS-SCNC: 6 MMOL/L (ref 3–14)
BLD PROD TYP BPU: NORMAL
BLD PROD TYP BPU: NORMAL
BLD UNIT ID BPU: 0
BLD UNIT ID BPU: 0
BLOOD PRODUCT CODE: NORMAL
BLOOD PRODUCT CODE: NORMAL
BPU ID: NORMAL
BPU ID: NORMAL
BUN SERPL-MCNC: 23 MG/DL (ref 7–30)
CALCIUM SERPL-MCNC: 8.1 MG/DL (ref 8.5–10.1)
CHLORIDE SERPL-SCNC: 104 MMOL/L (ref 94–109)
CO2 SERPL-SCNC: 28 MMOL/L (ref 20–32)
CREAT SERPL-MCNC: 1.5 MG/DL (ref 0.66–1.25)
ERYTHROCYTE [DISTWIDTH] IN BLOOD BY AUTOMATED COUNT: 15.2 % (ref 10–15)
GFR SERPL CREATININE-BSD FRML MDRD: 48 ML/MIN/1.7M2
GLUCOSE BLDC GLUCOMTR-MCNC: 96 MG/DL (ref 70–99)
GLUCOSE SERPL-MCNC: 99 MG/DL (ref 70–99)
GRAM STN SPEC: NORMAL
GRAM STN SPEC: NORMAL
HCT VFR BLD AUTO: 20.9 % (ref 40–53)
HGB BLD-MCNC: 6.4 G/DL (ref 13.3–17.7)
INR PPP: 1.58 (ref 0.86–1.14)
Lab: NORMAL
MAGNESIUM SERPL-MCNC: 2.2 MG/DL (ref 1.6–2.3)
MCH RBC QN AUTO: 25 PG (ref 26.5–33)
MCHC RBC AUTO-ENTMCNC: 30.6 G/DL (ref 31.5–36.5)
MCV RBC AUTO: 82 FL (ref 78–100)
PHOSPHATE SERPL-MCNC: 3.2 MG/DL (ref 2.5–4.5)
PLATELET # BLD AUTO: 249 10E9/L (ref 150–450)
POTASSIUM SERPL-SCNC: 3.7 MMOL/L (ref 3.4–5.3)
RBC # BLD AUTO: 2.56 10E12/L (ref 4.4–5.9)
SODIUM SERPL-SCNC: 138 MMOL/L (ref 133–144)
SPECIMEN SOURCE: NORMAL
TRANSFUSION RXN BLOOD BANK NOTIFICATION: NORMAL
TRANSFUSION STATUS PATIENT QL: NORMAL
WBC # BLD AUTO: 6.8 10E9/L (ref 4–11)

## 2017-10-01 PROCEDURE — 86850 RBC ANTIBODY SCREEN: CPT | Performed by: SURGERY

## 2017-10-01 PROCEDURE — 25000132 ZZH RX MED GY IP 250 OP 250 PS 637: Performed by: STUDENT IN AN ORGANIZED HEALTH CARE EDUCATION/TRAINING PROGRAM

## 2017-10-01 PROCEDURE — 36415 COLL VENOUS BLD VENIPUNCTURE: CPT | Performed by: PATHOLOGY

## 2017-10-01 PROCEDURE — 36415 COLL VENOUS BLD VENIPUNCTURE: CPT | Performed by: STUDENT IN AN ORGANIZED HEALTH CARE EDUCATION/TRAINING PROGRAM

## 2017-10-01 PROCEDURE — 25000128 H RX IP 250 OP 636: Performed by: STUDENT IN AN ORGANIZED HEALTH CARE EDUCATION/TRAINING PROGRAM

## 2017-10-01 PROCEDURE — 84100 ASSAY OF PHOSPHORUS: CPT | Performed by: STUDENT IN AN ORGANIZED HEALTH CARE EDUCATION/TRAINING PROGRAM

## 2017-10-01 PROCEDURE — 40000341 ZZHCL STATISTIC BB TRANSF RXN INVEST: Performed by: PATHOLOGY

## 2017-10-01 PROCEDURE — 25000128 H RX IP 250 OP 636: Performed by: SURGERY

## 2017-10-01 PROCEDURE — 25000132 ZZH RX MED GY IP 250 OP 250 PS 637: Performed by: SURGERY

## 2017-10-01 PROCEDURE — 25000131 ZZH RX MED GY IP 250 OP 636 PS 637: Performed by: STUDENT IN AN ORGANIZED HEALTH CARE EDUCATION/TRAINING PROGRAM

## 2017-10-01 PROCEDURE — 27210577 ZZ H INTRODUCER MICRO SET

## 2017-10-01 PROCEDURE — 12000001 ZZH R&B MED SURG/OB UMMC

## 2017-10-01 PROCEDURE — 27210913 ZZH NUTRITION PRODUCT INTERMEDIATE PACKET

## 2017-10-01 PROCEDURE — 83735 ASSAY OF MAGNESIUM: CPT | Performed by: STUDENT IN AN ORGANIZED HEALTH CARE EDUCATION/TRAINING PROGRAM

## 2017-10-01 PROCEDURE — 36569 INSJ PICC 5 YR+ W/O IMAGING: CPT

## 2017-10-01 PROCEDURE — 86900 BLOOD TYPING SEROLOGIC ABO: CPT | Performed by: SURGERY

## 2017-10-01 PROCEDURE — 86901 BLOOD TYPING SEROLOGIC RH(D): CPT | Performed by: SURGERY

## 2017-10-01 PROCEDURE — 85610 PROTHROMBIN TIME: CPT | Performed by: STUDENT IN AN ORGANIZED HEALTH CARE EDUCATION/TRAINING PROGRAM

## 2017-10-01 PROCEDURE — S0138 FINASTERIDE, 5 MG: HCPCS | Performed by: STUDENT IN AN ORGANIZED HEALTH CARE EDUCATION/TRAINING PROGRAM

## 2017-10-01 PROCEDURE — 27210195 ZZH KIT POWER PICC DOUBLE LUMEN

## 2017-10-01 PROCEDURE — 87040 BLOOD CULTURE FOR BACTERIA: CPT | Performed by: STUDENT IN AN ORGANIZED HEALTH CARE EDUCATION/TRAINING PROGRAM

## 2017-10-01 PROCEDURE — 25000132 ZZH RX MED GY IP 250 OP 250 PS 637

## 2017-10-01 PROCEDURE — 85027 COMPLETE CBC AUTOMATED: CPT | Performed by: STUDENT IN AN ORGANIZED HEALTH CARE EDUCATION/TRAINING PROGRAM

## 2017-10-01 PROCEDURE — P9016 RBC LEUKOCYTES REDUCED: HCPCS | Performed by: SURGERY

## 2017-10-01 PROCEDURE — 02HV33Z INSERTION OF INFUSION DEVICE INTO SUPERIOR VENA CAVA, PERCUTANEOUS APPROACH: ICD-10-PCS | Performed by: RADIOLOGY

## 2017-10-01 PROCEDURE — 86923 COMPATIBILITY TEST ELECTRIC: CPT | Performed by: SURGERY

## 2017-10-01 PROCEDURE — 80048 BASIC METABOLIC PNL TOTAL CA: CPT | Performed by: STUDENT IN AN ORGANIZED HEALTH CARE EDUCATION/TRAINING PROGRAM

## 2017-10-01 RX ORDER — HEPARIN SODIUM,PORCINE 10 UNIT/ML
2-5 VIAL (ML) INTRAVENOUS
Status: COMPLETED | OUTPATIENT
Start: 2017-10-01 | End: 2017-10-01

## 2017-10-01 RX ORDER — LIDOCAINE 40 MG/G
CREAM TOPICAL
Status: DISCONTINUED | OUTPATIENT
Start: 2017-10-01 | End: 2017-10-06 | Stop reason: HOSPADM

## 2017-10-01 RX ADMIN — PANCRELIPASE 48000 UNITS: 24000; 76000; 120000 CAPSULE, DELAYED RELEASE PELLETS ORAL at 11:28

## 2017-10-01 RX ADMIN — TAMSULOSIN HYDROCHLORIDE 0.4 MG: 0.4 CAPSULE ORAL at 08:32

## 2017-10-01 RX ADMIN — FINASTERIDE 5 MG: 5 TABLET, FILM COATED ORAL at 08:33

## 2017-10-01 RX ADMIN — PREDNISONE 5 MG: 5 SOLUTION ORAL at 08:34

## 2017-10-01 RX ADMIN — PANCRELIPASE 24000 UNITS: 24000; 76000; 120000 CAPSULE, DELAYED RELEASE PELLETS ORAL at 17:04

## 2017-10-01 RX ADMIN — Medication 2 PACKET: at 11:19

## 2017-10-01 RX ADMIN — PANTOPRAZOLE SODIUM 40 MG: 40 TABLET, DELAYED RELEASE ORAL at 08:32

## 2017-10-01 RX ADMIN — POTASSIUM CHLORIDE 10 MEQ: 7.46 INJECTION, SOLUTION INTRAVENOUS at 18:56

## 2017-10-01 RX ADMIN — Medication 0.2 MG: at 00:51

## 2017-10-01 RX ADMIN — OXYCODONE HYDROCHLORIDE 10 MG: 5 SOLUTION ORAL at 02:03

## 2017-10-01 RX ADMIN — SODIUM BICARBONATE 325 MG: 325 TABLET ORAL at 11:27

## 2017-10-01 RX ADMIN — LEVOTHYROXINE SODIUM 150 MCG: 150 TABLET ORAL at 08:33

## 2017-10-01 RX ADMIN — PANCRELIPASE 48000 UNITS: 24000; 76000; 120000 CAPSULE, DELAYED RELEASE PELLETS ORAL at 04:49

## 2017-10-01 RX ADMIN — SODIUM CHLORIDE, POTASSIUM CHLORIDE, SODIUM LACTATE AND CALCIUM CHLORIDE: 600; 310; 30; 20 INJECTION, SOLUTION INTRAVENOUS at 03:37

## 2017-10-01 RX ADMIN — OXYCODONE HYDROCHLORIDE 10 MG: 5 SOLUTION ORAL at 21:25

## 2017-10-01 RX ADMIN — PANCRELIPASE 48000 UNITS: 24000; 76000; 120000 CAPSULE, DELAYED RELEASE PELLETS ORAL at 01:05

## 2017-10-01 RX ADMIN — Medication 5 ML: at 19:58

## 2017-10-01 RX ADMIN — SODIUM BICARBONATE 325 MG: 325 TABLET ORAL at 08:34

## 2017-10-01 RX ADMIN — POTASSIUM CHLORIDE 10 MEQ: 7.46 INJECTION, SOLUTION INTRAVENOUS at 20:03

## 2017-10-01 RX ADMIN — OXYCODONE HYDROCHLORIDE 10 MG: 5 SOLUTION ORAL at 13:05

## 2017-10-01 RX ADMIN — PANCRELIPASE 24000 UNITS: 24000; 76000; 120000 CAPSULE, DELAYED RELEASE PELLETS ORAL at 20:43

## 2017-10-01 RX ADMIN — SODIUM CHLORIDE, POTASSIUM CHLORIDE, SODIUM LACTATE AND CALCIUM CHLORIDE 100 ML: 600; 310; 30; 20 INJECTION, SOLUTION INTRAVENOUS at 19:00

## 2017-10-01 RX ADMIN — Medication 0.2 MG: at 05:00

## 2017-10-01 RX ADMIN — OXYCODONE HYDROCHLORIDE 10 MG: 5 SOLUTION ORAL at 17:34

## 2017-10-01 RX ADMIN — Medication 5 ML: at 08:32

## 2017-10-01 RX ADMIN — SODIUM CHLORIDE, PRESERVATIVE FREE 5 ML: 5 INJECTION INTRAVENOUS at 19:01

## 2017-10-01 RX ADMIN — ACETAMINOPHEN 1000 MG: 500 TABLET, FILM COATED ORAL at 02:03

## 2017-10-01 RX ADMIN — ONDANSETRON 4 MG: 2 INJECTION INTRAMUSCULAR; INTRAVENOUS at 05:00

## 2017-10-01 RX ADMIN — SODIUM BICARBONATE 325 MG: 325 TABLET ORAL at 04:49

## 2017-10-01 RX ADMIN — Medication 0.2 MG: at 17:02

## 2017-10-01 RX ADMIN — SERTRALINE HYDROCHLORIDE 50 MG: 50 TABLET ORAL at 08:33

## 2017-10-01 RX ADMIN — ACETAMINOPHEN 1000 MG: 500 TABLET, FILM COATED ORAL at 10:17

## 2017-10-01 RX ADMIN — SODIUM CHLORIDE, POTASSIUM CHLORIDE, SODIUM LACTATE AND CALCIUM CHLORIDE 100 ML: 600; 310; 30; 20 INJECTION, SOLUTION INTRAVENOUS at 22:38

## 2017-10-01 RX ADMIN — SODIUM BICARBONATE 325 MG: 325 TABLET ORAL at 01:05

## 2017-10-01 RX ADMIN — SODIUM BICARBONATE 325 MG: 325 TABLET ORAL at 17:04

## 2017-10-01 RX ADMIN — SODIUM BICARBONATE 325 MG: 325 TABLET ORAL at 20:43

## 2017-10-01 RX ADMIN — SODIUM CHLORIDE, POTASSIUM CHLORIDE, SODIUM LACTATE AND CALCIUM CHLORIDE: 600; 310; 30; 20 INJECTION, SOLUTION INTRAVENOUS at 09:55

## 2017-10-01 RX ADMIN — SODIUM CHLORIDE, POTASSIUM CHLORIDE, SODIUM LACTATE AND CALCIUM CHLORIDE: 600; 310; 30; 20 INJECTION, SOLUTION INTRAVENOUS at 18:41

## 2017-10-01 RX ADMIN — ACETAMINOPHEN 1000 MG: 500 TABLET, FILM COATED ORAL at 19:58

## 2017-10-01 RX ADMIN — OXYCODONE HYDROCHLORIDE 10 MG: 5 SOLUTION ORAL at 08:35

## 2017-10-01 RX ADMIN — Medication 0.2 MG: at 07:13

## 2017-10-01 ASSESSMENT — PAIN DESCRIPTION - DESCRIPTORS
DESCRIPTORS: SORE;ACHING
DESCRIPTORS: ACHING;SORE

## 2017-10-01 NOTE — PROGRESS NOTES
Patient refusing PIV. Needs electrolytes replacement and IVF. Risks and benefits explained, Dr Santos notified and will assess.

## 2017-10-01 NOTE — PLAN OF CARE
Problem: Patient Care Overview  Goal: Plan of Care/Patient Progress Review  Outcome: No Change  AVSS. Up SBA. Hgb 6.4. 1 unit of blood administered. Complains of abdominal pain and throat pain. Oxycodone given x 2. Fentanyl patch in place. NG to continuous suction. Replaced at 1000 and 2230 per orders. GJ, J with TF at 50 ml/hr. G clamped. Voiding adequately. No BM today. K+ needs to be replaced. Pt. Refusing until PICC is (to happen sometime later today, time TBD). NPO with ice chips. Pt. Had possible transfusion reaction, work-up being done. Continue to closely monitor.

## 2017-10-01 NOTE — PROVIDER NOTIFICATION
Paged provider regarding potential transfusion reaction.  Patient's temperature morgan 2 degrees Farenheit during transfusion and patient reported chills. Blood culture ordered. Blood bag/tubing sent to lab. MD assessed patient, pt feels good, VSS.

## 2017-10-01 NOTE — PLAN OF CARE
Problem: Patient Care Overview  Goal: Plan of Care/Patient Progress Review  Outcome: No Change  AVSS.  96% RA.  Abd pain continues - Fent patch, Tylenol + PRN oxycodone with relief. Some nausea, zofran x1.    NG with cont sxn - replaced at 0230, 0650 per orders.  GJ tube - G clamped, J with TF@50/hr.  Up to BR with SBA, voiding, BMx1.  Only 1 PIV, lytes replaced yesterday.  MDs to address IV access.    Cont with POC.

## 2017-10-01 NOTE — PROGRESS NOTES
Patient still refusing PIV, Dr Wilkerson notified and pt assessed, recommendations for possible central line placement tomorrow, MD will address it with primary team tomorrow.

## 2017-10-01 NOTE — PROGRESS NOTES
GENERAL SURGERY PROGRESS NOTE    Patient: Lucas Brown  MRN: [id number]    Subjective  Pt needed a second peripheral IV placed for electrolyte replacement and fluid boluses, but it could not be placed. Is complaining about the NGT. Pain well controlled. Voiding. +BMs.     Objective  Temp:  [96.8  F (36  C)-98.8  F (37.1  C)] 97.7  F (36.5  C)  Pulse:  [61-63] 61  Heart Rate:  [61-75] 61  Resp:  [14-16] 16  BP: (123-132)/(61-73) 130/70  SpO2:  [92 %-97 %] 96 %    Pt is a well appearing middle aged man w/ an ng tube in place. Tube feeds at 50.  Nlb on RA  Abd is soft, non-tender, distended. No r/g. NGT output is light brown. GJ tube is intact.    I/O last 3 completed shifts:  In: 2850 [P.O.:330; I.V.:700; NG/GT:470; IV Piggyback:700]  Out: 3675 [Urine:1325; Emesis/NG output:2350]    Labs: Reviewed.    Imaging: Reviewed.    Assessment & Plan  57 y/o M w/ pmh of whipple procedure now w/ gastric outlet obstruction and high NG outputs. Decreasing w/ time. Is requiring significant electrolyte replacement and fluid bolus for high NGT output. May need surgical revision of gastrojejuno anastomosis.  - Pain Control: Scheduled tylenol, fentanyl patch, dilaudid IV for breakthrough pain  - Diet: npo  - Abx: None  - Wound cares: none   - NGT to low continuous suction w/ 2:1 LR replacement  - Mg > 2, K > 4 supplementation  - Warfarin dosing per pharmacy  - PICC placement today    --  Gary Wilkerson MD  Gen Surg PGY1

## 2017-10-01 NOTE — PLAN OF CARE
Notified of potential transfusion reaction due to elevation of temperature to 99.8. Went to evaluate patient. Denies chills,  difficulty breathing, pruritis, or hives. Temperature is now down to 97.2. Blood bank notified, request blood cultures which were ordered. No concern for ongoing reaction at this point.     Danielle Carrion MD  General Surgery PGY-1  [phone]

## 2017-10-01 NOTE — PLAN OF CARE
Problem: Patient Care Overview  Goal: Individualization & Mutuality  Outcome: No Change     Pain well controlled with IV Dilaudid and Oxycodone, denies nausea, NG to continuous suction, NG output replaced as ordered, TF at 50 cc/ hour, G-tube clamped, PIV infusing, phos level 2.1 replaced. Patient walked x 2, refused incentive spirometer, refused second PIV. Patient on high electrolytes replacement, IVF bolus PRN, and might need blood transfusion tomorrow. MD aware about this situation and will address tomorrow with the primary team.     Update: IVF paused, Phos replacement infusing.

## 2017-10-02 LAB
ANION GAP SERPL CALCULATED.3IONS-SCNC: 5 MMOL/L (ref 3–14)
BUN SERPL-MCNC: 21 MG/DL (ref 7–30)
CALCIUM SERPL-MCNC: 8 MG/DL (ref 8.5–10.1)
CHLORIDE SERPL-SCNC: 105 MMOL/L (ref 94–109)
CO2 SERPL-SCNC: 28 MMOL/L (ref 20–32)
CREAT SERPL-MCNC: 1.37 MG/DL (ref 0.66–1.25)
ERYTHROCYTE [DISTWIDTH] IN BLOOD BY AUTOMATED COUNT: 15.4 % (ref 10–15)
GFR SERPL CREATININE-BSD FRML MDRD: 53 ML/MIN/1.7M2
GLUCOSE SERPL-MCNC: 102 MG/DL (ref 70–99)
HCT VFR BLD AUTO: 22.9 % (ref 40–53)
HCT VFR BLD AUTO: 24.4 % (ref 40–53)
HGB BLD-MCNC: 7.1 G/DL (ref 13.3–17.7)
HGB BLD-MCNC: 7.4 G/DL (ref 13.3–17.7)
INR PPP: 1.27 (ref 0.86–1.14)
MCH RBC QN AUTO: 25.3 PG (ref 26.5–33)
MCHC RBC AUTO-ENTMCNC: 31 G/DL (ref 31.5–36.5)
MCV RBC AUTO: 82 FL (ref 78–100)
PLATELET # BLD AUTO: 242 10E9/L (ref 150–450)
POTASSIUM SERPL-SCNC: 4.4 MMOL/L (ref 3.4–5.3)
RBC # BLD AUTO: 2.81 10E12/L (ref 4.4–5.9)
SODIUM SERPL-SCNC: 138 MMOL/L (ref 133–144)
WBC # BLD AUTO: 6.5 10E9/L (ref 4–11)

## 2017-10-02 PROCEDURE — 25000132 ZZH RX MED GY IP 250 OP 250 PS 637

## 2017-10-02 PROCEDURE — 80048 BASIC METABOLIC PNL TOTAL CA: CPT | Performed by: STUDENT IN AN ORGANIZED HEALTH CARE EDUCATION/TRAINING PROGRAM

## 2017-10-02 PROCEDURE — 85014 HEMATOCRIT: CPT | Performed by: STUDENT IN AN ORGANIZED HEALTH CARE EDUCATION/TRAINING PROGRAM

## 2017-10-02 PROCEDURE — S0138 FINASTERIDE, 5 MG: HCPCS | Performed by: STUDENT IN AN ORGANIZED HEALTH CARE EDUCATION/TRAINING PROGRAM

## 2017-10-02 PROCEDURE — 25000128 H RX IP 250 OP 636: Performed by: STUDENT IN AN ORGANIZED HEALTH CARE EDUCATION/TRAINING PROGRAM

## 2017-10-02 PROCEDURE — 12000008 ZZH R&B INTERMEDIATE UMMC

## 2017-10-02 PROCEDURE — 25000132 ZZH RX MED GY IP 250 OP 250 PS 637: Performed by: SURGERY

## 2017-10-02 PROCEDURE — 25000128 H RX IP 250 OP 636: Performed by: SURGERY

## 2017-10-02 PROCEDURE — 36592 COLLECT BLOOD FROM PICC: CPT | Performed by: STUDENT IN AN ORGANIZED HEALTH CARE EDUCATION/TRAINING PROGRAM

## 2017-10-02 PROCEDURE — 25000125 ZZHC RX 250: Performed by: STUDENT IN AN ORGANIZED HEALTH CARE EDUCATION/TRAINING PROGRAM

## 2017-10-02 PROCEDURE — 25000132 ZZH RX MED GY IP 250 OP 250 PS 637: Performed by: STUDENT IN AN ORGANIZED HEALTH CARE EDUCATION/TRAINING PROGRAM

## 2017-10-02 PROCEDURE — 27210431 ZZH NUTRITION PRODUCT RENAL BASIC CAN

## 2017-10-02 PROCEDURE — 25000131 ZZH RX MED GY IP 250 OP 636 PS 637: Performed by: STUDENT IN AN ORGANIZED HEALTH CARE EDUCATION/TRAINING PROGRAM

## 2017-10-02 PROCEDURE — 85610 PROTHROMBIN TIME: CPT | Performed by: STUDENT IN AN ORGANIZED HEALTH CARE EDUCATION/TRAINING PROGRAM

## 2017-10-02 PROCEDURE — 85018 HEMOGLOBIN: CPT | Performed by: STUDENT IN AN ORGANIZED HEALTH CARE EDUCATION/TRAINING PROGRAM

## 2017-10-02 PROCEDURE — 85027 COMPLETE CBC AUTOMATED: CPT | Performed by: STUDENT IN AN ORGANIZED HEALTH CARE EDUCATION/TRAINING PROGRAM

## 2017-10-02 PROCEDURE — 27210913 ZZH NUTRITION PRODUCT INTERMEDIATE PACKET

## 2017-10-02 RX ORDER — LORAZEPAM 2 MG/ML
0.5 INJECTION INTRAMUSCULAR
Status: DISCONTINUED | OUTPATIENT
Start: 2017-10-02 | End: 2017-10-06 | Stop reason: HOSPADM

## 2017-10-02 RX ORDER — LORAZEPAM 2 MG/ML
2 INJECTION INTRAMUSCULAR ONCE
Status: COMPLETED | OUTPATIENT
Start: 2017-10-02 | End: 2017-10-02

## 2017-10-02 RX ORDER — WARFARIN SODIUM 5 MG/1
5 TABLET ORAL
Status: COMPLETED | OUTPATIENT
Start: 2017-10-02 | End: 2017-10-02

## 2017-10-02 RX ADMIN — ACETAMINOPHEN 1000 MG: 500 TABLET, FILM COATED ORAL at 04:43

## 2017-10-02 RX ADMIN — Medication 2 PACKET: at 13:42

## 2017-10-02 RX ADMIN — Medication 0.3 MG: at 15:48

## 2017-10-02 RX ADMIN — OXYCODONE HYDROCHLORIDE 10 MG: 5 SOLUTION ORAL at 02:04

## 2017-10-02 RX ADMIN — PANCRELIPASE 48000 UNITS: 24000; 76000; 120000 CAPSULE, DELAYED RELEASE PELLETS ORAL at 19:01

## 2017-10-02 RX ADMIN — SODIUM CHLORIDE, POTASSIUM CHLORIDE, SODIUM LACTATE AND CALCIUM CHLORIDE 1000 ML: 600; 310; 30; 20 INJECTION, SOLUTION INTRAVENOUS at 19:04

## 2017-10-02 RX ADMIN — Medication 0.2 MG: at 12:35

## 2017-10-02 RX ADMIN — Medication 0.2 MG: at 08:33

## 2017-10-02 RX ADMIN — Medication 5 ML: at 10:17

## 2017-10-02 RX ADMIN — PANCRELIPASE 48000 UNITS: 24000; 76000; 120000 CAPSULE, DELAYED RELEASE PELLETS ORAL at 00:40

## 2017-10-02 RX ADMIN — ONDANSETRON 4 MG: 2 INJECTION INTRAMUSCULAR; INTRAVENOUS at 00:33

## 2017-10-02 RX ADMIN — SODIUM BICARBONATE 325 MG: 325 TABLET ORAL at 00:40

## 2017-10-02 RX ADMIN — BENZOCAINE AND MENTHOL 1 LOZENGE: 15; 3.6 LOZENGE ORAL at 02:44

## 2017-10-02 RX ADMIN — SODIUM BICARBONATE 325 MG: 325 TABLET ORAL at 22:59

## 2017-10-02 RX ADMIN — FINASTERIDE 5 MG: 5 TABLET, FILM COATED ORAL at 09:56

## 2017-10-02 RX ADMIN — TOPICAL ANESTHETIC 2 EACH: 200 SPRAY DENTAL; PERIODONTAL at 15:58

## 2017-10-02 RX ADMIN — Medication 0.2 MG: at 00:33

## 2017-10-02 RX ADMIN — Medication 5 ML: at 21:01

## 2017-10-02 RX ADMIN — Medication 0.3 MG: at 21:01

## 2017-10-02 RX ADMIN — ACETAMINOPHEN 1000 MG: 500 TABLET, FILM COATED ORAL at 21:01

## 2017-10-02 RX ADMIN — SODIUM BICARBONATE 325 MG: 325 TABLET ORAL at 15:00

## 2017-10-02 RX ADMIN — SODIUM CHLORIDE, POTASSIUM CHLORIDE, SODIUM LACTATE AND CALCIUM CHLORIDE: 600; 310; 30; 20 INJECTION, SOLUTION INTRAVENOUS at 13:57

## 2017-10-02 RX ADMIN — PANTOPRAZOLE SODIUM 40 MG: 40 TABLET, DELAYED RELEASE ORAL at 09:55

## 2017-10-02 RX ADMIN — TOPICAL ANESTHETIC 2 EACH: 200 SPRAY DENTAL; PERIODONTAL at 12:35

## 2017-10-02 RX ADMIN — ONDANSETRON 4 MG: 2 INJECTION INTRAMUSCULAR; INTRAVENOUS at 08:33

## 2017-10-02 RX ADMIN — SODIUM CHLORIDE, POTASSIUM CHLORIDE, SODIUM LACTATE AND CALCIUM CHLORIDE: 600; 310; 30; 20 INJECTION, SOLUTION INTRAVENOUS at 20:29

## 2017-10-02 RX ADMIN — SODIUM CHLORIDE, POTASSIUM CHLORIDE, SODIUM LACTATE AND CALCIUM CHLORIDE: 600; 310; 30; 20 INJECTION, SOLUTION INTRAVENOUS at 00:49

## 2017-10-02 RX ADMIN — TOPICAL ANESTHETIC 2 EACH: 200 SPRAY DENTAL; PERIODONTAL at 09:01

## 2017-10-02 RX ADMIN — WARFARIN SODIUM 5 MG: 5 TABLET ORAL at 17:47

## 2017-10-02 RX ADMIN — SODIUM BICARBONATE 325 MG: 325 TABLET ORAL at 11:12

## 2017-10-02 RX ADMIN — PANCRELIPASE 48000 UNITS: 24000; 76000; 120000 CAPSULE, DELAYED RELEASE PELLETS ORAL at 22:58

## 2017-10-02 RX ADMIN — Medication 0.3 MG: at 19:08

## 2017-10-02 RX ADMIN — SODIUM CHLORIDE, POTASSIUM CHLORIDE, SODIUM LACTATE AND CALCIUM CHLORIDE 1000 ML: 600; 310; 30; 20 INJECTION, SOLUTION INTRAVENOUS at 22:58

## 2017-10-02 RX ADMIN — SODIUM BICARBONATE 325 MG: 325 TABLET ORAL at 19:01

## 2017-10-02 RX ADMIN — OXYCODONE HYDROCHLORIDE 10 MG: 5 SOLUTION ORAL at 06:28

## 2017-10-02 RX ADMIN — TAMSULOSIN HYDROCHLORIDE 0.4 MG: 0.4 CAPSULE ORAL at 09:56

## 2017-10-02 RX ADMIN — SODIUM BICARBONATE 325 MG: 325 TABLET ORAL at 05:09

## 2017-10-02 RX ADMIN — ONDANSETRON 4 MG: 2 INJECTION INTRAMUSCULAR; INTRAVENOUS at 13:57

## 2017-10-02 RX ADMIN — PANCRELIPASE 48000 UNITS: 24000; 76000; 120000 CAPSULE, DELAYED RELEASE PELLETS ORAL at 15:00

## 2017-10-02 RX ADMIN — PANCRELIPASE 48000 UNITS: 24000; 76000; 120000 CAPSULE, DELAYED RELEASE PELLETS ORAL at 05:09

## 2017-10-02 RX ADMIN — PREDNISONE 5 MG: 5 SOLUTION ORAL at 09:55

## 2017-10-02 RX ADMIN — SERTRALINE HYDROCHLORIDE 50 MG: 50 TABLET ORAL at 09:56

## 2017-10-02 RX ADMIN — PANCRELIPASE 48000 UNITS: 24000; 76000; 120000 CAPSULE, DELAYED RELEASE PELLETS ORAL at 11:05

## 2017-10-02 RX ADMIN — LEVOTHYROXINE SODIUM 150 MCG: 150 TABLET ORAL at 09:56

## 2017-10-02 RX ADMIN — SODIUM CHLORIDE, POTASSIUM CHLORIDE, SODIUM LACTATE AND CALCIUM CHLORIDE 1000 ML: 600; 310; 30; 20 INJECTION, SOLUTION INTRAVENOUS at 13:58

## 2017-10-02 RX ADMIN — OXYCODONE HYDROCHLORIDE 10 MG: 5 SOLUTION ORAL at 17:47

## 2017-10-02 RX ADMIN — Medication 0.2 MG: at 04:43

## 2017-10-02 RX ADMIN — LORAZEPAM 0.5 MG: 2 INJECTION INTRAMUSCULAR; INTRAVENOUS at 10:12

## 2017-10-02 RX ADMIN — ACETAMINOPHEN 1000 MG: 500 TABLET, FILM COATED ORAL at 12:38

## 2017-10-02 ASSESSMENT — PAIN DESCRIPTION - DESCRIPTORS
DESCRIPTORS: STABBING
DESCRIPTORS: CONSTANT;CRAMPING;ACHING
DESCRIPTORS: ACHING;CONSTANT;DISCOMFORT
DESCRIPTORS: SORE
DESCRIPTORS: SORE
DESCRIPTORS: ACHING;CONSTANT;DISCOMFORT
DESCRIPTORS: SORE
DESCRIPTORS: ACHING;CONSTANT;DISCOMFORT
DESCRIPTORS: CONSTANT;SORE

## 2017-10-02 NOTE — PROGRESS NOTES
SPIRITUAL HEALTH SERVICES  SPIRITUAL ASSESSMENT Progress Note  [facility] ([place] 7C    Offered to provide follow up to previous visits.  Altagracia was joined by a female visitor.  He expressed his appreciation for previous visits but said that he was not feeling well and would prefer a visit another day.    Will visit this pt on 10/4.    Mina Couch M.Div.  Staff   Pager [phone]

## 2017-10-02 NOTE — PLAN OF CARE
Problem: Patient Care Overview  Goal: Plan of Care/Patient Progress Review  Outcome: No Change  Tolerating tube feeding at 60cc/hr, if tolerates can be increased to goal rate of 65cc at 1900. NG still pulling large output, replacing output .5cc of LR to 1cc output from ng.C/O discomfort in abdomen, but throat pain is worse. Hurricane spray, Tylenol and Dilaudid with relief.Voiding spont, passing gas and stool.Ambulating in halls with sba. Patient very emotional this morning regarding not able to sleep and hospitalization, moved to a private room, Ativan .5mg IV given and patient slept, less teary this afternoon,wife at bedside and supportive.

## 2017-10-02 NOTE — PLAN OF CARE
Problem: Patient Care Overview  Goal: Plan of Care/Patient Progress Review  Outcome: No Change  AVSS.  96% RA.  Pain somewhat controlled with scheduled tylenol, fentanyl patch + PRN oxycodone and dilaudid IV.  Anxious at times.  Nausea at times, possibly pain related, better after meds.  NG to low cont pou=192zm total output - replacing Q4hrs, last at 0600.  GJ tube, Jtube with TF@50.  New PICC, infusing x2.  Voiding good vols, urinal at bedside.  Up with min assist.  Cont with POC.  Pain meds as pt needs, labs/lytes protocol.  Pt open to taking medication to help him relax - day RN notified to talk to MDs.

## 2017-10-02 NOTE — PROGRESS NOTES
GENERAL SURGERY PROGRESS NOTE    Patient: Lucas Brown  MRN: [id number]    Subjective  Some concern for transfusion rxn yesterday because of elevated T, chills post transfusion but did well shortly after. Had PICC placed by fluoro. Pain well controlled. Voiding. +BMs.     Objective  Temp:  [97.2  F (36.2  C)-99.9  F (37.7  C)] 99  F (37.2  C)  Pulse:  [61-77] 70  Heart Rate:  [61-66] 66  Resp:  [15-16] 16  BP: (127-153)/(68-81) 151/78  SpO2:  [92 %-98 %] 97 %    I/O last 3 completed shifts:  In: 3452.5 [P.O.:20; I.V.:1732.5; NG/GT:150; IV Piggyback:650]  Out: 2925 [Urine:1425; Emesis/NG output:1500]    Pt is a well appearing middle aged man w/ an ng tube in place. Tube feeds at 50.  Nlb on RA  Abd is soft, non-tender, distended. No r/g. NGT output is light red, thin. GJ tube is intact.    Labs: Hgb 7.4, Hct 24.4    Imaging: Reviewed.    Micro: 10/1 Blood culture NGTD  10/1 Blood donor unit culture NGTD  10/1 gram stain of donor unit NGTD    Assessment & Plan  55 y/o male ~1 month s/p whipple now w/ gastric outlet obstruction and high NG outputs decreasing w/ time (down to 1500 yesterday). Complicated by electrolyte disturbances, now improved w/ electrolyte protocol. Transfusion reaction unlikely given micro results and pt's presentation/afebrile. Hgb now appropriate. Possible EGD to evaluate gastric outlet in next couple of weeks.  - Pain control: Acetaminophen scheduled, fentanyl patch, dilaudid iv, oxy per j tube.  - Diet: Tube feeds @ 50 via GJ. Tolerating well.  - [place]: Voiding spontaneously  - Abx: None  - Wound Cares: None  - Drains: NGT to low continuous suction. Continuing  - DVT prophylaxis: Warfarin dosing per pharmacy  - Anticipated discharge: ~4 days pending PO intake. Tentative plan for EGD on outpatient basis.    --  Gary Wilkerson MD  Gen Surg PGY1

## 2017-10-02 NOTE — CONSULTS
Laboratory Medicine and Pathology  Transfusion Medicine- Transfusion Reaction    Lucas Brown MRN# [id number]   YOB: 1960 Age: 56 year old   Date of Reaction: 10/1/17       Transfusion Reaction Evaluation   Impression   56 y.o. M with 3 days of steadily increasing nausea and vomiting with associated abdominal pain in 1 month post whipple now complicated with gastric outlet obstruction received 1 unit of PRBC. During the transfusion, he developed chills with temperature rise. This is likely non-severe febrile non-hemolytic transfusion reaction (FNHTR).    Final culture results are pending.    Recommendation    Transfuse as needed with premedication with Tylenol.       ----------------------------------    History  Mr. Brown is a 56 who was admitted with 3 days of steadily increasing nausea and vomiting with associated abdominal pain. He is in 1 month post whipple now complicated with gastric outlet obstruction and high NG outputs, decreasing with time. Complicated by electrolyte disturbances, now improved w/ electrolyte protocol.     On 10/1/17 he received a unit of PRBC for a hemoglobin of 6.4 (goal >7), beginning at 1054.  At 1406, the transfusion stopped. The patient's temperature morgan 2 degrees during transfusion (97.5 to 99.9) along with chills. Blood cultures of blood bag/tubing was sent to lab.    Reported Symptoms  chills    Vital Signs (From EPIC Blood Administration Flowsheet)      Transfusion History: PRBC  Transfusion Reaction History: none prior to this episode  Type and Screen History: negative     Blood Bank Investigation  Product Type: PRBC  Unit Number: [id number]  Amount Remainin  Post-Transfusion Clerical Check: Correct  ABO/Rh: The unit type was A positive and the patient was A positive. The unit was compatible.  SNOW: negative  Post-Transfusion Plasma: straw colored    The unit was cultured and Gram stain was performed after adding 10 ml of saline prior to inoculating  the blood culture bottles. Gram stain showed no organisms and culture is no growth to date, pending final.    [facility] Hemovigilance  Case Definition: definitive  Severity: non-severe  Imputability: probable       Addendum:    Transfusion Reaction Interpretation FINAL  I have reviewed this transfusion reaction work-up from the  original note dated 10/2/2017. The final culture results  on the transfused unit showed no growth (Gram Stain as previously noted was NOS ). I agree with the original findings and I am finalizing  this case.    Final Diagnosis and Recommendations are same as in previous note.  Gato Bruce MD  Division of Transfusion Medicine   [place] [address]   Pager: [phone] or [phone]

## 2017-10-02 NOTE — PHARMACY-ANTICOAGULATION SERVICE
Clinical Pharmacy - Warfarin Dosing Consult     Pharmacy has been consulted to manage this patient s warfarin therapy.  Indication: Mechanical Aortic Valve Replacement  Therapy Goal: INR 2-3  Provider/Team: Surgery  OP Anticoag Clinic: [facility]  Warfarin Prior to Admission: Yes  Warfarin PTA Regimen: 5 mg daily, patient reports post-Whipple procedure had difficult time reaching therapeutic INR and warfarin dosing schedule has changed frequently, follows dosing instructions from anticoagulation clinic  Significant drug interactions: aspirin, fenofibrate, sertraline  Recent documented change in oral intake/nutrition: Yes (Tube feeds initiated on 9/27; currently NPO except for meds)  Dose Comments: Has 5 mg and 2.5 mg tablets at home    INR   Date Value Ref Range Status   10/02/2017 1.27 (H) 0.86 - 1.14 Final   10/01/2017 1.58 (H) 0.86 - 1.14 Final       Recommend warfarin 5 mg today.  Pharmacy will monitor Lucas Brown daily and order warfarin doses to achieve specified goal.      Please contact pharmacy as soon as possible if the warfarin needs to be held for a procedure or if the warfarin goals change.     Scarlet Lucero, PharmD

## 2017-10-02 NOTE — PLAN OF CARE
Problem: Patient Care Overview  Goal: Individualization & Mutuality  Outcome: Improving     Pain well controlled with IV Dilaudid and Oxycodone, denies nausea, NG to continuous suction, NG output replaced as ordered, TF at 50 cc/ hour, G-tube clamped, PIV infusing, Potassium level 3.7 replaced. Patient walked x 2, using incentive spirometer. PICC line placed this evening, all IV tubings and IVF changed per protocol. Call light in reach.

## 2017-10-03 LAB
ANION GAP SERPL CALCULATED.3IONS-SCNC: 4 MMOL/L (ref 3–14)
BUN SERPL-MCNC: 20 MG/DL (ref 7–30)
CALCIUM SERPL-MCNC: 8.2 MG/DL (ref 8.5–10.1)
CHLORIDE SERPL-SCNC: 104 MMOL/L (ref 94–109)
CO2 SERPL-SCNC: 30 MMOL/L (ref 20–32)
CREAT SERPL-MCNC: 1.35 MG/DL (ref 0.66–1.25)
ERYTHROCYTE [DISTWIDTH] IN BLOOD BY AUTOMATED COUNT: 15.3 % (ref 10–15)
GFR SERPL CREATININE-BSD FRML MDRD: 54 ML/MIN/1.7M2
GLUCOSE SERPL-MCNC: 108 MG/DL (ref 70–99)
HCT VFR BLD AUTO: 23.6 % (ref 40–53)
HGB BLD-MCNC: 7.3 G/DL (ref 13.3–17.7)
INR PPP: 1.19 (ref 0.86–1.14)
MAGNESIUM SERPL-MCNC: 1.8 MG/DL (ref 1.6–2.3)
MCH RBC QN AUTO: 25.5 PG (ref 26.5–33)
MCHC RBC AUTO-ENTMCNC: 30.9 G/DL (ref 31.5–36.5)
MCV RBC AUTO: 83 FL (ref 78–100)
PHOSPHATE SERPL-MCNC: 4 MG/DL (ref 2.5–4.5)
PLATELET # BLD AUTO: 216 10E9/L (ref 150–450)
POTASSIUM SERPL-SCNC: 3.9 MMOL/L (ref 3.4–5.3)
RBC # BLD AUTO: 2.86 10E12/L (ref 4.4–5.9)
SODIUM SERPL-SCNC: 138 MMOL/L (ref 133–144)
WBC # BLD AUTO: 6.5 10E9/L (ref 4–11)

## 2017-10-03 PROCEDURE — 27210913 ZZH NUTRITION PRODUCT INTERMEDIATE PACKET

## 2017-10-03 PROCEDURE — 80048 BASIC METABOLIC PNL TOTAL CA: CPT | Performed by: STUDENT IN AN ORGANIZED HEALTH CARE EDUCATION/TRAINING PROGRAM

## 2017-10-03 PROCEDURE — 85610 PROTHROMBIN TIME: CPT | Performed by: STUDENT IN AN ORGANIZED HEALTH CARE EDUCATION/TRAINING PROGRAM

## 2017-10-03 PROCEDURE — 25000125 ZZHC RX 250: Performed by: STUDENT IN AN ORGANIZED HEALTH CARE EDUCATION/TRAINING PROGRAM

## 2017-10-03 PROCEDURE — 25000131 ZZH RX MED GY IP 250 OP 636 PS 637: Performed by: STUDENT IN AN ORGANIZED HEALTH CARE EDUCATION/TRAINING PROGRAM

## 2017-10-03 PROCEDURE — 25000128 H RX IP 250 OP 636: Performed by: STUDENT IN AN ORGANIZED HEALTH CARE EDUCATION/TRAINING PROGRAM

## 2017-10-03 PROCEDURE — 84100 ASSAY OF PHOSPHORUS: CPT | Performed by: STUDENT IN AN ORGANIZED HEALTH CARE EDUCATION/TRAINING PROGRAM

## 2017-10-03 PROCEDURE — S0138 FINASTERIDE, 5 MG: HCPCS | Performed by: STUDENT IN AN ORGANIZED HEALTH CARE EDUCATION/TRAINING PROGRAM

## 2017-10-03 PROCEDURE — 25000132 ZZH RX MED GY IP 250 OP 250 PS 637: Performed by: STUDENT IN AN ORGANIZED HEALTH CARE EDUCATION/TRAINING PROGRAM

## 2017-10-03 PROCEDURE — 85027 COMPLETE CBC AUTOMATED: CPT | Performed by: STUDENT IN AN ORGANIZED HEALTH CARE EDUCATION/TRAINING PROGRAM

## 2017-10-03 PROCEDURE — 12000001 ZZH R&B MED SURG/OB UMMC

## 2017-10-03 PROCEDURE — 25000132 ZZH RX MED GY IP 250 OP 250 PS 637: Performed by: SURGERY

## 2017-10-03 PROCEDURE — 36592 COLLECT BLOOD FROM PICC: CPT | Performed by: STUDENT IN AN ORGANIZED HEALTH CARE EDUCATION/TRAINING PROGRAM

## 2017-10-03 PROCEDURE — 25000132 ZZH RX MED GY IP 250 OP 250 PS 637

## 2017-10-03 PROCEDURE — 83735 ASSAY OF MAGNESIUM: CPT | Performed by: STUDENT IN AN ORGANIZED HEALTH CARE EDUCATION/TRAINING PROGRAM

## 2017-10-03 PROCEDURE — 25000128 H RX IP 250 OP 636: Performed by: SURGERY

## 2017-10-03 RX ORDER — WARFARIN SODIUM 5 MG/1
5 TABLET ORAL
Status: COMPLETED | OUTPATIENT
Start: 2017-10-03 | End: 2017-10-03

## 2017-10-03 RX ADMIN — SODIUM BICARBONATE 325 MG: 325 TABLET ORAL at 06:54

## 2017-10-03 RX ADMIN — SODIUM CHLORIDE, POTASSIUM CHLORIDE, SODIUM LACTATE AND CALCIUM CHLORIDE: 600; 310; 30; 20 INJECTION, SOLUTION INTRAVENOUS at 22:59

## 2017-10-03 RX ADMIN — WARFARIN SODIUM 5 MG: 5 TABLET ORAL at 18:37

## 2017-10-03 RX ADMIN — SODIUM CHLORIDE, POTASSIUM CHLORIDE, SODIUM LACTATE AND CALCIUM CHLORIDE: 600; 310; 30; 20 INJECTION, SOLUTION INTRAVENOUS at 10:11

## 2017-10-03 RX ADMIN — OXYCODONE HYDROCHLORIDE 10 MG: 5 SOLUTION ORAL at 06:22

## 2017-10-03 RX ADMIN — SODIUM CHLORIDE, POTASSIUM CHLORIDE, SODIUM LACTATE AND CALCIUM CHLORIDE: 600; 310; 30; 20 INJECTION, SOLUTION INTRAVENOUS at 03:11

## 2017-10-03 RX ADMIN — OXYCODONE HYDROCHLORIDE 10 MG: 5 SOLUTION ORAL at 14:57

## 2017-10-03 RX ADMIN — PANCRELIPASE 48000 UNITS: 24000; 76000; 120000 CAPSULE, DELAYED RELEASE PELLETS ORAL at 10:55

## 2017-10-03 RX ADMIN — SODIUM BICARBONATE 325 MG: 325 TABLET ORAL at 18:58

## 2017-10-03 RX ADMIN — POTASSIUM CHLORIDE 10 MEQ: 7.46 INJECTION, SOLUTION INTRAVENOUS at 16:05

## 2017-10-03 RX ADMIN — SERTRALINE HYDROCHLORIDE 50 MG: 50 TABLET ORAL at 08:41

## 2017-10-03 RX ADMIN — SODIUM CHLORIDE, POTASSIUM CHLORIDE, SODIUM LACTATE AND CALCIUM CHLORIDE: 600; 310; 30; 20 INJECTION, SOLUTION INTRAVENOUS at 16:53

## 2017-10-03 RX ADMIN — Medication 0.3 MG: at 16:53

## 2017-10-03 RX ADMIN — PANCRELIPASE 48000 UNITS: 24000; 76000; 120000 CAPSULE, DELAYED RELEASE PELLETS ORAL at 06:54

## 2017-10-03 RX ADMIN — SODIUM BICARBONATE 325 MG: 325 TABLET ORAL at 03:12

## 2017-10-03 RX ADMIN — PANCRELIPASE 48000 UNITS: 24000; 76000; 120000 CAPSULE, DELAYED RELEASE PELLETS ORAL at 18:58

## 2017-10-03 RX ADMIN — SODIUM BICARBONATE 325 MG: 325 TABLET ORAL at 22:58

## 2017-10-03 RX ADMIN — Medication 0.3 MG: at 20:52

## 2017-10-03 RX ADMIN — FENTANYL 1 PATCH: 100 PATCH, EXTENDED RELEASE TRANSDERMAL at 21:51

## 2017-10-03 RX ADMIN — OXYCODONE HYDROCHLORIDE 10 MG: 5 SOLUTION ORAL at 23:58

## 2017-10-03 RX ADMIN — PANCRELIPASE 48000 UNITS: 24000; 76000; 120000 CAPSULE, DELAYED RELEASE PELLETS ORAL at 22:58

## 2017-10-03 RX ADMIN — OXYCODONE HYDROCHLORIDE 10 MG: 5 SOLUTION ORAL at 00:27

## 2017-10-03 RX ADMIN — ONDANSETRON 4 MG: 2 INJECTION INTRAMUSCULAR; INTRAVENOUS at 06:22

## 2017-10-03 RX ADMIN — LEVOTHYROXINE SODIUM 150 MCG: 150 TABLET ORAL at 08:41

## 2017-10-03 RX ADMIN — Medication 5 ML: at 08:41

## 2017-10-03 RX ADMIN — ACETAMINOPHEN 1000 MG: 325 SOLUTION ORAL at 12:27

## 2017-10-03 RX ADMIN — Medication 0.3 MG: at 14:09

## 2017-10-03 RX ADMIN — OXYCODONE HYDROCHLORIDE 10 MG: 5 SOLUTION ORAL at 18:39

## 2017-10-03 RX ADMIN — POTASSIUM CHLORIDE 10 MEQ: 7.46 INJECTION, SOLUTION INTRAVENOUS at 17:16

## 2017-10-03 RX ADMIN — ONDANSETRON 4 MG: 2 INJECTION INTRAMUSCULAR; INTRAVENOUS at 17:16

## 2017-10-03 RX ADMIN — FINASTERIDE 5 MG: 5 TABLET, FILM COATED ORAL at 08:41

## 2017-10-03 RX ADMIN — SODIUM BICARBONATE 325 MG: 325 TABLET ORAL at 10:54

## 2017-10-03 RX ADMIN — TOPICAL ANESTHETIC 2 EACH: 200 SPRAY DENTAL; PERIODONTAL at 20:12

## 2017-10-03 RX ADMIN — Medication 5 ML: at 21:50

## 2017-10-03 RX ADMIN — Medication 0.3 MG: at 08:40

## 2017-10-03 RX ADMIN — ONDANSETRON 4 MG: 2 INJECTION INTRAMUSCULAR; INTRAVENOUS at 00:27

## 2017-10-03 RX ADMIN — SODIUM CHLORIDE, POTASSIUM CHLORIDE, SODIUM LACTATE AND CALCIUM CHLORIDE 150 ML: 600; 310; 30; 20 INJECTION, SOLUTION INTRAVENOUS at 12:28

## 2017-10-03 RX ADMIN — PANCRELIPASE 48000 UNITS: 24000; 76000; 120000 CAPSULE, DELAYED RELEASE PELLETS ORAL at 03:12

## 2017-10-03 RX ADMIN — PREDNISONE 5 MG: 5 SOLUTION ORAL at 09:00

## 2017-10-03 RX ADMIN — SODIUM CHLORIDE, POTASSIUM CHLORIDE, SODIUM LACTATE AND CALCIUM CHLORIDE 1000 ML: 600; 310; 30; 20 INJECTION, SOLUTION INTRAVENOUS at 06:57

## 2017-10-03 RX ADMIN — SODIUM CHLORIDE, POTASSIUM CHLORIDE, SODIUM LACTATE AND CALCIUM CHLORIDE 1000 ML: 600; 310; 30; 20 INJECTION, SOLUTION INTRAVENOUS at 22:59

## 2017-10-03 RX ADMIN — Medication 0.3 MG: at 03:15

## 2017-10-03 RX ADMIN — PANCRELIPASE 48000 UNITS: 24000; 76000; 120000 CAPSULE, DELAYED RELEASE PELLETS ORAL at 14:57

## 2017-10-03 RX ADMIN — LORAZEPAM 0.5 MG: 2 INJECTION INTRAMUSCULAR; INTRAVENOUS at 21:51

## 2017-10-03 RX ADMIN — SODIUM BICARBONATE 325 MG: 325 TABLET ORAL at 14:57

## 2017-10-03 RX ADMIN — OXYCODONE HYDROCHLORIDE 10 MG: 5 SOLUTION ORAL at 10:55

## 2017-10-03 RX ADMIN — Medication 2 G: at 20:51

## 2017-10-03 RX ADMIN — PANTOPRAZOLE SODIUM 40 MG: 40 TABLET, DELAYED RELEASE ORAL at 08:41

## 2017-10-03 RX ADMIN — ACETAMINOPHEN 1000 MG: 325 SOLUTION ORAL at 04:37

## 2017-10-03 RX ADMIN — Medication 2 PACKET: at 14:57

## 2017-10-03 RX ADMIN — TAMSULOSIN HYDROCHLORIDE 0.4 MG: 0.4 CAPSULE ORAL at 08:41

## 2017-10-03 RX ADMIN — SODIUM CHLORIDE, POTASSIUM CHLORIDE, SODIUM LACTATE AND CALCIUM CHLORIDE 1000 ML: 600; 310; 30; 20 INJECTION, SOLUTION INTRAVENOUS at 03:12

## 2017-10-03 ASSESSMENT — PAIN DESCRIPTION - DESCRIPTORS
DESCRIPTORS: CONSTANT;SHARP;CRAMPING
DESCRIPTORS: CONSTANT;CRAMPING;DISCOMFORT
DESCRIPTORS: ACHING;CRAMPING;DISCOMFORT
DESCRIPTORS: CRAMPING;CONSTANT;SHARP
DESCRIPTORS: CONSTANT;CRAMPING;SHARP
DESCRIPTORS: CONSTANT
DESCRIPTORS: CONSTANT;CRAMPING;ACHING
DESCRIPTORS: ACHING;DISCOMFORT;CONSTANT

## 2017-10-03 NOTE — PROGRESS NOTES
CLINICAL NUTRITION SERVICES - REASSESSMENT NOTE     Nutrition Prescription    RECOMMENDATIONS FOR MDs/PROVIDERS TO ORDER:  - Try to minimize TF interruptions so pt meeting nutrition needs with TF + Prosource    Malnutrition Status:    Non-severe malnutrition in the context of acute illness.    Recommendations already ordered by Registered Dietitian (RD):  None at this time.    Future/Additional Recommendations:  - Continue with tube feeds and pancreatic enzyme regimen as ordered     EVALUATION OF THE PROGRESS TOWARD GOALS   Diet: NPO    Nutrition Support: Isosource 1.5 @ goal 65 ml/hr (1560 ml/day) to provide 2340 kcals (27+ kcal/kg/day), 106 g PRO (1.2 g/kg/day), 1186 ml free H2O, 275 g CHO and 23 g Fiber daily.    Intake: (calculations per new dosing wt 81 kg)  Per I/Os documentation pt reached goal TF rate of 65 mL/hr on 10/2 and avg TF intake over past 6 days (since TF start) 704 mL/day which provides 1056 kcal/day (13 kcal/kg, 50% lower end estimated needs) and 48 g PRO/day (0.6 g/kg, 38% lower end estimated needs). - pt likely did not receive full feeds d/t stopping TF at some points for electrolyte adjustments per chart review.    Per Medication Flowsheet, pt receiving avg of 2 pkts Prosource/day over past 6 days, providing an additional 80 kcal and 22 g PRO per day.     Avg TF + Prosource TF Intake for 6 days: 1136 kcal/day (14 kcal/kg, 56% low end needs), 70 g PRO/day (0.9 g pro/kg, 57% low end needs). Calculated using new dosing wt 81 kg and newly assessed nutrition needs using new DW.     NEW FINDINGS   Pt with gastric outlet obstruction per chart review and now tolerating tube feeds at goal 65 ml/hr via J tube.    GI: 1x BM per day over past week    NG Output: (9/26): 975 ml; (9/27): 9050 ml; (9/28): 7050 ml; (9/29): 3600 ml; (9/30): 2450 ml; (10/1): 1500 ml; (10/2): 1600 ml - output trending down over past 6 days    Meds: Creon 11110 2 capsules q4 hrs in J tube    New dosing weight: 81 kg (actual wt) -  based on lowest documented weight since admission 80.9 kg on 9/28 and IBW 75.5 kg.    UPDATED ASSESSED NUTRITION NEEDS  Estimated Energy Needs: 8707-9424 kcals/day (25 - 30 kcals/kg)  Justification: Increased needs (recent surgery)  Estimated Protein Needs: 122-162 grams protein/day (1.5 - 2 grams of pro/kg)  Justification: Increased needs    MALNUTRITION  % Intake: < 75% for > 7 days (non-severe)  % Weight Loss: Up to 7.5% in 3 months (non-severe) - likely fluid loss, d/t previous surgery and weight trend from past hospital admission.    Subcutaneous Fat Loss: None observed  Muscle Loss: None observed  Fluid Accumulation/Edema: None noted  Malnutrition Diagnosis: Non-severe malnutrition in the context of acute illness.    Previous Goals   Total avg nutritional intake to meet a minimum of 25 kcal/kg and 1.5 g PRO/kg daily (per dosing wt 85 kg).  Evaluation: Not met    Previous Nutrition Diagnosis  Inadequate protein-energy intake related to nausea, vomiting and TF being stopped as evidenced by pt report and chart documentation.   Evaluation: No change - updated below    CURRENT NUTRITION DIAGNOSIS  Inadequate protein-energy intake related to slow tube feed advancement as evidenced by Avg TF + Prosource TF Intake for 6 days: 1136 kcal/day (14 kcal/kg), 70 g PRO/day (0.9 g pro/kg).    INTERVENTIONS  Implementation  Collaboration with other providers - 7C team rounds    Goals  Total avg nutritional intake to meet a minimum of 25 kcal/kg and 1.5 g PRO/kg daily (per dosing wt 85 kg).    Monitoring/Evaluation  Progress toward goals will be monitored and evaluated per protocol.    Farnaz Sweet RD, LD  Unit 7C pgr: [phone]

## 2017-10-03 NOTE — PLAN OF CARE
Problem: Patient Care Overview  Goal: Plan of Care/Patient Progress Review  Outcome: Therapy, progress toward functional goals is gradual  Pt. Amb. Independently in the slaughter with SBA NPO. NG to LCS with moderate output, replaced per order. PEG with Continuous TF @ 65/hr. Hamzah. Well. Tyl ., IV Dil, Oxy and Fent. Patch for abd.  pain with mod. Relief. Sore throat and HA due to NG discomfort. Voiding in good amounts, soft/ loose stool x 2. Abd. Distended with + BS and flatus. Abd. Inc. Drsg. Changed. Wife at bedside.

## 2017-10-03 NOTE — DISCHARGE SUMMARY
[facility] Discharge Summary    Lucas Brown MRN: [id number]   YOB: 1960 Age: 56 year old     Date of Admission:  9/26/2017  Date of Discharge::  10/6/2017  Admitting Physician:  Haylee Bryan MD  Discharge Physician:  Joby Reyes MD  Primary Care Physician:         Cyril Mackay          Admission Diagnoses:   Uncontrolled nausea and vomiting  Abdominal pain  Gastric outlet obstruction          Discharge Diagnosis:   Same          Procedures:   None performed        Non-operative procedures:   NG tube placement          Consultations:   VASCULAR ACCESS CARE ADULT IP CONSULT  OCCUPATIONAL THERAPY ADULT IP CONSULT  PHYSICAL THERAPY ADULT IP CONSULT  PHARMACY TO DOSE WARFARIN  PHARMACY TO DOSE MEDICATION  NUTRITION SERVICES ADULT IP CONSULT  PHARMACY IP CONSULT  VASCULAR ACCESS CARE ADULT IP CONSULT  VASCULAR ACCESS CARE ADULT IP CONSULT  VASCULAR ACCESS ADULT IP CONSULT  PHARMACY TO DOSE WARFARIN          Brief History of Illness:   Lucas Brown is a 56 year old man with history of chronic pancreatitis s/p Whipple in August of 2017 who presented on 9/26/2017 with 4 days of increasing abdominal pain, nausea and vomiting and exam concerning for a partial small bowel obstruction.            Hospital Course:   The patient has been managed conservatively with an NG tube. He has gradually transitioned from full bowel rest with IV meds to full tube feeds and meds PO in the feeding tube. He has tolerated this management well without complications. He has remained on the floor for care and the remainder of his hospitalization was unremarkable.    At the time of discharge, he was tolerating his regular diet NPO plus ice chips with tube feeds, ambulating, voiding spontaneously without difficulty, and pain was controlled with oral pain medications. The patient was discharged home in stable condition. He will follow up in clinic in 2-4 weeks.    In addition, he developed stage 2  ROSELYN during this admission            Final Pathology Result:   No pathology submitted         Medications Prior to Admission:     Prescriptions Prior to Admission   Medication Sig Dispense Refill Last Dose     predniSONE 5 MG/5ML solution 5 mLs (5 mg) by Per J Tube route daily 70 mL 0 9/25/2017 at Unknown time     sodium bicarbonate 325 MG tablet 1 tablet (325 mg) by Per J Tube route every 4 hours 42 tablet 1 9/25/2017 at Unknown time     warfarin (COUMADIN) 7.5 MG tablet Take 0.5 tablets (3.75 mg) by mouth daily 30 tablet 0 9/25/2017 at 0900     TAMSULOSIN HCL PO Take 0.4 mg by mouth daily   9/25/2017 at Unknown time     levothyroxine (SYNTHROID, LEVOTHROID) 150 MCG tablet Take 150 mcg by mouth daily   9/25/2017 at Unknown time     sertraline (ZOLOFT) 50 MG tablet Take 50 mg by mouth daily   9/25/2017 at Unknown time     lisinopril (PRINIVIL,ZESTRIL) 10 MG tablet Take 10 mg by mouth daily   9/25/2017 at Unknown time     Multiple Vitamins-Minerals (MULTIVITAMINS) CHEW Take 1 chew tab by mouth daily   9/25/2017 at Unknown time     pravastatin (PRAVACHOL) 40 MG tablet Take 40 mg by mouth daily   9/26/2017 at Unknown time     fenofibrate 145 MG tablet Take 145 mg by mouth daily   9/25/2017 at Unknown time     omeprazole (PRILOSEC) 2 mg/mL SUSP 10 mLs (20 mg) by Oral or Feeding Tube route 2 times daily 300 mL 0 More than a month at Unknown time     [DISCONTINUED] fentaNYL (DURAGESIC) 100 mcg/hr 72 hr patch Place 1 patch onto the skin every 72 hours 5 patch 0 9/25/2017 at Unknown time     [DISCONTINUED] amylase-lipase-protease (CREON 24) 36964-08496 UNITS CPEP per EC capsule 2 capsules (48,000 Units) by Per J Tube route every 4 hours 200 capsule 0 Past Month at Unknown time     [DISCONTINUED] ondansetron (ZOFRAN-ODT) 4 MG ODT tab Take 1 tablet (4 mg) by mouth every 8 hours as needed for nausea 120 tablet 0 9/25/2017 at Unknown time     FINASTERIDE PO Take 5 mg by mouth daily   9/24/2017 at 2200     [DISCONTINUED]  ACETAMINOPHEN PO Take 1,000 mg by mouth every 6 hours as needed for pain    8/27/2017 at 1100     aspirin 81 MG tablet Take 81 mg by mouth daily Holding   More than a month at Unknown time            Discharge Medications:     Current Discharge Medication List      START taking these medications    Details   fentaNYL (DURAGESIC) 75 mcg/hr 72 hr patch Place 1 patch onto the skin every 72 hours  Qty: 5 patch, Refills: 0    Associated Diagnoses: Generalized abdominal pain      lactated ringers SOLN Inject 1,000 mLs into the vein daily  Qty: 50808 mL, Refills: 0    Associated Diagnoses: Generalized abdominal pain      polyethylene glycol (MIRALAX/GLYCOLAX) Packet 17 g by Per J Tube route daily as needed for constipation  Qty: 14 packet, Refills: 0    Associated Diagnoses: Generalized abdominal pain      ondansetron (ZOFRAN) 4 MG tablet 1-2 tablets (4-8 mg) by Per J Tube route every 8 hours as needed for nausea  Qty: 90 tablet, Refills: 0    Associated Diagnoses: Generalized abdominal pain         CONTINUE these medications which have CHANGED    Details   acetaminophen (TYLENOL) 32 mg/mL solution 31.25 mLs (1,000 mg) by Per J Tube route every 8 hours  Qty: 1000 mL, Refills: 0    Associated Diagnoses: Generalized abdominal pain      amylase-lipase-protease (CREON 24) 80003-11040 UNITS CPEP per EC capsule 2 capsules (48,000 Units) by Per J Tube route every 4 hours  Qty: 180 capsule, Refills: 0    Associated Diagnoses: Nausea and vomiting, intractability of vomiting not specified, unspecified vomiting type      oxyCODONE (ROXICODONE) 5 MG/5ML solution 10 mLs (10 mg) by Per J Tube route every 4 hours as needed for moderate to severe pain  Qty: 840 mL, Refills: 0    Associated Diagnoses: Acute post-operative pain         CONTINUE these medications which have NOT CHANGED    Details   predniSONE 5 MG/5ML solution 5 mLs (5 mg) by Per J Tube route daily  Qty: 70 mL, Refills: 0    Associated Diagnoses: Chronic pancreatitis,  unspecified pancreatitis type (H)      sodium bicarbonate 325 MG tablet 1 tablet (325 mg) by Per J Tube route every 4 hours  Qty: 42 tablet, Refills: 1    Associated Diagnoses: Chronic pancreatitis, unspecified pancreatitis type (H)      warfarin (COUMADIN) 7.5 MG tablet Take 0.5 tablets (3.75 mg) by mouth daily  Qty: 30 tablet, Refills: 0    Associated Diagnoses: H/O aortic valve replacement      TAMSULOSIN HCL PO Take 0.4 mg by mouth daily      levothyroxine (SYNTHROID, LEVOTHROID) 150 MCG tablet Take 150 mcg by mouth daily      sertraline (ZOLOFT) 50 MG tablet Take 50 mg by mouth daily      lisinopril (PRINIVIL,ZESTRIL) 10 MG tablet Take 10 mg by mouth daily      Multiple Vitamins-Minerals (MULTIVITAMINS) CHEW Take 1 chew tab by mouth daily      pravastatin (PRAVACHOL) 40 MG tablet Take 40 mg by mouth daily      fenofibrate 145 MG tablet Take 145 mg by mouth daily      omeprazole (PRILOSEC) 2 mg/mL SUSP 10 mLs (20 mg) by Oral or Feeding Tube route 2 times daily  Qty: 300 mL, Refills: 0    Associated Diagnoses: Chronic pancreatitis, unspecified pancreatitis type (H)      FINASTERIDE PO Take 5 mg by mouth daily      aspirin 81 MG tablet Take 81 mg by mouth daily Holding         STOP taking these medications       fentaNYL (DURAGESIC) 100 mcg/hr 72 hr patch Comments:   Reason for Stopping:         ondansetron (ZOFRAN-ODT) 4 MG ODT tab Comments:   Reason for Stopping:                    Day of Discharge Physical Exam:   Temp:  [97.1  F (36.2  C)-98.1  F (36.7  C)] 97.2  F (36.2  C)  Pulse:  [60-94] 60  Heart Rate:  [60-94] 69  Resp:  [16-18] 18  BP: (129-146)/(66-82) 129/66  SpO2:  [91 %-93 %] 92 %  General: AAOx4, NAD, lying comfortably in bed  CV/Pulm: no dyspnea, NLB on RA  Abd: soft, non-distended, non-tender  Extremities: WWP  Neuro: moving all extremities spontaneously          Discharge Instructions and Follow-Up:     Home infusion referral     Reason for your hospital stay   Gastric outlet obstruction      Activity   Your activity upon discharge: activity as tolerated     Tubes and drains   You are going home with the following tubes or drains: feeding tube GJ-Tube.   Follow these instructions  to care for your tube: please keep the G tube vented, meds and feeds per the J tube. NOTHING BY MOUTH other than occasional sips of water     IV access   You are going home with the following vascular access device: PICC.     Discharge Instructions   Please take all medications through the J tube. You may take sips of water keeping the g tube vented at all times. You will receive 1L of LR infusions through your PICC line once a day at home. You should return to taking your warfarin and managing it as you were before you came to the hospital. For your tube feeds, you should use 1.5 Isosource @ 65mL/hr at all times. You will follow up with Dr. Crum within the week, and are being sent out with 2 weeks supply of oxycodone, acetaminophen, and miralax. We have discontinued sublingual zofran and replaced it at your request.    If you are receiving home care please inform your home care nurse of our contact number.    You may also call the Surgery Call-Center to speak with a Triage Nurse or to make an appointment: [phone].     Full Code     Diet   Follow this diet upon discharge: NOTHING BY MOUTH (occassional sips okay)             Home Health Care:   Arranged   **Provider will contact Care Coordinator/ to arrange this service**    The following Homecare is recommended: skilled nursing visit.     Skilled home care RN to evaluate medication management, nutrition and hydration evaluation, endurance evaluation, and general status evaluation after discharge from the acute care hospital setting.     Skilled home care RN to assist with home enteral feedings via J-Tube per MD orders.  J-Tube cares per home care agency routine.      Skilled home care RN to assist with home IV hydration 1 liter LR daily via double lumen  picc line per MD orders.  Picc line cares per home care agency routine.    Skilled home care RN to evaluated gastro intestinal status in home setting.     Skilled home care RN to assist with anticoagulation management per MD orders.          Discharge Disposition:   Discharged to home      Condition at discharge: Stable    Patient discussed with staff on day of discharge.    DO [facility]  [phone]  Formerly named [facility]

## 2017-10-03 NOTE — PLAN OF CARE
Problem: Patient Care Overview  Goal: Plan of Care/Patient Progress Review  Outcome: No Change  AVSS. Pt c/o throat and abdominal pain, minimal relief reported with PRN oxycodone and dilaudid. Pt's throat irritated, hurricane spray with mild relief. Pt reporting continuous thick sputum production. C/o mild N/V, declined medication interventions. Past surgical incision site dressing c/d/i. Past drain site open to air, has eric-incisional erythema and scab formed. NG tube to CLS with yellow output. NG replacement fluids of LR given q4h. Continuous TF at 65 cc/hr. Voids spontaneously in adequate amounts. Pt reported having 1 stool this shift. Pt can have ice chips, minimal ice chips taken this shift d/t throat irritation. Ambulates with SBA. Continue POC.

## 2017-10-03 NOTE — PROGRESS NOTES
GENERAL SURGERY PROGRESS NOTE    Patient: Lucas Brown  MRN: [id number]    Subjective  No acute overnight events. Experienced throat and abdominal pain with relief using pain meds. Voiding. +BMs. Ambulating.     Objective  Temp:  [95.7  F (35.4  C)-98.3  F (36.8  C)] 98.3  F (36.8  C)  Pulse:  [68-83] 68  Heart Rate:  [69] 69  Resp:  [16-18] 18  BP: (134-152)/(73-85) 134/73  SpO2:  [91 %-94 %] 93 %    I/O last 3 completed shifts:  In: 5690 [P.O.:120; I.V.:3600; NG/GT:210; IV Piggyback:300]  Out: 3715 [Urine:2715; Emesis/NG output:1000]    Middle aged man resting comfortably.   Tube feeds at 65  Nlb on RA  Abd soft, non-tender, mildly distended. No r/g. GJ tube is intact. NGT output is light red.    Labs:  Cr 1.35 (1.37 yesterday)  INR 1.19  Hgb 7.3 (7.1 yesterday)  WBC 6.5    Imaging: Reviewed.    Micro:  10/1 Blood culture NGTD  10/1 Blood donor unit culture NGTD  10/1 gram stain of donor unit NGTD    Assessment & Plan  Lucas Brown 56 y.o. male s/p Killbuck in August 2017 now with gastric outlet obstruction and high output through NG tube which has been decreasing (1000 yesterday). Patient is feeling better with decompression but is experiencing frustration with length of hospital course. Hgb has remained stable at 7.3.     -Pain control: Acetaminophen scheduled, fentanyl patch, oxy per J tube, dilaudid IV  - Diet: Tube feeds at goal  - Durham: Voiding spontaneously   - Abx: None  - Wound Cares: None  - Drains: NGT to low continuous suction   - DVT prophylaxis: Restart warfarin  - Anticipated discharge: 3-4 days pending PO intake and clinical course  -Consider upper GI study     Discussed with staff.    Scribed by Yanet Narvaez, MS3    --  Gary Wilkerson MD  Gen Surg PGY1

## 2017-10-04 LAB
ABO + RH BLD: NORMAL
ABO + RH BLD: NORMAL
ANION GAP SERPL CALCULATED.3IONS-SCNC: 6 MMOL/L (ref 3–14)
BLD GP AB SCN SERPL QL: NORMAL
BLD PROD TYP BPU: NORMAL
BLD PROD TYP BPU: NORMAL
BLD UNIT ID BPU: 0
BLOOD BANK CMNT PATIENT-IMP: NORMAL
BLOOD PRODUCT CODE: NORMAL
BPU ID: NORMAL
BUN SERPL-MCNC: 19 MG/DL (ref 7–30)
CALCIUM SERPL-MCNC: 8.2 MG/DL (ref 8.5–10.1)
CHLORIDE SERPL-SCNC: 103 MMOL/L (ref 94–109)
CO2 SERPL-SCNC: 30 MMOL/L (ref 20–32)
CREAT SERPL-MCNC: 1.33 MG/DL (ref 0.66–1.25)
ERYTHROCYTE [DISTWIDTH] IN BLOOD BY AUTOMATED COUNT: 15.4 % (ref 10–15)
FERRITIN SERPL-MCNC: 212 NG/ML (ref 26–388)
FOLATE SERPL-MCNC: 17.2 NG/ML
GFR SERPL CREATININE-BSD FRML MDRD: 55 ML/MIN/1.7M2
GLUCOSE SERPL-MCNC: 102 MG/DL (ref 70–99)
HAPTOGLOB SERPL-MCNC: 282 MG/DL (ref 15–200)
HCT VFR BLD AUTO: 22.9 % (ref 40–53)
HGB BLD-MCNC: 6.9 G/DL (ref 13.3–17.7)
INR PPP: 1.34 (ref 0.86–1.14)
IRON SATN MFR SERPL: 6 % (ref 15–46)
IRON SERPL-MCNC: 11 UG/DL (ref 35–180)
LDH SERPL L TO P-CCNC: 236 U/L (ref 85–227)
MAGNESIUM SERPL-MCNC: 2.1 MG/DL (ref 1.6–2.3)
MCH RBC QN AUTO: 25.1 PG (ref 26.5–33)
MCHC RBC AUTO-ENTMCNC: 30.1 G/DL (ref 31.5–36.5)
MCV RBC AUTO: 83 FL (ref 78–100)
NUM BPU REQUESTED: 2
PLATELET # BLD AUTO: 232 10E9/L (ref 150–450)
POTASSIUM SERPL-SCNC: 4.2 MMOL/L (ref 3.4–5.3)
RBC # BLD AUTO: 2.75 10E12/L (ref 4.4–5.9)
SODIUM SERPL-SCNC: 138 MMOL/L (ref 133–144)
SPECIMEN EXP DATE BLD: NORMAL
TIBC SERPL-MCNC: 199 UG/DL (ref 240–430)
TRANSFUSION STATUS PATIENT QL: NORMAL
TRANSFUSION STATUS PATIENT QL: NORMAL
VIT B12 SERPL-MCNC: 481 PG/ML (ref 193–986)
WBC # BLD AUTO: 6.8 10E9/L (ref 4–11)

## 2017-10-04 PROCEDURE — 83615 LACTATE (LD) (LDH) ENZYME: CPT | Performed by: STUDENT IN AN ORGANIZED HEALTH CARE EDUCATION/TRAINING PROGRAM

## 2017-10-04 PROCEDURE — S0138 FINASTERIDE, 5 MG: HCPCS | Performed by: STUDENT IN AN ORGANIZED HEALTH CARE EDUCATION/TRAINING PROGRAM

## 2017-10-04 PROCEDURE — 25000132 ZZH RX MED GY IP 250 OP 250 PS 637: Performed by: SURGERY

## 2017-10-04 PROCEDURE — 27210913 ZZH NUTRITION PRODUCT INTERMEDIATE PACKET

## 2017-10-04 PROCEDURE — P9016 RBC LEUKOCYTES REDUCED: HCPCS | Performed by: SURGERY

## 2017-10-04 PROCEDURE — 36592 COLLECT BLOOD FROM PICC: CPT | Performed by: STUDENT IN AN ORGANIZED HEALTH CARE EDUCATION/TRAINING PROGRAM

## 2017-10-04 PROCEDURE — 25000132 ZZH RX MED GY IP 250 OP 250 PS 637: Performed by: STUDENT IN AN ORGANIZED HEALTH CARE EDUCATION/TRAINING PROGRAM

## 2017-10-04 PROCEDURE — 25000128 H RX IP 250 OP 636: Performed by: STUDENT IN AN ORGANIZED HEALTH CARE EDUCATION/TRAINING PROGRAM

## 2017-10-04 PROCEDURE — 83735 ASSAY OF MAGNESIUM: CPT | Performed by: STUDENT IN AN ORGANIZED HEALTH CARE EDUCATION/TRAINING PROGRAM

## 2017-10-04 PROCEDURE — 80048 BASIC METABOLIC PNL TOTAL CA: CPT | Performed by: STUDENT IN AN ORGANIZED HEALTH CARE EDUCATION/TRAINING PROGRAM

## 2017-10-04 PROCEDURE — 85610 PROTHROMBIN TIME: CPT | Performed by: STUDENT IN AN ORGANIZED HEALTH CARE EDUCATION/TRAINING PROGRAM

## 2017-10-04 PROCEDURE — 82728 ASSAY OF FERRITIN: CPT | Performed by: STUDENT IN AN ORGANIZED HEALTH CARE EDUCATION/TRAINING PROGRAM

## 2017-10-04 PROCEDURE — 83540 ASSAY OF IRON: CPT | Performed by: STUDENT IN AN ORGANIZED HEALTH CARE EDUCATION/TRAINING PROGRAM

## 2017-10-04 PROCEDURE — 83550 IRON BINDING TEST: CPT | Performed by: STUDENT IN AN ORGANIZED HEALTH CARE EDUCATION/TRAINING PROGRAM

## 2017-10-04 PROCEDURE — 85027 COMPLETE CBC AUTOMATED: CPT | Performed by: STUDENT IN AN ORGANIZED HEALTH CARE EDUCATION/TRAINING PROGRAM

## 2017-10-04 PROCEDURE — 83010 ASSAY OF HAPTOGLOBIN QUANT: CPT | Performed by: STUDENT IN AN ORGANIZED HEALTH CARE EDUCATION/TRAINING PROGRAM

## 2017-10-04 PROCEDURE — 82746 ASSAY OF FOLIC ACID SERUM: CPT | Performed by: STUDENT IN AN ORGANIZED HEALTH CARE EDUCATION/TRAINING PROGRAM

## 2017-10-04 PROCEDURE — 25000132 ZZH RX MED GY IP 250 OP 250 PS 637

## 2017-10-04 PROCEDURE — 12000001 ZZH R&B MED SURG/OB UMMC

## 2017-10-04 PROCEDURE — 25000128 H RX IP 250 OP 636: Performed by: SURGERY

## 2017-10-04 PROCEDURE — 25000131 ZZH RX MED GY IP 250 OP 636 PS 637: Performed by: STUDENT IN AN ORGANIZED HEALTH CARE EDUCATION/TRAINING PROGRAM

## 2017-10-04 PROCEDURE — 82607 VITAMIN B-12: CPT | Performed by: STUDENT IN AN ORGANIZED HEALTH CARE EDUCATION/TRAINING PROGRAM

## 2017-10-04 PROCEDURE — 27210429 ZZH NUTRITION PRODUCT INTERMEDIATE LITER

## 2017-10-04 RX ORDER — WARFARIN SODIUM 5 MG/1
5 TABLET ORAL
Status: COMPLETED | OUTPATIENT
Start: 2017-10-04 | End: 2017-10-04

## 2017-10-04 RX ORDER — FENTANYL 75 UG/1
75 PATCH TRANSDERMAL
Status: DISCONTINUED | OUTPATIENT
Start: 2017-10-04 | End: 2017-10-06 | Stop reason: HOSPADM

## 2017-10-04 RX ADMIN — SODIUM BICARBONATE 325 MG: 325 TABLET ORAL at 22:53

## 2017-10-04 RX ADMIN — ACETAMINOPHEN 1000 MG: 325 SOLUTION ORAL at 04:20

## 2017-10-04 RX ADMIN — SODIUM BICARBONATE 325 MG: 325 TABLET ORAL at 11:18

## 2017-10-04 RX ADMIN — SODIUM CHLORIDE, POTASSIUM CHLORIDE, SODIUM LACTATE AND CALCIUM CHLORIDE: 600; 310; 30; 20 INJECTION, SOLUTION INTRAVENOUS at 04:31

## 2017-10-04 RX ADMIN — OXYCODONE HYDROCHLORIDE 10 MG: 5 SOLUTION ORAL at 18:20

## 2017-10-04 RX ADMIN — PANCRELIPASE 48000 UNITS: 24000; 76000; 120000 CAPSULE, DELAYED RELEASE PELLETS ORAL at 06:48

## 2017-10-04 RX ADMIN — Medication 2 PACKET: at 11:53

## 2017-10-04 RX ADMIN — PREDNISONE 5 MG: 5 SOLUTION ORAL at 08:19

## 2017-10-04 RX ADMIN — ONDANSETRON 4 MG: 2 INJECTION INTRAMUSCULAR; INTRAVENOUS at 16:27

## 2017-10-04 RX ADMIN — ONDANSETRON 4 MG: 2 INJECTION INTRAMUSCULAR; INTRAVENOUS at 04:31

## 2017-10-04 RX ADMIN — PANCRELIPASE 48000 UNITS: 24000; 76000; 120000 CAPSULE, DELAYED RELEASE PELLETS ORAL at 22:53

## 2017-10-04 RX ADMIN — Medication 0.3 MG: at 03:00

## 2017-10-04 RX ADMIN — TAMSULOSIN HYDROCHLORIDE 0.4 MG: 0.4 CAPSULE ORAL at 08:19

## 2017-10-04 RX ADMIN — Medication 5 ML: at 19:10

## 2017-10-04 RX ADMIN — PANTOPRAZOLE SODIUM 40 MG: 40 TABLET, DELAYED RELEASE ORAL at 08:20

## 2017-10-04 RX ADMIN — OXYCODONE HYDROCHLORIDE 10 MG: 5 SOLUTION ORAL at 09:06

## 2017-10-04 RX ADMIN — FENTANYL 1 PATCH: 75 PATCH, EXTENDED RELEASE TRANSDERMAL at 22:01

## 2017-10-04 RX ADMIN — OXYCODONE HYDROCHLORIDE 10 MG: 5 SOLUTION ORAL at 22:00

## 2017-10-04 RX ADMIN — Medication 0.3 MG: at 13:25

## 2017-10-04 RX ADMIN — Medication 5 ML: at 08:19

## 2017-10-04 RX ADMIN — SODIUM BICARBONATE 325 MG: 325 TABLET ORAL at 06:48

## 2017-10-04 RX ADMIN — SODIUM BICARBONATE 325 MG: 325 TABLET ORAL at 19:10

## 2017-10-04 RX ADMIN — WARFARIN SODIUM 5 MG: 5 TABLET ORAL at 19:10

## 2017-10-04 RX ADMIN — SERTRALINE HYDROCHLORIDE 50 MG: 50 TABLET ORAL at 08:19

## 2017-10-04 RX ADMIN — PANCRELIPASE 48000 UNITS: 24000; 76000; 120000 CAPSULE, DELAYED RELEASE PELLETS ORAL at 11:19

## 2017-10-04 RX ADMIN — PANCRELIPASE 48000 UNITS: 24000; 76000; 120000 CAPSULE, DELAYED RELEASE PELLETS ORAL at 03:02

## 2017-10-04 RX ADMIN — PANCRELIPASE 48000 UNITS: 24000; 76000; 120000 CAPSULE, DELAYED RELEASE PELLETS ORAL at 19:10

## 2017-10-04 RX ADMIN — LEVOTHYROXINE SODIUM 150 MCG: 150 TABLET ORAL at 08:19

## 2017-10-04 RX ADMIN — SODIUM BICARBONATE 325 MG: 325 TABLET ORAL at 03:03

## 2017-10-04 RX ADMIN — FINASTERIDE 5 MG: 5 TABLET, FILM COATED ORAL at 08:19

## 2017-10-04 RX ADMIN — OXYCODONE HYDROCHLORIDE 10 MG: 5 SOLUTION ORAL at 04:21

## 2017-10-04 ASSESSMENT — PAIN DESCRIPTION - DESCRIPTORS
DESCRIPTORS: HEADACHE
DESCRIPTORS: ACHING
DESCRIPTORS: ACHING;SHARP
DESCRIPTORS: ACHING
DESCRIPTORS: ACHING
DESCRIPTORS: DISCOMFORT
DESCRIPTORS: ACHING
DESCRIPTORS: ACHING;DISCOMFORT
DESCRIPTORS: ACHING;CRAMPING
DESCRIPTORS: SHARP
DESCRIPTORS: ACHING

## 2017-10-04 NOTE — PLAN OF CARE
Problem: Patient Care Overview  Goal: Plan of Care/Patient Progress Review  Outcome: Therapy, progress toward functional goals is gradual  Pt. Amb. Independently in the slaughter. IV Dil , Oxy and Fent. Patch for pain. Worse pain in left throat due to NG placement, uses hot packs for comfort. NG clamped @ 1000 and GT to gravity. + BS , flatus and lg stool. 1 u PRBC for Hgb. Of 6.9, without sign of reaction. And VSS. TF per JT @ 65 hr. 1300 GT appeared clotted unable to flush or give meds. Clot buster ordered. Writer will attempt to open tube once back on floor from walk outside. Fent. Patch dose to be decreased with change tonight. Wife at bedside.

## 2017-10-04 NOTE — PLAN OF CARE
Problem: Patient Care Overview  Goal: Plan of Care/Patient Progress Review  Outcome: Therapy, progress toward functional goals is gradual  A&Ox4, AVSS. Pt c/o persistent pain, PRN Dilaudid and Oxycodone given with moderate relief. Ativan given this PM per pt request. C/o nausea this shift, zofran given with relief. Has throat irritation d/t NG tube, hurricane spray given with relief. NG tube to LCS with yellow/tan output, given LR q4h for NG replacement. Mg and K replaced this shift. Continuous TF running at 65 ml/hr to Jtube, Gtube is clamped. Small serous drainage on bandage of transverse incision from previous surgery, bandage replaced, has eric-incisional erythema. Old drain site open to air and scabbing. Up ad kip. Pt is NPO. Voiding spontaneously in good amounts. Passing flatus, no stool this shift. Continue POC.

## 2017-10-04 NOTE — PROGRESS NOTES
"SPIRITUAL HEALTH SERVICES  SPIRITUAL ASSESSMENT Progress Note  [facility] ([place]) 7C    PRIMARY FOCUS:     Emotional/spiritual/Uatsdin distress    Support for coping    ILLNESS CIRCUMSTANCES:   Reviewed documentation. Reflective conversation shared with Altagracia who was lying in bed with his wife, Karmen, but welcomed conversation.  Reason for visit: follow-up to previous visits.      Context of Serious Illness/Symptom(s) - Altagracia and his wife described how, after having described it as \"a last resort,\" the doctors had \"connected his stomach to his pancreas\", in addition to removing other organs.   Altagracia described himself as \"all empty inside.\"    Resources for Support -   o Wife  o Four daughters - two in [place], one in [place], on in [place]  o Six grandchildren - \"three here and three back home\"    DISTRESS:     Emotional/Existential/Relational Distress -   o Karmen described Altagracia's most recent procedure as having been much harder than either one of his open heart surgeries.  o They noted that the last three years - and especially the last month - have required patience, something which does not come naturally for Altagracia.    Spiritual/Religion Distress - None described.    Social/Cultural/Economic Distress - None described.    SPIRITUAL/Buddhist COPING:     Mandaeism/Laurence -   o Karmen is Jain, Altagracia is Episcopalian.    o For Altagracia, \"Having Oriental orthodox is foundational.\"   o For Karmen, \"I used to teach CCD (catechism) and I would say that it's a way of reminding yourself of the wider world, especially of the poor.\"    Spiritual Practice(s) - None named.    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  o Both spoke fondly of their dogs at home and how much they valued having time in the open air (he had just returned from a walk outside).  o They both expressed appreciation for having someone to talk to - \"it helps to pass the time.\"    GOALS OF CARE:    Goals of Care - Altagracia " "hopes to have the tube removed from his nose so that he can discharge within the next week or so.    Meaning/Sense-Making - They stressed the importance of keeping a sense of humor and \"focusing on what really matters.\"    PLAN:     Per his request, will look into arranging for a visit from a therapy dog.    Will visit this pt 1-2x / wk.      Mina Couch M.Div.  Staff   Pager [phone]      "

## 2017-10-04 NOTE — PROVIDER NOTIFICATION
Notified MD at 0822 AM regarding critical results read back.      Spoke with: Dr. Lex Wilkerson    Orders were obtained.    Comments: Pt hgb critical low of 6.9.  Order received for 1 unit PRBC.

## 2017-10-04 NOTE — PROGRESS NOTES
GENERAL SURGERY PROGRESS NOTE    Patient: Lucas Brown  MRN: [id number]    Subjective  No acute overnight events. Pain well controlled. Voiding. +BMs 4x yesterday.    Objective  Temp:  [97.2  F (36.2  C)-99.8  F (37.7  C)] 98.5  F (36.9  C)  Pulse:  [69-79] 69  Heart Rate:  [70-78] 78  Resp:  [16-20] 20  BP: (121-166)/(72-82) 121/73  SpO2:  [93 %-95 %] 94 %    I/O last 3 completed shifts:  In: 6156 [I.V.:3850; NG/GT:616; IV Piggyback:325]  Out: 3995 [Urine:2545; Emesis/NG output:1450]    Middle aged man resting comfortably.   Tube feeds at 65  Nlb on RA  Abd soft, non-tender, mildly distended. No r/g. GJ tube is intact. NGT output is sandor.    Labs:   Hgb 6.9 (down from 7.3)  Hct 22.9  Platelets 232  INR 1.34    Imaging: Reviewed.    Assessment & Plan  Lucas Brown 56 y.o. male s/p whipple in August 2017 now with gastric outlet obstruction and high output through NG tube which has been decreasing (800 yesterday). Patient is feeling better with decompression but is experiencing frustration with length of hospital course. Hgb lower today but essentially stable. Will transfuse and order anemia lab workup.  -Pain control: Acetaminophen scheduled, fentanyl patch, oxy per J tube, dilaudid IV  - Diet: Tube feeds at goal  - Durham: Voiding spontaneously   - Abx: None  - Wound Cares: None  - Drains: NGT to low continuous suction, NG clamp trial today or tomorrow   - DVT prophylaxis: Warfarin dosing per pharmacy  - 1 unit PRBCs w/ anemia workup, ferritin/iron/TIBC  - Anticipated discharge: 3-4 days pending PO intake and clinical course    --  Gary Wilkerson MD  Gen Surg PGY1

## 2017-10-04 NOTE — PLAN OF CARE
Problem: Patient Care Overview  Goal: Plan of Care/Patient Progress Review  Outcome: No Change  Patient sleeping between cares overnight.  Up ad kip.  Reports pain adequately controlled with PRN oxycodone and Tylenol, required PRN IV Dilaudid once for breakthrough pain.  Voiding adequate amounts, urinal at bedside.  NG to low-continuous suction with moderate tan drainage, replaced with LR 0.5:1ml every 4 hours.  Had one episode of nausea/dry heaves after meds administered, Zofran administered with relief.  Spitting up thick sputum often, which patient believes is due to NG tube discomfort.  Reports positive flatus and stools yesterday.  Tolerating tube feeding through J-tube at goal rate of 65 ml/hr, plus Creon/Bicarb.  (G-tube capped.)  NPO, taking occasional ice chips for oral comfort.

## 2017-10-05 LAB
ANION GAP SERPL CALCULATED.3IONS-SCNC: 6 MMOL/L (ref 3–14)
BUN SERPL-MCNC: 19 MG/DL (ref 7–30)
CALCIUM SERPL-MCNC: 8.3 MG/DL (ref 8.5–10.1)
CHLORIDE SERPL-SCNC: 102 MMOL/L (ref 94–109)
CO2 SERPL-SCNC: 32 MMOL/L (ref 20–32)
CREAT SERPL-MCNC: 1.38 MG/DL (ref 0.66–1.25)
ERYTHROCYTE [DISTWIDTH] IN BLOOD BY AUTOMATED COUNT: 16.1 % (ref 10–15)
GFR SERPL CREATININE-BSD FRML MDRD: 53 ML/MIN/1.7M2
GLUCOSE SERPL-MCNC: 106 MG/DL (ref 70–99)
HCT VFR BLD AUTO: 25.1 % (ref 40–53)
HGB BLD-MCNC: 7.7 G/DL (ref 13.3–17.7)
INR PPP: 1.4 (ref 0.86–1.14)
MCH RBC QN AUTO: 25.2 PG (ref 26.5–33)
MCHC RBC AUTO-ENTMCNC: 30.7 G/DL (ref 31.5–36.5)
MCV RBC AUTO: 82 FL (ref 78–100)
PLATELET # BLD AUTO: 282 10E9/L (ref 150–450)
POTASSIUM SERPL-SCNC: 4.3 MMOL/L (ref 3.4–5.3)
RBC # BLD AUTO: 3.05 10E12/L (ref 4.4–5.9)
SODIUM SERPL-SCNC: 140 MMOL/L (ref 133–144)
WBC # BLD AUTO: 5.8 10E9/L (ref 4–11)

## 2017-10-05 PROCEDURE — 25000132 ZZH RX MED GY IP 250 OP 250 PS 637

## 2017-10-05 PROCEDURE — 25000132 ZZH RX MED GY IP 250 OP 250 PS 637: Performed by: SURGERY

## 2017-10-05 PROCEDURE — 25000132 ZZH RX MED GY IP 250 OP 250 PS 637: Performed by: STUDENT IN AN ORGANIZED HEALTH CARE EDUCATION/TRAINING PROGRAM

## 2017-10-05 PROCEDURE — S0138 FINASTERIDE, 5 MG: HCPCS | Performed by: STUDENT IN AN ORGANIZED HEALTH CARE EDUCATION/TRAINING PROGRAM

## 2017-10-05 PROCEDURE — 27210913 ZZH NUTRITION PRODUCT INTERMEDIATE PACKET

## 2017-10-05 PROCEDURE — 25000128 H RX IP 250 OP 636: Performed by: STUDENT IN AN ORGANIZED HEALTH CARE EDUCATION/TRAINING PROGRAM

## 2017-10-05 PROCEDURE — 25000131 ZZH RX MED GY IP 250 OP 636 PS 637: Performed by: STUDENT IN AN ORGANIZED HEALTH CARE EDUCATION/TRAINING PROGRAM

## 2017-10-05 PROCEDURE — 85027 COMPLETE CBC AUTOMATED: CPT | Performed by: STUDENT IN AN ORGANIZED HEALTH CARE EDUCATION/TRAINING PROGRAM

## 2017-10-05 PROCEDURE — 36592 COLLECT BLOOD FROM PICC: CPT | Performed by: STUDENT IN AN ORGANIZED HEALTH CARE EDUCATION/TRAINING PROGRAM

## 2017-10-05 PROCEDURE — 85610 PROTHROMBIN TIME: CPT | Performed by: STUDENT IN AN ORGANIZED HEALTH CARE EDUCATION/TRAINING PROGRAM

## 2017-10-05 PROCEDURE — 40000802 ZZH SITE CHECK

## 2017-10-05 PROCEDURE — 80048 BASIC METABOLIC PNL TOTAL CA: CPT | Performed by: STUDENT IN AN ORGANIZED HEALTH CARE EDUCATION/TRAINING PROGRAM

## 2017-10-05 PROCEDURE — 12000001 ZZH R&B MED SURG/OB UMMC

## 2017-10-05 RX ORDER — WARFARIN SODIUM 7.5 MG/1
7.5 TABLET ORAL
Status: COMPLETED | OUTPATIENT
Start: 2017-10-05 | End: 2017-10-05

## 2017-10-05 RX ADMIN — Medication 5 ML: at 20:33

## 2017-10-05 RX ADMIN — ONDANSETRON 4 MG: 2 INJECTION INTRAMUSCULAR; INTRAVENOUS at 06:38

## 2017-10-05 RX ADMIN — ONDANSETRON 4 MG: 2 INJECTION INTRAMUSCULAR; INTRAVENOUS at 16:30

## 2017-10-05 RX ADMIN — Medication 2 PACKET: at 12:08

## 2017-10-05 RX ADMIN — OXYCODONE HYDROCHLORIDE 10 MG: 5 SOLUTION ORAL at 22:57

## 2017-10-05 RX ADMIN — OXYCODONE HYDROCHLORIDE 10 MG: 5 SOLUTION ORAL at 10:41

## 2017-10-05 RX ADMIN — Medication 0.3 MG: at 00:28

## 2017-10-05 RX ADMIN — Medication 0.3 MG: at 05:18

## 2017-10-05 RX ADMIN — SODIUM BICARBONATE 325 MG: 325 TABLET ORAL at 20:32

## 2017-10-05 RX ADMIN — ACETAMINOPHEN 1000 MG: 325 SOLUTION ORAL at 20:33

## 2017-10-05 RX ADMIN — ACETAMINOPHEN 1000 MG: 325 SOLUTION ORAL at 12:05

## 2017-10-05 RX ADMIN — PANCRELIPASE 48000 UNITS: 24000; 76000; 120000 CAPSULE, DELAYED RELEASE PELLETS ORAL at 20:32

## 2017-10-05 RX ADMIN — TAMSULOSIN HYDROCHLORIDE 0.4 MG: 0.4 CAPSULE ORAL at 09:40

## 2017-10-05 RX ADMIN — OXYCODONE HYDROCHLORIDE 10 MG: 5 SOLUTION ORAL at 18:57

## 2017-10-05 RX ADMIN — Medication 5 ML: at 09:39

## 2017-10-05 RX ADMIN — PANTOPRAZOLE SODIUM 40 MG: 40 TABLET, DELAYED RELEASE ORAL at 09:38

## 2017-10-05 RX ADMIN — PREDNISONE 5 MG: 5 SOLUTION ORAL at 09:39

## 2017-10-05 RX ADMIN — SODIUM BICARBONATE 325 MG: 325 TABLET ORAL at 07:33

## 2017-10-05 RX ADMIN — SODIUM BICARBONATE 325 MG: 325 TABLET ORAL at 12:04

## 2017-10-05 RX ADMIN — Medication 0.2 MG: at 13:19

## 2017-10-05 RX ADMIN — OXYCODONE HYDROCHLORIDE 10 MG: 5 SOLUTION ORAL at 06:39

## 2017-10-05 RX ADMIN — SERTRALINE HYDROCHLORIDE 50 MG: 50 TABLET ORAL at 09:40

## 2017-10-05 RX ADMIN — PANCRELIPASE 48000 UNITS: 24000; 76000; 120000 CAPSULE, DELAYED RELEASE PELLETS ORAL at 07:33

## 2017-10-05 RX ADMIN — PANCRELIPASE 48000 UNITS: 24000; 76000; 120000 CAPSULE, DELAYED RELEASE PELLETS ORAL at 12:04

## 2017-10-05 RX ADMIN — SODIUM BICARBONATE 325 MG: 325 TABLET ORAL at 02:50

## 2017-10-05 RX ADMIN — SODIUM BICARBONATE 325 MG: 325 TABLET ORAL at 16:15

## 2017-10-05 RX ADMIN — ONDANSETRON 4 MG: 2 INJECTION INTRAMUSCULAR; INTRAVENOUS at 22:56

## 2017-10-05 RX ADMIN — PANCRELIPASE 48000 UNITS: 24000; 76000; 120000 CAPSULE, DELAYED RELEASE PELLETS ORAL at 16:15

## 2017-10-05 RX ADMIN — Medication 0.2 MG: at 16:32

## 2017-10-05 RX ADMIN — FINASTERIDE 5 MG: 5 TABLET, FILM COATED ORAL at 09:40

## 2017-10-05 RX ADMIN — WARFARIN SODIUM 7.5 MG: 7.5 TABLET ORAL at 18:10

## 2017-10-05 RX ADMIN — OXYCODONE HYDROCHLORIDE 10 MG: 5 SOLUTION ORAL at 02:01

## 2017-10-05 RX ADMIN — Medication 0.2 MG: at 08:49

## 2017-10-05 RX ADMIN — OXYCODONE HYDROCHLORIDE 10 MG: 5 SOLUTION ORAL at 14:50

## 2017-10-05 RX ADMIN — LEVOTHYROXINE SODIUM 150 MCG: 150 TABLET ORAL at 09:39

## 2017-10-05 RX ADMIN — PANCRELIPASE 48000 UNITS: 24000; 76000; 120000 CAPSULE, DELAYED RELEASE PELLETS ORAL at 02:50

## 2017-10-05 ASSESSMENT — PAIN DESCRIPTION - DESCRIPTORS
DESCRIPTORS: ACHING
DESCRIPTORS: ACHING;CONSTANT
DESCRIPTORS: ACHING;CONSTANT
DESCRIPTORS: ACHING
DESCRIPTORS: ACHING

## 2017-10-05 NOTE — PROGRESS NOTES
GENERAL SURGERY PROGRESS NOTE    Patient: Lucas Brown  MRN: [id number]    Subjective  Got 1 unit of PRBCs yesterday, also started NGT clamp trial in pm. Pain well controlled. Voiding. +BM 1x yesterday.    Objective  Temp:  [98  F (36.7  C)-99  F (37.2  C)] 98.3  F (36.8  C)  Pulse:  [64-70] 68  Heart Rate:  [67-71] 71  Resp:  [16-20] 16  BP: (121-158)/(68-85) 146/77  SpO2:  [90 %-97 %] 90 %    I/O last 3 completed shifts:  In: 3976 [P.O.:50; I.V.:2030; NG/GT:466; IV Piggyback:325]  Out: 3725 [Urine:2800; Emesis/NG output:925]    Middle aged man resting comfortably. NAD.  Tube feeds at 65  Nlb on RA  Abd soft, non-tender, mildly distended. No r/g. GJ tube is intact. NGT output is sandor.    Labs:   Josefa 212  Folate 17.2  Iron 11  Iron Binding Cap 199  Iron Saturation Index 6    Vitamin B12 481    Imaging: Reviewed.    Assessment & Plan  Lucas Brown 56 y.o. male s/p whipple in August 2017 now with gastric outlet obstruction and high output through NG tube which has been decreasing, started on NGT clamp trial yesterday. Pt might need eventual revision of gastric anastamosis. Anemia labs consistent w/ anemia of chronic dz. Will attempt to see if he tolerates NG clamping today.  -Pain control: Acetaminophen scheduled, fentanyl patch, oxy per J tube, dilaudid IV  - Diet: Tube feeds at goal  - Durham: Voiding spontaneously   - Abx: None  - Wound Cares: None  - Drains: NG clamp trial today. If minimal output may remove.  - DVT prophylaxis: Warfarin dosing per pharmacy  - Anticipated discharge: 2-3 days pending PO intake and clinical course    --  Gary Wilkerson MD  Gen Surg PGY1    Bakari Teresa MD  PGY-2 General Surgery

## 2017-10-05 NOTE — PLAN OF CARE
Problem: Patient Care Overview  Goal: Plan of Care/Patient Progress Review  Outcome: Improving  /77 (BP Location: Left arm)  Pulse 68  Temp 98.3  F (36.8  C) (Oral)  Resp 16  Wt 93.8 kg (206 lb 11.2 oz)  SpO2 90%  BMI 28.83 kg/m2     Pain managed with PRN oxycodone. PICC infusing MIVF at 25cc/hr. G tube to gravity. J tube was unclogged around 1730, restarted tube feedings around 1900. J tube to continous feedings at 65cc/hr. Pt tolerating well with no nausea. NG clamped till tomorrow. No stool this evening but had a BM during day shift. Pt lung sound with course crackles due to thick mucus. Encourage pt to cough/deep breath/use IS. Pt coughing up thick sputum. Voiding spont. Up ad kip. NPO;ice chips

## 2017-10-05 NOTE — PLAN OF CARE
Problem: Patient Care Overview  Goal: Plan of Care/Patient Progress Review  Outcome: Therapy, progress toward functional goals is gradual  AVSS. Pt has an infrequent productive cough with thick white mucous. Lung sounds with crackles, IS/deep breathing encouraged. Pt reports abdominal pain, given 10 mg of oxycodone q4hrs and IV Dilaudid x2 with relief. Pt reports intermittent nausea, given IV Zofran. NG was clamped overnight and put to suction this morning by MD team. Pt reports passing flatus and had a BM yesterday. G tube to gravity drainage. NPO, TF to J tube at goal rate of 65 ml/hr. Enzymes given with TF q 4hrs. Voiding adequate amounts. Up ab kip. Continue to monitor respiratory status, pain control and GI function.

## 2017-10-06 VITALS
RESPIRATION RATE: 18 BRPM | HEART RATE: 60 BPM | DIASTOLIC BLOOD PRESSURE: 66 MMHG | BODY MASS INDEX: 27.09 KG/M2 | TEMPERATURE: 97.2 F | SYSTOLIC BLOOD PRESSURE: 129 MMHG | OXYGEN SATURATION: 92 % | WEIGHT: 194.2 LBS

## 2017-10-06 LAB
ANION GAP SERPL CALCULATED.3IONS-SCNC: 5 MMOL/L (ref 3–14)
BUN SERPL-MCNC: 20 MG/DL (ref 7–30)
CALCIUM SERPL-MCNC: 8.8 MG/DL (ref 8.5–10.1)
CHLORIDE SERPL-SCNC: 101 MMOL/L (ref 94–109)
CO2 SERPL-SCNC: 31 MMOL/L (ref 20–32)
CREAT SERPL-MCNC: 1.4 MG/DL (ref 0.66–1.25)
ERYTHROCYTE [DISTWIDTH] IN BLOOD BY AUTOMATED COUNT: 16 % (ref 10–15)
GFR SERPL CREATININE-BSD FRML MDRD: 52 ML/MIN/1.7M2
GLUCOSE SERPL-MCNC: 106 MG/DL (ref 70–99)
HCT VFR BLD AUTO: 27.4 % (ref 40–53)
HGB BLD-MCNC: 8.2 G/DL (ref 13.3–17.7)
INR PPP: 1.65 (ref 0.86–1.14)
MCH RBC QN AUTO: 25.2 PG (ref 26.5–33)
MCHC RBC AUTO-ENTMCNC: 29.9 G/DL (ref 31.5–36.5)
MCV RBC AUTO: 84 FL (ref 78–100)
PLATELET # BLD AUTO: 261 10E9/L (ref 150–450)
POTASSIUM SERPL-SCNC: 4.1 MMOL/L (ref 3.4–5.3)
RBC # BLD AUTO: 3.25 10E12/L (ref 4.4–5.9)
SODIUM SERPL-SCNC: 138 MMOL/L (ref 133–144)
WBC # BLD AUTO: 5.6 10E9/L (ref 4–11)

## 2017-10-06 PROCEDURE — S0138 FINASTERIDE, 5 MG: HCPCS | Performed by: STUDENT IN AN ORGANIZED HEALTH CARE EDUCATION/TRAINING PROGRAM

## 2017-10-06 PROCEDURE — 36592 COLLECT BLOOD FROM PICC: CPT | Performed by: STUDENT IN AN ORGANIZED HEALTH CARE EDUCATION/TRAINING PROGRAM

## 2017-10-06 PROCEDURE — 25000131 ZZH RX MED GY IP 250 OP 636 PS 637: Performed by: STUDENT IN AN ORGANIZED HEALTH CARE EDUCATION/TRAINING PROGRAM

## 2017-10-06 PROCEDURE — 85610 PROTHROMBIN TIME: CPT | Performed by: SURGERY

## 2017-10-06 PROCEDURE — 25000128 H RX IP 250 OP 636: Performed by: STUDENT IN AN ORGANIZED HEALTH CARE EDUCATION/TRAINING PROGRAM

## 2017-10-06 PROCEDURE — 25000132 ZZH RX MED GY IP 250 OP 250 PS 637: Performed by: STUDENT IN AN ORGANIZED HEALTH CARE EDUCATION/TRAINING PROGRAM

## 2017-10-06 PROCEDURE — 36592 COLLECT BLOOD FROM PICC: CPT | Performed by: SURGERY

## 2017-10-06 PROCEDURE — 85027 COMPLETE CBC AUTOMATED: CPT | Performed by: STUDENT IN AN ORGANIZED HEALTH CARE EDUCATION/TRAINING PROGRAM

## 2017-10-06 PROCEDURE — 25000132 ZZH RX MED GY IP 250 OP 250 PS 637: Performed by: SURGERY

## 2017-10-06 PROCEDURE — 25000132 ZZH RX MED GY IP 250 OP 250 PS 637

## 2017-10-06 PROCEDURE — 80048 BASIC METABOLIC PNL TOTAL CA: CPT | Performed by: STUDENT IN AN ORGANIZED HEALTH CARE EDUCATION/TRAINING PROGRAM

## 2017-10-06 PROCEDURE — 27210913 ZZH NUTRITION PRODUCT INTERMEDIATE PACKET

## 2017-10-06 RX ORDER — OXYCODONE HCL 5 MG/5 ML
10 SOLUTION, ORAL ORAL EVERY 4 HOURS PRN
Qty: 840 ML | Refills: 0 | Status: SHIPPED | OUTPATIENT
Start: 2017-10-06 | End: 2017-11-02

## 2017-10-06 RX ORDER — POLYETHYLENE GLYCOL 3350 17 G/17G
17 POWDER, FOR SOLUTION ORAL DAILY PRN
Qty: 14 PACKET | Refills: 0 | Status: SHIPPED | OUTPATIENT
Start: 2017-10-06 | End: 2017-11-02

## 2017-10-06 RX ORDER — FENTANYL 75 UG/1
1 PATCH TRANSDERMAL
Qty: 5 PATCH | Refills: 0 | Status: SHIPPED | OUTPATIENT
Start: 2017-10-06 | End: 2017-11-02

## 2017-10-06 RX ORDER — WARFARIN SODIUM 7.5 MG/1
7.5 TABLET ORAL
Status: DISCONTINUED | OUTPATIENT
Start: 2017-10-06 | End: 2017-10-06 | Stop reason: HOSPADM

## 2017-10-06 RX ORDER — ONDANSETRON 4 MG/1
4-8 TABLET, FILM COATED ORAL EVERY 8 HOURS PRN
Qty: 90 TABLET | Refills: 0 | Status: SHIPPED | OUTPATIENT
Start: 2017-10-06

## 2017-10-06 RX ADMIN — Medication 0.3 MG: at 06:12

## 2017-10-06 RX ADMIN — PANCRELIPASE 48000 UNITS: 24000; 76000; 120000 CAPSULE, DELAYED RELEASE PELLETS ORAL at 00:30

## 2017-10-06 RX ADMIN — LEVOTHYROXINE SODIUM 150 MCG: 150 TABLET ORAL at 10:05

## 2017-10-06 RX ADMIN — ACETAMINOPHEN 1000 MG: 325 SOLUTION ORAL at 14:18

## 2017-10-06 RX ADMIN — PANCRELIPASE 48000 UNITS: 24000; 76000; 120000 CAPSULE, DELAYED RELEASE PELLETS ORAL at 11:43

## 2017-10-06 RX ADMIN — PANCRELIPASE 48000 UNITS: 24000; 76000; 120000 CAPSULE, DELAYED RELEASE PELLETS ORAL at 08:33

## 2017-10-06 RX ADMIN — PREDNISONE 5 MG: 5 SOLUTION ORAL at 10:04

## 2017-10-06 RX ADMIN — SODIUM BICARBONATE 325 MG: 325 TABLET ORAL at 00:30

## 2017-10-06 RX ADMIN — Medication 0.3 MG: at 00:48

## 2017-10-06 RX ADMIN — FINASTERIDE 5 MG: 5 TABLET, FILM COATED ORAL at 10:05

## 2017-10-06 RX ADMIN — SODIUM BICARBONATE 325 MG: 325 TABLET ORAL at 11:43

## 2017-10-06 RX ADMIN — ONDANSETRON 4 MG: 2 INJECTION INTRAMUSCULAR; INTRAVENOUS at 08:27

## 2017-10-06 RX ADMIN — Medication 2 PACKET: at 14:13

## 2017-10-06 RX ADMIN — Medication 0.3 MG: at 09:28

## 2017-10-06 RX ADMIN — OXYCODONE HYDROCHLORIDE 10 MG: 5 SOLUTION ORAL at 03:13

## 2017-10-06 RX ADMIN — SODIUM BICARBONATE 325 MG: 325 TABLET ORAL at 08:33

## 2017-10-06 RX ADMIN — Medication 5 ML: at 10:05

## 2017-10-06 RX ADMIN — PANTOPRAZOLE SODIUM 40 MG: 40 TABLET, DELAYED RELEASE ORAL at 08:36

## 2017-10-06 RX ADMIN — OXYCODONE HYDROCHLORIDE 10 MG: 5 SOLUTION ORAL at 07:26

## 2017-10-06 RX ADMIN — TAMSULOSIN HYDROCHLORIDE 0.4 MG: 0.4 CAPSULE ORAL at 08:36

## 2017-10-06 RX ADMIN — SERTRALINE HYDROCHLORIDE 50 MG: 50 TABLET ORAL at 08:36

## 2017-10-06 RX ADMIN — PANCRELIPASE 48000 UNITS: 24000; 76000; 120000 CAPSULE, DELAYED RELEASE PELLETS ORAL at 03:36

## 2017-10-06 RX ADMIN — SODIUM BICARBONATE 325 MG: 325 TABLET ORAL at 03:36

## 2017-10-06 RX ADMIN — OXYCODONE HYDROCHLORIDE 10 MG: 5 SOLUTION ORAL at 11:20

## 2017-10-06 ASSESSMENT — PAIN DESCRIPTION - DESCRIPTORS
DESCRIPTORS: ACHING
DESCRIPTORS: ACHING;CONSTANT
DESCRIPTORS: ACHING;SORE
DESCRIPTORS: ACHING

## 2017-10-06 NOTE — PLAN OF CARE
Problem: Patient Care Overview  Goal: Plan of Care/Patient Progress Review  Outcome: Therapy, progress toward functional goals is gradual  /68 (BP Location: Left arm)  Pulse 60  Temp 97.8  F (36.6  C) (Oral)  Resp 16  Wt 87.5 kg (193 lb)  SpO2 91%  BMI 26.92 kg/m2     Pain managed with oxycodone, tylenol, and dilaudid. Nausea at beginning of shift, gave zofran with relief. PICC infusing MIVF, changed caps. J tube to TF @ 65cc/hr. G tube to gravity. NG clamped till AM rounds. Voiding spont. Lungs with course crackles, encourage cough/deep breath. Productive cough. NPO.

## 2017-10-06 NOTE — PROGRESS NOTES
GENERAL SURGERY PROGRESS NOTE    Patient: Lucas Brown  MRN: [id number]    Subjective  Underwent NGT clamp trial yesterday. Tolerated clamped NGT clamping. Feels ready to go home. Pain well controlled. Voiding.    Objective  Temp:  [97.1  F (36.2  C)-98.5  F (36.9  C)] 98.1  F (36.7  C)  Pulse:  [60-94] 60  Heart Rate:  [60-94] 73  Resp:  [16-18] 17  BP: (137-147)/(68-82) 137/78  SpO2:  [90 %-93 %] 91 %    I/O last 3 completed shifts:  In: 1635 [I.V.:630; NG/GT:30]  Out: 3370 [Urine:2300; Emesis/NG output:1070]    Middle aged man resting comfortably. NAD.  Tube feeds at 65  Nlb on RA  Abd soft, non-tender, mildly distended. No r/g. GJ tube is intact. NGT output is bileous. Unclamping the NGT (from yesterday early pm) reveals only 100 mL of fluid    Labs: Reviewed.    Imaging: Reviewed.    Assessment & Plan  Lucas Brown 56 y.o. male s/p whipple in August 2017 now with gastric outlet obstruction and high output through NG tube which has been decreasing, underwent NGT clamp trial with low output, and tolerated having NGT clamped overnight. Pt might need eventual revision of gastric anastamosis. Anemia labs consistent w/ anemia of chronic dz.  -Pain control: Acetaminophen scheduled, fentanyl patch, oxy per J tube, dilaudid IV  - Diet: Tube feeds at goal  - [place]: Voiding spontaneously   - Abx: None  - Wound Cares: None  - Drains: NG clamp trial today. If minimal output may remove.  - DVT prophylaxis: Warfarin dosing per pharmacy  - Anticipated discharge: Possible discharge home today    --  Gary Wilkerson MD  Gen Surg PGY1    Bakari Teresa MD  PGY-2 General Surgery

## 2017-10-06 NOTE — PLAN OF CARE
Problem: Patient Care Overview  Goal: Plan of Care/Patient Progress Review  Outcome: Therapy, progress towards functional goals is fair  AVSS. Pt has an infrequent productive cough with thick white mucous. Lung sounds with crackles, IS/deep breathing encouraged. Pt reports throat and ear pain, given 10 mg of oxycodone q4hrs and IV Dilaudid with relief. Pt reports intermittent nausea, given IV Zofran at the end of the evening shift. NG has been clamped overnight. Pt reports passing flatus and had a BM late last evening. G tube to gravity drainage. NPO, TF to J tube at goal rate of 65 ml/hr. Enzymes given with TF q 4hrs. Voiding adequate amounts. Up ab kip. Continue to monitor respiratory status, pain control and GI function.

## 2017-10-06 NOTE — PHARMACY-ANTICOAGULATION SERVICE
Clinical Pharmacy- Warfarin Discharge Note  This patient is currently on warfarin for the treatment of Mechanical Aortic Valve Replacement.  INR Goal= 2-3  Expected length of therapy lifetime.    Anticoagulation Dose History     Recent Dosing and Labs Latest Ref Rng & Units 9/30/2017 10/1/2017 10/2/2017 10/3/2017 10/4/2017 10/5/2017 10/6/2017    Warfarin 5 mg - - - 5 mg 5 mg 5 mg - -    Warfarin 7.5 mg - - - - - - 7.5 mg -    INR 0.86 - 1.14 2.37(H) 1.58(H) 1.27(H) 1.19(H) 1.34(H) 1.40(H) 1.65(H)          Vitamin K doses administered during the last 7 days: none  FFP administered during the last 7 days: none  Recommend discharging the patient on a warfarin regimen of 7.5 mg tonight (10/6), followed by 5 mg (Saturday, 10/7, and Sunday, 10/8). Patient has 5 mg and 2.5 mg tablets at home.     The patient should have an INR checked October / 09 / 2017.    Scarlet Lucero, PharmD

## 2017-10-06 NOTE — PROGRESS NOTES
Care Coordinator- Discharge Planning     Admission Date/Time:  9/26/2017  Attending MD:  Haylee Bryan MD     Update  Date of initial CC assessment:  Mr. Brown has been followed daily since admission.  Chart reviewed, discussed with interdisciplinary team.   Patient was admitted for:   1. Generalized abdominal pain    2. Nausea and vomiting, intractability of vomiting not specified, unspecified vomiting type         Assessment  The following tentative plans of care have been arranged on behalf of patient once he his stable for discharge per MD team:    Please fax discharge orders to [facility]     Ph:  [phone]     Fax: [phone]     Skilled home care RN for resumption of home care plans of care as prior to hospitalization and to assist with the MD team updated plans of care as designated in discharge orders.     Skilled home care RN to evaluate medication management, nutrition and hydration evaluation, endurance evaluation, and general status evaluation after discharge from the acute care hospital setting.     Skilled home care RN to assist with home enteral feedings via J-Tube per MD orders.  J-Tube cares per home care agency routine.     Skilled home care RN to assist with home IV hydration 1 liter LR daily via double lumen picc line per MD orders.  Picc line cares per home care agency routine.    Skilled home care RN to evaluated gastro intestinal status in home setting.     Skilled home care RN to assist with anticoagulation management per MD orders.     Coordination of Care and Referrals: Provided patient/family with options for home care agency of choice in home area.      Plan  Anticipated Discharge Date:  Pending MD Team final plans of care.  Anticipated Discharge Plan:  As above and per MD team final plans of care on the day of discharge.    CTS Handoff completed:  (Clinic Letter)  Sent.    Delfina Cross, RADHA.S.WANDER., P.H.N.,R.N.         Pager     Addendum:   [facility] Intake RN was updated about MD team plans of care for home IV hydration as prescribed.

## 2017-10-06 NOTE — PLAN OF CARE
Problem: Patient Care Overview  Goal: Plan of Care/Patient Progress Review  Outcome: Adequate for Discharge Date Met:  10/06/17  VSS. Pain managed with PRN oxycodone and scheduled tylenol. Dilaudid given x1 when NG tube was removed at 0930AM. G/J tube intact: G tube to gravity and J tube to continuous TF at goal rate 65ml/hr with q4hr flush 30cc. Double Lumen PICC with blood return and is SL. Passing gas no BM on this shift. NPO + ice chips. Lung sounds crackles and has a productive cough with loose secretions. Up ad kip ambulating in hallway. Wife supportive at bedside. Discharge teaching completed with pt and wife. Left unit with transport and d/c'ed home.

## 2017-10-07 LAB
BACTERIA SPEC CULT: NO GROWTH
SPECIMEN SOURCE: NORMAL

## 2017-10-09 ENCOUNTER — TELEPHONE (OUTPATIENT)
Dept: SURGERY | Facility: CLINIC | Age: 57
End: 2017-10-09

## 2017-10-09 ENCOUNTER — CARE COORDINATION (OUTPATIENT)
Dept: CARE COORDINATION | Facility: CLINIC | Age: 57
End: 2017-10-09

## 2017-10-09 ENCOUNTER — ANTICOAGULATION THERAPY VISIT (OUTPATIENT)
Dept: ANTICOAGULATION | Facility: CLINIC | Age: 57
End: 2017-10-09

## 2017-10-09 DIAGNOSIS — Z95.2 H/O AORTIC VALVE REPLACEMENT: ICD-10-CM

## 2017-10-09 DIAGNOSIS — Z79.01 LONG-TERM (CURRENT) USE OF ANTICOAGULANTS: ICD-10-CM

## 2017-10-09 LAB — INR PPP: 1.9 (ref 0.86–1.14)

## 2017-10-09 NOTE — MR AVS SNAPSHOT
Lucas Brown   10/9/2017   Anticoagulation Therapy Visit    Description:  56 year old male   Provider:  Yandy Salvador RN   Department:  [facility]           INR as of 10/9/2017     Today's INR 1.90!      Anticoagulation Summary as of 10/9/2017     INR goal 2.0-3.0   Today's INR 1.90!   Full instructions 10/9: 5 mg; 10/10: 5 mg; 10/11: 5 mg   Next INR check 10/12/2017    Indications   H/O aortic valve replacement [Z95.2]  Long-term (current) use of anticoagulants [Z79.01] [Z79.01]         October 2017 Details    Sun Mon Tue Wed Thu Fri Sat     1               2               3               4               5               6               7                 8               9      5 mg   See details      10      5 mg         11      5 mg         12            13               14                 15               16               17               18               19               20               21                 22               23               24               25               26               27               28                 29               30               31                    Date Details   10/09 This INR check       Date of next INR:  10/12/2017         How to take your warfarin dose     To take:  5 mg Take 1 of the 5 mg tablets.

## 2017-10-09 NOTE — PROGRESS NOTES
Patient has clinic visit within 24-48 hours of Discharge so no post DC follow up call is needed          Date: 10/10/2017 Status: Trveon   Time: 4:20 PM Length: 20     Visit Type: UMP RETURN [[id number]]   SHEREEN:     Provider: Vik Crum MD Department:  GENERAL SURGERY   Special Needs:   Comments:     Referral Number:   Referral Status:         CSN: [id number]   Notes: Javi Mir     Made On:   10/9/2017 7:51 AM By:   VANI PARKINSON [[id number]] (ES)

## 2017-10-09 NOTE — PROGRESS NOTES
Dates of hospitalization: 9/26 to 10/6  Reason for hospitalization: Gastric outlet obstruction.  Procedures performed: G-J tube placed.  PICC placed.  Vitamin K or FFP administered? no  Inpatient warfarin doses added to calendar? yes  Medication changes at discharge: Zofran changed from ODT to oral tablet, and Fentanyl patch D/Thomas.    Warfarin dosing after DC: 7.5mg on 10/6, 5mg on 10/7 and 10/8.    Patient discharged on Lovenox? no  Next INR date: 10/12  Where is the patient discharging to? (home, TCU, staying locally, etc.): home  Will patient have home care? [facility] nurse.  *Pt has coumadin 2.5mg and 5mg tablets at home.    ANTICOAGULATION FOLLOW-UP CLINIC VISIT    Patient Name:  Lucas Brown  Date:  10/9/2017  Contact Type:  Telephone    SUBJECTIVE:     Patient Findings     Comments Patient using oxycodone for pain management.           OBJECTIVE    INR   Date Value Ref Range Status   10/09/2017 1.90 (H) 0.86 - 1.14 Final       ASSESSMENT / PLAN  INR assessment THER    Recheck INR In: 3 DAYS    INR Location Clinic      Anticoagulation Summary as of 10/9/2017     INR goal 2.0-3.0   Today's INR 1.90!   Maintenance plan No maintenance plan   Full instructions 10/9: 5 mg; 10/10: 5 mg; 10/11: 5 mg   Plan last modified Gwendolyn Goodman, RN (9/15/2017)   Next INR check 10/12/2017   Priority INR   Target end date     Indications   H/O aortic valve replacement [Z95.2]  Long-term (current) use of anticoagulants [Z79.01] [Z79.01]         Anticoagulation Episode Summary     INR check location     Preferred lab     Send INR reminders to [facility]    Comments Pt is staying local and [facility] comes out  Monitoring pt for 4-6 weeks then go back to PCP at [facility] BERNIE Mcgee  Has 2.5 and 5mg tablets       Anticoagulation Care Providers     Provider Role Specialty Phone number    Vik Crum MD Responsible Transplant [phone]            See the Encounter Report to  view Anticoagulation Flowsheet and Dosing Calendar (Go to Encounters tab in chart review, and find the Anticoagulation Therapy Visit)    Spoke with patient.    Yandy Salvador RN

## 2017-10-09 NOTE — TELEPHONE ENCOUNTER
Established Patient Telephone Reminder Call    Date of call:  10/09/17  Phone numbers:  Home number on file [phone] (home)    Reached patient/confirmed appointment:  Yes  Appointment with:   Dr. Filipe Crum  Reason for visit:  f/u

## 2017-10-10 ENCOUNTER — OFFICE VISIT (OUTPATIENT)
Dept: SURGERY | Facility: CLINIC | Age: 57
End: 2017-10-10

## 2017-10-10 VITALS
BODY MASS INDEX: 26.61 KG/M2 | OXYGEN SATURATION: 99 % | DIASTOLIC BLOOD PRESSURE: 56 MMHG | SYSTOLIC BLOOD PRESSURE: 92 MMHG | HEIGHT: 71 IN | TEMPERATURE: 98.2 F | HEART RATE: 85 BPM | WEIGHT: 190.1 LBS

## 2017-10-10 DIAGNOSIS — Z95.2 H/O AORTIC VALVE REPLACEMENT: ICD-10-CM

## 2017-10-10 DIAGNOSIS — Z79.01 LONG-TERM (CURRENT) USE OF ANTICOAGULANTS: ICD-10-CM

## 2017-10-10 DIAGNOSIS — G89.18 ACUTE POST-OPERATIVE PAIN: Primary | ICD-10-CM

## 2017-10-10 ASSESSMENT — PAIN SCALES - GENERAL: PAINLEVEL: SEVERE PAIN (6)

## 2017-10-10 NOTE — LETTER
10/10/2017       RE: Lucas Brown  [address] [facility] [address]     Dear Colleague,    Thank you for referring your patient, Lucas Brown, to the [facility] GENERAL SURGERY at [facility]. Please see a copy of my visit note below.    See dictated note  GB      HISTORY OF PRESENT ILLNESS:  Mr. Brown is here in followup after a Whipple procedure for chronic pancreatitis.  His postop course is complicated by gastric emptying delay, for which he spent a week in the hospital.  He had an NG tube and a gastrostomy tube in place during that hospitalization.  Eventually, his gastrostomy tube started working, and NG tube output went down, and we were able to pull out the NG tube and send him home.  He continues to suffer somewhat from gastric emptying delay.  However, he recently developed a fever and has a productive cough.  His fever is low grade and is associated with malaise.  He blames this on a cold that he picked up in the hospital.      He has been urinating 4-5 times per day.  He denies any nausea except when he coughs.  He has not had any vomiting since discharge from the hospital.  He was receiving 1 L of IV fluids per day via PICC line as well as tube feeds.  He is receiving all of his medicines via the jejunal limb of his tube feed.      PHYSICAL EXAMINATION:   VITAL SIGNS:  Stable.  He is afebrile.   HEENT:  Normocephalic, atraumatic.  There is no scleral icterus.   NECK:  Supple.   CHEST:  Clear.   CARDIOVASCULAR:  Regular rate and rhythm.   ABDOMEN:  Soft and nontender.  His wound is well healed.  There is some mild fullness in the epigastrium inferior to the incision.  The G-tube site is clean and dry.   EXTREMITIES:  Without cyanosis, clubbing or edema.      ASSESSMENT/PLAN:     1.  Gastric emptying delay due to recent Whipple procedure.  Continue G tube to drainage.  This seems to be slowly getting better.  We will have the patient work with Adeola  about clamping the G tube.   2.  Malnutrition secondary to above.  Continue tube feeds.   3.  Postoperative pain.  Continue current dose of narcotics (fentanyl 75 mcg patch every 3 days).  The patient will call our office for a refill when this is necessary.  Follow up in 2 weeks.      Visit time 30 minutes, 20 minutes counseling.         VIK HERNANDEZ MD             D: 10/11/2017 08:04   T: 10/11/2017 10:37   MT: [email or web address]      Name:     AMELIE MCCRACKEN   MRN:      [id number]        Account:      [id number]   :      1960           Service Date: 10/10/2017      Document: [id number]       Again, thank you for allowing me to participate in the care of your patient.      Sincerely,    Vik Hernandez MD

## 2017-10-10 NOTE — MR AVS SNAPSHOT
After Visit Summary   10/10/2017    Lucas Brown    MRN: [id number]           Patient Information     Date Of Birth          1960        Visit Information        Provider Department      10/10/2017 4:20 PM Vik Crum MD [facility]        Today's Diagnoses     Acute post-operative pain    -  1    Long-term (current) use of anticoagulants        H/O aortic valve replacement           Follow-ups after your visit        Follow-up notes from your care team     Return in about 2 weeks (around 10/24/2017).      Your next 10 appointments already scheduled     Oct 25, 2017  8:00 AM CDT   (Arrive by 7:45 AM)   Return Visit with Vik Crum MD   [facility] ([facility])    [address] [place] [address]  4th [facility] [id number] [phone]            Oct 25, 2017 11:00 AM CDT   LAB with  LAB   [facility] ([facility])    [address] [place] [id number] [phone]           Patient must bring picture ID. Patient should be prepared to give a urine specimen  Please do not eat 10-12 hours before your appointment if you are coming in fasting for labs on lipids, cholesterol, or glucose (sugar). Pregnant women should follow their Care Team instructions. Water with medications is okay. Do not drink coffee or other fluids. If you have concerns about taking  your medications, please ask at office or if scheduling via [employer], send a message by clicking on Secure Messaging, Message Your Care Team.            Oct 26, 2017  7:30 AM CDT   RETURN WOUND NURSE VISIT with Sharmila Armenta RN   [facility] Wound Ostomy ([facility])    [address] [place] [id number] [phone]            Nov 02, 2017  6:20 PM CDT   (Arrive by 6:05 PM)   Return Visit with Vik Crum MD   [facility] ([facility]  [facility])    [address] [facility]  4th [facility] [address] [phone]              Who to contact     Please call your clinic at [phone] to:    Ask questions about your health    Make or cancel appointments    Discuss your medicines    Learn about your test results    Speak to your doctor   If you have compliments or concerns about an experience at your clinic, or if you wish to file a complaint, please contact [facility] Patient Relations at [phone] or email us at [email or web address]         Additional Information About Your Visit        [employer] Information     [employer]After gives you secure access to your electronic health record. If you see a primary care provider, you can also send messages to your care team and make appointments. If you have questions, please call your primary care clinic.  If you do not have a primary care provider, please call [phone] and they will assist you.      [employer] is an electronic gateway that provides easy, online access to your medical records. With [employer], you can request a clinic appointment, read your test results, renew a prescription or communicate with your care team.     To access your existing account, please contact your [facility] Clinic or call [phone] for assistance.        Care EveryWhere ID     This is your Care EveryWhere ID. This could be used by other organizations to access your [place] medical records  [id number]        Your Vitals Were     Pulse Temperature Height Pulse Oximetry BMI (Body Mass Index)       85 98.2  F (36.8  C) 1.803 m (5' 11\") 99% 26.51 kg/m2        Blood Pressure from Last 3 Encounters:   10/16/17 134/71   10/10/17 92/56   10/06/17 129/66    Weight from Last 3 Encounters:   10/15/17 90.8 kg (200 lb 1.6 oz)   10/10/17 86.2 kg (190 lb 1.6 oz)   10/06/17 88.1 kg (194 lb 3.2 oz)              We Performed the Following     INR        Primary " Care Provider Office Phone # Fax #    Cyril Mackay [phone]       [facility] [address] [facility] [address]        Equal Access to Services     LEE CORNELL : Hadshelbi arabella borja eyal Dc, waaxda luqadaha, qaybta kaalmada joselin, rimma grayson laBasiliakayla rodriguez. So [facility] [place] [phone].    ATENCIÓN: Si habla español, tiene a rodriguez disposición servicios gratuitos de asistencia lingüística. Llame al [phone].    We comply with applicable federal civil rights laws and [place] laws. We do not discriminate on the basis of race, color, national origin, age, disability, sex, sexual orientation, or gender identity.            Thank you!     Thank you for choosing [facility]  for your care. Our goal is always to provide you with excellent care. Hearing back from our patients is one way we can continue to improve our services. Please take a few minutes to complete the written survey that you may receive in the mail after your visit with us. Thank you!             Your Updated Medication List - Protect others around you: Learn how to safely use, store and throw away your medicines at www.disposemymeds.org.          This list is accurate as of: 10/10/17 11:59 PM.  Always use your most recent med list.                   Brand Name Dispense Instructions for use Diagnosis    acetaminophen 32 mg/mL solution    TYLENOL    1000 mL    31.25 mLs (1,000 mg) by Per J Tube route every 8 hours    Generalized abdominal pain       amylase-lipase-protease [id number] UNITS Cpep per EC capsule    CREON 24    180 capsule    2 capsules (48,000 Units) by Per J Tube route every 4 hours    Nausea and vomiting, intractability of vomiting not specified, unspecified vomiting type       aspirin 81 MG tablet      81 mg by Per J Tube route daily        fenofibrate 145 MG tablet      145 mg by Per J Tube route daily        fentaNYL 75 mcg/hr 72 hr patch    DURAGESIC    5 patch    Place 1  patch onto the skin every 72 hours    Generalized abdominal pain       FINASTERIDE PO      5 mg by Per J Tube route daily        lisinopril 10 MG tablet    PRINIVIL/ZESTRIL     10 mg by Per J Tube route daily        omeprazole 2 mg/mL Susp    priLOSEC    300 mL    10 mLs (20 mg) by Oral or Feeding Tube route 2 times daily    Chronic pancreatitis, unspecified pancreatitis type (H)       ondansetron 4 MG tablet    ZOFRAN    90 tablet    1-2 tablets (4-8 mg) by Per J Tube route every 8 hours as needed for nausea    Generalized abdominal pain       oxyCODONE 5 MG/5ML solution    ROXICODONE    840 mL    10 mLs (10 mg) by Per J Tube route every 4 hours as needed for moderate to severe pain    Acute post-operative pain       polyethylene glycol Packet    MIRALAX/GLYCOLAX    14 packet    17 g by Per J Tube route daily as needed for constipation    Generalized abdominal pain       pravastatin 40 MG tablet    PRAVACHOL     40 mg by Per J Tube route daily        predniSONE 5 MG/5ML solution     70 mL    5 mLs (5 mg) by Per J Tube route daily    Chronic pancreatitis, unspecified pancreatitis type (H)       sertraline 50 MG tablet    ZOLOFT     50 mg by Per J Tube route daily        sodium bicarbonate 325 MG tablet     42 tablet    1 tablet (325 mg) by Per J Tube route every 4 hours    Chronic pancreatitis, unspecified pancreatitis type (H)       TAMSULOSIN HCL PO      Take 0.4 mg by mouth daily

## 2017-10-10 NOTE — NURSING NOTE
"Chief Complaint   Patient presents with     RECHECK     f/u       Vitals:    10/10/17 1614   BP: 92/56   BP Location: Left arm   Patient Position: Chair   Cuff Size: Adult Regular   Pulse: 85   Temp: 98.2  F (36.8  C)   SpO2: 99%   Weight: 190 lb 1.6 oz   Height: 5' 11\"       Body mass index is 26.51 kg/(m^2).  Demarco Pittman MA                          "

## 2017-10-11 ENCOUNTER — TELEPHONE (OUTPATIENT)
Dept: TRANSPLANT | Facility: CLINIC | Age: 57
End: 2017-10-11

## 2017-10-11 ENCOUNTER — ANTICOAGULATION THERAPY VISIT (OUTPATIENT)
Dept: ANTICOAGULATION | Facility: CLINIC | Age: 57
End: 2017-10-11

## 2017-10-11 DIAGNOSIS — Z79.01 LONG-TERM (CURRENT) USE OF ANTICOAGULANTS: ICD-10-CM

## 2017-10-11 DIAGNOSIS — E87.8 ELECTROLYTE IMBALANCE: Primary | ICD-10-CM

## 2017-10-11 DIAGNOSIS — Z95.2 H/O AORTIC VALVE REPLACEMENT: ICD-10-CM

## 2017-10-11 DIAGNOSIS — E87.8 ELECTROLYTE IMBALANCE: ICD-10-CM

## 2017-10-11 LAB
ANION GAP SERPL CALCULATED.3IONS-SCNC: 6 MMOL/L (ref 3–14)
BACTERIA SPEC CULT: NO GROWTH
BUN SERPL-MCNC: 47 MG/DL (ref 7–30)
CALCIUM SERPL-MCNC: 8.6 MG/DL (ref 8.5–10.1)
CHLORIDE SERPL-SCNC: 97 MMOL/L (ref 94–109)
CO2 SERPL-SCNC: 29 MMOL/L (ref 20–32)
CREAT SERPL-MCNC: 1.67 MG/DL (ref 0.66–1.25)
GFR SERPL CREATININE-BSD FRML MDRD: 42 ML/MIN/1.7M2
GLUCOSE SERPL-MCNC: 115 MG/DL (ref 70–99)
INR PPP: 2.24 (ref 0.86–1.14)
Lab: NORMAL
POTASSIUM SERPL-SCNC: 4.9 MMOL/L (ref 3.4–5.3)
SODIUM SERPL-SCNC: 133 MMOL/L (ref 133–144)
SPECIMEN SOURCE: NORMAL

## 2017-10-11 NOTE — MR AVS SNAPSHOT
Lucas BOWERS Stephanie   10/11/2017   Anticoagulation Therapy Visit    Description:  56 year old male   Provider:  Gwendolyn Goodman, RN   Department[facility]  [facility]           INR as of 10/11/2017     Today's INR 2.24      Anticoagulation Summary as of 10/11/2017     INR goal 2.0-3.0   Today's INR 2.24   Full instructions 10/11: 2.5 mg; 10/12: 5 mg; 10/13: 5 mg; 10/14: 5 mg; 10/15: 5 mg; 10/16: 5 mg; 10/17: 5 mg   Next INR check 10/18/2017    Indications   H/O aortic valve replacement [Z95.2]  Long-term (current) use of anticoagulants [Z79.01] [Z79.01]         October 2017 Details    Sun Mon Tue Wed Thu Fri Sat     1               2               3               4               5               6               7                 8               9               10               11      2.5 mg   See details      12      5 mg         13      5 mg         14      5 mg           15      5 mg         16      5 mg         17      5 mg         18            19               20               21                 22               23               24               25               26               27               28                 29               30               31                    Date Details   10/11 This INR check       Date of next INR:  10/18/2017         How to take your warfarin dose     To take:  2.5 mg Take 0.5 of a 5 mg tablet.    To take:  5 mg Take 1 of the 5 mg tablets.

## 2017-10-11 NOTE — PROGRESS NOTES
ANTICOAGULATION FOLLOW-UP CLINIC VISIT    Patient Name:  Lucas Brown  Date:  10/11/2017  Contact Type:  Telephone    SUBJECTIVE:     Patient Findings     Positives No Problem Findings           OBJECTIVE    INR   Date Value Ref Range Status   10/11/2017 2.24 (H) 0.86 - 1.14 Final       ASSESSMENT / PLAN  INR assessment THER    Recheck INR In: 1 WEEK    INR Location Clinic      Anticoagulation Summary as of 10/11/2017     INR goal 2.0-3.0   Today's INR 2.24   Maintenance plan No maintenance plan   Full instructions 10/11: 2.5 mg; 10/12: 5 mg; 10/13: 5 mg; 10/14: 5 mg; 10/15: 5 mg; 10/16: 5 mg; 10/17: 5 mg   Plan last modified Gwendolyn Goodman RN (9/15/2017)   Next INR check 10/18/2017   Priority INR   Target end date     Indications   H/O aortic valve replacement [Z95.2]  Long-term (current) use of anticoagulants [Z79.01] [Z79.01]         Anticoagulation Episode Summary     INR check location     Preferred lab     Send INR reminders to [facility]    Comments Pt is staying local and [facility] comes out  Monitoring pt for 4-6 weeks then go back to PCP at [facility] BERNIE Mcgee  Has 2.5 and 5mg tablets       Anticoagulation Care Providers     Provider Role Specialty Phone number    RyanjarodVik MD Responsible Transplant [phone]            See the Encounter Report to view Anticoagulation Flowsheet and Dosing Calendar (Go to Encounters tab in chart review, and find the Anticoagulation Therapy Visit)    Spoke with patient. Gave them their lab results and new warfarin recommendation.  No changes in health, medication, or diet. No missed doses, no falls. No signs or symptoms of bleed or clotting.     Gwendolyn Goodman RN

## 2017-10-11 NOTE — PROGRESS NOTES
HISTORY OF PRESENT ILLNESS:  Mr. Brown is here in followup after a Whipple procedure for chronic pancreatitis.  His postop course is complicated by gastric emptying delay, for which he spent a week in the hospital.  He had an NG tube and a gastrostomy tube in place during that hospitalization.  Eventually, his gastrostomy tube started working, and NG tube output went down, and we were able to pull out the NG tube and send him home.  He continues to suffer somewhat from gastric emptying delay.  However, he recently developed a fever and has a productive cough.  His fever is low grade and is associated with malaise.  He blames this on a cold that he picked up in the hospital.      He has been urinating 4-5 times per day.  He denies any nausea except when he coughs.  He has not had any vomiting since discharge from the hospital.  He was receiving 1 L of IV fluids per day via PICC line as well as tube feeds.  He is receiving all of his medicines via the jejunal limb of his tube feed.      PHYSICAL EXAMINATION:   VITAL SIGNS:  Stable.  He is afebrile.   HEENT:  Normocephalic, atraumatic.  There is no scleral icterus.   NECK:  Supple.   CHEST:  Clear.   CARDIOVASCULAR:  Regular rate and rhythm.   ABDOMEN:  Soft and nontender.  His wound is well healed.  There is some mild fullness in the epigastrium inferior to the incision.  The G-tube site is clean and dry.   EXTREMITIES:  Without cyanosis, clubbing or edema.      ASSESSMENT/PLAN:     1.  Gastric emptying delay due to recent Whipple procedure.  Continue G tube to drainage.  This seems to be slowly getting better.  We will have the patient work with Adeola about clamping the G tube.   2.  Malnutrition secondary to above.  Continue tube feeds.   3.  Postoperative pain.  Continue current dose of narcotics (fentanyl 75 mcg patch every 3 days).  The patient will call our office for a refill when this is necessary.  Follow up in 2 weeks.      Visit time 30 minutes, 20  minutes counseling.         BABAR HERNANDEZ MD             D: 10/11/2017 08:04   T: 10/11/2017 10:37   MT: [email or web address]      Name:     AMELIE MCCRACKEN   MRN:      [id number]        Account:      [id number]   :      1960           Service Date: 10/10/2017      Document: [id number]

## 2017-10-11 NOTE — TELEPHONE ENCOUNTER
Plan for clamping G tube:    As I understand, you are currently able to clamp for an hour after AM meds.?    Let s start with trying for 4 hours after meds today. If this goes well, then you can try again in the evening but NOT OVERNIGHT.    If you get excessively nauseated /bloated/really feel as if you are going to vomit then reattached the G tube to the bag.    Please minimize ice chips/sips so we can get a good idea of exactly how much the G tube is draining.    Please continue to measure the G tube output color and let me know this daily.      My plan will be to SLOWLY increase the time you can clamp the G tube, once we get to 12 hours then we will try overnight.    Then, on to clear liquids.      I will call you every day to see how things are progressing.    For today, I will look up labs and let Dr Crum know results.    Please feel free to call page me anytime--pages also come to my cell phone .

## 2017-10-12 ENCOUNTER — HOSPITAL ENCOUNTER (INPATIENT)
Facility: CLINIC | Age: 57
LOS: 3 days | Discharge: HOME HEALTH | DRG: 394 | End: 2017-10-16
Attending: EMERGENCY MEDICINE | Admitting: SURGERY
Payer: COMMERCIAL

## 2017-10-12 ENCOUNTER — APPOINTMENT (OUTPATIENT)
Dept: INTERVENTIONAL RADIOLOGY/VASCULAR | Facility: CLINIC | Age: 57
DRG: 394 | End: 2017-10-12
Attending: NURSE PRACTITIONER
Payer: COMMERCIAL

## 2017-10-12 ENCOUNTER — APPOINTMENT (OUTPATIENT)
Dept: CT IMAGING | Facility: CLINIC | Age: 57
DRG: 394 | End: 2017-10-12
Attending: EMERGENCY MEDICINE
Payer: COMMERCIAL

## 2017-10-12 DIAGNOSIS — R10.84 GENERALIZED ABDOMINAL PAIN: ICD-10-CM

## 2017-10-12 DIAGNOSIS — L02.211 ABSCESS OF ABDOMINAL WALL: ICD-10-CM

## 2017-10-12 DIAGNOSIS — K86.1 CHRONIC PANCREATITIS, UNSPECIFIED PANCREATITIS TYPE (H): Primary | ICD-10-CM

## 2017-10-12 DIAGNOSIS — L03.319 CELLULITIS AND ABSCESS OF TRUNK: ICD-10-CM

## 2017-10-12 DIAGNOSIS — L02.219 CELLULITIS AND ABSCESS OF TRUNK: ICD-10-CM

## 2017-10-12 PROBLEM — K94.20 COMPLICATION OF FEEDING TUBE (H): Status: ACTIVE | Noted: 2017-10-12

## 2017-10-12 LAB
ALBUMIN SERPL-MCNC: 2.3 G/DL (ref 3.4–5)
ALP SERPL-CCNC: 68 U/L (ref 40–150)
ALT SERPL W P-5'-P-CCNC: 19 U/L (ref 0–70)
ANION GAP SERPL CALCULATED.3IONS-SCNC: 9 MMOL/L (ref 3–14)
AST SERPL W P-5'-P-CCNC: 18 U/L (ref 0–45)
BASOPHILS # BLD AUTO: 0 10E9/L (ref 0–0.2)
BASOPHILS NFR BLD AUTO: 0.1 %
BILIRUB SERPL-MCNC: 0.5 MG/DL (ref 0.2–1.3)
BUN SERPL-MCNC: 46 MG/DL (ref 7–30)
CALCIUM SERPL-MCNC: 8.1 MG/DL (ref 8.5–10.1)
CHLORIDE SERPL-SCNC: 102 MMOL/L (ref 94–109)
CO2 SERPL-SCNC: 26 MMOL/L (ref 20–32)
CREAT SERPL-MCNC: 1.66 MG/DL (ref 0.66–1.25)
DIFFERENTIAL METHOD BLD: ABNORMAL
EOSINOPHIL # BLD AUTO: 0 10E9/L (ref 0–0.7)
EOSINOPHIL NFR BLD AUTO: 0.3 %
ERYTHROCYTE [DISTWIDTH] IN BLOOD BY AUTOMATED COUNT: 16 % (ref 10–15)
GFR SERPL CREATININE-BSD FRML MDRD: 43 ML/MIN/1.7M2
GLUCOSE SERPL-MCNC: 118 MG/DL (ref 70–99)
HCT VFR BLD AUTO: 25.6 % (ref 40–53)
HGB BLD-MCNC: 7.9 G/DL (ref 13.3–17.7)
IMM GRANULOCYTES # BLD: 0 10E9/L (ref 0–0.4)
IMM GRANULOCYTES NFR BLD: 0.3 %
INR PPP: 1.99 (ref 0.86–1.14)
LIPASE SERPL-CCNC: 180 U/L (ref 73–393)
LYMPHOCYTES # BLD AUTO: 1.3 10E9/L (ref 0.8–5.3)
LYMPHOCYTES NFR BLD AUTO: 12.2 %
MCH RBC QN AUTO: 25.6 PG (ref 26.5–33)
MCHC RBC AUTO-ENTMCNC: 30.9 G/DL (ref 31.5–36.5)
MCV RBC AUTO: 83 FL (ref 78–100)
MONOCYTES # BLD AUTO: 1 10E9/L (ref 0–1.3)
MONOCYTES NFR BLD AUTO: 9.1 %
NEUTROPHILS # BLD AUTO: 8.5 10E9/L (ref 1.6–8.3)
NEUTROPHILS NFR BLD AUTO: 78 %
NRBC # BLD AUTO: 0 10*3/UL
NRBC BLD AUTO-RTO: 0 /100
PLATELET # BLD AUTO: 249 10E9/L (ref 150–450)
POTASSIUM SERPL-SCNC: 4.8 MMOL/L (ref 3.4–5.3)
PROT SERPL-MCNC: 7 G/DL (ref 6.8–8.8)
RADIOLOGIST FLAGS: ABNORMAL
RBC # BLD AUTO: 3.09 10E12/L (ref 4.4–5.9)
SODIUM SERPL-SCNC: 137 MMOL/L (ref 133–144)
WBC # BLD AUTO: 10.9 10E9/L (ref 4–11)

## 2017-10-12 PROCEDURE — 80053 COMPREHEN METABOLIC PANEL: CPT | Performed by: EMERGENCY MEDICINE

## 2017-10-12 PROCEDURE — 96376 TX/PRO/DX INJ SAME DRUG ADON: CPT

## 2017-10-12 PROCEDURE — 25000132 ZZH RX MED GY IP 250 OP 250 PS 637: Performed by: SURGERY

## 2017-10-12 PROCEDURE — 96376 TX/PRO/DX INJ SAME DRUG ADON: CPT | Performed by: EMERGENCY MEDICINE

## 2017-10-12 PROCEDURE — 83690 ASSAY OF LIPASE: CPT | Performed by: EMERGENCY MEDICINE

## 2017-10-12 PROCEDURE — 27210905 ZZH KIT CR7

## 2017-10-12 PROCEDURE — 99152 MOD SED SAME PHYS/QHP 5/>YRS: CPT

## 2017-10-12 PROCEDURE — 25000128 H RX IP 250 OP 636: Performed by: STUDENT IN AN ORGANIZED HEALTH CARE EDUCATION/TRAINING PROGRAM

## 2017-10-12 PROCEDURE — G0378 HOSPITAL OBSERVATION PER HR: HCPCS

## 2017-10-12 PROCEDURE — 96366 THER/PROPH/DIAG IV INF ADDON: CPT | Performed by: EMERGENCY MEDICINE

## 2017-10-12 PROCEDURE — 27210429 ZZH NUTRITION PRODUCT INTERMEDIATE LITER

## 2017-10-12 PROCEDURE — 96375 TX/PRO/DX INJ NEW DRUG ADDON: CPT | Performed by: EMERGENCY MEDICINE

## 2017-10-12 PROCEDURE — 0D2DX0Z CHANGE DRAINAGE DEVICE IN LOWER INTESTINAL TRACT, EXTERNAL APPROACH: ICD-10-PCS | Performed by: PHYSICIAN ASSISTANT

## 2017-10-12 PROCEDURE — 49452 REPLACE G-J TUBE PERC: CPT

## 2017-10-12 PROCEDURE — 96361 HYDRATE IV INFUSION ADD-ON: CPT | Performed by: EMERGENCY MEDICINE

## 2017-10-12 PROCEDURE — 96366 THER/PROPH/DIAG IV INF ADDON: CPT

## 2017-10-12 PROCEDURE — 99153 MOD SED SAME PHYS/QHP EA: CPT

## 2017-10-12 PROCEDURE — 25000125 ZZHC RX 250: Performed by: RADIOLOGY

## 2017-10-12 PROCEDURE — 99285 EMERGENCY DEPT VISIT HI MDM: CPT | Mod: 25 | Performed by: EMERGENCY MEDICINE

## 2017-10-12 PROCEDURE — 25000128 H RX IP 250 OP 636

## 2017-10-12 PROCEDURE — 99285 EMERGENCY DEPT VISIT HI MDM: CPT | Mod: Z6 | Performed by: EMERGENCY MEDICINE

## 2017-10-12 PROCEDURE — C1887 CATHETER, GUIDING: HCPCS

## 2017-10-12 PROCEDURE — 25000128 H RX IP 250 OP 636: Performed by: RADIOLOGY

## 2017-10-12 PROCEDURE — C1769 GUIDE WIRE: HCPCS

## 2017-10-12 PROCEDURE — 96365 THER/PROPH/DIAG IV INF INIT: CPT | Performed by: EMERGENCY MEDICINE

## 2017-10-12 PROCEDURE — 25000132 ZZH RX MED GY IP 250 OP 250 PS 637: Performed by: STUDENT IN AN ORGANIZED HEALTH CARE EDUCATION/TRAINING PROGRAM

## 2017-10-12 PROCEDURE — 85610 PROTHROMBIN TIME: CPT | Performed by: EMERGENCY MEDICINE

## 2017-10-12 PROCEDURE — 25000128 H RX IP 250 OP 636: Performed by: EMERGENCY MEDICINE

## 2017-10-12 PROCEDURE — 85025 COMPLETE CBC W/AUTO DIFF WBC: CPT | Performed by: EMERGENCY MEDICINE

## 2017-10-12 PROCEDURE — 25000125 ZZHC RX 250: Performed by: PHYSICIAN ASSISTANT

## 2017-10-12 PROCEDURE — 27210814 ZZ H TUBE GASTRO CR12

## 2017-10-12 PROCEDURE — 74177 CT ABD & PELVIS W/CONTRAST: CPT

## 2017-10-12 PROCEDURE — 96367 TX/PROPH/DG ADDL SEQ IV INF: CPT | Performed by: EMERGENCY MEDICINE

## 2017-10-12 RX ORDER — POTASSIUM CHLORIDE 29.8 MG/ML
20 INJECTION INTRAVENOUS
Status: DISCONTINUED | OUTPATIENT
Start: 2017-10-12 | End: 2017-10-12 | Stop reason: RX

## 2017-10-12 RX ORDER — POLYETHYLENE GLYCOL 3350 17 G/17G
17 POWDER, FOR SOLUTION ORAL DAILY PRN
Status: DISCONTINUED | OUTPATIENT
Start: 2017-10-12 | End: 2017-10-16 | Stop reason: HOSPADM

## 2017-10-12 RX ORDER — ONDANSETRON 2 MG/ML
4 INJECTION INTRAMUSCULAR; INTRAVENOUS EVERY 6 HOURS PRN
Status: DISCONTINUED | OUTPATIENT
Start: 2017-10-12 | End: 2017-10-16 | Stop reason: HOSPADM

## 2017-10-12 RX ORDER — PREDNISONE 5 MG/ML
5 SOLUTION ORAL DAILY
Status: DISCONTINUED | OUTPATIENT
Start: 2017-10-12 | End: 2017-10-16 | Stop reason: HOSPADM

## 2017-10-12 RX ORDER — HYDROMORPHONE HYDROCHLORIDE 1 MG/ML
INJECTION, SOLUTION INTRAMUSCULAR; INTRAVENOUS; SUBCUTANEOUS
Status: COMPLETED
Start: 2017-10-12 | End: 2017-10-12

## 2017-10-12 RX ORDER — TAMSULOSIN HYDROCHLORIDE 0.4 MG/1
0.4 CAPSULE ORAL DAILY
Status: DISCONTINUED | OUTPATIENT
Start: 2017-10-12 | End: 2017-10-16 | Stop reason: HOSPADM

## 2017-10-12 RX ORDER — PIPERACILLIN SODIUM, TAZOBACTAM SODIUM 3; .375 G/15ML; G/15ML
3.38 INJECTION, POWDER, LYOPHILIZED, FOR SOLUTION INTRAVENOUS EVERY 6 HOURS
Status: DISCONTINUED | OUTPATIENT
Start: 2017-10-12 | End: 2017-10-16 | Stop reason: HOSPADM

## 2017-10-12 RX ORDER — OXYCODONE HCL 5 MG/5 ML
10 SOLUTION, ORAL ORAL EVERY 4 HOURS PRN
Status: DISCONTINUED | OUTPATIENT
Start: 2017-10-12 | End: 2017-10-16 | Stop reason: HOSPADM

## 2017-10-12 RX ORDER — IOPAMIDOL 755 MG/ML
118 INJECTION, SOLUTION INTRAVASCULAR ONCE
Status: COMPLETED | OUTPATIENT
Start: 2017-10-12 | End: 2017-10-12

## 2017-10-12 RX ORDER — PIPERACILLIN SODIUM, TAZOBACTAM SODIUM 3; .375 G/15ML; G/15ML
3.38 INJECTION, POWDER, LYOPHILIZED, FOR SOLUTION INTRAVENOUS ONCE
Status: COMPLETED | OUTPATIENT
Start: 2017-10-12 | End: 2017-10-12

## 2017-10-12 RX ORDER — SODIUM CHLORIDE, SODIUM LACTATE, POTASSIUM CHLORIDE, CALCIUM CHLORIDE 600; 310; 30; 20 MG/100ML; MG/100ML; MG/100ML; MG/100ML
INJECTION, SOLUTION INTRAVENOUS CONTINUOUS
Status: DISCONTINUED | OUTPATIENT
Start: 2017-10-12 | End: 2017-10-16

## 2017-10-12 RX ORDER — POTASSIUM CL/LIDO/0.9 % NACL 10MEQ/0.1L
10 INTRAVENOUS SOLUTION, PIGGYBACK (ML) INTRAVENOUS
Status: DISCONTINUED | OUTPATIENT
Start: 2017-10-12 | End: 2017-10-16 | Stop reason: HOSPADM

## 2017-10-12 RX ORDER — HYDROMORPHONE HYDROCHLORIDE 1 MG/ML
0.5 INJECTION, SOLUTION INTRAMUSCULAR; INTRAVENOUS; SUBCUTANEOUS
Status: COMPLETED | OUTPATIENT
Start: 2017-10-12 | End: 2017-10-12

## 2017-10-12 RX ORDER — FENTANYL CITRATE 50 UG/ML
25-50 INJECTION, SOLUTION INTRAMUSCULAR; INTRAVENOUS EVERY 5 MIN PRN
Status: DISCONTINUED | OUTPATIENT
Start: 2017-10-12 | End: 2017-10-12 | Stop reason: HOSPADM

## 2017-10-12 RX ORDER — ONDANSETRON 4 MG/1
4 TABLET, ORALLY DISINTEGRATING ORAL EVERY 6 HOURS PRN
Status: DISCONTINUED | OUTPATIENT
Start: 2017-10-12 | End: 2017-10-16 | Stop reason: HOSPADM

## 2017-10-12 RX ORDER — NALOXONE HYDROCHLORIDE 0.4 MG/ML
.1-.4 INJECTION, SOLUTION INTRAMUSCULAR; INTRAVENOUS; SUBCUTANEOUS
Status: DISCONTINUED | OUTPATIENT
Start: 2017-10-12 | End: 2017-10-16 | Stop reason: HOSPADM

## 2017-10-12 RX ORDER — LIDOCAINE 40 MG/G
CREAM TOPICAL
Status: DISCONTINUED | OUTPATIENT
Start: 2017-10-12 | End: 2017-10-16 | Stop reason: HOSPADM

## 2017-10-12 RX ORDER — HYDROMORPHONE HYDROCHLORIDE 1 MG/ML
.3-.5 INJECTION, SOLUTION INTRAMUSCULAR; INTRAVENOUS; SUBCUTANEOUS
Status: DISCONTINUED | OUTPATIENT
Start: 2017-10-12 | End: 2017-10-16 | Stop reason: HOSPADM

## 2017-10-12 RX ORDER — WARFARIN SODIUM 5 MG/1
5 TABLET ORAL
Status: COMPLETED | OUTPATIENT
Start: 2017-10-12 | End: 2017-10-12

## 2017-10-12 RX ORDER — ONDANSETRON 2 MG/ML
4 INJECTION INTRAMUSCULAR; INTRAVENOUS EVERY 30 MIN PRN
Status: COMPLETED | OUTPATIENT
Start: 2017-10-12 | End: 2017-10-12

## 2017-10-12 RX ORDER — FLUMAZENIL 0.1 MG/ML
0.2 INJECTION, SOLUTION INTRAVENOUS
Status: DISCONTINUED | OUTPATIENT
Start: 2017-10-12 | End: 2017-10-12 | Stop reason: HOSPADM

## 2017-10-12 RX ORDER — NALOXONE HYDROCHLORIDE 0.4 MG/ML
.1-.4 INJECTION, SOLUTION INTRAMUSCULAR; INTRAVENOUS; SUBCUTANEOUS
Status: DISCONTINUED | OUTPATIENT
Start: 2017-10-12 | End: 2017-10-12 | Stop reason: HOSPADM

## 2017-10-12 RX ORDER — LIDOCAINE HYDROCHLORIDE 10 MG/ML
1-30 INJECTION, SOLUTION INFILTRATION; PERINEURAL
Status: DISCONTINUED | OUTPATIENT
Start: 2017-10-12 | End: 2017-10-12 | Stop reason: HOSPADM

## 2017-10-12 RX ORDER — MAGNESIUM SULFATE HEPTAHYDRATE 40 MG/ML
4 INJECTION, SOLUTION INTRAVENOUS EVERY 4 HOURS PRN
Status: DISCONTINUED | OUTPATIENT
Start: 2017-10-12 | End: 2017-10-16 | Stop reason: HOSPADM

## 2017-10-12 RX ORDER — POTASSIUM CHLORIDE 7.45 MG/ML
10 INJECTION INTRAVENOUS
Status: DISCONTINUED | OUTPATIENT
Start: 2017-10-12 | End: 2017-10-16 | Stop reason: HOSPADM

## 2017-10-12 RX ORDER — POTASSIUM CHLORIDE 1.5 G/1.58G
20-40 POWDER, FOR SOLUTION ORAL
Status: DISCONTINUED | OUTPATIENT
Start: 2017-10-12 | End: 2017-10-16 | Stop reason: HOSPADM

## 2017-10-12 RX ORDER — POTASSIUM CHLORIDE 750 MG/1
20-40 TABLET, EXTENDED RELEASE ORAL
Status: DISCONTINUED | OUTPATIENT
Start: 2017-10-12 | End: 2017-10-16 | Stop reason: HOSPADM

## 2017-10-12 RX ORDER — FENTANYL 75 UG/1
75 PATCH TRANSDERMAL
Status: DISCONTINUED | OUTPATIENT
Start: 2017-10-13 | End: 2017-10-16 | Stop reason: HOSPADM

## 2017-10-12 RX ORDER — SODIUM BICARBONATE 325 MG/1
325 TABLET ORAL EVERY 4 HOURS
Status: DISCONTINUED | OUTPATIENT
Start: 2017-10-12 | End: 2017-10-16 | Stop reason: HOSPADM

## 2017-10-12 RX ADMIN — PIPERACILLIN SODIUM AND TAZOBACTAM SODIUM 3.38 G: .375; 3 INJECTION, POWDER, LYOPHILIZED, FOR SOLUTION INTRAVENOUS at 10:06

## 2017-10-12 RX ADMIN — LIDOCAINE HYDROCHLORIDE 10 ML: 20 JELLY TOPICAL at 14:45

## 2017-10-12 RX ADMIN — Medication 20 MG: at 20:08

## 2017-10-12 RX ADMIN — PANCRELIPASE 48000 UNITS: 24000; 76000; 120000 CAPSULE, DELAYED RELEASE PELLETS ORAL at 20:08

## 2017-10-12 RX ADMIN — HYDROMORPHONE HYDROCHLORIDE 0.5 MG: 1 INJECTION, SOLUTION INTRAMUSCULAR; INTRAVENOUS; SUBCUTANEOUS at 19:25

## 2017-10-12 RX ADMIN — PIPERACILLIN SODIUM AND TAZOBACTAM SODIUM 3.38 G: 3; .375 INJECTION, POWDER, LYOPHILIZED, FOR SOLUTION INTRAVENOUS at 22:21

## 2017-10-12 RX ADMIN — HYDROMORPHONE HYDROCHLORIDE 0.5 MG: 10 INJECTION, SOLUTION INTRAMUSCULAR; INTRAVENOUS; SUBCUTANEOUS at 09:06

## 2017-10-12 RX ADMIN — HYDROMORPHONE HYDROCHLORIDE 0.5 MG: 1 INJECTION, SOLUTION INTRAMUSCULAR; INTRAVENOUS; SUBCUTANEOUS at 15:30

## 2017-10-12 RX ADMIN — ACETAMINOPHEN 975 MG: 325 SOLUTION ORAL at 17:07

## 2017-10-12 RX ADMIN — HYDROMORPHONE HYDROCHLORIDE 0.5 MG: 10 INJECTION, SOLUTION INTRAMUSCULAR; INTRAVENOUS; SUBCUTANEOUS at 12:19

## 2017-10-12 RX ADMIN — TAMSULOSIN HYDROCHLORIDE 0.4 MG: 0.4 CAPSULE ORAL at 16:38

## 2017-10-12 RX ADMIN — SODIUM CHLORIDE, PRESERVATIVE FREE 79 ML: 5 INJECTION INTRAVENOUS at 08:42

## 2017-10-12 RX ADMIN — SERTRALINE HYDROCHLORIDE 50 MG: 50 TABLET ORAL at 16:39

## 2017-10-12 RX ADMIN — HYDROMORPHONE HYDROCHLORIDE 0.5 MG: 10 INJECTION, SOLUTION INTRAMUSCULAR; INTRAVENOUS; SUBCUTANEOUS at 10:22

## 2017-10-12 RX ADMIN — ONDANSETRON 4 MG: 2 INJECTION INTRAMUSCULAR; INTRAVENOUS at 09:06

## 2017-10-12 RX ADMIN — FENTANYL CITRATE 200 MCG: 50 INJECTION, SOLUTION INTRAMUSCULAR; INTRAVENOUS at 14:44

## 2017-10-12 RX ADMIN — SODIUM CHLORIDE, POTASSIUM CHLORIDE, SODIUM LACTATE AND CALCIUM CHLORIDE: 600; 310; 30; 20 INJECTION, SOLUTION INTRAVENOUS at 16:39

## 2017-10-12 RX ADMIN — MIDAZOLAM 2 MG: 1 INJECTION INTRAMUSCULAR; INTRAVENOUS at 14:45

## 2017-10-12 RX ADMIN — WARFARIN SODIUM 5 MG: 5 TABLET ORAL at 19:25

## 2017-10-12 RX ADMIN — HYDROMORPHONE HYDROCHLORIDE 0.5 MG: 1 INJECTION, SOLUTION INTRAMUSCULAR; INTRAVENOUS; SUBCUTANEOUS at 07:31

## 2017-10-12 RX ADMIN — ONDANSETRON 4 MG: 2 INJECTION INTRAMUSCULAR; INTRAVENOUS at 10:22

## 2017-10-12 RX ADMIN — SODIUM BICARBONATE 325 MG: 325 TABLET ORAL at 20:08

## 2017-10-12 RX ADMIN — SODIUM CHLORIDE 1000 ML: 9 INJECTION, SOLUTION INTRAVENOUS at 07:33

## 2017-10-12 RX ADMIN — OXYCODONE HYDROCHLORIDE 10 MG: 5 SOLUTION ORAL at 22:21

## 2017-10-12 RX ADMIN — PIPERACILLIN SODIUM AND TAZOBACTAM SODIUM 3.38 G: 3; .375 INJECTION, POWDER, LYOPHILIZED, FOR SOLUTION INTRAVENOUS at 16:39

## 2017-10-12 RX ADMIN — IOPAMIDOL 118 ML: 755 INJECTION, SOLUTION INTRAVENOUS at 08:42

## 2017-10-12 RX ADMIN — VANCOMYCIN HYDROCHLORIDE 1500 MG: 10 INJECTION, POWDER, LYOPHILIZED, FOR SOLUTION INTRAVENOUS at 11:25

## 2017-10-12 RX ADMIN — OXYCODONE HYDROCHLORIDE 10 MG: 5 SOLUTION ORAL at 17:07

## 2017-10-12 RX ADMIN — ONDANSETRON 4 MG: 2 INJECTION INTRAMUSCULAR; INTRAVENOUS at 07:33

## 2017-10-12 ASSESSMENT — ENCOUNTER SYMPTOMS
ABDOMINAL PAIN: 1
ADENOPATHY: 0
DIFFICULTY URINATING: 0
SHORTNESS OF BREATH: 0
VOMITING: 1
LIGHT-HEADEDNESS: 0
NECK PAIN: 0
AGITATION: 0
CHILLS: 0
FEVER: 0
POLYDIPSIA: 0
NECK STIFFNESS: 0
NAUSEA: 1
BACK PAIN: 0
COLOR CHANGE: 1

## 2017-10-12 NOTE — PROCEDURES
Interventional Radiology Brief Post Procedure Note    Procedure: IR GASTRO JEJUNOSTOMY TUBE CHANGE    Proceduralist: Remy Caballero PA-C    Assistant: RT Amber(R)    Time Out: Prior to the start of the procedure and with procedural staff participation, I verbally confirmed the patient s identity using two indicators, relevant allergies, that the procedure was appropriate and matched the consent or emergent situation, and that the correct equipment/implants were available. Immediately prior to starting the procedure I conducted the Time Out with the procedural staff and re-confirmed the patient s name, procedure, and site/side. (The Joint Commission universal protocol was followed.)  Yes    Sedation: IR Nurse Monitored Care   Post Procedure Summary:  Prior to the start of the procedure and with procedural staff participation, I verbally confirmed the patient s identity using two indicators, relevant allergies, that the procedure was appropriate and matched the consent or emergent situation, and that the correct equipment/implants were available. Immediately prior to starting the procedure I conducted the Time Out with the procedural staff and re-confirmed the patient s name, procedure, and site/side. (The Joint Commission universal protocol was followed.)  Yes       Sedatives: Fentanyl and Midazolam (Versed)    Vital signs, airway and pulse oximetry were monitored and remained stable throughout the procedure and sedation was maintained until the procedure was complete.  The patient was monitored by staff until sedation discharge criteria were met.    Patient tolerance: Patient tolerated the procedure well with no immediate complications.    Time of sedation in minutes: 35 minutes from beginning to end of physician one to one monitoring.    Findings: CT shows balloon in subcutaneous tissue and surrounding abscess. Cellulitis surrounding gastrostomy. Completed fluoroscopy-guided over wire exchange of 22 [place]ian  gastrojejunostomy tube. Tube is ready for immediate use.    Estimated Blood Loss: Minimal    Fluoroscopy Time: 3.3 minute(s)    SPECIMENS: None    Complications: 1. None     Condition: Stable    Plan: Antibiotic course for gastrostomy cellulitis per primary team. Return in 9-12 months for routine exchange, or sooner if necessary.    Comments: See dictated procedure note for full details.    Remy Caballero PA-C

## 2017-10-12 NOTE — IP AVS SNAPSHOT
[address] [address]    Phone:  [phone]                                       After Visit Summary   10/12/2017    Lucas Brown    MRN: [id number]           After Visit Summary Signature Page     I have received my discharge instructions, and my questions have been answered. I have discussed any challenges I see with this plan with the nurse or doctor.    ..........................................................................................................................................  Patient/Patient Representative Signature      ..........................................................................................................................................  Patient Representative Print Name and Relationship to Patient    ..................................................               ................................................  Date                                            Time    ..........................................................................................................................................  Reviewed by Signature/Title    ...................................................              ..............................................  Date                                                            Time

## 2017-10-12 NOTE — PHARMACY-VANCOMYCIN DOSING SERVICE
Pharmacy Vancomycin Initial Note  Date of Service 2017  Patient's  1960  56 year old, male    Indication: Skin and Soft Tissue Infection    Current estimated CrCl = Estimated Creatinine Clearance: 60.8 mL/min (based on Cr of 1.66).    Creatinine for last 3 days  10/11/2017: 10:08 AM Creatinine 1.67 mg/dL  10/12/2017:  6:46 AM Creatinine 1.66 mg/dL    Recent Vancomycin Level(s) for last 3 days  No results found for requested labs within last 72 hours.      Vancomycin IV Administrations (past 72 hours)      No vancomycin orders with administrations in past 72 hours.                Nephrotoxins and other renal medications (Future)    Start     Dose/Rate Route Frequency Ordered Stop    10/12/17 0926  vancomycin place cheng - receiving intermittent dosing      1 each Does not apply SEE ADMIN INSTRUCTIONS 10/12/17 0926      10/12/17 0926  vancomycin (VANCOCIN) 1,500 mg in NaCl 0.9 % 250 mL intermittent infusion      1,500 mg  over 90 Minutes Intravenous ONCE 10/12/17 0925      10/12/17 0912  piperacillin-tazobactam (ZOSYN) 3.375 g vial to attach to  mL bag     Comments:  DO NOT USE THIS FIELD FOR ADMIN INSTRUCTIONS; INFORMATION DOES NOT SHOW ON MAR. USE THE FIELD ABOVE MARKED ADMIN INSTRUCTIONS    3.375 g  100 mL/hr over 1 Hours Intravenous ONCE 10/12/17 0911            Contrast Orders - past 72 hours (72h ago through future)    Start     Dose/Rate Route Frequency Ordered Stop    10/12/17 0823  iopamidol (ISOVUE-370) solution 118 mL      118 mL Intravenous ONCE 10/12/17 0822 10/12/17 0842                Plan:  1.  Start vancomycin  1500 mg IV once.   2.  Goal Trough Level: 10-15 mg/L   3.  Pharmacy will check trough levels as appropriate in 1-3 Days. Hx of previous toxic vanco level on vanco 1.5g IV Q12H back in 2017. His renal function has declined since then. Will monitor closely.  4. Serum creatinine levels will be ordered daily for the first week of therapy and at least twice  weekly for subsequent weeks.    5. [place] method utilized to dose vancomycin therapy: Method 1    Kelly Hong

## 2017-10-12 NOTE — PROGRESS NOTES
Interventional Radiology Pre-Procedure Sedation Assessment   Time of Assessment: 1:31 PM    Expected Level: Moderate Sedation    Indication: Sedation is required for the following type of Procedure: GI    Sedation and procedural consent: Risks, benefits and alternatives were discussed with Patient    PO Intake: Appropriately NPO for procedure    ASA Class: Class 2 - MILD SYSTEMIC DISEASE, NO ACUTE PROBLEMS, NO FUNCTIONAL LIMITATIONS.    Mallampati: Grade 2:  Soft palate, base of uvula, tonsillar pillars, and portion of posterior pharyngeal wall visible    Lungs: Lungs Clear with good breath sounds bilaterally    Heart: Normal heart sounds and rate    History and physical reviewed and no updates needed. I have reviewed the lab findings, diagnostic data, medications, and the plan for sedation. I have determined this patient to be an appropriate candidate for the planned sedation and procedure and have reassessed the patient IMMEDIATELY PRIOR to sedation and procedure.    Aldair Pierre MD

## 2017-10-12 NOTE — H&P
General Surgery History & Physical    Lucas Brown MRN# [id number]   Age: 56 year old YOB: 1960     Date of Admission:  10/12/2017         Assessment and Plan:   Assessment:   55 y/o M s/p Whipple procedure in August 2017 presenting with retracted percutaneous gastojejunostomy tube bulb and possible subcutaneous abscess vs cellulitis.         Plan:   -NPO with ice chips   -Continuous IVF   -Pain control   -Continue vancomycin and zosyn  -Admit to general surgery for observation   -Consult interventional radiology for GJ tube replacement   -Follow GJ placement with contrast study to ensure proper placement   -Consider evacuation of possible subcutaneous abscess surrounding GJ site     Scribed by Danielle Charles MS4     Discussed with staff, Dr. Aguirre    --  Gary Wilkerson MD  Gen Surg PGY1              Chief Complaint:   Abdominal pain at GJ site          History of Present Illness:   55 y/o M s/p Whipple procedure in August 2017 who was recently discharged from [facility] due to gastric outlet obstruction and high NG tube output presenting to the ED on 10/12 due to abdominal pain at his GJ site. Patient followed up with Dr. Crum on 10/10 and was asymptomatic and stable at that time. On the morning of 10/11, the patient began experiencing pain surrounding his GJ site. Pain was associated with intermittent chills and surrounding erythema around the GJ site. He also reports having cold-like symptoms the past few days. Patient has been flushing the J portion, but not G portion of his GJ tube since his recent discharge. Has continued to pass flatus. Last BM was 2 days ago. Denies fever, CP, SOB.             Past Medical History:     Past Medical History:   Diagnosis Date     Anticoagulation adequate with anticoagulant therapy      Antiplatelet or antithrombotic long-term use      Anxiety      BPH (benign prostatic hyperplasia)      Chronic pancreatitis (H)      Complete heart block (H)     medtronic ppm      Depression      GERD (gastroesophageal reflux disease)      Heart murmur      HTN (hypertension)      Hyperlipidemia      Hypothyroidism      COLBY (obstructive sleep apnea)     Not using CPAP     Other chronic pain      Pacemaker      Pancreatic disease      Pancreatitis              Past Surgical History:     Past Surgical History:   Procedure Laterality Date     aortic aneurysm       BIOPSY ARTERY TEMPORAL       ENDOSCOPIC RETROGRADE CHOLANGIOPANCREATOGRAM N/A 10/13/2015    Procedure: COMBINED ENDOSCOPIC RETROGRADE CHOLANGIOPANCREATOGRAPHY, PLACE TUBE/STENT;  Surgeon: Oniel Cates MD;  [facility]     ENDOSCOPIC RETROGRADE CHOLANGIOPANCREATOGRAM N/A 11/12/2015    Procedure: COMBINED ENDOSCOPIC RETROGRADE CHOLANGIOPANCREATOGRAPHY, REMOVE FOREIGN BODY OR STENT/TUBE;  Surgeon: Oniel Cates MD;  [facility]     ENDOSCOPIC RETROGRADE CHOLANGIOPANCREATOGRAM N/A 12/1/2015    Procedure: COMBINED ENDOSCOPIC RETROGRADE CHOLANGIOPANCREATOGRAPHY, PLACE TUBE/STENT;  Surgeon: Oniel Cates MD;  [facility]     ENDOSCOPIC RETROGRADE CHOLANGIOPANCREATOGRAM N/A 12/22/2015    Procedure: ENDOSCOPIC RETROGRADE CHOLANGIOPANCREATOGRAM;  Surgeon: Oniel Cates MD;  [facility]     ENDOSCOPIC RETROGRADE CHOLANGIOPANCREATOGRAM N/A 1/19/2016    Procedure: COMBINED ENDOSCOPIC RETROGRADE CHOLANGIOPANCREATOGRAPHY, REMOVE FOREIGN BODY OR STENT/TUBE;  Surgeon: Oniel Cates MD;  [facility]     ENDOSCOPIC RETROGRADE CHOLANGIOPANCREATOGRAM N/A 8/30/2016    Procedure: COMBINED ENDOSCOPIC RETROGRADE CHOLANGIOPANCREATOGRAPHY, PLACE TUBE/STENT;  Surgeon: Oniel Cates MD;  [facility]     ENDOSCOPIC RETROGRADE CHOLANGIOPANCREATOGRAM N/A 10/18/2016    Procedure: COMBINED ENDOSCOPIC RETROGRADE CHOLANGIOPANCREATOGRAPHY, REMOVE FOREIGN BODY OR STENT/TUBE;  Surgeon: Guru Amber Childs MD;  [facility]     ESOPHAGOSCOPY, GASTROSCOPY, DUODENOSCOPY  (EGD), COMBINED N/A 8/31/2016    Procedure: COMBINED ENDOSCOPIC ULTRASOUND, ESOPHAGOSCOPY, GASTROSCOPY, DUODENOSCOPY (EGD), FINE NEEDLE ASPIRATE/BIOPSY;  Surgeon: Loy Russ MD;  [facility]     ESOPHAGOSCOPY, GASTROSCOPY, DUODENOSCOPY (EGD), COMBINED N/A 10/18/2016    Procedure: COMBINED ESOPHAGOSCOPY, GASTROSCOPY, DUODENOSCOPY (EGD), REMOVE FOREIGN BODY;  Surgeon: Guru Amber Childs MD;  Location:  GI     ESOPHAGOSCOPY, GASTROSCOPY, DUODENOSCOPY (EGD), COMBINED N/A 5/15/2017    Procedure: COMBINED ENDOSCOPIC ULTRASOUND, ESOPHAGOSCOPY, GASTROSCOPY, DUODENOSCOPY (EGD), FINE NEEDLE ASPIRATE/BIOPSY;  Upper Endoscopic Ultrasound fine needle biopsy  ;  Surgeon: Titus Miguel MD;  Location:  OR      [facility] ENDOSCOPY W EUS N/A 10/9/2015    Procedure: COMBINED ENDOSCOPIC ULTRASOUND, ESOPHAGOSCOPY, GASTROSCOPY, DUODENOSCOPY (EGD);  Surgeon: Loy Russ MD;  Location:  GI     IMPLANT PACEMAKER  08/27/14     REPLACE VALVE AORTIC  08/27/14    X2, 1999 and 2014     WHIPPLE PROCEDURE N/A 8/28/2017    Procedure: WHIPPLE PROCEDURE;  pancreaticoduodenectomy ( whipple proceedure) umbilical hernia repair, incidental appendectomy , gastrojejunostomy tube placement, paraveretebral anesthia block.;  Surgeon: Vik Crum MD;  Location:  OR             Social History:     Social History   Substance Use Topics     Smoking status: Former Smoker     Packs/day: 1.50     Years: 25.00     Quit date: 3/9/2012     Smokeless tobacco: Former User      Comment: Quit 2012     Alcohol use No             Family History:     Family History   Problem Relation Age of Onset     Alzheimer Disease Mother                 Allergies:     Allergies   Allergen Reactions     Reclast [Zoledronic Acid] Other (See Comments)     Caused pain in joints     Codeine Other (See Comments)     Gets garcia             Medications:     Current Facility-Administered Medications   Medication     ondansetron  (ZOFRAN) injection 4 mg     sodium chloride 0.9 % bag 500mL for CT scan flush use     piperacillin-tazobactam (ZOSYN) 3.375 g vial to attach to  mL bag     vancomycin (VANCOCIN) 1,500 mg in NaCl 0.9 % 250 mL intermittent infusion     vancomycin place cheng - receiving intermittent dosing     Current Outpatient Prescriptions   Medication Sig     fentaNYL (DURAGESIC) 75 mcg/hr 72 hr patch Place 1 patch onto the skin every 72 hours     oxyCODONE (ROXICODONE) 5 MG/5ML solution 10 mLs (10 mg) by Per J Tube route every 4 hours as needed for moderate to severe pain     acetaminophen (TYLENOL) 32 mg/mL solution 31.25 mLs (1,000 mg) by Per J Tube route every 8 hours     amylase-lipase-protease (CREON 24) 64948-31448 UNITS CPEP per EC capsule 2 capsules (48,000 Units) by Per J Tube route every 4 hours     lactated ringers SOLN Inject 1,000 mLs into the vein daily     polyethylene glycol (MIRALAX/GLYCOLAX) Packet 17 g by Per J Tube route daily as needed for constipation     ondansetron (ZOFRAN) 4 MG tablet 1-2 tablets (4-8 mg) by Per J Tube route every 8 hours as needed for nausea     omeprazole (PRILOSEC) 2 mg/mL SUSP 10 mLs (20 mg) by Oral or Feeding Tube route 2 times daily     predniSONE 5 MG/5ML solution 5 mLs (5 mg) by Per J Tube route daily     sodium bicarbonate 325 MG tablet 1 tablet (325 mg) by Per J Tube route every 4 hours     warfarin (COUMADIN) 7.5 MG tablet Take 0.5 tablets (3.75 mg) by mouth daily     TAMSULOSIN HCL PO Take 0.4 mg by mouth daily     FINASTERIDE PO Take 5 mg by mouth daily     levothyroxine (SYNTHROID, LEVOTHROID) 150 MCG tablet Take 150 mcg by mouth daily     sertraline (ZOLOFT) 50 MG tablet Take 50 mg by mouth daily     lisinopril (PRINIVIL,ZESTRIL) 10 MG tablet Take 10 mg by mouth daily     Multiple Vitamins-Minerals (MULTIVITAMINS) CHEW Take 1 chew tab by mouth daily     pravastatin (PRAVACHOL) 40 MG tablet Take 40 mg by mouth daily     aspirin 81 MG tablet Take 81 mg by mouth daily  Holding     fenofibrate 145 MG tablet Take 145 mg by mouth daily     Facility-Administered Medications Ordered in Other Encounters   Medication     neostigmine (PROSTIGMINE) injection               Review of Systems:   10-point ROS otherwise negative except as noted above.          Physical Exam:   All vitals have been reviewed    Temp:  [98.4  F (36.9  C)] 98.4  F (36.9  C)  Pulse:  [87] 87  BP: (102)/(60) 102/60  SpO2:  [91 %-98 %] 96 %      No intake or output data in the 24 hours ending 10/12/17 0946      Physical Exam:  Gen: alert, responsive, NAD, comfortable in bed  CV: RRR  Pulm: NLB on RA, well-healed midline thoracic incision  Abd: GJ tube in LLQ, erythema surrounding GJ tube extending medially, periumbilical erythema, no masses, firm to palpation of erythematous areas, exquisite TTP surrounding GJ site, no crepitus   Ext: WWP          Data:   All laboratory data reviewed    Results:  BMP  Recent Labs  Lab 10/12/17  0646 10/11/17  1008 10/06/17  0919    133 138   POTASSIUM 4.8 4.9 4.1   CHLORIDE 102 97 101   CO2 26 29 31   BUN 46* 47* 20   CR 1.66* 1.67* 1.40*   * 115* 106*     CBC  Recent Labs  Lab 10/12/17  0646 10/06/17  0919   WBC 10.9 5.6   HGB 7.9* 8.2*    261     LFT  Recent Labs  Lab 10/12/17  0646 10/11/17  1008 10/09/17  0939 10/06/17  1137   AST 18  --   --   --    ALT 19  --   --   --    ALKPHOS 68  --   --   --    BILITOTAL 0.5  --   --   --    ALBUMIN 2.3*  --   --   --    INR 1.99* 2.24* 1.90* 1.65*       Recent Labs  Lab 10/12/17  0646 10/11/17  1008 10/06/17  0919   * 115* 106*       Imaging:  EXAMINATION: CT ABDOMEN PELVIS W CONTRAST, 10/12/2017 8:52 AM     TECHNIQUE:  Helical CT images from the lung bases through the  symphysis pubis were obtained with IV contrast. Contrast dose: 118 cc  of isovue 370     COMPARISON: 9/26/2017 CT abdomen/pelvis     HISTORY: Left sided abdominal pain; concern for abdominal wall abscess  vs deeper  infection     FINDINGS:     Abdomen and pelvis: Postsurgical changes of Whipple procedure with  pancreaticojejunostomy, hepaticojejunostomy, and gastrojejunostomy.  Percutaneous gastrojejunostomy tube crossing the gastrojejunostomy  with retraction of the bulb from its previous location in the stomach  to within the abdominal wall subcutaneous soft tissues. There is  pneumobilia, predominantly in the left hepatic lobe. Decreased  elongated air and fluid collection tracking along the lesser curve of  the remnant stomach towards the gastrojejunostomy and decreased  surrounding omental/peritoneal fat stranding. No bowel obstruction.  Mild atrophy of the remaining pancreatic body and tail. No pancreatic  ductal dilation.     The hepatic parenchyma and adrenal glands are unremarkable.  Splenomegaly, measuring 14.9 cm in craniocaudal dimension. Unchanged  small exophytic cyst arising from the lower pole of the left kidney.  The kidneys are otherwise unremarkable. No hydronephrosis,  hydroureter, or urinary tract stone. The bladder is unremarkable.  Mildly enlarged prostate. Symmetric seminal vesicles. No free fluid.  The major abdominal vessels are patent. No abdominal or pelvic  lymphadenopathy.      Lung bases/lower chest:  New centrilobular and peribronchial vascular  nodularity in the left lower lobe. Centrilobular emphysematous change  noted in the lung bases.     Bones and soft tissues: New rim-enhancing, multiloculated air and  fluid containing collection within the anterior left abdominal wall  musculature and subcutaneous fat just below the inflated percutaneous  gastrojejunostomy tube bulb within the abdominal wall. This collection  measures approximately 3.5 x 9.2 x 6.1 cm (AP by transverse by  longitudinal dimensions). Mild reactive inflammatory fat stranding in  the subjacent omentum/mesentery.    IMPRESSION:   1. Surgical changes of Whipple procedure with decreasing air and fluid  containing collection  tracking along the lesser curvature of the  stomach towards the gastrojejunostomy anastomosis.  2. The patient's percutaneous gastrojejunostomy tube bulb has  retracted from within the stomach to within the abdominal wall.  Associated multiloculated air and fluid containing abscess developing  within the abdominal wall musculature and subcutaneous fat extending  inferiorly from the gastrojejunostomy tube bulb. Reactive inflammation  noted within the subjacent omental/peritoneal fat, however, the nearby  transverse colon appears uninvolved.  3. New nodularity in the posterior left lower lobe is worrisome for  infection. Consider aspiration.     [Urgent Result: Retracted percutaneous gastrojejunostomy tube bulb and  associated abdominal wall abscess]

## 2017-10-12 NOTE — LETTER
Transition Communication Hand-off for Care Transitions to Next Level of Care Provider    Name: Lucas Brown  MRN #: [id number]  Primary Care Provider: ANDREW ADRIAN     Primary Clinic: [address] [address]     Reason for Hospitalization:  Cellulitis and abscess of trunk [L03.319, L02.219]  Admit Date/Time: 10/12/2017  6:31 AM  Discharge Date: 10/16/2017  Payor Source: Payor: [employer] / Plan: [employer] / Product Type: Indemnity /          Reason for Communication Hand-off Referral: Other continuity of care    Discharge Plan:  Discharged to home with [facility] for enteral nutrition and IV hydration support.  Will come to [facility] for INR lab draws.          Any outstanding tests or procedures:        Referrals     Future Labs/Procedures    Home infusion referral     Comments:    Your provider has referred you to: [facility]: [facility] [place] [phone]   [email or web address]    Local Address (if different from home address):  [address] [place] [address] [address]  [place], [place]     Anticipated Length of Therapy: Per MD Order  Enteral Nutrition  Isosource1.5 65ml/hr continuous  2 Packets Prosource daily    IV Hydration  1L LR Bolus Daily    Home Infusion Pharmacist to adjust therapy based on labs and clinical assessments: Yes    Labs:  May draw labs from Venous Catheter: Yes  Home Infusion Pharmacist to order labs based on therapy type and clinical assessments: Yes    Agency Staff to assess nursing needs for Infusion Therapy.    Access Device Management:  IV Access Type: PICC  Flush with Heparin and Normal Saline IVP PRN and routine site care (per agency protocol) to maintain access device? Yes            Elisabeth Shelton, RNCC  [phone]    AVS/Discharge Summary is the source of truth; this is a helpful guide for improved communication of patient story

## 2017-10-12 NOTE — ED NOTES
Patient arrived for abdominal pain at his feeding tube site. The site is markedly swollen and red. This started yesterday morning,. He recently had a whipple procedure and followed up on Tuesday with his dr who said everything was fine.

## 2017-10-12 NOTE — IP AVS SNAPSHOT
"    [address] [facility]: [phone]                                              INTERAGENCY TRANSFER FORM - PHYSICIAN ORDERS   10/12/2017                    Hospital Admission Date: 10/12/2017  AMELIE MCCRACKEN   : 1960  Sex: Male        Attending Provider: Loy Aguirre MD     Allergies:  Reclast [Zoledronic Acid], Codeine    Infection:  None   Service:  SURGERY    Ht:  1.803 m (5' 11\")   Wt:  90.8 kg (200 lb 1.6 oz)   Admission Wt:  87.5 kg (192 lb 14.4 oz)    BMI:  27.91 kg/m 2   BSA:  2.13 m 2            Patient PCP Information     Provider PCP Type    ANDREW ADRIAN [facility]      ED Clinical Impression     Diagnosis Description Comment Added By Time Added    Cellulitis and abscess of trunk [L03.319, L02.219] Cellulitis and abscess of trunk [L03.319, L02.219]  Samson Cole MD 10/12/2017  9:40 AM      Hospital Problems as of 10/16/2017              Priority Class Noted POA    Complication of feeding tube (H) Medium  10/12/2017 Yes    Abscess of abdominal wall Medium  10/13/2017 Yes      Non-Hospital Problems as of 10/16/2017              Priority Class Noted    Pancreatitis Medium  10/6/2015    S/P ERCP Medium  10/16/2015    Hypertension Medium  2015    Hyperlipidemia Medium  2015    Hypothyroidism Medium  2015    H/O aortic valve replacement Medium  2015    Sleep apnea Medium  2015    Abdominal pain Medium  2015    Atrioventricular block, complete (HCC) Medium  2015    Aneurysm of thoracic aorta (H) Medium  1/15/2016    Temporal arteritis (H) Medium  10/21/2016    Chronic pancreatitis (H) Medium  2017    Long-term (current) use of anticoagulants [Z79.01] Medium  2017    Electrolyte imbalance Medium  2017      Code Status History     Date Active Date Inactive Code Status Order ID Comments User Context    10/16/2017  2:55 PM  Full Code [id number]  Miguel Wilkerson MD Outpatient    10/13/2017  8:46 PM 10/16/2017  2:55 PM Full Code " [id number]  Shanti Sommer MD Inpatient    10/6/2017 11:50 AM 10/13/2017  8:46 PM Full Code [id number]  Miguel Wilkerson MD Outpatient    9/26/2017 12:02 PM 10/6/2017 11:50 AM Full Code [id number]  Miguel Wilkerson MD Inpatient    9/13/2017 12:20 PM 9/26/2017 12:02 PM Full Code [id number]  Mina Rosa MD Outpatient    7/22/2016 11:45 AM 9/13/2017 12:20 PM Full Code [id number]  Milagros Myles MD Outpatient    7/20/2016  3:43 PM 7/22/2016 11:45 AM Full Code [id number]  Milagros Myles MD Inpatient    1/22/2016  9:38 AM 7/20/2016  3:43 PM Full Code [id number]  Augustina Ibanez PA-C Outpatient    1/14/2016  7:58 PM 1/22/2016  9:38 AM Full Code [id number]  Nacho Hoang APRN CNP Inpatient    12/5/2015 10:53 AM 1/14/2016  7:58 PM Full Code [id number]  Hazel Ballesteros MD Outpatient    12/1/2015  8:05 PM 12/5/2015 10:53 AM Full Code [id number]  Alis Baker PA-C Inpatient    11/13/2015 11:05 AM 12/1/2015  8:05 PM Full Code [id number]  Loy Valiente MD Outpatient    11/12/2015  2:34 PM 11/13/2015 11:05 AM Full Code [id number]  Loy Valiente MD Inpatient    10/16/2015  1:47 PM 11/12/2015  2:34 PM Full Code [id number]  Bc Brooke MD Outpatient    10/6/2015  5:00 PM 10/16/2015  1:47 PM Full Code [id number]  Lashonda Finnegan MD Inpatient         Medication Review      START taking        Dose / Directions Comments    Lidocaine 4 % Gel        Externally apply topically 2 times daily   Quantity:  60 g   Refills:  0          CONTINUE these medications which may have CHANGED, or have new prescriptions. If we are uncertain of the size of tablets/capsules you have at home, strength may be listed as something that might have changed.        Dose / Directions Comments    * oxyCODONE 5 MG/5ML solution   Commonly known as:  ROXICODONE   This may have changed:  Another medication with the same name was added. Make sure you understand how and when to take each.   Used for:   Acute post-operative pain        Dose:  10 mg   10 mLs (10 mg) by Per J Tube route every 4 hours as needed for moderate to severe pain   Quantity:  840 mL   Refills:  0        * oxyCODONE 5 MG IR tablet   Commonly known as:  ROXICODONE   This may have changed:  You were already taking a medication with the same name, and this prescription was added. Make sure you understand how and when to take each.        Dose:  5-10 mg   Take 1-2 tablets (5-10 mg) by mouth every 4 hours as needed for moderate to severe pain or pain maximum 8 tablet(s) per day   Quantity:  80 tablet   Refills:  0        * polyethylene glycol Packet   Commonly known as:  MIRALAX/GLYCOLAX   Indication:  Constipation   This may have changed:  Another medication with the same name was added. Make sure you understand how and when to take each.   Used for:  Generalized abdominal pain        Dose:  17 g   17 g by Per J Tube route daily as needed for constipation   Quantity:  14 packet   Refills:  0        * polyethylene glycol powder   Commonly known as:  MIRALAX   This may have changed:  You were already taking a medication with the same name, and this prescription was added. Make sure you understand how and when to take each.        Dose:  1 capful   Take 17 g (1 capful) by mouth daily   Quantity:  510 g   Refills:  0        * Notice:  This list has 4 medication(s) that are the same as other medications prescribed for you. Read the directions carefully, and ask your doctor or other care provider to review them with you.      CONTINUE these medications which have NOT CHANGED        Dose / Directions Comments    acetaminophen 32 mg/mL solution   Commonly known as:  TYLENOL   Used for:  Generalized abdominal pain        Dose:  1000 mg   31.25 mLs (1,000 mg) by Per J Tube route every 8 hours   Quantity:  1000 mL   Refills:  0        amylase-lipase-protease [id number] UNITS Cpep per EC capsule   Commonly known as:  CREON 24   Used for:  Nausea and  vomiting, intractability of vomiting not specified, unspecified vomiting type        Dose:  2 capsule   2 capsules (48,000 Units) by Per J Tube route every 4 hours   Quantity:  180 capsule   Refills:  0        aspirin 81 MG tablet        Dose:  81 mg   81 mg by Per J Tube route daily   Refills:  0        fenofibrate 145 MG tablet        Dose:  145 mg   145 mg by Per J Tube route daily   Refills:  0        fentaNYL 75 mcg/hr 72 hr patch   Commonly known as:  DURAGESIC   Used for:  Generalized abdominal pain        Dose:  1 patch   Place 1 patch onto the skin every 72 hours   Quantity:  5 patch   Refills:  0        FINASTERIDE PO        Dose:  5 mg   5 mg by Per J Tube route daily   Refills:  0        lisinopril 10 MG tablet   Commonly known as:  PRINIVIL/ZESTRIL   Indication:  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        Dose:  10 mg   10 mg by Per J Tube route daily   Refills:  0        omeprazole 2 mg/mL Susp   Commonly known as:  priLOSEC   Used for:  Chronic pancreatitis, unspecified pancreatitis type (H)        Dose:  20 mg   10 mLs (20 mg) by Oral or Feeding Tube route 2 times daily   Quantity:  300 mL   Refills:  0        ondansetron 4 MG tablet   Commonly known as:  ZOFRAN   Used for:  Generalized abdominal pain        Dose:  4-8 mg   1-2 tablets (4-8 mg) by Per J Tube route every 8 hours as needed for nausea   Quantity:  90 tablet   Refills:  0        pravastatin 40 MG tablet   Commonly known as:  PRAVACHOL        Dose:  40 mg   40 mg by Per J Tube route daily   Refills:  0        predniSONE 5 MG/5ML solution   Used for:  Chronic pancreatitis, unspecified pancreatitis type (H)        Dose:  5 mg   5 mLs (5 mg) by Per J Tube route daily   Quantity:  70 mL   Refills:  0        sertraline 50 MG tablet   Commonly known as:  ZOLOFT        Dose:  50 mg   50 mg by Per J Tube route daily   Refills:  0        sodium bicarbonate 325 MG tablet   Used for:  Chronic pancreatitis, unspecified pancreatitis type  (H)        Dose:  325 mg   1 tablet (325 mg) by Per J Tube route every 4 hours   Quantity:  42 tablet   Refills:  1        TAMSULOSIN HCL PO        Dose:  0.4 mg   Take 0.4 mg by mouth daily   Refills:  0        WARFARIN SODIUM PO        by Per J Tube route daily Alternates 5mg and 2.5mg   Refills:  0                Summary of Visit     Reason for your hospital stay       Abdominal wall abscess             After Care     Activity       Your activity upon discharge: activity as tolerated       Diet       Follow this diet upon discharge: Nothing by mouth       Discharge Instructions       If questions or problems arise regarding tube function (e.g. leaking, dislodges, etc.) Contact Interventional Radiology department 24 hours a day.    For procedures that were done at the [facility],   8:00-4:30 PM Monday through Friday    Contact:[phone].    For afterhours and weekends call the [facility] main phone line [phone] and ask for the [facility] [place] [facility] IR on call physician number.    If DIRECTED by the RADIOLOGIST, related to specific problems with the tube functioning,  go to the Emergency Department.       Discharge Instructions       If questions or problems arise regarding tube function (e.g. leaking, dislodges, etc.) Contact Interventional Radiology department 24 hours a day.    For procedures that were done at the [facility],   8:00-4:30 PM Monday through Friday    Contact:[phone].    For afterhours and weekends call the [facility] [place] [facility] main phone line [phone] and ask for the [facility] [facility] IR on call physician number.    If DIRECTED by the RADIOLOGIST, related to specific problems with the tube functioning,  go to the Emergency Department.       Discharge Instructions       Patient to make a follow up appointment in IR clinic in 10-14 days for the removal of the retention sutures at the G or GJ tube site. Phone number is [phone] if needed.       Discharge Instructions        Discharge Instructions  Activity  - No strenuous exercise for 3 weeks.  - No lifting, pushing, pulling more than 15-20 pounds for 3 weeks.   - Do not strain with bowel movements.  - Do not drive until you can press the brake pedal quickly and fully without pain.   - Do not operate a motor vehicle while taking narcotic pain medications.     Medications  - Do not take any additional Tylenol (acetaminophen) while using a narcotic pain medication which includes acetaminophen.  - Do not take more than 4,000mg of acetaminophen in any 24 hour period, as this can cause liver damage.  - Take stool softeners such as Senna or Miralax while you are using narcotics, but stop if you develop diarrhea.   - Wean yourself off of narcotic pain medications.     Follow-Up:  - Call your primary care provider to touch base regarding your recent admission.     - Call or return sooner than your regularly scheduled visit if you develop any of the following: fever >101.5, uncontrolled pain, uncontrolled nausea or vomiting, as well as increased redness, swelling, or drainage from your wound.   -  A nurse from the General Surgery Clinic will contact you 24 hours, or next business day, after your discharge from the hospital.  If you have questions or to schedule a follow up appointment please call the General Surgery Clinic at [phone].  Call [phone] and ask to speak with the Surgery resident on call if you are having troubles in the evenings, at night, or on the weekend.  -  If you are receiving home care please inform your home care nurse of our contact number.       Tubes and drains       You are going home with the following tubes or drains: please keep your g tube vented. The j tube is for feeding and for meds.       Wound care and dressings       Instructions to care for your wound at home: Please use the lidocaine jelly to premedicate yourself for dressing changes. Let it numb the area for 5-10 minutes before changing the  dressing. Then pack the wound with one piece of wet kerlix and put an abd on top as we discussed. Please repeat this twice per day.             Referrals     Home infusion referral       Your provider has referred you to: [facility]: [facility] [phone]   [email or web address]    Local Address (if different from home address):  [address] [place] [address] [address]  [place], [place]     Anticipated Length of Therapy: Per MD Order  Enteral Nutrition  Isosource1.5 65ml/hr continuous  2 Packets Prosource daily    IV Hydration  1L LR Bolus Daily    Home Infusion Pharmacist to adjust therapy based on labs and clinical assessments: Yes    Labs:  May draw labs from Venous Catheter: Yes  Home Infusion Pharmacist to order labs based on therapy type and clinical assessments: Yes    Agency Staff to assess nursing needs for Infusion Therapy.    Access Device Management:  IV Access Type: PICC  Flush with Heparin and Normal Saline IVP PRN and routine site care (per agency protocol) to maintain access device? Yes             Your next 10 appointments already scheduled     Oct 26, 2017  7:20 AM CDT   (Arrive by 7:05 AM)   Return Visit with Vik Crum MD   [facility] ([facility])    [address] [place] [id number] [phone]            Nov 02, 2017  6:20 PM CDT   (Arrive by 6:05 PM)   Return Visit with Vik Crum MD   [facility] ([facility])    [address] [place] [id number]   [phone]              Statement of Approval     Ordered          10/16/17 1456  I have reviewed and agree with all the recommendations and orders detailed in this document.  EFFECTIVE NOW     Approved and electronically signed by:  Miguel Wilkerson MD

## 2017-10-12 NOTE — PHARMACY-ADMISSION MEDICATION HISTORY
Admission medication history interview status for the 10/12/2017 admission is complete. See Epic admission navigator for allergy information, pharmacy, prior to admission medications and immunization status.     Medication history interview sources:  Patient and spouse    Changes made to PTA medication list (reason)  Added: none  Deleted: Lactated Ringers, Levothyroxine, Multivitamin  Changed: Route to J-tube    Additional medication history information (including reliability of information, actions taken by pharmacist):All medications are given via J-tube except for the tamsulosin capsule.  Patient alternates warfarin 2.5mg with 5mg tablets. Fentanyl patch is due to be changed 10/13 PM      Prior to Admission medications    Medication Sig Last Dose Taking? Auth Provider   WARFARIN SODIUM PO by Per J Tube route daily Alternates 5mg and 2.5mg 10/11/2017 at 2.5mg Yes Unknown, Entered By History   acetaminophen (TYLENOL) 32 mg/mL solution 31.25 mLs (1,000 mg) by Per J Tube route every 8 hours 10/11/2017 at Unknown time Yes Joby Reyes MD   amylase-lipase-protease (CREON 24) [id number] UNITS CPEP per EC capsule 2 capsules (48,000 Units) by Per J Tube route every 4 hours  Yes Joby Reyes MD   fentaNYL (DURAGESIC) 75 mcg/hr 72 hr patch Place 1 patch onto the skin every 72 hours 10/12/2017 at Unknown time Yes Joby Reyes MD   oxyCODONE (ROXICODONE) 5 MG/5ML solution 10 mLs (10 mg) by Per J Tube route every 4 hours as needed for moderate to severe pain 10/12/2017 at 0337 Yes Joby Reyes MD   polyethylene glycol (MIRALAX/GLYCOLAX) Packet 17 g by Per J Tube route daily as needed for constipation  Yes Joby Reyes MD   ondansetron (ZOFRAN) 4 MG tablet 1-2 tablets (4-8 mg) by Per J Tube route every 8 hours as needed for nausea  Yes Joby Reyes MD   omeprazole (PRILOSEC) 2 mg/mL SUSP 10 mLs (20 mg) by Oral or Feeding Tube route 2 times  daily 10/11/2017 at Unknown time Yes Vik Crum MD   predniSONE 5 MG/5ML solution 5 mLs (5 mg) by Per J Tube route daily 10/11/2017 at Unknown time Yes Vik Crum MD   sodium bicarbonate 325 MG tablet 1 tablet (325 mg) by Per J Tube route every 4 hours  Yes Vik Crum MD   TAMSULOSIN HCL PO Take 0.4 mg by mouth daily 10/11/2017 at Unknown time Yes Unknown, Entered By History   FINASTERIDE PO 5 mg by Per J Tube route daily  10/11/2017 at Unknown time Yes Reported, Patient   sertraline (ZOLOFT) 50 MG tablet 50 mg by Per J Tube route daily  10/11/2017 at Unknown time Yes Unknown, Entered By History   lisinopril (PRINIVIL,ZESTRIL) 10 MG tablet 10 mg by Per J Tube route daily  10/11/2017 at Unknown time Yes Unknown, Entered By History   pravastatin (PRAVACHOL) 40 MG tablet 40 mg by Per J Tube route daily  10/11/2017 at Unknown time Yes Unknown, Entered By History   aspirin 81 MG tablet 81 mg by Per J Tube route daily  10/11/2017 at Unknown time Yes Unknown, Entered By History   fenofibrate 145 MG tablet 145 mg by Per J Tube route daily  10/11/2017 at Unknown time Yes Unknown, Entered By History         Medication history completed by:   Amna Taylor, PharmD, BCPS  October 12, 2017

## 2017-10-12 NOTE — PHARMACY-ANTICOAGULATION SERVICE
Clinical Pharmacy - Warfarin Dosing Consult     Pharmacy has been consulted to manage this patient s warfarin therapy.  Indication: Mechanical Aortic Valve Replacement  Therapy Goal: INR 2-3  Warfarin Prior to Admission: Yes  Warfarin PTA Regimen: 2.5-5mg daily; per anticoag note 10/11 plan was: 10/11: 2.5mg; 10/12: 5mg; 10/13: 5mg; 10/14:5 mg; 10/15:5 mg; 10/16: 5mg; 10/17:5 mg; last dose was 10/11  Significant drug interactions: aspirin, fenofibrate, omeprazole, prednisone, sertraline     INR   Date Value Ref Range Status   10/12/2017 1.99 (H) 0.86 - 1.14 Final   10/11/2017 2.24 (H) 0.86 - 1.14 Final       Recommend warfarin 5 mg today.  Pharmacy will monitor Lucas Brown daily and order warfarin doses to achieve specified goal.      Please contact pharmacy as soon as possible if the warfarin needs to be held for a procedure or if the warfarin goals change.      Berna Varner, PharmD  Pager: [phone]

## 2017-10-12 NOTE — PROGRESS NOTES
Interventional Radiology Intra-procedural Nursing Note    Patient Name: Lucas Brown  Medical Record Number: [id number]  Today's Date: October 12, 2017    Start Time: 1400  End of procedure time: 1435  Procedure: GJ tube exchange  Report given to: Katie OLMEDO  Time pt departs:  1505      Other Notes: pt from ER to IR 5, Pt a/o x 3, consent obtained and verified. Pt positioned and prepped per procedure protocol. Fentanyl 200 mcg, versed 2 mg, urojet 10 ml, St 35 minutes  KELLIE PIERSON

## 2017-10-12 NOTE — CONSULTS
Patient is on IR schedule 10/12/2017 for a GJ tube change.   Keep NPO and hold tube feeds until after procedure.  Consent will be done prior to procedure.     Please contact the IR [place] RN at [phone] for estimated time of procedure.     Case discussed with Dr. Garcia from IR and Dr. Wilkerson.    Abigail Marques, TUTU, APRN  Interventional Radiology  Phone: [phone]  Pager: [phone]

## 2017-10-12 NOTE — ED PROVIDER NOTES
History     Chief Complaint   Patient presents with     Abdominal Pain     HPI  Lucas Brown is a 56-year-old man presenting with 2 days of left-sided abdominal pain. Pain is constant, throbbing, moderate-to-severe, nonradiating, movement makes it worse, rest improves it. He was seen by his surgeon, Dr. Yanes, 2 days ago, and at that time he did not have these symptoms. He is status-post Whipple procedure approximately 1.5 months ago. He does have nausea and dry heaving. No fevers. No other concerns or complaints.    This part of the medical record was transcribed by Wilfred Pozo Scribjennie.     Past Medical History:   Diagnosis Date     Anticoagulation adequate with anticoagulant therapy      Antiplatelet or antithrombotic long-term use      Anxiety      BPH (benign prostatic hyperplasia)      Chronic pancreatitis (H)      Complete heart block (H)     medtronic ppm     Depression      GERD (gastroesophageal reflux disease)      Heart murmur      HTN (hypertension)      Hyperlipidemia      Hypothyroidism      COLBY (obstructive sleep apnea)     Not using CPAP     Other chronic pain      Pacemaker      Pancreatic disease      Pancreatitis        Past Surgical History:   Procedure Laterality Date     aortic aneurysm       BIOPSY ARTERY TEMPORAL       ENDOSCOPIC RETROGRADE CHOLANGIOPANCREATOGRAM N/A 10/13/2015    Procedure: COMBINED ENDOSCOPIC RETROGRADE CHOLANGIOPANCREATOGRAPHY, PLACE TUBE/STENT;  Surgeon: Oniel Cates MD;  Location: [facility]     ENDOSCOPIC RETROGRADE CHOLANGIOPANCREATOGRAM N/A 11/12/2015    Procedure: COMBINED ENDOSCOPIC RETROGRADE CHOLANGIOPANCREATOGRAPHY, REMOVE FOREIGN BODY OR STENT/TUBE;  Surgeon: Oniel Catse MD;  Location: [facility]     ENDOSCOPIC RETROGRADE CHOLANGIOPANCREATOGRAM N/A 12/1/2015    Procedure: COMBINED ENDOSCOPIC RETROGRADE CHOLANGIOPANCREATOGRAPHY, PLACE TUBE/STENT;  Surgeon: Oniel Cates MD;  Location: [facility]     ENDOSCOPIC RETROGRADE  CHOLANGIOPANCREATOGRAM N/A 12/22/2015    Procedure: ENDOSCOPIC RETROGRADE CHOLANGIOPANCREATOGRAM;  Surgeon: Oniel Cates MD;  Location: [facility]     ENDOSCOPIC RETROGRADE CHOLANGIOPANCREATOGRAM N/A 1/19/2016    Procedure: COMBINED ENDOSCOPIC RETROGRADE CHOLANGIOPANCREATOGRAPHY, REMOVE FOREIGN BODY OR STENT/TUBE;  Surgeon: Oniel Cates MD;  Location: [facility]     ENDOSCOPIC RETROGRADE CHOLANGIOPANCREATOGRAM N/A 8/30/2016    Procedure: COMBINED ENDOSCOPIC RETROGRADE CHOLANGIOPANCREATOGRAPHY, PLACE TUBE/STENT;  Surgeon: Oniel Cates MD;  Location: [facility]     ENDOSCOPIC RETROGRADE CHOLANGIOPANCREATOGRAM N/A 10/18/2016    Procedure: COMBINED ENDOSCOPIC RETROGRADE CHOLANGIOPANCREATOGRAPHY, REMOVE FOREIGN BODY OR STENT/TUBE;  Surgeon: Guru Amber Childs MD;  Location: [facility]     ESOPHAGOSCOPY, GASTROSCOPY, DUODENOSCOPY (EGD), COMBINED N/A 8/31/2016    Procedure: COMBINED ENDOSCOPIC ULTRASOUND, ESOPHAGOSCOPY, GASTROSCOPY, DUODENOSCOPY (EGD), FINE NEEDLE ASPIRATE/BIOPSY;  Surgeon: Loy Russ MD;  Location: [facility]     ESOPHAGOSCOPY, GASTROSCOPY, DUODENOSCOPY (EGD), COMBINED N/A 10/18/2016    Procedure: COMBINED ESOPHAGOSCOPY, GASTROSCOPY, DUODENOSCOPY (EGD), REMOVE FOREIGN BODY;  Surgeon: Guru Amber Childs MD;  Location: [facility]     ESOPHAGOSCOPY, GASTROSCOPY, DUODENOSCOPY (EGD), COMBINED N/A 5/15/2017    Procedure: COMBINED ENDOSCOPIC ULTRASOUND, ESOPHAGOSCOPY, GASTROSCOPY, DUODENOSCOPY (EGD), FINE NEEDLE ASPIRATE/BIOPSY;  Upper Endoscopic Ultrasound fine needle biopsy  ;  Surgeon: Titus Miguel MD;  Location: [facility]     HC UGI ENDOSCOPY W EUS N/A 10/9/2015    Procedure: COMBINED ENDOSCOPIC ULTRASOUND, ESOPHAGOSCOPY, GASTROSCOPY, DUODENOSCOPY (EGD);  Surgeon: Loy Russ MD;  Location: [facility]     IMPLANT PACEMAKER  08/27/14     REPLACE VALVE AORTIC  08/27/14    X2, 1999 and 2014     WHIPPLE PROCEDURE N/A 8/28/2017    Procedure: WHIPPLE  PROCEDURE;  pancreaticoduodenectomy ( whipple proceedure) umbilical hernia repair, incidental appendectomy , gastrojejunostomy tube placement, paraveretebral anesthia block.;  Surgeon: Vik Crum MD;  Location:  OR       Family History   Problem Relation Age of Onset     Alzheimer Disease Mother        Social History   Substance Use Topics     Smoking status: Former Smoker     Packs/day: 1.50     Years: 25.00     Quit date: 3/9/2012     Smokeless tobacco: Former User      Comment: Quit 2012     Alcohol use No     Current Facility-Administered Medications   Medication     sodium chloride 0.9 % bag 500mL for CT scan flush use     vancomycin place cheng - receiving intermittent dosing     lidocaine 1 % injection 1-30 mL     midazolam (VERSED) injection 0.5-1 mg     flumazenil (ROMAZICON) injection 0.2 mg     fentaNYL (PF) (SUBLIMAZE) injection 25-50 mcg     naloxone (NARCAN) injection 0.1-0.4 mg     Facility-Administered Medications Ordered in Other Encounters   Medication     neostigmine (PROSTIGMINE) injection        Allergies   Allergen Reactions     Reclast [Zoledronic Acid] Other (See Comments)     Caused pain in joints     Codeine Other (See Comments)     Gets garcia        I have reviewed the Medications, Allergies, Past Medical and Surgical History, and Social History in the Epic system.    Review of Systems   Constitutional: Negative for chills and fever.   HENT: Negative for congestion.    Eyes: Negative for visual disturbance.   Respiratory: Negative for shortness of breath.    Cardiovascular: Negative for chest pain.   Gastrointestinal: Positive for abdominal pain, nausea and vomiting.   Endocrine: Negative for polydipsia and polyuria.   Genitourinary: Negative for difficulty urinating.   Musculoskeletal: Negative for back pain, neck pain and neck stiffness.   Skin: Positive for color change.   Neurological: Negative for light-headedness.   Hematological: Negative for adenopathy.    Psychiatric/Behavioral: Negative for agitation and behavioral problems.       Physical Exam   BP: 102/60  Pulse: 87  Heart Rate: 73  Temp: 98.4  F (36.9  C)  Resp: 18  Weight: 87.5 kg (192 lb 14.4 oz)  SpO2: 98 %       Physical Exam   Constitutional: He is oriented to person, place, and time. He appears well-developed and well-nourished. No distress.   HENT:   Head: Normocephalic and atraumatic.   Mouth/Throat: Oropharynx is clear and moist. No oropharyngeal exudate.   Eyes: Conjunctivae and EOM are normal. Pupils are equal, round, and reactive to light. No scleral icterus.   Neck: Normal range of motion.   Cardiovascular: Normal rate, normal heart sounds and intact distal pulses.    Pulmonary/Chest: Effort normal and breath sounds normal. No respiratory distress.   Abdominal: Soft. Bowel sounds are normal. He exhibits no distension. There is tenderness. There is no rebound and no guarding.   Erythema and induration in left abdomen with firmness and tenderness. No drainage around the GJ tube in LUQ.   Musculoskeletal: Normal range of motion. He exhibits no edema or tenderness.   Neurological: He is alert and oriented to person, place, and time. No cranial nerve deficit. He exhibits normal muscle tone. Coordination normal.   Skin: Skin is warm. No rash noted. He is not diaphoretic.   Psychiatric: He has a normal mood and affect. His behavior is normal. Judgment and thought content normal.   Nursing note and vitals reviewed.      ED Course     ED Course     Procedures             Critical Care time:  none             Labs Ordered and Resulted from Time of ED Arrival Up to the Time of Departure from the ED   CBC WITH PLATELETS DIFFERENTIAL - Abnormal; Notable for the following:        Result Value    RBC Count 3.09 (*)     Hemoglobin 7.9 (*)     Hematocrit 25.6 (*)     MCH 25.6 (*)     MCHC 30.9 (*)     RDW 16.0 (*)     Absolute Neutrophil 8.5 (*)     All other components within normal limits   COMPREHENSIVE  METABOLIC PANEL - Abnormal; Notable for the following:     Glucose 118 (*)     Urea Nitrogen 46 (*)     Creatinine 1.66 (*)     GFR Estimate 43 (*)     GFR Estimate If Black 52 (*)     Calcium 8.1 (*)     Albumin 2.3 (*)     All other components within normal limits   INR - Abnormal; Notable for the following:     INR 1.99 (*)     All other components within normal limits   LIPASE       Consults  Surgery: Responded (Gen) (10/12/17 5970)    Assessments & Plan (with Medical Decision Making)   56-year-old man presenting with abdominal pain. Differential diagnosis, subcutaneous abscess, cellulitis, intra-abdominal abscess, perforation.    After thorough history and physical exam patient appears to be in mild distress secondary to pain. I ll treat his pain with IV Dilaudid and his nausea with IV Zofran. I obtained laboratory studies which returned without any evidence of leukocytosis, WBC is normal at 10,900. There is anemia with hemoglobin of 7.9, however, this is close to patient s baseline. Electrolytes show elevated creatinine at 1.66 which is slightly elevated from the baseline. LFTs are normal. INR is elevated at 1.99. Lipase is normal at 180. I obtained a CT abdomen/pelvis and the reviewed the study and discussed with the radiologist; it appears the patient has abdominal wall abscess and that the balloon of the GJ tube has migrated outside the stomach. There are foci of air which, according to the radiologist, appear to be contained within the abscess. General surgery was paged to evaluate the patient. I did initiate intravenous Zosyn and intravenous vancomycin to treat his infection. He will be admitted to the hospital for further management.     This part of the document was transcribed by Wilfred Mendosa Scribjennie.      I have reviewed the nursing notes.    I have reviewed the findings, diagnosis, plan and need for follow up with the patient.    Current Discharge Medication List          Final diagnoses:    Cellulitis and abscess of trunk     I was physically present and have reviewed and verified the accuracy of this note documented by my scribe.    Samson Cole MD      10/12/2017   [facility], [place], EMERGENCY DEPARTMENT     Samson Cole MD  10/12/17 [id number]

## 2017-10-12 NOTE — IP AVS SNAPSHOT
MRN:[id number]                      After Visit Summary   10/12/2017    Lucas Brown    MRN: [id number]           Thank you!     Thank you for choosing [facility] for your care. Our goal is always to provide you with excellent care. Hearing back from our patients is one way we can continue to improve our services. Please take a few minutes to complete the written survey that you may receive in the mail after you visit with us. Thank you!        Patient Information     Date Of Birth          1960        Designated Caregiver       Most Recent Value    Caregiver    Will someone help with your care after discharge? yes    Name of designated caregiver Karmen    Phone number of caregiver [phone]    Caregiver address same as patient      About your hospital stay     You were admitted on:  October 12, 2017 You last received care in the:  [address] [facility]    You were discharged on:  October 16, 2017        Reason for your hospital stay       Abdominal wall abscess                  Who to Call     For medical emergencies, please call 911.  For non-urgent questions about your medical care, please call your primary care provider or clinic, [phone]  For questions related to your surgery, please call your surgery clinic        Attending Provider     Provider Specialty    Samson Cole MD Emergency Medicine    Timothy, Loy Mata MD General Surgery       Primary Care Provider Office Phone # Fax #    Cyril Mackay [phone]      After Care Instructions     Activity       Your activity upon discharge: activity as tolerated            Diet       Follow this diet upon discharge: Nothing by mouth            Discharge Instructions       If questions or problems arise regarding tube function (e.g. leaking, dislodges, etc.) Contact Interventional Radiology department 24 hours a day.    For procedures that were done at the [facility] sites,   8:00-4:30 PM Monday  through Friday    Contact:[phone].    For afterhours and weekends call the [facility] main phone line [phone] and ask for the [facility] IR on call physician number.    If DIRECTED by the RADIOLOGIST, related to specific problems with the tube functioning,  go to the Emergency Department.            Discharge Instructions       If questions or problems arise regarding tube function (e.g. leaking, dislodges, etc.) Contact Interventional Radiology department 24 hours a day.    For procedures that were done at the [facility] sites,   8:00-4:30 PM Monday through Friday    Contact:[phone].    For afterhours and weekends call the [facility] main phone line [phone] and ask for the [facility] IR on call physician number.    If DIRECTED by the RADIOLOGIST, related to specific problems with the tube functioning,  go to the Emergency Department.            Discharge Instructions       Patient to make a follow up appointment in IR clinic in 10-14 days for the removal of the retention sutures at the G or GJ tube site. Phone number is [phone] if needed.            Discharge Instructions       Discharge Instructions  Activity  - No strenuous exercise for 3 weeks.  - No lifting, pushing, pulling more than 15-20 pounds for 3 weeks.   - Do not strain with bowel movements.  - Do not drive until you can press the brake pedal quickly and fully without pain.   - Do not operate a motor vehicle while taking narcotic pain medications.     Medications  - Do not take any additional Tylenol (acetaminophen) while using a narcotic pain medication which includes acetaminophen.  - Do not take more than 4,000mg of acetaminophen in any 24 hour period, as this can cause liver damage.  - Take stool softeners such as Senna or Miralax while you are using narcotics, but stop if you develop diarrhea.   - Wean yourself off of narcotic pain medications.     Follow-Up:  - Call your primary care provider to touch base  regarding your recent admission.     - Call or return sooner than your regularly scheduled visit if you develop any of the following: fever >101.5, uncontrolled pain, uncontrolled nausea or vomiting, as well as increased redness, swelling, or drainage from your wound.   -  A nurse from the General Surgery Clinic will contact you 24 hours, or next business day, after your discharge from the hospital.  If you have questions or to schedule a follow up appointment please call the General Surgery Clinic at [phone].  Call [phone] and ask to speak with the Surgery resident on call if you are having troubles in the evenings, at night, or on the weekend.  -  If you are receiving home care please inform your home care nurse of our contact number.            Tubes and drains       You are going home with the following tubes or drains: please keep your g tube vented. The j tube is for feeding and for meds.            Wound care and dressings       Instructions to care for your wound at home: Please use the lidocaine jelly to premedicate yourself for dressing changes. Let it numb the area for 5-10 minutes before changing the dressing. Then pack the wound with one piece of wet kerlix and put an abd on top as we discussed. Please repeat this twice per day.                  Your next 10 appointments already scheduled     Oct 26, 2017  7:20 AM CDT   (Arrive by 7:05 AM)   Return Visit with Vik Crum MD   [facility] ([facility])    [address] [place] [id number] [phone]            Nov 02, 2017  6:20 PM CDT   (Arrive by 6:05 PM)   Return Visit with Vik Crum MD   [facility] ([facility])    [address] [place] [id number] [phone]              Additional Services     Home infusion referral       Your provider has referred you to: MELISSA: [facility] [place] [phone]   [email or web address]    Local Address (if different from home address):  [address] [address]  [place], [place]     Anticipated Length of Therapy: Per MD Order  Enteral Nutrition  Isosource1.5 65ml/hr continuous  2 Packets Prosource daily    IV Hydration  1L LR Bolus Daily    Home Infusion Pharmacist to adjust therapy based on labs and clinical assessments: Yes    Labs:  May draw labs from Venous Catheter: Yes  Home Infusion Pharmacist to order labs based on therapy type and clinical assessments: Yes    Agency Staff to assess nursing needs for Infusion Therapy.    Access Device Management:  IV Access Type: PICC  Flush with Heparin and Normal Saline IVP PRN and routine site care (per agency protocol) to maintain access device? Yes                  Warfarin Instruction     You have started taking a medicine called warfarin. This is a blood-thinning medicine (anticoagulant). It helps prevent and treat blood clots.      Before leaving the hospital, make sure you know how much to take and how long to take it.      You will need regular blood tests to make sure your blood is clotting safely. It is very important to see your doctor for regular blood tests.    Talk to your doctor before taking any new medicine (this includes over-the-counter drugs and herbal products). Many medicines can interact with warfarin. This may cause more bleeding or too much clotting.     Eating a lot of vitamin K--found in green, leafy vegetables--can change the way warfarin works in your body. Do NOT avoid these foods. Instead, try to eat the same amount each day.     Bleeding is the most common side-effect of warfarin. You may notice bleeding gums, a bloody nose, bruises and bleeding longer when you cut yourself. See a doctor at once if:   o You cough up blood  o You find blood in your stool (poop)  o You have a deep cut, or a cut that bleeds longer than  "10 minutes   o You have a bad cut, hard fall, accident or hit your head (go to urgent care or the emergency room).    For women who can get pregnant: This medicine can harm an unborn baby. Be very careful not to get pregnant while taking this medicine. If you think you might be pregnant, call your doctor right away.    For more information, read \"Guide to Warfarin Therapy,  the booklet you received in the hospital.        Pending Results     Date and Time Order Name Status Description    10/13/2017 1843 Cytology non gyn In process     10/13/2017 1843 Fungus Culture, non-blood Preliminary     10/13/2017 1843 Anaerobic bacterial culture Preliminary     10/13/2017 1842 Abscess Culture Aerobic Bacterial Preliminary             Statement of Approval     Ordered          10/16/17 2688  I have reviewed and agree with all the recommendations and orders detailed in this document.  EFFECTIVE NOW     Approved and electronically signed by:  Miguel Wilkerson MD             Admission Information     Date & Time Provider Department Dept. Phone    10/12/2017 Loy Aguirre MD [address] [facility] [phone]      Your Vitals Were     Blood Pressure Pulse Temperature Respirations Weight Pulse Oximetry    134/71 (BP Location: Left arm) 69 96  F (35.6  C) (Oral) 18 90.8 kg (200 lb 1.6 oz) 98%    BMI (Body Mass Index)                   27.91 kg/m2           MyChart Information     [email or web address] gives you secure access to your electronic health record. If you see a primary care provider, you can also send messages to your care team and make appointments. If you have questions, please call your primary care clinic.  If you do not have a primary care provider, please call [phone] and they will assist you.        Care EveryWhere ID     This is your Care EveryWhere ID. This could be used by other organizations to access your [facility] medical records  [id number]        Equal Access to Services     LEE CORNELL AH: Sandeep birch " Sonick, watejda luqadaha, qaybta kaalmada joselin, rimma kylein hayaan coreyshelby janetisidro velia michael. So [facility] [phone].    ATENCIÓN: Si jaelyn tabares, tiene a rodriguez disposición servicios gratuitos de asistencia lingüística. Roberto al [phone].    We comply with applicable federal civil rights laws and [place] laws. We do not discriminate on the basis of race, color, national origin, age, disability, sex, sexual orientation, or gender identity.               Review of your medicines      START taking        Dose / Directions    Lidocaine 4 % Gel        Externally apply topically 2 times daily   Quantity:  60 g   Refills:  0         CONTINUE these medicines which may have CHANGED, or have new prescriptions. If we are uncertain of the size of tablets/capsules you have at home, strength may be listed as something that might have changed.        Dose / Directions    * oxyCODONE 5 MG/5ML solution   Commonly known as:  ROXICODONE   This may have changed:  Another medication with the same name was added. Make sure you understand how and when to take each.   Used for:  Acute post-operative pain        Dose:  10 mg   10 mLs (10 mg) by Per J Tube route every 4 hours as needed for moderate to severe pain   Quantity:  840 mL   Refills:  0       * oxyCODONE 5 MG/5ML solution   Commonly known as:  ROXICODONE   This may have changed:  You were already taking a medication with the same name, and this prescription was added. Make sure you understand how and when to take each.   Used for:  Cellulitis and abscess of trunk        Dose:  5-10 mg   Take 5-10 mLs (5-10 mg) by mouth every 4 hours as needed for moderate to severe pain or pain maximum 60 mL per day   Quantity:  500 mL   Refills:  0       * polyethylene glycol Packet   Commonly known as:  MIRALAX/GLYCOLAX   Indication:  Constipation   This may have changed:  Another medication with the same name was added. Make sure you understand how and when to take each.   Used for:   Generalized abdominal pain        Dose:  17 g   17 g by Per J Tube route daily as needed for constipation   Quantity:  14 packet   Refills:  0       * polyethylene glycol powder   Commonly known as:  MIRALAX   This may have changed:  You were already taking a medication with the same name, and this prescription was added. Make sure you understand how and when to take each.        Dose:  1 capful   Take 17 g (1 capful) by mouth daily   Quantity:  510 g   Refills:  0       * Notice:  This list has 4 medication(s) that are the same as other medications prescribed for you. Read the directions carefully, and ask your doctor or other care provider to review them with you.      CONTINUE these medicines which have NOT CHANGED        Dose / Directions    acetaminophen 32 mg/mL solution   Commonly known as:  TYLENOL   Used for:  Generalized abdominal pain        Dose:  1000 mg   31.25 mLs (1,000 mg) by Per J Tube route every 8 hours   Quantity:  2000 mL   Refills:  1       amylase-lipase-protease [id number] UNITS Cpep per EC capsule   Commonly known as:  CREON 24   Used for:  Nausea and vomiting, intractability of vomiting not specified, unspecified vomiting type        Dose:  2 capsule   2 capsules (48,000 Units) by Per J Tube route every 4 hours   Quantity:  180 capsule   Refills:  0       aspirin 81 MG tablet        Dose:  81 mg   81 mg by Per J Tube route daily   Refills:  0       fenofibrate 145 MG tablet        Dose:  145 mg   145 mg by Per J Tube route daily   Refills:  0       fentaNYL 75 mcg/hr 72 hr patch   Commonly known as:  DURAGESIC   Used for:  Generalized abdominal pain        Dose:  1 patch   Place 1 patch onto the skin every 72 hours   Quantity:  5 patch   Refills:  0       FINASTERIDE PO        Dose:  5 mg   5 mg by Per J Tube route daily   Refills:  0       lisinopril 10 MG tablet   Commonly known as:  PRINIVIL/ZESTRIL   Indication:  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        Dose:  10 mg   10  mg by Per J Tube route daily   Refills:  0       omeprazole 2 mg/mL Susp   Commonly known as:  priLOSEC   Used for:  Chronic pancreatitis, unspecified pancreatitis type (H)        Dose:  20 mg   10 mLs (20 mg) by Oral or Feeding Tube route 2 times daily   Quantity:  300 mL   Refills:  0       ondansetron 4 MG tablet   Commonly known as:  ZOFRAN   Used for:  Generalized abdominal pain        Dose:  4-8 mg   1-2 tablets (4-8 mg) by Per J Tube route every 8 hours as needed for nausea   Quantity:  90 tablet   Refills:  0       pravastatin 40 MG tablet   Commonly known as:  PRAVACHOL        Dose:  40 mg   40 mg by Per J Tube route daily   Refills:  0       predniSONE 5 MG/5ML solution   Used for:  Chronic pancreatitis, unspecified pancreatitis type (H)        Dose:  5 mg   5 mLs (5 mg) by Per J Tube route daily   Quantity:  70 mL   Refills:  0       sertraline 50 MG tablet   Commonly known as:  ZOLOFT        Dose:  50 mg   50 mg by Per J Tube route daily   Refills:  0       sodium bicarbonate 325 MG tablet   Used for:  Chronic pancreatitis, unspecified pancreatitis type (H)        Dose:  325 mg   1 tablet (325 mg) by Per J Tube route every 4 hours   Quantity:  42 tablet   Refills:  1       TAMSULOSIN HCL PO        Dose:  0.4 mg   Take 0.4 mg by mouth daily   Refills:  0       WARFARIN SODIUM PO        by Per J Tube route daily Alternates 5mg and 2.5mg   Refills:  0            Where to get your medicines      Some of these will need a paper prescription and others can be bought over the counter. Ask your nurse if you have questions.     Bring a paper prescription for each of these medications     acetaminophen 32 mg/mL solution    Lidocaine 4 % Gel    oxyCODONE 5 MG/5ML solution    polyethylene glycol powder                Protect others around you: Learn how to safely use, store and throw away your medicines at www.disposemymeds.org.             Medication List: This is a list of all your medications and when to take  them. Check marks below indicate your daily home schedule. Keep this list as a reference.      Medications           Morning Afternoon Evening Bedtime As Needed    acetaminophen 32 mg/mL solution   Commonly known as:  TYLENOL   31.25 mLs (1,000 mg) by Per J Tube route every 8 hours   Last time this was given:  975 mg on 10/16/2017  8:18 AM                                amylase-lipase-protease [id number] UNITS Cpep per EC capsule   Commonly known as:  CREON 24   2 capsules (48,000 Units) by Per J Tube route every 4 hours   Last time this was given:  48,000 Units on 10/16/2017  6:27 AM                                aspirin 81 MG tablet   81 mg by Per J Tube route daily                                fenofibrate 145 MG tablet   145 mg by Per J Tube route daily                                fentaNYL 75 mcg/hr 72 hr patch   Commonly known as:  DURAGESIC   Place 1 patch onto the skin every 72 hours   Last time this was given:  1 patch on 10/13/2017 10:18 PM                                FINASTERIDE PO   5 mg by Per J Tube route daily                                Lidocaine 4 % Gel   Externally apply topically 2 times daily                                lisinopril 10 MG tablet   Commonly known as:  PRINIVIL/ZESTRIL   10 mg by Per J Tube route daily                                omeprazole 2 mg/mL Susp   Commonly known as:  priLOSEC   10 mLs (20 mg) by Oral or Feeding Tube route 2 times daily   Last time this was given:  20 mg on 10/16/2017  8:18 AM                                ondansetron 4 MG tablet   Commonly known as:  ZOFRAN   1-2 tablets (4-8 mg) by Per J Tube route every 8 hours as needed for nausea                                * oxyCODONE 5 MG/5ML solution   Commonly known as:  ROXICODONE   10 mLs (10 mg) by Per J Tube route every 4 hours as needed for moderate to severe pain   Last time this was given:  10 mg on 10/16/2017 12:15 PM                                * oxyCODONE 5 MG/5ML solution   Commonly  known as:  ROXICODONE   Take 5-10 mLs (5-10 mg) by mouth every 4 hours as needed for moderate to severe pain or pain maximum 60 mL per day   Last time this was given:  10 mg on 10/16/2017 12:15 PM                                * polyethylene glycol Packet   Commonly known as:  MIRALAX/GLYCOLAX   17 g by Per J Tube route daily as needed for constipation                                * polyethylene glycol powder   Commonly known as:  MIRALAX   Take 17 g (1 capful) by mouth daily                                pravastatin 40 MG tablet   Commonly known as:  PRAVACHOL   40 mg by Per J Tube route daily                                predniSONE 5 MG/5ML solution   5 mLs (5 mg) by Per J Tube route daily   Last time this was given:  5 mg on 10/16/2017  8:18 AM                                sertraline 50 MG tablet   Commonly known as:  ZOLOFT   50 mg by Per J Tube route daily   Last time this was given:  50 mg on 10/16/2017  8:19 AM                                sodium bicarbonate 325 MG tablet   1 tablet (325 mg) by Per J Tube route every 4 hours   Last time this was given:  325 mg on 10/16/2017  6:27 AM                                TAMSULOSIN HCL PO   Take 0.4 mg by mouth daily   Last time this was given:  0.4 mg on 10/16/2017  8:19 AM                                WARFARIN SODIUM PO   by Per J Tube route daily Alternates 5mg and 2.5mg   Last time this was given:  2 mg on 10/13/2017  9:52 PM                                * Notice:  This list has 4 medication(s) that are the same as other medications prescribed for you. Read the directions carefully, and ask your doctor or other care provider to review them with you.

## 2017-10-13 ENCOUNTER — ANESTHESIA (OUTPATIENT)
Dept: SURGERY | Facility: CLINIC | Age: 57
DRG: 394 | End: 2017-10-13
Payer: COMMERCIAL

## 2017-10-13 ENCOUNTER — ANESTHESIA EVENT (OUTPATIENT)
Dept: SURGERY | Facility: CLINIC | Age: 57
DRG: 394 | End: 2017-10-13
Payer: COMMERCIAL

## 2017-10-13 PROBLEM — L02.211 ABSCESS OF ABDOMINAL WALL: Status: ACTIVE | Noted: 2017-10-13

## 2017-10-13 LAB
BACTERIA SPEC CULT: NORMAL
BLD PROD TYP BPU: NORMAL
BLD UNIT ID BPU: 0
BLOOD PRODUCT CODE: NORMAL
BPU ID: NORMAL
CREAT SERPL-MCNC: 1.47 MG/DL (ref 0.66–1.25)
ERYTHROCYTE [DISTWIDTH] IN BLOOD BY AUTOMATED COUNT: 15.8 % (ref 10–15)
GFR SERPL CREATININE-BSD FRML MDRD: 49 ML/MIN/1.7M2
GLUCOSE BLDC GLUCOMTR-MCNC: 126 MG/DL (ref 70–99)
GLUCOSE BLDC GLUCOMTR-MCNC: 90 MG/DL (ref 70–99)
HCT VFR BLD AUTO: 21.9 % (ref 40–53)
HGB BLD-MCNC: 6.7 G/DL (ref 13.3–17.7)
INR PPP: 2.97 (ref 0.86–1.14)
INR PPP: 3.56 (ref 0.86–1.14)
MAGNESIUM SERPL-MCNC: 2.2 MG/DL (ref 1.6–2.3)
MCH RBC QN AUTO: 25.2 PG (ref 26.5–33)
MCHC RBC AUTO-ENTMCNC: 30.6 G/DL (ref 31.5–36.5)
MCV RBC AUTO: 82 FL (ref 78–100)
PHOSPHATE SERPL-MCNC: 3.1 MG/DL (ref 2.5–4.5)
PLATELET # BLD AUTO: 243 10E9/L (ref 150–450)
RBC # BLD AUTO: 2.66 10E12/L (ref 4.4–5.9)
SPECIMEN SOURCE: NORMAL
TRANSFUSION STATUS PATIENT QL: NORMAL
TRANSFUSION STATUS PATIENT QL: NORMAL
VANCOMYCIN SERPL-MCNC: 8.1 MG/L
WBC # BLD AUTO: 8.6 10E9/L (ref 4–11)

## 2017-10-13 PROCEDURE — 82565 ASSAY OF CREATININE: CPT | Performed by: STUDENT IN AN ORGANIZED HEALTH CARE EDUCATION/TRAINING PROGRAM

## 2017-10-13 PROCEDURE — 40000802 ZZH SITE CHECK

## 2017-10-13 PROCEDURE — 25000128 H RX IP 250 OP 636: Performed by: ANESTHESIOLOGY

## 2017-10-13 PROCEDURE — 87186 SC STD MICRODIL/AGAR DIL: CPT | Performed by: SURGERY

## 2017-10-13 PROCEDURE — 25000131 ZZH RX MED GY IP 250 OP 636 PS 637: Performed by: STUDENT IN AN ORGANIZED HEALTH CARE EDUCATION/TRAINING PROGRAM

## 2017-10-13 PROCEDURE — 96366 THER/PROPH/DIAG IV INF ADDON: CPT

## 2017-10-13 PROCEDURE — 00000146 ZZHCL STATISTIC GLUCOSE BY METER IP

## 2017-10-13 PROCEDURE — 87106 FUNGI IDENTIFICATION YEAST: CPT | Performed by: SURGERY

## 2017-10-13 PROCEDURE — 83735 ASSAY OF MAGNESIUM: CPT | Performed by: STUDENT IN AN ORGANIZED HEALTH CARE EDUCATION/TRAINING PROGRAM

## 2017-10-13 PROCEDURE — P9016 RBC LEUKOCYTES REDUCED: HCPCS | Performed by: ANESTHESIOLOGY

## 2017-10-13 PROCEDURE — 86850 RBC ANTIBODY SCREEN: CPT | Performed by: ANESTHESIOLOGY

## 2017-10-13 PROCEDURE — 25000566 ZZH SEVOFLURANE, EA 15 MIN: Performed by: SURGERY

## 2017-10-13 PROCEDURE — 84100 ASSAY OF PHOSPHORUS: CPT | Performed by: STUDENT IN AN ORGANIZED HEALTH CARE EDUCATION/TRAINING PROGRAM

## 2017-10-13 PROCEDURE — 25000132 ZZH RX MED GY IP 250 OP 250 PS 637: Performed by: SURGERY

## 2017-10-13 PROCEDURE — 27210794 ZZH OR GENERAL SUPPLY STERILE: Performed by: SURGERY

## 2017-10-13 PROCEDURE — 86923 COMPATIBILITY TEST ELECTRIC: CPT | Performed by: ANESTHESIOLOGY

## 2017-10-13 PROCEDURE — 85610 PROTHROMBIN TIME: CPT | Performed by: ANESTHESIOLOGY

## 2017-10-13 PROCEDURE — 87076 CULTURE ANAEROBE IDENT EACH: CPT | Performed by: SURGERY

## 2017-10-13 PROCEDURE — 25000132 ZZH RX MED GY IP 250 OP 250 PS 637: Performed by: STUDENT IN AN ORGANIZED HEALTH CARE EDUCATION/TRAINING PROGRAM

## 2017-10-13 PROCEDURE — 37000008 ZZH ANESTHESIA TECHNICAL FEE, 1ST 30 MIN: Performed by: SURGERY

## 2017-10-13 PROCEDURE — 25000128 H RX IP 250 OP 636: Performed by: STUDENT IN AN ORGANIZED HEALTH CARE EDUCATION/TRAINING PROGRAM

## 2017-10-13 PROCEDURE — 86900 BLOOD TYPING SEROLOGIC ABO: CPT | Performed by: ANESTHESIOLOGY

## 2017-10-13 PROCEDURE — C9399 UNCLASSIFIED DRUGS OR BIOLOG: HCPCS | Performed by: STUDENT IN AN ORGANIZED HEALTH CARE EDUCATION/TRAINING PROGRAM

## 2017-10-13 PROCEDURE — 87077 CULTURE AEROBIC IDENTIFY: CPT | Performed by: SURGERY

## 2017-10-13 PROCEDURE — 37000009 ZZH ANESTHESIA TECHNICAL FEE, EACH ADDTL 15 MIN: Performed by: SURGERY

## 2017-10-13 PROCEDURE — 00000155 ZZHCL STATISTIC H-CELL BLOCK W/STAIN: Performed by: SURGERY

## 2017-10-13 PROCEDURE — 25000128 H RX IP 250 OP 636: Performed by: SURGERY

## 2017-10-13 PROCEDURE — 36592 COLLECT BLOOD FROM PICC: CPT | Performed by: ANESTHESIOLOGY

## 2017-10-13 PROCEDURE — 27210913 ZZH NUTRITION PRODUCT INTERMEDIATE PACKET

## 2017-10-13 PROCEDURE — 85027 COMPLETE CBC AUTOMATED: CPT | Performed by: ANESTHESIOLOGY

## 2017-10-13 PROCEDURE — 88112 CYTOPATH CELL ENHANCE TECH: CPT | Performed by: SURGERY

## 2017-10-13 PROCEDURE — 87070 CULTURE OTHR SPECIMN AEROBIC: CPT | Performed by: SURGERY

## 2017-10-13 PROCEDURE — 12000008 ZZH R&B INTERMEDIATE UMMC

## 2017-10-13 PROCEDURE — 80202 ASSAY OF VANCOMYCIN: CPT | Performed by: SURGERY

## 2017-10-13 PROCEDURE — 36592 COLLECT BLOOD FROM PICC: CPT | Performed by: SURGERY

## 2017-10-13 PROCEDURE — 0W9F3ZZ DRAINAGE OF ABDOMINAL WALL, PERCUTANEOUS APPROACH: ICD-10-PCS | Performed by: SURGERY

## 2017-10-13 PROCEDURE — 36592 COLLECT BLOOD FROM PICC: CPT | Performed by: STUDENT IN AN ORGANIZED HEALTH CARE EDUCATION/TRAINING PROGRAM

## 2017-10-13 PROCEDURE — 87075 CULTR BACTERIA EXCEPT BLOOD: CPT | Performed by: SURGERY

## 2017-10-13 PROCEDURE — 36000057 ZZH SURGERY LEVEL 3 1ST 30 MIN - UMMC: Performed by: SURGERY

## 2017-10-13 PROCEDURE — 96376 TX/PRO/DX INJ SAME DRUG ADON: CPT

## 2017-10-13 PROCEDURE — 86901 BLOOD TYPING SEROLOGIC RH(D): CPT | Performed by: ANESTHESIOLOGY

## 2017-10-13 PROCEDURE — 87102 FUNGUS ISOLATION CULTURE: CPT | Performed by: SURGERY

## 2017-10-13 PROCEDURE — 40000170 ZZH STATISTIC PRE-PROCEDURE ASSESSMENT II: Performed by: SURGERY

## 2017-10-13 PROCEDURE — 36000059 ZZH SURGERY LEVEL 3 EA 15 ADDTL MIN UMMC: Performed by: SURGERY

## 2017-10-13 PROCEDURE — 88305 TISSUE EXAM BY PATHOLOGIST: CPT | Performed by: SURGERY

## 2017-10-13 PROCEDURE — G0378 HOSPITAL OBSERVATION PER HR: HCPCS

## 2017-10-13 PROCEDURE — 96368 THER/DIAG CONCURRENT INF: CPT

## 2017-10-13 PROCEDURE — 71000014 ZZH RECOVERY PHASE 1 LEVEL 2 FIRST HR: Performed by: SURGERY

## 2017-10-13 PROCEDURE — 25000125 ZZHC RX 250: Performed by: SURGERY

## 2017-10-13 PROCEDURE — 00000102 ZZHCL STATISTIC CYTO WRIGHT STAIN TC: Performed by: SURGERY

## 2017-10-13 PROCEDURE — 25000125 ZZHC RX 250: Performed by: STUDENT IN AN ORGANIZED HEALTH CARE EDUCATION/TRAINING PROGRAM

## 2017-10-13 PROCEDURE — 40000558 ZZH STATISTIC CVC DRESSING CHANGE

## 2017-10-13 PROCEDURE — 85610 PROTHROMBIN TIME: CPT | Performed by: STUDENT IN AN ORGANIZED HEALTH CARE EDUCATION/TRAINING PROGRAM

## 2017-10-13 RX ORDER — LIDOCAINE HYDROCHLORIDE 20 MG/ML
INJECTION, SOLUTION INFILTRATION; PERINEURAL PRN
Status: DISCONTINUED | OUTPATIENT
Start: 2017-10-13 | End: 2017-10-13

## 2017-10-13 RX ORDER — ONDANSETRON 2 MG/ML
INJECTION INTRAMUSCULAR; INTRAVENOUS PRN
Status: DISCONTINUED | OUTPATIENT
Start: 2017-10-13 | End: 2017-10-13

## 2017-10-13 RX ORDER — SODIUM CHLORIDE, SODIUM LACTATE, POTASSIUM CHLORIDE, CALCIUM CHLORIDE 600; 310; 30; 20 MG/100ML; MG/100ML; MG/100ML; MG/100ML
INJECTION, SOLUTION INTRAVENOUS CONTINUOUS PRN
Status: DISCONTINUED | OUTPATIENT
Start: 2017-10-13 | End: 2017-10-13

## 2017-10-13 RX ORDER — PROCHLORPERAZINE MALEATE 5 MG
5-10 TABLET ORAL EVERY 6 HOURS PRN
Status: DISCONTINUED | OUTPATIENT
Start: 2017-10-13 | End: 2017-10-16 | Stop reason: HOSPADM

## 2017-10-13 RX ORDER — AMINO ACIDS/PROTEIN HYDROLYS 11G-40/45
1 LIQUID IN PACKET (ML) ORAL 2 TIMES DAILY
Status: DISCONTINUED | OUTPATIENT
Start: 2017-10-13 | End: 2017-10-16 | Stop reason: HOSPADM

## 2017-10-13 RX ORDER — PROCHLORPERAZINE MALEATE 5 MG
5-10 TABLET ORAL EVERY 6 HOURS PRN
Status: DISCONTINUED | OUTPATIENT
Start: 2017-10-13 | End: 2017-10-13

## 2017-10-13 RX ORDER — ONDANSETRON 2 MG/ML
4 INJECTION INTRAMUSCULAR; INTRAVENOUS EVERY 30 MIN PRN
Status: DISCONTINUED | OUTPATIENT
Start: 2017-10-13 | End: 2017-10-13 | Stop reason: HOSPADM

## 2017-10-13 RX ORDER — NALOXONE HYDROCHLORIDE 0.4 MG/ML
.1-.4 INJECTION, SOLUTION INTRAMUSCULAR; INTRAVENOUS; SUBCUTANEOUS
Status: DISCONTINUED | OUTPATIENT
Start: 2017-10-13 | End: 2017-10-13

## 2017-10-13 RX ORDER — LIDOCAINE 40 MG/G
CREAM TOPICAL
Status: DISCONTINUED | OUTPATIENT
Start: 2017-10-13 | End: 2017-10-13

## 2017-10-13 RX ORDER — SODIUM CHLORIDE, SODIUM LACTATE, POTASSIUM CHLORIDE, CALCIUM CHLORIDE 600; 310; 30; 20 MG/100ML; MG/100ML; MG/100ML; MG/100ML
INJECTION, SOLUTION INTRAVENOUS CONTINUOUS
Status: DISCONTINUED | OUTPATIENT
Start: 2017-10-13 | End: 2017-10-13 | Stop reason: HOSPADM

## 2017-10-13 RX ORDER — ONDANSETRON 2 MG/ML
4 INJECTION INTRAMUSCULAR; INTRAVENOUS EVERY 6 HOURS PRN
Status: DISCONTINUED | OUTPATIENT
Start: 2017-10-13 | End: 2017-10-13

## 2017-10-13 RX ORDER — WARFARIN SODIUM 1 MG/1
2 TABLET ORAL
Status: COMPLETED | OUTPATIENT
Start: 2017-10-13 | End: 2017-10-13

## 2017-10-13 RX ORDER — FENTANYL CITRATE 50 UG/ML
INJECTION, SOLUTION INTRAMUSCULAR; INTRAVENOUS PRN
Status: DISCONTINUED | OUTPATIENT
Start: 2017-10-13 | End: 2017-10-13

## 2017-10-13 RX ORDER — ONDANSETRON 4 MG/1
4 TABLET, ORALLY DISINTEGRATING ORAL EVERY 30 MIN PRN
Status: DISCONTINUED | OUTPATIENT
Start: 2017-10-13 | End: 2017-10-13 | Stop reason: HOSPADM

## 2017-10-13 RX ORDER — ONDANSETRON 2 MG/ML
4 INJECTION INTRAMUSCULAR; INTRAVENOUS ONCE
Status: DISCONTINUED | OUTPATIENT
Start: 2017-10-13 | End: 2017-10-13 | Stop reason: HOSPADM

## 2017-10-13 RX ORDER — FENTANYL CITRATE 50 UG/ML
25-50 INJECTION, SOLUTION INTRAMUSCULAR; INTRAVENOUS
Status: COMPLETED | OUTPATIENT
Start: 2017-10-13 | End: 2017-10-13

## 2017-10-13 RX ORDER — PROPOFOL 10 MG/ML
INJECTION, EMULSION INTRAVENOUS PRN
Status: DISCONTINUED | OUTPATIENT
Start: 2017-10-13 | End: 2017-10-13

## 2017-10-13 RX ORDER — FENTANYL CITRATE 50 UG/ML
25-50 INJECTION, SOLUTION INTRAMUSCULAR; INTRAVENOUS
Status: DISCONTINUED | OUTPATIENT
Start: 2017-10-13 | End: 2017-10-13 | Stop reason: HOSPADM

## 2017-10-13 RX ORDER — ONDANSETRON 4 MG/1
4 TABLET, ORALLY DISINTEGRATING ORAL EVERY 6 HOURS PRN
Status: DISCONTINUED | OUTPATIENT
Start: 2017-10-13 | End: 2017-10-13

## 2017-10-13 RX ORDER — LABETALOL HYDROCHLORIDE 5 MG/ML
10 INJECTION, SOLUTION INTRAVENOUS
Status: DISCONTINUED | OUTPATIENT
Start: 2017-10-13 | End: 2017-10-13 | Stop reason: HOSPADM

## 2017-10-13 RX ADMIN — HYDROMORPHONE HYDROCHLORIDE 0.5 MG: 1 INJECTION, SOLUTION INTRAMUSCULAR; INTRAVENOUS; SUBCUTANEOUS at 08:02

## 2017-10-13 RX ADMIN — ACETAMINOPHEN 975 MG: 325 SOLUTION ORAL at 00:16

## 2017-10-13 RX ADMIN — FENTANYL CITRATE 25 MCG: 50 INJECTION, SOLUTION INTRAMUSCULAR; INTRAVENOUS at 17:30

## 2017-10-13 RX ADMIN — PANCRELIPASE 48000 UNITS: 24000; 76000; 120000 CAPSULE, DELAYED RELEASE PELLETS ORAL at 14:39

## 2017-10-13 RX ADMIN — PREDNISONE 5 MG: 5 SOLUTION ORAL at 08:05

## 2017-10-13 RX ADMIN — LIDOCAINE HYDROCHLORIDE 100 MG: 20 INJECTION, SOLUTION INFILTRATION; PERINEURAL at 18:20

## 2017-10-13 RX ADMIN — Medication 20 MG: at 08:06

## 2017-10-13 RX ADMIN — SODIUM CHLORIDE, POTASSIUM CHLORIDE, SODIUM LACTATE AND CALCIUM CHLORIDE: 600; 310; 30; 20 INJECTION, SOLUTION INTRAVENOUS at 04:15

## 2017-10-13 RX ADMIN — PANCRELIPASE 48000 UNITS: 24000; 76000; 120000 CAPSULE, DELAYED RELEASE PELLETS ORAL at 00:14

## 2017-10-13 RX ADMIN — PIPERACILLIN SODIUM AND TAZOBACTAM SODIUM 3.38 G: 3; .375 INJECTION, POWDER, LYOPHILIZED, FOR SOLUTION INTRAVENOUS at 04:16

## 2017-10-13 RX ADMIN — FENTANYL CITRATE 25 MCG: 50 INJECTION, SOLUTION INTRAMUSCULAR; INTRAVENOUS at 17:45

## 2017-10-13 RX ADMIN — PROPOFOL 30 MG: 10 INJECTION, EMULSION INTRAVENOUS at 18:50

## 2017-10-13 RX ADMIN — FENTANYL CITRATE 25 MCG: 50 INJECTION, SOLUTION INTRAMUSCULAR; INTRAVENOUS at 17:40

## 2017-10-13 RX ADMIN — FENTANYL CITRATE 25 MCG: 50 INJECTION, SOLUTION INTRAMUSCULAR; INTRAVENOUS at 17:35

## 2017-10-13 RX ADMIN — SODIUM BICARBONATE 325 MG: 325 TABLET ORAL at 04:15

## 2017-10-13 RX ADMIN — PROPOFOL 30 MG: 10 INJECTION, EMULSION INTRAVENOUS at 18:47

## 2017-10-13 RX ADMIN — FENTANYL 1 PATCH: 75 PATCH, EXTENDED RELEASE TRANSDERMAL at 22:18

## 2017-10-13 RX ADMIN — HYDROMORPHONE HYDROCHLORIDE 0.5 MG: 1 INJECTION, SOLUTION INTRAMUSCULAR; INTRAVENOUS; SUBCUTANEOUS at 22:19

## 2017-10-13 RX ADMIN — ONDANSETRON 4 MG: 2 INJECTION INTRAMUSCULAR; INTRAVENOUS at 18:13

## 2017-10-13 RX ADMIN — SODIUM BICARBONATE 325 MG: 325 TABLET ORAL at 08:05

## 2017-10-13 RX ADMIN — OXYCODONE HYDROCHLORIDE 10 MG: 5 SOLUTION ORAL at 10:05

## 2017-10-13 RX ADMIN — TAMSULOSIN HYDROCHLORIDE 0.4 MG: 0.4 CAPSULE ORAL at 08:06

## 2017-10-13 RX ADMIN — SODIUM BICARBONATE 325 MG: 325 TABLET ORAL at 00:13

## 2017-10-13 RX ADMIN — PANCRELIPASE 48000 UNITS: 24000; 76000; 120000 CAPSULE, DELAYED RELEASE PELLETS ORAL at 08:05

## 2017-10-13 RX ADMIN — ROCURONIUM BROMIDE 30 MG: 10 INJECTION INTRAVENOUS at 18:25

## 2017-10-13 RX ADMIN — PIPERACILLIN SODIUM AND TAZOBACTAM SODIUM 3.38 G: 3; .375 INJECTION, POWDER, LYOPHILIZED, FOR SOLUTION INTRAVENOUS at 10:05

## 2017-10-13 RX ADMIN — SUGAMMADEX 200 MG: 100 INJECTION, SOLUTION INTRAVENOUS at 18:47

## 2017-10-13 RX ADMIN — OXYCODONE HYDROCHLORIDE 10 MG: 5 SOLUTION ORAL at 04:16

## 2017-10-13 RX ADMIN — ONDANSETRON 4 MG: 2 INJECTION INTRAMUSCULAR; INTRAVENOUS at 19:24

## 2017-10-13 RX ADMIN — FENTANYL CITRATE 50 MCG: 50 INJECTION, SOLUTION INTRAMUSCULAR; INTRAVENOUS at 18:30

## 2017-10-13 RX ADMIN — HYDROMORPHONE HYDROCHLORIDE 0.5 MG: 1 INJECTION, SOLUTION INTRAMUSCULAR; INTRAVENOUS; SUBCUTANEOUS at 00:22

## 2017-10-13 RX ADMIN — PIPERACILLIN SODIUM AND TAZOBACTAM SODIUM 3.38 G: 3; .375 INJECTION, POWDER, LYOPHILIZED, FOR SOLUTION INTRAVENOUS at 18:28

## 2017-10-13 RX ADMIN — ONDANSETRON 4 MG: 2 INJECTION INTRAMUSCULAR; INTRAVENOUS at 04:28

## 2017-10-13 RX ADMIN — PANCRELIPASE 48000 UNITS: 24000; 76000; 120000 CAPSULE, DELAYED RELEASE PELLETS ORAL at 21:53

## 2017-10-13 RX ADMIN — WARFARIN SODIUM 2 MG: 1 TABLET ORAL at 21:52

## 2017-10-13 RX ADMIN — PIPERACILLIN SODIUM AND TAZOBACTAM SODIUM 3.38 G: 3; .375 INJECTION, POWDER, LYOPHILIZED, FOR SOLUTION INTRAVENOUS at 21:53

## 2017-10-13 RX ADMIN — ACETAMINOPHEN 975 MG: 325 SOLUTION ORAL at 08:06

## 2017-10-13 RX ADMIN — SODIUM CHLORIDE, POTASSIUM CHLORIDE, SODIUM LACTATE AND CALCIUM CHLORIDE: 600; 310; 30; 20 INJECTION, SOLUTION INTRAVENOUS at 18:07

## 2017-10-13 RX ADMIN — HYDROMORPHONE HYDROCHLORIDE 0.5 MG: 1 INJECTION, SOLUTION INTRAMUSCULAR; INTRAVENOUS; SUBCUTANEOUS at 05:55

## 2017-10-13 RX ADMIN — ONDANSETRON 4 MG: 2 INJECTION INTRAMUSCULAR; INTRAVENOUS at 11:12

## 2017-10-13 RX ADMIN — HYDROMORPHONE HYDROCHLORIDE 0.5 MG: 1 INJECTION, SOLUTION INTRAMUSCULAR; INTRAVENOUS; SUBCUTANEOUS at 02:35

## 2017-10-13 RX ADMIN — VANCOMYCIN HYDROCHLORIDE 1250 MG: 10 INJECTION, POWDER, LYOPHILIZED, FOR SOLUTION INTRAVENOUS at 14:39

## 2017-10-13 RX ADMIN — HYDROMORPHONE HYDROCHLORIDE 0.5 MG: 1 INJECTION, SOLUTION INTRAMUSCULAR; INTRAVENOUS; SUBCUTANEOUS at 12:44

## 2017-10-13 RX ADMIN — PANCRELIPASE 48000 UNITS: 24000; 76000; 120000 CAPSULE, DELAYED RELEASE PELLETS ORAL at 04:15

## 2017-10-13 RX ADMIN — FENTANYL CITRATE 50 MCG: 50 INJECTION, SOLUTION INTRAMUSCULAR; INTRAVENOUS at 19:29

## 2017-10-13 RX ADMIN — Medication 1 PACKET: at 14:38

## 2017-10-13 RX ADMIN — OXYCODONE HYDROCHLORIDE 10 MG: 5 SOLUTION ORAL at 15:49

## 2017-10-13 RX ADMIN — Medication 100 MG: at 18:20

## 2017-10-13 RX ADMIN — SERTRALINE HYDROCHLORIDE 50 MG: 50 TABLET ORAL at 08:06

## 2017-10-13 RX ADMIN — SODIUM BICARBONATE 325 MG: 325 TABLET ORAL at 14:39

## 2017-10-13 RX ADMIN — PROPOFOL 100 MG: 10 INJECTION, EMULSION INTRAVENOUS at 18:20

## 2017-10-13 ASSESSMENT — PAIN DESCRIPTION - DESCRIPTORS
DESCRIPTORS: SORE
DESCRIPTORS: SORE
DESCRIPTORS: ACHING;SORE;SHARP
DESCRIPTORS: ACHING;SORE;SHARP
DESCRIPTORS: SORE
DESCRIPTORS: ACHING;SHARP;SORE

## 2017-10-13 NOTE — PROGRESS NOTES
Care Coordinator Progress Note     Admission Date/Time:  10/12/2017  Attending MD:  Loy Aguirre MD     Data  Chart reviewed, discussed with interdisciplinary team.   Patient was admitted for:    Cellulitis and abscess of trunk  Chronic pancreatitis, unspecified pancreatitis type (H).    Concerns with insurance coverage for discharge needs: None.  Current Living Situation: Patient lives with spouse.  Support System: Supportive and Involved  Services Involved: Home Infusion  Transportation: Family or Friend will provide  Barriers to Discharge: medical clearance    Coordination of Care and Referrals: Provided patient/family with options for Home Infusion.        Assessment  Patient is a 56 year old male s/p whipple procedure with GJ tube placement.  Patient admitted with cellulitis surrounding his GJ tube, now s/p GJ tube replacement.  Per MD team may need to go to OR for an I&D.  Patient currently on vancomycin and zosyn, unsure of plan for antibiotics at this time.  Patient is on tube feedings via his J tube and per chart review is open to [facility](P: [phone], F: [phone]) for enteral support.  Met with patient at bedside to introduce RNCC role and discuss discharge planning.  Patient verified he is open to [facility] and would like to continue service with them at discharge.  Resumption orders placed and I liaison, Savana Guzman, made aware of patients admission.  Patient is on coumadin and while staying locally is being managed by Dr. Crum and the [facility].  Patient has no outstanding discharge concerns at this time.  RNCC will continue to follow and assist with discharge planning as needed.           Plan  Anticipated Discharge Date:  1-2 days  Anticipated Discharge Plan:  Home with resumption of I for tube feeding support    Elisabeth Shelton, YESSICA  [phone]

## 2017-10-13 NOTE — PROGRESS NOTES
GENERAL SURGERY PROGRESS NOTE    Patient: Lucas Brown  MRN: [id number]    Subjective  No acute overnight events. Pain still present, but controlled. Voiding.     Objective  Temp:  [97.2  F (36.2  C)-100.3  F (37.9  C)] 98.6  F (37  C)  Pulse:  [87] 87  Heart Rate:  [64-81] 81  Resp:  [18-20] 18  BP: ()/(49-64) 91/49  SpO2:  [87 %-99 %] 95 %    I/O last 3 completed shifts:  In: 615 [I.V.:435]  Out: 600 [Urine:600]    Well appearing man in NAD  NLB on RA  Abd is soft, distended, tender at GJ site. Some erythema, slight induration surrounding the GJ site.    Labs:   Creatinine 1.47 (1.66 on 10/12/2017)   GFR 49 (43 on 10/12/2017)     INR 2.97 (1.99 on 10/13/2017)     Imaging: Reviewed.    Assessment & Plan  57 y/o M s/p Whipple procedure in August 2017 presenting on 10/12/2017 with retracted percutaneous GJ tube bulb and surrounding cellulitis with possible abscess. IR replaced GJ tube on 10/12/2017. Zosyn and vancomycin were started on 10/12/2017. Considering medical management vs surgical I&D.    - Pain control: Scheduled tylenol solution (per J tube), fentanyl patch, dilaudid 0.3-0.5 mg injection, oxycodone 10 mg solution PRN (per J tube)   - Diet: NPO + ice chips, tube feeding at 65 ml hr continuous.  - [place]: none   - Abx: Skin and soft tissue infection surrounding GJ site: Zosyn (10/12- ) Vancomycin (10/12- )   - Wound Cares: daily morning dressing change around GJ site   - Drains: G tube in place R lateral to umbilicus   - DVT prophylaxis: warfarin  - Anticipated discharge: will discuss with staff regarding possible need for I&D and which antibiotics to send patient home on     Scribed by Danielle Charles, MS4        Rosa Carlson DO     [facility]  [phone]  [facility]

## 2017-10-13 NOTE — PLAN OF CARE
Problem: Infection, Risk/Actual (Adult)  Intervention: Prevent Infection/Maximize Resistance  Blood pressure 94/53, pulse 87, temperature 97.2  F (36.2  C), temperature source Oral, resp. rate 18, weight 87.5 kg (192 lb 14.4 oz), SpO2 99 %.  Pt admitted on Observation with Cellulitis of abdomen due to a retracted G-J tube. IR replaced G-J tube, G-tube to gravity drainage, J-tube with tube feedings at 45 ml/hr according to MD advance every 8 hrs to goal of 65 ml/hr. Pt tolerating tube feedings. Abdomen tender and reddened and swollen, IV dilaudid and Oxycodone given per orders with improvement in pain. PICC in place, IV fluids started per orders as well as IV antibiotics. Cont with care plan and observation goals.

## 2017-10-13 NOTE — OR NURSING
Lab notified the writer of Hgb of 6.6.  Dr. Contreras notified.  MD will speak with Dr. Aguirre and transfuse during surgery.

## 2017-10-13 NOTE — PROGRESS NOTES
CLINICAL NUTRITION SERVICES - ASSESSMENT NOTE     Nutrition Prescription    RECOMMENDATIONS FOR MDs/PROVIDERS TO ORDER:  None at this time     Malnutrition Status:    Patient does not meet two of the above criteria necessary for diagnosing malnutrition, but is at risk    Recommendations already ordered by Registered Dietitian (RD):  1. Adjust TF recipe to send cans of formula mixed in jug  2. Will add 2 pkts Prosource per day (40 kcal and 11 g PRO each) so TF + Prosource = 2420 kcal (28 kcal/kg) and 128 g protein (1.5 g/kg)  3. 30 mL Q4H free water flushes for FT patency  4. K+/Mg++/Phos AM lab draw tomorrow  5. Added instructions to enzyme/sodium bicarb orders    Once begin TFs, begin the following pancreatic enzyme regimen and recommend order each of the following:   (please copy and paste administration instructions into each order)  A) Sodium Bicarb tablet (325 mg), 1 tablet q 4 hrs via Jtube. Administration Instructions: Crush 1 tablet and mix into 15 ml of warm water and use this solution to mix with Creon pancreatic enzymes. DO NOT administer directly into Jtube (to be mixed into TF formula with Creon enzyme - see Creon enzyme order)   B) Creon 24, 1-2 capsules q 4 hrs via Jtube. Administration Instructions: If TF rate is running @ 15-45 ml/hr, administer 1 capsule q 4 hrs; once TF rate advances 55-65 ml/hr, increase to 2 capsule q 4 hrs.  Open capsule and empty contents into 15 ml sodium bicarb solution (see sodium bicarb order), let dissolve for about 20-30 minutes and then add this solution to the amount of TF formula hung in TF bag every 4 hrs (i.e., once TF @ goal infusion 65 ml/hr will mix 2 capsules into 260 ml of TF formula every 4 hrs).   *Note: this enzyme regimen with TF @ goal infusion will provide approx 3130 units of lipase/gram of total Fat daily and approp dosing initially for pancreatic insufficiency with more elemental TF formula.      Future/Additional Recommendations:  If/when no longer on  "IVF and requires 100% hydration via TF + water flushes, recommend increase free water flushes to 120 mL Q3H (960 mL/day) so TF + free water = 25 mL/kg (or other volume per team discretion)      REASON FOR ASSESSMENT  Lucas Brown is a/an 56 year old male assessed by the dietitian for Admission Nutrition Risk Screen for tube feeding or parenteral nutrition and Provider Order: Registered Dietitian to Assess and Order TF per Medical Nutrition Therapy Protocol    NUTRITION HISTORY  Obtained info from and patient and chart.  Pt has continued on continuous TF (Isosource 1.5 @ 65 mL/hr with enzymes/sodium bicarb as currently ordered) since his Whipple procedure in Aug 2017, no issues/interruptions noted.  Reports good tolerance of TF.  Pt was recently admitted, and per last RD note on 10/3 pt was at goal Isosoruce 1.5 @ 65 mL/hr + 2 pkts Prosource per day and tolerating at that point.      Per chart review, pt admit yesterday, had GJ tube replaced. Pt was experiencing left sided abdominal pain at feeding tube site 10/11 (did not have any symptoms when he visited Dr. Yanes on 10/10).     CURRENT NUTRITION ORDERS  Diet: NPO    Nutrition Support: Isosource 1.5 @ goal 65 ml/hr (1560 ml/day) to provide 2340 kcals (27 kcal/kg/day), 106 g PRO (1.2 g/kg/day), 1186 ml free H2O, 275 g CHO, 92 g fat and 23 g Fiber daily.     Intake/Tolerance: TF started last night by provider @ 45 mL/hr and titrated up 10 mL Q8H to goal (currently at 55 mL/hr)    LABS  Labs reviewed  - Mg++ and Phos WNL. K+ WNL yesterday (not checked again today)    MEDICATIONS  Medications reviewed  - Lactated ringers @ 100 mL/hr  - Creon 24 (2 capsules Q4H via J-tube) + Sodium Bicarbonate (325 mg Q4H via J-tube)    ANTHROPOMETRICS  Height: 180.3 cm (5' 11\")  Most Recent Weight: 87.5 kg (192 lb 14.4 oz)    IBW: 78.2 kg   BMI: Overweight BMI 25-29.9  Weight History: Lowest recent recorded wt during last admission of 80.9 kg on 9/28 but all other weights that " admission were mostly 84-88 kg with fluctuations.  Per pt weight has been stable ~190# recently.  Pt appears to have lost about 10# from Aug-Sep 2017, likely related to surgery, but has been stable over the past month  Wt Readings from Last 10 Encounters:   10/12/17 87.5 kg (192 lb 14.4 oz)   10/10/17 86.2 kg (190 lb 1.6 oz)   10/06/17 88.1 kg (194 lb 3.2 oz)   09/23/17 86.5 kg (190 lb 12.8 oz)   09/19/17 87.5 kg (192 lb 12.8 oz)   09/12/17 93.4 kg (205 lb 14.6 oz)   08/24/17 93.4 kg (205 lb 12.8 oz)   08/24/17 92.3 kg (203 lb 8 oz)   08/24/17 91.6 kg (202 lb)   07/20/17 91.1 kg (200 lb 12.8 oz)      Dosing Weight: 86 kg (lowest recent wt of 86.2 kg on 10/10)    ASSESSED NUTRITION NEEDS  Estimated Energy Needs: 6026-1040 kcals/day (25 - 30 kcals/kg)  Justification: Maintenance  Estimated Protein Needs: 103-129 grams protein/day (1.2 - 1.5 grams of pro/kg)  Justification: Hypercatabolism with acute illness  Estimated Fluid Needs: (1 mL/kcal)   Justification: Maintenance or per provider pending fluid status    PHYSICAL FINDINGS  See malnutrition section below.    MALNUTRITION  % Intake: Decreased intake does not meet criteria  % Weight Loss: None noted  Subcutaneous Fat Loss: None observed  Muscle Loss: None observed  Fluid Accumulation/Edema: None noted per chart review  Malnutrition Diagnosis: Patient does not meet two of the above criteria necessary for diagnosing malnutrition, but is at risk    NUTRITION DIAGNOSIS  Predicted inadequate nutrient intake (protein-energy) related to continues on TF to provide 100% estimated kcal and protein needs while NPO but potential for TF interruptions     INTERVENTIONS  Implementation  Nutrition Education: Provided education on role of RD and nutrition POC   Collaboration with other providers - asked provider for TF consult so TF recipe could be adjusted  Enteral Nutrition - adjusted recipe  Feeding tube flush   PERT    Goals  Total avg nutritional intake to meet a minimum of 25  kcal/kg and 1.2 g PRO/kg daily (per dosing wt 86 kg).     Monitoring/Evaluation  Progress toward goals will be monitored and evaluated per protocol.      Jennie Grigsby RD, LORI   [address] RD Pager: [phone]

## 2017-10-13 NOTE — OR NURSING
Dr. Moya called regarding tube feedings infusing at 75 cc/hr since midnight.  MD ordered to stop tube feedings at 1600, which was completed.  Blood sugar at 1630 was 90.    Dr. Aguirre was also notified of tube feedings infusing and since stopped, and stated that it was fine to proceed with the case as scheduled.

## 2017-10-13 NOTE — PHARMACY-VANCOMYCIN DOSING SERVICE
Pharmacy Vancomycin Note  Date of Service 2017  Patient's  1960   56 year old, male    Indication: Urinary Tract Infection  Goal Trough Level: 10-15 mg/L  Day of Therapy: 2  Current Vancomycin regimen:  1500 mg IV once    Current estimated CrCl = Estimated Creatinine Clearance: 68.6 mL/min (based on Cr of 1.47).    Creatinine for last 3 days  10/11/2017: 10:08 AM Creatinine 1.67 mg/dL  10/12/2017:  6:46 AM Creatinine 1.66 mg/dL  10/13/2017:  7:26 AM Creatinine 1.47 mg/dL    Recent Vancomycin Levels (past 3 days)  10/13/2017: 10:16 AM Vancomycin Level 8.1 mg/L (22.9 hr trough - after just one dose)    Vancomycin IV Administrations (past 72 hours)                   vancomycin (VANCOCIN) 1,500 mg in NaCl 0.9 % 250 mL intermittent infusion (mg) 1,500 mg New Bag 10/12/17 1125                Nephrotoxins and other renal medications (Future)    Start     Dose/Rate Route Frequency Ordered Stop    10/13/17 1330  vancomycin (VANCOCIN) 1,250 mg in NaCl 0.9 % 250 mL intermittent infusion      1,250 mg  over 90 Minutes Intravenous EVERY 24 HOURS 10/13/17 1325      10/12/17 1600  piperacillin-tazobactam (ZOSYN) 3.375 g vial to attach to  mL bag      3.375 g  over 30 Minutes Intravenous EVERY 6 HOURS 10/12/17 1522               Contrast Orders - past 72 hours (72h ago through future)    Start     Dose/Rate Route Frequency Ordered Stop    10/12/17 1415  iopamidol (ISOVUE-250) 51% solution 50 mL      50 mL Oral ONCE 10/12/17 1406 10/12/17 1444    10/12/17 0823  iopamidol (ISOVUE-370) solution 118 mL      118 mL Intravenous ONCE 10/12/17 0822 10/12/17 0842          Interpretation of levels and current regimen:  Trough level is  Subtherapeutic    Has serum creatinine changed > 50% in last 72 hours: No  3  Urine output: decent urine output    Renal Function: Worsening, baseline creatinine ~1 mg/dL    Plan:  1.  Change vancomycin regimen to 1250 mg IV every 24 hours.  2.  Pharmacy will check trough levels as  appropriate in 1-3 Days.    3.  Serum creatinine levels will be ordered daily for the first week of therapy and at least twice weekly for subsequent weeks.        Eris Egan, PharmD, BCPS  October 13, 2017            .

## 2017-10-13 NOTE — BRIEF OP NOTE
[facility], [place]    Brief Operative Note    Pre-operative diagnosis: Abdomen Wall Abscess  Post-operative diagnosis Same  Procedure: COMBINED INCISION AND DRAINAGE ABDOMINAL WALL ABSCESS  Surgeon: Surgeon(s) and Role:     * Loy Aguirre MD - Primary     * Shanti Sommer MD PGY-6 - Assisting     * Yanet Narvaez, MS3  Anesthesia: General   Estimated blood loss: 20 ml  Drains: None  Specimens:   ID Type Source Tests Collected by Time Destination   1 : ABDOMINAL WALL ABCSESS  Fluid Abdomen ANAEROBIC BACTERIAL CULTURE, FLUID CULTURE AEROBIC BACTERIAL, FUNGUS CULTURE, CYTOLOGY NON GYN Loy Aguirre MD 10/13/2017  6:42 PM      Findings: Foul-smelling purulent drainage.   Complications: None.  Implants: None.

## 2017-10-13 NOTE — PLAN OF CARE
Problem: Infection, Risk/Actual (Adult)  Intervention: Prevent Infection/Maximize Resistance  Observation goals PRIOR TO DISCHARGE     Comments:   -diagnostic tests and consults completed and resulted -goal met  -vital signs normal or at patient baseline - goal met  -adequate pain control on oral analgesics - goal not met, pt still needs IV pain medications and IV antibiotics.

## 2017-10-13 NOTE — PLAN OF CARE
Problem: Patient Care Overview  Goal: Plan of Care/Patient Progress Review  Outcome: No Change  Vitals:     10/12/17 2021 10/13/17 0109 10/13/17 0541 10/13/17 0741   BP: 94/53 91/49   111/67   BP Location:   Left arm   Left arm   Pulse:           Resp:   18   18   Temp:   99  F (37.2  C) 98.6  F (37  C) 99.3  F (37.4  C)   TempSrc:   Oral Oral Oral   SpO2:   95%   95%   Weight:           Pt has been c/o pain  received IV Dilaudid some relief and then Oxycodone  solution  effective at this time.   IV Zofran given pt stated relief. Abd  distended had BM soft. And voided. JG tube site warmth and redness no change   MD did US of the abd (see notes) Pt to go the OR  This afternoon. Tube feeding continuous at 65/hr  Met goal at 12pm.   Continue to follow up per plan of care.

## 2017-10-13 NOTE — PROGRESS NOTES
Observation goals PRIOR TO DISCHARGE     Comments:   -diagnostic tests and consults completed and resulted -goal met  -vital signs normal or at patient baseline - goal not met-pt febrile T 99, otherwise VSS.  -adequate pain control on oral analgesics - goal not met, pt still needs IV pain medications and IV antibiotics.

## 2017-10-13 NOTE — OR NURSING
Telma Duffy paged regarding pacemaker.  Telma stated that if heart rate drops during the case to place a magnet over the pacemaker.

## 2017-10-13 NOTE — PLAN OF CARE
Problem: Patient Care Overview  Goal: Plan of Care/Patient Progress Review  A/O, VSS. Pt c/o of continuous pain during the night. PRN IV Dilaudid and Suspension Oxy were given, pt reported relief. Pt c/o of nausea during the shift and was given 4mg IV Zofran, reported relief. GJ tube had minimal drainage around the site, swelling and tender around area, G tube to gravity output 175ml. Tube feedings running at 55ml/hr, goal rate is 65 ml/hr. Programmed 30 ml flushes Q4 hrs. Voiding WNLs.  NPO. Up independently. Calls appropriately. Will continue to monitor and POC.     Observation goals PRIOR TO DISCHARGE     Comments:   -diagnostic tests and consults completed and resulted -goal met  -vital signs normal or at patient baseline - goal met VSS   -adequate pain control on oral analgesics - goal not met, pt still needs IV pain medications and IV antibiotics.

## 2017-10-13 NOTE — ANESTHESIA PREPROCEDURE EVALUATION
Anesthesia Evaluation     .             ROS/MED HX    ENT/Pulmonary:     (+)sleep apnea, doesn't use CPAP , . .    Neurologic:       Cardiovascular:     (+) ----. : . . . pacemaker :. valvular problems/murmurs  s/p AVR with redp in 2014:. Previous cardiac testing [place]:3/2017results:Summary:   1. The left ventricular internal cavity size is normal.   2. Normal left ventricular systolic function.   3. Left ventricular ejection fraction, by visual estimation, is 50 to 55%.   4. Mild concentric left ventricular hypertrophy.   5. Pseudonormal (Grade 2) pattern of LV diastolic filling.   6. Left ventricular regional wall motion abnormalities. See comments below.   7. Moderate hypokinesis of the apical septal left ventricular wall(s).   8. There is a small membranous VSD with peak left to right flow velocity of 4.2 m/s.   9. The aortic root and ascending aorta is/are normal.  10. Mechanical prosthesis in the aortic position.  11. The peak and mean pressures and peak systolic velocity through the AV mechanical prothesis are respectively 41 mmHg, 23 mmHg and 3.20 m/s. These are unchanged from 1/7/2016.  12. Trace aortic valve regurgitation.  13. Trace mitral valve regurgitation.  14. Mild tricuspid valve regurgitation.  15. The inferior vena cava is normal.  16. The right ventricular systolic pressure is normal at 30.0 mmHg.  17. Compared to the previous study of 1/7/2016 the LV systolic function, aortic valve and VSD are unchanged. The right sided pressures have decreased.date: results: date: results: date: results:          METS/Exercise Tolerance:     Hematologic: Comments: hgb 7.9    (+) Anemia, Other Hematologic Disorder-anticoagulated for AVR      Musculoskeletal:         GI/Hepatic:     (+) Other GI/Hepatic s/p whipple 8/2017 for pancreatitis      Renal/Genitourinary:     (+) chronic renal disease, type: CRI, Pt does not require dialysis,       Endo:         Psychiatric:         Infectious Disease:          Malignancy:         Other:                     Physical Exam  Normal systems: pulmonary    Airway   Mallampati: II  TM distance: >3 FB  Neck ROM: limited    Dental   (+) caps    Cardiovascular   Rhythm and rate: regular and normal      Pulmonary     Other findings: Pale and ill appearing                Anesthesia Plan      History & Physical Review  History and physical reviewed and following examination; no interval change.    ASA Status:  3 .    NPO Status:  > 2 hours    Plan for General, RSI and ETT with Intravenous induction. Maintenance will be Balanced.    PONV prophylaxis:  Ondansetron (or other 5HT-3)  Additional equipment: Videolaryngoscope and 2nd IV I have examined the patient and reviewed the medical record.  Has had j tube feedings until 1500  Tolerated general anesthetic well on many occasiona  PM in place, interrogated in the past, OK to place magnet per [facility] team  Hgb now 6.6 will transfuse perioperatively    Diallo Contreras MD      Postoperative Care  Postoperative pain management:  IV analgesics and Neuraxial analgesia.      Consents  Anesthetic plan, risks, benefits and alternatives discussed with:  Patient..

## 2017-10-14 ENCOUNTER — APPOINTMENT (OUTPATIENT)
Dept: INTERVENTIONAL RADIOLOGY/VASCULAR | Facility: CLINIC | Age: 57
DRG: 394 | End: 2017-10-14
Payer: COMMERCIAL

## 2017-10-14 LAB
ANION GAP SERPL CALCULATED.3IONS-SCNC: 6 MMOL/L (ref 3–14)
BASOPHILS # BLD AUTO: 0 10E9/L (ref 0–0.2)
BASOPHILS NFR BLD AUTO: 0.4 %
BUN SERPL-MCNC: 25 MG/DL (ref 7–30)
CALCIUM SERPL-MCNC: 7.9 MG/DL (ref 8.5–10.1)
CHLORIDE SERPL-SCNC: 111 MMOL/L (ref 94–109)
CO2 SERPL-SCNC: 26 MMOL/L (ref 20–32)
CREAT SERPL-MCNC: 1.2 MG/DL (ref 0.66–1.25)
DIFFERENTIAL METHOD BLD: ABNORMAL
EOSINOPHIL # BLD AUTO: 0.1 10E9/L (ref 0–0.7)
EOSINOPHIL NFR BLD AUTO: 1 %
ERYTHROCYTE [DISTWIDTH] IN BLOOD BY AUTOMATED COUNT: 15.8 % (ref 10–15)
GFR SERPL CREATININE-BSD FRML MDRD: 62 ML/MIN/1.7M2
GLUCOSE SERPL-MCNC: 128 MG/DL (ref 70–99)
HCT VFR BLD AUTO: 22 % (ref 40–53)
HGB BLD-MCNC: 6.8 G/DL (ref 13.3–17.7)
IMM GRANULOCYTES # BLD: 0 10E9/L (ref 0–0.4)
IMM GRANULOCYTES NFR BLD: 0.4 %
INR PPP: 3.72 (ref 0.86–1.14)
LYMPHOCYTES # BLD AUTO: 0.6 10E9/L (ref 0.8–5.3)
LYMPHOCYTES NFR BLD AUTO: 11.6 %
MAGNESIUM SERPL-MCNC: 2.1 MG/DL (ref 1.6–2.3)
MCH RBC QN AUTO: 25.9 PG (ref 26.5–33)
MCHC RBC AUTO-ENTMCNC: 30.9 G/DL (ref 31.5–36.5)
MCV RBC AUTO: 84 FL (ref 78–100)
MONOCYTES # BLD AUTO: 0.3 10E9/L (ref 0–1.3)
MONOCYTES NFR BLD AUTO: 5.2 %
NEUTROPHILS # BLD AUTO: 3.9 10E9/L (ref 1.6–8.3)
NEUTROPHILS NFR BLD AUTO: 81.4 %
NRBC # BLD AUTO: 0 10*3/UL
NRBC BLD AUTO-RTO: 0 /100
PHOSPHATE SERPL-MCNC: 2.4 MG/DL (ref 2.5–4.5)
PLATELET # BLD AUTO: 217 10E9/L (ref 150–450)
POTASSIUM SERPL-SCNC: 3.9 MMOL/L (ref 3.4–5.3)
RBC # BLD AUTO: 2.63 10E12/L (ref 4.4–5.9)
SODIUM SERPL-SCNC: 142 MMOL/L (ref 133–144)
WBC # BLD AUTO: 4.8 10E9/L (ref 4–11)

## 2017-10-14 PROCEDURE — 27210905 ZZH KIT CR7

## 2017-10-14 PROCEDURE — 85025 COMPLETE CBC W/AUTO DIFF WBC: CPT | Performed by: SURGERY

## 2017-10-14 PROCEDURE — 12000008 ZZH R&B INTERMEDIATE UMMC

## 2017-10-14 PROCEDURE — 25000128 H RX IP 250 OP 636: Performed by: RADIOLOGY

## 2017-10-14 PROCEDURE — 27210913 ZZH NUTRITION PRODUCT INTERMEDIATE PACKET

## 2017-10-14 PROCEDURE — C1769 GUIDE WIRE: HCPCS

## 2017-10-14 PROCEDURE — 36592 COLLECT BLOOD FROM PICC: CPT | Performed by: SURGERY

## 2017-10-14 PROCEDURE — 49452 REPLACE G-J TUBE PERC: CPT

## 2017-10-14 PROCEDURE — 25000128 H RX IP 250 OP 636: Performed by: STUDENT IN AN ORGANIZED HEALTH CARE EDUCATION/TRAINING PROGRAM

## 2017-10-14 PROCEDURE — 25000131 ZZH RX MED GY IP 250 OP 636 PS 637: Performed by: STUDENT IN AN ORGANIZED HEALTH CARE EDUCATION/TRAINING PROGRAM

## 2017-10-14 PROCEDURE — 27210814 ZZ H TUBE GASTRO CR12

## 2017-10-14 PROCEDURE — 25000132 ZZH RX MED GY IP 250 OP 250 PS 637: Performed by: STUDENT IN AN ORGANIZED HEALTH CARE EDUCATION/TRAINING PROGRAM

## 2017-10-14 PROCEDURE — 0D2DX0Z CHANGE DRAINAGE DEVICE IN LOWER INTESTINAL TRACT, EXTERNAL APPROACH: ICD-10-PCS | Performed by: RADIOLOGY

## 2017-10-14 PROCEDURE — 84100 ASSAY OF PHOSPHORUS: CPT | Performed by: SURGERY

## 2017-10-14 PROCEDURE — 25000128 H RX IP 250 OP 636: Performed by: SURGERY

## 2017-10-14 PROCEDURE — 80048 BASIC METABOLIC PNL TOTAL CA: CPT | Performed by: SURGERY

## 2017-10-14 PROCEDURE — 85610 PROTHROMBIN TIME: CPT | Performed by: SURGERY

## 2017-10-14 PROCEDURE — 83735 ASSAY OF MAGNESIUM: CPT | Performed by: SURGERY

## 2017-10-14 PROCEDURE — 40000802 ZZH SITE CHECK

## 2017-10-14 RX ORDER — NALOXONE HYDROCHLORIDE 0.4 MG/ML
.1-.4 INJECTION, SOLUTION INTRAMUSCULAR; INTRAVENOUS; SUBCUTANEOUS
Status: DISCONTINUED | OUTPATIENT
Start: 2017-10-14 | End: 2017-10-14

## 2017-10-14 RX ORDER — IODIXANOL 320 MG/ML
50 INJECTION, SOLUTION INTRAVASCULAR ONCE
Status: COMPLETED | OUTPATIENT
Start: 2017-10-14 | End: 2017-10-14

## 2017-10-14 RX ORDER — FENTANYL CITRATE 50 UG/ML
25-50 INJECTION, SOLUTION INTRAMUSCULAR; INTRAVENOUS EVERY 5 MIN PRN
Status: DISCONTINUED | OUTPATIENT
Start: 2017-10-14 | End: 2017-10-14

## 2017-10-14 RX ADMIN — SODIUM CHLORIDE, POTASSIUM CHLORIDE, SODIUM LACTATE AND CALCIUM CHLORIDE: 600; 310; 30; 20 INJECTION, SOLUTION INTRAVENOUS at 23:05

## 2017-10-14 RX ADMIN — PIPERACILLIN SODIUM AND TAZOBACTAM SODIUM 3.38 G: 3; .375 INJECTION, POWDER, LYOPHILIZED, FOR SOLUTION INTRAVENOUS at 10:19

## 2017-10-14 RX ADMIN — SODIUM BICARBONATE 325 MG: 325 TABLET ORAL at 02:04

## 2017-10-14 RX ADMIN — SODIUM BICARBONATE 325 MG: 325 TABLET ORAL at 14:29

## 2017-10-14 RX ADMIN — SODIUM CHLORIDE, POTASSIUM CHLORIDE, SODIUM LACTATE AND CALCIUM CHLORIDE: 600; 310; 30; 20 INJECTION, SOLUTION INTRAVENOUS at 12:11

## 2017-10-14 RX ADMIN — SERTRALINE HYDROCHLORIDE 50 MG: 50 TABLET ORAL at 08:41

## 2017-10-14 RX ADMIN — Medication 1 PACKET: at 08:42

## 2017-10-14 RX ADMIN — HYDROMORPHONE HYDROCHLORIDE 0.5 MG: 1 INJECTION, SOLUTION INTRAMUSCULAR; INTRAVENOUS; SUBCUTANEOUS at 07:03

## 2017-10-14 RX ADMIN — ACETAMINOPHEN 975 MG: 325 SOLUTION ORAL at 16:37

## 2017-10-14 RX ADMIN — SODIUM BICARBONATE 325 MG: 325 TABLET ORAL at 22:09

## 2017-10-14 RX ADMIN — OXYCODONE HYDROCHLORIDE 10 MG: 5 SOLUTION ORAL at 21:14

## 2017-10-14 RX ADMIN — SODIUM BICARBONATE 325 MG: 325 TABLET ORAL at 05:48

## 2017-10-14 RX ADMIN — HYDROMORPHONE HYDROCHLORIDE 0.5 MG: 1 INJECTION, SOLUTION INTRAMUSCULAR; INTRAVENOUS; SUBCUTANEOUS at 03:41

## 2017-10-14 RX ADMIN — IODIXANOL 15 ML: 320 INJECTION, SOLUTION INTRAVASCULAR at 13:44

## 2017-10-14 RX ADMIN — Medication 1 PACKET: at 19:38

## 2017-10-14 RX ADMIN — PIPERACILLIN SODIUM AND TAZOBACTAM SODIUM 3.38 G: 3; .375 INJECTION, POWDER, LYOPHILIZED, FOR SOLUTION INTRAVENOUS at 16:30

## 2017-10-14 RX ADMIN — HYDROMORPHONE HYDROCHLORIDE 0.5 MG: 1 INJECTION, SOLUTION INTRAMUSCULAR; INTRAVENOUS; SUBCUTANEOUS at 23:08

## 2017-10-14 RX ADMIN — ONDANSETRON 4 MG: 2 INJECTION INTRAMUSCULAR; INTRAVENOUS at 08:24

## 2017-10-14 RX ADMIN — Medication 20 MG: at 19:38

## 2017-10-14 RX ADMIN — PREDNISONE 5 MG: 5 SOLUTION ORAL at 08:40

## 2017-10-14 RX ADMIN — PIPERACILLIN SODIUM AND TAZOBACTAM SODIUM 3.38 G: 3; .375 INJECTION, POWDER, LYOPHILIZED, FOR SOLUTION INTRAVENOUS at 03:41

## 2017-10-14 RX ADMIN — PANCRELIPASE 48000 UNITS: 24000; 76000; 120000 CAPSULE, DELAYED RELEASE PELLETS ORAL at 05:47

## 2017-10-14 RX ADMIN — HYDROMORPHONE HYDROCHLORIDE 0.5 MG: 1 INJECTION, SOLUTION INTRAMUSCULAR; INTRAVENOUS; SUBCUTANEOUS at 12:40

## 2017-10-14 RX ADMIN — FENTANYL CITRATE 100 MCG: 50 INJECTION, SOLUTION INTRAMUSCULAR; INTRAVENOUS at 13:34

## 2017-10-14 RX ADMIN — HYDROMORPHONE HYDROCHLORIDE 0.5 MG: 1 INJECTION, SOLUTION INTRAMUSCULAR; INTRAVENOUS; SUBCUTANEOUS at 01:01

## 2017-10-14 RX ADMIN — PIPERACILLIN SODIUM AND TAZOBACTAM SODIUM 3.38 G: 3; .375 INJECTION, POWDER, LYOPHILIZED, FOR SOLUTION INTRAVENOUS at 22:08

## 2017-10-14 RX ADMIN — ACETAMINOPHEN 975 MG: 325 SOLUTION ORAL at 01:01

## 2017-10-14 RX ADMIN — PANCRELIPASE 48000 UNITS: 24000; 76000; 120000 CAPSULE, DELAYED RELEASE PELLETS ORAL at 22:08

## 2017-10-14 RX ADMIN — Medication 20 MG: at 08:40

## 2017-10-14 RX ADMIN — VANCOMYCIN HYDROCHLORIDE 1250 MG: 10 INJECTION, POWDER, LYOPHILIZED, FOR SOLUTION INTRAVENOUS at 14:00

## 2017-10-14 RX ADMIN — ACETAMINOPHEN 975 MG: 325 SOLUTION ORAL at 08:41

## 2017-10-14 RX ADMIN — SODIUM BICARBONATE 325 MG: 325 TABLET ORAL at 18:34

## 2017-10-14 RX ADMIN — PANCRELIPASE 48000 UNITS: 24000; 76000; 120000 CAPSULE, DELAYED RELEASE PELLETS ORAL at 02:04

## 2017-10-14 RX ADMIN — PANCRELIPASE 48000 UNITS: 24000; 76000; 120000 CAPSULE, DELAYED RELEASE PELLETS ORAL at 14:29

## 2017-10-14 RX ADMIN — TAMSULOSIN HYDROCHLORIDE 0.4 MG: 0.4 CAPSULE ORAL at 08:40

## 2017-10-14 RX ADMIN — HYDROMORPHONE HYDROCHLORIDE 0.5 MG: 1 INJECTION, SOLUTION INTRAMUSCULAR; INTRAVENOUS; SUBCUTANEOUS at 16:34

## 2017-10-14 RX ADMIN — OXYCODONE HYDROCHLORIDE 10 MG: 5 SOLUTION ORAL at 08:41

## 2017-10-14 RX ADMIN — HYDROMORPHONE HYDROCHLORIDE 0.5 MG: 1 INJECTION, SOLUTION INTRAMUSCULAR; INTRAVENOUS; SUBCUTANEOUS at 10:20

## 2017-10-14 RX ADMIN — HYDROMORPHONE HYDROCHLORIDE 0.5 MG: 1 INJECTION, SOLUTION INTRAMUSCULAR; INTRAVENOUS; SUBCUTANEOUS at 19:38

## 2017-10-14 RX ADMIN — PANCRELIPASE 48000 UNITS: 24000; 76000; 120000 CAPSULE, DELAYED RELEASE PELLETS ORAL at 18:33

## 2017-10-14 RX ADMIN — PANCRELIPASE 48000 UNITS: 24000; 76000; 120000 CAPSULE, DELAYED RELEASE PELLETS ORAL at 10:34

## 2017-10-14 RX ADMIN — SODIUM BICARBONATE 325 MG: 325 TABLET ORAL at 10:20

## 2017-10-14 NOTE — PROGRESS NOTES
GENERAL SURGERY PROGRESS NOTE    Patient: Lucas Brown  MRN: [id number]    Subjective  Overnight the J port of his GJT was found to be cracked and leaked at the 3-way intersection. Otherwise no acute events. Pain still present, but controlled. Voiding.    Objective  Temp:  [96.1  F (35.6  C)-99.3  F (37.4  C)] 96.8  F (36  C)  Heart Rate:  [65-85] 76  Resp:  [17-20] 18  BP: ()/(53-73) 124/63  SpO2:  [94 %-100 %] 97 %    I/O last 3 completed shifts:  In: 1988 [I.V.:1210; NG/GT:173]  Out: 1875 [Urine:1400; Emesis/NG output:475]    NAD, alert, conversant  NLB on NC  Abd is soft, distended, tender at GJ site. Wound at abscess site beefy red, no skin edge necrosis or significant erythema.  WWP    Labs:   reviewed    Imaging: Reviewed.    Assessment & Plan  57 y/o M s/p Whipple procedure in August 2017 presenting on 10/12/2017 with retracted percutaneous GJ tube bulb and surrounding cellulitis with abscess. IR replaced GJ tube on 10/12/2017. I&D of abdominal abscess at GJ site on 10/13/2017.      - Consult IR to replace GJT since the current apparatus is cracked and leaking.  - Pain control: Scheduled tylenol solution (per J tube), fentanyl patch, dilaudid 0.3-0.5 mg injection, oxycodone 10 mg solution PRN (per J tube)   - Diet: NPO + ice chips, tube feeding at 65 ml hr continuous.  - [place]: none   - Abx: Zosyn (10/12- ) Vancomycin (10/12- ). Follow up abscess cultures.   - Wound Cares: daily morning dressing changes around GJ site, monitor I&D site and BID wet to dry dressing changes    - Drains: GJ tube  - DVT prophylaxis: warfarin  - Anticipated discharge: pending pain control, replacement of GJ tube by IR.     Scribed by Danielle Charles, MS4     Bakari Teresa MD  PGY-2 General Surgery

## 2017-10-14 NOTE — ANESTHESIA CARE TRANSFER NOTE
Patient: Lucas Brown    Procedure(s):  COMBINED INCISION AND DRAINAGE - Wound Class: II-Clean Contaminated    Diagnosis: Abdomen Wall Abscess  Diagnosis Additional Information: No value filed.    Anesthesia Type:   General, RSI, ETT     Note:  Airway :Face Mask  Patient transferred to:PACU  Comments: VSS. Breathing spontaneously at a regular rate with adequate tidal volumes and maintaining O2 sats on 6L facemask. Denies nausea or pain. No apparent complications from anesthesia.     Aldair Duran DO  CA-1  Handoff Report: Identifed the Patient, Identified the Reponsible Provider, Reviewed the pertinent medical history, Discussed the surgical course, Reviewed Intra-OP anesthesia mangement and issues during anesthesia, Set expectations for post-procedure period and Allowed opportunity for questions and acknowledgement of understanding      Vitals: (Last set prior to Anesthesia Care Transfer)    CRNA VITALS  10/13/2017 1829 - 10/13/2017 1910      10/13/2017             Pulse: 99    Ht Rate: 97    SpO2: 100 %    Resp Rate (observed): (!)  34                Electronically Signed By: Aldair Duran DO  October 13, 2017  7:10 PM

## 2017-10-14 NOTE — PLAN OF CARE
Problem: Patient Care Overview  Goal: Plan of Care/Patient Progress Review  Outcome: No Change  Vital signs:  Temp: 96.8  F (36  C) Temp src: Oral BP: 124/63   Heart Rate: 76 Resp: 18 SpO2: 97 %     VSS on RA. Capnography on. Abdominal incision dressing intact, some drainage. G tube to gravity with 160 mL yellow/thick output. Tf @ goal of 65 mL/hr via Jtube, pt tolerating. Meds infused through Jtube. Pt says GJ tube has a crack/leak and it needs to be held a certain way when flushed. Dbl lumen PICC, gray lumen infusing LR @ 100 mL/hr, blue lumen saline locked. IV ABX infused per orders. Pt c/o pain in abdomen near GJ tube site. Dilaudid given x3 for pain control. Watery BM x1 overnight. Using urinal/toilet appropriately. Sleeping between cares.

## 2017-10-14 NOTE — PROCEDURES
Interventional Radiology Brief Post Procedure Note    Procedure: IR GASTRO JEJUNOSTOMY TUBE CHANGE    Proceduralist: Alessandro Savage MD    Assistant: None    Time Out: Prior to the start of the procedure and with procedural staff participation, I verbally confirmed the patient s identity using two indicators, relevant allergies, that the procedure was appropriate and matched the consent or emergent situation, and that the correct equipment/implants were available. Immediately prior to starting the procedure I conducted the Time Out with the procedural staff and re-confirmed the patient s name, procedure, and site/side. (The Joint Commission universal protocol was followed.)  Yes    Medications   Medication Event Details Admin User Admin Time       Sedation: None. Local Anesthestic used    Findings: Successful over the wire exchange of an 18 Fr GJ tube.     Estimated Blood Loss: Minimal    Fluoroscopy Time:  minute(s)    SPECIMENS: None    Complications: 1. None     Condition: Stable    Plan: GJ tube ready for immediate use.     Comments: See dictated procedure note for full details.    Alessandro Savage MD

## 2017-10-14 NOTE — PLAN OF CARE
Problem: Patient Care Overview  Goal: Plan of Care/Patient Progress Review  Outcome: No Change  RN [phone]  B/P: 135/70, T: 97.1, P: 87, R: 18  Patient reports pain is tolerable with Fentanyl patch on right chest, Dilaudid 0.5 mg every 2 hours and Oxycodone given once today. Zosyn given x 1 this morning and reports relief.  Abdominal wound C/D/I.  Abdomen soft with bowel sounds present, patient reports passing gas. Patient went down to IR for G/J tube replacement.  G/J tube intact, J-tube infusing tube feeds at 65 ml/hr, tolerating well.  G-tube to gravity.  Up with SBA.  Voiding and not saving.  Patient reports having 4-5 loose stools today.  Continue with POC.

## 2017-10-14 NOTE — PROGRESS NOTES
Surgery Post-Operative Note  10/13/2017   ID: 56 year old  POD#0 s/p incision and drainage of abdominal wall abscess.      S: Patient c/o abdominal pain well controlled w/ dilaudid PRN. Denies SOB, n/v or CP.  Feeling tired.    O:  /66  Pulse 87  Temp 97.4  F (36.3  C) (Oral)  Resp 17  Wt 87.5 kg (192 lb 14.4 oz)  SpO2 96%  BMI 26.9 kg/m2  GEN: Alert NAD, resting comfortably  PUL: Breathing nonlabored on RA  Abdomen: Soft, appropriately tender.  Dressing clean and dry.  Extremities - no edema, non-tender, SCDs in place.    Lab Results   Component Value Date    HGB 6.7 10/13/2017    HGB 7.9 10/12/2017       UOP: 0.7 mL/kg/hr    A/P: 56 year old male who is s/p incision and drainage of abdominal wall abscess.  Progressing well.    Pain: Dilaudid PRN  FEN: NPO, MIVF  Activity: As tolerated   Diet: NPO w/ ice chips   Prophylaxis: AC with warfarin      Pippa Couch  General Surgery PGY1  [id number]

## 2017-10-14 NOTE — PLAN OF CARE
Problem: Patient Care Overview  Goal: Plan of Care/Patient Progress Review  Outcome: No Change  Pt arrived back from PACU 2030. Pt Had I&D for abdominal Abscess. VSS on RA. Pt reporting abdominal pain over G/J site. Pain managed with prn IV dialudid and oxycodone. Fentanyl patch on R chest. Abdominal incision Dressing inatct. Scant drainage from wound. ABD over dressing changed. G tube to gravity with 100 serous output. Tube feeding restarted at 2200 via J tube at goal rate. IV abx infused via PICC. MIVF at 100ml/hr. NPO ex meds and Ice chips. Pt voiding adequately using urinal. BM soft x1. Pt up w/SBA. Pt G/J noted to be leaking when flushed. No leaking with Tube feeding. Will pass on to oncoming Nurse to Notify team tomorrow AM. Continue POC

## 2017-10-15 LAB
ABO + RH BLD: NORMAL
ABO + RH BLD: NORMAL
BLD GP AB SCN SERPL QL: NORMAL
BLD PROD TYP BPU: NORMAL
BLD PROD TYP BPU: NORMAL
BLD UNIT ID BPU: 0
BLOOD BANK CMNT PATIENT-IMP: NORMAL
BLOOD PRODUCT CODE: NORMAL
BPU ID: NORMAL
C DIFF TOX B STL QL: NEGATIVE
CREAT SERPL-MCNC: 1.07 MG/DL (ref 0.66–1.25)
GFR SERPL CREATININE-BSD FRML MDRD: 71 ML/MIN/1.7M2
HGB BLD-MCNC: 6.7 G/DL (ref 13.3–17.7)
INR PPP: 3.69 (ref 0.86–1.14)
MAGNESIUM SERPL-MCNC: 2 MG/DL (ref 1.6–2.3)
NUM BPU REQUESTED: 2
PHOSPHATE SERPL-MCNC: 2.5 MG/DL (ref 2.5–4.5)
POTASSIUM SERPL-SCNC: 3.8 MMOL/L (ref 3.4–5.3)
SPECIMEN EXP DATE BLD: NORMAL
SPECIMEN SOURCE: NORMAL
TRANSFUSION STATUS PATIENT QL: NORMAL
TRANSFUSION STATUS PATIENT QL: NORMAL
VANCOMYCIN SERPL-MCNC: 10.2 MG/L

## 2017-10-15 PROCEDURE — 84100 ASSAY OF PHOSPHORUS: CPT | Performed by: SURGERY

## 2017-10-15 PROCEDURE — 36592 COLLECT BLOOD FROM PICC: CPT | Performed by: STUDENT IN AN ORGANIZED HEALTH CARE EDUCATION/TRAINING PROGRAM

## 2017-10-15 PROCEDURE — 25000128 H RX IP 250 OP 636: Performed by: STUDENT IN AN ORGANIZED HEALTH CARE EDUCATION/TRAINING PROGRAM

## 2017-10-15 PROCEDURE — 25000132 ZZH RX MED GY IP 250 OP 250 PS 637: Performed by: STUDENT IN AN ORGANIZED HEALTH CARE EDUCATION/TRAINING PROGRAM

## 2017-10-15 PROCEDURE — 82565 ASSAY OF CREATININE: CPT | Performed by: SURGERY

## 2017-10-15 PROCEDURE — 36416 COLLJ CAPILLARY BLOOD SPEC: CPT | Performed by: SURGERY

## 2017-10-15 PROCEDURE — 25000131 ZZH RX MED GY IP 250 OP 636 PS 637: Performed by: STUDENT IN AN ORGANIZED HEALTH CARE EDUCATION/TRAINING PROGRAM

## 2017-10-15 PROCEDURE — 84132 ASSAY OF SERUM POTASSIUM: CPT | Performed by: SURGERY

## 2017-10-15 PROCEDURE — 87493 C DIFF AMPLIFIED PROBE: CPT | Performed by: SURGERY

## 2017-10-15 PROCEDURE — 80202 ASSAY OF VANCOMYCIN: CPT | Performed by: SURGERY

## 2017-10-15 PROCEDURE — 85610 PROTHROMBIN TIME: CPT | Performed by: SURGERY

## 2017-10-15 PROCEDURE — P9016 RBC LEUKOCYTES REDUCED: HCPCS | Performed by: ANESTHESIOLOGY

## 2017-10-15 PROCEDURE — 40000802 ZZH SITE CHECK

## 2017-10-15 PROCEDURE — 12000008 ZZH R&B INTERMEDIATE UMMC

## 2017-10-15 PROCEDURE — 83735 ASSAY OF MAGNESIUM: CPT | Performed by: SURGERY

## 2017-10-15 PROCEDURE — 25000128 H RX IP 250 OP 636: Performed by: SURGERY

## 2017-10-15 PROCEDURE — 25000125 ZZHC RX 250: Performed by: STUDENT IN AN ORGANIZED HEALTH CARE EDUCATION/TRAINING PROGRAM

## 2017-10-15 PROCEDURE — 27210913 ZZH NUTRITION PRODUCT INTERMEDIATE PACKET

## 2017-10-15 PROCEDURE — 85018 HEMOGLOBIN: CPT | Performed by: STUDENT IN AN ORGANIZED HEALTH CARE EDUCATION/TRAINING PROGRAM

## 2017-10-15 RX ORDER — VANCOMYCIN HYDROCHLORIDE 1 G/200ML
1000 INJECTION, SOLUTION INTRAVENOUS EVERY 12 HOURS
Status: DISCONTINUED | OUTPATIENT
Start: 2017-10-16 | End: 2017-10-16 | Stop reason: HOSPADM

## 2017-10-15 RX ADMIN — OXYCODONE HYDROCHLORIDE 10 MG: 5 SOLUTION ORAL at 14:51

## 2017-10-15 RX ADMIN — PANCRELIPASE 48000 UNITS: 24000; 76000; 120000 CAPSULE, DELAYED RELEASE PELLETS ORAL at 05:50

## 2017-10-15 RX ADMIN — POTASSIUM CHLORIDE 10 MEQ: 7.46 INJECTION, SOLUTION INTRAVENOUS at 16:58

## 2017-10-15 RX ADMIN — SODIUM BICARBONATE 325 MG: 325 TABLET ORAL at 10:34

## 2017-10-15 RX ADMIN — ACETAMINOPHEN 975 MG: 325 SOLUTION ORAL at 01:48

## 2017-10-15 RX ADMIN — PIPERACILLIN SODIUM AND TAZOBACTAM SODIUM 3.38 G: 3; .375 INJECTION, POWDER, LYOPHILIZED, FOR SOLUTION INTRAVENOUS at 03:36

## 2017-10-15 RX ADMIN — VANCOMYCIN HYDROCHLORIDE 1250 MG: 10 INJECTION, POWDER, LYOPHILIZED, FOR SOLUTION INTRAVENOUS at 13:32

## 2017-10-15 RX ADMIN — PIPERACILLIN SODIUM AND TAZOBACTAM SODIUM 3.38 G: 3; .375 INJECTION, POWDER, LYOPHILIZED, FOR SOLUTION INTRAVENOUS at 10:32

## 2017-10-15 RX ADMIN — SODIUM BICARBONATE 325 MG: 325 TABLET ORAL at 23:29

## 2017-10-15 RX ADMIN — PANCRELIPASE 48000 UNITS: 24000; 76000; 120000 CAPSULE, DELAYED RELEASE PELLETS ORAL at 23:28

## 2017-10-15 RX ADMIN — Medication 20 MG: at 09:07

## 2017-10-15 RX ADMIN — SODIUM BICARBONATE 325 MG: 325 TABLET ORAL at 18:33

## 2017-10-15 RX ADMIN — SODIUM CHLORIDE, POTASSIUM CHLORIDE, SODIUM LACTATE AND CALCIUM CHLORIDE: 600; 310; 30; 20 INJECTION, SOLUTION INTRAVENOUS at 22:27

## 2017-10-15 RX ADMIN — Medication 2 G: at 19:37

## 2017-10-15 RX ADMIN — PANCRELIPASE 48000 UNITS: 24000; 76000; 120000 CAPSULE, DELAYED RELEASE PELLETS ORAL at 10:34

## 2017-10-15 RX ADMIN — PANCRELIPASE 48000 UNITS: 24000; 76000; 120000 CAPSULE, DELAYED RELEASE PELLETS ORAL at 18:33

## 2017-10-15 RX ADMIN — SODIUM BICARBONATE 325 MG: 325 TABLET ORAL at 05:51

## 2017-10-15 RX ADMIN — ONDANSETRON 4 MG: 2 INJECTION INTRAMUSCULAR; INTRAVENOUS at 22:47

## 2017-10-15 RX ADMIN — ACETAMINOPHEN 975 MG: 325 SOLUTION ORAL at 17:03

## 2017-10-15 RX ADMIN — TAMSULOSIN HYDROCHLORIDE 0.4 MG: 0.4 CAPSULE ORAL at 09:26

## 2017-10-15 RX ADMIN — SERTRALINE HYDROCHLORIDE 50 MG: 50 TABLET ORAL at 09:07

## 2017-10-15 RX ADMIN — SODIUM BICARBONATE 325 MG: 325 TABLET ORAL at 01:49

## 2017-10-15 RX ADMIN — OXYCODONE HYDROCHLORIDE 10 MG: 5 SOLUTION ORAL at 05:51

## 2017-10-15 RX ADMIN — HYDROMORPHONE HYDROCHLORIDE 0.5 MG: 1 INJECTION, SOLUTION INTRAMUSCULAR; INTRAVENOUS; SUBCUTANEOUS at 10:23

## 2017-10-15 RX ADMIN — HYDROMORPHONE HYDROCHLORIDE 0.5 MG: 1 INJECTION, SOLUTION INTRAMUSCULAR; INTRAVENOUS; SUBCUTANEOUS at 03:03

## 2017-10-15 RX ADMIN — ONDANSETRON 4 MG: 2 INJECTION INTRAMUSCULAR; INTRAVENOUS at 05:56

## 2017-10-15 RX ADMIN — SODIUM BICARBONATE 325 MG: 325 TABLET ORAL at 14:20

## 2017-10-15 RX ADMIN — Medication 1 PACKET: at 21:06

## 2017-10-15 RX ADMIN — PANCRELIPASE 48000 UNITS: 24000; 76000; 120000 CAPSULE, DELAYED RELEASE PELLETS ORAL at 01:49

## 2017-10-15 RX ADMIN — ACETAMINOPHEN 975 MG: 325 SOLUTION ORAL at 09:07

## 2017-10-15 RX ADMIN — HYDROMORPHONE HYDROCHLORIDE 0.5 MG: 1 INJECTION, SOLUTION INTRAMUSCULAR; INTRAVENOUS; SUBCUTANEOUS at 13:28

## 2017-10-15 RX ADMIN — OXYCODONE HYDROCHLORIDE 10 MG: 5 SOLUTION ORAL at 19:08

## 2017-10-15 RX ADMIN — OXYCODONE HYDROCHLORIDE 10 MG: 5 SOLUTION ORAL at 01:49

## 2017-10-15 RX ADMIN — HYDROMORPHONE HYDROCHLORIDE 0.5 MG: 1 INJECTION, SOLUTION INTRAMUSCULAR; INTRAVENOUS; SUBCUTANEOUS at 05:09

## 2017-10-15 RX ADMIN — PANCRELIPASE 48000 UNITS: 24000; 76000; 120000 CAPSULE, DELAYED RELEASE PELLETS ORAL at 14:19

## 2017-10-15 RX ADMIN — HYDROMORPHONE HYDROCHLORIDE 0.5 MG: 1 INJECTION, SOLUTION INTRAMUSCULAR; INTRAVENOUS; SUBCUTANEOUS at 17:26

## 2017-10-15 RX ADMIN — HYDROMORPHONE HYDROCHLORIDE 0.5 MG: 1 INJECTION, SOLUTION INTRAMUSCULAR; INTRAVENOUS; SUBCUTANEOUS at 21:10

## 2017-10-15 RX ADMIN — PIPERACILLIN SODIUM AND TAZOBACTAM SODIUM 3.38 G: 3; .375 INJECTION, POWDER, LYOPHILIZED, FOR SOLUTION INTRAVENOUS at 18:05

## 2017-10-15 RX ADMIN — POTASSIUM CHLORIDE 10 MEQ: 7.46 INJECTION, SOLUTION INTRAVENOUS at 15:04

## 2017-10-15 RX ADMIN — PREDNISONE 5 MG: 5 SOLUTION ORAL at 09:07

## 2017-10-15 RX ADMIN — Medication 1 PACKET: at 09:08

## 2017-10-15 RX ADMIN — OXYCODONE HYDROCHLORIDE 10 MG: 5 SOLUTION ORAL at 23:16

## 2017-10-15 RX ADMIN — ONDANSETRON 4 MG: 2 INJECTION INTRAMUSCULAR; INTRAVENOUS at 16:32

## 2017-10-15 RX ADMIN — Medication 20 MG: at 21:05

## 2017-10-15 RX ADMIN — OXYCODONE HYDROCHLORIDE 10 MG: 5 SOLUTION ORAL at 09:12

## 2017-10-15 RX ADMIN — HYDROMORPHONE HYDROCHLORIDE 0.5 MG: 1 INJECTION, SOLUTION INTRAMUSCULAR; INTRAVENOUS; SUBCUTANEOUS at 07:55

## 2017-10-15 NOTE — PHARMACY-VANCOMYCIN DOSING SERVICE
Pharmacy Vancomycin Note  Date of Service October 15, 2017  Patient's  1960   56 year old, male    Indication: Skin and Soft Tissue Infection/abscess  Goal Trough Level: 15-20 mg/L  Day of Therapy: 4  Current Vancomycin regimen:  1250 mg IV q24h    Current estimated CrCl = Estimated Creatinine Clearance: 93.9 mL/min (based on Cr of 1.07).    Creatinine for last 3 days  10/13/2017:  7:26 AM Creatinine 1.47 mg/dL  10/14/2017: 11:02 AM Creatinine 1.20 mg/dL  10/15/2017:  7:01 AM Creatinine 1.07 mg/dL    Recent Vancomycin Levels (past 3 days)  10/13/2017: 10:16 AM Vancomycin Level 8.1 mg/L  10/15/2017:  1:26 PM Vancomycin Level 10.2 mg/L (23.5 hour trough)    Vancomycin IV Administrations (past 72 hours)                   vancomycin (VANCOCIN) 1,250 mg in NaCl 0.9 % 250 mL intermittent infusion (mg) 1,250 mg New Bag 10/15/17 1332     1,250 mg New Bag 10/14/17 1400     1,250 mg New Bag 10/13/17 1439                Nephrotoxins and other renal medications (Future)    Start     Dose/Rate Route Frequency Ordered Stop    10/13/17 1330  vancomycin (VANCOCIN) 1,250 mg in NaCl 0.9 % 250 mL intermittent infusion      1,250 mg  over 90 Minutes Intravenous EVERY 24 HOURS 10/13/17 1325      10/12/17 1600  piperacillin-tazobactam (ZOSYN) 3.375 g vial to attach to  mL bag      3.375 g  over 30 Minutes Intravenous EVERY 6 HOURS 10/12/17 1522               Contrast Orders - past 72 hours (72h ago through future)    Start     Dose/Rate Route Frequency Ordered Stop    10/14/17 1315  iodixanol (VISIPAQUE 320) injection 50 mL      50 mL Tube ONCE 10/14/17 1301 10/14/17 1344          Interpretation of levels and current regimen:  Trough level is  Subtherapeutic    Has serum creatinine changed > 50% in last 72 hours: Yes, scr is improving    Urine output:  unable to determine    Renal Function: Improving    Plan:  1.  Increase Dose to 1000 mg q 12 hours.  2.  Pharmacy will check trough levels as appropriate in 1-3 Days.    3.  Serum creatinine levels will be ordered daily for the first week of therapy and at least twice weekly for subsequent weeks.      Shweta Jackson, PharmD, BCPS        .

## 2017-10-15 NOTE — PROVIDER NOTIFICATION
"Vitals:    10/14/17 1335 10/14/17 1624 10/14/17 1920 10/14/17 2218   BP: 123/76 135/70 150/74 127/70   BP Location:  Left arm Left arm Left arm   Cuff Size:       Pulse:    71   Resp: 17 18 18 18   Temp:  97.1  F (36.2  C) 95.9  F (35.5  C) 96.3  F (35.7  C)   TempSrc:  Oral Oral Oral   SpO2: 98% 96% 99% 97%   Weight:   87.2 kg (192 lb 4.8 oz)      Afebrile. VSS. O2 sats high 90s on RA. Notified Dr. Rudolph regarding patient c/o posterior knee pain. Patient stated it was achy, pedal pulses 2+, no edema, skin pale. C/o mild numbness, pt stated, \"Felt like feet were asleep\" at first but then numbness disappeared. PCDs applied, repositioned. Will continue to monitor.  "

## 2017-10-15 NOTE — PLAN OF CARE
Problem: Patient Care Overview  Goal: Plan of Care/Patient Progress Review  Outcome: No Change  Vital signs:  Temp: 96.3  F (35.7  C) Temp src: Oral BP: 127/70 Pulse: 71 Heart Rate: 79 Resp: 18 SpO2: 97 % O2 Device: None (Room air)      VSS. Abdominal dressing c/d/i, reinforced on one side with pressure dressing. PICC infusing LR @ 100 mL/hr, one lumen saline locked. TF @ goal of 65mL/hr. Pump keeps beeping with error message, Jtube flushing fine, ordered a new pump, waiting for arrival. Gtube to gravity with total 85 mL thick/yellow output. Pt c/o pain in abdomen, Dilaudid given x3, Oxycodone given x2. Pt also c/o nausea around 0600, zofran given x1. 1 Watery BM overnight. Due to multiple watery BMs during day, told pt to save BM in hat d/t concern for Cdiff. Pt has not had BM since midnight. Pt requested heating pad overnight, however, when they arrived, pt changed his mind. Heating pad is in room if pt wants it. NPO except ice chips. Pt c/o of pain in bilateral knees during evening shift, however, PCDs on now and seems to be helping, 2+ pulses. Sleeping between cares.

## 2017-10-15 NOTE — OP NOTE
DATE OF PROCEDURE:  10/13/2017      PREOPERATIVE DIAGNOSIS:  Wound infection.      POSTOPERATIVE DIAGNOSIS:  Wound infection.      PROCEDURE:  Incision and drainage of transverse upper abdominal mid incision.      SURGEON:  Loy Aguirre MD      ASSISTANT SURGEON:  Shanti Sommer MD    MEDICAL STUDENTt: Yanet Narvaez MSIII      INDICATION FOR PROCEDURE:  Lucas Brown is a 56-year-old male presents with severe pain and erythema with tenderness over the upper midline subcostal incision.  This area is exquisitely tender and ultrasound exam demonstrated a collection of fluid in the subcutaneous tissue.  The patient also has a G-tube present in the abdomen superior to the incision.  The G-tube has been in place for a short time.  The incision appears to be the primary problem and infection site.  The patient gave informed consent; the risks and the benefits were discussed including potential for additional infection, need for second surgery, all the anesthetic complications including myocardial infarction, pulmonary emboli, stroke, even death in the operating room were also discussed with the patient. The patient understood these risks and wished to proceed.      DESCRIPTION OF PROCEDURE:  Lucas Brown was taken to the main operating room under general endotracheal anesthesia.  The patient was prepped and draped in sterile fashion.  Following the appropriate timeout procedure, to assure patient identification and procedure, a #15 blade was used to open the bilateral subcostal wound.  A large collection of gray tan liquid purulent material was present in the wound.  This was all washed out.  A sample was taken, sent for culture and Gram stain.  The wound opened to the full extent laterally and medially using the #15 blade with electrocautery, the wound was washed with 3.3% PCMX soap, irrigated with sterile normal saline.  Hemostasis was excellent, packed with gauze.  The patient tolerated the  procedure well and was returned to postanesthesia recovery in good condition.         MATEUSZ RECINOS MD             D: 10/14/2017 22:06   T: 10/15/2017 07:10   MT: [id number]      Name:     AMELIE MCCRACKEN   MRN:      [id number]        Account:        [id number]   :      1960           Procedure Date: 10/13/2017      Document: [id number]       cc: [facility] Surgery Billing

## 2017-10-15 NOTE — PLAN OF CARE
Problem: Patient Care Overview  Goal: Plan of Care/Patient Progress Review  Outcome: No Change  B/P: 142/79, T: 97.3, P: 66, R: 18  Patient reports pain is tolerable with Dilaudid every 2-3 hours, Oxycodone x 1.  G-tube to gravity, pulling moderate amounts of yellow, thick output.  J-tube with tube feeds infusing at 65 ml/hr, tolerating well.  Abdomen soft with bowel sounds present, patient states he is passing gas and has had a couple of loose BM's today.  Voiding and not saving. Up with SBA.  Wife at bedside.  Hgb of 6.7, received one unit of blood with no issues.  K+ of 3.8 needs to be replaced after scheduled antibiotics and blood are done infusing.  Continue with POC.

## 2017-10-15 NOTE — PROGRESS NOTES
GENERAL SURGERY PROGRESS NOTE    Patient: Lucas Brown  MRN: [id number]    Subjective  GJT replaced by IR yesterday, working appropriately now. Otherwise no acute events. Pain still present, but controlled. Voiding. BMs. TFs at goal\.    Objective  Temp:  [95.9  F (35.5  C)-97.1  F (36.2  C)] 96.3  F (35.7  C)  Pulse:  [71] 71  Heart Rate:  [66-79] 79  Resp:  [13-18] 18  BP: (123-150)/(70-76) 127/70  SpO2:  [96 %-99 %] 97 %    I/O last 3 completed shifts:  In: 4136 [P.O.:60; I.V.:2406; NG/GT:240]  Out: 1185 [Urine:650; Emesis/NG output:535]    NAD, alert, conversant  NLB on RA  Abd is soft, mildly distended. Tender at GJ site and wound. Wound at abscess site beefy red, no skin edge necrosis or significant erythema.  WWP    Labs:   reviewed  Hgb 6.7    Imaging: Reviewed.    Assessment & Plan  55 y/o M s/p Whipple procedure in August 2017 presenting on 10/12/2017 with retracted percutaneous GJ tube bulb and surrounding cellulitis with abscess. IR replaced GJ tube on 10/12/2017. I&D of abdominal abscess at GJ site on 10/13/2017.      - transfuse 1u pRBC today  - WOCN consult  - Pain control: Scheduled tylenol solution (per J tube), fentanyl patch, dilaudid 0.3-0.5 mg injection, oxycodone 10 mg solution PRN (per J tube)   - Diet: NPO + ice chips, TFs at goal 65  - Abx: Zosyn (10/12- ) Vancomycin (10/12- ). Follow up abscess cultures.   - Wound Cares: BID wet to dry at wound  - Drains: GJ tube  - DVT prophylaxis: warfarin  - Anticipated discharge: pending pain control, cultures workup    Bakari Teresa MD  PGY-2 General Surgery

## 2017-10-15 NOTE — PLAN OF CARE
Problem: Patient Care Overview  Goal: Individualization & Mutuality  Outcome: No Change  Vitals:     10/14/17 1335 10/14/17 1624 10/14/17 1920 10/14/17 2218   BP: 123/76 135/70 150/74 127/70   BP Location:   Left arm Left arm Left arm   Cuff Size:           Pulse:       71   Resp: 17 18 18 18   Temp:   97.1  F (36.2  C) 95.9  F (35.5  C) 96.3  F (35.7  C)   TempSrc:   Oral Oral Oral   SpO2: 98% 96% 99% 97%   Weight:     87.2 kg (192 lb 4.8 oz)       Afebrile. VSS. O2 sats high 90s on RA. Lung sounds clear. Patient does report coughing up small amount of sputum. Left abdominal dressing c/d/i. Zosyn infused per orders. Surrounding area reddened. PRN Dilaudid x2 and PRN oxycodone x1 effective for left abdominal pain control. States area is tender and patient guarding. Patient states heat packs help with pain. Fentanyl patch in place on right chest. G-tube to gravity with small yellow thick drainage. C/o mild nausea, refused Zofran. TF running via J-tube at 65 mL/hr with sodium bicarb and Creon enzymes mixed in. NPO status, taking small amount of ice chips. Up to bathroom with SBA, voided in toilet, had large stool, patient reported watery. Will continue to monitor. C/o pain behind bilateral knees, MD aware, see previous note. PCDs on. Slept in between cares. Continue POC.

## 2017-10-16 VITALS
SYSTOLIC BLOOD PRESSURE: 134 MMHG | DIASTOLIC BLOOD PRESSURE: 71 MMHG | BODY MASS INDEX: 27.91 KG/M2 | WEIGHT: 200.1 LBS | OXYGEN SATURATION: 98 % | HEART RATE: 69 BPM | TEMPERATURE: 96 F | RESPIRATION RATE: 18 BRPM

## 2017-10-16 LAB
CREAT SERPL-MCNC: 1.03 MG/DL (ref 0.66–1.25)
ERYTHROCYTE [DISTWIDTH] IN BLOOD BY AUTOMATED COUNT: 15.6 % (ref 10–15)
GFR SERPL CREATININE-BSD FRML MDRD: 74 ML/MIN/1.7M2
HCT VFR BLD AUTO: 26.1 % (ref 40–53)
HGB BLD-MCNC: 8 G/DL (ref 13.3–17.7)
INR PPP: 2.54 (ref 0.86–1.14)
MAGNESIUM SERPL-MCNC: 2 MG/DL (ref 1.6–2.3)
MCH RBC QN AUTO: 26 PG (ref 26.5–33)
MCHC RBC AUTO-ENTMCNC: 30.7 G/DL (ref 31.5–36.5)
MCV RBC AUTO: 85 FL (ref 78–100)
PHOSPHATE SERPL-MCNC: 3.3 MG/DL (ref 2.5–4.5)
PLATELET # BLD AUTO: 225 10E9/L (ref 150–450)
POTASSIUM SERPL-SCNC: 3.7 MMOL/L (ref 3.4–5.3)
RBC # BLD AUTO: 3.08 10E12/L (ref 4.4–5.9)
WBC # BLD AUTO: 3.4 10E9/L (ref 4–11)

## 2017-10-16 PROCEDURE — 25000128 H RX IP 250 OP 636: Performed by: SURGERY

## 2017-10-16 PROCEDURE — 25000131 ZZH RX MED GY IP 250 OP 636 PS 637: Performed by: STUDENT IN AN ORGANIZED HEALTH CARE EDUCATION/TRAINING PROGRAM

## 2017-10-16 PROCEDURE — 84100 ASSAY OF PHOSPHORUS: CPT | Performed by: SURGERY

## 2017-10-16 PROCEDURE — 84132 ASSAY OF SERUM POTASSIUM: CPT | Performed by: SURGERY

## 2017-10-16 PROCEDURE — 83735 ASSAY OF MAGNESIUM: CPT | Performed by: SURGERY

## 2017-10-16 PROCEDURE — 25000132 ZZH RX MED GY IP 250 OP 250 PS 637: Performed by: STUDENT IN AN ORGANIZED HEALTH CARE EDUCATION/TRAINING PROGRAM

## 2017-10-16 PROCEDURE — 25000128 H RX IP 250 OP 636

## 2017-10-16 PROCEDURE — 82565 ASSAY OF CREATININE: CPT | Performed by: SURGERY

## 2017-10-16 PROCEDURE — 85027 COMPLETE CBC AUTOMATED: CPT | Performed by: STUDENT IN AN ORGANIZED HEALTH CARE EDUCATION/TRAINING PROGRAM

## 2017-10-16 PROCEDURE — 27210429 ZZH NUTRITION PRODUCT INTERMEDIATE LITER

## 2017-10-16 PROCEDURE — 40000802 ZZH SITE CHECK

## 2017-10-16 PROCEDURE — 36592 COLLECT BLOOD FROM PICC: CPT | Performed by: SURGERY

## 2017-10-16 PROCEDURE — 85610 PROTHROMBIN TIME: CPT | Performed by: SURGERY

## 2017-10-16 PROCEDURE — 25000128 H RX IP 250 OP 636: Performed by: STUDENT IN AN ORGANIZED HEALTH CARE EDUCATION/TRAINING PROGRAM

## 2017-10-16 PROCEDURE — 25000125 ZZHC RX 250: Performed by: STUDENT IN AN ORGANIZED HEALTH CARE EDUCATION/TRAINING PROGRAM

## 2017-10-16 RX ORDER — POLYETHYLENE GLYCOL 3350 17 G/17G
1 POWDER, FOR SOLUTION ORAL DAILY
Qty: 510 G | Refills: 0 | Status: SHIPPED | OUTPATIENT
Start: 2017-10-16

## 2017-10-16 RX ORDER — LIDOCAINE 40 MG/G
CREAM TOPICAL ONCE
Status: COMPLETED | OUTPATIENT
Start: 2017-10-16 | End: 2017-10-16

## 2017-10-16 RX ORDER — WARFARIN SODIUM 5 MG/1
5 TABLET ORAL
Status: DISCONTINUED | OUTPATIENT
Start: 2017-10-16 | End: 2017-10-16 | Stop reason: HOSPADM

## 2017-10-16 RX ORDER — OXYCODONE HYDROCHLORIDE 5 MG/1
5-10 TABLET ORAL EVERY 4 HOURS PRN
Qty: 80 TABLET | Refills: 0 | Status: SHIPPED | OUTPATIENT
Start: 2017-10-16 | End: 2017-10-16

## 2017-10-16 RX ORDER — OXYCODONE HCL 5 MG/5 ML
5-10 SOLUTION, ORAL ORAL EVERY 4 HOURS PRN
Qty: 500 ML | Refills: 0 | Status: SHIPPED | OUTPATIENT
Start: 2017-10-16 | End: 2017-11-02

## 2017-10-16 RX ADMIN — ACETAMINOPHEN 975 MG: 325 SOLUTION ORAL at 00:45

## 2017-10-16 RX ADMIN — SERTRALINE HYDROCHLORIDE 50 MG: 50 TABLET ORAL at 08:19

## 2017-10-16 RX ADMIN — PIPERACILLIN SODIUM AND TAZOBACTAM SODIUM 3.38 G: 3; .375 INJECTION, POWDER, LYOPHILIZED, FOR SOLUTION INTRAVENOUS at 06:27

## 2017-10-16 RX ADMIN — VANCOMYCIN HYDROCHLORIDE 1000 MG: 1 INJECTION, SOLUTION INTRAVENOUS at 13:26

## 2017-10-16 RX ADMIN — PREDNISONE 5 MG: 5 SOLUTION ORAL at 08:18

## 2017-10-16 RX ADMIN — PIPERACILLIN SODIUM AND TAZOBACTAM SODIUM 3.38 G: 3; .375 INJECTION, POWDER, LYOPHILIZED, FOR SOLUTION INTRAVENOUS at 00:45

## 2017-10-16 RX ADMIN — OXYCODONE HYDROCHLORIDE 10 MG: 5 SOLUTION ORAL at 03:12

## 2017-10-16 RX ADMIN — VANCOMYCIN HYDROCHLORIDE 1000 MG: 1 INJECTION, SOLUTION INTRAVENOUS at 01:33

## 2017-10-16 RX ADMIN — SODIUM BICARBONATE 325 MG: 325 TABLET ORAL at 06:27

## 2017-10-16 RX ADMIN — HYDROMORPHONE HYDROCHLORIDE 0.5 MG: 1 INJECTION, SOLUTION INTRAMUSCULAR; INTRAVENOUS; SUBCUTANEOUS at 04:13

## 2017-10-16 RX ADMIN — ONDANSETRON 4 MG: 2 INJECTION INTRAMUSCULAR; INTRAVENOUS at 15:13

## 2017-10-16 RX ADMIN — PANCRELIPASE 48000 UNITS: 24000; 76000; 120000 CAPSULE, DELAYED RELEASE PELLETS ORAL at 06:27

## 2017-10-16 RX ADMIN — POTASSIUM CHLORIDE 20 MEQ: 1.5 POWDER, FOR SOLUTION ORAL at 09:06

## 2017-10-16 RX ADMIN — Medication 2 G: at 09:06

## 2017-10-16 RX ADMIN — TAMSULOSIN HYDROCHLORIDE 0.4 MG: 0.4 CAPSULE ORAL at 08:19

## 2017-10-16 RX ADMIN — Medication 20 MG: at 08:18

## 2017-10-16 RX ADMIN — OXYCODONE HYDROCHLORIDE 10 MG: 5 SOLUTION ORAL at 08:17

## 2017-10-16 RX ADMIN — PIPERACILLIN SODIUM AND TAZOBACTAM SODIUM 3.38 G: 3; .375 INJECTION, POWDER, LYOPHILIZED, FOR SOLUTION INTRAVENOUS at 12:14

## 2017-10-16 RX ADMIN — HYDROMORPHONE HYDROCHLORIDE 0.5 MG: 1 INJECTION, SOLUTION INTRAMUSCULAR; INTRAVENOUS; SUBCUTANEOUS at 09:36

## 2017-10-16 RX ADMIN — SODIUM BICARBONATE 325 MG: 325 TABLET ORAL at 03:26

## 2017-10-16 RX ADMIN — PANCRELIPASE 48000 UNITS: 24000; 76000; 120000 CAPSULE, DELAYED RELEASE PELLETS ORAL at 03:26

## 2017-10-16 RX ADMIN — POTASSIUM PHOSPHATE, MONOBASIC AND POTASSIUM PHOSPHATE, DIBASIC 10 MMOL: 224; 236 INJECTION, SOLUTION INTRAVENOUS at 01:33

## 2017-10-16 RX ADMIN — ACETAMINOPHEN 975 MG: 325 SOLUTION ORAL at 08:18

## 2017-10-16 RX ADMIN — HYDROMORPHONE HYDROCHLORIDE 0.5 MG: 1 INJECTION, SOLUTION INTRAMUSCULAR; INTRAVENOUS; SUBCUTANEOUS at 06:36

## 2017-10-16 RX ADMIN — LIDOCAINE: 40 CREAM TOPICAL at 11:13

## 2017-10-16 RX ADMIN — HYDROMORPHONE HYDROCHLORIDE 0.5 MG: 1 INJECTION, SOLUTION INTRAMUSCULAR; INTRAVENOUS; SUBCUTANEOUS at 01:01

## 2017-10-16 RX ADMIN — OXYCODONE HYDROCHLORIDE 10 MG: 5 SOLUTION ORAL at 12:15

## 2017-10-16 RX ADMIN — PROCHLORPERAZINE EDISYLATE 5 MG: 5 INJECTION INTRAMUSCULAR; INTRAVENOUS at 04:13

## 2017-10-16 RX ADMIN — Medication 1 PACKET: at 08:19

## 2017-10-16 NOTE — PLAN OF CARE
Problem: Patient Care Overview  Goal: Plan of Care/Patient Progress Review  Outcome: No Change  Pt alert and oriented. VSS. Afebrile. Pt c/o of pain to abdomen - pt given PRN dilaudid and oxycodone. Pt reports adequate pain control. Pt given zofran x 2 for nausea - pt reports relief. TF at 65 ml/hr per MD order. K+ finished infusing - morning lab ordered. Mag replacement of 2 gm given per MD order - morning lab ordered. IV abx given per MD order. G-tube to gravity - 250 output during shift. PICC dressing is CDI. Pt voiding spontaneously - adequate output.

## 2017-10-16 NOTE — PROGRESS NOTES
GENERAL SURGERY PROGRESS NOTE    Patient: Lucas Brown  MRN: [id number]    Subjective  Got 1 unit of PRBCs yesterday, otherwise NAEO. Pain controlled. Voiding. BMs. TFs at goal.    Objective  Temp:  [95.2  F (35.1  C)-97.3  F (36.3  C)] 96.4  F (35.8  C)  Pulse:  [65-69] 69  Heart Rate:  [71] 71  Resp:  [18-20] 18  BP: (133-149)/(68-81) 149/81  SpO2:  [96 %-100 %] 98 %    I/O last 3 completed shifts:  In: 5027.67 [P.O.:100; I.V.:3477.67; NG/GT:240]  Out: 2310 [Urine:1575; Emesis/NG output:735]    NAD, alert, conversant  NLB on RA  Abd is soft, mildly distended. Tender at GJ site and wound. Wound at abscess site is red with granulation tissue, no pus. No surrounding erythema.  WWP    Labs: Reviewed.    Imaging: Reviewed.    Micro: Abdominal abscess culture grew e coli, klebsiella, strep    Assessment & Plan  55 y/o M s/p Whipple procedure in August 2017 presenting on 10/12/2017 with retracted percutaneous GJ tube bulb and surrounding cellulitis with abscess. IR replaced GJ tube on 10/12/2017. I&D of abdominal abscess at GJ site on 10/13/2017. Lots of pain during dressing change, on vanc/zosyn.    - WOCN consult, appreciate recs  - Pain control: PO pain meds  - Diet: NPO + ice chips, TFs at goal 65  - Abx: Zosyn (10/12- ) Vancomycin (10/12- ). Despite lack of erythema will continue abx today given prednisone usage. Likely d/c tomorrow or on discharge.  - Wound Cares: BID wet to dry w/ kerlix at wound  - Drains: GJ tube. Vent G tube.  - DVT prophylaxis: warfarin  - Anticipated discharge: pending pain control, tolerating dressing changes    --  Gary Wilkerson MD  Gen Surg PGY1

## 2017-10-16 NOTE — PLAN OF CARE
Pt d/c home with wife. Educated on wound cares by WOC. Demonstrated dressing per self with wife SBA. Pt states he feels comfortable already with GJtube cares. Pt walked self down to pharmacy with wife and WC. Educated on AVS and f/u plan. PICC in place per plan and caps changed.

## 2017-10-16 NOTE — PLAN OF CARE
Problem: Patient Care Overview  Goal: Plan of Care/Patient Progress Review  Outcome: No Change  Vital signs:  Temp: 96.4  F (35.8  C) Temp src: Oral BP: 149/81 Pulse: 69 Heart Rate: 71 Resp: 18 SpO2: 98 % O2 Device: None (Room air)      VSS. Abdomen dressing c/d/i. Gtube to gravity with 300 mL thick, light yellow output. TF @ goal of 65 mL/hr. Dbl lumen PICC infusing LR @ 100mL/hr and vanco per orders in one lumen, NS TKO and Zosyn infused in second lumen. Phos replaced overnight, rechecked ordered in AM. CBC ordered for Hgb recheck. Meds through Jtube. Pt c/o pain in abdomen.  Dilaudid given x3, Oxycodone given x1. Pt c/o nausea around 0400, Compazine given x1 with relief. No BM overnight. Independently ambulating to bathroom and voiding adequately. Pt had some trouble sleeping tonight.

## 2017-10-16 NOTE — DISCHARGE SUMMARY
[facility]  Discharge Summary  Colon and Rectal Surgery     Lucas Brown MRN# [id number]   YOB: 1960 Age: 56 year old     Date of Admission:  10/12/2017  Date of Discharge::  10/16/2017  Admitting Physician:  Loy Aguirre MD  Discharge Physician:  Joby Reyes MD   Primary Care Physician:         Cyril Mackay          Admission Diagnoses:   Cellulitis and abscess of trunk   Gastrojejunostomy tube and bulb retraction             Discharge Diagnosis:   Same         Procedures:   COMBINED INCISION AND DRAINAGE ABDOMINAL WALL ABSCESS  IR gastrojejunostomy tube exchange            Consultations:   INTERVENTIONAL RADIOLOGY IP CONSULT  MEDICATION HISTORY IP PHARMACY CONSULT  CARE COORDINATOR IP CONSULT  PHARMACY TO DOSE WARFARIN  PHARMACY TO DOSE VANCO  NUTRITION SERVICES ADULT IP CONSULT  PHARMACY IP CONSULT  INTERVENTIONAL RADIOLOGY IP CONSULT  WOUND OSTOMY CONTINENCE NURSE  IP CONSULT         Imaging Studies:     Results for orders placed or performed during the hospital encounter of 10/12/17   CT Abdomen Pelvis w Contrast     Value    Radiologist flags (Urgent)     Retracted percutaneous gastrojejunostomy tube bulb and    Narrative    EXAMINATION: CT ABDOMEN PELVIS W CONTRAST, 10/12/2017 8:52 AM    TECHNIQUE:  Helical CT images from the lung bases through the  symphysis pubis were obtained with IV contrast. Contrast dose: 118 cc  of isovue 370    COMPARISON: 9/26/2017 CT abdomen/pelvis    HISTORY: Left sided abdominal pain; concern for abdominal wall abscess  vs deeper infection    FINDINGS:    Abdomen and pelvis: Postsurgical changes of Whipple procedure with  pancreaticojejunostomy, hepaticojejunostomy, and gastrojejunostomy.  Percutaneous gastrojejunostomy tube crossing the gastrojejunostomy  with retraction of the bulb from its previous location in the stomach  to within the abdominal wall subcutaneous soft tissues. There is  pneumobilia, predominantly in  the left hepatic lobe. Decreased  elongated air and fluid collection tracking along the lesser curve of  the remnant stomach towards the gastrojejunostomy and decreased  surrounding omental/peritoneal fat stranding. No bowel obstruction.  Mild atrophy of the remaining pancreatic body and tail. No pancreatic  ductal dilation.    The hepatic parenchyma and adrenal glands are unremarkable.  Splenomegaly, measuring 14.9 cm in craniocaudal dimension. Unchanged  small exophytic cyst arising from the lower pole of the left kidney.  The kidneys are otherwise unremarkable. No hydronephrosis,  hydroureter, or urinary tract stone. The bladder is unremarkable.  Mildly enlarged prostate. Symmetric seminal vesicles. No free fluid.  The major abdominal vessels are patent. No abdominal or pelvic  lymphadenopathy.     Lung bases/lower chest:  New centrilobular and peribronchial vascular  nodularity in the left lower lobe. Centrilobular emphysematous change  noted in the lung bases.    Bones and soft tissues: New rim-enhancing, multiloculated air and  fluid containing collection within the anterior left abdominal wall  musculature and subcutaneous fat just below the inflated percutaneous  gastrojejunostomy tube bulb within the abdominal wall. This collection  measures approximately 3.5 x 9.2 x 6.1 cm (AP by transverse by  longitudinal dimensions). Mild reactive inflammatory fat stranding in  the subjacent omentum/mesentery.      Impression    IMPRESSION:   1. Surgical changes of Whipple procedure with decreasing air and fluid  containing collection tracking along the lesser curvature of the  stomach towards the gastrojejunostomy anastomosis.  2. The patient's percutaneous gastrojejunostomy tube bulb has  retracted from within the stomach to within the abdominal wall.  Associated multiloculated air and fluid containing abscess developing  within the abdominal wall musculature and subcutaneous fat extending  inferiorly from the  gastrojejunostomy tube bulb. Reactive inflammation  noted within the subjacent omental/peritoneal fat, however, the nearby  transverse colon appears uninvolved.  3. New nodularity in the posterior left lower lobe is worrisome for  infection. Consider aspiration.    [Urgent Result: Retracted percutaneous gastrojejunostomy tube bulb and  associated abdominal wall abscess]    Finding was identified on 10/12/2017 8:54 AM.     Dr. Cole was contacted by Dr. Dexter at 10/12/2017 9:36 AM and  verbalized understanding of the urgent finding.     I have personally reviewed the examination and initial interpretation  and I agree with the findings.    JOSÉ DIAMOND MD   IR Gastro Jejunostomy Tube Change    Narrative    PRE-PROCEDURE DIAGNOSIS: Cellulitis of gastrostomy site, pain at  gastrostomy site    POST-PROCEDURE DIAGNOSIS: Same    PROCEDURE: Gastrojejunostomy tube exchange    Impression    IMPRESSION: CT shows existing balloon in subcutaneous tissue with  surrounding abscess. Exam shows significant cellulitis surrounding  gastrostomy. Completed fluoroscopy-guided over wire exchange for new  22 Uzbek 45 cm gastrojejunostomy tube which is ready for immediate  use.    PLAN: Antibiotics per primary team. Return in 9-12 months for routine  exchange or sooner if necessary.     ----------    CLINICAL HISTORY: Patient with existing gastrojejunostomy tube placed  surgically August 2017 for nutrition and venting. Patient had GJ tube  checked and repositioned on 9/20/17 as balloon was found to be  post-pyloric. Patient presents to ER today with left abdominal pain  and redness around gastrostomy. CT of the abdomen shows existing  balloon in subcutaneous tract. Gastrojejunostomy tube exchange  requested.     PERFORMED BY: Remy Caballero PA-C    ASSIST: RT Amber(R)    ATTENDING PHYSICIAN: Yemi Samayoa MD    CONSENT: Written informed consent was obtained and is documented in  the patient record.    MEDICATIONS:   1. 200  mcg fentanyl IV  2. 2 mg midazolam IV  3. 2% lidocaine gel    NURSING: The patient was placed on continuous vital signs monitoring.  Vital signs were monitored by nursing staff under IR staff  supervision.     SEDATION TIME: 35 minutes    FLUOROSCOPY TIME: 3.3 minutes    CONTRAST AMOUNT: 60 mL    DESCRIPTION: The patient's upper quadrant and existing tube were  prepped and draped in the usual sterile fashion. Lidocaine gel was  used to anesthetize the tube tract.    Under fluoroscopic guidance, guidewire was advanced to distal tube.  Existing tube balloon was then deflated. With fluoroscopic guidance,  the existing gastrojejunostomy tube was exchanged over guidewire for a  new 22 Wallisian 45 cm gastrojejunostomy tube. Injection of the gastric  lumen immediately fills the duodenum. The balloon was deflated and  reinflated proximally, however, the patient immediately had pain,  presumably because the balloon was in the skin. The balloon was  adjusted again and contrast was seen filling the stomach with  retention on the balloon at the 4.5-5 cm dave. Images reviewed with  Dr. Samayoa. Balloon filled with 10 mL and retention disc snugged to  5 cm. There was leakage around the tube until the disc was snugged.    COMPLICATIONS: No immediate concerns, the patient remained stable  throughout the procedure and tolerated it well.    ESTIMATED BLOOD LOSS: None    SPECIMENS: None    PIPPA VAZQUEZ PA-C   IR Gastro Jejunostomy Tube Change    Narrative    Procedures:   1. Over-the-wire exchange of a gastrojejunostomy tube.    Clinical indication: This is a patient who is reliant on their  gastrojejunostomy tube. The gastrojejunostomy tube was inadvertently  perforated and they need an exchange.    Comparison studies: 10/12/2017.    PROCEDURE:        Interventional radiologists: GONZALO Savage MD (IR staff)    Consent: verbal and written informed consent obtained prior to  procedure.    Procedure details: Patient placed in supine  position. The  gastrojejunostomy tube was prepped and draped in standard sterile  fashion. Using fluoroscopic guidance, a stiff angled Glidewire was  advanced through the jejunostomy tube which was removed over the wire  after the balloon was deflated. A new 18 French 45 cm  gastrojejunostomy tube was advanced over the wire. Contrast was  injected through the jejunum and gastric ports to confirm adequate  placement. Adequate placement was confirmed.      Medications: Lidocaine jelly for local anesthesia.     Monitoring: The patient was placed on continuous monitoring. Vital  signs and sedation monitored by nursing staff under my supervision.  The patient remained stable throughout the procedure.    Fluoroscopy time: 0.9 minutes    Complications: None.      Impression    IMPRESSION:   Successful over-the-wire exchange of a gastrojejunostomy.    PLAN:  Gastrojejunostomy tube ready for immediate use.    I, DESTINY LEDESMA MD, attest that I was present in the procedure room  for the entire procedure.    DESTINY LEDESMA MD            Medications Prior to Admission:     Prescriptions Prior to Admission   Medication Sig Dispense Refill Last Dose     WARFARIN SODIUM PO by Per J Tube route daily Alternates 5mg and 2.5mg   10/11/2017 at 2.5mg     acetaminophen (TYLENOL) 32 mg/mL solution 31.25 mLs (1,000 mg) by Per J Tube route every 8 hours 1000 mL 0 10/11/2017 at Unknown time     amylase-lipase-protease (CREON 24) 97186-12418 UNITS CPEP per EC capsule 2 capsules (48,000 Units) by Per J Tube route every 4 hours 180 capsule 0      fentaNYL (DURAGESIC) 75 mcg/hr 72 hr patch Place 1 patch onto the skin every 72 hours 5 patch 0 10/10/2017 at Unknown time     oxyCODONE (ROXICODONE) 5 MG/5ML solution 10 mLs (10 mg) by Per J Tube route every 4 hours as needed for moderate to severe pain 840 mL 0 10/12/2017 at 0337     polyethylene glycol (MIRALAX/GLYCOLAX) Packet 17 g by Per J Tube route daily as needed for constipation 14 packet 0       ondansetron (ZOFRAN) 4 MG tablet 1-2 tablets (4-8 mg) by Per J Tube route every 8 hours as needed for nausea 90 tablet 0      omeprazole (PRILOSEC) 2 mg/mL SUSP 10 mLs (20 mg) by Oral or Feeding Tube route 2 times daily 300 mL 0 10/11/2017 at Unknown time     predniSONE 5 MG/5ML solution 5 mLs (5 mg) by Per J Tube route daily 70 mL 0 10/11/2017 at Unknown time     sodium bicarbonate 325 MG tablet 1 tablet (325 mg) by Per J Tube route every 4 hours 42 tablet 1 9/25/2017 at Unknown time     TAMSULOSIN HCL PO Take 0.4 mg by mouth daily   10/11/2017 at Unknown time     FINASTERIDE PO 5 mg by Per J Tube route daily    10/11/2017 at Unknown time     sertraline (ZOLOFT) 50 MG tablet 50 mg by Per J Tube route daily    10/11/2017 at Unknown time     lisinopril (PRINIVIL,ZESTRIL) 10 MG tablet 10 mg by Per J Tube route daily    10/11/2017 at Unknown time     pravastatin (PRAVACHOL) 40 MG tablet 40 mg by Per J Tube route daily    10/11/2017 at Unknown time     aspirin 81 MG tablet 81 mg by Per J Tube route daily    10/11/2017 at Unknown time     fenofibrate 145 MG tablet 145 mg by Per J Tube route daily    10/11/2017 at Unknown time            Discharge Medications:     Current Discharge Medication List      START taking these medications    Details   Lidocaine 4 % GEL Externally apply topically 2 times daily  Qty: 60 g, Refills: 0    Associated Diagnoses: Abscess of abdominal wall      polyethylene glycol (MIRALAX) powder Take 17 g (1 capful) by mouth daily  Qty: 510 g, Refills: 0    Associated Diagnoses: Abscess of abdominal wall      !! oxyCODONE (ROXICODONE) 5 MG/5ML solution Take 5-10 mLs (5-10 mg) by mouth every 4 hours as needed for moderate to severe pain or pain maximum 60 mL per day  Qty: 500 mL, Refills: 0    Associated Diagnoses: Cellulitis and abscess of trunk       !! - Potential duplicate medications found. Please discuss with provider.      CONTINUE these medications which have CHANGED    Details    acetaminophen (TYLENOL) 32 mg/mL solution 31.25 mLs (1,000 mg) by Per J Tube route every 8 hours  Qty: 2000 mL, Refills: 1    Associated Diagnoses: Generalized abdominal pain         CONTINUE these medications which have NOT CHANGED    Details   WARFARIN SODIUM PO by Per J Tube route daily Alternates 5mg and 2.5mg      amylase-lipase-protease (CREON 24) 59230-67466 UNITS CPEP per EC capsule 2 capsules (48,000 Units) by Per J Tube route every 4 hours  Qty: 180 capsule, Refills: 0    Associated Diagnoses: Nausea and vomiting, intractability of vomiting not specified, unspecified vomiting type      fentaNYL (DURAGESIC) 75 mcg/hr 72 hr patch Place 1 patch onto the skin every 72 hours  Qty: 5 patch, Refills: 0    Associated Diagnoses: Generalized abdominal pain      !! oxyCODONE (ROXICODONE) 5 MG/5ML solution 10 mLs (10 mg) by Per J Tube route every 4 hours as needed for moderate to severe pain  Qty: 840 mL, Refills: 0    Associated Diagnoses: Acute post-operative pain      polyethylene glycol (MIRALAX/GLYCOLAX) Packet 17 g by Per J Tube route daily as needed for constipation  Qty: 14 packet, Refills: 0    Associated Diagnoses: Generalized abdominal pain      ondansetron (ZOFRAN) 4 MG tablet 1-2 tablets (4-8 mg) by Per J Tube route every 8 hours as needed for nausea  Qty: 90 tablet, Refills: 0    Associated Diagnoses: Generalized abdominal pain      omeprazole (PRILOSEC) 2 mg/mL SUSP 10 mLs (20 mg) by Oral or Feeding Tube route 2 times daily  Qty: 300 mL, Refills: 0    Associated Diagnoses: Chronic pancreatitis, unspecified pancreatitis type (H)      predniSONE 5 MG/5ML solution 5 mLs (5 mg) by Per J Tube route daily  Qty: 70 mL, Refills: 0    Associated Diagnoses: Chronic pancreatitis, unspecified pancreatitis type (H)      sodium bicarbonate 325 MG tablet 1 tablet (325 mg) by Per J Tube route every 4 hours  Qty: 42 tablet, Refills: 1    Associated Diagnoses: Chronic pancreatitis, unspecified pancreatitis type (H)       TAMSULOSIN HCL PO Take 0.4 mg by mouth daily      FINASTERIDE PO 5 mg by Per J Tube route daily       sertraline (ZOLOFT) 50 MG tablet 50 mg by Per J Tube route daily       lisinopril (PRINIVIL,ZESTRIL) 10 MG tablet 10 mg by Per J Tube route daily       pravastatin (PRAVACHOL) 40 MG tablet 40 mg by Per J Tube route daily       aspirin 81 MG tablet 81 mg by Per J Tube route daily       fenofibrate 145 MG tablet 145 mg by Per J Tube route daily        !! - Potential duplicate medications found. Please discuss with provider.                 Brief History of Illness:   55 y/o M s/p Whipple procedure in August 2017 who was recently discharged from [facility] after resolution of gastric outlet obstruction and high NG tube. Patient followed up with Dr. Crum on 10/10 and was asymptomatic and stable at that time. On the morning of 10/11, the patient began experiencing pain surrounding his GJ site. Presented to ED on 10/12 due to abdominal pain at his GJ site. Pain was associated with intermittent chills and surrounding erythema around the GJ site. He also reports having cold-like symptoms the past few days. Patient has been flushing the J portion, but not G portion of his GJ tube since his recent discharge. He continued to pass flatus and have BMs.    While in the ED patient was started on IV vancomycin and Zosyn. CT at that time showed percutaneous gastrojejunostomy tube bulb has retracted from within the stomach to within the abdominal wall. Associated multiloculated air and fluid containing abscess developing within the abdominal wall musculature and subcutaneous fat extending inferiorly from the gastrojejunostomy tube bulb.           Hospital Course:   Lucas Brown was admitted to [facility] due to retraction of percutaneous gastrojejunostomy tube bulb and subcutaneous abscess. He initially underwent IR gastrojejunostomy tube exchange on 10/12 without complications, followed by irrigation and drainage of abdominal wound  infection on 10/13 which he tolerated well and was without complications. His gastrojejunostomy tube was found to be perforated and was therefore exchanged by IR again on 10/14, which he tolerated well and was without complications.  Preliminary results of the abdominal wound cultures grew K pneumonia, E coli, Enterobacter cloacae, streptococcus and yeast. Throughout his hospital stay he was continued on IV vancomycin and Zosyn for a total of 5 days, which was judged to be adequate at the time of discharge. With antibiotic treatment and drainage the abscess in the abdominal wall receded. At the time of discharge he tolerated a dressing change using only lidocaine jelly and pain medicine per J tube. He was voiding, ambulating, and tolerating his J tube feeds at goal. He should follow up with Dr. Crum in general surgery clinic at the next available appointment.           Day of Discharge Physical Exam:   Blood pressure 134/71, pulse 69, temperature 96  F (35.6  C), temperature source Oral, resp. rate 18, weight 90.8 kg (200 lb 1.6 oz), SpO2 98 %.    Gen: AAOx3, NAD, sitting in bed comfortably  Pulm: Non-labored breathing  Abd: Soft, appropriately tender around incision site, no guarding.    Incision without areas of necrosis or erythema, packed with gauze.   Ext:  Warm and well-perfused         Final Pathology Result:   None         Discharge Instructions and Follow-Up:     Discharge Procedure Orders  Home infusion referral         Discharge Instructions   Order Comments: If questions or problems arise regarding tube function (e.g. leaking, dislodges, etc.) Contact Interventional Radiology department 24 hours a day.    For procedures that were done at the [facility] sites,   8:00-4:30 PM Monday through Friday    Contact:[phone].    For afterhours and weekends call the [facility] main phone line [phone] and ask for the [facility] IR on call physician number.    If DIRECTED by the RADIOLOGIST,  related to specific problems with the tube functioning,  go to the Emergency Department.       Discharge Instructions   Order Comments: Patient to make a follow up appointment in IR clinic in 10-14 days for the removal of the retention sutures at the G or GJ tube site. Phone number is [phone] if needed.     Diet: Nothing by mouth    Please continue with your home infusions of 1L of LR as performed prior to your recent admission    Please leave the g tube to vent, the J tube is for meds and tube feeds.    You may resume your usual activities.     Discharge Instructions  Activity  - No strenuous exercise for 3 weeks.  - No lifting, pushing, pulling more than 15-20 pounds for 3 weeks.   - Do not strain with bowel movements.  - Do not drive until you can press the brake pedal quickly and fully without pain.   - Do not operate a motor vehicle while taking narcotic pain medications.     Medications  - Do not take any additional Tylenol (acetaminophen) while using a narcotic pain medication which includes acetaminophen.  - Do not take more than 4,000mg of acetaminophen in any 24 hour period, as this can cause liver damage.  - Take stool softeners such as Senna or Miralax while you are using narcotics, but stop if you develop diarrhea.   - Wean yourself off of narcotic pain medications.     Follow-Up:  - Call your primary care provider to touch base regarding your recent admission.     - Call or return sooner than your regularly scheduled visit if you develop any of the following: fever >101.5, uncontrolled pain, uncontrolled nausea or vomiting, as well as increased redness, swelling, or drainage from your wound.   -  A nurse from the General Surgery Clinic will contact you 24 hours, or next business day, after your discharge from the hospital.  If you have questions or to schedule a follow up appointment please call the General Surgery Clinic at [phone].  Call [phone] and ask to speak with the Surgery  resident on call if you are having troubles in the evenings, at night, or on the weekend.  -  If you are receiving home care please inform your home care nurse of our contact number.        Home Health Care:   Not changes.           Discharge Disposition:   Discharged to home      Condition at discharge: Stable       Patient was discussed with Dr. Reyes on day of discharge.    Scribed by Joshua Ugarte, MS3     --  Gary Wilkerson MD  Gen Surg PGY1

## 2017-10-17 ENCOUNTER — ANTICOAGULATION THERAPY VISIT (OUTPATIENT)
Dept: ANTICOAGULATION | Facility: CLINIC | Age: 57
End: 2017-10-17

## 2017-10-17 ENCOUNTER — CARE COORDINATION (OUTPATIENT)
Dept: CARE COORDINATION | Facility: CLINIC | Age: 57
End: 2017-10-17

## 2017-10-17 DIAGNOSIS — L03.311 CELLULITIS OF ABDOMINAL WALL: Primary | ICD-10-CM

## 2017-10-17 LAB
BACTERIA SPEC CULT: ABNORMAL
COPATH REPORT: NORMAL
SPECIMEN SOURCE: ABNORMAL

## 2017-10-17 NOTE — PROGRESS NOTES
Dates of hospitalization: 10/12/17 to 10/16/17   Reason for hospitalization: Cellulitis and Abcess of Trunk  Vitamin K or FFP administered? NO  Inpatient warfarin doses added to calendar? Yes  Warfarin dosing after DC: Patient took 2.5mgs on 10/16 and 10/17  Next INR date: 10/18/17  Where is the patient discharging to? (home, TCU, staying locally, etc.): Home  Will patient have home care? No    Spoke with patient on 10/17. Verified his warfarin dose and next INR date.

## 2017-10-17 NOTE — PROGRESS NOTES
This patient was seen at or by General Surgery Department and Care Coordinators from that department will handle this patient's post discharge follow up          -  A nurse from the General Surgery Clinic will contact you 24 hours, or next business day, after your discharge from the hospital.  If you have questions or to schedule a follow up appointment please call the General Surgery Clinic at [phone].  Call [phone] and ask to speak with the Surgery resident on call if you are having troubles in the evenings, at night, or on the weekend.

## 2017-10-17 NOTE — MR AVS SNAPSHOT
Lucas Brown   10/17/2017   Anticoagulation Therapy Visit    Description:  56 year old male   Provider:  Frederic Craft [facility]   Department:  [facility]           INR as of 10/17/2017     Today's INR No new INR was available at the time of this encounter.      Anticoagulation Summary as of 10/17/2017     INR goal 2.0-3.0   Today's INR No new INR was available at the time of this encounter.   Full instructions 10/17: 2.5 mg   Next INR check 10/18/2017    Indications   H/O aortic valve replacement [Z95.2]  Long-term (current) use of anticoagulants [Z79.01] [Z79.01]         October 2017 Details    Sun Mon Tue Wed Thu Fri Sat     1               2               3               4               5               6               7                 8               9               10               11               12               13               14                 15               16               17      2.5 mg   See details      18            19               20               21                 22               23               24               25               26               27               28                 29               30               31                    Date Details   10/17 This INR check       Date of next INR:  10/18/2017         How to take your warfarin dose     To take:  2.5 mg Take 1 of the 2.5 mg tablets.

## 2017-10-18 ENCOUNTER — ANTICOAGULATION THERAPY VISIT (OUTPATIENT)
Dept: ANTICOAGULATION | Facility: CLINIC | Age: 57
End: 2017-10-18

## 2017-10-18 DIAGNOSIS — Z95.2 H/O AORTIC VALVE REPLACEMENT: ICD-10-CM

## 2017-10-18 DIAGNOSIS — Z79.01 LONG-TERM (CURRENT) USE OF ANTICOAGULANTS: ICD-10-CM

## 2017-10-18 DIAGNOSIS — E87.8 ELECTROLYTE IMBALANCE: ICD-10-CM

## 2017-10-18 LAB
ANION GAP SERPL CALCULATED.3IONS-SCNC: 7 MMOL/L (ref 3–14)
BUN SERPL-MCNC: 17 MG/DL (ref 7–30)
CALCIUM SERPL-MCNC: 8.5 MG/DL (ref 8.5–10.1)
CHLORIDE SERPL-SCNC: 102 MMOL/L (ref 94–109)
CO2 SERPL-SCNC: 28 MMOL/L (ref 20–32)
CREAT SERPL-MCNC: 1.07 MG/DL (ref 0.66–1.25)
GFR SERPL CREATININE-BSD FRML MDRD: 71 ML/MIN/1.7M2
GLUCOSE SERPL-MCNC: 104 MG/DL (ref 70–99)
INR PPP: 1.48 (ref 0.86–1.14)
POTASSIUM SERPL-SCNC: 4.4 MMOL/L (ref 3.4–5.3)
SODIUM SERPL-SCNC: 137 MMOL/L (ref 133–144)

## 2017-10-18 NOTE — MR AVS SNAPSHOT
Lucas BOWERS Stephanie   10/18/2017   Anticoagulation Therapy Visit    Description:  56 year old male   Provider:  Sarabjit Wynn, RN   Department:  [facility]           INR as of 10/18/2017     Today's INR 1.48!      Anticoagulation Summary as of 10/18/2017     INR goal 2.0-3.0   Today's INR 1.48!   Full instructions 10/18: 7.5 mg; 10/19: 5 mg   Next INR check 10/20/2017    Indications   H/O aortic valve replacement [Z95.2]  Long-term (current) use of anticoagulants [Z79.01] [Z79.01]         October 2017 Details    Sun Mon Tue Wed Thu Fri Sat     1               2               3               4               5               6               7                 8               9               10               11               12               13               14                 15               16               17               18      7.5 mg   See details      19      5 mg         20            21                 22               23               24               25               26               27               28                 29               30               31                    Date Details   10/18 This INR check       Date of next INR:  10/20/2017         How to take your warfarin dose     To take:  5 mg Take 1 of the 5 mg tablets.    To take:  7.5 mg Take 1 of the 5 mg tablets and 1 of the 2.5 mg tablets.

## 2017-10-18 NOTE — PROGRESS NOTES
ANTICOAGULATION FOLLOW-UP CLINIC VISIT    Patient Name:  Lucas Brown  Date:  10/18/2017  Contact Type:  Telephone    SUBJECTIVE:     Patient Findings     Positives Other complaints    Comments Patient phoned the Anticoagulation clinic.  He is recovering from a Whipple procedure.  He is packing his surgical site, and reported there is little bleeding.  He will check an INR on Friday at his appointment with the wound nurse.           OBJECTIVE    INR   Date Value Ref Range Status   10/18/2017 1.48 (H) 0.86 - 1.14 Final       ASSESSMENT / PLAN  INR assessment SUB    Recheck INR In: 2 DAYS    INR Location Clinic      Anticoagulation Summary as of 10/18/2017     INR goal 2.0-3.0   Today's INR 1.48!   Maintenance plan No maintenance plan   Full instructions 10/18: 7.5 mg; 10/19: 5 mg   Plan last modified Gwendolyn Goodman RN (9/15/2017)   Next INR check 10/20/2017   Priority INR   Target end date     Indications   H/O aortic valve replacement [Z95.2]  Long-term (current) use of anticoagulants [Z79.01] [Z79.01]         Anticoagulation Episode Summary     INR check location     Preferred lab     Send INR reminders to [facility]    Comments Pt is staying local and [facility] comes out  Monitoring pt for 4-6 weeks then go back to PCP at [facility] BERNIE Mcgee  Has 2.5 and 5mg tablets       Anticoagulation Care Providers     Provider Role Specialty Phone number    RyanjarodVik MD Responsible Transplant [phone]            See the Encounter Report to view Anticoagulation Flowsheet and Dosing Calendar (Go to Encounters tab in chart review, and find the Anticoagulation Therapy Visit)    Spoke with patient. Gave them their lab results and new warfarin recommendation.  No changes in health, medication, or diet. No missed doses, no falls. No signs or symptoms of bleed or clotting.    Sarabjit Wynn, RN

## 2017-10-19 NOTE — ANESTHESIA PROCEDURE NOTES
Epidural Procedure Note    Staff:     Anesthesiologist:  INO PRAJAPATI    Resident/CRNA:  SETH FINCH    Procedure performed by resident/CRNA in the presence of a teaching physician    Location: Pre-op   Pre-procedure checklist:   patient identified, IV checked, site marked, risks and benefits discussed, informed consent, monitors and equipment checked, pre-op evaluation, at physician/surgeon's request and post-op pain management      Correct Patient: Yes      Correct Position: Yes      Correct Site: Yes      Correct Procedure: Yes      Correct Laterality:  Yes    Site Marked:  Yes  Procedure:     Procedure:  Epidural catheter    ASA:  3    Position:  Sitting    Sterile Prep: chloraprep      Insertion site:  T9-10    Local skin infiltration:  1% lidocaine    Approach:  Midline    Block Needle Type:  Touhy    Injection Technique:  LORT saline    Attempts:  1    Catheter gauge (G):  19    Catheter threaded easily: Yes      Threaded to cm at skin:  12    Threaded in epidural space (cm):  5    Paresthesias:  No    Aspiration negative for Heme or CSF: Yes       Local anesthetic:  Lidocaine 1.5% w/ 1:200,000 epinephrine    Test dose negative for signs of intravascular, subdural or intrathecal injection: Yes

## 2017-10-20 ENCOUNTER — OFFICE VISIT (OUTPATIENT)
Dept: WOUND CARE | Facility: CLINIC | Age: 57
End: 2017-10-20

## 2017-10-20 ENCOUNTER — ANTICOAGULATION THERAPY VISIT (OUTPATIENT)
Dept: ANTICOAGULATION | Facility: CLINIC | Age: 57
End: 2017-10-20

## 2017-10-20 ENCOUNTER — TELEPHONE (OUTPATIENT)
Dept: TRANSPLANT | Facility: CLINIC | Age: 57
End: 2017-10-20

## 2017-10-20 DIAGNOSIS — Z95.2 H/O AORTIC VALVE REPLACEMENT: ICD-10-CM

## 2017-10-20 DIAGNOSIS — Z79.01 LONG-TERM (CURRENT) USE OF ANTICOAGULANTS: ICD-10-CM

## 2017-10-20 DIAGNOSIS — S31.109A OPEN ABDOMINAL WALL WOUND, INITIAL ENCOUNTER: Primary | ICD-10-CM

## 2017-10-20 LAB — INR PPP: 1.64 (ref 0.86–1.14)

## 2017-10-20 NOTE — PROGRESS NOTES
ANTICOAGULATION FOLLOW-UP CLINIC VISIT    Patient Name:  Lucas Brown  Date:  10/20/2017  Contact Type:  Telephone    SUBJECTIVE:     Patient Findings     Comments No problem findings           OBJECTIVE    INR   Date Value Ref Range Status   10/20/2017 1.64 (H) 0.86 - 1.14 Final       ASSESSMENT / PLAN  INR assessment SUB    Recheck INR In: 3 DAYS    INR Location Clinic      Anticoagulation Summary as of 10/20/2017     INR goal 2.0-3.0   Today's INR 1.64!   Maintenance plan No maintenance plan   Full instructions 10/20: 5 mg; 10/21: 2.5 mg; 10/22: 2.5 mg   Plan last modified Gwendolyn Goodman RN (9/15/2017)   Next INR check 10/23/2017   Priority INR   Target end date     Indications   H/O aortic valve replacement [Z95.2]  Long-term (current) use of anticoagulants [Z79.01] [Z79.01]         Anticoagulation Episode Summary     INR check location     Preferred lab     Send INR reminders to [facility]    Comments Pt is staying local and [facility] comes out  Monitoring pt for 4-6 weeks then go back to PCP at [facility] BERNIE Mcgee  Has 2.5 and 5mg tablets       Anticoagulation Care Providers     Provider Role Specialty Phone number    Mikelphoebe Vik Amezcua MD Responsible Transplant [phone]            See the Encounter Report to view Anticoagulation Flowsheet and Dosing Calendar (Go to Encounters tab in chart review, and find the Anticoagulation Therapy Visit)    Spoke with patient about his INR and informed him of the dosing/follow-up plan. He has not had any recent changes in medications, diet, or exercise. His is still NPO. He reports no missed doses, falls, or signs of bleeding or clotting.    Karla Petty PharmD3/[facility]

## 2017-10-20 NOTE — MR AVS SNAPSHOT
[place] [facility] Stephanie   10/20/2017   Anticoagulation Therapy Visit    Description:  56 year old male   Provider:  Frederic Craft [facility]   Department:  [facility]           INR as of 10/20/2017     Today's INR 1.64!      Anticoagulation Summary as of 10/20/2017     INR goal 2.0-3.0   Today's INR 1.64!   Full instructions 10/20: 5 mg; 10/21: 2.5 mg; 10/22: 2.5 mg   Next INR check 10/23/2017    Indications   H/O aortic valve replacement [Z95.2]  Long-term (current) use of anticoagulants [Z79.01] [Z79.01]         October 2017 Details    Sun Mon Tue Wed Thu Fri Sat     1               2               3               4               5               6               7                 8               9               10               11               12               13               14                 15               16               17               18               19               20      5 mg   See details      21      2.5 mg           22      2.5 mg         23            24               25               26               27               28                 29               30               31                    Date Details   10/20 This INR check       Date of next INR:  10/23/2017         How to take your warfarin dose     To take:  2.5 mg Take 1 of the 2.5 mg tablets.    To take:  5 mg Take 1 of the 5 mg tablets.

## 2017-10-20 NOTE — PROGRESS NOTES
Patient comes to wound clinic for wound assessment per request of dr. Crum. He has history of a Open surgical wound due to : Pancreaticoduodenectomy (Whipple proceedure), umbilical hernia repair, incidental appendectomy , gastrojejunostomy tube placement on 08/28/2017  Clean gloves are donned and current dressing removed. Previous treatment includes: packing changed  1 dayago  This is treatment week:1    Objective findings: Photo in media      Location: left abdominal wound    Wound measured.: L 10 cm x, W 2 cm x, D 3  cm, Full thickness.    Wound description: no sign of infection    Tenderness:sometimes    Odor: none     Drainage is moderate, serosanguinous and cloudy     Wound Base: moist and slough     Palpation of the wound bed:  mushy    Slough appearance:  adherent   50  %    Periwound Skin: intact,      Color: normal and consistent with surrounding tissue     Lab assessment Not Applicable    Subjective finding:     Pt states: doing okay    Patient is assessed for discomfort which is: moderate    Today's status of the wound: initial assessment    Treatment: Removal of existing dressing, Visual inspection, Cleansing with scrubcare solution, Irrigation with saline solution,  Wound packing with Vashe Soaked gauze, Application of clean dressing, , Non-excisional debridement (no cutting was done), however removal of debris was performed with swabs, gauze and/or wet to dry dressings.   Vashe soaks debridement     Pt received the following instructions:  Remove old dressing and wash hands again.  Cleanse wound with  Scrubcare soap irrigate with wound spray.  Pack wound with Vashe soaked gauze  Cover as instructed.  Change dressing Twice daily .  You may shower with this dressing off    Signs and symptoms of infection taught.  Patient needs to be seen 1 week.   Treated and follow-up appointment made Dr. Resendiz was available for supervision of care if needed or if questions should arise and regarding plan of  care.  Sharmila Armenta RN, CWON

## 2017-10-20 NOTE — PATIENT INSTRUCTIONS
At Home Dressing Change Instructions:  Wash hands before and after the dressing change. You may wear gloves if you choose. Gloves are available at any pharmacy like [employer] or [facility].  Use clean scissors at home that you keep in a glass of rubbing alcohol or wipe clean before and after use. Keep these scissors for wound care only.    Remove old dressing and wash hands again.  Cleanse wound with  Scrubcare soap irrigate with wound spray.  Pack wound with Vashe soaked gauze  Cover as instructed.  Change dressing Twice daily .  You may shower with this dressing off    Signs and symptoms of infection:  Yellow or green drainage, Foul odor, Increased pain to wound, Redness or swelling at the site of the wound orFever    Call your physician if you notice any of these signs or symptoms. Our call center is [phone].    The following supplies were Gathered and sent home with the patient:  4x4 gauze, Tape, q tips, Vashe, Scrubace and Microklenz spray

## 2017-10-20 NOTE — TELEPHONE ENCOUNTER
Instructions sent via e-mail and verbally to patient today.    Clamp your G tube at 4 and leave the bag off till you go to bed.    Tomorrow you can remove the bag and leave off for 12 hours if you feel ok. You can have some ice chips and SIPS only of fluids while the bag is off.    Sunday we will clamp the tube and plan to leave clamped overnight !    If this goes well then I am going to start you on unlimited clear liquids Monday night.    Let me know if you have any questions.

## 2017-10-20 NOTE — MR AVS SNAPSHOT
After Visit Summary   10/20/2017    Lucas Brown    MRN: [id number]           Patient Information     Date Of Birth          1960        Visit Information        Provider Department      10/20/2017 8:30 AM Sharmila Armenta RN [facility] Wound Ostomy        Today's Diagnoses     Open abdominal wall wound, initial encounter    -  1      Care Instructions    At Home Dressing Change Instructions:  Wash hands before and after the dressing change. You may wear gloves if you choose. Gloves are available at any pharmacy like [employer].  Use clean scissors at home that you keep in a glass of rubbing alcohol or wipe clean before and after use. Keep these scissors for wound care only.    Remove old dressing and wash hands again.  Cleanse wound with  Scrubcare soap irrigate with wound spray.  Pack wound with Vashe soaked gauze  Cover as instructed.  Change dressing Twice daily .  You may shower with this dressing off    Signs and symptoms of infection:  Yellow or green drainage, Foul odor, Increased pain to wound, Redness or swelling at the site of the wound orFever    Call your physician if you notice any of these signs or symptoms. Our call center is [phone].    The following supplies were Gathered and sent home with the patient:  4x4 gauze, Tape, q tips, Vashe, Scrubace and Microklenz spray            Follow-ups after your visit        Your next 10 appointments already scheduled     Oct 26, 2017  7:20 AM CDT   (Arrive by 7:05 AM)   Return Visit with Vik Crum MD   [facility] General Surgery ([facility])    [address] [place] [id number] [phone]            Oct 26, 2017  7:30 AM CDT   RETURN WOUND NURSE VISIT with Sharmila Armenta RN   [facility] Wound Ostomy ([facility])    [address] [place] [address] [id number] [phone]            Nov 02, 2017  6:20 PM CDT    (Arrive by 6:05 PM)   Return Visit with Vik Crum MD   [facility] Surgery ([facility])    [address] [facility] [address] [phone]              Who to contact     Please call your clinic at [phone] to:    Ask questions about your health    Make or cancel appointments    Discuss your medicines    Learn about your test results    Speak to your doctor   If you have compliments or concerns about an experience at your clinic, or if you wish to file a complaint, please contact [facility] Patient Relations at [phone] or email us at [email or web address]         Additional Information About Your Visit        [employer] Information     [employer] gives you secure access to your electronic health record. If you see a primary care provider, you can also send messages to your care team and make appointments. If you have questions, please call your primary care clinic.  If you do not have a primary care provider, please call [phone] and they will assist you.      [employer] is an electronic gateway that provides easy, online access to your medical records. With [employer], you can request a clinic appointment, read your test results, renew a prescription or communicate with your care team.     To access your existing account, please contact your [facility] or call [phone] for assistance.        Care EveryWhere ID     This is your Care EveryWhere ID. This could be used by other organizations to access your [place] medical records  [id number]         Blood Pressure from Last 3 Encounters:   10/16/17 134/71   10/10/17 92/56   10/06/17 129/66    Weight from Last 3 Encounters:   10/15/17 90.8 kg (200 lb 1.6 oz)   10/10/17 86.2 kg (190 lb 1.6 oz)   10/06/17 88.1 kg (194 lb 3.2 oz)              Today, you had the following     No orders found for display       Primary Care  Provider Office Phone # Fax #    Cyril Mackay [phone]       [facility] [address] [facility] [address]        Equal Access to Services     LEE CORNELL : Sandeep arabella borja eyal Dc, waaxda luqadaha, qaybta kaalmada joselin, rimma grayson laBasiliakayla rodriguez. So [facility] [phone].    ATENCIÓN: Si habla español, tiene a rodriguez disposición servicios gratuitos de asistencia lingüística. Llame al [phone].    We comply with applicable federal civil rights laws and [place] laws. We do not discriminate on the basis of race, color, national origin, age, disability, sex, sexual orientation, or gender identity.            Thank you!     Thank you for choosing [facility]  for your care. Our goal is always to provide you with excellent care. Hearing back from our patients is one way we can continue to improve our services. Please take a few minutes to complete the written survey that you may receive in the mail after your visit with us. Thank you!             Your Updated Medication List - Protect others around you: Learn how to safely use, store and throw away your medicines at www.disposemymeds.org.          This list is accurate as of: 10/20/17  9:10 AM.  Always use your most recent med list.                   Brand Name Dispense Instructions for use Diagnosis    acetaminophen 32 mg/mL solution    TYLENOL    2000 mL    31.25 mLs (1,000 mg) by Per J Tube route every 8 hours    Generalized abdominal pain       amylase-lipase-protease [id number] UNITS Cpep per EC capsule    CREON 24    180 capsule    2 capsules (48,000 Units) by Per J Tube route every 4 hours    Nausea and vomiting, intractability of vomiting not specified, unspecified vomiting type       aspirin 81 MG tablet      81 mg by Per J Tube route daily        fenofibrate 145 MG tablet      145 mg by Per J Tube route daily        fentaNYL 75 mcg/hr 72 hr patch    DURAGESIC    5 patch    Place 1 patch  onto the skin every 72 hours    Generalized abdominal pain       FINASTERIDE PO      5 mg by Per J Tube route daily        Lidocaine 4 % Gel     60 g    Externally apply topically 2 times daily    Abscess of abdominal wall       lisinopril 10 MG tablet    PRINIVIL/ZESTRIL     10 mg by Per J Tube route daily        omeprazole 2 mg/mL Susp    priLOSEC    300 mL    10 mLs (20 mg) by Oral or Feeding Tube route 2 times daily    Chronic pancreatitis, unspecified pancreatitis type (H)       ondansetron 4 MG tablet    ZOFRAN    90 tablet    1-2 tablets (4-8 mg) by Per J Tube route every 8 hours as needed for nausea    Generalized abdominal pain       * oxyCODONE 5 MG/5ML solution    ROXICODONE    840 mL    10 mLs (10 mg) by Per J Tube route every 4 hours as needed for moderate to severe pain    Acute post-operative pain       * oxyCODONE 5 MG/5ML solution    ROXICODONE    500 mL    Take 5-10 mLs (5-10 mg) by mouth every 4 hours as needed for moderate to severe pain or pain maximum 60 mL per day    Cellulitis and abscess of trunk       * polyethylene glycol Packet    MIRALAX/GLYCOLAX    14 packet    17 g by Per J Tube route daily as needed for constipation    Generalized abdominal pain       * polyethylene glycol powder    MIRALAX    510 g    Take 17 g (1 capful) by mouth daily    Abscess of abdominal wall       pravastatin 40 MG tablet    PRAVACHOL     40 mg by Per J Tube route daily        predniSONE 5 MG/5ML solution     70 mL    5 mLs (5 mg) by Per J Tube route daily    Chronic pancreatitis, unspecified pancreatitis type (H)       sertraline 50 MG tablet    ZOLOFT     50 mg by Per J Tube route daily        sodium bicarbonate 325 MG tablet     42 tablet    1 tablet (325 mg) by Per J Tube route every 4 hours    Chronic pancreatitis, unspecified pancreatitis type (H)       TAMSULOSIN HCL PO      Take 0.4 mg by mouth daily        WARFARIN SODIUM PO      by Per J Tube route daily Alternates 5mg and 2.5mg        * Notice:   This list has 4 medication(s) that are the same as other medications prescribed for you. Read the directions carefully, and ask your doctor or other care provider to review them with you.

## 2017-10-22 LAB
BACTERIA SPEC CULT: ABNORMAL
Lab: ABNORMAL
SPECIMEN SOURCE: ABNORMAL

## 2017-10-23 ENCOUNTER — ANTICOAGULATION THERAPY VISIT (OUTPATIENT)
Dept: ANTICOAGULATION | Facility: CLINIC | Age: 57
End: 2017-10-23

## 2017-10-23 DIAGNOSIS — Z95.2 H/O AORTIC VALVE REPLACEMENT: ICD-10-CM

## 2017-10-23 DIAGNOSIS — Z79.01 LONG-TERM (CURRENT) USE OF ANTICOAGULANTS: ICD-10-CM

## 2017-10-23 DIAGNOSIS — R10.9 ABDOMINAL PAIN: Primary | ICD-10-CM

## 2017-10-23 LAB — INR PPP: 1.84 (ref 0.86–1.14)

## 2017-10-23 RX ORDER — FENTANYL 50 UG/1
1 PATCH TRANSDERMAL
Qty: 5 PATCH | Refills: 0 | Status: SHIPPED | OUTPATIENT
Start: 2017-10-23 | End: 2017-11-02

## 2017-10-23 NOTE — MR AVS SNAPSHOT
Lucas Brown   10/23/2017   Anticoagulation Therapy Visit    Description:  56 year old male   Provider:  Yandy Salvador RN   Department:  [facility]           INR as of 10/23/2017     Today's INR 1.84!      Anticoagulation Summary as of 10/23/2017     INR goal 2.0-3.0   Today's INR 1.84!   Full instructions 10/23: 5 mg; 10/24: 5 mg   Next INR check 10/25/2017    Indications   H/O aortic valve replacement [Z95.2]  Long-term (current) use of anticoagulants [Z79.01] [Z79.01]         October 2017 Details    Sun Mon Tue Wed Thu Fri Sat     1               2               3               4               5               6               7                 8               9               10               11               12               13               14                 15               16               17               18               19               20               21                 22               23      5 mg   See details      24      5 mg         25            26               27               28                 29               30               31                    Date Details   10/23 This INR check       Date of next INR:  10/25/2017         How to take your warfarin dose     To take:  5 mg Take 1 of the 5 mg tablets.

## 2017-10-23 NOTE — PROGRESS NOTES
ANTICOAGULATION FOLLOW-UP CLINIC VISIT    Patient Name:  Lucas Brown  Date:  10/23/2017  Contact Type:  Telephone    SUBJECTIVE:     Patient Findings     Positives No Problem Findings           OBJECTIVE    INR   Date Value Ref Range Status   10/23/2017 1.84 (H) 0.86 - 1.14 Final       ASSESSMENT / PLAN  INR assessment SUB    Recheck INR In: 2 DAYS    INR Location Clinic      Anticoagulation Summary as of 10/23/2017     INR goal 2.0-3.0   Today's INR 1.84!   Maintenance plan No maintenance plan   Full instructions 10/23: 5 mg; 10/24: 5 mg   Plan last modified Gwendolyn Goodman RN (9/15/2017)   Next INR check 10/25/2017   Priority INR   Target end date     Indications   H/O aortic valve replacement [Z95.2]  Long-term (current) use of anticoagulants [Z79.01] [Z79.01]         Anticoagulation Episode Summary     INR check location     Preferred lab     Send INR reminders to [facility]    Comments Pt is staying local and [facility] comes out  Monitoring pt for 4-6 weeks then go back to PCP at [facility] [place] [place]  Has 2.5 and 5mg tablets       Anticoagulation Care Providers     Provider Role Specialty Phone number    Vik Crum MD Responsible Transplant [phone]            See the Encounter Report to view Anticoagulation Flowsheet and Dosing Calendar (Go to Encounters tab in chart review, and find the Anticoagulation Therapy Visit)  Spoke with patient.    Yandy Salvador RN

## 2017-10-25 ENCOUNTER — TELEPHONE (OUTPATIENT)
Dept: TRANSPLANT | Facility: CLINIC | Age: 57
End: 2017-10-25

## 2017-10-25 ENCOUNTER — OFFICE VISIT (OUTPATIENT)
Dept: SURGERY | Facility: CLINIC | Age: 57
End: 2017-10-25

## 2017-10-25 ENCOUNTER — TELEPHONE (OUTPATIENT)
Dept: SURGERY | Facility: CLINIC | Age: 57
End: 2017-10-25

## 2017-10-25 ENCOUNTER — ANTICOAGULATION THERAPY VISIT (OUTPATIENT)
Dept: ANTICOAGULATION | Facility: CLINIC | Age: 57
End: 2017-10-25

## 2017-10-25 DIAGNOSIS — Z79.01 LONG-TERM (CURRENT) USE OF ANTICOAGULANTS: ICD-10-CM

## 2017-10-25 DIAGNOSIS — Z95.2 H/O AORTIC VALVE REPLACEMENT: ICD-10-CM

## 2017-10-25 DIAGNOSIS — Z98.890 POST-OPERATIVE STATE: Primary | ICD-10-CM

## 2017-10-25 DIAGNOSIS — G89.18 ACUTE POST-OPERATIVE PAIN: Primary | ICD-10-CM

## 2017-10-25 DIAGNOSIS — Z98.890 POST-OPERATIVE STATE: ICD-10-CM

## 2017-10-25 LAB
ALBUMIN SERPL-MCNC: 3.4 G/DL (ref 3.4–5)
ALP SERPL-CCNC: 77 U/L (ref 40–150)
ALT SERPL W P-5'-P-CCNC: 33 U/L (ref 0–70)
ANION GAP SERPL CALCULATED.3IONS-SCNC: 7 MMOL/L (ref 3–14)
AST SERPL W P-5'-P-CCNC: 32 U/L (ref 0–45)
BASOPHILS # BLD AUTO: 0 10E9/L (ref 0–0.2)
BASOPHILS NFR BLD AUTO: 0.4 %
BILIRUB SERPL-MCNC: 0.4 MG/DL (ref 0.2–1.3)
BUN SERPL-MCNC: 40 MG/DL (ref 7–30)
CALCIUM SERPL-MCNC: 8.8 MG/DL (ref 8.5–10.1)
CHLORIDE SERPL-SCNC: 100 MMOL/L (ref 94–109)
CO2 SERPL-SCNC: 27 MMOL/L (ref 20–32)
CREAT SERPL-MCNC: 1.41 MG/DL (ref 0.66–1.25)
DIFFERENTIAL METHOD BLD: ABNORMAL
EOSINOPHIL # BLD AUTO: 0.2 10E9/L (ref 0–0.7)
EOSINOPHIL NFR BLD AUTO: 2.3 %
ERYTHROCYTE [DISTWIDTH] IN BLOOD BY AUTOMATED COUNT: 17.2 % (ref 10–15)
GFR SERPL CREATININE-BSD FRML MDRD: 52 ML/MIN/1.7M2
GLUCOSE SERPL-MCNC: 105 MG/DL (ref 70–99)
HCT VFR BLD AUTO: 36.3 % (ref 40–53)
HGB BLD-MCNC: 11.2 G/DL (ref 13.3–17.7)
IMM GRANULOCYTES # BLD: 0.1 10E9/L (ref 0–0.4)
IMM GRANULOCYTES NFR BLD: 0.6 %
INR PPP: 2.04 (ref 0.86–1.14)
LYMPHOCYTES # BLD AUTO: 2.2 10E9/L (ref 0.8–5.3)
LYMPHOCYTES NFR BLD AUTO: 25.3 %
MCH RBC QN AUTO: 26 PG (ref 26.5–33)
MCHC RBC AUTO-ENTMCNC: 30.9 G/DL (ref 31.5–36.5)
MCV RBC AUTO: 84 FL (ref 78–100)
MONOCYTES # BLD AUTO: 0.8 10E9/L (ref 0–1.3)
MONOCYTES NFR BLD AUTO: 8.9 %
NEUTROPHILS # BLD AUTO: 5.4 10E9/L (ref 1.6–8.3)
NEUTROPHILS NFR BLD AUTO: 62.5 %
NRBC # BLD AUTO: 0 10*3/UL
NRBC BLD AUTO-RTO: 0 /100
PLATELET # BLD AUTO: 340 10E9/L (ref 150–450)
POTASSIUM SERPL-SCNC: 5 MMOL/L (ref 3.4–5.3)
PROT SERPL-MCNC: 8.3 G/DL (ref 6.8–8.8)
RBC # BLD AUTO: 4.3 10E12/L (ref 4.4–5.9)
SODIUM SERPL-SCNC: 133 MMOL/L (ref 133–144)
WBC # BLD AUTO: 8.6 10E9/L (ref 4–11)

## 2017-10-25 NOTE — MR AVS SNAPSHOT
After Visit Summary   10/25/2017    Lucas Brown    MRN: [id number]           Patient Information     Date Of Birth          1960        Visit Information        Provider Department      10/25/2017 8:00 AM Vik Crum MD [facility]        Today's Diagnoses     Acute post-operative pain    -  1    Post-operative state        Long-term (current) use of anticoagulants           Follow-ups after your visit        Follow-up notes from your care team     Return in about 1 day (around 10/26/2017).      Your next 10 appointments already scheduled     Nov 02, 2017  6:20 PM CDT   (Arrive by 6:05 PM)   Return Visit with Vik Crum MD   [facility] ([facility] [place])    [address]  [address] [facility] [address] [phone]              Future tests that were ordered for you today     Open Future Orders        Priority Expected Expires Ordered    Basic metabolic panel Routine 10/26/2017 11/25/2017 10/26/2017            Who to contact     Please call your clinic at [phone] to:    Ask questions about your health    Make or cancel appointments    Discuss your medicines    Learn about your test results    Speak to your doctor   If you have compliments or concerns about an experience at your clinic, or if you wish to file a complaint, please contact [facility] Patient Relations at [phone] or email us at [email or web address]         Additional Information About Your Visit        MyChart Information     Flipkartt gives you secure access to your electronic health record. If you see a primary care provider, you can also send messages to your care team and make appointments. If you have questions, please call your primary care clinic.  If you do not have a primary care provider, please call [phone] and they will assist you.      Yappn is an electronic gateway that provides  easy, online access to your medical records. With WEPOWER Eco, you can request a clinic appointment, read your test results, renew a prescription or communicate with your care team.     To access your existing account, please contact your [facility] or call [phone] for assistance.        Care EveryWhere ID     This is your Care EveryWhere ID. This could be used by other organizations to access your [place] medical records  [id number]         Blood Pressure from Last 3 Encounters:   10/26/17 106/68   10/16/17 134/71   10/10/17 92/56    Weight from Last 3 Encounters:   10/26/17 81.7 kg (180 lb 1.6 oz)   10/15/17 90.8 kg (200 lb 1.6 oz)   10/10/17 86.2 kg (190 lb 1.6 oz)              We Performed the Following     CBC with platelets differential     Comprehensive metabolic panel        Primary Care Provider Office Phone # Fax #    Cyril Mackay [phone]       [address] [address]        Equal Access to Services     LEE CORNELL : Hadii aad ku hadasho Soomaali, waaxda luqadaha, qaybta kaalmada adeegyada, rimma ferrara haykayla gunn . So [facility] [phone].    ATENCIÓN: Si habla español, tiene a rodriguez disposición servicios gratuitos de asistencia lingüística. Llame al [phone].    We comply with applicable federal civil rights laws and [place] laws. We do not discriminate on the basis of race, color, national origin, age, disability, sex, sexual orientation, or gender identity.            Thank you!     Thank you for choosing [facility]  for your care. Our goal is always to provide you with excellent care. Hearing back from our patients is one way we can continue to improve our services. Please take a few minutes to complete the written survey that you may receive in the mail after your visit with us. Thank you!             Your Updated Medication List - Protect others around you: Learn how to  safely use, store and throw away your medicines at www.disposemymeds.org.          This list is accurate as of: 10/25/17 11:59 PM.  Always use your most recent med list.                   Brand Name Dispense Instructions for use Diagnosis    acetaminophen 32 mg/mL solution    TYLENOL    2000 mL    31.25 mLs (1,000 mg) by Per J Tube route every 8 hours    Generalized abdominal pain       amylase-lipase-protease [id number] UNITS Cpep per EC capsule    CREON 24    180 capsule    2 capsules (48,000 Units) by Per J Tube route every 4 hours    Nausea and vomiting, intractability of vomiting not specified, unspecified vomiting type       aspirin 81 MG tablet      81 mg by Per J Tube route daily        fenofibrate 145 MG tablet      145 mg by Per J Tube route daily        * fentaNYL 75 mcg/hr 72 hr patch    DURAGESIC    5 patch    Place 1 patch onto the skin every 72 hours    Generalized abdominal pain       * fentaNYL 50 mcg/hr 72 hr patch    DURAGESIC    5 patch    Place 1 patch onto the skin every 72 hours    Abdominal pain       FINASTERIDE PO      5 mg by Per J Tube route daily        Lidocaine 4 % Gel     60 g    Externally apply topically 2 times daily    Abscess of abdominal wall       lisinopril 10 MG tablet    PRINIVIL/ZESTRIL     10 mg by Per J Tube route daily        omeprazole 2 mg/mL Susp    priLOSEC    300 mL    10 mLs (20 mg) by Oral or Feeding Tube route 2 times daily    Chronic pancreatitis, unspecified pancreatitis type (H)       ondansetron 4 MG tablet    ZOFRAN    90 tablet    1-2 tablets (4-8 mg) by Per J Tube route every 8 hours as needed for nausea    Generalized abdominal pain       * oxyCODONE 5 MG/5ML solution    ROXICODONE    840 mL    10 mLs (10 mg) by Per J Tube route every 4 hours as needed for moderate to severe pain    Acute post-operative pain       * oxyCODONE 5 MG/5ML solution    ROXICODONE    500 mL    Take 5-10 mLs (5-10 mg) by mouth every 4 hours as needed for moderate to severe pain  or pain maximum 60 mL per day    Cellulitis and abscess of trunk       * polyethylene glycol Packet    MIRALAX/GLYCOLAX    14 packet    17 g by Per J Tube route daily as needed for constipation    Generalized abdominal pain       * polyethylene glycol powder    MIRALAX    510 g    Take 17 g (1 capful) by mouth daily    Abscess of abdominal wall       pravastatin 40 MG tablet    PRAVACHOL     40 mg by Per J Tube route daily        predniSONE 5 MG/5ML solution     70 mL    5 mLs (5 mg) by Per J Tube route daily    Chronic pancreatitis, unspecified pancreatitis type (H)       sertraline 50 MG tablet    ZOLOFT     50 mg by Per J Tube route daily        sodium bicarbonate 325 MG tablet     42 tablet    1 tablet (325 mg) by Per J Tube route every 4 hours    Chronic pancreatitis, unspecified pancreatitis type (H)       TAMSULOSIN HCL PO      Take 0.4 mg by mouth daily        WARFARIN SODIUM PO      by Per J Tube route daily Alternates 5mg and 2.5mg        * Notice:  This list has 6 medication(s) that are the same as other medications prescribed for you. Read the directions carefully, and ask your doctor or other care provider to review them with you.

## 2017-10-25 NOTE — MR AVS SNAPSHOT
Meeker ROBINSON Stephanie   10/25/2017   Anticoagulation Therapy Visit    Description:  56 year old male   Provider:  Justine Johnson, RN   Department:  [facility]           INR as of 10/25/2017     Today's INR 2.04      Anticoagulation Summary as of 10/25/2017     INR goal 2.0-3.0   Today's INR 2.04   Full instructions 10/25: 5 mg; 10/26: 2.5 mg; 10/27: 5 mg; 10/28: 5 mg; 10/29: 5 mg   Next INR check 10/30/2017    Indications   H/O aortic valve replacement [Z95.2]  Long-term (current) use of anticoagulants [Z79.01] [Z79.01]         October 2017 Details    Sun Mon Tue Wed Thu Fri Sat     1               2               3               4               5               6               7                 8               9               10               11               12               13               14                 15               16               17               18               19               20               21                 22               23               24               25      5 mg   See details      26      2.5 mg         27      5 mg         28      5 mg           29      5 mg         30            31                    Date Details   10/25 This INR check       Date of next INR:  10/30/2017         How to take your warfarin dose     To take:  2.5 mg Take 0.5 of a 5 mg tablet.    To take:  5 mg Take 1 of the 5 mg tablets.

## 2017-10-25 NOTE — PROGRESS NOTES
ANTICOAGULATION FOLLOW-UP CLINIC VISIT    Patient Name:  Lucas Brown  Date:  10/25/2017  Contact Type:  Telephone    SUBJECTIVE:        OBJECTIVE    INR   Date Value Ref Range Status   10/25/2017 2.04 (H) 0.86 - 1.14 Final       ASSESSMENT / PLAN  INR assessment THER    Recheck INR In: 5 DAYS    INR Location Clinic      Anticoagulation Summary as of 10/25/2017     INR goal 2.0-3.0   Today's INR 2.04   Maintenance plan No maintenance plan   Full instructions 10/25: 5 mg; 10/26: 2.5 mg; 10/27: 5 mg; 10/28: 5 mg; 10/29: 5 mg   Plan last modified Gwendolyn Goodman RN (9/15/2017)   Next INR check 10/30/2017   Priority INR   Target end date     Indications   H/O aortic valve replacement [Z95.2]  Long-term (current) use of anticoagulants [Z79.01] [Z79.01]         Anticoagulation Episode Summary     INR check location     Preferred lab     Send INR reminders to [facility]    Comments Pt is staying local and [facility] comes out  Monitoring pt for 4-6 weeks then go back to PCP at [facility]  Has 2.5 and 5mg tablets       Anticoagulation Care Providers     Provider Role Specialty Phone number    Vik Crum MD Responsible Transplant [phone]            See the Encounter Report to view Anticoagulation Flowsheet and Dosing Calendar (Go to Encounters tab in chart review, and find the Anticoagulation Therapy Visit)    Spoke with Lucas.    Justine Johnson RN

## 2017-10-25 NOTE — TELEPHONE ENCOUNTER
Spoke with patient 10/25/17 at 1245pm to confirm his appointment with our clinic tomorrow 10/26/17 at 0720. I instructed the patient to check in fifteen minutes prior to his appointment.  The patient voiced agreement and understanding stating that he will be here.  Gaston Rivera, CMA

## 2017-10-25 NOTE — TELEPHONE ENCOUNTER
Called pt after reviewing lab results with Dr Crum. I asked him to give a total of 2 litres of fluid today.We will reasess when we see him in clinic tomorrow.

## 2017-10-25 NOTE — LETTER
10/25/2017       RE: Lucas Brown  [address] [facility] [address]     Dear Colleague,    Thank you for referring your patient, Lucas Brown, to the [facility] SURGERY at [facility]. Please see a copy of my visit note below.    See dictated note  GB      Results reviewed with Dr Hernandez, see [facility] for follow up  HISTORY OF PRESENT ILLNESS:  Mr. Brown is here because of leakage around his gastrostomy tube.  He denies fevers, chills or sweats.  He denies any significant abdominal pain.  Last night he started to have a moderate amount of drainage around the G-tube site.  This drainage is clear and is not bloody.  He denies nausea or vomiting.  He does note some distention.      PHYSICAL EXAMINATION:   ABDOMEN:  The G-tube site is clean and dry.  His abdominal wound is granulating nicely.  His abdomen is soft and mildly distended. After discussion with Mr. Brown and his wife, we elected to pull out the gastrostomy tube.  About 500 cc of clear what appeared to be gastric contents leaked out through the G-tube site once we removed the G-tube.      ASSESSMENT:     1.  Malfunctioning gastrostomy tube.  We will leave him n.p.o. for now.  I will leave the G-tube out and we will start feeds moving forward.   2.  Dehydration.  His BUN is 40 with a creatinine of 1.4.  We will increase his IV fluids to 2 liters per day.  I offered Mr. Brown the opportunity to come in the hospital and he would rather stay home.  I plan on seeing him again in clinic tomorrow.      Visit time 20 minutes, 10 minutes counseling.         BABAR HERNANDEZ MD             D: 10/26/2017 07:36   T: 10/26/2017 10:47   MT: JUHI      Name:     LUCAS BROWN   MRN:      [id number]        Account:      [id number]   :      1960           Service Date: 10/25/2017      Document: [id number]       Again, thank you for allowing me to participate in the care of your patient.       Sincerely,    Vik Crum MD

## 2017-10-26 ENCOUNTER — OFFICE VISIT (OUTPATIENT)
Dept: SURGERY | Facility: CLINIC | Age: 57
End: 2017-10-26

## 2017-10-26 ENCOUNTER — OFFICE VISIT (OUTPATIENT)
Dept: WOUND CARE | Facility: CLINIC | Age: 57
End: 2017-10-26

## 2017-10-26 VITALS
OXYGEN SATURATION: 98 % | HEIGHT: 71 IN | SYSTOLIC BLOOD PRESSURE: 106 MMHG | DIASTOLIC BLOOD PRESSURE: 68 MMHG | HEART RATE: 80 BPM | WEIGHT: 180.1 LBS | BODY MASS INDEX: 25.21 KG/M2

## 2017-10-26 DIAGNOSIS — E87.8 ELECTROLYTE IMBALANCE: Primary | ICD-10-CM

## 2017-10-26 DIAGNOSIS — Z79.01 LONG-TERM (CURRENT) USE OF ANTICOAGULANTS: ICD-10-CM

## 2017-10-26 DIAGNOSIS — S31.109A OPEN ABDOMINAL WALL WOUND, INITIAL ENCOUNTER: Primary | ICD-10-CM

## 2017-10-26 DIAGNOSIS — G89.18 ACUTE POST-OPERATIVE PAIN: Primary | ICD-10-CM

## 2017-10-26 ASSESSMENT — PAIN SCALES - GENERAL: PAINLEVEL: WORST PAIN (10)

## 2017-10-26 NOTE — MR AVS SNAPSHOT
After Visit Summary   10/26/2017    Lucas Brown    MRN: [id number]           Patient Information     Date Of Birth          1960        Visit Information        Provider Department      10/26/2017 7:30 AM Sharmila Armenta RN [facility] Wound Ostomy        Today's Diagnoses     Open abdominal wall wound, initial encounter    -  1       Follow-ups after your visit        Your next 10 appointments already scheduled     Nov 02, 2017  6:20 PM CDT   (Arrive by 6:05 PM)   Return Visit with Vik Crum MD   [facility] General Surgery ([facility] [place])    [address] [place] [address] [phone]              Future tests that were ordered for you today     Open Future Orders        Priority Expected Expires Ordered    Basic metabolic panel Routine 10/26/2017 11/25/2017 10/26/2017            Who to contact     Please call your clinic at [phone] to:    Ask questions about your health    Make or cancel appointments    Discuss your medicines    Learn about your test results    Speak to your doctor   If you have compliments or concerns about an experience at your clinic, or if you wish to file a complaint, please contact [facility] Patient Relations at [phone] or email us at [email or web address]         Additional Information About Your Visit        MyChart Information     [employer] gives you secure access to your electronic health record. If you see a primary care provider, you can also send messages to your care team and make appointments. If you have questions, please call your primary care clinic.  If you do not have a primary care provider, please call [phone] and they will assist you.      ACSIAN is an electronic gateway that provides easy, online access to your medical records. With ACSIAN, you can request a clinic appointment, read your test results, renew a prescription or  communicate with your care team.     To access your existing account, please contact your [facility] or call [phone] for assistance.        Care EveryWhere ID     This is your Care EveryWhere ID. This could be used by other organizations to access your [place] medical records  [id number]         Blood Pressure from Last 3 Encounters:   10/26/17 106/68   10/16/17 134/71   10/10/17 92/56    Weight from Last 3 Encounters:   10/26/17 81.7 kg (180 lb 1.6 oz)   10/15/17 90.8 kg (200 lb 1.6 oz)   10/10/17 86.2 kg (190 lb 1.6 oz)              Today, you had the following     No orders found for display       Primary Care Provider Office Phone # Fax #    Cyril Mackay [phone]       [facility] [address] [facility] [address]        Equal Access to Services     LEE CORNELL : Hadii aad ku hadasho Soomaali, waaxda luqadaha, qaybta kaalmada adeegyada, waxay kylein haychinedun doron gunn . So [facility] [phone].    ATENCIÓN: Si habla español, tiene a rodriguez disposición servicios gratuitos de asistencia lingüística. Roberto al [phone].    We comply with applicable federal civil rights laws and [place] laws. We do not discriminate on the basis of race, color, national origin, age, disability, sex, sexual orientation, or gender identity.            Thank you!     Thank you for choosing [facility] WOUND OSTOMY  for your care. Our goal is always to provide you with excellent care. Hearing back from our patients is one way we can continue to improve our services. Please take a few minutes to complete the written survey that you may receive in the mail after your visit with us. Thank you!             Your Updated Medication List - Protect others around you: Learn how to safely use, store and throw away your medicines at www.disposemymeds.org.          This list is accurate as of: 10/26/17  8:05 AM.  Always use your most recent med list.                    Brand Name Dispense Instructions for use Diagnosis    acetaminophen 32 mg/mL solution    TYLENOL    2000 mL    31.25 mLs (1,000 mg) by Per J Tube route every 8 hours    Generalized abdominal pain       amylase-lipase-protease [id number] UNITS Cpep per EC capsule    CREON 24    180 capsule    2 capsules (48,000 Units) by Per J Tube route every 4 hours    Nausea and vomiting, intractability of vomiting not specified, unspecified vomiting type       aspirin 81 MG tablet      81 mg by Per J Tube route daily        fenofibrate 145 MG tablet      145 mg by Per J Tube route daily        * fentaNYL 75 mcg/hr 72 hr patch    DURAGESIC    5 patch    Place 1 patch onto the skin every 72 hours    Generalized abdominal pain       * fentaNYL 50 mcg/hr 72 hr patch    DURAGESIC    5 patch    Place 1 patch onto the skin every 72 hours    Abdominal pain       FINASTERIDE PO      5 mg by Per J Tube route daily        Lidocaine 4 % Gel     60 g    Externally apply topically 2 times daily    Abscess of abdominal wall       lisinopril 10 MG tablet    PRINIVIL/ZESTRIL     10 mg by Per J Tube route daily        omeprazole 2 mg/mL Susp    priLOSEC    300 mL    10 mLs (20 mg) by Oral or Feeding Tube route 2 times daily    Chronic pancreatitis, unspecified pancreatitis type (H)       ondansetron 4 MG tablet    ZOFRAN    90 tablet    1-2 tablets (4-8 mg) by Per J Tube route every 8 hours as needed for nausea    Generalized abdominal pain       * oxyCODONE 5 MG/5ML solution    ROXICODONE    840 mL    10 mLs (10 mg) by Per J Tube route every 4 hours as needed for moderate to severe pain    Acute post-operative pain       * oxyCODONE 5 MG/5ML solution    ROXICODONE    500 mL    Take 5-10 mLs (5-10 mg) by mouth every 4 hours as needed for moderate to severe pain or pain maximum 60 mL per day    Cellulitis and abscess of trunk       * polyethylene glycol Packet    MIRALAX/GLYCOLAX    14 packet    17 g by Per J Tube route daily as needed for  constipation    Generalized abdominal pain       * polyethylene glycol powder    MIRALAX    510 g    Take 17 g (1 capful) by mouth daily    Abscess of abdominal wall       pravastatin 40 MG tablet    PRAVACHOL     40 mg by Per J Tube route daily        predniSONE 5 MG/5ML solution     70 mL    5 mLs (5 mg) by Per J Tube route daily    Chronic pancreatitis, unspecified pancreatitis type (H)       sertraline 50 MG tablet    ZOLOFT     50 mg by Per J Tube route daily        sodium bicarbonate 325 MG tablet     42 tablet    1 tablet (325 mg) by Per J Tube route every 4 hours    Chronic pancreatitis, unspecified pancreatitis type (H)       TAMSULOSIN HCL PO      Take 0.4 mg by mouth daily        WARFARIN SODIUM PO      by Per J Tube route daily Alternates 5mg and 2.5mg        * Notice:  This list has 6 medication(s) that are the same as other medications prescribed for you. Read the directions carefully, and ask your doctor or other care provider to review them with you.

## 2017-10-26 NOTE — NURSING NOTE
"Chief Complaint   Patient presents with     Clinic Care Coordination - Follow-up     Follow up appt.        Vitals:    10/26/17 0714   BP: 106/68   BP Location: Left arm   Patient Position: Chair   Cuff Size: Adult Large   Pulse: 80   SpO2: 98%   Weight: 180 lb 1.6 oz   Height: 5' 11\"       Body mass index is 25.12 kg/(m^2).    Alicia PEDROZA LPN               "

## 2017-10-26 NOTE — PROGRESS NOTES
HISTORY OF PRESENT ILLNESS:  Mr. Brown is here because of leakage around his gastrostomy tube.  He denies fevers, chills or sweats.  He denies any significant abdominal pain.  Last night he started to have a moderate amount of drainage around the G-tube site.  This drainage is clear and is not bloody.  He denies nausea or vomiting.  He does note some distention.      PHYSICAL EXAMINATION:   ABDOMEN:  The G-tube site is clean and dry.  His abdominal wound is granulating nicely.  His abdomen is soft and mildly distended. After discussion with Mr. Brown and his wife, we elected to pull out the gastrostomy tube.  About 500 cc of clear what appeared to be gastric contents leaked out through the G-tube site once we removed the G-tube.      ASSESSMENT:     1.  Malfunctioning gastrostomy tube.  We will leave him n.p.o. for now.  I will leave the G-tube out and we will start feeds moving forward.   2.  Dehydration.  His BUN is 40 with a creatinine of 1.4.  We will increase his IV fluids to 2 liters per day.  I offered Mr. Brown the opportunity to come in the hospital and he would rather stay home.  I plan on seeing him again in clinic tomorrow.      Visit time 20 minutes, 10 minutes counseling.         BABAR HERNANDEZ MD             D: 10/26/2017 07:36   T: 10/26/2017 10:47   MT: JUHI      Name:     AMELIE BROWN   MRN:      [id number]        Account:      [id number]   :      1960           Service Date: 10/25/2017      Document: [id number]

## 2017-10-26 NOTE — PROGRESS NOTES
HISTORY OF PRESENT ILLNESS:  Mr. Brown is here in followup after G-tube removal yesterday.  He has continued to have drainage out of his wound overnight.  I saw Mr. Brown with Sharmila Armenta, our wound care nurse.  Mr. Brown has some skin changes that appear to be burning his skin around the G-tube site.  The wound inferior to this continues to be stable.      ASSESSMENT AND PLAN:   1.   Skin irritation due to gastric drainage.  We will pouch the wound and use local therapy to protect the skin.  I plan on seeing Mr. Brown again tomorrow.   2.  Dehydration.  Continue 2 liters of IV fluids per day and recheck his labs tomorrow.      Visit time 15 minutes, 10 minutes counseling.         BABAR HERNANDEZ MD             D: 10/26/2017 07:37   T: 10/26/2017 11:01   MT: JUHI      Name:     AMELIE BROWN   MRN:      [id number]        Account:      [id number]   :      1960           Service Date: 10/26/2017      Document: [id number]

## 2017-10-26 NOTE — MR AVS SNAPSHOT
After Visit Summary   10/26/2017    Lucas Brown    MRN: [id number]           Patient Information     Date Of Birth          1960        Visit Information        Provider Department      10/26/2017 7:20 AM Vik Crum MD [facility]        Today's Diagnoses     Acute post-operative pain    -  1    Long-term (current) use of anticoagulants           Follow-ups after your visit        Follow-up notes from your care team     Return in about 1 day (around 10/27/2017).      Your next 10 appointments already scheduled     Nov 02, 2017  6:20 PM CDT   (Arrive by 6:05 PM)   Return Visit with Vik Crum MD   [facility] ([facility])    [address]  [address] [facility] [address] [phone]              Future tests that were ordered for you today     Open Future Orders        Priority Expected Expires Ordered    Basic metabolic panel Routine 10/26/2017 11/25/2017 10/26/2017            Who to contact     Please call your clinic at [phone] to:    Ask questions about your health    Make or cancel appointments    Discuss your medicines    Learn about your test results    Speak to your doctor   If you have compliments or concerns about an experience at your clinic, or if you wish to file a complaint, please contact [facility] Patient Relations at [phone] or email us at [email or web address]         Additional Information About Your Visit        MyChart Information     [employer] gives you secure access to your electronic health record. If you see a primary care provider, you can also send messages to your care team and make appointments. If you have questions, please call your primary care clinic.  If you do not have a primary care provider, please call [phone] and they will assist you.      [employer] is an electronic gateway that provides easy, online access to your  "medical records. With [employer], you can request a clinic appointment, read your test results, renew a prescription or communicate with your care team.     To access your existing account, please contact your [facility] or call [phone] for assistance.        Care EveryWhere ID     This is your Care EveryWhere ID. This could be used by other organizations to access your [facility] medical records  [id number]        Your Vitals Were     Pulse Height Pulse Oximetry BMI (Body Mass Index)          80 1.803 m (5' 11\") 98% 25.12 kg/m2         Blood Pressure from Last 3 Encounters:   10/26/17 106/68   10/16/17 134/71   10/10/17 92/56    Weight from Last 3 Encounters:   10/26/17 81.7 kg (180 lb 1.6 oz)   10/15/17 90.8 kg (200 lb 1.6 oz)   10/10/17 86.2 kg (190 lb 1.6 oz)              Today, you had the following     No orders found for display       Primary Care Provider Office Phone # Fax #    Cyril Mackay [phone]       [facility] [address] [facility] [address]        Equal Access to Services     LEE CORNELL : Hadii aad ku hadasho Soomaali, waaxda luqadaha, qaybta kaalmada adeegyada, rimma storeyn doron rodriguez. So [facility] [phone].    ATENCIÓN: Si habla español, tiene a rodriguez disposición servicios gratuitos de asistencia lingüística. Llame al [phone].    We comply with applicable federal civil rights laws and [place] laws. We do not discriminate on the basis of race, color, national origin, age, disability, sex, sexual orientation, or gender identity.            Thank you!     Thank you for choosing [facility]  for your care. Our goal is always to provide you with excellent care. Hearing back from our patients is one way we can continue to improve our services. Please take a few minutes to complete the written survey that you may receive in the mail after your visit with us. Thank you!             Your Updated " Medication List - Protect others around you: Learn how to safely use, store and throw away your medicines at www.disposemymeds.org.          This list is accurate as of: 10/26/17  8:48 AM.  Always use your most recent med list.                   Brand Name Dispense Instructions for use Diagnosis    acetaminophen 32 mg/mL solution    TYLENOL    2000 mL    31.25 mLs (1,000 mg) by Per J Tube route every 8 hours    Generalized abdominal pain       amylase-lipase-protease 83607-03012 UNITS Cpep per EC capsule    CREON 24    180 capsule    2 capsules (48,000 Units) by Per J Tube route every 4 hours    Nausea and vomiting, intractability of vomiting not specified, unspecified vomiting type       aspirin 81 MG tablet      81 mg by Per J Tube route daily        fenofibrate 145 MG tablet      145 mg by Per J Tube route daily        * fentaNYL 75 mcg/hr 72 hr patch    DURAGESIC    5 patch    Place 1 patch onto the skin every 72 hours    Generalized abdominal pain       * fentaNYL 50 mcg/hr 72 hr patch    DURAGESIC    5 patch    Place 1 patch onto the skin every 72 hours    Abdominal pain       FINASTERIDE PO      5 mg by Per J Tube route daily        Lidocaine 4 % Gel     60 g    Externally apply topically 2 times daily    Abscess of abdominal wall       lisinopril 10 MG tablet    PRINIVIL/ZESTRIL     10 mg by Per J Tube route daily        omeprazole 2 mg/mL Susp    priLOSEC    300 mL    10 mLs (20 mg) by Oral or Feeding Tube route 2 times daily    Chronic pancreatitis, unspecified pancreatitis type (H)       ondansetron 4 MG tablet    ZOFRAN    90 tablet    1-2 tablets (4-8 mg) by Per J Tube route every 8 hours as needed for nausea    Generalized abdominal pain       * oxyCODONE 5 MG/5ML solution    ROXICODONE    840 mL    10 mLs (10 mg) by Per J Tube route every 4 hours as needed for moderate to severe pain    Acute post-operative pain       * oxyCODONE 5 MG/5ML solution    ROXICODONE    500 mL    Take 5-10 mLs (5-10 mg) by  mouth every 4 hours as needed for moderate to severe pain or pain maximum 60 mL per day    Cellulitis and abscess of trunk       * polyethylene glycol Packet    MIRALAX/GLYCOLAX    14 packet    17 g by Per J Tube route daily as needed for constipation    Generalized abdominal pain       * polyethylene glycol powder    MIRALAX    510 g    Take 17 g (1 capful) by mouth daily    Abscess of abdominal wall       pravastatin 40 MG tablet    PRAVACHOL     40 mg by Per J Tube route daily        predniSONE 5 MG/5ML solution     70 mL    5 mLs (5 mg) by Per J Tube route daily    Chronic pancreatitis, unspecified pancreatitis type (H)       sertraline 50 MG tablet    ZOLOFT     50 mg by Per J Tube route daily        sodium bicarbonate 325 MG tablet     42 tablet    1 tablet (325 mg) by Per J Tube route every 4 hours    Chronic pancreatitis, unspecified pancreatitis type (H)       TAMSULOSIN HCL PO      Take 0.4 mg by mouth daily        WARFARIN SODIUM PO      by Per J Tube route daily Alternates 5mg and 2.5mg        * Notice:  This list has 6 medication(s) that are the same as other medications prescribed for you. Read the directions carefully, and ask your doctor or other care provider to review them with you.

## 2017-10-26 NOTE — PROGRESS NOTES
Patient comes to wound clinic for wound assessment per request of dr. Crum. He has history of a Open surgical wound due to : Pancreaticoduodenectomy (Whipple proceedure), umbilical hernia repair, incidental appendectomy , gastrojejunostomy tube placement on 08/28/2017  Clean gloves are donned and current dressing removed. Previous treatment includes: packing changed  1 dayago  This is treatment week:1    Objective findings: unchanged    Location: left abdominal wound    Wound measured.: L 10 cm x, W 2 cm x, D 3  cm, Full thickness.    Wound description: no sign of infection    Tenderness:sometimes    Odor: none     Drainage is moderate, serosanguinous and cloudy     Wound Base: moist and slough     Palpation of the wound bed:  mushy    Slough appearance:  adherent   50  %     Periwound Skin: intact,      Color: normal and consistent with surrounding tissue     Lab assessment Not Applicable    Subjective finding:     Pt states: a lot of pain from draining gtube site    Patient is assessed for discomfort which is: moderate    Today's status of the wound: increased slough and a draining g-tube site above the wound    Treatment: Removal of existing dressing, Visual inspection, Cleansing with Vashe,  Wound packed with Vashe Soaked gauze,  The G-tube site is draining stomach acid and getting into the wound. Both wounds are covered with a wound manger [place] [id number].  Edges covered with Rashard ring. Non-excisional debridement (no cutting was done), however removal of debris was performed with swabs, gauze and/or wet to dry dressings.   Vashe soaks debridement     Pt received the following instructions:  Remove old dressing and wash hands again. Cleanse wound with Vashe. Pack wound with Vashe soaked gauze Cover as instructed.Change dressing as often as needed.    Signs and symptoms of infection taught.  Patient needs to be seen Tomorrow.   Treated and follow-up appointment made Dr. Crum was available for supervision  of care if needed or if questions should arise and regarding plan of care. Sharmila Armenta RN, CWON

## 2017-10-26 NOTE — LETTER
10/26/2017       RE: Lucas Brown  [address] [facility] [address]     Dear Colleague,    Thank you for referring your patient, Lucas Brown, to the [facility] at [facility]. Please see a copy of my visit note below.    See dictated note: [id number]  GB      HISTORY OF PRESENT ILLNESS:  Mr. Brown is here in followup after G-tube removal yesterday.  He has continued to have drainage out of his wound overnight.  I saw Mr. Brown with Sharmila Armenta, our wound care nurse.  Mr. Brown has some skin changes that appear to be burning his skin around the G-tube site.  The wound inferior to this continues to be stable.      ASSESSMENT AND PLAN:   1.   Skin irritation due to gastric drainage.  We will pouch the wound and use local therapy to protect the skin.  I plan on seeing Mr. Brown again tomorrow.   2.  Dehydration.  Continue 2 liters of IV fluids per day and recheck his labs tomorrow.      Visit time 15 minutes, 10 minutes counseling.         VIK HERNANDEZ MD             D: 10/26/2017 07:37   T: 10/26/2017 11:01   MT: [id number]      Name:     LUCAS BROWN   MRN:      [id number]        Account:      [id number]   :      1960           Service Date: 10/26/2017      Document: [id number]         Again, thank you for allowing me to participate in the care of your patient.      Sincerely,    Vik Hernandez MD

## 2017-10-27 ENCOUNTER — ANTICOAGULATION THERAPY VISIT (OUTPATIENT)
Dept: ANTICOAGULATION | Facility: CLINIC | Age: 57
End: 2017-10-27

## 2017-10-27 DIAGNOSIS — Z79.01 LONG-TERM (CURRENT) USE OF ANTICOAGULANTS: ICD-10-CM

## 2017-10-27 DIAGNOSIS — Z95.2 H/O AORTIC VALVE REPLACEMENT: ICD-10-CM

## 2017-10-27 DIAGNOSIS — E87.8 ELECTROLYTE IMBALANCE: ICD-10-CM

## 2017-10-27 DIAGNOSIS — E87.8 ELECTROLYTE IMBALANCE: Primary | ICD-10-CM

## 2017-10-27 LAB
ANION GAP SERPL CALCULATED.3IONS-SCNC: 8 MMOL/L (ref 3–14)
BUN SERPL-MCNC: 35 MG/DL (ref 7–30)
CALCIUM SERPL-MCNC: 8.9 MG/DL (ref 8.5–10.1)
CHLORIDE SERPL-SCNC: 100 MMOL/L (ref 94–109)
CO2 SERPL-SCNC: 27 MMOL/L (ref 20–32)
CREAT SERPL-MCNC: 1.5 MG/DL (ref 0.66–1.25)
GFR SERPL CREATININE-BSD FRML MDRD: 48 ML/MIN/1.7M2
GLUCOSE SERPL-MCNC: 101 MG/DL (ref 70–99)
INR PPP: 3.61 (ref 0.86–1.14)
POTASSIUM SERPL-SCNC: 4.1 MMOL/L (ref 3.4–5.3)
SODIUM SERPL-SCNC: 136 MMOL/L (ref 133–144)

## 2017-10-27 NOTE — PROGRESS NOTES
ANTICOAGULATION FOLLOW-UP CLINIC VISIT    Patient Name:  Lucas Brown  Date:  10/27/2017  Contact Type:  Telephone    SUBJECTIVE:     Patient Findings     Positives Change in diet/appetite (feeding tube was taken out 10/25/17 and he is NPO;  he is receiving 2 liters of IV fluids daily)           OBJECTIVE    INR   Date Value Ref Range Status   10/27/2017 3.61 (H) 0.86 - 1.14 Final       ASSESSMENT / PLAN  INR assessment SUPRA    Recheck INR In: 3 DAYS    INR Location Clinic      Anticoagulation Summary as of 10/27/2017     INR goal 2.0-3.0   Today's INR 3.61!   Maintenance plan No maintenance plan   Full instructions 10/27: Hold; 10/28: 2.5 mg; 10/29: 2.5 mg   Plan last modified Gwendolyn Goodman RN (9/15/2017)   Next INR check 10/30/2017   Priority INR   Target end date     Indications   H/O aortic valve replacement [Z95.2]  Long-term (current) use of anticoagulants [Z79.01] [Z79.01]         Anticoagulation Episode Summary     INR check location     Preferred lab     Send INR reminders to  [facility]    Comments Pt is staying local and [facility] comes out  Monitoring pt for 4-6 weeks then go back to PCP at [facility] BERNIE Mcgee  Has 2.5 and 5mg tablets       Anticoagulation Care Providers     Provider Role Specialty Phone number    Vik Crum MD Responsible Transplant [phone]            See the Encounter Report to view Anticoagulation Flowsheet and Dosing Calendar (Go to Encounters tab in chart review, and find the Anticoagulation Therapy Visit)    Spoke with Lucas's wife.  Lucas was seen in clinic and g tube was removed on 10/25/17.  He is currently NPO and is receiving 2 liters of IV fluid daily.  Will hold warfarin today and decrease dose over weekend.  He took 5 mg of coumadin last night by mistake, he was instructed to take 2.5 mg.    Aidee Torres RN

## 2017-10-27 NOTE — MR AVS SNAPSHOT
uLcas Brown   10/27/2017   Anticoagulation Therapy Visit    Description:  56 year old male   Provider:  Aidee Torres, RN   Department:  [facility]           INR as of 10/27/2017     Today's INR 3.61!      Anticoagulation Summary as of 10/27/2017     INR goal 2.0-3.0   Today's INR 3.61!   Full instructions 10/27: Hold; 10/28: 2.5 mg; 10/29: 2.5 mg   Next INR check 10/30/2017    Indications   H/O aortic valve replacement [Z95.2]  Long-term (current) use of anticoagulants [Z79.01] [Z79.01]         October 2017 Details    Sun Mon Tue Wed Thu Fri Sat     1               2               3               4               5               6               7                 8               9               10               11               12               13               14                 15               16               17               18               19               20               21                 22               23               24               25               26               27      Hold   See details      28      2.5 mg           29      2.5 mg         30            31                    Date Details   10/27 This INR check       Date of next INR:  10/30/2017         How to take your warfarin dose     To take:  2.5 mg Take 0.5 of a 5 mg tablet.    Hold Do not take your warfarin dose. See the Details table to the right for additional instructions.

## 2017-10-30 ENCOUNTER — ANTICOAGULATION THERAPY VISIT (OUTPATIENT)
Dept: ANTICOAGULATION | Facility: CLINIC | Age: 57
End: 2017-10-30

## 2017-10-30 ENCOUNTER — TELEPHONE (OUTPATIENT)
Dept: TRANSPLANT | Facility: CLINIC | Age: 57
End: 2017-10-30

## 2017-10-30 DIAGNOSIS — Z95.2 H/O AORTIC VALVE REPLACEMENT: ICD-10-CM

## 2017-10-30 DIAGNOSIS — Z79.01 LONG-TERM (CURRENT) USE OF ANTICOAGULANTS: ICD-10-CM

## 2017-10-30 DIAGNOSIS — E87.8 ELECTROLYTE IMBALANCE: ICD-10-CM

## 2017-10-30 LAB
ANION GAP SERPL CALCULATED.3IONS-SCNC: 10 MMOL/L (ref 3–14)
BASOPHILS # BLD AUTO: 0 10E9/L (ref 0–0.2)
BASOPHILS NFR BLD AUTO: 0.7 %
BUN SERPL-MCNC: 20 MG/DL (ref 7–30)
CALCIUM SERPL-MCNC: 8.4 MG/DL (ref 8.5–10.1)
CHLORIDE SERPL-SCNC: 104 MMOL/L (ref 94–109)
CO2 SERPL-SCNC: 26 MMOL/L (ref 20–32)
CREAT SERPL-MCNC: 1.11 MG/DL (ref 0.66–1.25)
DIFFERENTIAL METHOD BLD: ABNORMAL
EOSINOPHIL # BLD AUTO: 0.1 10E9/L (ref 0–0.7)
EOSINOPHIL NFR BLD AUTO: 2 %
ERYTHROCYTE [DISTWIDTH] IN BLOOD BY AUTOMATED COUNT: 16.8 % (ref 10–15)
GFR SERPL CREATININE-BSD FRML MDRD: 68 ML/MIN/1.7M2
GLUCOSE SERPL-MCNC: 79 MG/DL (ref 70–99)
HCT VFR BLD AUTO: 33.1 % (ref 40–53)
HGB BLD-MCNC: 10.5 G/DL (ref 13.3–17.7)
IMM GRANULOCYTES # BLD: 0 10E9/L (ref 0–0.4)
IMM GRANULOCYTES NFR BLD: 0.2 %
INR PPP: 5.04 (ref 0.86–1.14)
LYMPHOCYTES # BLD AUTO: 2 10E9/L (ref 0.8–5.3)
LYMPHOCYTES NFR BLD AUTO: 44.8 %
MCH RBC QN AUTO: 26.3 PG (ref 26.5–33)
MCHC RBC AUTO-ENTMCNC: 31.7 G/DL (ref 31.5–36.5)
MCV RBC AUTO: 83 FL (ref 78–100)
MONOCYTES # BLD AUTO: 0.4 10E9/L (ref 0–1.3)
MONOCYTES NFR BLD AUTO: 9.5 %
NEUTROPHILS # BLD AUTO: 1.9 10E9/L (ref 1.6–8.3)
NEUTROPHILS NFR BLD AUTO: 42.8 %
NRBC # BLD AUTO: 0 10*3/UL
NRBC BLD AUTO-RTO: 0 /100
PLATELET # BLD AUTO: 242 10E9/L (ref 150–450)
POTASSIUM SERPL-SCNC: 3.4 MMOL/L (ref 3.4–5.3)
RBC # BLD AUTO: 4 10E12/L (ref 4.4–5.9)
SODIUM SERPL-SCNC: 139 MMOL/L (ref 133–144)
WBC # BLD AUTO: 4.4 10E9/L (ref 4–11)

## 2017-10-30 NOTE — PROGRESS NOTES
ANTICOAGULATION FOLLOW-UP CLINIC VISIT    Patient Name:  Lucas Brown  Date:  10/30/2017  Contact Type:  Telephone    SUBJECTIVE:     Patient Findings     Positives Change in diet/appetite, Unexplained INR or factor level change    Comments Patient is transitioning to a clear liquid diet after removal of g-tube. Administered 2L of fluid.  Will recheck on Wednesday.  Spoke to care coordinator.           OBJECTIVE    INR   Date Value Ref Range Status   10/30/2017 5.04 (HH) 0.86 - 1.14 Final     Comment:     Critical Value called to and read back by  VIK HERNANDEZ MD 10/30/2017 0909 BY           ASSESSMENT / PLAN  INR assessment SUPRA    Recheck INR In: 2 DAYS    INR Location Clinic      Anticoagulation Summary as of 10/30/2017     INR goal 2.0-3.0   Today's INR 5.04!   Maintenance plan No maintenance plan   Full instructions 10/30: Hold; 10/31: Hold   Plan last modified Gwendolyn Goodman, RN (9/15/2017)   Next INR check 11/1/2017   Priority INR   Target end date     Indications   H/O aortic valve replacement [Z95.2]  Long-term (current) use of anticoagulants [Z79.01] [Z79.01]         Anticoagulation Episode Summary     INR check location     Preferred lab     Send INR reminders to [facility]    Comments Pt is staying local and [facility] comes out  Monitoring pt for 4-6 weeks then go back to PCP at [facility] BERNIE Mcgee  Has 2.5 and 5mg tablets       Anticoagulation Care Providers     Provider Role Specialty Phone number    Vik Hernandez MD Responsible Transplant [phone]            See the Encounter Report to view Anticoagulation Flowsheet and Dosing Calendar (Go to Encounters tab in chart review, and find the Anticoagulation Therapy Visit)    Spoke to Lucas.  Reviewed risk of bleeding with supratherapeutic INR result.  Holding Coumadin for two doses.  Check an INR on Wednesday.  Patient is receiving 2 liters of fluid.  G-tube has been removed.  Patient  reported excessive drainage, and placed an ostomy bag over the site for collection.  Reviewed drainage description, serosanguous, patient stated there is little blood from the site.     Sarabjit Wynn, RN

## 2017-10-30 NOTE — MR AVS SNAPSHOT
Lucas BOWERS Davidadan   10/30/2017   Anticoagulation Therapy Visit    Description:  56 year old male   Provider:  Sarabjit Wynn RN   Department:  [facility]           INR as of 10/30/2017     Today's INR 5.04!      Anticoagulation Summary as of 10/30/2017     INR goal 2.0-3.0   Today's INR 5.04!   Full instructions 10/30: Hold; 10/31: Hold   Next INR check 11/1/2017    Indications   H/O aortic valve replacement [Z95.2]  Long-term (current) use of anticoagulants [Z79.01] [Z79.01]         October 2017 Details    Sun Mon Tue Wed Thu Fri Sat     1               2               3               4               5               6               7                 8               9               10               11               12               13               14                 15               16               17               18               19               20               21                 22               23               24               25               26               27               28                 29               30      Hold   See details      31      Hold              Date Details   10/30 This INR check               How to take your warfarin dose     Hold Do not take your warfarin dose. See the Details table to the right for additional instructions.                November 2017 Details    Sun Mon Tue Wed Thu Fri Sat        1            2               3               4                 5               6               7               8               9               10               11                 12               13               14               15               16               17               18                 19               20               21               22               23               24               25                 26               27               28               29               30                  Date Details   No additional details    Date of next INR:  11/1/2017

## 2017-10-30 NOTE — TELEPHONE ENCOUNTER
"Reviewed today's labs with Dr Crum.Coumadin clinic has already called re INR of 5.4. Per Dr Crum plan to keep NPO another 1-2 days as G tube still \"bubbling \"Will keep IV fluids at 2 litres a day for now, anticipate 1 liter for maybe 4-5 more days. This was conveyed to Pamela at [facility]. Lucas feels good \"hungry \"  "

## 2017-11-01 ENCOUNTER — TELEPHONE (OUTPATIENT)
Dept: TRANSPLANT | Facility: CLINIC | Age: 57
End: 2017-11-01

## 2017-11-01 ENCOUNTER — ANTICOAGULATION THERAPY VISIT (OUTPATIENT)
Dept: ANTICOAGULATION | Facility: CLINIC | Age: 57
End: 2017-11-01

## 2017-11-01 ENCOUNTER — CARE COORDINATION (OUTPATIENT)
Dept: SURGERY | Facility: CLINIC | Age: 57
End: 2017-11-01

## 2017-11-01 DIAGNOSIS — Z79.01 LONG-TERM (CURRENT) USE OF ANTICOAGULANTS: ICD-10-CM

## 2017-11-01 DIAGNOSIS — Z95.2 H/O AORTIC VALVE REPLACEMENT: ICD-10-CM

## 2017-11-01 LAB — INR PPP: 4.51 (ref 0.86–1.14)

## 2017-11-01 NOTE — MR AVS SNAPSHOT
Lucas Brown   11/1/2017   Anticoagulation Therapy Visit    Description:  56 year old male   Provider:  Gwendolyn Goodman, RN   Department:  [facility]           INR as of 11/1/2017     Today's INR 4.51!      Anticoagulation Summary as of 11/1/2017     INR goal 2.0-3.0   Today's INR 4.51!   Full instructions 11/1: 1.25 mg; 11/2: 1.25 mg   Next INR check 11/3/2017    Indications   H/O aortic valve replacement [Z95.2]  Long-term (current) use of anticoagulants [Z79.01] [Z79.01]         November 2017 Details    Sun Mon Tue Wed Thu Fri Sat        1      1.25 mg   See details      2      1.25 mg         3            4                 5               6               7               8               9               10               11                 12               13               14               15               16               17               18                 19               20               21               22               23               24               25                 26               27               28               29               30                  Date Details   11/01 This INR check       Date of next INR:  11/3/2017         How to take your warfarin dose     To take:  1.25 mg Take 0.5 of a 2.5 mg tablet.

## 2017-11-01 NOTE — PROGRESS NOTES
Pre Visit Call and Assessment    Date of call:  11/01/2017    Phone numbers:  Home number on file [phone] (home)    Reached patient/confirmed appointment:  Yes  Patient care team/Primary provider:  Cyril Mackay outpatient pharmacy:    [facility] [place] - [address] [place] [id number]  Phone: [phone] Fax: [phone]    Referred to:  Dr. Filipe Crum    Reason for visit:  return

## 2017-11-01 NOTE — PROGRESS NOTES
ANTICOAGULATION FOLLOW-UP CLINIC VISIT    Patient Name:  Lucas Brown  Date:  11/1/2017  Contact Type:  Telephone    SUBJECTIVE:     Patient Findings     Positives Change in diet/appetite    Comments Pt continues clear liquid diet and tomorrow will find out if his diet will change.            OBJECTIVE    INR   Date Value Ref Range Status   11/01/2017 4.51 (H) 0.86 - 1.14 Final       ASSESSMENT / PLAN  INR assessment SUPRA    Recheck INR In: 2 DAYS    INR Location Clinic      Anticoagulation Summary as of 11/1/2017     INR goal 2.0-3.0   Today's INR 4.51!   Maintenance plan No maintenance plan   Full instructions 11/1: 1.25 mg; 11/2: 1.25 mg   Plan last modified Gwendolyn Goodman RN (9/15/2017)   Next INR check 11/3/2017   Priority INR   Target end date     Indications   H/O aortic valve replacement [Z95.2]  Long-term (current) use of anticoagulants [Z79.01] [Z79.01]         Anticoagulation Episode Summary     INR check location     Preferred lab     Send INR reminders to [facility]    Comments Pt is staying local and [facility] comes out  Monitoring pt for 4-6 weeks then go back to PCP at [facility] BERNIE Mcgee  Has 2.5 and 5mg tablets       Anticoagulation Care Providers     Provider Role Specialty Phone number    Vik Crum MD Responsible Transplant [phone]            See the Encounter Report to view Anticoagulation Flowsheet and Dosing Calendar (Go to Encounters tab in chart review, and find the Anticoagulation Therapy Visit)    Spoke with patient. Gave them their lab results and new warfarin recommendation.  No changes in health, medication, or diet. No missed doses, no falls. No signs or symptoms of bleed or clotting.     Gwendolyn Goodman, RN

## 2017-11-01 NOTE — TELEPHONE ENCOUNTER
Called Lucas today to follow up yesterdays call. I started him on clear liquids and gave him permission to remove the ostomy bag from his G tube exit site.He reports tolerating clear liquids, no G tube site leakage, no bloating. He will reduce IV fluids to I litre a day tomorrow. Plan to stop Iv fluids when he can take steph litre a day.Abdominal wound is healing well now.

## 2017-11-02 ENCOUNTER — OFFICE VISIT (OUTPATIENT)
Dept: SURGERY | Facility: CLINIC | Age: 57
End: 2017-11-02

## 2017-11-02 VITALS
TEMPERATURE: 98.6 F | HEIGHT: 71 IN | WEIGHT: 180 LBS | SYSTOLIC BLOOD PRESSURE: 119 MMHG | HEART RATE: 83 BPM | OXYGEN SATURATION: 98 % | BODY MASS INDEX: 25.2 KG/M2 | DIASTOLIC BLOOD PRESSURE: 79 MMHG

## 2017-11-02 DIAGNOSIS — R10.84 GENERALIZED ABDOMINAL PAIN: ICD-10-CM

## 2017-11-02 DIAGNOSIS — E86.0 DEHYDRATION: ICD-10-CM

## 2017-11-02 DIAGNOSIS — E44.0 MODERATE MALNUTRITION (H): ICD-10-CM

## 2017-11-02 DIAGNOSIS — G89.18 ACUTE POST-OPERATIVE PAIN: Primary | ICD-10-CM

## 2017-11-02 RX ORDER — FENTANYL 50 UG/1
1 PATCH TRANSDERMAL
Qty: 5 PATCH | Refills: 0 | Status: SHIPPED | OUTPATIENT
Start: 2017-11-02

## 2017-11-02 RX ORDER — OXYCODONE HYDROCHLORIDE 5 MG/1
5-10 TABLET ORAL EVERY 6 HOURS PRN
Qty: 100 TABLET | Refills: 0 | Status: SHIPPED | OUTPATIENT
Start: 2017-11-02

## 2017-11-02 ASSESSMENT — PAIN SCALES - GENERAL: PAINLEVEL: MILD PAIN (2)

## 2017-11-02 NOTE — LETTER
2017       RE: Lucas Brown  [address] [place] [id number]     Dear Colleague,    Thank you for referring your patient, Lucas Brown, to the [facility] SURGERY at [facility]. Please see a copy of my visit note below.    2017         RE: Lucas Brown   MRN: [id number]   : 1960      Dear Dr. Mackay:      Mr. Brown is here for followup after his Whipple procedure for chronic pancreatitis.  His postoperative course was complicated by a wound infection and a slow recovery of his gastric function.  About a week ago, I pulled out his G-tube.  After an initial period of high G-tube site outputs, the G-tube has stopped draining.  I am also happy to report that his oral intake has improved and he is not nauseated any further.  He continues to receive a liter a day of IV fluid.  Because of his open wound/wound infection, he is packing his wound daily with moist gauze.  He denies any nausea and vomiting.  He has not taken any ondansetron for nausea at this point.      With regard to his postoperative pain control, he is on a fentanyl patch at 50 mcg and is using about 10 mg of oxycodone every 6 hours.  He is working on reducing the dose of his oxycodone.      PHYSICAL EXAMINATION:   VITAL SIGNS:  Stable.  He is afebrile.   ABDOMEN:  Right antecubital PICC line is clean and dry.  His wound is granulating nicely, and there is decreased erythema around the skin at his G-tube site, which is no longer draining fluid.   EXTREMITIES:  Without cyanosis, clubbing or edema.  His oral mucosa are moist.      ASSESSMENT:   1.  Postoperative malnutrition due to prolonged gastric emptying after Whipple procedure.  Continue slow advancing of his diet.   2.  Dehydration secondary to above.  Creatinine is markedly improved and is down to 1.1.  Continue 1 liter a day of IV fluids.  As his oral intake improves, will wean the IV fluids off.   3.   Postoperative pain.  I refilled his oxycodone (switched to pills 5-10 mg every 6 hours p.r.n. #100) and refilled his fentanyl patch for another 2 weeks.        I am hopeful that Mr. Brown will continue to advance his diet, and I am hoping that in 2 weeks we will be able to send him home to [place], [place], and for your great care.        Dr. Mackay, when he arrives at home, if you should have any questions, please feel free to call me on my cell phone at any time regarding this phoebe gentleman.      Sincerely yours,            Vik Hernandez MD      Visit time 30 minutes, 20 minutes counseling.         VIK HERNANDEZ MD             D: 2017 17:52   T: 2017 23:49   MT: DAVIS      Name:     AMELIE BROWN   MRN:      [id number]        Account:      [id number]   :      1960           Service Date: 2017      Document: [id number]       Again, thank you for allowing me to participate in the care of your patient.      Sincerely,    MD Cyril Bauer MD   [address]  [address]

## 2017-11-02 NOTE — NURSING NOTE
"Chief Complaint   Patient presents with     Clinic Care Coordination - Follow-up     follow up per sooc-islet transplant       Vitals:    11/02/17 1722   BP: 119/79   BP Location: Left arm   Patient Position: Chair   Cuff Size: Adult Regular   Pulse: 83   Temp: 98.6  F (37  C)   TempSrc: Oral   SpO2: 98%   Weight: 81.6 kg (180 lb)   Height: 1.803 m (5' 11\")       Body mass index is 25.1 kg/(m^2).  Gaston Rivera RN                          "

## 2017-11-03 ENCOUNTER — ANTICOAGULATION THERAPY VISIT (OUTPATIENT)
Dept: ANTICOAGULATION | Facility: CLINIC | Age: 57
End: 2017-11-03

## 2017-11-03 DIAGNOSIS — Z79.01 LONG-TERM (CURRENT) USE OF ANTICOAGULANTS: ICD-10-CM

## 2017-11-03 DIAGNOSIS — Z95.2 H/O AORTIC VALVE REPLACEMENT: ICD-10-CM

## 2017-11-03 LAB — INR PPP: 5 (ref 0.86–1.14)

## 2017-11-03 NOTE — MR AVS SNAPSHOT
Lucas Brown   11/3/2017   Anticoagulation Therapy Visit    Description:  56 year old male   Provider:  Yandy Salvador RN   Department:  [facility]           INR as of 11/3/2017     Today's INR 5.00!      Anticoagulation Summary as of 11/3/2017     INR goal 2.0-3.0   Today's INR 5.00!   Full instructions 11/3: Hold; 11/4: Hold; 11/5: 1.25 mg   Next INR check 11/6/2017    Indications   H/O aortic valve replacement [Z95.2]  Long-term (current) use of anticoagulants [Z79.01] [Z79.01]         November 2017 Details    Sun Mon Tue Wed Thu Fri Sat        1               2               3      Hold   See details      4      Hold           5      1.25 mg         6            7               8               9               10               11                 12               13               14               15               16               17               18                 19               20               21               22               23               24               25                 26               27               28               29               30                  Date Details   11/03 This INR check       Date of next INR:  11/6/2017         How to take your warfarin dose     To take:  1.25 mg Take 0.5 of a 2.5 mg tablet.    Hold Do not take your warfarin dose. See the Details table to the right for additional instructions.

## 2017-11-03 NOTE — PROGRESS NOTES
2017      Cyril Mackay MD   [facility]   [address] [place]  [address]      RE: Lucas Brown   MRN: [id number]   : 1960      Dear Dr. Mackay:      Mr. Brown is here for followup after his Whipple procedure for chronic pancreatitis.  His postoperative course was complicated by a wound infection and a slow recovery of his gastric function.  About a week ago, I pulled out his G-tube.  After an initial period of high G-tube site outputs, the G-tube has stopped draining.  I am also happy to report that his oral intake has improved and he is not nauseated any further.  He continues to receive a liter a day of IV fluid.  Because of his open wound/wound infection, he is packing his wound daily with moist gauze.  He denies any nausea and vomiting.  He has not taken any ondansetron for nausea at this point.      With regard to his postoperative pain control, he is on a fentanyl patch at 50 mcg and is using about 10 mg of oxycodone every 6 hours.  He is working on reducing the dose of his oxycodone.      PHYSICAL EXAMINATION:   VITAL SIGNS:  Stable.  He is afebrile.   ABDOMEN:  Right antecubital PICC line is clean and dry.  His wound is granulating nicely, and there is decreased erythema around the skin at his G-tube site, which is no longer draining fluid.   EXTREMITIES:  Without cyanosis, clubbing or edema.  His oral mucosa are moist.      ASSESSMENT:   1.  Postoperative malnutrition due to prolonged gastric emptying after Whipple procedure.  Continue slow advancing of his diet.   2.  Dehydration secondary to above.  Creatinine is markedly improved and is down to 1.1.  Continue 1 liter a day of IV fluids.  As his oral intake improves, will wean the IV fluids off.   3.  Postoperative pain.  I refilled his oxycodone (switched to pills 5-10 mg every 6 hours p.r.n. #100) and refilled his fentanyl patch for another 2 weeks.        I am hopeful that Mr. Brown will  continue to advance his diet, and I am hoping that in 2 weeks we will be able to send him home to [place], [place], and for your great care.        Dr. Mackay, when he arrives at home, if you should have any questions, please feel free to call me on my cell phone at any time regarding this phoebe gentleman.      Sincerely yours,            Vik Hernandez MD      Visit time 30 minutes, 20 minutes counseling.         VIK HERNANDEZ MD             D: 2017 17:52   T: 2017 23:49   MT: DAVIS      Name:     AMELIE MCCRACKEN   MRN:      [id number]        Account:      [id number]   :      1960           Service Date: 2017      Document: [id number]

## 2017-11-06 ENCOUNTER — ANTICOAGULATION THERAPY VISIT (OUTPATIENT)
Dept: ANTICOAGULATION | Facility: CLINIC | Age: 57
End: 2017-11-06

## 2017-11-06 ENCOUNTER — TELEPHONE (OUTPATIENT)
Dept: TRANSPLANT | Facility: CLINIC | Age: 57
End: 2017-11-06

## 2017-11-06 DIAGNOSIS — Z95.2 H/O AORTIC VALVE REPLACEMENT: ICD-10-CM

## 2017-11-06 DIAGNOSIS — E87.8 ELECTROLYTE IMBALANCE: Primary | ICD-10-CM

## 2017-11-06 DIAGNOSIS — Z79.01 LONG-TERM (CURRENT) USE OF ANTICOAGULANTS: ICD-10-CM

## 2017-11-06 DIAGNOSIS — E87.8 ELECTROLYTE IMBALANCE: ICD-10-CM

## 2017-11-06 LAB
ANION GAP SERPL CALCULATED.3IONS-SCNC: 6 MMOL/L (ref 3–14)
BASOPHILS # BLD AUTO: 0 10E9/L (ref 0–0.2)
BASOPHILS NFR BLD AUTO: 1 %
BUN SERPL-MCNC: 8 MG/DL (ref 7–30)
CALCIUM SERPL-MCNC: 8.5 MG/DL (ref 8.5–10.1)
CHLORIDE SERPL-SCNC: 106 MMOL/L (ref 94–109)
CO2 SERPL-SCNC: 29 MMOL/L (ref 20–32)
CREAT SERPL-MCNC: 1.12 MG/DL (ref 0.66–1.25)
DIFFERENTIAL METHOD BLD: ABNORMAL
EOSINOPHIL # BLD AUTO: 0.1 10E9/L (ref 0–0.7)
EOSINOPHIL NFR BLD AUTO: 3 %
ERYTHROCYTE [DISTWIDTH] IN BLOOD BY AUTOMATED COUNT: 16.8 % (ref 10–15)
GFR SERPL CREATININE-BSD FRML MDRD: 67 ML/MIN/1.7M2
GLUCOSE SERPL-MCNC: 87 MG/DL (ref 70–99)
HCT VFR BLD AUTO: 35 % (ref 40–53)
HGB BLD-MCNC: 11.2 G/DL (ref 13.3–17.7)
INR PPP: 1.43 (ref 0.86–1.14)
LYMPHOCYTES # BLD AUTO: 1.7 10E9/L (ref 0.8–5.3)
LYMPHOCYTES NFR BLD AUTO: 37 %
MCH RBC QN AUTO: 26.7 PG (ref 26.5–33)
MCHC RBC AUTO-ENTMCNC: 32 G/DL (ref 31.5–36.5)
MCV RBC AUTO: 84 FL (ref 78–100)
MONOCYTES # BLD AUTO: 0.5 10E9/L (ref 0–1.3)
MONOCYTES NFR BLD AUTO: 10 %
NEUTROPHILS # BLD AUTO: 2.3 10E9/L (ref 1.6–8.3)
NEUTROPHILS NFR BLD AUTO: 49 %
PLATELET # BLD AUTO: 171 10E9/L (ref 150–450)
POTASSIUM SERPL-SCNC: 3.6 MMOL/L (ref 3.4–5.3)
RBC # BLD AUTO: 4.19 10E12/L (ref 4.4–5.9)
SODIUM SERPL-SCNC: 141 MMOL/L (ref 133–144)
WBC # BLD AUTO: 4.6 10E9/L (ref 4–11)

## 2017-11-06 NOTE — PROGRESS NOTES
ANTICOAGULATION FOLLOW-UP CLINIC VISIT    Patient Name:  Lucas Brown  Date:  11/6/2017  Contact Type:  Telephone    SUBJECTIVE:        OBJECTIVE    INR   Date Value Ref Range Status   11/06/2017 1.43 (H) 0.86 - 1.14 Final       ASSESSMENT / PLAN  INR assessment SUB    Recheck INR In: 3 DAYS    INR Location Clinic      Anticoagulation Summary as of 11/6/2017     INR goal 2.0-3.0   Today's INR 1.43!   Maintenance plan No maintenance plan   Full instructions 11/6: 1.25 mg; 11/7: 1.25 mg; 11/8: 1.25 mg   Plan last modified Gwendolyn Goodman RN (9/15/2017)   Next INR check 11/9/2017   Priority INR   Target end date     Indications   H/O aortic valve replacement [Z95.2]  Long-term (current) use of anticoagulants [Z79.01] [Z79.01]         Anticoagulation Episode Summary     INR check location     Preferred lab     Send INR reminders to  [facility]    Comments Pt is staying local and [facility] comes out  Monitoring pt for 4-6 weeks then go back to PCP at [facility] [place], [place]  Has 2.5 and 5mg tablets       Anticoagulation Care Providers     Provider Role Specialty Phone number    Vik Crum MD Responsible Transplant [phone]            See the Encounter Report to view Anticoagulation Flowsheet and Dosing Calendar (Go to Encounters tab in chart review, and find the Anticoagulation Therapy Visit)    Left message for patient with results and dosing recommendations. Asked patient to call back to report any missed doses, falls, signs and symptoms of bleeding or clotting, any changes in health, medication, or diet. Asked patient to call back with any questions or concerns.     Justine Johnson RN

## 2017-11-06 NOTE — TELEPHONE ENCOUNTER
Called Lucas, informed him based on today's labs we are stopping IV fluids. [facility] will maintain PICC for another week to ensure he continues his progress. He is eating  and tolerating small meals. Instructed to take 1-2 Creon 24,00 with such. No issues today.

## 2017-11-06 NOTE — MR AVS SNAPSHOT
[place] Stephanie   11/6/2017   Anticoagulation Therapy Visit    Description:  56 year old male   Provider:  Justine Johnson, RN   Department:  [facility]           INR as of 11/6/2017     Today's INR 1.43!      Anticoagulation Summary as of 11/6/2017     INR goal 2.0-3.0   Today's INR 1.43!   Full instructions 11/6: 1.25 mg; 11/7: 1.25 mg; 11/8: 1.25 mg   Next INR check 11/9/2017    Indications   H/O aortic valve replacement [Z95.2]  Long-term (current) use of anticoagulants [Z79.01] [Z79.01]         November 2017 Details    Sun Mon Tue Wed Thu Fri Sat        1               2               3               4                 5               6      1.25 mg   See details      7      1.25 mg         8      1.25 mg         9            10               11                 12               13               14               15               16               17               18                 19               20               21               22               23               24               25                 26               27               28               29               30                  Date Details   11/06 This INR check       Date of next INR:  11/9/2017         How to take your warfarin dose     To take:  1.25 mg Take 0.5 of a 2.5 mg tablet.

## 2017-11-09 ENCOUNTER — ANTICOAGULATION THERAPY VISIT (OUTPATIENT)
Dept: ANTICOAGULATION | Facility: CLINIC | Age: 57
End: 2017-11-09

## 2017-11-09 DIAGNOSIS — Z95.2 H/O AORTIC VALVE REPLACEMENT: ICD-10-CM

## 2017-11-09 DIAGNOSIS — Z79.01 LONG-TERM (CURRENT) USE OF ANTICOAGULANTS: ICD-10-CM

## 2017-11-09 LAB — INR PPP: 2.45 (ref 0.86–1.14)

## 2017-11-09 NOTE — MR AVS SNAPSHOT
Lucas BOWERS Stephanie   11/9/2017   Anticoagulation Therapy Visit    Description:  57 year old male   Provider:  Gwendolyn Goodman, RN   Department:  [facility]           INR as of 11/9/2017     Today's INR 2.45      Anticoagulation Summary as of 11/9/2017     INR goal 2.0-3.0   Today's INR 2.45   Full instructions 11/9: 0.5 mg; 11/10: 1 mg; 11/11: 1 mg; 11/12: 1 mg   Next INR check 11/13/2017    Indications   H/O aortic valve replacement [Z95.2]  Long-term (current) use of anticoagulants [Z79.01] [Z79.01]         November 2017 Details    Sun Mon Tue Wed Thu Fri Sat        1               2               3               4                 5               6               7               8               9      0.5 mg   See details      10      1 mg         11      1 mg           12      1 mg         13            14               15               16               17               18                 19               20               21               22               23               24               25                 26               27               28               29               30                  Date Details   11/09 This INR check       Date of next INR:  11/13/2017         How to take your warfarin dose     To take:  0.5 mg Take 0.5 of a 1 mg tablet.    To take:  1 mg Take 1 of the 1 mg tablets.

## 2017-11-09 NOTE — PROGRESS NOTES
ANTICOAGULATION FOLLOW-UP CLINIC VISIT    Patient Name:  Lucas Brown  Date:  11/9/2017  Contact Type:  Telephone    SUBJECTIVE:     Patient Findings     Comments Pt has 2.5mg and 5mg tablets. Since pt is on an all liquid diet I am thinking pt should have 1mg tablets to help his INR be in the 2.00-3.00 range. The INR from Monday to today has increase rapidly from 1.43 to 2.45. Spouse Karmen will have pt call writer back so we can order a 1mg tablet for pt and send it to the appropriate Pharmacy.           OBJECTIVE    INR   Date Value Ref Range Status   11/09/2017 2.45 (H) 0.86 - 1.14 Final       ASSESSMENT / PLAN  INR assessment THER    Recheck INR In: 4 DAYS    INR Location Clinic      Anticoagulation Summary as of 11/9/2017     INR goal 2.0-3.0   Today's INR 2.45   Maintenance plan No maintenance plan   Full instructions 11/9: 0.5 mg; 11/10: 1 mg; 11/11: 1 mg; 11/12: 1 mg   Plan last modified Gwendolyn Goodman RN (11/9/2017)   Next INR check 11/13/2017   Priority INR   Target end date     Indications   H/O aortic valve replacement [Z95.2]  Long-term (current) use of anticoagulants [Z79.01] [Z79.01]         Anticoagulation Episode Summary     INR check location     Preferred lab     Send INR reminders to [facility]    Comments Pt is staying local and [facility] comes out  Monitoring pt for 4-6 weeks then go back to PCP at [facility] BERNIE [facility]  Has 2.5 and 5mg tablets       Anticoagulation Care Providers     Provider Role Specialty Phone number    Vik Crum MD Responsible Transplant [phone]            See the Encounter Report to view Anticoagulation Flowsheet and Dosing Calendar (Go to Encounters tab in chart review, and find the Anticoagulation Therapy Visit)    Spoke with spouse Karmen. Will await for pt to call back with correct Pharmacy to send 1mg tablets.    At 1650 Pt has not called writer back so writer called pt and he was aware of his INR results  and said he was going to continue 2.5mg daily cause that's what he was doing, but on Monday took a 5mg dose then 2.5mg daily. Writer explained that this is NOT what we have documented and that if pt disagrees with our recommendation then he should talk to us about this and not independently dose self. Writer also explained the INR is trending up with the doses he has been doing. Pt doesn't want a 1mg tablet and wants to do the 2.5mg daily. Pt is hoping to leave for [place] on Monday, but will get an INR on that day before leaving.       Gwendolyn Goodman RN

## 2017-11-10 LAB
FUNGUS SPEC CULT: ABNORMAL
FUNGUS SPEC CULT: ABNORMAL
SPECIMEN SOURCE: ABNORMAL

## 2017-11-13 ENCOUNTER — OFFICE VISIT (OUTPATIENT)
Dept: SURGERY | Facility: CLINIC | Age: 57
End: 2017-11-13

## 2017-11-13 ENCOUNTER — ANTICOAGULATION THERAPY VISIT (OUTPATIENT)
Dept: ANTICOAGULATION | Facility: CLINIC | Age: 57
End: 2017-11-13

## 2017-11-13 VITALS
DIASTOLIC BLOOD PRESSURE: 69 MMHG | OXYGEN SATURATION: 97 % | SYSTOLIC BLOOD PRESSURE: 98 MMHG | WEIGHT: 179.2 LBS | TEMPERATURE: 97.9 F | HEART RATE: 70 BPM | BODY MASS INDEX: 25.09 KG/M2 | HEIGHT: 71 IN

## 2017-11-13 DIAGNOSIS — T14.8XXA LOCAL INFECTION OF WOUND: ICD-10-CM

## 2017-11-13 DIAGNOSIS — E87.8 ELECTROLYTE IMBALANCE: ICD-10-CM

## 2017-11-13 DIAGNOSIS — R10.84 GENERALIZED ABDOMINAL PAIN: ICD-10-CM

## 2017-11-13 DIAGNOSIS — Z79.01 LONG-TERM (CURRENT) USE OF ANTICOAGULANTS: ICD-10-CM

## 2017-11-13 DIAGNOSIS — L08.9 LOCAL INFECTION OF WOUND: ICD-10-CM

## 2017-11-13 DIAGNOSIS — Z95.2 H/O AORTIC VALVE REPLACEMENT: ICD-10-CM

## 2017-11-13 DIAGNOSIS — E86.0 DEHYDRATION: Primary | ICD-10-CM

## 2017-11-13 DIAGNOSIS — E87.8 ELECTROLYTE IMBALANCE: Primary | ICD-10-CM

## 2017-11-13 LAB
ALBUMIN SERPL-MCNC: 3.2 G/DL (ref 3.4–5)
ALP SERPL-CCNC: 52 U/L (ref 40–150)
ALT SERPL W P-5'-P-CCNC: 27 U/L (ref 0–70)
ANION GAP SERPL CALCULATED.3IONS-SCNC: 6 MMOL/L (ref 3–14)
AST SERPL W P-5'-P-CCNC: 21 U/L (ref 0–45)
BILIRUB SERPL-MCNC: 0.5 MG/DL (ref 0.2–1.3)
BUN SERPL-MCNC: 16 MG/DL (ref 7–30)
CALCIUM SERPL-MCNC: 8 MG/DL (ref 8.5–10.1)
CHLORIDE SERPL-SCNC: 103 MMOL/L (ref 94–109)
CO2 SERPL-SCNC: 27 MMOL/L (ref 20–32)
CREAT SERPL-MCNC: 1.1 MG/DL (ref 0.66–1.25)
ERYTHROCYTE [DISTWIDTH] IN BLOOD BY AUTOMATED COUNT: 16.4 % (ref 10–15)
GFR SERPL CREATININE-BSD FRML MDRD: 69 ML/MIN/1.7M2
GLUCOSE SERPL-MCNC: 76 MG/DL (ref 70–99)
HCT VFR BLD AUTO: 35 % (ref 40–53)
HGB BLD-MCNC: 11 G/DL (ref 13.3–17.7)
INR PPP: 4.08 (ref 0.86–1.14)
MCH RBC QN AUTO: 26.7 PG (ref 26.5–33)
MCHC RBC AUTO-ENTMCNC: 31.4 G/DL (ref 31.5–36.5)
MCV RBC AUTO: 85 FL (ref 78–100)
PLATELET # BLD AUTO: 176 10E9/L (ref 150–450)
POTASSIUM SERPL-SCNC: 3.6 MMOL/L (ref 3.4–5.3)
PROT SERPL-MCNC: 6.6 G/DL (ref 6.8–8.8)
RBC # BLD AUTO: 4.12 10E12/L (ref 4.4–5.9)
SODIUM SERPL-SCNC: 136 MMOL/L (ref 133–144)
WBC # BLD AUTO: 8.1 10E9/L (ref 4–11)

## 2017-11-13 ASSESSMENT — PAIN SCALES - GENERAL: PAINLEVEL: MILD PAIN (3)

## 2017-11-13 NOTE — PROGRESS NOTES
ANTICOAGULATION FOLLOW-UP CLINIC VISIT    Patient Name:  Lucas Brown  Date:  11/13/2017  Contact Type:  Telephone    SUBJECTIVE:     Patient Findings     Comments See previous INR note--Lucas had decided on his own to continue taking warfarin 2.5 mg daily.  He will hold warfarin today.  He is on his way back to [place] and plans to follow up with his primary provider there on 11/15/17 and transfer his INR management back to them.  Will inactivate from [facility].           OBJECTIVE    INR   Date Value Ref Range Status   11/13/2017 4.08 (H) 0.86 - 1.14 Final       ASSESSMENT / PLAN  INR assessment SUPRA    Recheck INR In: 2 DAYS    INR Location Clinic      Anticoagulation Summary as of 11/13/2017     INR goal 2.0-3.0   Today's INR 4.08!   Maintenance plan No maintenance plan   Full instructions 11/13: Hold; 11/14: 1.25 mg   Plan last modified Gwendolyn Goodman RN (11/9/2017)   Next INR check 11/15/2017   Priority INR   Target end date     Indications   H/O aortic valve replacement [Z95.2]  Long-term (current) use of anticoagulants [Z79.01] [Z79.01]         Anticoagulation Episode Summary     INR check location     Preferred lab     [facility]    Comments Pt is staying local and [facility] comes out  Monitoring pt for 4-6 weeks then go back to PCP at [facility] BERNIE [facility]gee  Has 2.5 and 5mg tablets       Anticoagulation Care Providers     Provider Role Specialty Phone number    RyanjarodVik MD Responsible Transplant [phone]            See the Encounter Report to view Anticoagulation Flowsheet and Dosing Calendar (Go to Encounters tab in chart review, and find the Anticoagulation Therapy Visit)    See patient findings.  Lucas is on his way back to [place] and plans to transfer his INR management back to them, will inactivate from [facility].      Aidee Torres RN

## 2017-11-13 NOTE — NURSING NOTE
"Chief Complaint   Patient presents with     Clinic Care Coordination - Follow-up     Return clinic follow-up.        Vitals:    11/13/17 1313   BP: 98/69   BP Location: Left arm   Patient Position: Chair   Cuff Size: Adult Regular   Pulse: 70   Temp: 97.9  F (36.6  C)   TempSrc: Oral   SpO2: 97%   Weight: 179 lb 3.2 oz   Height: 5' 11\"       Body mass index is 24.99 kg/(m^2).    Alicia PEDROZA LPN                        "

## 2017-11-13 NOTE — MR AVS SNAPSHOT
Lucas Brown   11/13/2017   Anticoagulation Therapy Visit    Description:  57 year old male   Provider:  Aidee Torres RN   Department:  [facility]           INR as of 11/13/2017     Today's INR 4.08!      Anticoagulation Summary as of 11/13/2017     INR goal 2.0-3.0   Today's INR 4.08!   Full instructions 11/13: Hold; 11/14: 1.25 mg   Next INR check     Indications   H/O aortic valve replacement [Z95.2]  Long-term (current) use of anticoagulants [Z79.01] [Z79.01]         Anticoagulation Episode Summary     Resolved date 11/13/2017    Resolved reason Patient moved

## 2017-11-13 NOTE — PROGRESS NOTES
2017         Cyril Mackay MD    [facility]   [address] [place] [address]      RE: Lucas Brown   MRN: [id number]   : 1960      Dear Dr. Mackay:      I had the pleasure of seeing your patient, Mr. Lucas Brown, and in Surgery Clinic today.  As you know, he is 2-1/2 months status post a Whipple procedure.  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  He has been making great progress the last week or 2 and is off IV fluids, is tolerating oral intake, has normal blood sugars and is having decreasing pain.      With his pancreatic pain, he is on a fentanyl patch 50 mcg every 3 days as well as oxycodone 5 mg 2-3 times per day.      PHYSICAL EXAMINATION:     VITAL SIGNS:  Stable.  He is afebrile.   HEENT:  Unremarkable.   NECK:  Supple.   CHEST:  Clear.   ABDOMINAL EXAMINATION:  His abdomen is soft and nontender.  His G-tube site is clean and dry.  His wound is granulating nicely (it is open about 3 cm in length and about 0.5 cm in depth and is granulating).  The PICC line in the right antecubital fossa is clean and dry and was removed without difficulty.  His INR today is 4.      ASSESSMENT:  Status post Whipple procedure for pancreatitis, making excellent progress.  His post-pancreatectomy gastric emptying delay is resolved at this point and he is eating nicely.  He has required no additional IV fluids for his dehydration secondary to the above.  I pulled his PICC line as noted.  With regards to his pancreatitis pain, he is weaning nicely from his narcotics and is currently on 50 mcg fentanyl patch 3 times a day.  He has enough fentanyl to last him another 2 weeks.  At that point, I would drop him to 25 mcg for 2 weeks, 12.5 mcg for 2 weeks, then stop his fentanyl patch.  I would like to see him again in 3 months.  Certainly, feel free to call me on my cell phone if  I can be of any assistance or if you have any questions.        Visit time 25 minutes, 15 minutes counseling.      Sincerely,         MD BABAR Andres MD             D: 2017 13:45   T: 2017 14:09   MT: KENNEY      Name:     AMELIE MCCRACKEN   MRN:      [id number]        Account:      [id number]   :      1960           Service Date: 2017      Document: [id number]

## 2017-11-13 NOTE — LETTER
2017       RE: Lucas Brown  [address]  [facility] [address]     Dear Colleague,    Thank you for referring your patient, Lucas Brown, to the [facility] SURGERY at [facility]. Please see a copy of my visit note below.    See dictated note: [id number]  GB      2017        RE: Lucas Brown   MRN: [id number]   : 1960      Dear Dr. Mackay:      I had the pleasure of seeing your patient, Mr. Lucas Brown, and in Surgery Clinic today.  As you know, he is 2-1/2 months status post a Whipple procedure.  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  He has been making great progress the last week or 2 and is off IV fluids, is tolerating oral intake, has normal blood sugars and is having decreasing pain.      With his pancreatic pain, he is on a fentanyl patch 50 mcg every 3 days as well as oxycodone 5 mg 2-3 times per day.      PHYSICAL EXAMINATION:     VITAL SIGNS:  Stable.  He is afebrile.   HEENT:  Unremarkable.   NECK:  Supple.   CHEST:  Clear.   ABDOMINAL EXAMINATION:  His abdomen is soft and nontender.  His G-tube site is clean and dry.  His wound is granulating nicely (it is open about 3 cm in length and about 0.5 cm in depth and is granulating).  The PICC line in the right antecubital fossa is clean and dry and was removed without difficulty.  His INR today is 4.      ASSESSMENT:  Status post Whipple procedure for pancreatitis, making excellent progress.  His post-pancreatectomy gastric emptying delay is resolved at this point and he is eating nicely.  He has required no additional IV fluids for his dehydration secondary to the above.  I pulled his PICC line as noted.  With regards to his pancreatitis pain, he is weaning nicely from his narcotics and is currently on 50 mcg fentanyl patch 3 times a day.  He has enough fentanyl to  last him another 2 weeks.  At that point, I would drop him to 25 mcg for 2 weeks, 12.5 mcg for 2 weeks, then stop his fentanyl patch.  I would like to see him again in 3 months.  Certainly, feel free to call me on my cell phone if I can be of any assistance or if you have any questions.        Visit time 25 minutes, 15 minutes counseling.      Sincerely,         Vik Crum MD         D: 2017 13:45   T: 2017 14:09   MT: KENNEY      Name:     AMELIE MCCRACKEN   MRN:      [id number]        Account:      [id number]   :      1960           Service Date: 2017      Document: [id number]       Again, thank you for allowing me to participate in the care of your patient.      Sincerely,    Vik Crum MD    CC:     Cyril Mackay MD    [address] [address]

## 2017-12-21 LAB — TRANSF REACT PLASRBC-IMP: NORMAL

## 2018-01-30 ENCOUNTER — HOSPITAL ENCOUNTER (EMERGENCY)
Dept: HOSPITAL 41 - JD.ED | Age: 58
Discharge: NURSING, REHAB OR LONG-TERM CARE | End: 2018-01-30
Payer: COMMERCIAL

## 2018-01-30 VITALS — DIASTOLIC BLOOD PRESSURE: 52 MMHG | SYSTOLIC BLOOD PRESSURE: 100 MMHG

## 2018-01-30 DIAGNOSIS — S80.11XA: ICD-10-CM

## 2018-01-30 DIAGNOSIS — I50.9: ICD-10-CM

## 2018-01-30 DIAGNOSIS — Z87.891: ICD-10-CM

## 2018-01-30 DIAGNOSIS — Z79.899: ICD-10-CM

## 2018-01-30 DIAGNOSIS — R79.1: ICD-10-CM

## 2018-01-30 DIAGNOSIS — S70.12XA: Primary | ICD-10-CM

## 2018-01-30 DIAGNOSIS — Z79.82: ICD-10-CM

## 2018-01-30 DIAGNOSIS — K21.9: ICD-10-CM

## 2018-01-30 DIAGNOSIS — Z88.5: ICD-10-CM

## 2018-01-30 DIAGNOSIS — S30.1XXA: ICD-10-CM

## 2018-01-30 DIAGNOSIS — F32.9: ICD-10-CM

## 2018-01-30 DIAGNOSIS — D64.89: ICD-10-CM
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
## 2018-01-30 DIAGNOSIS — E03.9: ICD-10-CM

## 2018-01-30 DIAGNOSIS — Z79.01: ICD-10-CM

## 2018-01-30 DIAGNOSIS — E78.00: ICD-10-CM

## 2018-01-30 DIAGNOSIS — Z88.8: ICD-10-CM

## 2018-01-30 DIAGNOSIS — I11.0: ICD-10-CM

## 2018-01-30 DIAGNOSIS — Z98.890: ICD-10-CM

## 2018-01-30 DIAGNOSIS — I95.9: ICD-10-CM

## 2018-01-30 PROCEDURE — 80053 COMPREHEN METABOLIC PANEL: CPT

## 2018-01-30 PROCEDURE — 85610 PROTHROMBIN TIME: CPT

## 2018-01-30 PROCEDURE — 96376 TX/PRO/DX INJ SAME DRUG ADON: CPT

## 2018-01-30 PROCEDURE — 74177 CT ABD & PELVIS W/CONTRAST: CPT

## 2018-01-30 PROCEDURE — 99285 EMERGENCY DEPT VISIT HI MDM: CPT

## 2018-01-30 PROCEDURE — 96374 THER/PROPH/DIAG INJ IV PUSH: CPT

## 2018-01-30 PROCEDURE — 83690 ASSAY OF LIPASE: CPT

## 2018-01-30 PROCEDURE — 85025 COMPLETE CBC W/AUTO DIFF WBC: CPT

## 2018-01-30 PROCEDURE — 36415 COLL VENOUS BLD VENIPUNCTURE: CPT

## 2018-01-30 PROCEDURE — 96361 HYDRATE IV INFUSION ADD-ON: CPT

## 2018-01-30 NOTE — CT
Addendum: I did not have all the images below the pubic symphysis at 

time of original dictation.  These additional images shows enlargement

 of the rectus femoris muscle on the left side with ill-defined low 

density areas within the muscle as well as slight surrounding 

inflammatory-type change.  Findings most likely represent muscle 

hematoma or change from muscle tear and hematoma.  Inflammatory 

appearing change is felt to be due to soft tissue swelling.

 

--- Addendum1 above dictated on [01/30/2018 12:04] by [GAYLE Johnson, 

Marcio GARZA] ---

--- Addendum1 above signed on [01/30/2018 12:04] by [GAYLE Johnson Hilton J.] ---

 

--- Original report below dictated on [01/30/2018 11:29] by [GAYLE Johnson, Marcio GARZA] ---

--- Original report below signed on [01/30/2018 11:50] by [GAYLE Johnson, Marcio GARZA] ---

 

CT abdomen and pelvis

 

Technique: Multiple axial sections were obtained during the arterial 

phase of contrast administration from above the dome of the diaphragm 

inferiorly through the pubic symphysis.  Reconstructed coronal and 

sagittal images were obtained.

 

Findings: Abdominal aorta shows no dissection.  No aneurysm is 

identified.  Celiac axis and superior mesenteric artery appear patent.

  Right renal artery not well seen but felt to be patent given that 

the enhancement of the right kidney is similar to the left kidney.  

Left renal artery appears patent.  Inferior vena cava is patent.  

External and internal iliac arteries appear within normal limits.

 

Visualized lung bases show nothing acute.  Liver shows no focal 

parenchymal abnormality.  Spleen appears within normal limits.  

Adrenal glands show no nodule.  Kidneys show a small cyst on the left 

side measuring 1.4 cm.  No additional abnormality seen within the 

kidneys.  Gallbladder not visualized presumably from previous 

cholecystectomy.  Pancreas is within normal limits.  Small 

fat-containing umbilical hernia is seen.  No pelvic mass or adenopathy

 is seen.

 

Bone window settings were reviewed which show mild scattered 

degenerative change within the spine.  Mild anterior wedging is seen 

of T11 which appears to be old.

 

Impression:

1.  No evidence of abdominal aortic aneurysm or dissection.  Major 

arteries off the aorta appear patent.

2.  Other incidental findings.  Nothing acute is appreciated on CT 

study of the abdomen and pelvis.

 

Diagnostic code #2

 

 

--- Addendum1 signed ---

## 2018-01-30 NOTE — EDM.PDOC
ED HPI GENERAL MEDICAL PROBLEM





- General


Chief Complaint: Neuro Symptoms/Deficits


Stated Complaint: SENT BY JACKIE SMITH


Time Seen by Provider: 01/30/18 10:35


Source of Information: Reports: Patient, Family, Provider


History Limitations: Reports: No Limitations





- History of Present Illness


INITIAL COMMENTS - FREE TEXT/NARRATIVE: 





The patient presents with low abdominal pain, bilateral thigh pain and 

hypotension.  The patient had valve surgery years ago and he was under for 

awhile and he ended up getting pancreatitis from that.  He had chronic 

pancreatitis.  He had been dealing with that for years.  He finally had a 

whipple procedure in August.  He had complications from that and he had 4 

infections and a feeding tube site that would not close.  He has been doing 

okay with the abdominal pain.  He has temporal arteritis and he is on 

prednisone.  He had more pain a few weeks ago and his prednisone was increased 

to 20mg and then weaned back down to 5mg.  He then started to develop pain in 

his low abdomen, pelvis and bilateral thighs.  He also has ecchymosis to his 

legs and right flank.  He denies fever, chills, cough, chest pain and shortness 

of breath.  He checked his BP at home and it was in the 80s.  He went to the 

clinic today and his INR was >7.  His BP was low.


Onset: Gradual


Duration: Week(s):


Location: Reports: Abdomen, Lower Extremity, Left, Lower Extremity, Right


Quality: Reports: Sharp


Severity: Moderate


Improves with: Reports: None


Worsens with: Reports: None


Associated Symptoms: Reports: Headaches.  Denies: Chest Pain, Cough, Fever/

Chills, Nausea/Vomiting, Shortness of Breath


  ** Abdominal


Pain Score (Numeric/FACES): 8





- Related Data


 Allergies











Allergy/AdvReac Type Severity Reaction Status Date / Time


 


codeine AdvReac  Agitation Verified 01/30/18 10:34


 


mannitol [From Reclast] AdvReac  Joint Pain Verified 01/30/18 10:34


 


zoledronic acid AdvReac  Joint Pain Verified 01/30/18 10:34





[From Reclast]     











Home Meds: 


 Home Meds





Acetaminophen [Tylenol Extra Strength] 1,000 mg PO BID PRN 12/11/16 [History]


Aspirin 81 mg PO DAILY 12/11/16 [History]


Celecoxib [CeleBREX] 200 mg PO DAILY 12/11/16 [History]


Fenofibrate Nanocrystallized [Tricor] 145 mg PO DAILY 12/11/16 [History]


Finasteride [Proscar] 5 mg PO DAILY 12/11/16 [History]


HYDROmorphone [Dilaudid] 2 mg PO Q8HR PRN 12/11/16 [History]


Levothyroxine 150 mcg PO DAILY 12/11/16 [History]


Lisinopril 0.5 mg PO DAILY 12/11/16 [History]


Multivitamin [Multivitamins] 1 tab PO DAILY 12/11/16 [History]


Pravastatin [Pravachol] 40 mg PO DAILY 12/11/16 [History]


Prednisone [IJD: Prednisone] 5 mg PO DAILY 12/11/16 [History]


Sertraline [Zoloft] 50 mg PO DAILY 12/11/16 [History]


Warfarin [Coumadin] 1.25 mg PO DAILY 12/15/16 [History]


Omeprazole Magnesium [Prilosec Otc] 20 mg PO BID #60 tablet. 12/16/16 [Rx]


Cyclobenzaprine [Flexeril] 10 mg PO TID PRN 01/30/18 [History]


Ferrous Sulfate [Slow Fe] 50 tab PO BID 01/30/18 [History]


Lipase/Protease/Amylase [Viokace 20,880-78,300 Units Tb] 1 each PO TID 01/30/18 

[History]


Ondansetron [Zofran ODT] 4 mg PO Q4H PRN 01/30/18 [History]


Tamsulosin HCl [Flomax] 0.4 mg PO DAILY 01/30/18 [History]


fentaNYL [Duragesic] 12 mcg TD Q72H 01/30/18 [History]


fentaNYL [Duragesic] 25 mcg TD Q72H 01/30/18 [History]











Past Medical History


HEENT History: Reports: Other (See Below)


Other HEENT History: early stages of cataracts


Cardiovascular History: Reports: Aneurysm, Heart Failure, High Cholesterol, 

Hypertension, Other (See Below)


Other Cardiovascular History: Temporal arteritis (on prednisone for that reason)


Respiratory History: Reports: None


Other Respiratory History: after Reclast last friday, heavier breathing since 

then.


Gastrointestinal History: Reports: GERD, Pancreatitis, PUD, Other (See Below)


Other Gastrointestinal History: Peptic ulcers/ stents to pancreas and common 

bile duct


Genitourinary History: Reports: Other (See Below)


Other Genitourinary History: biopsy to prostate (negative)


Musculoskeletal History: Reports: Arthritis


Other Musculoskeletal History: knee pain


Neurological History: Reports: None


Other Neuro History: headaches from temporal arteritis


Psychiatric History: Reports: Anxiety, Depression


Other Psychiatric History: On sertraline for "cabin fever"


Endocrine/Metabolic History: Reports: Hypothyroidism


Other Endocrine/Metabolic History: hips, early signs of osteoporosis


Hematologic History: Reports: Other (See Below)


Immunologic History: Reports: Immunosuppression


Other Immunologic History: steroid use at this time for his temporal arteritis


Oncologic (Cancer) History: Reports: None


Dermatologic History: Reports: None





- Past Surgical History


Cardiovascular Surgical History: Reports: Aneurysm, Pacer, Valve Replacement, 

Vascular Surgery


GI Surgical History: Reports: Appendectomy, Cholecystectomy, Other (See Below)


Other GI Surgeries/Procedures: Whipple procedure


Male  Surgical History: Reports: Prostate Biopsy





Social & Family History





- Family History


Family Medical History: Noncontributory


Cardiac: Reports: Bypass, MI, Stent





- Tobacco Use


Smoking Status *Q: Former Smoker


Years of Tobacco use: 34


Packs/Tins Daily: 1.5


Used Tobacco, but Quit: Yes


Month Tobacco Last Used: Quit 2012


Second Hand Smoke Exposure: No





- Caffeine Use


Caffeine Use: Reports: None


Other Caffeine Use: 1 cup daily or less.





- Alcohol Use


Days Per Week of Alcohol Use: 0





- Recreational Drug Use


Recreational Drug Use: No





- Living Situation & Occupation


Living situation: Reports: , with Spouse


Occupation: Employed





ED ROS GENERAL





- Review of Systems


Review Of Systems: See Below


Constitutional: Reports: No Symptoms


HEENT: Reports: No Symptoms


Respiratory: Reports: No Symptoms


Cardiovascular: Reports: Lightheadedness


Endocrine: Reports: No Symptoms


GI/Abdominal: Reports: No Symptoms


: Reports: No Symptoms


Musculoskeletal: Reports: No Symptoms





ED EXAM, NEURO





- Physical Exam


Exam: See Below


Exam Limited By: No Limitations


General Appearance: Alert, No Apparent Distress


Ears: Normal External Exam


Nose: Normal Inspection


Head Exam: Atraumatic, Normocephalic


Neck: Normal Inspection


Respiratory/Chest: No Respiratory Distress, Lungs Clear, Normal Breath Sounds


Cardiovascular: Regular Rate, Rhythm, No Edema, No Murmur


GI/Abdominal: Soft, No Organomegaly, No Mass, Tender (Mild generalized pain.  

Scars form prior surgery)


Neurological: Alert, No Motor/Sensory Deficits, Oriented x 3





Course





- Vital Signs


Last Recorded V/S: 


 Last Vital Signs











Temp  97.6 F   01/30/18 13:45


 


Pulse  72   01/30/18 13:45


 


Resp  11 L  01/30/18 13:45


 


BP  100/52 L  01/30/18 13:45


 


Pulse Ox  99   01/30/18 13:45








 





Orthostatic Blood Pressure [     101/79


Sitting]                         


Orthostatic Blood Pressure [     86/63


Supine]                          











- Orders/Labs/Meds


Orders: 


 Active Orders 24 hr











 Category Date Time Status


 


 Cardiac Monitoring [RC] .AS DIRECTED Care  01/30/18 10:43 Active


 


 Peripheral IV Care [RC] .AS DIRECTED Care  01/30/18 10:44 Active


 


 Sodium Chloride 0.9% [Normal Saline] 1,000 ml Med  01/30/18 10:45 Active





 IV .BOLUS   


 


 Sodium Chloride 0.9% [Normal Saline] 1,000 ml Med  01/30/18 13:15 Active





 IV ASDIRECTED   


 


 Sodium Chloride 0.9% [Normal Saline] 100 ml Med  01/30/18 11:00 Active





 IV ASDIRECTED   


 


 Sodium Chloride 0.9% [Saline Flush] Med  01/30/18 10:43 Active





 10 ml FLUSH ASDIRECTED PRN   


 


 Sodium Chloride 0.9% [Saline Flush] Med  01/30/18 10:59 Active





 10 ml FLUSH ONETIME PRN   


 


 Peripheral IV Insertion Adult [OM.PC] Stat Oth  01/30/18 10:43 Ordered








 Medication Orders





Sodium Chloride (Normal Saline)  1,000 mls @ 1,000 mls/hr IV .BOLUS LEVAR


   Last Admin: 01/30/18 10:55  Dose: 1,000 mls/hr


Sodium Chloride (Normal Saline)  100 mls @ 75 mls/hr IV ASDIRECTED LEVAR


   Last Admin: 01/30/18 11:03  Dose: 75 mls/hr


Sodium Chloride (Normal Saline)  1,000 mls @ 150 mls/hr IV ASDIRECTED LEVAR


Sodium Chloride (Saline Flush)  10 ml FLUSH ASDIRECTED PRN


   PRN Reason: Keep Vein Open


   Last Admin: 01/30/18 10:56  Dose: 10 ml


Sodium Chloride (Saline Flush)  10 ml FLUSH ONETIME PRN


   PRN Reason: IV FLUSH


   Last Admin: 01/30/18 11:03  Dose: 10 ml








Labs: 


 Laboratory Tests











  01/30/18 01/30/18 01/30/18 Range/Units





  10:47 10:47 10:47 


 


WBC  12.64 H    (4.23-9.07)  K/mm3


 


RBC  3.29 L    (4.63-6.08)  M/mm3


 


Hgb  8.9 L    (13.7-17.5)  gm/L


 


Hct  27.4 L    (40.1-51.0)  %


 


MCV  83.3    (79.0-92.2)  fl


 


MCH  27.1    (25.7-32.2)  pg


 


MCHC  32.5    (32.2-35.5)  g/dl


 


RDW Std Deviation  48.2 H    (35.1-43.9)  fL


 


Plt Count  304    (163-337)  K/mm3


 


MPV  9.7    (9.4-12.3)  fl


 


Neut % (Auto)  57.6    (34.0-67.9)  %


 


Lymph % (Auto)  30.0    (21.8-53.1)  %


 


Mono % (Auto)  9.5    (5.3-12.2)  %


 


Eos % (Auto)  1.4    (0.8-7.0)  


 


Baso % (Auto)  0.2    (0.1-1.2)  %


 


Neut # (Auto)  7.27 H    (1.78-5.38)  K/mm3


 


Lymph # (Auto)  3.79 H    (1.32-3.57)  K/mm3


 


Mono # (Auto)  1.20 H    (0.30-0.82)  K/mm3


 


Eos # (Auto)  0.18    (0.04-0.54)  K/mm3


 


Baso # (Auto)  0.03    (0.01-0.08)  K/mm3


 


Manual Slide Review  Normal smear    


 


PT   141.2 H*   (8.0-13.0)  SECONDS


 


INR   11.11 H*   


 


Sodium    136  (136-145)  mEq/L


 


Potassium    4.6  (3.5-5.1)  mEq/L


 


Chloride    103  ()  mEq/L


 


Carbon Dioxide    25  (21-32)  mEq/L


 


Anion Gap    12.6  (5-15)  


 


BUN    37 H  (7-18)  mg/dL


 


Creatinine    1.7 H  (0.7-1.3)  mg/dL


 


Est Cr Clr Drug Dosing    50.45  mL/min


 


Estimated GFR (MDRD)    42  (>60)  mL/min


 


BUN/Creatinine Ratio    21.8 H  (14-18)  


 


Glucose    79  ()  mg/dL


 


Calcium    8.8  (8.5-10.1)  mg/dL


 


Total Bilirubin    1.2 H  (0.2-1.0)  mg/dL


 


AST    36  (15-37)  U/L


 


ALT    28  (16-63)  U/L


 


Alkaline Phosphatase    45 L  ()  U/L


 


Total Protein    6.8  (6.4-8.2)  g/dl


 


Albumin    3.3 L  (3.4-5.0)  g/dl


 


Globulin    3.5  gm/dL


 


Albumin/Globulin Ratio    0.9 L  (1-2)  


 


Lipase    130  ()  U/L











Meds: 


Medications











Generic Name Dose Route Start Last Admin





  Trade Name Freq  PRN Reason Stop Dose Admin


 


Sodium Chloride  1,000 mls @ 1,000 mls/hr  01/30/18 10:45  01/30/18 10:55





  Normal Saline  IV   1,000 mls/hr





  .BOLUS LEVAR   Administration


 


Sodium Chloride  100 mls @ 75 mls/hr  01/30/18 11:00  01/30/18 11:03





  Normal Saline  IV   75 mls/hr





  ASDIRECTED LEVAR   Administration


 


Sodium Chloride  1,000 mls @ 150 mls/hr  01/30/18 13:15  





  Normal Saline  IV   





  ASDIRECTED LEVAR   


 


Sodium Chloride  10 ml  01/30/18 10:43  01/30/18 10:56





  Saline Flush  FLUSH   10 ml





  ASDIRECTED PRN   Administration





  Keep Vein Open   


 


Sodium Chloride  10 ml  01/30/18 10:59  01/30/18 11:03





  Saline Flush  FLUSH   10 ml





  ONETIME PRN   Administration





  IV FLUSH   














Discontinued Medications














Generic Name Dose Route Start Last Admin





  Trade Name Freq  PRN Reason Stop Dose Admin


 


Hydromorphone HCl  1 mg  01/30/18 10:45  01/30/18 10:55





  Dilaudid  IVPUSH  01/30/18 10:46  1 mg





  ONETIME ONE   Administration


 


Hydromorphone HCl  0.5 mg  01/30/18 12:36  01/30/18 12:52





  Dilaudid  IVPUSH  01/30/18 12:37  0.5 mg





  ONETIME ONE   Administration


 


Iopamidol  100 ml  01/30/18 10:59  01/30/18 11:03





  Isovue-370 (76%)  IVPUSH  01/30/18 11:00  100 ml





  ONETIME ONE   Administration


 


Phytonadione  5 mg  01/30/18 12:37  01/30/18 12:54





  Aquamephyton  PO  01/30/18 12:38  5 mg





  ONETIME ONE   Administration














- Re-Assessments/Exams


Free Text/Narrative Re-Assessment/Exam: 





01/30/18 13:42


My nurse did orthostatic BPs and he was symptomatic.  I ordered an IV NS 1L 

bolus, labs, CT of his abdomen and pelvis and dilaudid 1mg IV.


01/30/18 13:43


His WBC was elevated at 12.64.  His Hgb was low at 8.9.  His platelets were 

normal.  His INR was very high at 11.11.  I ordered vitamin K 5mg by mouth.  

His creatinine was elevated at 1.7.  His lipase was negative.  His CT shows 

enlargement of the rectus femoris muscle on the left side with ill-defined low 

density areas within the muscle as well as slight surrounding inflammatory type 

change.  Findings most likely represent muscle hematoma or change from muscle 

tear and hematoma.  Inflammatory appearing change is felt to be acute to soft 

tissue swelling.  No evidence of abdominal aortic aneurysm or dissection.  

Major arteries off the aorta appear patent.  He was having more pain so I 

ordered more dilaudid.  His BP is better.  I talked to our hospitalist Dr Gardner about staying and he was not comfortable keeping him here.  I talked 

with the hospitalist on call Dr Ellison and he agreed to the transfer to 

[place].





Departure





- Departure


Time of Disposition: 13:00


Disposition: DC/Tfer to [facility]


Condition: Serious


Clinical Impression: 


 Supratherapeutic INR





Hematoma of left thigh


Qualifiers:


 Encounter type: initial encounter Qualified Code(s): S70.12XA - Contusion of 

left thigh, initial encounter





Hypotension


Qualifiers:


 Hypotension type: unspecified hypotension type Qualified Code(s): I95.9 - 

Hypotension, unspecified





Anemia


Qualifiers:


 Anemia type: other cause Other causes of anemia: other cause, not classified 

Qualified Code(s): D64.89 - Other specified anemias








- Discharge Information


Referrals: 


Jackie Smith NP [Ordering Only Provider] - 


Forms:  ED Department Discharge





- My Orders


Last 24 Hours: 


My Active Orders





01/30/18 10:43


Cardiac Monitoring [RC] .AS DIRECTED 


Sodium Chloride 0.9% [Saline Flush]   10 ml FLUSH ASDIRECTED PRN 


Peripheral IV Insertion Adult [OM.PC] Stat 





01/30/18 10:44


Peripheral IV Care [RC] .AS DIRECTED 





01/30/18 10:45


Sodium Chloride 0.9% [Normal Saline] 1,000 ml IV .BOLUS 





01/30/18 10:59


Sodium Chloride 0.9% [Saline Flush]   10 ml FLUSH ONETIME PRN 





01/30/18 11:00


Sodium Chloride 0.9% [Normal Saline] 100 ml IV ASDIRECTED 





01/30/18 13:15


Sodium Chloride 0.9% [Normal Saline] 1,000 ml IV ASDIRECTED 














- Assessment/Plan


Last 24 Hours: 


My Active Orders





01/30/18 10:43


Cardiac Monitoring [RC] .AS DIRECTED 


Sodium Chloride 0.9% [Saline Flush]   10 ml FLUSH ASDIRECTED PRN 


Peripheral IV Insertion Adult [OM.PC] Stat 





01/30/18 10:44


Peripheral IV Care [RC] .AS DIRECTED 





01/30/18 10:45


Sodium Chloride 0.9% [Normal Saline] 1,000 ml IV .BOLUS 





01/30/18 10:59


Sodium Chloride 0.9% [Saline Flush]   10 ml FLUSH ONETIME PRN 





01/30/18 11:00


Sodium Chloride 0.9% [Normal Saline] 100 ml IV ASDIRECTED 





01/30/18 13:15


Sodium Chloride 0.9% [Normal Saline] 1,000 ml IV ASDIRECTED

## 2018-02-14 ENCOUNTER — TELEPHONE (OUTPATIENT)
Dept: SURGERY | Facility: CLINIC | Age: 58
End: 2018-02-14

## 2018-02-14 DIAGNOSIS — K86.89 PANCREATIC INSUFFICIENCY: Primary | ICD-10-CM

## 2018-02-14 NOTE — TELEPHONE ENCOUNTER
Established Patient Telephone Reminder Call    Date of call:  02/14/18  Phone numbers:  Home number on file [phone] (home)    Reached patient/confirmed appointment:  Yes  Appointment with:   Dr. Filipe Crum  Reason for visit:  Follow up

## 2018-02-15 ENCOUNTER — OFFICE VISIT (OUTPATIENT)
Dept: SURGERY | Facility: CLINIC | Age: 58
End: 2018-02-15
Payer: COMMERCIAL

## 2018-02-15 ENCOUNTER — ALLIED HEALTH/NURSE VISIT (OUTPATIENT)
Dept: TRANSPLANT | Facility: CLINIC | Age: 58
End: 2018-02-15
Attending: SURGERY
Payer: COMMERCIAL

## 2018-02-15 VITALS
WEIGHT: 184.2 LBS | SYSTOLIC BLOOD PRESSURE: 113 MMHG | OXYGEN SATURATION: 98 % | HEART RATE: 98 BPM | DIASTOLIC BLOOD PRESSURE: 76 MMHG | TEMPERATURE: 97.5 F | HEIGHT: 71 IN | BODY MASS INDEX: 25.79 KG/M2

## 2018-02-15 VITALS — WEIGHT: 185.3 LBS | BODY MASS INDEX: 25.84 KG/M2

## 2018-02-15 DIAGNOSIS — D50.8 OTHER IRON DEFICIENCY ANEMIA: Primary | ICD-10-CM

## 2018-02-15 DIAGNOSIS — Z79.01 LONG-TERM (CURRENT) USE OF ANTICOAGULANTS: ICD-10-CM

## 2018-02-15 DIAGNOSIS — Z95.2 H/O AORTIC VALVE REPLACEMENT: ICD-10-CM

## 2018-02-15 DIAGNOSIS — K86.1 CHRONIC RECURRENT PANCREATITIS (H): Primary | ICD-10-CM

## 2018-02-15 DIAGNOSIS — K85.90 PANCREATITIS: Primary | ICD-10-CM

## 2018-02-15 DIAGNOSIS — K86.89 PANCREATIC INSUFFICIENCY: ICD-10-CM

## 2018-02-15 LAB
ALBUMIN SERPL-MCNC: 3.5 G/DL (ref 3.4–5)
ALP SERPL-CCNC: 47 U/L (ref 40–150)
ALT SERPL W P-5'-P-CCNC: 22 U/L (ref 0–70)
ANION GAP SERPL CALCULATED.3IONS-SCNC: 7 MMOL/L (ref 3–14)
AST SERPL W P-5'-P-CCNC: 28 U/L (ref 0–45)
BASOPHILS # BLD AUTO: 0 10E9/L (ref 0–0.2)
BASOPHILS NFR BLD AUTO: 0.5 %
BILIRUB SERPL-MCNC: 0.7 MG/DL (ref 0.2–1.3)
BUN SERPL-MCNC: 20 MG/DL (ref 7–30)
C PEPTIDE SERPL-MCNC: 5 NG/ML (ref 0.9–6.9)
CALCIUM SERPL-MCNC: 8.4 MG/DL (ref 8.5–10.1)
CHLORIDE SERPL-SCNC: 108 MMOL/L (ref 94–109)
CO2 SERPL-SCNC: 25 MMOL/L (ref 20–32)
CREAT SERPL-MCNC: 1.37 MG/DL (ref 0.66–1.25)
DIFFERENTIAL METHOD BLD: ABNORMAL
EOSINOPHIL # BLD AUTO: 0.2 10E9/L (ref 0–0.7)
EOSINOPHIL NFR BLD AUTO: 2.3 %
ERYTHROCYTE [DISTWIDTH] IN BLOOD BY AUTOMATED COUNT: 16.9 % (ref 10–15)
FERRITIN SERPL-MCNC: 177 NG/ML (ref 26–388)
GFR SERPL CREATININE-BSD FRML MDRD: 53 ML/MIN/1.7M2
GLUCOSE BLDC GLUCOMTR-MCNC: 67 MG/DL (ref 70–99)
GLUCOSE SERPL-MCNC: 85 MG/DL (ref 70–99)
GLUCOSE SERPL-MCNC: 87 MG/DL (ref 70–99)
GLUCOSE SERPL-MCNC: 91 MG/DL (ref 70–99)
HBA1C MFR BLD: 4.8 % (ref 4.3–6)
HCT VFR BLD AUTO: 35.2 % (ref 40–53)
HGB BLD-MCNC: 11.4 G/DL (ref 13.3–17.7)
IMM GRANULOCYTES # BLD: 0.2 10E9/L (ref 0–0.4)
IMM GRANULOCYTES NFR BLD: 2.6 %
INR PPP: 2.51 (ref 0.86–1.14)
IRON SATN MFR SERPL: 17 % (ref 15–46)
IRON SERPL-MCNC: 67 UG/DL (ref 35–180)
LYMPHOCYTES # BLD AUTO: 2.2 10E9/L (ref 0.8–5.3)
LYMPHOCYTES NFR BLD AUTO: 34.5 %
MCH RBC QN AUTO: 29.2 PG (ref 26.5–33)
MCHC RBC AUTO-ENTMCNC: 32.4 G/DL (ref 31.5–36.5)
MCV RBC AUTO: 90 FL (ref 78–100)
MONOCYTES # BLD AUTO: 0.5 10E9/L (ref 0–1.3)
MONOCYTES NFR BLD AUTO: 7.6 %
NEUTROPHILS # BLD AUTO: 3.4 10E9/L (ref 1.6–8.3)
NEUTROPHILS NFR BLD AUTO: 52.5 %
NRBC # BLD AUTO: 0 10*3/UL
NRBC BLD AUTO-RTO: 0 /100
PLATELET # BLD AUTO: 357 10E9/L (ref 150–450)
POTASSIUM SERPL-SCNC: 4.2 MMOL/L (ref 3.4–5.3)
PREALB SERPL IA-MCNC: 23 MG/DL (ref 15–45)
PROT SERPL-MCNC: 7.2 G/DL (ref 6.8–8.8)
RBC # BLD AUTO: 3.91 10E12/L (ref 4.4–5.9)
SODIUM SERPL-SCNC: 139 MMOL/L (ref 133–144)
TIBC SERPL-MCNC: 399 UG/DL (ref 240–430)
VIT B12 SERPL-MCNC: 438 PG/ML (ref 193–986)
WBC # BLD AUTO: 6.4 10E9/L (ref 4–11)

## 2018-02-15 PROCEDURE — G0463 HOSPITAL OUTPT CLINIC VISIT: HCPCS | Mod: ZF

## 2018-02-15 PROCEDURE — 84590 ASSAY OF VITAMIN A: CPT | Performed by: SURGERY

## 2018-02-15 PROCEDURE — 82306 VITAMIN D 25 HYDROXY: CPT | Performed by: SURGERY

## 2018-02-15 PROCEDURE — 83036 HEMOGLOBIN GLYCOSYLATED A1C: CPT | Performed by: SURGERY

## 2018-02-15 PROCEDURE — 84446 ASSAY OF VITAMIN E: CPT | Performed by: SURGERY

## 2018-02-15 PROCEDURE — 85025 COMPLETE CBC W/AUTO DIFF WBC: CPT | Performed by: SURGERY

## 2018-02-15 PROCEDURE — 84630 ASSAY OF ZINC: CPT | Performed by: SURGERY

## 2018-02-15 PROCEDURE — 85610 PROTHROMBIN TIME: CPT | Performed by: SURGERY

## 2018-02-15 PROCEDURE — 84134 ASSAY OF PREALBUMIN: CPT | Performed by: SURGERY

## 2018-02-15 PROCEDURE — 83540 ASSAY OF IRON: CPT | Performed by: SURGERY

## 2018-02-15 PROCEDURE — 80053 COMPREHEN METABOLIC PANEL: CPT | Performed by: SURGERY

## 2018-02-15 PROCEDURE — 82947 ASSAY GLUCOSE BLOOD QUANT: CPT | Performed by: SURGERY

## 2018-02-15 PROCEDURE — 82728 ASSAY OF FERRITIN: CPT | Performed by: SURGERY

## 2018-02-15 PROCEDURE — 82607 VITAMIN B-12: CPT | Performed by: SURGERY

## 2018-02-15 PROCEDURE — 84681 ASSAY OF C-PEPTIDE: CPT | Performed by: SURGERY

## 2018-02-15 PROCEDURE — 82747 ASSAY OF FOLIC ACID RBC: CPT | Performed by: SURGERY

## 2018-02-15 PROCEDURE — 36415 COLL VENOUS BLD VENIPUNCTURE: CPT | Performed by: SURGERY

## 2018-02-15 PROCEDURE — 83550 IRON BINDING TEST: CPT | Performed by: SURGERY

## 2018-02-15 ASSESSMENT — PAIN SCALES - GENERAL: PAINLEVEL: NO PAIN (0)

## 2018-02-15 NOTE — PROGRESS NOTES
Service Date: 02/15/2018      February 15, 2018         Cyril Mackay MD   [facility]   [address] [place]  [address]      RE:  Lucas Brown   :  1960   MRN #:  [id number]       Dear Dr. Mackay:       I had the pleasure of seeing our mutual patient, Mr. Lucas Brown.  As you know, you had asked me to see him about 8 months ago for chronic pancreatitis involving the head of his pancreas.  He underwent a Whipple procedure last early  with complications of wound infection, pancreatic fistula and others.  I am happy to say that he has made a very solid recovery since and now his current medical issues relate to obtaining adequate anticoagulation for his heart valve.  He is eating essentially normally.  His weight is stable at 184 pounds.  His pain doctor is working on weaning down from his narcotics (currently on 37.5 fentanyl patch and 2 mg of Dilaudid every 8 hours as needed.  He usually takes her Dilaudid twice a day).      On physical exam, his vital signs are stable.  He is afebrile.  His weight is 184 pounds.  HEENT is  normocephalic, atraumatic.  There is no scleral icterus.  Neck is supple.  Chest is clear.  On abdominal exam, his bilateral subcostal incision is clean and dry without tenderness or hernia.  He does have a diastasis recti in the upper abdomen.  Extremities are without cyanosis, clubbing or edema.      Mr. Brown is doing well status post Whipple procedure for chronic pancreatitis.  I am happy to see him again at your request, but think that he has essentially returned to baseline or a little better since his Whipple procedure.  I certainly appreciate the chance to interact with this phoebe gentleman and his wife and hope that I can be of further assistance in the future.      Sincerely yours,         BABAR HERNANDEZ MD             D: 02/15/2018   T: 02/15/2018   MT: arnold      Name:     LUCAS BROWN   MRN:      [id number]        Account:       [id number]   :      1960           Service Date: 02/15/2018      Document: [id number]

## 2018-02-15 NOTE — MR AVS SNAPSHOT
After Visit Summary   2/15/2018    Lucas Brown    MRN: [id number]           Patient Information     Date Of Birth          1960        Visit Information        Provider Department      2/15/2018 11:15 AM Nurse, [facility]        Today's Diagnoses     Pancreatitis    -  1       Follow-ups after your visit        Who to contact     If you have questions or need follow up information about today's clinic visit or your schedule please contact [facility] directly at [phone].  Normal or non-critical lab and imaging results will be communicated to you by [employer], letter or phone within 4 business days after the clinic has received the results. If you do not hear from us within 7 days, please contact the clinic through M[employer]GL[employer] or phone. If you have a critical or abnormal lab result, we will notify you by phone as soon as possible.  Submit refill requests through [employer] or call your pharmacy and they will forward the refill request to us. Please allow 3 business days for your refill to be completed.          Additional Information About Your Visit        MyChart Information     [employer] gives you secure access to your electronic health record. If you see a primary care provider, you can also send messages to your care team and make appointments. If you have questions, please call your primary care clinic.  If you do not have a primary care provider, please call [phone] and they will assist you.        Care EveryWhere ID     This is your Care EveryWhere ID. This could be used by other organizations to access your [place] medical records  [id number]        Your Vitals Were     BMI (Body Mass Index)                   25.84 kg/m2            Blood Pressure from Last 3 Encounters:   02/15/18 113/76   11/13/17 98/69   11/02/17 119/79    Weight from Last 3 Encounters:   02/15/18 83.6 kg (184 lb 3.2 oz)   02/15/18 84.1 kg (185 lb 4.8 oz)   11/13/17  81.3 kg (179 lb 3.2 oz)              Today, you had the following     No orders found for display         Today's Medication Changes          These changes are accurate as of 2/15/18 11:59 PM.  If you have any questions, ask your nurse or doctor.               Start taking these medicines.        Dose/Directions    Iron (Ferrous Sulfate) 142 (45 FE) MG Tbcr   Used for:  Other iron deficiency anemia   Started by:  Alis Dillard        Dose:  1 tablet   Take 1 tablet by mouth daily Take 1 tablet daily   Quantity:  30 tablet   Refills:  0            Where to get your medicines      Some of these will need a paper prescription and others can be bought over the counter.  Ask your nurse if you have questions.     You don't need a prescription for these medications     Iron (Ferrous Sulfate) 142 (45 FE) MG Tbcr                Primary Care Provider Office Phone # Fax #    Cyril Mackay [phone]       [facility] [address] [address]        Equal Access to Services     LEE CORNELL : Hadii arabella birch Sonick, waaxda lujody, qaybta kaalmada adecookie, rimma rodriguez. So [facility] [phone].    ATENCIÓN: Si habla español, tiene a rodriguez disposición servicios gratuitos de asistencia lingüística. Llame al [phone].    We comply with applicable federal civil rights laws and [place] laws. We do not discriminate on the basis of race, color, national origin, age, disability, sex, sexual orientation, or gender identity.            Thank you!     Thank you for choosing [facility]  for your care. Our goal is always to provide you with excellent care. Hearing back from our patients is one way we can continue to improve our services. Please take a few minutes to complete the written survey that you may receive in the mail after your visit with us. Thank you!             Your Updated Medication List - Protect others around you: Learn  how to safely use, store and throw away your medicines at www.disposemymeds.org.          This list is accurate as of 2/15/18 11:59 PM.  Always use your most recent med list.                   Brand Name Dispense Instructions for use Diagnosis    acetaminophen 32 mg/mL solution    TYLENOL    2000 mL    31.25 mLs (1,000 mg) by Per J Tube route every 8 hours    Generalized abdominal pain       aspirin 81 MG tablet      81 mg by Per J Tube route daily        BOOST HIGH PROTEIN Liqd      After above baseline labs are drawn, give: 6 mL/kg to maximum of 360 mL; the beverage is to be consumed within 5 minutes.    Pancreatic insufficiency       fenofibrate 145 MG tablet      145 mg by Per J Tube route daily        fentaNYL 50 mcg/hr 72 hr patch    DURAGESIC    5 patch    Place 1 patch onto the skin every 72 hours    Generalized abdominal pain       FINASTERIDE PO      5 mg by Per J Tube route daily        Iron (Ferrous Sulfate) 142 (45 FE) MG Tbcr     30 tablet    Take 1 tablet by mouth daily Take 1 tablet daily    Other iron deficiency anemia       omeprazole 2 mg/mL Susp    priLOSEC    300 mL    10 mLs (20 mg) by Oral or Feeding Tube route 2 times daily    Chronic pancreatitis, unspecified pancreatitis type (H)       ondansetron 4 MG tablet    ZOFRAN    90 tablet    1-2 tablets (4-8 mg) by Per J Tube route every 8 hours as needed for nausea    Generalized abdominal pain       oxyCODONE IR 5 MG tablet    ROXICODONE    100 tablet    Take 1-2 tablets (5-10 mg) by mouth every 6 hours as needed for moderate to severe pain or pain maximum 8 tablet(s) per day    Acute post-operative pain       polyethylene glycol powder    MIRALAX    510 g    Take 17 g (1 capful) by mouth daily    Abscess of abdominal wall       pravastatin 40 MG tablet    PRAVACHOL     40 mg by Per J Tube route daily        predniSONE 5 MG/5ML solution     70 mL    5 mLs (5 mg) by Per J Tube route daily    Chronic pancreatitis, unspecified pancreatitis type (H)        sertraline 50 MG tablet    ZOLOFT     50 mg by Per J Tube route daily        TAMSULOSIN HCL PO      Take 0.4 mg by mouth daily        WARFARIN SODIUM PO      by Per J Tube route daily Alternates 5mg and 2.5mg

## 2018-02-15 NOTE — LETTER
2/15/2018     RE: Lucas Brown  [address] [place] [id number]     Dear Colleague,    Thank you for referring your patient, Lucas Brown, to the [facility] GENERAL SURGERY at [facility]. Please see a copy of my visit note below.     February 15, 2018         Cyril Mackay MD   [facility]   [address] [facility]  [address]      RE:  Lucas Brown   :  1960   MRN #: [id number]       Dear Dr. Mackay:       I had the pleasure of seeing our mutual patient, Mr. Lucas Brown.  As you know, you had asked me to see him about 8 months ago for chronic pancreatitis involving the head of his pancreas.  He underwent a Whipple procedure last early  with complications of wound infection, pancreatic fistula and others.  I am happy to say that he has made a very solid recovery since and now his current medical issues relate to obtaining adequate anticoagulation for his heart valve.  He is eating essentially normally.  His weight is stable at 184 pounds.  His pain doctor is working on weaning down from his narcotics (currently on 37.5 fentanyl patch and 2 mg of Dilaudid every 8 hours as needed.  He usually takes her Dilaudid twice a day).      On physical exam, his vital signs are stable.  He is afebrile.  His weight is 184 pounds.  HEENT is  normocephalic, atraumatic.  There is no scleral icterus.  Neck is supple.  Chest is clear.  On abdominal exam, his bilateral subcostal incision is clean and dry without tenderness or hernia.  He does have a diastasis recti in the upper abdomen.  Extremities are without cyanosis, clubbing or edema.      Mr. Brown is doing well status post Whipple procedure for chronic pancreatitis.  I am happy to see him again at your request, but think that he has essentially returned to baseline or a little better since his Whipple procedure.  I certainly appreciate the chance to interact with this phoebe  gentleman and his wife and hope that I can be of further assistance in the future.      Sincerely yours,         BABAR HERNANDEZ MD        D: 02/15/2018   T: 02/15/2018   MT: arnold    Name:     AMELIE MCCRACKEN   MRN:      [id number]        Account:      [id number]   :      1960           Service Date: 02/15/2018    Document: [id number]      GB  Visit time 20 min, 15 min counselling

## 2018-02-15 NOTE — NURSING NOTE
"Chief Complaint   Patient presents with     boost     Boost test        Initial Wt 84.1 kg (185 lb 4.8 oz)  BMI 25.84 kg/m2 Estimated body mass index is 25.84 kg/(m^2) as calculated from the following:    Height as of 11/13/17: 1.803 m (5' 11\").    Weight as of this encounter: 84.1 kg (185 lb 4.8 oz).  Medication Reconciliation: complete    "

## 2018-02-15 NOTE — MR AVS SNAPSHOT
After Visit Summary   2/15/2018    Lucas Brown    MRN: [id number]           Patient Information     Date Of Birth          1960        Visit Information        Provider Department      2/15/2018 3:20 PM Vik Crum MD [facility] Surgery        Today's Diagnoses     Chronic recurrent pancreatitis (H)    -  1       Follow-ups after your visit        Follow-up notes from your care team     Return if symptoms worsen or fail to improve.      Your next 10 appointments already scheduled     Feb 15, 2018  3:20 PM CST   (Arrive by 3:05 PM)   Return Visit with Vik Crum MD   [facility] Surgery ([facility] [place])    [address]  [address] [facility] [address] [phone]              Who to contact     Please call your clinic at [phone] to:    Ask questions about your health    Make or cancel appointments    Discuss your medicines    Learn about your test results    Speak to your doctor            Additional Information About Your Visit        MyChart Information     [employer] gives you secure access to your electronic health record. If you see a primary care provider, you can also send messages to your care team and make appointments. If you have questions, please call your primary care clinic.  If you do not have a primary care provider, please call [phone] and they will assist you.      [employer] is an electronic gateway that provides easy, online access to your medical records. With [employer], you can request a clinic appointment, read your test results, renew a prescription or communicate with your care team.     To access your existing account, please contact your [facility] or call [phone] for assistance.        Care EveryWhere ID     This is your Care EveryWhere ID. This could be used by other organizations to access your [place] medical records  [id number]        Your Vitals  "Were     Pulse Temperature Height Pulse Oximetry BMI (Body Mass Index)       98 97.5  F (36.4  C) (Oral) 1.803 m (5' 11\") 98% 25.69 kg/m2        Blood Pressure from Last 3 Encounters:   02/15/18 113/76   11/13/17 98/69   11/02/17 119/79    Weight from Last 3 Encounters:   02/15/18 83.6 kg (184 lb 3.2 oz)   02/15/18 84.1 kg (185 lb 4.8 oz)   11/13/17 81.3 kg (179 lb 3.2 oz)              We Performed the Following     Glucose by meter          Today's Medication Changes          These changes are accurate as of 2/15/18  3:03 PM.  If you have any questions, ask your nurse or doctor.               Start taking these medicines.        Dose/Directions    Iron (Ferrous Sulfate) 142 (45 FE) MG Tbcr   Used for:  Other iron deficiency anemia   Started by:  Alis Dillard        Dose:  1 tablet   Take 1 tablet by mouth daily Take 1 tablet daily   Quantity:  30 tablet   Refills:  0            Where to get your medicines      Some of these will need a paper prescription and others can be bought over the counter.  Ask your nurse if you have questions.     You don't need a prescription for these medications     Iron (Ferrous Sulfate) 142 (45 FE) MG Tbcr                Primary Care Provider Office Phone # Fax #    Cyril Mackay [phone]       [address] [address]        Equal Access to Services     LEE CORNELL AH: Hadii arabella aguilaro Sonick, waaxda luqadaha, qaybta kaalmada adeegyada, waxay josias rodriguez. So [facility] [phone].    ATENCIÓN: Si habla español, tiene a rodriguez disposición servicios gratuitos de asistencia lingüística. Lldominic al [phone].    We comply with applicable federal civil rights laws and [place] laws. We do not discriminate on the basis of race, color, national origin, age, disability, sex, sexual orientation, or gender identity.            Thank you!     Thank you for choosing [facility]  for your care. Our " goal is always to provide you with excellent care. Hearing back from our patients is one way we can continue to improve our services. Please take a few minutes to complete the written survey that you may receive in the mail after your visit with us. Thank you!             Your Updated Medication List - Protect others around you: Learn how to safely use, store and throw away your medicines at www.disposemymeds.org.          This list is accurate as of 2/15/18  3:03 PM.  Always use your most recent med list.                   Brand Name Dispense Instructions for use Diagnosis    acetaminophen 32 mg/mL solution    TYLENOL    2000 mL    31.25 mLs (1,000 mg) by Per J Tube route every 8 hours    Generalized abdominal pain       aspirin 81 MG tablet      81 mg by Per J Tube route daily        BOOST HIGH PROTEIN Liqd      After above baseline labs are drawn, give: 6 mL/kg to maximum of 360 mL; the beverage is to be consumed within 5 minutes.    Pancreatic insufficiency       fenofibrate 145 MG tablet      145 mg by Per J Tube route daily        fentaNYL 50 mcg/hr 72 hr patch    DURAGESIC    5 patch    Place 1 patch onto the skin every 72 hours    Generalized abdominal pain       FINASTERIDE PO      5 mg by Per J Tube route daily        Iron (Ferrous Sulfate) 142 (45 FE) MG Tbcr     30 tablet    Take 1 tablet by mouth daily Take 1 tablet daily    Other iron deficiency anemia       omeprazole 2 mg/mL Susp    priLOSEC    300 mL    10 mLs (20 mg) by Oral or Feeding Tube route 2 times daily    Chronic pancreatitis, unspecified pancreatitis type (H)       ondansetron 4 MG tablet    ZOFRAN    90 tablet    1-2 tablets (4-8 mg) by Per J Tube route every 8 hours as needed for nausea    Generalized abdominal pain       oxyCODONE IR 5 MG tablet    ROXICODONE    100 tablet    Take 1-2 tablets (5-10 mg) by mouth every 6 hours as needed for moderate to severe pain or pain maximum 8 tablet(s) per day    Acute post-operative pain        polyethylene glycol powder    MIRALAX    510 g    Take 17 g (1 capful) by mouth daily    Abscess of abdominal wall       pravastatin 40 MG tablet    PRAVACHOL     40 mg by Per J Tube route daily        predniSONE 5 MG/5ML solution     70 mL    5 mLs (5 mg) by Per J Tube route daily    Chronic pancreatitis, unspecified pancreatitis type (H)       sertraline 50 MG tablet    ZOLOFT     50 mg by Per J Tube route daily        TAMSULOSIN HCL PO      Take 0.4 mg by mouth daily        WARFARIN SODIUM PO      by Per J Tube route daily Alternates 5mg and 2.5mg

## 2018-02-15 NOTE — NURSING NOTE
"Chief Complaint   Patient presents with     Clinic Care Coordination - Follow-up     Follow up.       Vitals:    02/15/18 1435   BP: 113/76   BP Location: Left arm   Patient Position: Chair   Cuff Size: Adult Large   Pulse: 98   Temp: 97.5  F (36.4  C)   TempSrc: Oral   SpO2: 98%   Weight: 184 lb 3.2 oz   Height: 5' 11\"       Body mass index is 25.69 kg/(m^2).  Alicia PEDROZA LPN                        "

## 2018-02-16 LAB
C PEPTIDE SERPL-MCNC: 3.1 NG/ML (ref 0.9–6.9)
C PEPTIDE SERPL-MCNC: 3.6 NG/ML (ref 0.9–6.9)
FOLATE RBC-MCNC: 1115 NG/ML
HCT VFR BLD CALC: NORMAL %
ZINC SERPL-MCNC: 78 UG/DL (ref 60–120)

## 2018-02-18 LAB
A-TOCOPHEROL VIT E SERPL-MCNC: 9.5 MG/L (ref 5.5–18)
ANNOTATION COMMENT IMP: NORMAL
BETA+GAMMA TOCOPHEROL SERPL-MCNC: 1 MG/L (ref 0–6)
RETINYL PALMITATE SERPL-MCNC: 0.03 MG/L (ref 0–0.1)
VIT A SERPL-MCNC: 0.79 MG/L (ref 0.3–1.2)

## 2018-02-20 LAB
DEPRECATED CALCIDIOL+CALCIFEROL SERPL-MC: <16 UG/L (ref 20–75)
VITAMIN D2 SERPL-MCNC: <5 UG/L
VITAMIN D3 SERPL-MCNC: 11 UG/L

## 2018-07-19 ENCOUNTER — HOSPITAL ENCOUNTER (OUTPATIENT)
Dept: HOSPITAL 41 - JD.SDS | Age: 58
Discharge: HOME | End: 2018-07-19
Attending: SURGERY
Payer: COMMERCIAL

## 2018-07-19 VITALS — DIASTOLIC BLOOD PRESSURE: 88 MMHG | SYSTOLIC BLOOD PRESSURE: 125 MMHG

## 2018-07-19 DIAGNOSIS — E07.9: ICD-10-CM

## 2018-07-19 DIAGNOSIS — Z99.89: ICD-10-CM

## 2018-07-19 DIAGNOSIS — Z79.01: ICD-10-CM

## 2018-07-19 DIAGNOSIS — G47.30: ICD-10-CM

## 2018-07-19 DIAGNOSIS — N40.0: ICD-10-CM

## 2018-07-19 DIAGNOSIS — M19.90: ICD-10-CM

## 2018-07-19 DIAGNOSIS — Z98.890: ICD-10-CM

## 2018-07-19 DIAGNOSIS — R10.13: Primary | ICD-10-CM

## 2018-07-19 DIAGNOSIS — Z79.899: ICD-10-CM

## 2018-07-19 DIAGNOSIS — Z79.82: ICD-10-CM

## 2018-07-19 DIAGNOSIS — K44.9: ICD-10-CM

## 2018-07-19 DIAGNOSIS — I35.0: ICD-10-CM

## 2018-07-19 DIAGNOSIS — Z87.891: ICD-10-CM

## 2018-07-19 DIAGNOSIS — Z88.5: ICD-10-CM

## 2018-07-19 DIAGNOSIS — M81.0: ICD-10-CM

## 2018-07-19 DIAGNOSIS — Z88.8: ICD-10-CM

## 2018-07-19 PROCEDURE — 43235 EGD DIAGNOSTIC BRUSH WASH: CPT

## 2018-07-19 NOTE — PCM.OPNOTE
- General Post-Op/Procedure Note


Date of Surgery/Procedure: 07/19/18


Operative Procedure(s): EGD


Pre Op Diagnosis: epigstric pain


Post-Op Diagnosis: Same


Anesthesia Technique: MAC


Primary Surgeon: Vazquez Valencia


EBL in mLs: 0


Complications: None


Condition: Good

## 2018-07-19 NOTE — [id number]
Post Anesthesia Note





- EVALUATION WITHIN 48HRS OF ANESTHETIC


Vital Signs in Normal Range: Yes


Patient Participated in Evaluation: Yes


Respiratory Function Stable: Yes


Airway Patent: Yes


Cardiovascular Function Stable: Yes


Hydration Status Stable: Yes


Pain Control Satisfactory: Yes


Nausea and Vomiting Control Satisfactory: Yes


Mental Status Recovered: Yes


Pulse Rate: 90


Resp Rate: 18


Temperature: 36.9 C


Blood Pressure: 129/81

## 2018-07-19 NOTE — PCM.PREANE
Preanesthetic Assessment





- Anesthesia/Transfusion/Family Hx


Anesthesia History: Prior Anesthesia Without Reaction


Family History of Anesthesia Reaction: No


Transfusion History: No Prior Transfusion(s)





- Review of Systems


General: No Symptoms


Pulmonary: No Symptoms


Cardiovascular: No Symptoms


Gastrointestinal: Abdominal Pain


Neurological: Numbness (left hand)


Other: Reports: Easy Bleeding, Easy Bruising, Thyroid Problems (hypothyroid)





- Physical Assessment


NPO Status Date: 07/18/18


NPO Status Time: 21:00


Pulse: 90


O2 Sat by Pulse Oximetry: 97


Respiratory Rate: 18


Blood Pressure: 129/81


Temperature: 36.9 C


Height: 1.8 m


Weight: 83.007 kg


ASA Class: 2


Mental Status: Alert & Oriented x3


Airway Class: Mallampati = 1


Dentition: Reports: Normal Dentition


Thyro-Mental Finger Breadths: 3


Mouth Opening Finger Breadths: 3


ROM/Head Extension: Full


Lungs: Clear to Auscultation, Normal Respiratory Effort


Cardiovascular: Regular Rate (Paced)





- Allergies


Allergies/Adverse Reactions: 


 Allergies











Allergy/AdvReac Type Severity Reaction Status Date / Time


 


codeine AdvReac  Agitation Verified 07/18/18 14:09


 


zoledronic acid AdvReac  Joint Pain Verified 07/18/18 14:09





[From Reclast]     














- Blood


Blood Available: No


Product(s) Available: None





- Anesthesia Plan


Pre-Op Medication Ordered: None





- Acknowledgements


Anesthesia Type Planned: MAC


Pt an Appropriate Candidate for the Planned Anesthesia: Yes


Alternatives and Risks of Anesthesia Discussed w Pt/Guardian: Yes


Pt/Guardian Understands and Agrees with Anesthesia Plan: Yes





PreAnesthesia Questionnaire


HEENT History: Reports: Impaired Vision, Other (See Below)


Other HEENT History: early stages of cataracts


Cardiovascular History: Reports: Aneurysm, Heart Failure, High Cholesterol, 

Hypertension, Other (See Below)


Other Cardiovascular History: Temporal arteritis (on prednisone for that reason)

, Aortic anyersm, aortic stenosis, stents


Respiratory History: Reports: None, Sleep Apnea


Gastrointestinal History: Reports: GERD, Pancreatitis, PUD, Other (See Below)


Other Gastrointestinal History: Peptic ulcers/ stents to pancreas and common 

bile duct


Genitourinary History: Reports: Other (See Below)


Other Genitourinary History: biopsy to prostate (negative)


OB/GYN History: Reports: None


Musculoskeletal History: Reports: Arthritis, Osteoporosis


Other Musculoskeletal History: knee pain


Neurological History: Reports: None, Headaches, Chronic


Other Neuro History: headaches from temporal arteritis


Psychiatric History: Reports: Anxiety, Depression


Other Psychiatric History: On sertraline for "cabin fever"


Endocrine/Metabolic History: Reports: Hypothyroidism


Other Endocrine/Metabolic History: hips, early signs of osteoporosis


Hematologic History: Reports: Other (See Below)


Immunologic History: Reports: Immunosuppression, Other (See Below)


Other Immunologic History: steroid use at this time for his temporal arteritis


Oncologic (Cancer) History: Reports: None


Dermatologic History: Reports: None





- Past Surgical History


Head Surgeries/Procedures: Reports: None


HEENT Surgical History: Reports: None


Cardiovascular Surgical History: Reports: Valve Replacement, Vascular Surgery, 

Other (See Below)


Other Cardiovascular Surgeries/Procedures: aortic aneurysm repair


Respiratory Surgical History: Reports: None


GI Surgical History: Reports: ERCP


Other GI Surgeries/Procedures: Whipple procedure


Female  Surgical History: Reports: None


Male  Surgical History: Reports: Prostate Biopsy


Endocrine Surgical History: Reports: None


Neurological Surgical History: Reports: None


Musculoskeletal Surgical History: Reports: None


Oncologic Surgical History: Reports: None


Dermatological Surgical History: Reports: None





- SUBSTANCE USE


Smoking Status *Q: Former Smoker


Tobacco Use Within Last Twelve Months: No


Second Hand Smoke Exposure: Yes


Days Per Week of Alcohol Use: 0


Number of Drinks Per Day: 0


Total Drinks Per Week: 0


Recreational Drug Use History: No





- HOME MEDS


Home Medications: 


 Home Meds





Acetaminophen [Tylenol Extra Strength] 1,000 mg PO BID PRN 12/11/16 [History]


Aspirin 81 mg PO DAILY 12/11/16 [History]


Fenofibrate Nanocrystallized [Tricor] 145 mg PO DAILY 12/11/16 [History]


Finasteride [Proscar] 5 mg PO DAILY 12/11/16 [History]


HYDROmorphone [Dilaudid] 2 mg PO Q8HR PRN 12/11/16 [History]


Levothyroxine 150 mcg PO DAILY 12/11/16 [History]


Multivitamin [Multivitamins] 1 tab PO DAILY 12/11/16 [History]


Pravastatin [Pravachol] 40 mg PO DAILY 12/11/16 [History]


Prednisone [IJD: Prednisone] 5 mg PO DAILY 12/11/16 [History]


Warfarin [Coumadin] 1.25 mg PO DAILY 12/15/16 [History]


Omeprazole Magnesium [Prilosec Otc] 20 mg PO BID #60 tablet. 12/16/16 [Rx]


Ferrous Sulfate [Slow Fe] 50 tab PO BID 01/30/18 [History]


Lipase/Protease/Amylase [Viokace 20,880-78,300 Units Tb] 1 each PO TID 01/30/18 

[History]


Ondansetron [Zofran ODT] 4 mg PO Q4H PRN 01/30/18 [History]


Tamsulosin HCl [Flomax] 0.4 mg PO DAILY 01/30/18 [History]


fentaNYL [Duragesic] 12 mcg TD Q72H 01/30/18 [History]











- CURRENT (IN HOUSE) MEDS


Current Meds: 





 Current Medications





Lactated Ringer's (Ringers, Lactated)  1,000 mls @ 125 mls/hr IV ASDIRECTED LEVAR


   Stop: 07/19/18 23:00


Lidocaine/Sodium Bicarbonate (Buffered Lidocaine 1% In Ns 8.4%)  0.25 ml IDERM 

ONETIME PRN


   PRN Reason: Prior to IV Start


   Stop: 07/19/18 18:00


Sodium Chloride (Saline Flush)  10 ml FLUSH ASDIRECTED PRN


   PRN Reason: Keep Vein Open


   Stop: 07/19/18 18:00

## 2018-07-20 NOTE — OR
DATE OF OPERATION:  07/19/2018

 

SURGEON:  Vazquez Valencia MD

 

PREOPERATIVE DIAGNOSIS:

Epigastric pain and history of Whipple procedure.

 

POSTOPERATIVE DIAGNOSIS:

Epigastric pain and history of Whipple procedure.

 

OPERATION PERFORMED:  EGD.

 

FINDINGS:

Pyloric-preserving Whipple operation with both limbs of the jejunum functioning

well and without any evidence of ulceration either in the stomach or the

jejunum.  There was a small sliding hiatal hernia.  GE junction was normal.  The

fundus, body, and cardia of the stomach were unremarkable.  The balance of the

esophagus did not show any pathology.  No acute pathology was found on the study

to explain his epigastric pain.

 

ANESTHESIA:

Procedure done under IV sedation.

 

DESCRIPTION OF PROCEDURE:

The patient was taken to the endoscopy room, placed in a supine position,

connected to monitoring equipment, and given IV sedation.  A bite block was

inserted and video Olympus gastroscope placed in the posterior oropharynx under

direct vision, threaded past the cricopharyngeus, down the esophagus, and into

the stomach.  The stomach was insufflated, and the scope passed through the

pylorus, to the jejunum, and 2 limbs of the jejunostomy were noted.  Both were

reviewed and did not see any acute pathology.  Pylorus was free of any acute

pathology as was the body, cardia, and antrum of the stomach.  Hiatus was

unremarkable.  Sliding hiatal hernia noted.  GE junction located at about 40 cm

and did not show any acute pathology.  Rest of the esophagus was free of any

disease.  The patient tolerated the procedure.  Biopsies were not being done

because the patient was not taken off his anticoagulation because of risk

factors.  The patient tolerated the procedure, was sent to recovery room in a

stable condition, and will be followed up with Dr. Cheng as a regular

appointment.

 

ESTIMATED BLOOD LOSS:

 

 

DD:  07/19/2018 12:21:45

DT:  07/19/2018 13:03:37  MMODAL

Job #:  [id number]

## 2019-05-09 NOTE — PROGRESS NOTES
ANTICOAGULATION FOLLOW-UP CLINIC VISIT    Patient Name:  Lucas Brown  Date:  11/3/2017  Contact Type:  Telephone    SUBJECTIVE:     Patient Findings     Positives Change in diet/appetite (Pt is on a full liquid diet.  He will try to get some Vit K rich foods into his diet this weekend.)           OBJECTIVE    INR   Date Value Ref Range Status   11/03/2017 5.00 (H) 0.86 - 1.14 Final       ASSESSMENT / PLAN  INR assessment SUPRA    Recheck INR In: 3 DAYS    INR Location Clinic      Anticoagulation Summary as of 11/3/2017     INR goal 2.0-3.0   Today's INR 5.00!   Maintenance plan No maintenance plan   Full instructions 11/3: Hold; 11/4: Hold; 11/5: 1.25 mg   Plan last modified Gwendolyn Goodman RN (9/15/2017)   Next INR check 11/6/2017   Priority INR   Target end date     Indications   H/O aortic valve replacement [Z95.2]  Long-term (current) use of anticoagulants [Z79.01] [Z79.01]         Anticoagulation Episode Summary     INR check location     Preferred lab     Send INR reminders to [facility]    Comments Pt is staying local and [facility] comes out  Monitoring pt for 4-6 weeks then go back to PCP at [facility] BERNIE Mcgee  Has 2.5 and 5mg tablets       Anticoagulation Care Providers     Provider Role Specialty Phone number    Vik Crum MD Responsible Transplant [phone]            See the Encounter Report to view Anticoagulation Flowsheet and Dosing Calendar (Go to Encounters tab in chart review, and find the Anticoagulation Therapy Visit)    Spoke with patient.    Yandy Salvador RN                Patient sleeping with no visible signs or symptoms of distress, appears comfortable.  Equal chest rise, breathing non labored. RN to continue to monitor. Sitter posted.

## 2019-06-17 PROBLEM — Z79.01 LONG TERM CURRENT USE OF ANTICOAGULANT THERAPY: Status: ACTIVE | Noted: 2017-09-14

## 2019-07-09 NOTE — PLAN OF CARE
[facility] - NEUROLOGY  [facility] [place]    Date: July 9, 2019   Patient Name: Adri Bennett   MRN: [id number]   PCP: Bethany Hollins  Amando Friend MD  [facility]   Problem: Goal Outcome Summary  Goal: Goal Outcome Summary  PT 4A: patient able to perform standing activities without AD and SBA.  Ambulated 90' with FWW and min assist.  Mobility improving, remains limited by fatigue and pain.     Given expected length of acute stay and pending continued improvement, anticipate patient able to discharge to local apartment when stable.        NOEL Steve MD at Bates County Memorial Hospital ENDO ([facility])    HYSTERECTOMY  2009    Formerly Vidant Duplin 

## 2019-10-03 ENCOUNTER — HEALTH MAINTENANCE LETTER (OUTPATIENT)
Age: 59
End: 2019-10-03
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
## 2020-06-06 NOTE — CR
Left knee: 4 views of the left knee were obtained.

 

Comparison: No prior knee study is available.

 

Medial and lateral joint compartments are maintained in height.  No 

joint effusion is identified.  No acute fracture, dislocation or other

 bony abnormality is appreciated.

 

Impression:

1.  No abnormality is identified on left knee exam.

 

Diagnostic code #1

 

This report was dictated in MDT

## 2020-11-07 ENCOUNTER — HEALTH MAINTENANCE LETTER (OUTPATIENT)
Age: 60
End: 2020-11-07

## 2021-03-27 NOTE — PLAN OF CARE
/76 (BP Location: Left leg)  Pulse 78  Temp 97.4  F (36.3  C) (Oral)  Resp 18  Wt 92.5 kg (203 lb 14.4 oz)  SpO2 92%  BMI 28.44 kg/m2    VSS on RA. Pt complains of throat and abdominal pain. PRN oxy, IV dilaudid, fentanyl patch and scheduled tylenol given with some relief. Pt had one episode of nausea, zofran given with relief.   PICC with MIVF @ 150. NG to LCS wtih moderate OP, LR replacement given. G/J with TF @ 65ml/hr. Voiding adequate amounts, x1 BM. Up ad kip. NPO. Will continue to monitor. Call light in reach, continue with POC.    Patient presents to the ED with c/o a frontal headache x this morning. Pt reports having a hx of migraines and gets them 2-3 times a day but stated this time it feels different. Reports photophobia. Reports N/V/D. Stated his mom gave him imodium that helped with his stomach pain and stopped the diarrhea. Pt reports a \"warm\" sensation in his stomach. Denies fevers, SOB or cough. Stated he smoked weed today to try to help but it made his symptoms worse. Pt is alert, oriented and appropriate. Ambulatory on arrival. No abdominal tenderness noted on assessment. Lights dimmed for patient comfort. Emergency Department Nursing Plan of Care       The Nursing Plan of Care is developed from the Nursing assessment and Emergency Department Attending provider initial evaluation. The plan of care may be reviewed in the ED Provider note.     The Plan of Care was developed with the following considerations:   Patient / Family readiness to learn indicated by:verbalized understanding  Persons(s) to be included in education: patient  Barriers to Learning/Limitations:No    Signed     Lexington Risen    3/26/2021   8:20 PM

## 2021-09-05 ENCOUNTER — HEALTH MAINTENANCE LETTER (OUTPATIENT)
Age: 61
End: 2021-09-05

## 2021-10-31 NOTE — EDM.PDOC
ED HPI GENERAL MEDICAL PROBLEM





- General


Chief Complaint: Back Pain or Injury


Stated Complaint: BACK/RIB INJURY


Time Seen by Provider: 10/31/21 19:12





- History of Present Illness


INITIAL COMMENTS - FREE TEXT/NARRATIVE: 





60-year-old male presents the emergency room with back pain.





Patient rolled out of bed funny and his back is hurt ever since.  He was seen in

the walk-in clinic couple days ago they gave him some oxycodone but this really 

has not helped.  Patient is on warfarin blood thinners.  He is placed on this 

with prophylaxis for a prosthetic heart valve and he has a pacemaker.  Patient 

denies a specific injury he thinks it is related to getting out of bed at a 

funny angle.  The patient was seen in the walk-in clinic where they gave him 

Percocet.  Is been going on for the last for 5 days or so without any improvem

ent pain seems to be mid back does not extend down his extremities no loss of 

bowel or bladder control.





- Related Data


                                    Allergies











Allergy/AdvReac Type Severity Reaction Status Date / Time


 


codeine AdvReac  Agitation Verified 10/31/21 19:07


 


zoledronic acid AdvReac  Joint Pain Verified 10/31/21 19:07





[From Reclast]     











Home Meds: 


                                    Home Meds





Acetaminophen [Tylenol Extra Strength] 1,000 mg PO BID PRN 12/11/16 [History]


Aspirin 81 mg PO DAILY 12/11/16 [History]


Fenofibrate Nanocrystallized [Tricor] 145 mg PO DAILY 12/11/16 [History]


Finasteride [Proscar] 5 mg PO DAILY 12/11/16 [History]


Levothyroxine 175 mcg PO DAILY 12/11/16 [History]


Multivitamin [Multivitamins] 1 tab PO DAILY 12/11/16 [History]


Warfarin [Coumadin] 2.5 mg PO SUTUWETHSA 12/15/16 [History]


Omeprazole Magnesium [Prilosec Otc] 20 mg PO BID #60 tablet. 12/16/16 [Rx]


Ferrous Sulfate [Slow Fe] 50 tab PO BID 01/30/18 [History]


Lipase/Protease/Amylase [Viokace 20,880-78,300 Units Tb] 1 each PO ASDIRECTED 

PRN 01/30/18 [History]


Tamsulosin HCl [Flomax] 0.4 mg PO DAILY 01/30/18 [History]


Albuterol Sulfate [Albuterol Sulfate Hfa] 2 puff INH Q4H PRN 06/06/20 [History]


Celecoxib [CeleBREX] 200 mg PO DAILY 06/06/20 [History]


Metoprolol Succinate 50 mg PO DAILY 06/06/20 [History]


Rosuvastatin Calcium 40 mg PO DAILY 06/06/20 [History]


Sertraline HCl [Zoloft] 50 mg PO DAILY 06/06/20 [History]


Warfarin [Coumadin] 5 mg PO MOFR 06/06/20 [History]


lisinopriL [Lisinopril] 2.5 mg PO DAILY 06/06/20 [History]


oxyCODONE HCl/Acetaminophen [Percocet 5-325 mg Tablet] 1 - 2 each PO Q6HR PRN 

#20 tablet 06/06/20 [Rx]


Hydrocodone/Acetaminophen [HYDROcodone-Acetaminophen 5-325 MG] 1 each PO BID PRN

#10 tab 10/31/21 [Rx]


Orphenadrine [Norflex] 100 mg PO BID PRN #14 tab 10/31/21 [Rx]











Past Medical History


HEENT History: Reports: Impaired Vision, Other (See Below)


Other HEENT History: early stages of cataracts


Cardiovascular History: Reports: Aneurysm, Heart Failure, High Cholesterol, 

Hypertension, Pacemaker, Other (See Below)


Other Cardiovascular History: Temporal arteritis (on prednisone for that 

reason), Aortic anyersm, aortic stenosis, stents


Respiratory History: Reports: None, Sleep Apnea


Gastrointestinal History: Reports: GERD, Pancreatitis, PUD, Other (See Below)


Other Gastrointestinal History: Peptic ulcers/ stents to pancreas and common 

bile duct


Genitourinary History: Reports: Other (See Below)


Other Genitourinary History: biopsy to prostate (negative)


OB/GYN History: Reports: None


Musculoskeletal History: Reports: Arthritis, Osteoporosis


Other Musculoskeletal History: knee pain


Neurological History: Reports: None, Headaches, Chronic


Other Neuro History: headaches from temporal arteritis


Psychiatric History: Reports: Anxiety, Depression


Other Psychiatric History: On sertraline for "cabin fever"


Endocrine/Metabolic History: Reports: Hypothyroidism


Other Endocrine/Metabolic History: hips, early signs of osteoporosis


Hematologic History: Reports: Other (See Below)


Immunologic History: Reports: Immunosuppression, Other (See Below)


Other Immunologic History: steroid use at this time for his temporal arteritis


Oncologic (Cancer) History: Reports: None


Dermatologic History: Reports: None





- Past Surgical History


Head Surgeries/Procedures: Reports: None


HEENT Surgical History: Reports: None


Cardiovascular Surgical History: Reports: Valve Replacement, Vascular Surgery, 

Other (See Below)


Other Cardiovascular Surgeries/Procedures: aortic aneurysm repair


Respiratory Surgical History: Reports: None


GI Surgical History: Reports: ERCP


Other GI Surgeries/Procedures: Whipple procedure


Male  Surgical History: Reports: Prostate Biopsy


Endocrine Surgical History: Reports: None


Neurological Surgical History: Reports: None


Musculoskeletal Surgical History: Reports: None


Oncologic Surgical History: Reports: None


Dermatological Surgical History: Reports: None





Social & Family History





- Family History


Family Medical History: No Pertinent Family History


Cardiac: Reports: Bypass, MI, Stent





- Caffeine Use


Caffeine Use: Reports: Coffee


Other Caffeine Use: 1 cup daily or less.





- Living Situation & Occupation


Living situation: Reports: , with Spouse


Occupation: Employed





ED ROS GENERAL





- Review of Systems


Review Of Systems: See Below


Constitutional: Reports: No Symptoms


HEENT: Reports: No Symptoms


Respiratory: Reports: No Symptoms


Cardiovascular: Reports: No Symptoms


GI/Abdominal: Reports: No Symptoms


: Reports: No Symptoms


Musculoskeletal: Reports: Back Pain


Skin: Reports: No Symptoms, Change in Color


Neurological: Reports: No Symptoms


Psychiatric: Reports: No Symptoms





ED EXAM, GENERAL





- Physical Exam


Exam: See Below


Exam Limited By: No Limitations


General Appearance: Alert, No Apparent Distress


Ears: Normal External Exam, Normal Canal, Hearing Grossly Normal, Normal TMs


Nose: Normal Inspection, Normal Mucosa, No Blood


Throat/Mouth: Normal Inspection, Normal Lips, Normal Teeth, Normal Gums, Normal 

Oropharynx, Normal Voice, No Airway Compromise


Head: Atraumatic, Normocephalic


Neck: Normal Inspection, Supple, Non-Tender, Full Range of Motion


Respiratory/Chest: No Respiratory Distress, Lungs Clear, Normal Breath Sounds, 

No Accessory Muscle Use, Chest Non-Tender


Cardiovascular: Normal Peripheral Pulses, Regular Rate, Rhythm, No Edema, No 

Gallop, No JVD, No Murmur, No Rub


GI/Abdominal: Normal Bowel Sounds, Soft, Non-Tender


Back Exam: Normal Inspection (Tenderness especially in the thoracic spine into 

the upper lumbar spine right side more than left this involves the lateral 

muscles the paraspinous muscles he has no midline tenderness)





Course





- Vital Signs


Last Recorded V/S: 


                                Last Vital Signs











Temp  36.6 C   10/31/21 19:08


 


Pulse  70   10/31/21 19:08


 


Resp  18   10/31/21 19:08


 


BP  155/84 H  10/31/21 19:08


 


Pulse Ox  96   10/31/21 19:08














- Orders/Labs/Meds


Orders: 


                               Active Orders 24 hr











 Category Date Time Status


 


 Orphenadrine [Norflex] Med  10/31/21 21:00 Active





 100 mg PO BID   








                                Medication Orders





Orphenadrine Citrate (Orphenadrine 100 Mg Tab.Er)  100 mg PO BID LEVAR


   Last Admin: 10/31/21 19:51  Dose: 100 mg


   Documented by: [id number]








Labs: 


                                Laboratory Tests











  10/31/21 10/31/21 10/31/21 Range/Units





  19:40 19:40 19:40 


 


WBC  7.77    (4.23-9.07)  K/mm3


 


RBC  4.70    (4.63-6.08)  M/mm3


 


Hgb  13.5 L    (13.7-17.5)  gm/dl


 


Hct  41.2    (40.1-51.0)  %


 


MCV  87.7    (79.0-92.2)  fl


 


MCH  28.7    (25.7-32.2)  pg


 


MCHC  32.8    (32.2-35.5)  g/dl


 


RDW Std Deviation  44.0 H    (35.1-43.9)  fL


 


Plt Count  219    (163-337)  K/mm3


 


MPV  10.4    (9.4-12.3)  fl


 


Neut % (Auto)  62.8    (34.0-67.9)  %


 


Lymph % (Auto)  24.2    (21.8-53.1)  %


 


Mono % (Auto)  10.0    (5.3-12.2)  %


 


Eos % (Auto)  2.3    (0.8-7.0)  


 


Baso % (Auto)  0.3    (0.1-1.2)  %


 


Neut # (Auto)  4.88    (1.78-5.38)  K/mm3


 


Lymph # (Auto)  1.88    (1.32-3.57)  K/mm3


 


Mono # (Auto)  0.78    (0.30-0.82)  K/mm3


 


Eos # (Auto)  0.18    (0.04-0.54)  K/mm3


 


Baso # (Auto)  0.02    (0.01-0.08)  K/mm3


 


PT   20.2 H   (9.7-12.0)  SECONDS


 


INR   1.86   


 


Sodium    141  (136-145)  mEq/L


 


Potassium    4.4  (3.5-5.1)  mEq/L


 


Chloride    107  ()  mEq/L


 


Carbon Dioxide    26  (21-32)  mEq/L


 


Anion Gap    12.4  (5-15)  


 


BUN    17  (7-18)  mg/dL


 


Creatinine    1.3  (0.7-1.3)  mg/dL


 


Est Cr Clr Drug Dosing    TNP  


 


Estimated GFR (MDRD)    56  (>60)  mL/min


 


BUN/Creatinine Ratio    13.1 L  (14-18)  


 


Glucose    125 H  (70-99)  mg/dL


 


Calcium    8.4 L  (8.5-10.1)  mg/dL


 


Total Bilirubin    0.4  (0.2-1.0)  mg/dL


 


AST    25  (15-37)  U/L


 


ALT    33  (16-63)  U/L


 


Alkaline Phosphatase    79  ()  U/L


 


Total Protein    6.6  (6.4-8.2)  g/dl


 


Albumin    3.4  (3.4-5.0)  g/dl


 


Globulin    3.2  gm/dL


 


Albumin/Globulin Ratio    1.1  (1-2)  











Meds: 


Medications











Generic Name Dose Route Start Last Admin





  Trade Name Freq  PRN Reason Stop Dose Admin


 


Orphenadrine Citrate  100 mg  10/31/21 21:00  10/31/21 19:51





  Orphenadrine 100 Mg Tab.Er  PO   100 mg





  BID LEVAR   Administration














Discontinued Medications














Generic Name Dose Route Start Last Admin





  Trade Name Freq  PRN Reason Stop Dose Admin


 


Hydromorphone HCl  0.5 mg  10/31/21 19:23  10/31/21 19:50





  Hydromorphone 0.5 Mg/0.5 Ml Syringe  IVPUSH  10/31/21 19:24  0.5 mg





  ONETIME ONE   Administration














- Re-Assessments/Exams


Free Text/Narrative Re-Assessment/Exam: 





10/31/21 21:25


Is doing much better after Norflex and a little bit of Dilaudid patient cannot 

have nonsteroidals because he is on Coumadin.  The patient developed his 

symptoms while taking Zanaflex we will stop the Zanaflex.





Departure





- Departure


Time of Disposition: 21:26


Disposition: Home, Self-Care 01


Clinical Impression: 


 Back strain








- Discharge Information


Referrals: 


Topher Cheng MD [Primary Care Provider] - 


Forms:  ED Department Discharge


Additional Instructions: 


Return to the emergency room with any questions problems or worsening symptoms.





Stop the Zanaflex.  You been started on Norflex this is another muscle relaxant 

take this twice daily.  Because you cannot take nonsteroidals I gave you a few 

Norco, this is hydrocodone try not to take more than 2 in a 24-hour.  Do not 

drive or return to work within 12 hours of using the hydrocodone the Norflex can

also blunt your reflexes and slow response time until you know how this affects 

you do not drive.





Follow-up in the clinic on Wednesday or Thursday for recheck





Sepsis Event Note (ED)





- Focused Exam


Vital Signs: 


                                   Vital Signs











  Temp Pulse Resp BP Pulse Ox


 


 10/31/21 19:08  36.6 C  70  18  155/84 H  96














- My Orders


Last 24 Hours: 


My Active Orders





10/31/21 21:00


Orphenadrine [Norflex]   100 mg PO BID 














- Assessment/Plan


Last 24 Hours: 


My Active Orders





10/31/21 21:00


Orphenadrine [Norflex]   100 mg PO BID

## 2021-11-07 NOTE — EDM.PDOC
ED HPI GENERAL MEDICAL PROBLEM





- General


Chief Complaint: General


Stated Complaint: RIB PAIN


Time Seen by Provider: 11/07/21 19:56


Source of Information: Reports: Patient


History Limitations: Reports: No Limitations





- History of Present Illness


INITIAL COMMENTS - FREE TEXT/NARRATIVE: 





Patient is a 60-year-old male who for the past week and a half is complaining of

having back pain which seems to be a little worse on the left side.  Pain is 

much worse with movement.  This all started after he was doing some heavy 

lifting working on a friend's car and the next day when he got out of bed and 

twisted.  He feels that he heard a popping sound and may have injured his rib.  

Patient also has a chronic cough which is occasionally productive but no worse 

than usual but is coughing and any movement makes his symptoms worse.  Patient 

was seen by urgent care last Friday 10 days ago and was started pain meds and 

muscle relaxant then was reseen here on Sunday and had his muscle relaxant 

switched and given a couple days of pain medication which she is currently out 

of.  Patient feels no better is actually feeling worse.  He denies fever chills 

body aches or any Covid symptoms.  He has not been nauseous or vomiting.  He is 

not feel short of breath.  Has been treating his pain with heat which does help.

 He has seen his chiropractor for the above symptoms without any relief.


Duration: Week(s): (1.5)


Location: Reports: Back


Quality: Reports: Ache, Dull


Severity: Severe


Improves with: Reports: Heat Therapy


Worsens with: Reports: Movement


Context: Reports: Activity


Associated Symptoms: Reports: No Other Symptoms


Treatments PTA: Reports: Other Medication(s) (Narcotic pain medicine and muscle 

relaxants.)


  ** Right Upper Abdomen


Pain Score (Numeric/FACES): 9





- Related Data


                                    Allergies











Allergy/AdvReac Type Severity Reaction Status Date / Time


 


codeine AdvReac  Agitation Verified 10/31/21 19:07


 


zoledronic acid AdvReac  Joint Pain Verified 10/31/21 19:07





[From Reclast]     











Home Meds: 


                                    Home Meds





Acetaminophen [Tylenol Extra Strength] 1,000 mg PO BID PRN 12/11/16 [History]


Aspirin 81 mg PO DAILY 12/11/16 [History]


Fenofibrate Nanocrystallized [Tricor] 145 mg PO DAILY 12/11/16 [History]


Finasteride [Proscar] 5 mg PO DAILY 12/11/16 [History]


Levothyroxine 175 mcg PO DAILY 12/11/16 [History]


Multivitamin [Multivitamins] 1 tab PO DAILY 12/11/16 [History]


Warfarin [Coumadin] 2.5 mg PO SUTUWETHSA 12/15/16 [History]


Omeprazole Magnesium [Prilosec Otc] 20 mg PO BID #60 tablet.dr 12/16/16 [Rx]


Ferrous Sulfate [Slow Fe] 50 tab PO BID 01/30/18 [History]


Lipase/Protease/Amylase [Viokace 20,880-78,300 Units Tb] 1 each PO ASDIRECTED 

PRN 01/30/18 [History]


Tamsulosin HCl [Flomax] 0.4 mg PO DAILY 01/30/18 [History]


Albuterol Sulfate [Albuterol Sulfate Hfa] 2 puff INH Q4H PRN 06/06/20 [History]


Celecoxib [CeleBREX] 200 mg PO DAILY 06/06/20 [History]


Metoprolol Succinate 50 mg PO DAILY 06/06/20 [History]


Rosuvastatin Calcium 40 mg PO DAILY 06/06/20 [History]


Sertraline HCl [Zoloft] 50 mg PO DAILY 06/06/20 [History]


Warfarin [Coumadin] 5 mg PO MOFR 06/06/20 [History]


lisinopriL [Lisinopril] 2.5 mg PO DAILY 06/06/20 [History]


oxyCODONE HCl/Acetaminophen [Percocet 5-325 mg Tablet] 1 - 2 each PO Q6HR PRN 

#20 tablet 06/06/20 [Rx]


Hydrocodone/Acetaminophen [HYDROcodone-Acetaminophen 5-325 MG] 1 each PO BID PRN

#10 tab 10/31/21 [Rx]


Orphenadrine [Norflex] 100 mg PO BID PRN #14 tab 10/31/21 [Rx]


Codeine/guaiFENesin [Robitussin AC] 10 ml PO Q6HR PRN #118 liquid 11/07/21 [Rx]


Lidocaine 5% [Lidoderm 5%] 1 patch TOP DAILY PRN #15 patch 11/07/21 [Rx]











Past Medical History


HEENT History: Reports: Impaired Vision, Other (See Below)


Other HEENT History: early stages of cataracts


Cardiovascular History: Reports: Aneurysm, Heart Failure, High Cholesterol, Hy

pertension, Pacemaker, Other (See Below)


Other Cardiovascular History: Temporal arteritis (on prednisone for that 

reason), Aortic anyersm, aortic stenosis, stents


Respiratory History: Reports: None, Sleep Apnea


Gastrointestinal History: Reports: GERD, Pancreatitis, PUD, Other (See Below)


Other Gastrointestinal History: Peptic ulcers/ stents to pancreas and common 

bile duct


Genitourinary History: Reports: Other (See Below)


Other Genitourinary History: biopsy to prostate (negative)


OB/GYN History: Reports: None


Musculoskeletal History: Reports: Arthritis, Osteoporosis


Other Musculoskeletal History: knee pain


Neurological History: Reports: None, Headaches, Chronic


Other Neuro History: headaches from temporal arteritis


Psychiatric History: Reports: Anxiety, Depression


Other Psychiatric History: On sertraline for "cabin fever"


Endocrine/Metabolic History: Reports: Hypothyroidism


Other Endocrine/Metabolic History: hips, early signs of osteoporosis


Hematologic History: Reports: Other (See Below)


Immunologic History: Reports: Immunosuppression, Other (See Below)


Other Immunologic History: steroid use at this time for his temporal arteritis


Oncologic (Cancer) History: Reports: None


Dermatologic History: Reports: None





- Past Surgical History


Head Surgeries/Procedures: Reports: None


HEENT Surgical History: Reports: None


Cardiovascular Surgical History: Reports: Valve Replacement, Vascular Surgery, O

ther (See Below)


Other Cardiovascular Surgeries/Procedures: aortic aneurysm repair


Respiratory Surgical History: Reports: None


GI Surgical History: Reports: ERCP


Other GI Surgeries/Procedures: Whipple procedure


Male  Surgical History: Reports: Prostate Biopsy


Endocrine Surgical History: Reports: None


Neurological Surgical History: Reports: None


Musculoskeletal Surgical History: Reports: None


Oncologic Surgical History: Reports: None


Dermatological Surgical History: Reports: None





Social & Family History





- Family History


Family Medical History: No Pertinent Family History


Cardiac: Reports: Bypass, MI, Stent





- Caffeine Use


Caffeine Use: Reports: Coffee, Tea


Other Caffeine Use: 1 cup daily or less.


Caffeine Use Comment: one cup of coffee daily and one tea beverag daily





- Living Situation & Occupation


Living situation: Reports: , with Spouse


Occupation: Employed





ED ROS GENERAL





- Review of Systems


Review Of Systems: Comprehensive ROS is negative, except as noted in HPI.


Constitutional: Reports: No Symptoms


Respiratory: Reports: No Symptoms.  Denies: Shortness of Breath, Pleuritic Chest

 Pain


Cardiovascular: Reports: No Symptoms.  Denies: Chest Pain


GI/Abdominal: Reports: No Symptoms.  Denies: Decreased Appetite, Nausea, 

Vomiting


Musculoskeletal: Reports: Back Pain


Skin: Reports: No Symptoms





ED EXAM, GENERAL





- Physical Exam


Exam: See Below


General Appearance: Alert, Moderate Distress


Neck: Normal Inspection, Supple


Respiratory/Chest: No Respiratory Distress, Normal Breath Sounds, Chest Non-

Tender, Other (Negative rib spring.)


Cardiovascular: Regular Rate, Rhythm, Systolic Murmur


GI/Abdominal: Normal Bowel Sounds, Soft, Non-Tender


Back Exam: Decreased Range of Motion, Muscle Spasm.  No: CVA Tenderness (L), CVA

 Tenderness (R), Vertebral Tenderness


Extremities: Normal Inspection


Neurological: Alert, Oriented


Psychiatric: Depressed Mood


Skin Exam: Warm, Dry





Course





- Vital Signs


Text/Narrative:: 





Treating the patient with a Lidoderm patch to his mid back with a Coban rib 

belt.  He is also given some Phenergan with codeine.  I feel that suppressing 

his cough will help his back spasm.  I will give him prescription for both the 

Lidoderm and Robitussin-AC.  I am recommending patient follow-up with a physical

 therapist as soon as possible he does have an appointment in 3 days with PT I 

am recommending he try to move that up if possible.  Patient is can continue 

using heat on his back and get some over-the-counter Voltaren gel.  He may have 

massage if he tolerates this.  He may continue with his muscle relaxant.


Last Recorded V/S: 


                                Last Vital Signs











Temp  97.3 F   11/07/21 20:07


 


Pulse  72   11/07/21 20:07


 


Resp  20   11/07/21 20:07


 


BP  107/65   11/07/21 20:07


 


Pulse Ox  97   11/07/21 20:07














- Orders/Labs/Meds


Orders: 


                               Active Orders 24 hr











 Category Date Time Status


 


 Codeine/Promethazine [Phenergan with Codeine] Med  11/07/21 20:45 Active





 10 ml PO Q6HR   


 


 Lidocaine 4% [Aspercreme 4%] Med  11/07/21 20:41 Active





 1 each TOP DAILY PRN   


 


 Remove Patch Med  11/08/21 09:00 Active





 1 ea TRDERM DAILY   








                                Medication Orders





Lidocaine (Lidocaine 4% 1 Each Patch)  1 each TOP DAILY PRN


   PRN Reason: Pain


   Last Admin: 11/07/21 21:06  Dose: 1 each


   Documented by: HEIDKEL


Miscellaneous Information (Remove Patch)  1 ea TRDERM DAILY LEVAR


Promethazine HCl/Codeine (Codeine/Promethazine 10-6.25 Mg/5 Ml Syrup 5 Ml Ud 

Cup)  10 ml PO Q6HR LEVAR


   Last Admin: 11/07/21 21:06  Dose: 10 ml


   Documented by: KATHLEEN








Meds: 


Medications











Generic Name Dose Route Start Last Admin





  Trade Name Freq  PRN Reason Stop Dose Admin


 


Lidocaine  1 each  11/07/21 20:41  11/07/21 21:06





  Lidocaine 4% 1 Each Patch  TOP   1 each





  DAILY PRN   Administration





  Pain  


 


Miscellaneous Information  1 ea  11/08/21 09:00 





  Remove Patch  TRDERM  





  DAILY LEVAR  


 


Promethazine HCl/Codeine  10 ml  11/07/21 20:45  11/07/21 21:06





  Codeine/Promethazine 10-6.25 Mg/5 Ml Syrup 5 Ml Ud Cup  PO   10 ml





  Q6HR LEVAR   Administration














Departure





- Departure


Time of Disposition: 21:21


Disposition: Home, Self-Care 01


Condition: Good


Clinical Impression: 


 Muscle spasm of back








- Discharge Information


Instructions:  Muscle Cramps and Spasms, Easy-to-Read


Referrals: 


Topher Cheng MD [Primary Care Provider] - 


Forms:  ED Department Discharge


Additional Instructions: 


Robitussin-AC and Lidoderm as directed.  Coban belt as needed.  Heat and massage

as tolerated.  Follow-up with PCP and PT this week for recheck.  

Over-the-counter Voltaren gel as needed.  Return to ER symptoms are worse.





Sepsis Event Note (ED)





- Evaluation


Sepsis Screening Result: No Definite Risk





- Focused Exam


Vital Signs: 


                                   Vital Signs











  Temp Pulse Resp BP Pulse Ox


 


 11/07/21 20:07  97.3 F  72  20  107/65  97














- My Orders


Last 24 Hours: 


My Active Orders





11/07/21 20:41


Lidocaine 4% [Aspercreme 4%]   1 each TOP DAILY PRN 





11/07/21 20:45


Codeine/Promethazine [Phenergan with Codeine]   10 ml PO Q6HR 





11/08/21 09:00


Remove Patch   1 ea TRDERM DAILY 














- Assessment/Plan


Last 24 Hours: 


My Active Orders





11/07/21 20:41


Lidocaine 4% [Aspercreme 4%]   1 each TOP DAILY PRN 





11/07/21 20:45


Codeine/Promethazine [Phenergan with Codeine]   10 ml PO Q6HR 





11/08/21 09:00


Remove Patch   1 ea TRDERM DAILY

## 2021-12-26 ENCOUNTER — HEALTH MAINTENANCE LETTER (OUTPATIENT)
Age: 61
End: 2021-12-26

## 2022-01-04 NOTE — EDM.PDOC
ED HPI GENERAL MEDICAL PROBLEM





- General


Chief Complaint: Respiratory Problem


Stated Complaint: SOB/COUGH/BODY ACHE


Time Seen by Provider: 01/04/22 06:16





- History of Present Illness


INITIAL COMMENTS - FREE TEXT/NARRATIVE: 





61-year-old male presents the emergency room with a worsening cough and not 

feeling well.





This started Sunday afternoon and is progressively gotten worse.  He has not had

any associated gastrointestinal symptoms no nausea vomiting or abdominal pain.  

He just has a worsening cough for the most part nonproductive he does not have 

associated chest pain or chest pressure with this.  Patient has a remarkable 

history for open heart surgery valve replacement and he has a pacemaker in 

place.  The patient has had his Covid vaccine and booster as well as his 

influenza shot.  The patient's Coumadin is managed by the Coumadin clinic and he

is scheduled to have blood work done in the very near future for this and an 

upcoming cardiology appointment.





- Related Data


                                    Allergies











Allergy/AdvReac Type Severity Reaction Status Date / Time


 


codeine AdvReac  Agitation Verified 01/04/22 05:01


 


zoledronic acid AdvReac  Joint Pain Verified 01/04/22 05:01





[From Reclast]     











Home Meds: 


                                    Home Meds





Acetaminophen [Tylenol Extra Strength] 1,000 mg PO BID PRN 12/11/16 [History]


Aspirin 81 mg PO DAILY 12/11/16 [History]


Finasteride [Proscar] 5 mg PO DAILY 12/11/16 [History]


Multivitamin [Multivitamins] 1 tab PO DAILY 12/11/16 [History]


Warfarin [Coumadin] 2.5 mg PO SUTUWETHSA 12/15/16 [History]


Omeprazole Magnesium [Prilosec Otc] 20 mg PO BID #60 tablet.dr 12/16/16 [Rx]


Ferrous Sulfate [Slow Fe] 50 tab PO BID 01/30/18 [History]


Tamsulosin HCl [Flomax] 0.4 mg PO DAILY 01/30/18 [History]


Albuterol Sulfate [Albuterol Sulfate Hfa] 2 puff INH Q4H PRN 06/06/20 [History]


Celecoxib [CeleBREX] 200 mg PO DAILY 06/06/20 [History]


Metoprolol Succinate 25 mg PO DAILY 06/06/20 [History]


Rosuvastatin Calcium 40 mg PO DAILY 06/06/20 [History]


Sertraline HCl [Zoloft] 50 mg PO DAILY 06/06/20 [History]


Warfarin [Coumadin] 5 mg PO MOFR 06/06/20 [History]


Benzonatate [Tessalon Perles] 200 mg PO TID #6 cap 01/04/22 [Rx]


Budesonide/Formoterol [Symbicort 160-4.5 MCG] 2 puff INH BID 01/04/22 [History]


Furosemide [Lasix] 20 mg PO DAILY 01/04/22 [History]


Hydrocortisone [Cortef] 5 mg PO ACLUNCH 01/04/22 [History]


Hydrocortisone [Cortef] 15 mg PO ACBREAKFAST 01/04/22 [History]


Levothyroxine Sodium [Levothyroxine] 137 mcg PO DAILY 01/04/22 [History]


Oseltamivir [Tamiflu] 75 mg PO BID #9 cap 01/04/22 [Rx]


Topiramate [Topamax] 50 mg PO DAILY 01/04/22 [History]


tiZANidine [Zanaflex] 4 mg PO Q8H PRN 01/04/22 [History]











Past Medical History


HEENT History: Reports: Impaired Vision, Other (See Below)


Other HEENT History: early stages of cataracts


Cardiovascular History: Reports: Aneurysm, Heart Failure, High Cholesterol, 

Hypertension, Pacemaker, Other (See Below)


Other Cardiovascular History: Temporal arteritis (on prednisone for that 

reason), Aortic anyersm, aortic stenosis, stents


Respiratory History: Reports: COPD, Sleep Apnea


Gastrointestinal History: Reports: GERD, Pancreatitis, PUD, Other (See Below)


Other Gastrointestinal History: Peptic ulcers/ stents to pancreas and common 

bile duct


Genitourinary History: Reports: Other (See Below)


Other Genitourinary History: biopsy to prostate (negative)


OB/GYN History: Reports: None


Musculoskeletal History: Reports: Arthritis, Osteoporosis


Other Musculoskeletal History: knee pain


Neurological History: Reports: Headaches, Chronic


Other Neuro History: headaches from temporal arteritis


Psychiatric History: Reports: Anxiety, Depression


Other Psychiatric History: On sertraline for "cabin fever"


Endocrine/Metabolic History: Reports: Hypothyroidism


Other Endocrine/Metabolic History: hips, early signs of osteoporosis


Hematologic History: Reports: Other (See Below)


Immunologic History: Reports: Immunosuppression


Other Immunologic History: steroid use at this time for his temporal arteritis


Oncologic (Cancer) History: Reports: None


Dermatologic History: Reports: None





- Past Surgical History


Head Surgeries/Procedures: Reports: None


HEENT Surgical History: Reports: None


Cardiovascular Surgical History: Reports: Valve Replacement, Vascular Surgery, 

Other (See Below)


Other Cardiovascular Surgeries/Procedures: aortic aneurysm repair


Respiratory Surgical History: Reports: None


GI Surgical History: Reports: ERCP


Other GI Surgeries/Procedures: Whipple procedure


Male  Surgical History: Reports: Prostate Biopsy


Endocrine Surgical History: Reports: None


Neurological Surgical History: Reports: None


Musculoskeletal Surgical History: Reports: None


Oncologic Surgical History: Reports: None


Dermatological Surgical History: Reports: None





Social & Family History





- Family History


Family Medical History: No Pertinent Family History


Cardiac: Reports: Bypass, MI, Stent





- Tobacco Use


Tobacco Use Status *Q: Unknown Ever Used Tobacco





- Caffeine Use


Caffeine Use: Reports: None


Other Caffeine Use: 1 cup daily or less.


Caffeine Use Comment: one cup of coffee daily and one tea beverag daily





- Living Situation & Occupation


Living situation: Reports: , with Spouse


Occupation: Employed





ED ROS GENERAL





- Review of Systems


Review Of Systems: See Below


Constitutional: Denies: Fever, Chills


HEENT: Reports: No Symptoms


Respiratory: Reports: Cough.  Denies: Sputum, Hemoptysis


Cardiovascular: Reports: No Symptoms.  Denies: Chest Pain


GI/Abdominal: Reports: No Symptoms


: Reports: No Symptoms


Musculoskeletal: Reports: No Symptoms


Skin: Reports: No Symptoms


Neurological: Reports: No Symptoms


Psychiatric: Reports: No Symptoms





ED EXAM, GENERAL





- Physical Exam


Exam: See Below


Exam Limited By: No Limitations


General Appearance: Alert, No Apparent Distress, Other (He does have a frequent 

cough)


Eye Exam: Bilateral Eye: Normal Inspection


Ears: Normal External Exam, Normal Canal, Hearing Grossly Normal, Normal TMs


Nose: Normal Inspection, Normal Mucosa, No Blood


Throat/Mouth: Normal Inspection, Normal Lips, Normal Teeth, Normal Gums, Normal 

Oropharynx, Normal Voice, No Airway Compromise


Head: Atraumatic, Normocephalic


Neck: Normal Inspection, Supple, Non-Tender, Full Range of Motion.  No: Lympha

denopathy (L), Lymphadenopathy (R)


Respiratory/Chest: No Respiratory Distress, Lungs Clear


Cardiovascular: Normal Peripheral Pulses, Regular Rate, Rhythm, No Edema, No Rub


GI/Abdominal: Normal Bowel Sounds, Soft, Non-Tender, Other (Several well-healed 

incisions)


Back Exam: Normal Inspection.  No: CVA Tenderness (L), CVA Tenderness (R)


Neurological: Alert, Oriented, Normal Cognition





Course





- Vital Signs


Last Recorded V/S: 


                                Last Vital Signs











Temp  36.1 C   01/04/22 07:48


 


Pulse  61   01/04/22 07:48


 


Resp  18   01/04/22 07:48


 


BP  118/74   01/04/22 05:02


 


Pulse Ox  98   01/04/22 07:48














- Orders/Labs/Meds


Orders: 


                               Active Orders 24 hr











 Category Date Time Status


 


 Chest 1V Frontal [CR] Stat Exams  01/04/22 06:38 Taken











Labs: 


                                Laboratory Tests











  01/04/22 Range/Units





  05:00 


 


Influenza Type A RNA  Positive H  (NEGATIVE)  


 


Influenza Type B RNA  Negative  (NEGATIVE)  


 


SARS-CoV-2 RNA (ARELIS)  Negative  (NEGATIVE)  











Meds: 


Medications














Discontinued Medications














Generic Name Dose Route Start Last Admin





  Trade Name Calinq  PRN Reason Stop Dose Admin


 


Benzonatate  200 mg  01/04/22 07:11  01/04/22 07:25





  Benzonatate 100 Mg Cap  PO  01/04/22 07:12  200 mg





  ONETIME ONE   Administration


 


Oseltamivir Phosphate  75 mg  01/04/22 06:43  01/04/22 06:52





  Oseltamivir 75 Mg Cap  PO  01/04/22 06:44  75 mg





  ONETIME ONE   Administration














- Re-Assessments/Exams


Free Text/Narrative Re-Assessment/Exam: 





01/04/22 07:43


Influenza A is positive chest x-ray is nondiagnostic no obvious infiltrate on a 

portable chest x-ray no other acute cardiopulmonary changes he does have some 

degree of cardiomegaly pacemaker is seen wires appear intact.  The patient has 

had open heart surgery he has had a gastric bypass procedure he is certainly at 

risk for influenza his onset time is less than 48 hours he will be started 

immediately on Tamiflu.  We will also start him on Tessalon Perles his first 

dose of each given here in the emergency room.





Patient had lab work done at [facility] that he was able to pull up on his alon done

 in September 2021 that showed good renal function and creatinine clearance.











Departure





- Departure


Time of Disposition: 07:45


Disposition: Home, Self-Care 01


Clinical Impression: 


 Influenza A








- Discharge Information


Prescriptions: 


Oseltamivir [Tamiflu] 75 mg PO BID #9 cap


Benzonatate [Tessalon Perles] 200 mg PO TID #6 cap


Instructions:  Influenza, Adult


Referrals: 


Yasmin Muñoz MD [Primary Care Provider] - 


Forms:  ED Department Discharge


Additional Instructions: 


Return to the emergency room with any questions problems or worsening symptoms.





You have been started on Tamiflu this is an antiinfluenza medication your first 

dose was given in the emergency room  your pick prescription and start 

this this evening.  You have also been started on Tessalon Perles take 1 

approximately every 8 hours today and tomorrow do not exceed 6 more doses your 

first dose was given here in the emergency room.





Be sure and drink plenty of fluids.





Tylenol as needed for discomfort and if you develop any fevers.





Sepsis Event Note (ED)





- Evaluation


Sepsis Screening Result: No Definite Risk





- Focused Exam


Vital Signs: 


                                   Vital Signs











  Temp Pulse Resp BP Pulse Ox


 


 01/04/22 07:48  36.1 C  61  18   98


 


 01/04/22 05:02  36.9 C  80  17  118/74  97














- My Orders


Last 24 Hours: 


My Active Orders





01/04/22 06:38


Chest 1V Frontal [CR] Stat 














- Assessment/Plan


Last 24 Hours: 


My Active Orders





01/04/22 06:38


Chest 1V Frontal [CR] Stat

## 2022-01-04 NOTE — CR
EXAM: XR CHEST 1 VIEW

LOCATION: [facility] [employer]

DATE/TIME: 1/4/2022 6:51 AM

INDICATION: Cough, influenza A.

COMPARISON: None.

IMPRESSION: Left chest multiple lead cardiac assist device in place. Heart size 
and postoperative mediastinal

contours are within normal limits. Normal vasculature. The lungs and pleural 
spaces are clear.

SIGNED BY: Carlo Baig MD 1/4/2022 8:27 AM

BRIGITTE

## 2022-05-13 ENCOUNTER — LAB (OUTPATIENT)
Dept: LAB | Facility: CLINIC | Age: 62
End: 2022-05-13

## 2022-05-13 DIAGNOSIS — Z79.01 ANTICOAGULATION MONITORING, INR RANGE 2.5-3.5: Primary | ICD-10-CM

## 2022-05-13 LAB — INR PPP: 1.91 (ref 0.85–1.15)

## 2022-05-13 PROCEDURE — 36415 COLL VENOUS BLD VENIPUNCTURE: CPT

## 2022-05-13 PROCEDURE — 85610 PROTHROMBIN TIME: CPT

## 2022-07-21 NOTE — PROGRESS NOTES
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 I&D RLQ with draining wound     Moisture Management:  Ostomy Wound manager pouch    Current Diet / Nutrition:           Active Diet Order      NPO for Medical/Clinical Reasons Except for: Ice Chips         TPN    Koko Assessment and sub scores:   Koko Score  Av.3  Min: 14  Max: 19     Labs:         Recent Labs   Lab Test  17   0755  17   0712   17   0731   17   0341   17   0329   ALBUMIN   --    --    --    --    --   2.5*   --   2.8*   HGB   --   8.2*   < >  8.2*   < >  7.1*   < >  8.5*   RBC   --   3.15*   < >  3.05*   < >  2.68*   < >  3.29*   WBC   --   11.1*   < >  10.1   < >  6.2   < >  6.2   PLT   --   449   < >  431   < >  129*   < >  130*   INR  2.13*  2.61*   --   2.24*   < >   --    --    --    A1C   --    --    --    --    --    --    --   5.7   CRP   --    --    --   130.0*   --    --    < >   --     < > = values in this interval not displayed.          Wound Assessment (location #1):   RLQ  Wound History:  I&D incisional wound with brown mucous drainage    Wound Base: Not able to visualize, tunneled wound, moist and non-granulation    Specific Dimensions (length x width x depth, in cm) :   3 x 0.5 x 4+ cm    Tunneling:  As above    Palpation of the wound bed:  normal    Slough appearance:  none    Eschar appearance:  none    Periwound Skin: intact, erythema and induration,      Color: red    Temperature  normal     Drainage:   Amount: light to moderate . Color: brown mucous type drainage     Odor: none    Pain:  absent ,           Intervention:     Patient's chart evaluated.      Wound(s) was assessed    Wound Care: was  done:  Visual inspection    Demonstrated ostomy pouch change and wound packing to patient and wife    Gave written pouch change instructions and Supply ordering information and RX    Orders  Reviewed    Supplies  Mesalt packing    Discussed plan of care with Patient, Family and Nurse          Assessment:       RLQ I&D incisional wound with moderate drainage requiring containment         Plan:     Nursing to notify the Provider(s) and re-consult the WOC Nurse if wound(s) deteriorate(s).    Plan of care for wound located on RLQ: Nursing to change pouch PRN if leaking, use 2 piece urostomy pouching system stocked in 7B supply room. Change Mesalt gauze packing 1 x daily     WOC Nurse will return: PRN     Face to face time: 30 minutes      Self

## 2022-10-29 ENCOUNTER — HEALTH MAINTENANCE LETTER (OUTPATIENT)
Age: 62
End: 2022-10-29

## 2023-04-02 ENCOUNTER — HEALTH MAINTENANCE LETTER (OUTPATIENT)
Age: 63
End: 2023-04-02

## 2024-08-17 ENCOUNTER — HEALTH MAINTENANCE LETTER (OUTPATIENT)
Age: 64
End: 2024-08-17

## 2024-12-19 NOTE — ANESTHESIA POSTPROCEDURE EVALUATION
Patient: Lucas Brown    Procedure(s):  pancreaticoduodenectomy ( whipple proceedure) umbilical hernia repair, incidental appendectomy , gastrojejunostomy tube placement, paraveretebral anesthia block. - Wound Class: I-Clean    Diagnosis:Pancreatitis   Diagnosis Additional Information: No value filed.    Anesthesia Type:  General, ETT, Periph. Nerve Block for postop pain    Note:  Anesthesia Post Evaluation    Patient location during evaluation: PACU  Patient participation: Able to fully participate in evaluation  Level of consciousness: awake and alert  Pain management: adequate  Airway patency: patent  Cardiovascular status: hemodynamically stable  Respiratory status: acceptable  Hydration status: stable  PONV: none     Anesthetic complications: None          Last vitals:  Vitals:    08/28/17 0715 08/28/17 0720 08/28/17 0725   BP: 108/70 104/67    Pulse:      Resp: 14 11 15   Temp:      SpO2: 100% 100% 92%         Electronically Signed By: Jeremy Mchugh MD  August 28, 2017  2:48 PM   n/a

## 2024-12-28 ENCOUNTER — HEALTH MAINTENANCE LETTER (OUTPATIENT)
Age: 64
End: 2024-12-28

## 2025-05-14 NOTE — MR AVS SNAPSHOT
After Visit Summary   5/10/2017    Lucas Brown    MRN: [id number]           Patient Information     Date Of Birth          1960        Visit Information        Provider Department      5/10/2017 10:00 AM Rosaline Saldana APRN [facility]        Today's Diagnoses     Idiopathic acute pancreatitis, unspecified complication status    -  1       Follow-ups after your visit        Your next 10 appointments already scheduled     May 15, 2017   Procedure with Titus Miguel MD   [facility], [place], Same Day Surgery (--)    [address]   [place] [address] [phone]              Who to contact     Please call your clinic at [phone] to:    Ask questions about your health    Make or cancel appointments    Discuss your medicines    Learn about your test results    Speak to your doctor   If you have compliments or concerns about an experience at your clinic, or if you wish to file a complaint, please contact [facility] Patient Relations at [phone] or email us at [email or web address]         Additional Information About Your Visit        MyChart Information     [employer] gives you secure access to your electronic health record. If you see a primary care provider, you can also send messages to your care team and make appointments. If you have questions, please call your primary care clinic.  If you do not have a primary care provider, please call [phone] and they will assist you.      [employer] is an electronic gateway that provides easy, online access to your medical records. With [employer], you can request a clinic appointment, read your test results, renew a prescription or communicate with your care team.     To access your existing account, please contact your [facility] or call [phone] for assistance.        Care EveryWhere ID     This is your Care EveryWhere ID. This could be used  "by other organizations to access your [facility] medical records  [id number]        Your Vitals Were     Pulse Temperature Height Pulse Oximetry BMI (Body Mass Index)       82 98.3  F (36.8  C) (Oral) 1.803 m (5' 11\") 97% 27.63 kg/m2        Blood Pressure from Last 3 Encounters:   05/15/17 109/82   05/10/17 104/75   01/20/17 118/79    Weight from Last 3 Encounters:   05/15/17 89.5 kg (197 lb 5 oz)   05/10/17 89.9 kg (198 lb 1.6 oz)   01/20/17 89.8 kg (198 lb)              Today, you had the following     No orders found for display         Today's Medication Changes          These changes are accurate as of: 5/10/17 11:59 PM.  If you have any questions, ask your nurse or doctor.               Start taking these medicines.        Dose/Directions    amylase-lipase-protease 60049 UNITS Cpep per EC capsule   Commonly known as:  CREON   Used for:  Idiopathic acute pancreatitis, unspecified complication status   Started by:  Rosaline Saldana APRN CNP        Take 2-3 with meals / 1-2 with snacks, up to 15 per day.   Quantity:  450 capsule   Refills:  6         These medicines have changed or have updated prescriptions.        Dose/Directions    HYDROmorphone 4 MG tablet   Commonly known as:  DILAUDID   This may have changed:  how much to take   Used for:  S/P ERCP        Dose:  2-4 mg   Take 0.5-1 tablets (2-4 mg) by mouth every 4 hours as needed for moderate to severe pain   Quantity:  90 tablet   Refills:  0       PREDNISONE PO   This may have changed:  Another medication with the same name was removed. Continue taking this medication, and follow the directions you see here.        Dose:  4 mg   Take 4 mg by mouth daily   Refills:  0            Where to get your medicines      These medications were sent to [facility] [place], [address] [address]     Phone:  [phone]     amylase-lipase-protease 71146 UNITS Cpep per EC capsule                Primary " Care Provider Office Phone # Fax #    Cyril Mackay [phone]       [facility] [address] [address]        Thank you!     Thank you for choosing [facility]  for your care. Our goal is always to provide you with excellent care. Hearing back from our patients is one way we can continue to improve our services. Please take a few minutes to complete the written survey that you may receive in the mail after your visit with us. Thank you!             Your Updated Medication List - Protect others around you: Learn how to safely use, store and throw away your medicines at www.disposemymeds.org.          This list is accurate as of: 5/10/17 11:59 PM.  Always use your most recent med list.                   Brand Name Dispense Instructions for use    ACETAMINOPHEN PO      Take 1,000 mg by mouth every 4 hours as needed for pain       amylase-lipase-protease 33683 UNITS Cpep per EC capsule    CREON    450 capsule    Take 2-3 with meals / 1-2 with snacks, up to 15 per day.       aspirin 81 MG tablet      Take 81 mg by mouth daily Holding       CELEBREX PO      Take 200 mg by mouth daily       enoxaparin 80 MG/0.8ML injection    LOVENOX     Inject 80 mg Subcutaneous 2 times daily       fenofibrate 145 MG tablet      Take 145 mg by mouth daily       fentaNYL 12 mcg/hr 72 hr patch    DURAGESIC     Place 1 patch onto the skin every 72 hours       FINASTERIDE PO      Take 5 mg by mouth daily       HYDROmorphone 4 MG tablet    DILAUDID    90 tablet    Take 0.5-1 tablets (2-4 mg) by mouth every 4 hours as needed for moderate to severe pain       levothyroxine 150 MCG tablet    SYNTHROID/LEVOTHROID     Take 150 mcg by mouth daily       lisinopril 10 MG tablet    PRINIVIL/ZESTRIL     Take 10 mg by mouth daily       MULTIVITAMINS Chew      Take 1 chew tab by mouth daily       omeprazole 20 MG CR capsule    priLOSEC     Take 20 mg by mouth 2 times daily       ondansetron 4  MG ODT tab    ZOFRAN-ODT     Take 4 mg by mouth every 8 hours as needed for nausea       pravastatin 40 MG tablet    PRAVACHOL     Take 40 mg by mouth daily       PREDNISONE PO      Take 4 mg by mouth daily       sertraline 50 MG tablet    ZOLOFT     Take 50 mg by mouth daily       WARFARIN SODIUM PO      Holding          normal...

## (undated) DEVICE — ENDO TUBING CO2 SMARTCAP STERILE DISP 100145CO2EXT

## (undated) DEVICE — SU VICRYL 1 CT-1 27" UND J261H

## (undated) DEVICE — SPONGE LAP 18X18" X8435

## (undated) DEVICE — SUCTION TIP YANKAUER W/O VENT K86

## (undated) DEVICE — SU PROLENE 4-0 SHDA 36" 8521H

## (undated) DEVICE — LINEN TOWEL PACK X30 5481

## (undated) DEVICE — Device

## (undated) DEVICE — PITCHER STERILE 1000ML  SSK9004A

## (undated) DEVICE — DRAPE U SPLIT 74X120" 29440

## (undated) DEVICE — SU ETHILON 3-0 PS-1 18" 1663H

## (undated) DEVICE — SU SILK 3-0 TIE 12X30" A304H

## (undated) DEVICE — SUTURE BOOTS 051003PBX

## (undated) DEVICE — TUBE TRANS GASTROJEJUNOSTOMY 22FRX45CM  0250-22

## (undated) DEVICE — SUCTION MANIFOLD DORNOCH ULTRA CART UL-CL500

## (undated) DEVICE — NDL COUNTER 20CT 31142493

## (undated) DEVICE — LINEN TOWEL PACK X6 WHITE 5487

## (undated) DEVICE — SU VICRYL 3-0 SH 27" J316H

## (undated) DEVICE — DRSG ABDOMINAL 07 1/2X8" 7197D

## (undated) DEVICE — SU SILK 0 TIE 6X30" A306H

## (undated) DEVICE — SYR 30ML LL W/O NDL 302832

## (undated) DEVICE — NDL BIOPSY SYSTEM SHARKCORE 22GA NDL ONLY C-22-05

## (undated) DEVICE — SOL WATER IRRIG 1000ML BOTTLE 2F7114

## (undated) DEVICE — SU SILK 3-0 SH 30" K832H

## (undated) DEVICE — GLOVE PROTEXIS POWDER FREE 8.0 ORTHOPEDIC 2D73ET80

## (undated) DEVICE — KIT ENDO FIRST STEP DISINFECTANT 200ML W/POUCH EP-4

## (undated) DEVICE — ENDO SYSTEM WATER BOTTLE & TUBING W/CO2 FILTER 00711549

## (undated) DEVICE — SU SILK 4-0 TIE 12X30" A303H

## (undated) DEVICE — ESU GROUND PAD ADULT W/CORD E7507

## (undated) DEVICE — PACK ENDOSCOPY GI CUSTOM UMMC

## (undated) DEVICE — ESU PENCIL W/COATED BLADE E2450H

## (undated) DEVICE — SPECIMEN CONTAINER 3OZ W/FORMALIN 59901

## (undated) DEVICE — SU PROLENE 3-0 SHDA 36" 8522H

## (undated) DEVICE — SU SILK 2-0 TIE 12X30" A305H

## (undated) DEVICE — ENDO BITE BLOCK ADULT OMNI-BLOC

## (undated) DEVICE — STPL RELOAD 60 X 3.8MM GIA6038L

## (undated) DEVICE — SU PDS II 5-0 RB-2DA 30" Z148H

## (undated) DEVICE — SOL NACL 0.9% IRRIG 3000ML BAG 2B7477

## (undated) DEVICE — NDL BLUNT 18GA 1" W/O FILTER 305181

## (undated) DEVICE — STPL 60 X 3.8MM GIA6038S

## (undated) DEVICE — SPONGE KITTNER 30-101

## (undated) DEVICE — DRSG MEDIPORE 3 1/2X13 3/4" 3573

## (undated) DEVICE — SU SILK 1 TIE 6X30" A307H

## (undated) DEVICE — BLADE CLIPPER SGL USE 9680

## (undated) DEVICE — SU PROLENE 5-0 RB-1DA 36"  8556H

## (undated) DEVICE — DRSG DRAIN 2X2" 7087

## (undated) DEVICE — SU SILK 3-0 SH CR 8X18" C013D

## (undated) DEVICE — DRSG PRIMAPORE 02X3" 7133

## (undated) DEVICE — WIPES FOLEY CARE SURESTEP PROVON DFC100

## (undated) DEVICE — DRAIN JACKSON PRATT ROUND W/TROCAR 19FR JP-HUR195

## (undated) DEVICE — LINEN GOWN XLG 5407

## (undated) DEVICE — DRSG KERLIX 2 1/4"X3YDS ROLL 6720

## (undated) DEVICE — NDL BIOPSY SYSTEM SHARKCORE 22GA W/HANDLE DSC-22-01

## (undated) DEVICE — TAPE DURAPORE 3" SILK 1538-3

## (undated) DEVICE — APPLICATOR COTTON TIP 6"X2 STERILE LF 6012

## (undated) DEVICE — SUCTION TIP POOLE K770

## (undated) DEVICE — SU MONOCRYL 4-0 PS-2 27" UND Y426H

## (undated) DEVICE — SU PDS II 4-0 RB-1 27" Z304H

## (undated) DEVICE — PREP CHLORAPREP 26ML TINTED ORANGE  260815

## (undated) DEVICE — TUBING SUCTION 10'X3/16" N510

## (undated) DEVICE — DRAIN JACKSON PRATT RESERVOIR 100ML SU130-1305

## (undated) DEVICE — BLADE KNIFE SURG 15 371115

## (undated) DEVICE — SOL NACL 0.9% IRRIG 1000ML BOTTLE 2F7124

## (undated) DEVICE — ESU LIGASURE IMPACT OPEN SEALER/DVDR CVD LG JAW LF4418

## (undated) DEVICE — SU SILK 2-0 SH 30" K833H

## (undated) DEVICE — SU PDS II 0 TP-1 60" Z991G

## (undated) RX ORDER — ROCURONIUM BROMIDE 50 MG/5 ML
SYRINGE (ML) INTRAVENOUS
Status: DISPENSED
Start: 2017-08-28

## (undated) RX ORDER — ONDANSETRON 2 MG/ML
INJECTION INTRAMUSCULAR; INTRAVENOUS
Status: DISPENSED
Start: 2017-10-13

## (undated) RX ORDER — PHENYLEPHRINE HCL IN 0.9% NACL 1 MG/10 ML
SYRINGE (ML) INTRAVENOUS
Status: DISPENSED
Start: 2017-08-28

## (undated) RX ORDER — FENTANYL CITRATE 50 UG/ML
INJECTION, SOLUTION INTRAMUSCULAR; INTRAVENOUS
Status: DISPENSED
Start: 2017-08-28

## (undated) RX ORDER — FENTANYL CITRATE 50 UG/ML
INJECTION, SOLUTION INTRAMUSCULAR; INTRAVENOUS
Status: DISPENSED
Start: 2017-10-14

## (undated) RX ORDER — FENTANYL CITRATE 50 UG/ML
INJECTION, SOLUTION INTRAMUSCULAR; INTRAVENOUS
Status: DISPENSED
Start: 2017-10-13

## (undated) RX ORDER — GLYCOPYRROLATE 0.2 MG/ML
INJECTION, SOLUTION INTRAMUSCULAR; INTRAVENOUS
Status: DISPENSED
Start: 2017-08-28

## (undated) RX ORDER — ALBUMIN, HUMAN INJ 5% 5 %
SOLUTION INTRAVENOUS
Status: DISPENSED
Start: 2017-08-28

## (undated) RX ORDER — DEXAMETHASONE SODIUM PHOSPHATE 4 MG/ML
INJECTION, SOLUTION INTRA-ARTICULAR; INTRALESIONAL; INTRAMUSCULAR; INTRAVENOUS; SOFT TISSUE
Status: DISPENSED
Start: 2017-08-28

## (undated) RX ORDER — HYDROMORPHONE HYDROCHLORIDE 2 MG/1
TABLET ORAL
Status: DISPENSED
Start: 2017-05-15

## (undated) RX ORDER — HYDROMORPHONE HYDROCHLORIDE 1 MG/ML
INJECTION, SOLUTION INTRAMUSCULAR; INTRAVENOUS; SUBCUTANEOUS
Status: DISPENSED
Start: 2017-08-28

## (undated) RX ORDER — EPHEDRINE SULFATE 50 MG/ML
INJECTION, SOLUTION INTRAMUSCULAR; INTRAVENOUS; SUBCUTANEOUS
Status: DISPENSED
Start: 2017-08-28

## (undated) RX ORDER — LIDOCAINE HYDROCHLORIDE 10 MG/ML
INJECTION, SOLUTION EPIDURAL; INFILTRATION; INTRACAUDAL; PERINEURAL
Status: DISPENSED
Start: 2017-10-01

## (undated) RX ORDER — PIPERACILLIN SODIUM, TAZOBACTAM SODIUM 3; .375 G/15ML; G/15ML
INJECTION, POWDER, LYOPHILIZED, FOR SOLUTION INTRAVENOUS
Status: DISPENSED
Start: 2017-10-13

## (undated) RX ORDER — BUPIVACAINE HYDROCHLORIDE 2.5 MG/ML
INJECTION, SOLUTION EPIDURAL; INFILTRATION; INTRACAUDAL
Status: DISPENSED
Start: 2017-09-02

## (undated) RX ORDER — ERTAPENEM 1 G/1
INJECTION, POWDER, LYOPHILIZED, FOR SOLUTION INTRAMUSCULAR; INTRAVENOUS
Status: DISPENSED
Start: 2017-08-28

## (undated) RX ORDER — LIDOCAINE HYDROCHLORIDE 20 MG/ML
INJECTION, SOLUTION EPIDURAL; INFILTRATION; INTRACAUDAL; PERINEURAL
Status: DISPENSED
Start: 2017-10-13

## (undated) RX ORDER — DEXAMETHASONE SODIUM PHOSPHATE 4 MG/ML
INJECTION, SOLUTION INTRA-ARTICULAR; INTRALESIONAL; INTRAMUSCULAR; INTRAVENOUS; SOFT TISSUE
Status: DISPENSED
Start: 2017-10-13

## (undated) RX ORDER — PROPOFOL 10 MG/ML
INJECTION, EMULSION INTRAVENOUS
Status: DISPENSED
Start: 2017-10-13

## (undated) RX ORDER — LIDOCAINE HYDROCHLORIDE 10 MG/ML
INJECTION, SOLUTION EPIDURAL; INFILTRATION; INTRACAUDAL; PERINEURAL
Status: DISPENSED
Start: 2017-09-04

## (undated) RX ORDER — FENTANYL CITRATE 50 UG/ML
INJECTION, SOLUTION INTRAMUSCULAR; INTRAVENOUS
Status: DISPENSED
Start: 2017-10-12

## (undated) RX ORDER — DEXTROSE, SODIUM CHLORIDE, SODIUM LACTATE, POTASSIUM CHLORIDE, AND CALCIUM CHLORIDE 5; .6; .31; .03; .02 G/100ML; G/100ML; G/100ML; G/100ML; G/100ML
INJECTION, SOLUTION INTRAVENOUS
Status: DISPENSED
Start: 2017-08-28

## (undated) RX ORDER — ONDANSETRON 2 MG/ML
INJECTION INTRAMUSCULAR; INTRAVENOUS
Status: DISPENSED
Start: 2017-05-15

## (undated) RX ORDER — LIDOCAINE HYDROCHLORIDE 10 MG/ML
INJECTION, SOLUTION EPIDURAL; INFILTRATION; INTRACAUDAL; PERINEURAL
Status: DISPENSED
Start: 2017-10-14

## (undated) RX ORDER — ONDANSETRON 2 MG/ML
INJECTION INTRAMUSCULAR; INTRAVENOUS
Status: DISPENSED
Start: 2017-08-28

## (undated) RX ORDER — ROCURONIUM BROMIDE 50 MG/5 ML
SYRINGE (ML) INTRAVENOUS
Status: DISPENSED
Start: 2017-10-13

## (undated) RX ORDER — ACETAMINOPHEN 325 MG/1
TABLET ORAL
Status: DISPENSED
Start: 2017-08-28

## (undated) RX ORDER — LIDOCAINE HYDROCHLORIDE 20 MG/ML
INJECTION, SOLUTION EPIDURAL; INFILTRATION; INTRACAUDAL; PERINEURAL
Status: DISPENSED
Start: 2017-08-28